# Patient Record
Sex: FEMALE | Race: WHITE | NOT HISPANIC OR LATINO | Employment: OTHER | URBAN - METROPOLITAN AREA
[De-identification: names, ages, dates, MRNs, and addresses within clinical notes are randomized per-mention and may not be internally consistent; named-entity substitution may affect disease eponyms.]

---

## 2017-01-16 ENCOUNTER — LAB VISIT (OUTPATIENT)
Dept: LAB | Facility: HOSPITAL | Age: 67
End: 2017-01-16
Attending: INTERNAL MEDICINE
Payer: MEDICARE

## 2017-01-16 ENCOUNTER — TELEPHONE (OUTPATIENT)
Dept: HEMATOLOGY/ONCOLOGY | Facility: CLINIC | Age: 67
End: 2017-01-16

## 2017-01-16 ENCOUNTER — OFFICE VISIT (OUTPATIENT)
Dept: HEMATOLOGY/ONCOLOGY | Facility: CLINIC | Age: 67
End: 2017-01-16
Payer: MEDICARE

## 2017-01-16 VITALS
HEIGHT: 65 IN | SYSTOLIC BLOOD PRESSURE: 142 MMHG | OXYGEN SATURATION: 99 % | HEART RATE: 61 BPM | WEIGHT: 197.31 LBS | DIASTOLIC BLOOD PRESSURE: 80 MMHG | TEMPERATURE: 98 F | BODY MASS INDEX: 32.87 KG/M2 | RESPIRATION RATE: 18 BRPM

## 2017-01-16 DIAGNOSIS — C64.1 KIDNEY CARCINOMA, RIGHT: Primary | ICD-10-CM

## 2017-01-16 DIAGNOSIS — R30.0 DYSURIA: ICD-10-CM

## 2017-01-16 DIAGNOSIS — Z85.528 HISTORY OF RENAL CELL CARCINOMA: ICD-10-CM

## 2017-01-16 LAB
ALBUMIN SERPL BCP-MCNC: 3.7 G/DL
ALP SERPL-CCNC: 93 U/L
ALT SERPL W/O P-5'-P-CCNC: 14 U/L
ANION GAP SERPL CALC-SCNC: 7 MMOL/L
AST SERPL-CCNC: 18 U/L
BASOPHILS # BLD AUTO: 0.02 K/UL
BASOPHILS NFR BLD: 0.4 %
BILIRUB SERPL-MCNC: 0.8 MG/DL
BUN SERPL-MCNC: 12 MG/DL
CALCIUM SERPL-MCNC: 8.8 MG/DL
CHLORIDE SERPL-SCNC: 108 MMOL/L
CO2 SERPL-SCNC: 25 MMOL/L
CREAT SERPL-MCNC: 1.1 MG/DL
DIFFERENTIAL METHOD: NORMAL
EOSINOPHIL # BLD AUTO: 0.1 K/UL
EOSINOPHIL NFR BLD: 2.3 %
ERYTHROCYTE [DISTWIDTH] IN BLOOD BY AUTOMATED COUNT: 14 %
EST. GFR  (AFRICAN AMERICAN): >60 ML/MIN/1.73 M^2
EST. GFR  (NON AFRICAN AMERICAN): 52 ML/MIN/1.73 M^2
GLUCOSE SERPL-MCNC: 82 MG/DL
HCT VFR BLD AUTO: 42.9 %
HGB BLD-MCNC: 14.4 G/DL
LYMPHOCYTES # BLD AUTO: 1.5 K/UL
LYMPHOCYTES NFR BLD: 29 %
MCH RBC QN AUTO: 29.6 PG
MCHC RBC AUTO-ENTMCNC: 33.6 %
MCV RBC AUTO: 88 FL
MONOCYTES # BLD AUTO: 0.4 K/UL
MONOCYTES NFR BLD: 7.1 %
NEUTROPHILS # BLD AUTO: 3.2 K/UL
NEUTROPHILS NFR BLD: 61.2 %
PLATELET # BLD AUTO: 202 K/UL
PMV BLD AUTO: 9.9 FL
POTASSIUM SERPL-SCNC: 4.8 MMOL/L
PROT SERPL-MCNC: 6.6 G/DL
RBC # BLD AUTO: 4.86 M/UL
SODIUM SERPL-SCNC: 140 MMOL/L
WBC # BLD AUTO: 5.2 K/UL

## 2017-01-16 PROCEDURE — 99213 OFFICE O/P EST LOW 20 MIN: CPT | Mod: S$PBB,,, | Performed by: INTERNAL MEDICINE

## 2017-01-16 PROCEDURE — 99213 OFFICE O/P EST LOW 20 MIN: CPT | Mod: PBBFAC | Performed by: INTERNAL MEDICINE

## 2017-01-16 PROCEDURE — 99999 PR PBB SHADOW E&M-EST. PATIENT-LVL III: CPT | Mod: PBBFAC,,, | Performed by: INTERNAL MEDICINE

## 2017-01-16 NOTE — TELEPHONE ENCOUNTER
Patient is concerned about CT scan results. Message sent to Dr Patel. Will contact patient whenever Dr Patel replies.

## 2017-01-16 NOTE — TELEPHONE ENCOUNTER
----- Message from Rita Tai sent at 1/16/2017  1:51 PM CST -----  Contact: pt  Pt calling to speak to nurse...states that she was seen earlier today and has some questions/ concerns...please adv/call pt back at [phone]///thx jw

## 2017-01-16 NOTE — PROGRESS NOTES
Hematology/Oncology Consult Note    Reason for Consult: Evaluation and management of new bone pain in setting of renal cell carcinoma history    Consult requested by: Dr. Isamar Chun, Rheumatology    [facility] Diagnosis: Renal cell carcinoma of R kidney    Stage: III ([id number])    Pathology: Right nephrectomy 7/25/2013  1. RETROPERITONEAL TISSUE:  POSSIBLY AS MANY AS 5 LYMPH NODES WITH NO METASTATIC CARCINOMA IDENTIFIED  3. RIGHT KIDNEY:  RENAL CELL CARCINOMA (conventional type) WITH MICROSCOPIC EXTENSION INTO PERINEPHRIC ADIPOSE TISSUE AND  WITH TUMOR IN THE RENAL VEIN MARGIN    Prior Treatment: Laparascopic right radical nephrectomy 7/25/13    Current Treatment: Surveillance    History of Present Illness:  Mrs. Sherrill Avery is a 65-year-old  female with prior history of right renal cell carcinoma status post right nephrectomy in July 2013 who comes in for evaluation and management.  At time of initial diagnosis patient reports that she was having trouble bending over, being unable to breath. She then developed georgi hematuria on 6/31/13, went to [facility]. CT imaging showed a right renal mass.  She underwent R nephrectomy in 7/2013, with pathology showing RCC. Pathology report was reviewed between her surgeon, Dr. Correa and the pathologist in detail as it showed extension of the renal cell carcinoma in 2 perinephric adipose tissue and positive margin in the renal vein.  At that time, patient was counseled that there is no role for adjuvant treatment for renal cell carcinoma.  She has been followed closely by Dr. Correa in [place] with surveillance imaging including CT of abdomen pelvis and chest x-rays.  Patient reports having right thigh pain for approximately 6 months, but has had persistent aches and pains in her feet and hips for several years.  She is extremely anxious with the surveillance imaging she has been getting and she asked why she cannot have a PET scan or chest CT.  She denies  any recurrent fevers, chills, sweats, unintentional weight loss.  No nausea, vomiting, diarrhea.  No enlarged lymph nodes.  No hematuria.    Today, patient comes in for follow and surveillance of renal cell carcinoma. She saw Dr. Myers and had abd/pelvis CT on 12/1/16. Imaging showed stable findings, no suspicion of cancer. She asks today why she keeps having blood in her UA. She also reports that she has low back pain, which sometimes occurs during urinary tract infections. She denies any n/v/d/abdom pain, hematuria, dysuria, unintentional weight loss, fevers, chills, sweats, back pain.     ROS:  General:  No wt loss, fever/chills, fatigue, night sweats  Eyes: No vision problems, pain or inflammation.   Ears/Nose: No difficultly hearing, ear pain, or epistaxis, rhinorrhea  Oropharynx: No ulcers, dysphagia, or odynophagia  Cardiovascular: No chest pains, sob, PND or dyspnea on exertion  Pulmonary: No cough, sob, hemoptysis  Gastrointestional: No n/v/d, melena, hematochezia, or change in bowel habits  : No dysuria, hematuria, pelvic pain or flank pain  Musculoskeletal: No myalgias, weakness. Low back pain  Neurological: No headaches, focal deficits, or seizure activity, dizziness  Endocrine: No heat or cold intolerance   Skin: No rashes pruritus, or lesions  Psychiatric: No symptoms of mood disorders  Heme/Lymph: No lymph node enlargment    Past Medical History   Diagnosis Date    Anxiety     Arthritis     Cancer of kidney 8/2/2013     dr myers    Ectopic pregnancy     Eye infection     GERD (gastroesophageal reflux disease)     Hiatal hernia     History of kidney cancer     Hypercholesteremia     Hypertension     Hypothyroid      dr block q 6 months    Insomnia     Migraine headache     Renal cell cancer     Respiratory arrest      due to anesthia    Rosacea     Sleep apnea     Thyroid nodule      dr block    Urinary tract infection        Social History:  Social History     Social History     Marital status:      Spouse name: KAIDEN    Number of children: 5    Years of education: N/A     Occupational History    retired      Dance Instructor     Social History Main Topics    Smoking status: Never Smoker    Smokeless tobacco: Never Used    Alcohol use 0.0 oz/week      Comment: social    Drug use: No    Sexual activity: Yes     Partners: Male     Birth control/ protection: Surgical     Other Topics Concern    None     Social History Narrative    Patient is a retired dance instructor and live with .       Family History: family history includes Cancer in her father; Diabetes in her mother; Glaucoma in her paternal grandmother and sister; Heart disease in her mother; Hypertension in her mother. Father had small cell lung cancer age 76.    Physical Exam:  Vitals:    01/16/17 1135   BP: (!) 142/80   Pulse: 61   Resp: 18   Temp: 98.4 °F (36.9 °C)     Body mass index is 32.83 kg/(m^2).  General:  AAOx4, no acute distress  HEENT: EOMI. Normocephalic and atraumatic. No maxillary sinus tenderness. External auditory canals clear and TMs intact without lesions. Nasal and oral mucosal membranes moist. Normal dentition and gums.   Neck: no LAD, thyromegaly, normal ROM  Pulmonary: Bilaterally clear to auscultation, Normal effort with no accessory muscle use, no wheezes/rales/rhonchi  CV: Normal rate, regular rhythm, no murmurs/rubs/gallops, no edema  ABD:  Soft, nontender, nondistended, no mass, and without hepatosplenomegaly   Ext: No clubbing, cyanosis, or edema, normal ROM.   Skin: No rashes, lesions, bruising or petechiae  Neurological: No focal deficits, CN II to XII grossly intact, normal coordination. 5/5 strength in all 4 extremities   Psychiatric:  Normal affect and judgement, somewhat anxious.  Hem/Lymph:  No submandibular, cervical, supraclavicular, axillaryLAD.    Labs:    Lab Results   Component Value Date    WBC 5.20 01/16/2017    HGB 14.4 01/16/2017    HCT 42.9 01/16/2017    MCV  88 01/16/2017     01/16/2017     Lab Results   Component Value Date     01/16/2017    K 4.8 01/16/2017     01/16/2017    CO2 25 01/16/2017    BUN 12 01/16/2017    CREATININE 1.1 01/16/2017    CALCIUM 8.8 01/16/2017    ANIONGAP 7 (L) 01/16/2017    ESTGFRAFRICA >60 01/16/2017    EGFRNONAA 52 (A) 01/16/2017     Lab Results   Component Value Date    ALT 14 01/16/2017    AST 18 01/16/2017    ALKPHOS 93 01/16/2017    BILITOT 0.8 01/16/2017       No results found for: IRON, TIBC, FERRITIN, SATURATEDIRO  No results found for: [id number]  No results found for: FOLATE  Lab Results   Component Value Date    TSH 0.705 09/17/2013       Imaging:    CT abd/pelvis 12/1/16:  1. In this patient status post right nephrectomy, there is no evidence of local recurrence or metastatic disease.  2. New less than 4 mm pulmonary nodule in the anterior basal segment of the right lower lobe.  Stable 4 mm pulmonary nodule posterior basal segment right lower lobe.  Per Fleischner Society guidelines; in a low risk patient, no follow up recommended. In a high risk patient/smoker, consider 12 month CT chest follow up to evaluate for stability.  3. 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  4. Stable 1.1 cm hypodensity in the left hepatic lobe.  5. Other findings as detailed above.    Bone scan 2/2/16: Mild degenerative change with no acute findings.    Assessment / Plan:  Sherrill Avery is a 66 y.o. female with history of right-sided renal cell carcinoma comes in for evaluation and management    1. Renal cell carcinoma, right-sided: Stage III at time of diagnosis in July/2013.  Status post radical right nephrectomy.  No role for adjuvant chemotherapy. Has had no evidence of disease recurrence on past imaging.  Discussed in detail how PET scans differ from CT scans, specifically with several false positive findings on PET imaging.  Also discussed how PET is not as useful for surveillance but rather can be helpful in the  workup of abnormal CT findings.  Also discussed that insurance often does not cover pet imaging.  We reviewed that risks of and benefits of surveillance imaging have to balance the chance of identifying disease recurrence early with the risk of repeat radiation exposure. Reassured patient that there is no role for adjuvant chemotherapy for renal cell carcinoma.   -- Follow-up in 6 months with labs.   -- Pt also follows with Dr. Myers    2.  Low back pain, dysuria: UA with few bacteria and 8 RBCs.   -- Check urine culture. Will call pt with results. If urine culture negative, will ask Dr. Myers whether the microscopic hematuria needs any further workup.    3. Left knee pain: She wants to know if she needs a knee replacement. I offered evaluation first by Dr. Chun and then perhaps orthopedic surgery. Pt states she will be evaluated by Dr. Chun first.    4. Obesity: BMI > 30. Patient previously asked for recommendations on weight loss. I advised diet and exercise, specifically with calorie counting use a free mobile rush and walking 30 minutes a day as a start.    Purnima Patel M.D.  Hematology Oncology

## 2017-01-17 ENCOUNTER — TELEPHONE (OUTPATIENT)
Dept: HEMATOLOGY/ONCOLOGY | Facility: CLINIC | Age: 67
End: 2017-01-17

## 2017-01-17 NOTE — TELEPHONE ENCOUNTER
Explained Dr Wong advised to patient. Patient still insist that her cancer (with is non existence) is aggressive and waiting 1 year to re scan, would be too late. Explained to patient multiple times the guidelines. Patient still wants to be scanned more frequently. advised patient non reccommended exposure to radiation is harmful.  Patient still no agreeing with advice. Advised patient Dr Patel will contact her for further explanation.

## 2017-01-17 NOTE — TELEPHONE ENCOUNTER
----- Message from Purnima Patel MD sent at 1/17/2017  8:27 AM CST -----  Since CT scans are so sensitive and can  tiny pulmonary nodules which can represent scar tissue, infection and other benign (noncancerous findings), we follow Fleischner Society guidelines in how to follow these findings. I recommend we repeat the chest imaging in 1 yr based on these findings. Pulmonary nodules that are less than 1 cm and especially those less than 0.5cm are often seen on imaging and do not represent cancer.    I hope that helps.    Dr. Patel    ----- Message -----     From: Gabriella Neely LPN     Sent: 1/16/2017   2:05 PM       To: Purnima Patel MD    New less than 4 mm pulmonary nodule in the anterior basal segment of the right lower lobe.  Stable 4 mm pulmonary nodule posterior basal segment right lower lobe.  Per Fleischner Society guidelines; in a low risk patient, no follow up recommended. In a high risk patient/smoker, consider 12 month CT chest follow up to evaluate for stability.    Patient is concerned about this statement. Pleaser advise.

## 2017-01-23 ENCOUNTER — TELEPHONE (OUTPATIENT)
Dept: UROLOGY | Facility: CLINIC | Age: 67
End: 2017-01-23

## 2017-01-23 DIAGNOSIS — R31.29 HEMATURIA, MICROSCOPIC: Primary | ICD-10-CM

## 2017-01-23 NOTE — TELEPHONE ENCOUNTER
----- Message from Oleg Myers MD sent at 1/19/2017  7:40 AM CST -----  I think the only thing we should do is a cystoscopy. Will all of the prior imaging she does not need further upper tract imaging. If cysto negative she will need no further workup for microhematuria for 5 years. I will ask Kari to set her up. Also she could see one of our [facility] urologists to avoid a foggy ride to [facility].  ----- Message -----     From: Purnima Patel MD     Sent: 1/18/2017  12:58 PM       To: Oleg Myers MD    Hello. This patient sees me in Hem[facility]c clinic in [place] every 6 months as an adjunct to your visits, for follow up of RCC. I know she is an extremely anxious person, and I actually had a long discussion with her that it is not healthy for her to be so anxious and that she needs to trust her doctors. However, she did mention that she always has microscopic hematuria on her urinalyses. I checked a urine culture to ensure there was no infection, it was negative. Do you think she needs any further workup for the microscopic hematuria?    Thank you,  Purnima Patel

## 2017-02-02 ENCOUNTER — PROCEDURE VISIT (OUTPATIENT)
Dept: UROLOGY | Facility: CLINIC | Age: 67
End: 2017-02-02
Payer: MEDICARE

## 2017-02-02 VITALS
WEIGHT: 197.75 LBS | BODY MASS INDEX: 31.78 KG/M2 | TEMPERATURE: 98 F | HEIGHT: 66 IN | HEART RATE: 63 BPM | SYSTOLIC BLOOD PRESSURE: 127 MMHG | RESPIRATION RATE: 18 BRPM | DIASTOLIC BLOOD PRESSURE: 69 MMHG

## 2017-02-02 DIAGNOSIS — R31.29 MICROSCOPIC HEMATURIA: ICD-10-CM

## 2017-02-02 DIAGNOSIS — R31.29 HEMATURIA, MICROSCOPIC: ICD-10-CM

## 2017-02-02 PROCEDURE — 52000 CYSTOURETHROSCOPY: CPT | Mod: S$PBB,,, | Performed by: UROLOGY

## 2017-02-02 PROCEDURE — 52000 CYSTOURETHROSCOPY: CPT | Mod: PBBFAC | Performed by: UROLOGY

## 2017-02-02 RX ORDER — LIDOCAINE HYDROCHLORIDE 20 MG/ML
JELLY TOPICAL ONCE
Status: COMPLETED | OUTPATIENT
Start: 2017-02-02 | End: 2017-02-02

## 2017-02-02 RX ORDER — CIPROFLOXACIN 250 MG/1
500 TABLET, FILM COATED ORAL ONCE
Status: COMPLETED | OUTPATIENT
Start: 2017-02-02 | End: 2017-02-02

## 2017-02-02 RX ADMIN — LIDOCAINE HYDROCHLORIDE: 20 JELLY TOPICAL at 01:02

## 2017-02-02 RX ADMIN — CIPROFLOXACIN 500 MG: 250 TABLET, FILM COATED ORAL at 01:02

## 2017-02-02 NOTE — PATIENT INSTRUCTIONS
Cystoscopy  Cystoscopy is a procedure that lets your doctor look directly inside your urethra and bladder. It can be used to:  · Help diagnose a problem with your urethra, bladder, or kidneys.  · Take a sample (biopsy) of bladder or urethral tissue.  · Treat certain problems (such as removing kidney stones).  · Place a stent to bypass an obstruction.  · Take special X-rays of the kidneys.  Based on the findings, your doctor may recommend other tests or treatments.  What is a cystoscope?  A cystoscope is a telescope-like instrument that contains lenses and fiberoptics (small glass wires that make bright light). The cystoscope may be straight and rigid, or flexible to bend around curves in the urethra. The doctor may look directly into the cystoscope, or project the image onto a monitor.  Getting ready  · Ask your doctor if you should stop taking any medications prior to the procedure.  · Ask whether you should avoid eating or drinking anything after midnight before the procedure.  · Follow any other instructions your doctor gives you.  Tell your doctor before the exam if you:  · Take any medications, such as aspirin or blood thinners  · Have allergies to any medications  · Are pregnant   The procedure  Cystoscopy is done in the doctors office or hospital. The doctor and a nurse are present during the procedure. It takes only a few minutes, longer if a biopsy, X-ray, or treatment needs to be done.  During the procedure:  · You lie on an exam table on your back, knees bent and legs apart. You are covered with a drape.  · Your urethra and the area around it are washed. Anesthetic jelly may be applied to numb the urethra. Other pain medication is usually not needed. In some cases, you may be offered a mild sedative to help you relax. If a more extensive procedure is to be done, such as a biopsy or kidney stone removal, general anesthesia may be needed.  · The cystoscope is inserted. A sterile fluid is put into the  bladder to expand it. You may feel pressure from this fluid.  · When the procedure is done, the cystoscope is removed.  After the procedure  If you had a sedative, general anesthesia, or spinal anesthesia, you must have someone drive you home. Once youre home:  · Drink plenty of fluids.  · You may have burning or light bleeding when you urinate--this is normal.  · Medications may be prescribed to ease any discomfort or prevent infection. Take these as directed.  · Call your doctor if you have heavy bleeding or blood clots, burning that lasts more than a day, a fever over 100°F  (38° C), or trouble urinating.  © 0640-2024 [employer]. [address] [place] [address], [place] [address]. All rights reserved. This information is not intended as a substitute for professional medical care. Always follow your healthcare professional's instructions.      What to Expect After a Cystoscopy  For the next 24-48 hours, you may feel a mild burning when you urinate. This burning is normal and expected. Drink plenty of water to dilute the urine to help relieve the burning sensation. You may also see a small amount of blood in your urine after the procedure.    Unless you are already taking antibiotics, you may be given an antibiotic after the test to prevent infection.    Signs and Symptoms to Report  Call the [facility] at [phone] if you develop any of the following:  · Fever of 101 degrees or higher  · Chills or persistent bleeding  · Inability to urinate

## 2017-02-02 NOTE — MR AVS SNAPSHOT
[facility]  [address]  [facility] [address]  Phone: [phone]                  Sherrill Avery   2017 1:00 PM   Procedure visit    Description:  Female : 1950   Provider:  PROCEDURES, UROLOGY   Department:  [facility]           Reason for Visit     Other           Diagnoses this Visit        Comments    Hematuria, microscopic         Microscopic hematuria                To Do List           Future Appointments        Provider Department Dept Phone    2017 10:40 AM LABORATORY, [address] [facility] [phone]    2017 11:00 AM Purnima Patel MD [facility] - Hematology Oncology [phone]      Goals (5 Years of Data)     None      [facility]     [facility] -  Assistance  Registered nurses in the [facility] provide clinical advisement, health education, appointment booking, and other advisory services.  Call for this free service at [phone].             Medications           Message regarding Medications     Verify the changes and/or additions to your medication regime listed below are the same as discussed with your clinician today.  If any of these changes or additions are incorrect, please notify your healthcare provider.        These medications were administered today        Dose Freq    lidocaine HCl 2% urojet  Once    Sig: Place into the urethra once.    Class: Normal    Route: Urethral    ciprofloxacin HCl tablet 500 mg 500 mg Once    Sig: Take 2 tablets (500 mg total) by mouth once.    Class: Normal    Route: Oral           Verify that the below list of medications is an accurate representation of the medications you are currently taking.  If none reported, the list may be blank. If incorrect, please contact your healthcare provider. Carry this list with you in case of emergency.           Current Medications     alprazolam (XANAX) 0.5 MG tablet Take 1 tablet (0.5 mg  "total) by mouth nightly as needed.    butalbital-acetaminophen-caffeine -40 mg (FIORICET, ESGIC) -40 mg per tablet TAKE 1 TABLET EVERY 4 HOURS AS NEEDED    clotrimazole (LOTRIMIN) 1 % Soln Apply topically 2 (two) times daily.    cycloSPORINE (RESTASIS) 0.05 % ophthalmic emulsion Place 1 drop into both eyes 2 (two) times daily.     estradiol (ESTRACE) 1 MG tablet Take 1 tablet (1 mg total) by mouth once daily.    fluticasone (FLONASE) 50 mcg/actuation nasal spray 2 sprays by Each Nare route once daily.    GLUCOSAMINE HCL/CHONDR HERNDON A NA (OSTEO BI-FLEX ORAL) Take by mouth.    hydrocortisone 2.5 % cream Apply to the affected area twice daily.    ketoconazole (NIZORAL) 2 % shampoo Shampoo three times weekly for 4 weeks then as needed.    levothyroxine (SYNTHROID) 100 MCG tablet Take 100 mcg by mouth once daily.    zolpidem (AMBIEN) 5 MG Tab Take 1 tablet (5 mg total) by mouth nightly as needed.    calcium-vitamin D3 500 mg(1,250mg) -200 unit per tablet Take 1 tablet by mouth.    diclofenac sodium 1 % Gel Apply 2 g topically 3 (three) times daily.           Clinical Reference Information           Your Vitals Were     BP Pulse Temp Resp Height Weight    131/70 69 97.7 °F (36.5 °C) (Oral) 18 5' 5.5" (1.664 m) 89.7 kg (197 lb 12 oz)    BMI                32.41 kg/m2          Blood Pressure          Most Recent Value    BP  131/70      Allergies as of 2/2/2017     Tramadol    Codeine    Morphine    Naproxen    Protonix [Pantoprazole]    Wal-phed [Pseudoephedrine Hcl]    Buspirone    Talwin [Pentazocine Lactate]      Immunizations Administered on Date of Encounter - 2/2/2017     None      Orders Placed During Today's Visit      Normal Orders This Visit    Cystoscopy     Future Labs/Procedures Expected by Expires    Cystoscopy  As directed 2/2/2018      [facility] Sign-Up     Activating your [facility] account is as easy as 1-2-3!     1) Visit [email or web address], select Sign Up Now, enter this activation code and your " date of birth, then select Next.  [email or web address]  Expires: 3/19/2017  1:16 PM      2) Create a username and password to use when you visit [facility] in the future and select a security question in case you lose your password and select Next.    3) Enter your e-mail address and click Sign Up!    Additional Information  If you have questions, please e-mail [email or web address] or call [phone] to talk to our [email or web address] staff. Remember, [facility] is NOT to be used for urgent needs. For medical emergencies, dial 911.         Instructions        Cystoscopy  Cystoscopy is a procedure that lets your doctor look directly inside your urethra and bladder. It can be used to:  · Help diagnose a problem with your urethra, bladder, or kidneys.  · Take a sample (biopsy) of bladder or urethral tissue.  · Treat certain problems (such as removing kidney stones).  · Place a stent to bypass an obstruction.  · Take special X-rays of the kidneys.  Based on the findings, your doctor may recommend other tests or treatments.  What is a cystoscope?  A cystoscope is a telescope-like instrument that contains lenses and fiberoptics (small glass wires that make bright light). The cystoscope may be straight and rigid, or flexible to bend around curves in the urethra. The doctor may look directly into the cystoscope, or project the image onto a monitor.  Getting ready  · Ask your doctor if you should stop taking any medications prior to the procedure.  · Ask whether you should avoid eating or drinking anything after midnight before the procedure.  · Follow any other instructions your doctor gives you.  Tell your doctor before the exam if you:  · Take any medications, such as aspirin or blood thinners  · Have allergies to any medications  · Are pregnant   The procedure  Cystoscopy is done in the doctors office or hospital. The doctor and a nurse are present during the procedure. It takes only a few minutes, longer if a biopsy, X-ray, or  treatment needs to be done.  During the procedure:  · You lie on an exam table on your back, knees bent and legs apart. You are covered with a drape.  · Your urethra and the area around it are washed. Anesthetic jelly may be applied to numb the urethra. Other pain medication is usually not needed. In some cases, you may be offered a mild sedative to help you relax. If a more extensive procedure is to be done, such as a biopsy or kidney stone removal, general anesthesia may be needed.  · The cystoscope is inserted. A sterile fluid is put into the bladder to expand it. You may feel pressure from this fluid.  · When the procedure is done, the cystoscope is removed.  After the procedure  If you had a sedative, general anesthesia, or spinal anesthesia, you must have someone drive you home. Once youre home:  · Drink plenty of fluids.  · You may have burning or light bleeding when you urinate--this is normal.  · Medications may be prescribed to ease any discomfort or prevent infection. Take these as directed.  · Call your doctor if you have heavy bleeding or blood clots, burning that lasts more than a day, a fever over 100°F  (38° C), or trouble urinating.  © 7223-3716 [employer]. [address] [place] [address], [place] [id number]. All rights reserved. This information is not intended as a substitute for professional medical care. Always follow your healthcare professional's instructions.      What to Expect After a Cystoscopy  For the next 24-48 hours, you may feel a mild burning when you urinate. This burning is normal and expected. Drink plenty of water to dilute the urine to help relieve the burning sensation. You may also see a small amount of blood in your urine after the procedure.    Unless you are already taking antibiotics, you may be given an antibiotic after the test to prevent infection.    Signs and Symptoms to Report  Call the [facility] at [phone] if you develop any of the  following:  · Fever of 101 degrees or higher  · Chills or persistent bleeding  · Inability to urinate       Language Assistance Services     ATTENTION: Language assistance services are available, free of charge. Please call [phone].      ATENCIÓN: Si ananth crawford, tiene a del toro disposición servicios gratuitos de asistencia lingüística. Llame al [phone].     CHÚ Ý: N?u b?n nói Ti?ng Vi?t, có các d?ch v? h? tr? ngôn ng? mi?n phí dành cho b?n. G?i s? [phone].         [facility] complies with applicable Federal civil rights laws and does not discriminate on the basis of race, color, national origin, age, disability, or sex.

## 2017-02-08 ENCOUNTER — OFFICE VISIT (OUTPATIENT)
Dept: PULMONOLOGY | Facility: CLINIC | Age: 67
End: 2017-02-08
Payer: MEDICARE

## 2017-02-08 VITALS
SYSTOLIC BLOOD PRESSURE: 110 MMHG | DIASTOLIC BLOOD PRESSURE: 70 MMHG | OXYGEN SATURATION: 96 % | WEIGHT: 195.56 LBS | HEART RATE: 70 BPM | BODY MASS INDEX: 31.43 KG/M2 | HEIGHT: 66 IN

## 2017-02-08 DIAGNOSIS — R91.8 LUNG NODULES: Primary | Chronic | ICD-10-CM

## 2017-02-08 PROCEDURE — 99999 PR PBB SHADOW E&M-EST. PATIENT-LVL III: CPT | Mod: PBBFAC,,, | Performed by: INTERNAL MEDICINE

## 2017-02-08 PROCEDURE — 99213 OFFICE O/P EST LOW 20 MIN: CPT | Mod: PBBFAC | Performed by: INTERNAL MEDICINE

## 2017-02-08 PROCEDURE — 99205 OFFICE O/P NEW HI 60 MIN: CPT | Mod: S$PBB,,, | Performed by: INTERNAL MEDICINE

## 2017-02-08 NOTE — PROGRESS NOTES
Subjective:      Patient ID: Sherrill Avery is a 66 y.o. female.    Patient Active Problem List   Diagnosis    GERD (gastroesophageal reflux disease)    Esophageal reflux    Hypothyroidism    Nontoxic multinodular goiter    Perceptive hearing loss, both sides    Osteoarthritis    Fibromyalgia    Other chronic dermatitis due to solar radiation    Benign neoplasm of skin    Family history of malignant neoplasm    Inflamed seborrheic keratosis    Dyschromia    Other dermatitis due to solar radiation    Actinic degeneration    Dermatofibroma    Family history of malignant melanoma    Family history of skin cancer    Bilateral sensorineural hearing loss    Hypertension    Hypercholesteremia    Kidney carcinoma    Multiple benign melanocytic nevi    Hip pain    Body aches    Neuropathy of left sural nerve-mild    Memory deficits    Vitamin D deficiency    Anxiety    Insomnia    Vertigo    Urinary tract infection without hematuria    Microscopic hematuria    Lung nodules     Problem list has been reviewed.    Chief Complaint: Pulmonary Nodules    HPI    She presents for evaluation and treatment of a lung nodule. She has a history of high grade renal cell cancer pT3a, grade 2/4. She underwent radical right nephrectomy resection in 07/25/2013. Surveillance imaging has shown non recurrence. The most recent imaging revealed a stable less than 4 mm pulmonary nodule in the posterior basal segment of the right lower lobe. She is her to evaluate lung nodules. Other symptoms include dyspnea on exertion. She reports dyspnea with bending over and going upstairs but no dyspnea with walking on flat ground.   She has never smoked. The patient has no known exposure to tuberculosis.       Previous Report Reviewed: office notes and radiology reports     The following portions of the patient's history were reviewed and updated as appropriate: She  has a past medical history of Anxiety; Arthritis; Cancer of  kidney (8/2/2013); Ectopic pregnancy; Eye infection; GERD (gastroesophageal reflux disease); Hiatal hernia; History of kidney cancer; Hypercholesteremia; Hypertension; Hypothyroid; Insomnia; Migraine headache; Renal cell cancer; Respiratory arrest; Rosacea; Sleep apnea; Thyroid nodule; and Urinary tract infection.  She  has a past surgical history that includes right ganglion cyst; throat polyp; breast implants; Laparoscopic nephrectomy, hand assisted (07/25/2013); Nephrectomy; Upper gastrointestinal endoscopy (2007); Colonoscopy (2007); Oophorectomy; Transobturator sling (2011); and Hysterectomy.  Her family history includes Cancer in her father; Diabetes in her mother; Glaucoma in her paternal grandmother and sister; Heart disease in her mother; Hypertension in her mother.  She  reports that she has never smoked. She has never used smokeless tobacco. She reports that she drinks alcohol. She reports that she does not use illicit drugs.  She has a current medication list which includes the following prescription(s): alprazolam, butalbital-acetaminophen-caffeine -40 mg, calcium-vitamin d3, clotrimazole, cyclosporine, estradiol, fluticasone, glucosamine hcl/chondr del toro a na, hydrocortisone, ketoconazole, levothyroxine, and zolpidem.  She is allergic to tramadol; codeine; morphine; naproxen; protonix [pantoprazole]; wal-phed [pseudoephedrine hcl]; buspirone; and talwin [pentazocine lactate]..    Review of Systems   Constitutional: Negative for chills, fatigue and night sweats.   HENT: Positive for sinus pressure and congestion. Negative for nosebleeds, postnasal drip and hearing loss.    Eyes: Positive for itching.   Respiratory: Positive for snoring and dyspnea on extertion.    Cardiovascular: Positive for chest pain.   Genitourinary: Positive for hematuria.   Endocrine: Positive for heat intolerance.    Musculoskeletal: Positive for arthralgias and back pain.   Gastrointestinal: Positive for acid reflux. Negative  "for nausea, vomiting and abdominal pain.        Constipation   Neurological: Positive for dizziness.   Psychiatric/Behavioral: The patient is nervous/anxious.      Occupational History:  Retired. Was a dance instructor retired in 1995. Denies occupational exposure to asbestos, silica and petrochemicals. Her father used to work in the shipyard and yard to bring asbestos home. Father and both brothers have asbestosis. She was exposed to asbestos at home.     Avocational Exposure:  None currently.    Pet Exposures:  Dogs. Denies allergy to dog dander.    Objective:     Visit Vitals    /70    Pulse 70    Ht 5' 5.5" (1.664 m)    Wt 88.7 kg (195 lb 8.8 oz)    SpO2 96%    BMI 32.05 kg/m2     Body mass index is 32.05 kg/(m^2).    Physical Exam   Constitutional: She is oriented to person, place, and time. She appears well-developed and well-nourished.   HENT:   Head: Normocephalic and atraumatic.   Right Ear: External ear normal.   Left Ear: External ear normal.   Nose: Nose normal.   Mouth/Throat: Oropharynx is clear and moist.   Eyes: Conjunctivae and EOM are normal. Pupils are equal, round, and reactive to light.   Neck: Normal range of motion.   Cardiovascular: Normal rate, regular rhythm and normal heart sounds.  Exam reveals no gallop and no friction rub.    No murmur heard.  Pulmonary/Chest: Effort normal and breath sounds normal.   Abdominal: Soft. Bowel sounds are normal. She exhibits no distension. There is no hepatosplenomegaly.   Musculoskeletal: Normal range of motion.   Neurological: She is alert and oriented to person, place, and time.   Skin: Skin is warm. No rash noted.   Psychiatric: She has a normal mood and affect. Her behavior is normal. Judgment and thought content normal.   Vitals reviewed.      Personal Diagnostic Review  CT of chest performed on 12/01/16 with contrast: There are partially visualized breast implants. The lung bases demonstrate no evidence of atelectasis or pleural " effusion. There is a new less than 4 mm pulmonary nodule in the anterior basal segment of the right lower lobe and a stable less than 4 mm pulmonary nodule in the posterior basal segment of the right lower lobe.      The visualized portions of the heart appear normal.    The liver is normal in size and attenuation.  No focal hepatic abnormality is seen.  There is a stable 1.1 cm hypodensity in the left hepatic lobe which likely represents a hepatic cyst and compared to 5/2016 and 2/2015. The gallbladder is unremarkable.  No intrahepatic or extrahepatic biliary ductal dilatation.    The stomach, spleen, pancreas, and adrenal glands are unremarkable.    The right kidney is surgically absent. There is no evidence of recurrence of disease in the right renal fossa. The left kidney is normal in size and location.. The left kidney concentrate and excrete contrast appropriately.  No hydronephrosis is seen.  2 ureters are identified on the left. There is a left parapelvic cyst. The urinary bladder and prostate are unremarkable. The uterus is surgically removed.    The visualized loops of small and large bowel show no evidence of obstruction or inflammation.    No ascites, free fluid, or intraperitoneal free air is noted.    There is no evidence of lymph node enlargement in the abdomen or pelvis.    The abdominal aorta is normal in course and caliber without significant atherosclerotic calcifications.    The osseous structures demonstrate no evidence of acute fracture or osseous destructive process.      The extraperitoneal soft tissues are unremarkable.    Assessment:     1. Lung nodules Active     Outpatient Encounter Prescriptions as of 2/8/2017   Medication Sig Dispense Refill    alprazolam (XANAX) 0.5 MG tablet Take 1 tablet (0.5 mg total) by mouth nightly as needed. 90 tablet 0    butalbital-acetaminophen-caffeine -40 mg (FIORICET, ESGIC) -40 mg per tablet TAKE 1 TABLET EVERY 4 HOURS AS NEEDED 30 tablet 0     calcium-vitamin D3 500 mg(1,250mg) -200 unit per tablet Take 1 tablet by mouth.      clotrimazole (LOTRIMIN) 1 % Soln Apply topically 2 (two) times daily. 30 mL 3    cycloSPORINE (RESTASIS) 0.05 % ophthalmic emulsion Place 1 drop into both eyes 2 (two) times daily.       estradiol (ESTRACE) 1 MG tablet Take 1 tablet (1 mg total) by mouth once daily. 30 tablet 0    fluticasone (FLONASE) 50 mcg/actuation nasal spray 2 sprays by Each Nare route once daily. 1 Bottle 12    GLUCOSAMINE HCL/CHONDR HERNDON A NA (OSTEO BI-FLEX ORAL) Take by mouth.      hydrocortisone 2.5 % cream Apply to the affected area twice daily.      ketoconazole (NIZORAL) 2 % shampoo Shampoo three times weekly for 4 weeks then as needed.      levothyroxine (SYNTHROID) 100 MCG tablet Take 100 mcg by mouth once daily.      zolpidem (AMBIEN) 5 MG Tab Take 1 tablet (5 mg total) by mouth nightly as needed. 90 tablet 1    [DISCONTINUED] diclofenac sodium 1 % Gel Apply 2 g topically 3 (three) times daily. 100 g 5     No facility-administered encounter medications on file as of 2/8/2017.      Orders Placed This Encounter   Procedures    CT Chest With Contrast     Standing Status:   Future     Standing Expiration Date:   2/8/2018     Order Specific Question:   Reason for Exam:     Answer:   lung nodules     Order Specific Question:   Is the patient allergic to iodine or contrast? Has a steroid / antihistamine prep been administered?     Answer:   No     Order Specific Question:   Is the patient on ANY Metformin drug such as Glugophage/Glucovance?           Should be off drug 48 hours after contrast. Check renal function before restart.     Answer:   No     Order Specific Question:   Age > 60 years?     Answer:   Yes     Order Specific Question:   History of Kidney Disease - including: decreased kidney function, dialysis, kidney transplay, single kidney, kidney cancer, kidney surgery?     Answer:   Kidney Cancer     Order Specific Question:   Does the  patient have high blood preasure requiring medical treatment?     Answer:   No     Order Specific Question:   Diabetes?     Answer:   No     Order Specific Question:   May the Radiologist modify the order per protocol to meet the clinical needs of the patient?     Answer:   Yes     Order Specific Question:   Recist criteria?     Answer:   No     Order Specific Question:   Will this service be billed to a Worker's Comp policy?     Answer:   No     Plan:     Discussed diagnosis, its evaluation, treatment and usual course. All questions answered.     Lung nodules  Repeat CT chest in 3 months.    TIME SPENT WITH PATIENT: Time spent: 60 minutes in face to face  discussion concerning diagnosis, prognosis, review of lab and test results, benefits of treatment as well as management of disease, counseling of patient and coordination of care between various health  care providers . Greater than half the time spent was used for coordination of care and counseling of patient.       Return in about 3 weeks (around 3/1/2017) for Lung Nodule.

## 2017-02-08 NOTE — PATIENT INSTRUCTIONS
Lung Anatomy  Your lungs take air in to give your body oxygen, which the body needs to work. Your lungs, like all the tissues in your body, are made up of billions of tiny specialized cells. Old lung cells die and are replaced by new, identical lung cells. This natural process helps ensure healthy lungs.    Date Last Reviewed: 11/1/2016  © 2733-0857 [employer]. [address] [place] [address]. All rights reserved. This information is not intended as a substitute for professional medical care. Always follow your healthcare professional's instructions.

## 2017-02-27 DIAGNOSIS — R91.8 LUNG NODULES: Primary | ICD-10-CM

## 2017-03-01 ENCOUNTER — OFFICE VISIT (OUTPATIENT)
Dept: PULMONOLOGY | Facility: CLINIC | Age: 67
End: 2017-03-01
Payer: MEDICARE

## 2017-03-01 ENCOUNTER — HOSPITAL ENCOUNTER (OUTPATIENT)
Dept: RADIOLOGY | Facility: HOSPITAL | Age: 67
Discharge: HOME | End: 2017-03-01
Attending: INTERNAL MEDICINE
Payer: MEDICARE

## 2017-03-01 VITALS
OXYGEN SATURATION: 97 % | HEIGHT: 65 IN | SYSTOLIC BLOOD PRESSURE: 130 MMHG | DIASTOLIC BLOOD PRESSURE: 82 MMHG | BODY MASS INDEX: 32.83 KG/M2 | RESPIRATION RATE: 18 BRPM | HEART RATE: 18 BPM | WEIGHT: 197.06 LBS

## 2017-03-01 DIAGNOSIS — R91.8 MULTIPLE LUNG NODULES ON CT: Primary | Chronic | ICD-10-CM

## 2017-03-01 DIAGNOSIS — R91.8 LUNG NODULES: Chronic | ICD-10-CM

## 2017-03-01 PROCEDURE — 99213 OFFICE O/P EST LOW 20 MIN: CPT | Mod: PBBFAC | Performed by: INTERNAL MEDICINE

## 2017-03-01 PROCEDURE — 99215 OFFICE O/P EST HI 40 MIN: CPT | Mod: S$PBB,,, | Performed by: INTERNAL MEDICINE

## 2017-03-01 PROCEDURE — 99999 PR PBB SHADOW E&M-EST. PATIENT-LVL III: CPT | Mod: PBBFAC,,, | Performed by: INTERNAL MEDICINE

## 2017-03-01 RX ADMIN — IOHEXOL 75 ML: 350 INJECTION, SOLUTION INTRAVENOUS at 08:03

## 2017-03-01 NOTE — PROGRESS NOTES
Subjective:      Patient ID: Sherrill Avery is a 66 y.o. female.    Patient Active Problem List   Diagnosis    GERD (gastroesophageal reflux disease)    Esophageal reflux    Hypothyroidism    Nontoxic multinodular goiter    Perceptive hearing loss, both sides    Osteoarthritis    Fibromyalgia    Other chronic dermatitis due to solar radiation    Benign neoplasm of skin    Family history of malignant neoplasm    Inflamed seborrheic keratosis    Dyschromia    Other dermatitis due to solar radiation    Actinic degeneration    Dermatofibroma    Family history of malignant melanoma    Family history of skin cancer    Bilateral sensorineural hearing loss    Hypertension    Hypercholesteremia    Kidney carcinoma    Multiple benign melanocytic nevi    Hip pain    Body aches    Neuropathy of left sural nerve-mild    Memory deficits    Vitamin D deficiency    Anxiety    Insomnia    Vertigo    Urinary tract infection without hematuria    Microscopic hematuria    Multiple lung nodules on CT     Problem list has been reviewed.    Chief Complaint: Pulmonary Nodules    HPI   Follow up for lung nodules. Review of CT chest. She is accompanied by her spouse. CT chest reviewed with pateint who voiced understanding. She states that she  is fine and has no specific pulmonary complaints. She denies cough sputum, hemoptysis,  pain with breathing, wheezing, asthma.   A full  review of systems, past , family  and social histories was performed except as mentioned in the note above, these are non contributory to the main issues discussed  today    Previous Report Reviewed: office notes     The following portions of the patient's history were reviewed and updated as appropriate: She  has a past medical history of Anxiety; Arthritis; Cancer of kidney (8/2/2013); Ectopic pregnancy; Eye infection; GERD (gastroesophageal reflux disease); Hiatal hernia; History of kidney cancer; Hypercholesteremia; Hypertension;  Hypothyroid; Insomnia; Migraine headache; Renal cell cancer; Respiratory arrest; Rosacea; Sleep apnea; Thyroid nodule; and Urinary tract infection.  She  has a past surgical history that includes right ganglion cyst; throat polyp; breast implants; Laparoscopic nephrectomy, hand assisted (07/25/2013); Nephrectomy; Upper gastrointestinal endoscopy (2007); Colonoscopy (2007); Oophorectomy; Transobturator sling (2011); and Hysterectomy.  Her family history includes Cancer in her father; Diabetes in her mother; Glaucoma in her paternal grandmother and sister; Heart disease in her mother; Hypertension in her mother.  She  reports that she has never smoked. She has never used smokeless tobacco. She reports that she drinks alcohol. She reports that she does not use illicit drugs.  She has a current medication list which includes the following prescription(s): alprazolam, butalbital-acetaminophen-caffeine -40 mg, calcium-vitamin d3, clotrimazole, cyclosporine, estradiol, fluticasone, glucosamine hcl/chondr del toro a na, hydrocortisone, ketoconazole, levothyroxine, and zolpidem.  She is allergic to tramadol; codeine; morphine; naproxen; protonix [pantoprazole]; wal-phed [pseudoephedrine hcl]; buspirone; and talwin [pentazocine lactate]..    Review of Systems   Constitutional: Negative for chills, fatigue and night sweats.   HENT: Positive for sinus pressure and congestion. Negative for nosebleeds, postnasal drip and hearing loss.    Eyes: Positive for itching.   Respiratory: Positive for snoring and dyspnea on extertion.    Cardiovascular: Positive for chest pain.   Genitourinary: Positive for hematuria.   Endocrine: Positive for heat intolerance.    Musculoskeletal: Positive for arthralgias and back pain.   Gastrointestinal: Positive for acid reflux. Negative for nausea, vomiting and abdominal pain.        Constipation   Neurological: Positive for dizziness.   Psychiatric/Behavioral: The patient is nervous/anxious.    All  "other systems reviewed and are negative.     Objective:   /82  Pulse (!) 18  Resp 18  Ht 5' 5" (1.651 m)  Wt 89.4 kg (197 lb 1.5 oz)  SpO2 97%  BMI 32.8 kg/m2  Body mass index is 32.8 kg/(m^2).    Physical Exam   Constitutional: She is oriented to person, place, and time. She appears well-developed and well-nourished.   HENT:   Head: Normocephalic and atraumatic.   Right Ear: External ear normal.   Left Ear: External ear normal.   Nose: Nose normal.   Mouth/Throat: Oropharynx is clear and moist.   Eyes: Conjunctivae and EOM are normal. Pupils are equal, round, and reactive to light.   Neck: Normal range of motion.   Cardiovascular: Normal rate, regular rhythm and normal heart sounds.  Exam reveals no gallop and no friction rub.    No murmur heard.  Pulmonary/Chest: Effort normal and breath sounds normal.   Abdominal: Soft. Bowel sounds are normal. She exhibits no distension. There is no hepatosplenomegaly.   Musculoskeletal: Normal range of motion.   Neurological: She is alert and oriented to person, place, and time.   Skin: Skin is warm. No rash noted.   Psychiatric: She has a normal mood and affect. Her behavior is normal. Judgment and thought content normal.   Vitals reviewed.      Personal Diagnostic Review  CT of chest performed on 03/01/17 with contrast:     There are 5 subcentimeter nodules in the right lung base measuring up to 6 mm which are all new since prior chest CT and 4 of which are new since recent abdominal CT dated 12/01/2016.  There is a stable 3 mm subpleural nodule in the posterior right lung base.  There is a new 5 mm nodule in the right lower lobe infrahilar region.  There is a new 4 mm subpleural nodule right middle lobe.  There is a 6 mm benign-appearing subpleural nodular opacity in the medial left lung base stable compared to recent abdominal CT, new since prior chest CT.  No other suspicious lung nodules are identified.  No dominant lung mass identified.  Patchy bibasilar " dependent atelectasis is noted. The lungs are otherwise clear. There is no acute infiltrate, pleural effusion, or pneumothorax.  The airways are patent.  No pathologic lymphadenopathy is seen in the chest. There is no significant cardiomegaly or pericardial effusion. No aortic aneurysm or dissection identified. Negative for acute fracture or suspicious osseous lesions. No acute disease or suspicious mass seen in the upper abdomen.  Stable simple hepatic cyst.    Assessment:     1. Multiple lung nodules on CT Active     Outpatient Encounter Prescriptions as of 3/1/2017   Medication Sig Dispense Refill    alprazolam (XANAX) 0.5 MG tablet Take 1 tablet (0.5 mg total) by mouth nightly as needed. 90 tablet 0    butalbital-acetaminophen-caffeine -40 mg (FIORICET, ESGIC) -40 mg per tablet TAKE 1 TABLET EVERY 4 HOURS AS NEEDED 30 tablet 0    calcium-vitamin D3 500 mg(1,250mg) -200 unit per tablet Take 1 tablet by mouth.      clotrimazole (LOTRIMIN) 1 % Soln Apply topically 2 (two) times daily. 30 mL 3    cycloSPORINE (RESTASIS) 0.05 % ophthalmic emulsion Place 1 drop into both eyes 2 (two) times daily.       estradiol (ESTRACE) 1 MG tablet Take 1 tablet (1 mg total) by mouth once daily. 30 tablet 0    fluticasone (FLONASE) 50 mcg/actuation nasal spray 2 sprays by Each Nare route once daily. 1 Bottle 12    GLUCOSAMINE HCL/CHONDR HERNDON A NA (OSTEO BI-FLEX ORAL) Take by mouth.      hydrocortisone 2.5 % cream Apply to the affected area twice daily.      ketoconazole (NIZORAL) 2 % shampoo Shampoo three times weekly for 4 weeks then as needed.      levothyroxine (SYNTHROID) 100 MCG tablet Take 100 mcg by mouth once daily.      zolpidem (AMBIEN) 5 MG Tab Take 1 tablet (5 mg total) by mouth nightly as needed. 90 tablet 1     Facility-Administered Encounter Medications as of 3/1/2017   Medication Dose Route Frequency Provider Last Rate Last Dose    [COMPLETED] omnipaque 350 iohexol 75 mL  75 mL Intravenous ONCE  PRN Oleg Shah MD   75 mL at 03/01/17 0840     Orders Placed This Encounter   Procedures    CT Chest With Contrast     Standing Status:   Future     Standing Expiration Date:   3/1/2018     Order Specific Question:   Reason for Exam:     Answer:   Multiple lung nodules     Order Specific Question:   Is the patient allergic to iodine or contrast? Has a steroid / antihistamine prep been administered?     Answer:   No     Order Specific Question:   Is the patient on ANY Metformin drug such as Glugophage/Glucovance?           Should be off drug 48 hours after contrast. Check renal function before restart.     Answer:   No     Order Specific Question:   Age > 60 years?     Answer:   Yes     Order Specific Question:   History of Kidney Disease - including: decreased kidney function, dialysis, kidney transplay, single kidney, kidney cancer, kidney surgery?     Answer:   Kidney Cancer     Order Specific Question:   Does the patient have high blood preasure requiring medical treatment?     Answer:   No     Order Specific Question:   Diabetes?     Answer:   No     Order Specific Question:   May the Radiologist modify the order per protocol to meet the clinical needs of the patient?     Answer:   Yes     Order Specific Question:   Recist criteria?     Answer:   No     Plan:     Discussed diagnosis, its evaluation, treatment and usual course. All questions answered.    Multiple lung nodules on CT  ? Metastatic disease. Subcentimeter nodules . Repeat CT chest in 3 months.     TIME SPENT WITH PATIENT: Time spent: 40 minutes in face to face  discussion concerning diagnosis, prognosis, review of lab and test results, benefits of treatment as well as management of disease, counseling of patient and coordination of care between various health  care providers . Greater than half the time spent was used for coordination of care and counseling of patient.          Return in about 3 months (around 6/1/2017) for Multiple lung  nodules.

## 2017-03-01 NOTE — PATIENT INSTRUCTIONS
Lung Anatomy  Your lungs take air in to give your body oxygen, which the body needs to work. Your lungs, like all the tissues in your body, are made up of billions of tiny specialized cells. Old lung cells die and are replaced by new, identical lung cells. This natural process helps ensure healthy lungs.    Date Last Reviewed: 11/1/2016  © 9713-1734 [employer]. [address] [place] [address]. All rights reserved. This information is not intended as a substitute for professional medical care. Always follow your healthcare professional's instructions.

## 2017-03-14 ENCOUNTER — TELEPHONE (OUTPATIENT)
Dept: HEMATOLOGY/ONCOLOGY | Facility: CLINIC | Age: 67
End: 2017-03-14

## 2017-03-14 NOTE — TELEPHONE ENCOUNTER
----- Message from Lida Shell sent at 3/14/2017  3:58 PM CDT -----  Patient declined to give reason for call. Please adv/call [phone].//thanks. [email or web address]

## 2017-03-21 ENCOUNTER — TELEPHONE (OUTPATIENT)
Dept: HEMATOLOGY/ONCOLOGY | Facility: CLINIC | Age: 67
End: 2017-03-21

## 2017-03-21 NOTE — TELEPHONE ENCOUNTER
----- Message from Aj Shell sent at 3/21/2017  8:17 AM CDT -----  Pt states she is seeing [facility] and she does not want Dr. Patel intervening./ States she would like to speak directly to Dr. Patel./ States she is done with Dr. Patel and she does not want her sharing in on any of her medical care. Pt can be reached at [phone]

## 2017-03-23 ENCOUNTER — TELEPHONE (OUTPATIENT)
Dept: UROLOGY | Facility: CLINIC | Age: 67
End: 2017-03-23

## 2017-03-23 NOTE — TELEPHONE ENCOUNTER
----- Message from Kaelyn Montero MA sent at 3/21/2017  8:23 AM CDT -----  Contact: [phone]  Wanting to let you know that the kidney cancer has returned and it spread to lungs (clear cell carcinoma lung lesion) and she is going to [facility]. She is upset.

## 2017-04-24 ENCOUNTER — HOSPITAL ENCOUNTER (OUTPATIENT)
Dept: RADIOLOGY | Facility: HOSPITAL | Age: 67
Discharge: HOME | End: 2017-04-24
Attending: INTERNAL MEDICINE
Payer: MEDICARE

## 2017-04-24 ENCOUNTER — OFFICE VISIT (OUTPATIENT)
Dept: PULMONOLOGY | Facility: CLINIC | Age: 67
End: 2017-04-24
Payer: MEDICARE

## 2017-04-24 ENCOUNTER — TELEPHONE (OUTPATIENT)
Dept: PULMONOLOGY | Facility: CLINIC | Age: 67
End: 2017-04-24

## 2017-04-24 VITALS
DIASTOLIC BLOOD PRESSURE: 80 MMHG | HEART RATE: 71 BPM | SYSTOLIC BLOOD PRESSURE: 120 MMHG | BODY MASS INDEX: 33.13 KG/M2 | HEIGHT: 65 IN | WEIGHT: 198.88 LBS | OXYGEN SATURATION: 97 % | RESPIRATION RATE: 18 BRPM

## 2017-04-24 DIAGNOSIS — R05.9 COUGH: ICD-10-CM

## 2017-04-24 DIAGNOSIS — R05.9 COUGH: Primary | ICD-10-CM

## 2017-04-24 DIAGNOSIS — R06.00 DYSPNEA AND RESPIRATORY ABNORMALITIES: Chronic | ICD-10-CM

## 2017-04-24 DIAGNOSIS — R06.89 DYSPNEA AND RESPIRATORY ABNORMALITIES: Chronic | ICD-10-CM

## 2017-04-24 DIAGNOSIS — C80.1 CLEAR CELL CARCINOMA: Primary | Chronic | ICD-10-CM

## 2017-04-24 PROCEDURE — 99213 OFFICE O/P EST LOW 20 MIN: CPT | Mod: PBBFAC | Performed by: INTERNAL MEDICINE

## 2017-04-24 PROCEDURE — 99214 OFFICE O/P EST MOD 30 MIN: CPT | Mod: S$PBB,,, | Performed by: INTERNAL MEDICINE

## 2017-04-24 PROCEDURE — 71020 XR CHEST PA AND LATERAL: CPT | Mod: 26,,, | Performed by: RADIOLOGY

## 2017-04-24 PROCEDURE — 99999 PR PBB SHADOW E&M-EST. PATIENT-LVL III: CPT | Mod: PBBFAC,,, | Performed by: INTERNAL MEDICINE

## 2017-04-24 RX ORDER — FUROSEMIDE 20 MG/1
20 TABLET ORAL DAILY
Qty: 5 TABLET | Refills: 0 | Status: SHIPPED | OUTPATIENT
Start: 2017-04-24 | End: 2017-05-23

## 2017-04-24 NOTE — PATIENT INSTRUCTIONS
Lung Anatomy  Your lungs take air in to give your body oxygen, which the body needs to work. Your lungs, like all the tissues in your body, are made up of billions of tiny specialized cells. Old lung cells die and are replaced by new, identical lung cells. This natural process helps ensure healthy lungs.    Date Last Reviewed: 11/1/2016  © 0915-3984 [employer]. [address] [place] [address]. All rights reserved. This information is not intended as a substitute for professional medical care. Always follow your healthcare professional's instructions.

## 2017-04-24 NOTE — PROGRESS NOTES
Subjective:      Patient ID: Sherrill Avery is a 66 y.o. female.    Patient Active Problem List   Diagnosis    GERD (gastroesophageal reflux disease)    Esophageal reflux    Hypothyroidism    Nontoxic multinodular goiter    Perceptive hearing loss, both sides    Osteoarthritis    Fibromyalgia    Other chronic dermatitis due to solar radiation    Benign neoplasm of skin    Family history of malignant neoplasm    Inflamed seborrheic keratosis    Dyschromia    Other dermatitis due to solar radiation    Actinic degeneration    Dermatofibroma    Family history of malignant melanoma    Family history of skin cancer    Bilateral sensorineural hearing loss    Hypertension    Hypercholesteremia    Kidney carcinoma    Multiple benign melanocytic nevi    Hip pain    Body aches    Neuropathy of left sural nerve-mild    Memory deficits    Vitamin D deficiency    Anxiety    Insomnia    Vertigo    Urinary tract infection without hematuria    Microscopic hematuria    Multiple lung nodules on CT    Clear cell carcinoma with lung metastasis    Dyspnea and respiratory abnormalities     Problem list has been reviewed.    Chief Complaint: Shortness of Breath    HPI   Follow up for lung nodules. She is accompanied by her spouse.  CXR reviewed with patient who voiced understanding. She reports that she is currently recovering a recent cold. She reports progressive dyspnea with exertion. She reports a mild cough  productive of yellowish phlegm. She denies  hemoptysis,  pain with breathing, wheezing, asthma.   A full  review of systems, past , family  and social histories was performed except as mentioned in the note above, these are non contributory to the main issues discussed  Today    [facility] was evaluated at [facility]. She was told that she has clear cell carcinoma. Stage IV. Too small to biopsy.  She has follow up in 05/10/17.     Previous Report Reviewed: office notes     The following portions of the  patient's history were reviewed and updated as appropriate: She  has a past medical history of Anxiety; Arthritis; Cancer of kidney (8/2/2013); Ectopic pregnancy; Eye infection; GERD (gastroesophageal reflux disease); Hiatal hernia; History of kidney cancer; Hypercholesteremia; Hypertension; Hypothyroid; Insomnia; Migraine headache; Renal cell cancer; Respiratory arrest; Rosacea; Sleep apnea; Thyroid nodule; and Urinary tract infection.  She  has a past surgical history that includes right ganglion cyst; throat polyp; breast implants; Laparoscopic nephrectomy, hand assisted (07/25/2013); Nephrectomy; Upper gastrointestinal endoscopy (2007); Colonoscopy (2007); Oophorectomy; Transobturator sling (2011); and Hysterectomy.  Her family history includes Cancer in her father; Diabetes in her mother; Glaucoma in her paternal grandmother and sister; Heart disease in her mother; Hypertension in her mother.  She  reports that she has never smoked. She has never used smokeless tobacco. She reports that she drinks alcohol. She reports that she does not use illicit drugs.  She has a current medication list which includes the following prescription(s): alprazolam, butalbital-acetaminophen-caffeine -40 mg, clotrimazole, cyclosporine, estradiol, furosemide, glucosamine hcl/chondr del toro a na, hydrocortisone, ketoconazole, levothyroxine, and zolpidem.  She is allergic to tramadol; codeine; morphine; naproxen; protonix [pantoprazole]; wal-phed [pseudoephedrine hcl]; buspirone; and talwin [pentazocine lactate]..    Review of Systems   Constitutional: Negative for chills, fatigue and night sweats.   HENT: Positive for sinus pressure and congestion. Negative for nosebleeds, postnasal drip and hearing loss.    Eyes: Positive for itching.   Respiratory: Positive for snoring, shortness of breath and dyspnea on extertion.    Cardiovascular: Positive for chest pain.   Genitourinary: Positive for hematuria.   Endocrine: Positive for heat  "intolerance.    Musculoskeletal: Positive for arthralgias and back pain.   Gastrointestinal: Positive for acid reflux. Negative for nausea, vomiting and abdominal pain.        Constipation   Neurological: Positive for dizziness.   Psychiatric/Behavioral: The patient is nervous/anxious.    All other systems reviewed and are negative.     Objective:   /80  Pulse 71  Resp 18  Ht 5' 5" (1.651 m)  Wt 90.2 kg (198 lb 13.7 oz)  SpO2 97%  BMI 33.09 kg/m2  Body mass index is 33.09 kg/(m^2).    Physical Exam   Constitutional: She is oriented to person, place, and time. She appears well-developed and well-nourished.   HENT:   Head: Normocephalic and atraumatic.   Right Ear: External ear normal.   Left Ear: External ear normal.   Nose: Nose normal.   Mouth/Throat: Oropharynx is clear and moist.   Eyes: Conjunctivae and EOM are normal. Pupils are equal, round, and reactive to light.   Neck: Normal range of motion.   Cardiovascular: Normal rate, regular rhythm and normal heart sounds.  Exam reveals no gallop and no friction rub.    No murmur heard.  Pulmonary/Chest: Effort normal and breath sounds normal.   Abdominal: Soft. Bowel sounds are normal. She exhibits no distension. There is no hepatosplenomegaly.   Musculoskeletal: Normal range of motion.   Neurological: She is alert and oriented to person, place, and time.   Skin: Skin is warm. No rash noted.   Psychiatric: She has a normal mood and affect. Her behavior is normal. Judgment and thought content normal.   Vitals reviewed.      Personal Diagnostic Review    X-Ray Chest PA And Lateral: 4/24/2017  Cannot exclude superimposed acute infiltrates and/or partial atelectasis on underlying chronic changes in the LEFT base.  Correlate with clinical and laboratory findings.  Remainder of the lungs are stable in appearance.  Heart and pulmonary vasculature within normal limits and aorta tortuous.  Trachea is midline.  Mild osteopenia and spondylosis  Assessment:     1. " Clear cell carcinoma with lung metastasis Stable   2. Dyspnea and respiratory abnormalities Stable     Outpatient Encounter Prescriptions as of 4/24/2017   Medication Sig Dispense Refill    alprazolam (XANAX) 0.5 MG tablet Take 1 tablet (0.5 mg total) by mouth nightly as needed. 90 tablet 0    butalbital-acetaminophen-caffeine -40 mg (FIORICET, ESGIC) -40 mg per tablet TAKE 1 TABLET EVERY 4 HOURS AS NEEDED 30 tablet 0    clotrimazole (LOTRIMIN) 1 % Soln Apply topically 2 (two) times daily. 30 mL 3    cycloSPORINE (RESTASIS) 0.05 % ophthalmic emulsion Place 1 drop into both eyes 2 (two) times daily.       estradiol (ESTRACE) 1 MG tablet Take 1 tablet (1 mg total) by mouth once daily. 30 tablet 0    furosemide (LASIX) 20 MG tablet Take 1 tablet (20 mg total) by mouth once daily. 5 tablet 0    GLUCOSAMINE HCL/CHONDR HERNDON A NA (OSTEO BI-FLEX ORAL) Take by mouth.      hydrocortisone 2.5 % cream Apply to the affected area twice daily.      ketoconazole (NIZORAL) 2 % shampoo Shampoo three times weekly for 4 weeks then as needed.      levothyroxine (SYNTHROID) 100 MCG tablet Take 100 mcg by mouth once daily.      zolpidem (AMBIEN) 5 MG Tab Take 1 tablet (5 mg total) by mouth nightly as needed. 90 tablet 1    [DISCONTINUED] calcium-vitamin D3 500 mg(1,250mg) -200 unit per tablet Take 1 tablet by mouth.      [DISCONTINUED] fluticasone (FLONASE) 50 mcg/actuation nasal spray 2 sprays by Each Nare route once daily. 1 Bottle 12     No facility-administered encounter medications on file as of 4/24/2017.      No orders of the defined types were placed in this encounter.    Plan:     Discussed diagnosis, its evaluation, treatment and usual course. All questions answered.    Dyspnea and respiratory abnormalities  Etiology unclear:    Lasix 20mg PO daily X 5 days.     Clear cell carcinoma with lung metastasis  Follow up in [facility] as scheduled.     TIME SPENT WITH PATIENT: Time spent: 25 minutes in face to  face  discussion concerning diagnosis, prognosis, review of lab and test results, benefits of treatment as well as management of disease, counseling of patient and coordination of care between various health  care providers . Greater than half the time spent was used for coordination of care and counseling of patient.          Return in about 3 months (around 7/24/2017) for Shortness of breath, Lung Nodule.

## 2017-04-25 ENCOUNTER — HOSPITAL ENCOUNTER (OUTPATIENT)
Facility: HOSPITAL | Age: 67
Discharge: HOME | End: 2017-04-26
Attending: EMERGENCY MEDICINE | Admitting: INTERNAL MEDICINE
Payer: MEDICARE

## 2017-04-25 DIAGNOSIS — R07.9 CHEST PAIN: ICD-10-CM

## 2017-04-25 DIAGNOSIS — I25.9 CHEST PAIN DUE TO MYOCARDIAL ISCHEMIA, UNSPECIFIED ISCHEMIC CHEST PAIN TYPE: Primary | ICD-10-CM

## 2017-04-25 DIAGNOSIS — R07.89 OTHER CHEST PAIN: ICD-10-CM

## 2017-04-25 LAB
ALBUMIN SERPL BCP-MCNC: 3.6 G/DL
ALP SERPL-CCNC: 75 U/L
ALT SERPL W/O P-5'-P-CCNC: 21 U/L
ANION GAP SERPL CALC-SCNC: 9 MMOL/L
AST SERPL-CCNC: 19 U/L
BASOPHILS # BLD AUTO: 0.01 K/UL
BASOPHILS NFR BLD: 0.2 %
BILIRUB SERPL-MCNC: 0.4 MG/DL
BNP SERPL-MCNC: 22 PG/ML
BUN SERPL-MCNC: 9 MG/DL
CALCIUM SERPL-MCNC: 8.6 MG/DL
CHLORIDE SERPL-SCNC: 109 MMOL/L
CO2 SERPL-SCNC: 24 MMOL/L
CREAT SERPL-MCNC: 1 MG/DL
DIFFERENTIAL METHOD: NORMAL
EOSINOPHIL # BLD AUTO: 0.1 K/UL
EOSINOPHIL NFR BLD: 1.8 %
ERYTHROCYTE [DISTWIDTH] IN BLOOD BY AUTOMATED COUNT: 14.2 %
EST. GFR  (AFRICAN AMERICAN): >60 ML/MIN/1.73 M^2
EST. GFR  (NON AFRICAN AMERICAN): 59 ML/MIN/1.73 M^2
GLUCOSE SERPL-MCNC: 103 MG/DL
HCT VFR BLD AUTO: 41.5 %
HGB BLD-MCNC: 14.1 G/DL
INR PPP: 0.9
LYMPHOCYTES # BLD AUTO: 1.6 K/UL
LYMPHOCYTES NFR BLD: 28.3 %
MCH RBC QN AUTO: 30.2 PG
MCHC RBC AUTO-ENTMCNC: 34 %
MCV RBC AUTO: 89 FL
MONOCYTES # BLD AUTO: 0.3 K/UL
MONOCYTES NFR BLD: 4.9 %
NEUTROPHILS # BLD AUTO: 3.7 K/UL
NEUTROPHILS NFR BLD: 64.8 %
PLATELET # BLD AUTO: 199 K/UL
PMV BLD AUTO: 10.1 FL
POTASSIUM SERPL-SCNC: 3.6 MMOL/L
PROT SERPL-MCNC: 6.7 G/DL
PROTHROMBIN TIME: 9.4 SEC
RBC # BLD AUTO: 4.67 M/UL
SODIUM SERPL-SCNC: 142 MMOL/L
TROPONIN I SERPL DL<=0.01 NG/ML-MCNC: <0.006 NG/ML
TROPONIN I SERPL DL<=0.01 NG/ML-MCNC: <0.006 NG/ML
WBC # BLD AUTO: 5.69 K/UL

## 2017-04-25 PROCEDURE — 84443 ASSAY THYROID STIM HORMONE: CPT

## 2017-04-25 PROCEDURE — 84484 ASSAY OF TROPONIN QUANT: CPT | Mod: 91

## 2017-04-25 PROCEDURE — 84484 ASSAY OF TROPONIN QUANT: CPT

## 2017-04-25 PROCEDURE — 25000003 PHARM REV CODE 250: Performed by: EMERGENCY MEDICINE

## 2017-04-25 PROCEDURE — 80053 COMPREHEN METABOLIC PANEL: CPT

## 2017-04-25 PROCEDURE — G0378 HOSPITAL OBSERVATION PER HR: HCPCS

## 2017-04-25 PROCEDURE — 99285 EMERGENCY DEPT VISIT HI MDM: CPT | Mod: 25

## 2017-04-25 PROCEDURE — 93005 ELECTROCARDIOGRAM TRACING: CPT

## 2017-04-25 PROCEDURE — 85025 COMPLETE CBC W/AUTO DIFF WBC: CPT

## 2017-04-25 PROCEDURE — 93010 ELECTROCARDIOGRAM REPORT: CPT | Mod: ,,, | Performed by: INTERNAL MEDICINE

## 2017-04-25 PROCEDURE — 80061 LIPID PANEL: CPT

## 2017-04-25 PROCEDURE — 36415 COLL VENOUS BLD VENIPUNCTURE: CPT

## 2017-04-25 PROCEDURE — 85610 PROTHROMBIN TIME: CPT

## 2017-04-25 PROCEDURE — 83880 ASSAY OF NATRIURETIC PEPTIDE: CPT

## 2017-04-25 RX ORDER — ASPIRIN 325 MG
325 TABLET ORAL
Status: COMPLETED | OUTPATIENT
Start: 2017-04-25 | End: 2017-04-25

## 2017-04-25 RX ORDER — LEVOTHYROXINE SODIUM 100 UG/1
100 TABLET ORAL DAILY
Status: DISCONTINUED | OUTPATIENT
Start: 2017-04-26 | End: 2017-04-26 | Stop reason: HOSPADM

## 2017-04-25 RX ORDER — ALPRAZOLAM 0.5 MG/1
0.5 TABLET ORAL NIGHTLY PRN
Status: DISCONTINUED | OUTPATIENT
Start: 2017-04-25 | End: 2017-04-26 | Stop reason: HOSPADM

## 2017-04-25 RX ORDER — ONDANSETRON 2 MG/ML
4 INJECTION INTRAMUSCULAR; INTRAVENOUS EVERY 8 HOURS PRN
Status: DISCONTINUED | OUTPATIENT
Start: 2017-04-25 | End: 2017-04-26 | Stop reason: HOSPADM

## 2017-04-25 RX ORDER — NITROGLYCERIN 0.4 MG/1
0.4 TABLET SUBLINGUAL EVERY 5 MIN PRN
Status: DISCONTINUED | OUTPATIENT
Start: 2017-04-25 | End: 2017-04-26 | Stop reason: HOSPADM

## 2017-04-25 RX ADMIN — ASPIRIN 325 MG ORAL TABLET 325 MG: 325 PILL ORAL at 02:04

## 2017-04-25 NOTE — H&P
"[facility] Hospital Medicine  History & Physical    Patient Name: Sherrill Avery  MRN: [id number]  Admission Date: 4/25/2017  Attending Physician: Eleazar Chamorro Jr., MD   Primary Care Provider: Alexandre Macias MD         Patient information was obtained from patient and ER records.     Subjective:     Principal Problem: Chest pain    Chief Complaint:   Chief Complaint   Patient presents with    Chest Pain     c/o left sided CP on and off x's 2 weeks.         HPI: Sherrill Avery is a 66 year old female with a PMHx of renal cell carcinoma of right kidney s/p radial right nephrectomy, Hypothyroidism, GERD, HLD, and recently diagnosed with Stage IV clear cell carcinoma (f/u with [facility] on 05/10/17) who presented to the Emergency Department with c/o substernal chest pain. No radiation of pain. Pt describes pain "deep and dull." ED workup revealed:  CBC/CMP stable, initial troponin <0.006. CXR with no acute findings. Pt given ASA in ED. Pt admitted to Observation for ACS rule out.          Past Medical History:   Diagnosis Date    Anxiety     Arthritis     Cancer of kidney 8/2/2013    dr faye    Ectopic pregnancy     Eye infection     GERD (gastroesophageal reflux disease)     Hiatal hernia     History of kidney cancer     Hypercholesteremia     Hypertension     Hypothyroid     dr block q 6 months    Insomnia     Migraine headache     Renal cell cancer     Respiratory arrest     due to anesthia    Rosacea     Sleep apnea     Thyroid nodule     dr block    Urinary tract infection        Past Surgical History:   Procedure Laterality Date    breast implants      COLONOSCOPY  2007    HYSTERECTOMY      ectopic pregnancy x 2, with LSO    LAPAROSCOPIC NEPHRECTOMY, HAND ASSISTED  07/25/2013    NEPHRECTOMY      OOPHORECTOMY      x1    right ganglion cyst      throat polyp      TRANSOBTURATOR SLING  2011    with RSO for persistent complex cyst & MARGOT; done by arndt    UPPER " GASTROINTESTINAL ENDOSCOPY  2007       Review of patient's allergies indicates:   Allergen Reactions    Tramadol Itching    Codeine Itching    Morphine Itching    Naproxen Itching    Wal-phed [pseudoephedrine hcl] Itching    Buspirone Anxiety    Talwin [pentazocine lactate] Anxiety       No current facility-administered medications on file prior to encounter.      Current Outpatient Prescriptions on File Prior to Encounter   Medication Sig    alprazolam (XANAX) 0.5 MG tablet Take 1 tablet (0.5 mg total) by mouth nightly as needed.    cycloSPORINE (RESTASIS) 0.05 % ophthalmic emulsion Place 1 drop into both eyes 2 (two) times daily.     estradiol (ESTRACE) 1 MG tablet Take 1 tablet (1 mg total) by mouth once daily.    furosemide (LASIX) 20 MG tablet Take 1 tablet (20 mg total) by mouth once daily.    levothyroxine (SYNTHROID) 100 MCG tablet Take 100 mcg by mouth once daily.    zolpidem (AMBIEN) 5 MG Tab Take 1 tablet (5 mg total) by mouth nightly as needed.    butalbital-acetaminophen-caffeine -40 mg (FIORICET, ESGIC) -40 mg per tablet TAKE 1 TABLET EVERY 4 HOURS AS NEEDED    clotrimazole (LOTRIMIN) 1 % Soln Apply topically 2 (two) times daily.    GLUCOSAMINE HCL/CHONDR HERNDON A NA (OSTEO BI-FLEX ORAL) Take by mouth.    hydrocortisone 2.5 % cream Apply to the affected area twice daily.    ketoconazole (NIZORAL) 2 % shampoo Shampoo three times weekly for 4 weeks then as needed.     Family History     Problem Relation (Age of Onset)    Cancer Father    Diabetes Mother    Glaucoma Paternal Grandmother, Sister    Heart disease Mother    Hypertension Mother        Social History Main Topics    Smoking status: Never Smoker    Smokeless tobacco: Never Used    Alcohol use 0.0 oz/week      Comment: social    Drug use: No    Sexual activity: Yes     Partners: Male     Birth control/ protection: Surgical     Review of Systems   Constitutional: Negative for appetite change, chills and fever.    HENT: Negative for trouble swallowing and voice change.    Eyes: Negative.    Respiratory: Negative for apnea, cough, choking, chest tightness, shortness of breath, wheezing and stridor.    Cardiovascular: Positive for chest pain. Negative for palpitations and leg swelling.   Gastrointestinal: Negative for abdominal pain, blood in stool, constipation, diarrhea, nausea and vomiting.   Endocrine: Negative.    Genitourinary: Negative.    Musculoskeletal: Negative.    Skin: Negative.    Allergic/Immunologic: Negative.    Neurological: Negative for dizziness, tremors, seizures, syncope, facial asymmetry, speech difficulty, weakness, light-headedness, numbness and headaches.   Hematological: Negative.    All other systems reviewed and are negative.    Objective:     Vital Signs (Most Recent):  Temp: 98 °F (36.7 °C) (04/25/17 1402)  Pulse: 81 (04/25/17 1455)  Resp: 11 (04/25/17 1455)  BP: 125/68 (04/25/17 1455)  SpO2: 99 % (04/25/17 1455) Vital Signs (24h Range):  Temp:  [98 °F (36.7 °C)] 98 °F (36.7 °C)  Pulse:  [74-81] 81  Resp:  [11-18] 11  SpO2:  [97 %-99 %] 99 %  BP: (125-167)/(68-76) 125/68     Weight: 89.8 kg (198 lb)  Body mass index is 32.95 kg/(m^2).    Physical Exam   Constitutional: She is oriented to person, place, and time. She appears well-developed and well-nourished.   HENT:   Head: Normocephalic and atraumatic.   Eyes: Conjunctivae and EOM are normal.   Neck: Normal range of motion. Neck supple.   Cardiovascular: Normal rate, regular rhythm, normal heart sounds and intact distal pulses.    No murmur heard.  Pulmonary/Chest: Effort normal and breath sounds normal. No respiratory distress.   Abdominal: Soft. Bowel sounds are normal. There is no tenderness.   Musculoskeletal: Normal range of motion.   Neurological: She is alert and oriented to person, place, and time.   Skin: Skin is warm and dry.   Psychiatric: She has a normal mood and affect. Her behavior is normal. Judgment and thought content normal.    Nursing note and vitals reviewed.       Significant Labs:   CBC:   Recent Labs  Lab 04/25/17  1423   WBC 5.69   HGB 14.1   HCT 41.5        CMP:   Recent Labs  Lab 04/25/17  1423      K 3.6      CO2 24      BUN 9   CREATININE 1.0   CALCIUM 8.6*   PROT 6.7   ALBUMIN 3.6   BILITOT 0.4   ALKPHOS 75   AST 19   ALT 21   ANIONGAP 9   EGFRNONAA 59*     Coagulation:   Recent Labs  Lab 04/25/17  1423   INR 0.9     Troponin:   Recent Labs  Lab 04/25/17  1423   TROPONINI <0.006       Significant Imaging:   Imaging Results         X-Ray Chest PA And Lateral (Final result) Result time:  04/25/17 14:33:58    Final result by Zeke Benitez MD (04/25/17 14:33:58)    Impression:     Negative      Electronically signed by: ZEKE BENITEZ MD  Date:     04/25/17  Time:    14:33     Narrative:    History: Chest pain    Normal heart size. No infiltrates.            Assessment/Plan:     Chest pain  - Admit to Observation with telemetry  - Serial troponins pending  - TSH, Lipid panel pending  - Supplemental oxygen prn, keep O2 sats >92%  - SL Nitro prn  - ASA  - Consult Cardiology for marked increase in troponins       Hypothyroidism  - Obtain TSH  - Resume Levothyroxine      VTE Risk Mitigation         Ordered     Medium Risk of VTE  Once      04/25/17 1651     Place sequential compression device  Until discontinued      04/25/17 1651        SAMAN Domingo  Department of Hospital Medicine   [facility]

## 2017-04-25 NOTE — ASSESSMENT & PLAN NOTE
- Admit to Observation with telemetry  - Serial troponins pending  - TSH, Lipid panel pending  - Morphine prn  - Supplemental oxygen prn, keep O2 sats >92%  - SL Nitro prn  - ASA  - Consult Cardiology for marked increase in troponins

## 2017-04-25 NOTE — ED PROVIDER NOTES
SCRIBE #1 NOTE: I, Harley Araujo, am scribing for, and in the presence of, Eleazar Chamorro Jr., MD. I have scribed the entire note.      History      Chief Complaint   Patient presents with    Chest Pain     c/o left sided CP on and off x's 2 weeks.        Review of patient's allergies indicates:   Allergen Reactions    Tramadol Itching    Codeine Itching    Morphine Itching    Naproxen Itching    Protonix [pantoprazole]      Bloating, constipation    Wal-phed [pseudoephedrine hcl] Itching    Buspirone Anxiety    Talwin [pentazocine lactate] Anxiety        HPI   HPI    4/25/2017, 2:08 PM   History obtained from the patient and       History of Present Illness: Sherrill Avery is a 66 y.o. female patient with a PMHx of kidney cancer, who presents to the Emergency Department for substernal CP which onset gradually today. Sxs are episodic, lasting for approximately 15 min, and moderate in severity. Pain is described as a deep, dull pain. Pt notes several episodes over the last week. There are no mitigating or exacerbating factors noted. Pt denies any fever, N/V/D, numbness, HA, SOB, cough, dizziness, leg swelling, calf pain, and all other sxs at this time. Pt has an appointment at [facility] on May 10th. No further complaints or concerns at this time.       Arrival mode: Personal vehicle     PCP: Alexandre Macias MD       Past Medical History:  Past Medical History:   Diagnosis Date    Anxiety     Arthritis     Cancer of kidney 8/2/2013    dr faye    Ectopic pregnancy     Eye infection     GERD (gastroesophageal reflux disease)     Hiatal hernia     History of kidney cancer     Hypercholesteremia     Hypertension     Hypothyroid     dr block q 6 months    Insomnia     Migraine headache     Renal cell cancer     Respiratory arrest     due to anesthia    Rosacea     Sleep apnea     Thyroid nodule     dr block    Urinary tract infection        Past Surgical History:  Past  Surgical History:   Procedure Laterality Date    breast implants      COLONOSCOPY  2007    HYSTERECTOMY      ectopic pregnancy x 2, with LSO    LAPAROSCOPIC NEPHRECTOMY, HAND ASSISTED  07/25/2013    NEPHRECTOMY      OOPHORECTOMY      x1    right ganglion cyst      throat polyp      TRANSOBTURATOR SLING  2011    with RSO for persistent complex cyst & MARGOT; done by yris    UPPER GASTROINTESTINAL ENDOSCOPY  2007         Family History:  Family History   Problem Relation Age of Onset    Diabetes Mother     Hypertension Mother     Heart disease Mother     Cancer Father      lung    Glaucoma Paternal Grandmother     Glaucoma Sister        Social History:  Social History     Social History Main Topics    Smoking status: Never Smoker    Smokeless tobacco: Never Used    Alcohol use 0.0 oz/week      Comment: social    Drug use: No    Sexual activity: Yes     Partners: Male     Birth control/ protection: Surgical       ROS   Review of Systems   Constitutional: Negative for fever.   HENT: Negative for sore throat.    Respiratory: Negative for shortness of breath.    Cardiovascular: Positive for chest pain. Negative for palpitations and leg swelling.   Gastrointestinal: Negative for constipation, diarrhea, nausea and vomiting.   Genitourinary: Negative for dysuria.   Musculoskeletal: Negative for back pain.   Skin: Negative for rash.   Neurological: Negative for weakness.   Hematological: Does not bruise/bleed easily.     Physical Exam    Initial Vitals   BP Pulse Resp Temp SpO2   04/25/17 1402 04/25/17 1402 04/25/17 1402 04/25/17 1402 04/25/17 1402   167/76 74 18 98 °F (36.7 °C) 97 %      Physical Exam  Nursing Notes and Vital Signs Reviewed.  Constitutional: Patient is in no acute distress. Awake and alert. Well-developed and well-nourished.  Head: Atraumatic. Normocephalic.  Eyes: PERRL. EOM intact. Conjunctivae are not pale. No scleral icterus.  ENT: Mucous membranes are moist. Oropharynx is clear and  "symmetric.    Neck: Supple. Full ROM. No lymphadenopathy.  Cardiovascular: Regular rate. Regular rhythm. No murmurs, rubs, or gallops. Distal pulses are 2+ and symmetric.  Pulmonary/Chest: No respiratory distress. Clear to auscultation bilaterally. No wheezing, rales, or rhonchi.  Abdominal: Soft and non-distended.  There is no tenderness.  No rebound, guarding, or rigidity. Good bowel sounds.  Genitourinary: No CVA tenderness  Musculoskeletal: Moves all extremities. No obvious deformities. No edema. No calf tenderness.  Skin: Warm and dry.  Neurological:  Alert, awake, and appropriate.  Normal speech.  No acute focal neurological deficits are appreciated.  Psychiatric: Normal affect. Good eye contact. Appropriate in content.    ED Course    Procedures  ED Vital Signs:  Vitals:    04/25/17 1402 04/25/17 1455 04/25/17 1645 04/25/17 1730   BP: (!) 167/76 125/68 125/78    Pulse: 74 81 81    Resp: 18 11 20    Temp: 98 °F (36.7 °C)      TempSrc: Oral      SpO2: 97% 99% 99%    Weight: 89.8 kg (198 lb)   90.3 kg (199 lb)   Height: 5' 5" (1.651 m)   5' 5" (1.651 m)    04/25/17 1738   BP: (!) 152/86   Pulse: 66   Resp: 17   Temp: 98 °F (36.7 °C)   TempSrc: Oral   SpO2: 98%   Weight:    Height:        Abnormal Lab Results:  Labs Reviewed   COMPREHENSIVE METABOLIC PANEL - Abnormal; Notable for the following:        Result Value    Calcium 8.6 (*)     eGFR if non  59 (*)     All other components within normal limits    Narrative:     PLEASE REVIEW ORDER START TIME BEFORE MARKING SPECIMEN  COLLECTED.   CBC W/ AUTO DIFFERENTIAL    Narrative:     PLEASE REVIEW ORDER START TIME BEFORE MARKING SPECIMEN  COLLECTED.   PROTIME-INR    Narrative:     PLEASE REVIEW ORDER START TIME BEFORE MARKING SPECIMEN  COLLECTED.   TROPONIN I    Narrative:     PLEASE REVIEW ORDER START TIME BEFORE MARKING SPECIMEN  COLLECTED.   B-TYPE NATRIURETIC PEPTIDE    Narrative:     PLEASE REVIEW ORDER START TIME BEFORE MARKING " SPECIMEN  COLLECTED.        All Lab Results:  Results for orders placed or performed during the hospital encounter of 04/25/17   CBC auto differential   Result Value Ref Range    WBC 5.69 3.90 - 12.70 K/uL    RBC 4.67 4.00 - 5.40 M/uL    Hemoglobin 14.1 12.0 - 16.0 g/dL    Hematocrit 41.5 37.0 - 48.5 %    MCV 89 82 - 98 fL    MCH 30.2 27.0 - 31.0 pg    MCHC 34.0 32.0 - 36.0 %    RDW 14.2 11.5 - 14.5 %    Platelets 199 150 - 350 K/uL    MPV 10.1 9.2 - 12.9 fL    Gran # 3.7 1.8 - 7.7 K/uL    Lymph # 1.6 1.0 - 4.8 K/uL    Mono # 0.3 0.3 - 1.0 K/uL    Eos # 0.1 0.0 - 0.5 K/uL    Baso # 0.01 0.00 - 0.20 K/uL    Gran% 64.8 38.0 - 73.0 %    Lymph% 28.3 18.0 - 48.0 %    Mono% 4.9 4.0 - 15.0 %    Eosinophil% 1.8 0.0 - 8.0 %    Basophil% 0.2 0.0 - 1.9 %    Differential Method Automated    Comprehensive metabolic panel   Result Value Ref Range    Sodium 142 136 - 145 mmol/L    Potassium 3.6 3.5 - 5.1 mmol/L    Chloride 109 95 - 110 mmol/L    CO2 24 23 - 29 mmol/L    Glucose 103 70 - 110 mg/dL    BUN, Bld 9 8 - 23 mg/dL    Creatinine 1.0 0.5 - 1.4 mg/dL    Calcium 8.6 (L) 8.7 - 10.5 mg/dL    Total Protein 6.7 6.0 - 8.4 g/dL    Albumin 3.6 3.5 - 5.2 g/dL    Total Bilirubin 0.4 0.1 - 1.0 mg/dL    Alkaline Phosphatase 75 55 - 135 U/L    AST 19 10 - 40 U/L    ALT 21 10 - 44 U/L    Anion Gap 9 8 - 16 mmol/L    eGFR if African American >60 >60 mL/min/1.73 m^2    eGFR if non African American 59 (A) >60 mL/min/1.73 m^2   Protime-INR   Result Value Ref Range    Prothrombin Time 9.4 9.0 - 12.5 sec    INR 0.9 0.8 - 1.2   Troponin I   Result Value Ref Range    Troponin I <0.006 0.000 - 0.026 ng/mL   B-Type natriuretic peptide (BNP)   Result Value Ref Range    BNP 22 0 - 99 pg/mL         Imaging Results:  Imaging Results         X-Ray Chest PA And Lateral (Final result) Result time:  04/25/17 14:33:58    Final result by Zeke Benitez MD (04/25/17 14:33:58)    Impression:     Negative      Electronically signed by: ZEKE BENITEZ  MD  Date:     04/25/17  Time:    14:33     Narrative:    History: Chest pain    Normal heart size. No infiltrates.                        The EKG was ordered, reviewed, and independently interpreted by the ED provider.  Interpretation time: 14:15  Rate: 74 BPM  Rhythm: sinus arrhythmia  Interpretation: Possible LAE. Low voltage QRS. No STEMI.      The Emergency Provider reviewed the vital signs and test results, which are outlined above.    ED Discussion     3:53 PM: Discussed case with Ana Kirk NP ([facility]). Ana agrees with current care and management of pt and accepts admission.   Admitting Service: [facility]   Admitting Physician: Dr. Allison  Admit to: telemetry    3:54 PM: Re-evaluated pt. I have discussed test results, shared treatment plan, and the need for admission with patient and family at bedside. Pt and family express understanding at this time and agree with all information. All questions answered. Pt and family have no further questions or concerns at this time. Pt is ready for admit.      ED Medication(s):  Medications   levothyroxine tablet 100 mcg (not administered)   alprazolam tablet 0.5 mg (not administered)   nitroGLYCERIN SL tablet 0.4 mg (not administered)   ondansetron injection 4 mg (not administered)   promethazine (PHENERGAN) 6.25 mg in dextrose 5 % 50 mL IVPB (not administered)   aspirin tablet 325 mg (325 mg Oral Given 4/25/17 2674)       Current Discharge Medication List                Medical Decision Making    Medical Decision Making:   Clinical Tests:   Lab Tests: Reviewed and Ordered  Radiological Study: Reviewed and Ordered  Medical Tests: Ordered and Reviewed           Scribe Attestation:   Scribe #1: I performed the above scribed service and the documentation accurately describes the services I performed. I attest to the accuracy of the note.    Attending:   Physician Attestation Statement for Scribe #1: I, Eleazar Chamorro Jr., MD, personally performed  the services described in this documentation, as scribed by Harley Araujo, in my presence, and it is both accurate and complete.          Clinical Impression       ICD-10-CM ICD-9-CM   1. Chest pain due to myocardial ischemia, unspecified ischemic chest pain type I20.9 786.50   2. Chest pain R07.9 786.50       Disposition:   Disposition: Admitted  Condition: Fair         Eleazar Chamorro Jr., MD  04/25/17 1835

## 2017-04-25 NOTE — ED NOTES
Pt is resting in bed in NAD. RR equal and non labored. Bed in low position and  Locked, side rails up X 2. Call light within reach. Will continue to monitor.

## 2017-04-25 NOTE — ED NOTES
Care assumed from RAEANN Gage. Pt comfortable in stretcher, denies CP at this time. Cardiac monitoring continues, V/S stable. Spouse at bedside, will continue to monitor.

## 2017-04-25 NOTE — SUBJECTIVE & OBJECTIVE
Past Medical History:   Diagnosis Date    Anxiety     Arthritis     Cancer of kidney 8/2/2013    dr faye    Ectopic pregnancy     Eye infection     GERD (gastroesophageal reflux disease)     Hiatal hernia     History of kidney cancer     Hypercholesteremia     Hypertension     Hypothyroid     dr block q 6 months    Insomnia     Migraine headache     Renal cell cancer     Respiratory arrest     due to anesthia    Rosacea     Sleep apnea     Thyroid nodule     dr block    Urinary tract infection        Past Surgical History:   Procedure Laterality Date    breast implants      COLONOSCOPY  2007    HYSTERECTOMY      ectopic pregnancy x 2, with LSO    LAPAROSCOPIC NEPHRECTOMY, HAND ASSISTED  07/25/2013    NEPHRECTOMY      OOPHORECTOMY      x1    right ganglion cyst      throat polyp      TRANSOBTURATOR SLING  2011    with RSO for persistent complex cyst & MARGOT; done by [facility]    UPPER GASTROINTESTINAL ENDOSCOPY  2007       Review of patient's allergies indicates:   Allergen Reactions    Tramadol Itching    Codeine Itching    Morphine Itching    Naproxen Itching    Wal-phed [pseudoephedrine hcl] Itching    Buspirone Anxiety    Talwin [pentazocine lactate] Anxiety       No current facility-administered medications on file prior to encounter.      Current Outpatient Prescriptions on File Prior to Encounter   Medication Sig    alprazolam (XANAX) 0.5 MG tablet Take 1 tablet (0.5 mg total) by mouth nightly as needed.    cycloSPORINE (RESTASIS) 0.05 % ophthalmic emulsion Place 1 drop into both eyes 2 (two) times daily.     estradiol (ESTRACE) 1 MG tablet Take 1 tablet (1 mg total) by mouth once daily.    furosemide (LASIX) 20 MG tablet Take 1 tablet (20 mg total) by mouth once daily.    levothyroxine (SYNTHROID) 100 MCG tablet Take 100 mcg by mouth once daily.    zolpidem (AMBIEN) 5 MG Tab Take 1 tablet (5 mg total) by mouth nightly as needed.    butalbital-acetaminophen-caffeine  -40 mg (FIORICET, ESGIC) -40 mg per tablet TAKE 1 TABLET EVERY 4 HOURS AS NEEDED    clotrimazole (LOTRIMIN) 1 % Soln Apply topically 2 (two) times daily.    GLUCOSAMINE HCL/CHONDR HERNDON A NA (OSTEO BI-FLEX ORAL) Take by mouth.    hydrocortisone 2.5 % cream Apply to the affected area twice daily.    ketoconazole (NIZORAL) 2 % shampoo Shampoo three times weekly for 4 weeks then as needed.     Family History     Problem Relation (Age of Onset)    Cancer Father    Diabetes Mother    Glaucoma Paternal Grandmother, Sister    Heart disease Mother    Hypertension Mother        Social History Main Topics    Smoking status: Never Smoker    Smokeless tobacco: Never Used    Alcohol use 0.0 oz/week      Comment: social    Drug use: No    Sexual activity: Yes     Partners: Male     Birth control/ protection: Surgical     Review of Systems   Constitutional: Negative for appetite change, chills and fever.   HENT: Negative for trouble swallowing and voice change.    Eyes: Negative.    Respiratory: Negative for apnea, cough, choking, chest tightness, shortness of breath, wheezing and stridor.    Cardiovascular: Positive for chest pain. Negative for palpitations and leg swelling.   Gastrointestinal: Negative for abdominal pain, blood in stool, constipation, diarrhea, nausea and vomiting.   Endocrine: Negative.    Genitourinary: Negative.    Musculoskeletal: Negative.    Skin: Negative.    Allergic/Immunologic: Negative.    Neurological: Negative for dizziness, tremors, seizures, syncope, facial asymmetry, speech difficulty, weakness, light-headedness, numbness and headaches.   Hematological: Negative.    All other systems reviewed and are negative.    Objective:     Vital Signs (Most Recent):  Temp: 98 °F (36.7 °C) (04/25/17 1402)  Pulse: 81 (04/25/17 1455)  Resp: 11 (04/25/17 1455)  BP: 125/68 (04/25/17 1455)  SpO2: 99 % (04/25/17 1455) Vital Signs (24h Range):  Temp:  [98 °F (36.7 °C)] 98 °F (36.7 °C)  Pulse:   [74-81] 81  Resp:  [11-18] 11  SpO2:  [97 %-99 %] 99 %  BP: (125-167)/(68-76) 125/68     Weight: 89.8 kg (198 lb)  Body mass index is 32.95 kg/(m^2).    Physical Exam   Constitutional: She is oriented to person, place, and time. She appears well-developed and well-nourished.   HENT:   Head: Normocephalic and atraumatic.   Eyes: Conjunctivae and EOM are normal.   Neck: Normal range of motion. Neck supple.   Cardiovascular: Normal rate, regular rhythm, normal heart sounds and intact distal pulses.    No murmur heard.  Pulmonary/Chest: Effort normal and breath sounds normal. No respiratory distress.   Abdominal: Soft. Bowel sounds are normal. There is no tenderness.   Musculoskeletal: Normal range of motion.   Neurological: She is alert and oriented to person, place, and time.   Skin: Skin is warm and dry.   Psychiatric: She has a normal mood and affect. Her behavior is normal. Judgment and thought content normal.   Nursing note and vitals reviewed.       Significant Labs:   CBC:   Recent Labs  Lab 04/25/17  1423   WBC 5.69   HGB 14.1   HCT 41.5        CMP:   Recent Labs  Lab 04/25/17  1423      K 3.6      CO2 24      BUN 9   CREATININE 1.0   CALCIUM 8.6*   PROT 6.7   ALBUMIN 3.6   BILITOT 0.4   ALKPHOS 75   AST 19   ALT 21   ANIONGAP 9   EGFRNONAA 59*     Coagulation:   Recent Labs  Lab 04/25/17  1423   INR 0.9     Troponin:   Recent Labs  Lab 04/25/17  1423   TROPONINI <0.006       Significant Imaging:   Imaging Results         X-Ray Chest PA And Lateral (Final result) Result time:  04/25/17 14:33:58    Final result by Zeke Benitez MD (04/25/17 14:33:58)    Impression:     Negative      Electronically signed by: ZEKE BENITEZ MD  Date:     04/25/17  Time:    14:33     Narrative:    History: Chest pain    Normal heart size. No infiltrates.

## 2017-04-25 NOTE — IP AVS SNAPSHOT
[address] [place] [address]           Patient Discharge Instructions   Our goal is to set you up for success. This packet includes information on your condition, medications, and your home care.  It will help you care for yourself to prevent having to return to the hospital.     Please ask your nurse if you have any questions.      There are many details to remember when preparing to leave the hospital. Here is what you will need to do:    1. Take your medicine. If you are prescribed medications, review your Medication List on the following pages. You may have new medications to  at the pharmacy and others that you'll need to stop taking. Review the instructions for how and when to take your medications. Talk with your doctor or nurses if you are unsure of what to do.     2. Go to your follow-up appointments. Specific follow-up information is listed in the following pages. Your may be contacted by a nurse or clinical provider about future appointments. Be sure we have all of the phone numbers to reach you. Please contact your provider's office if you are unable to make an appointment.     3. Watch for warning signs. Your doctor or nurse will give you detailed warning signs to watch for and when to call for assistance. These instructions may also include educational information about your condition. If you experience any of warning signs to your health, call your doctor.               ** Verify the list of medication(s) below is accurate and up to date. Carry this with you in case of emergency. If your medications have changed, please notify your healthcare provider.             Medication List      START taking these medications        Additional Info                      aspirin 81 MG EC tablet   Commonly known as:  ECOTRIN   Refills:  0   Dose:  81 mg    Instructions:  Take 1 tablet (81 mg total) by mouth once daily.     Begin Date    AM    Noon    PM     Bedtime         CONTINUE taking these medications        Additional Info                      alprazolam 0.5 MG tablet   Commonly known as:  XANAX   Quantity:  90 tablet   Refills:  0   Dose:  0.5 mg    Instructions:  Take 1 tablet (0.5 mg total) by mouth nightly as needed.     Begin Date    AM    Noon    PM    Bedtime       butalbital-acetaminophen-caffeine -40 mg -40 mg per tablet   Commonly known as:  FIORICET, ESGIC   Quantity:  30 tablet   Refills:  0    Instructions:  TAKE 1 TABLET EVERY 4 HOURS AS NEEDED     Begin Date    AM    Noon    PM    Bedtime       clotrimazole 1 % Soln   Commonly known as:  LOTRIMIN   Quantity:  30 mL   Refills:  3    Instructions:  Apply topically 2 (two) times daily.     Begin Date    AM    Noon    PM    Bedtime       cycloSPORINE 0.05 % ophthalmic emulsion   Commonly known as:  RESTASIS   Refills:  0   Dose:  1 drop    Instructions:  Place 1 drop into both eyes 2 (two) times daily.     Begin Date    AM    Noon    PM    Bedtime       estradiol 1 MG tablet   Commonly known as:  ESTRACE   Quantity:  30 tablet   Refills:  0   Dose:  1 mg    Instructions:  Take 1 tablet (1 mg total) by mouth once daily.     Begin Date    AM    Noon    PM    Bedtime       furosemide 20 MG tablet   Commonly known as:  LASIX   Quantity:  5 tablet   Refills:  0   Dose:  20 mg    Instructions:  Take 1 tablet (20 mg total) by mouth once daily.     Begin Date    AM    Noon    PM    Bedtime       hydrocortisone 2.5 % cream   Refills:  0    Instructions:  Apply to the affected area twice daily.     Begin Date    AM    Noon    PM    Bedtime       ketoconazole 2 % shampoo   Commonly known as:  NIZORAL   Refills:  0    Instructions:  Shampoo three times weekly for 4 weeks then as needed.     Begin Date    AM    Noon    PM    Bedtime       levothyroxine 100 MCG tablet   Commonly known as:  SYNTHROID   Refills:  0   Dose:  100 mcg    Last time this was given:  100 mcg on 4/26/2017  6:09 AM   Instructions:   Take 100 mcg by mouth once daily.     Begin Date    AM    Noon    PM    Bedtime       OSTEO BI-FLEX ORAL   Refills:  0    Instructions:  Take by mouth.     Begin Date    AM    Noon    PM    Bedtime       zolpidem 5 MG Tab   Commonly known as:  AMBIEN   Quantity:  90 tablet   Refills:  1   Dose:  5 mg    Instructions:  Take 1 tablet (5 mg total) by mouth nightly as needed.     Begin Date    AM    Noon    PM    Bedtime            Where to Get Your Medications      You can get these medications from any pharmacy     You don't need a prescription for these medications     aspirin 81 MG EC tablet                  Please bring to all follow up appointments:    1. A copy of your discharge instructions.  2. All medicines you are currently taking in their original bottles.  3. Identification and insurance card.    Please arrive 15 minutes ahead of scheduled appointment time.    Please call 24 hours in advance if you must reschedule your appointment and/or time.        Your Scheduled Appointments     Apr 27, 2017 11:15 AM CDT   MPI Delay with [place] [address] CARDIAC   [facility]    [address] [facility] [id number] [phone]            Apr 27, 2017 11:20 AM CDT   Nuclear Regadenonsen with [place], NUCLEAR MEDICINE   [facility] ([facility]    [address] [facility] 3rd [address]  [facility] [id number] [phone]            Apr 27, 2017  1:15 PM CDT   MPI Delay with [place] [address] CARDIAC   [facility]    [address] [facility] [address] [facility] [id number] [phone]            Apr 27, 2017  3:20 PM CDT   Established Patient Visit with Anjel Gill MD   [facility] ([facility])    [address] [facility] [address] [facility] [id number] [phone]            Jun 26, 2017 10:20 AM CDT   Established Patient Visit with MD [facility] ([facility])    [address] [facility] [id number] [phone]  "             Follow-up Information     Follow up with Alexandre Macias MD In 3 days.    Specialty:  Family Medicine    Contact information:    [address]  [facility] [address] [phone]          Follow up with [facility] - Cardiology In 1 week.    Specialty:  Cardiology    Contact information:    [address] [facility] [address] [phone]    Additional information:    ([address]) 2nd floor      Referrals     Future Orders    Ambulatory consult to Cardiology         Discharge Instructions     Future Orders    Activity as tolerated     Diet general     Questions:    Total calories:      Fat restriction, if any:      Protein restriction, if any:      Na restriction, if any:      Fluid restriction:      Additional restrictions:  Cardiac (Low Na/Chol)        Discharge Instructions       Light breakfast in the AM.  Avoid all caseated products until after your stress test       Primary Diagnosis     Your primary diagnosis was:  Chest Pain      Admission Information     Date & Time Provider Department CSN    4/25/2017  2:07 PM Rashawn Allison MD [facility] -  [id number]      Care Providers     Provider Role Specialty Primary office phone    Rashawn Allison MD Attending Provider Internal Medicine [phone]    Rashawn Allison MD Team Attending  Internal Medicine [phone]      Your Vitals Were     BP Pulse Temp Resp Height Weight    117/72 (BP Location: Left arm, Patient Position: Sitting, BP Method: Automatic) 75 97.9 °F (36.6 °C) (Oral) 17 5' 5" (1.651 m) 90.3 kg (199 lb)    SpO2 BMI             96% 33.12 kg/m2         Recent Lab Values        9/17/2013 7/23/2014                       10:58 AM 10:18 AM          A1C 5.1 5.3                     Pending Labs     Order Current Status    Lipid panel In process      Allergies as of 4/26/2017        Reactions    Tramadol Itching    Codeine Itching    Morphine Itching    Naproxen Itching    Wal-phed " [Pseudoephedrine Hcl] Itching    Buspirone Anxiety    Talwin [Pentazocine Lactate] Anxiety      [facility]     [facility] Care [facility] - 24/7 Assistance  Unless otherwise directed by your provider, please contact [facility], our nurse care line that is available for 24/7 assistance.     Registered nurses in the [facility] provide clinical advisement, health education, appointment booking, and other advisory services.  Call for this free service at [phone].        Advance Directives     An advance directive is a document which, in the event you are no longer able to make decisions for yourself, tells your healthcare team what kind of treatment you do or do not want to receive, or who you would like to make those decisions for you.  If you do not currently have an advance directive, [facility] encourages you to create one.  For more information call:  [phone]), [phone] ([phone]),  or log on to [email or web address].        Language Assistance Services     ATTENTION: Language assistance services are available, free of charge. Please call [phone].      ATENCIÓN: Si habla español, tiene a del toro disposición servicios gratuitos de asistencia lingüística. Llame al [phone].     CHÚ Ý: N?u b?n nói Ti?ng Vi?t, có các d?ch v? h? tr? ngôn ng? mi?n phí dành cho b?n. G?i s? [phone].        [facility] Sign-Up     Activating your [facility] account is as easy as 1-2-3!     1) Visit [email or web address], select Sign Up Now, enter this activation code and your date of birth, then select Next.  [email or web address]  Expires: 6/10/2017  9:50 AM      2) Create a username and password to use when you visit [facility] in the future and select a security question in case you lose your password and select Next.    3) Enter your e-mail address and click Sign Up!    Additional Information  If you have questions, please e-mail [email or web address] or call [phone] to  talk to our [facility] staff. Remember, [facility] is NOT to be used for urgent needs. For medical emergencies, dial 911.          [facility] complies with applicable Federal civil rights laws and does not discriminate on the basis of race, color, national origin, age, disability, or sex.

## 2017-04-26 ENCOUNTER — TELEPHONE (OUTPATIENT)
Dept: CARDIOLOGY | Facility: CLINIC | Age: 67
End: 2017-04-26

## 2017-04-26 VITALS
HEIGHT: 65 IN | WEIGHT: 199 LBS | RESPIRATION RATE: 17 BRPM | BODY MASS INDEX: 33.15 KG/M2 | DIASTOLIC BLOOD PRESSURE: 81 MMHG | TEMPERATURE: 98 F | OXYGEN SATURATION: 98 % | SYSTOLIC BLOOD PRESSURE: 145 MMHG | HEART RATE: 76 BPM

## 2017-04-26 DIAGNOSIS — C64.9 KIDNEY CARCINOMA, UNSPECIFIED LATERALITY: ICD-10-CM

## 2017-04-26 DIAGNOSIS — I10 ESSENTIAL HYPERTENSION: ICD-10-CM

## 2017-04-26 DIAGNOSIS — R06.00 DYSPNEA, UNSPECIFIED TYPE: ICD-10-CM

## 2017-04-26 DIAGNOSIS — R07.9 CHEST PAIN, UNSPECIFIED TYPE: Primary | ICD-10-CM

## 2017-04-26 LAB
CHOLEST/HDLC SERPL: 3.9 {RATIO}
HDL/CHOLESTEROL RATIO: 25.7 %
HDLC SERPL-MCNC: 241 MG/DL
HDLC SERPL-MCNC: 62 MG/DL
LDLC SERPL CALC-MCNC: 152.2 MG/DL
NONHDLC SERPL-MCNC: 179 MG/DL
TRIGL SERPL-MCNC: 134 MG/DL
TROPONIN I SERPL DL<=0.01 NG/ML-MCNC: <0.006 NG/ML
TSH SERPL DL<=0.005 MIU/L-ACNC: 2.5 UIU/ML

## 2017-04-26 PROCEDURE — G0378 HOSPITAL OBSERVATION PER HR: HCPCS

## 2017-04-26 PROCEDURE — 25000003 PHARM REV CODE 250: Performed by: NURSE PRACTITIONER

## 2017-04-26 RX ORDER — ASPIRIN 81 MG/1
81 TABLET ORAL DAILY
Refills: 0 | COMMUNITY
Start: 2017-04-26 | End: 2017-05-23

## 2017-04-26 RX ADMIN — LEVOTHYROXINE SODIUM 100 MCG: 100 TABLET ORAL at 06:04

## 2017-04-26 NOTE — PLAN OF CARE
Problem: Patient Care Overview  Goal: Plan of Care Review  Outcome: Ongoing (interventions implemented as appropriate)  Patient remains free from injury or falls during shift; plan of care reviewed with patient; pt verbalized understanding;no c/o CP overnight; NS on monitor, HR 70-60s; family at bedside for support; patient sleeping between care;. Will continue to monitor with hourly rounding.

## 2017-04-26 NOTE — TELEPHONE ENCOUNTER
Called pt to schedule stress test and f/u appt I let pt know i will give her a call back once Leana puts order in for stress test.

## 2017-04-26 NOTE — TELEPHONE ENCOUNTER
----- Message from CANDIE Arroyo sent at 4/26/2017 11:06 AM CDT -----  Patient is in room 230, can you call and arrange OP stress test this week with f/u this week as well    Let me know if any issues

## 2017-04-26 NOTE — PROGRESS NOTES
Pt given dc instructions at this time. Pt denies having questions. Cards  arranged NMST for tomorrow with f/u apt after study. PIV removed, catheter remained intact. Tele monitor removed at this time. Dc vitals obtained. Pt ambulated off unit to waiting car. No distress noted to pt

## 2017-04-27 ENCOUNTER — HOSPITAL ENCOUNTER (OUTPATIENT)
Dept: RADIOLOGY | Facility: HOSPITAL | Age: 67
Discharge: HOME | End: 2017-04-27
Attending: INTERNAL MEDICINE
Payer: MEDICARE

## 2017-04-27 ENCOUNTER — CLINICAL SUPPORT (OUTPATIENT)
Dept: CARDIOLOGY | Facility: CLINIC | Age: 67
End: 2017-04-27
Payer: MEDICARE

## 2017-04-27 ENCOUNTER — OFFICE VISIT (OUTPATIENT)
Dept: CARDIOLOGY | Facility: CLINIC | Age: 67
End: 2017-04-27
Payer: MEDICARE

## 2017-04-27 VITALS — HEART RATE: 68 BPM | HEIGHT: 65 IN | DIASTOLIC BLOOD PRESSURE: 84 MMHG | SYSTOLIC BLOOD PRESSURE: 120 MMHG

## 2017-04-27 DIAGNOSIS — I10 ESSENTIAL HYPERTENSION: ICD-10-CM

## 2017-04-27 DIAGNOSIS — I10 ESSENTIAL HYPERTENSION: Primary | ICD-10-CM

## 2017-04-27 DIAGNOSIS — R06.00 DYSPNEA, UNSPECIFIED TYPE: ICD-10-CM

## 2017-04-27 DIAGNOSIS — R07.89 CHEST PAIN, ATYPICAL: ICD-10-CM

## 2017-04-27 DIAGNOSIS — R07.9 CHEST PAIN, UNSPECIFIED TYPE: ICD-10-CM

## 2017-04-27 LAB — DIASTOLIC DYSFUNCTION: NO

## 2017-04-27 PROCEDURE — 99204 OFFICE O/P NEW MOD 45 MIN: CPT | Mod: S$PBB,,, | Performed by: INTERNAL MEDICINE

## 2017-04-27 PROCEDURE — 93016 CV STRESS TEST SUPVJ ONLY: CPT | Mod: S$PBB,,, | Performed by: INTERNAL MEDICINE

## 2017-04-27 PROCEDURE — 93018 CV STRESS TEST I&R ONLY: CPT | Mod: S$PBB,,, | Performed by: INTERNAL MEDICINE

## 2017-04-27 PROCEDURE — 78452 HT MUSCLE IMAGE SPECT MULT: CPT | Mod: TC,PO

## 2017-04-27 PROCEDURE — 78452 HT MUSCLE IMAGE SPECT MULT: CPT | Mod: 26,,, | Performed by: INTERNAL MEDICINE

## 2017-04-27 PROCEDURE — 99999 PR PBB SHADOW E&M-EST. PATIENT-LVL II: CPT | Mod: PBBFAC,,, | Performed by: INTERNAL MEDICINE

## 2017-04-27 NOTE — MR AVS SNAPSHOT
[facility]  [address] [facility] [address] [address]  Phone: [phone]  Fax: [phone]                  Sherrill Avery   2017 3:20 PM   Office Visit    Description:  Female : 1950   Provider:  Anjel Gill MD   Department:  [facility]           Reason for Visit     Hypertension     Hyperlipidemia           Diagnoses this Visit        Comments    Essential hypertension    -  Primary     Chest pain, atypical                To Do List           Future Appointments        Provider Department Dept Phone    2017 9:30 AM [facility] CT1 LIMIT 500 LBS [facility] -  [phone]    2017 10:20 AM Oleg Shah MD '[place] - [facility] Services [phone]      Goals (5 Years of Data)     None      Follow-Up and Disposition     Return if symptoms worsen or fail to improve.      [facility]     [facility] Care [facility] -  Assistance  Unless otherwise directed by your provider, please contact [facility], our nurse care line that is available for  assistance.     Registered nurses in the [facility] provide: appointment scheduling, clinical advisement, health education, and other advisory services.  Call: [phone] (toll free)               Medications           Message regarding Medications     Verify the changes and/or additions to your medication regime listed below are the same as discussed with your clinician today.  If any of these changes or additions are incorrect, please notify your healthcare provider.             Verify that the below list of medications is an accurate representation of the medications you are currently taking.  If none reported, the list may be blank. If incorrect, please contact your healthcare provider. Carry this list with you in case of emergency.           Current Medications     alprazolam (XANAX) 0.5 MG tablet Take 1 tablet (0.5 mg total) by mouth nightly as needed.    aspirin (ECOTRIN) 81 MG EC tablet  "Take 1 tablet (81 mg total) by mouth once daily.    butalbital-acetaminophen-caffeine -40 mg (FIORICET, ESGIC) -40 mg per tablet TAKE 1 TABLET EVERY 4 HOURS AS NEEDED    clotrimazole (LOTRIMIN) 1 % Soln Apply topically 2 (two) times daily.    cycloSPORINE (RESTASIS) 0.05 % ophthalmic emulsion Place 1 drop into both eyes 2 (two) times daily.     estradiol (ESTRACE) 1 MG tablet Take 1 tablet (1 mg total) by mouth once daily.    furosemide (LASIX) 20 MG tablet Take 1 tablet (20 mg total) by mouth once daily.    GLUCOSAMINE HCL/CHONDR HERNDON A NA (OSTEO BI-FLEX ORAL) Take by mouth.    hydrocortisone 2.5 % cream Apply to the affected area twice daily.    ketoconazole (NIZORAL) 2 % shampoo Shampoo three times weekly for 4 weeks then as needed.    levothyroxine (SYNTHROID) 100 MCG tablet Take 100 mcg by mouth once daily.    zolpidem (AMBIEN) 5 MG Tab Take 1 tablet (5 mg total) by mouth nightly as needed.           Clinical Reference Information           Your Vitals Were     BP Pulse Height             120/84 68 5' 5" (1.651 m)         Blood Pressure          Most Recent Value    Right Arm BP - Sitting  120/84    Left Arm BP - Sitting  116/80    BP  120/84      Allergies as of 4/27/2017     Tramadol    Codeine    Morphine    Naproxen    Wal-phed [Pseudoephedrine Hcl]    Buspirone    Talwin [Pentazocine Lactate]      Immunizations Administered on Date of Encounter - 4/27/2017     None      [facility] Sign-Up     Activating your [facility] account is as easy as 1-2-3!     1) Visit [email or web address], select Sign Up Now, enter this activation code and your date of birth, then select Next.  [email or web address]  Expires: 6/10/2017  9:50 AM      2) Create a username and password to use when you visit [facility] in the future and select a security question in case you lose your password and select Next.    3) Enter your e-mail address and click Sign Up!    Additional Information  If you have questions, please e-mail " [email or web address] or call [phone] to talk to our [facility] staff. Remember, [facility] is NOT to be used for urgent needs. For medical emergencies, dial 911.         Smoking Cessation     If you would like to quit smoking:   You may be eligible for free services if you are a [place] resident and started smoking cigarettes before September 1, 1988.  Call the Smoking Cessation [employer] ([facility]) toll free at (455) 795-3387 or ([phone] 603-0014.   Call 1-800-QUIT-NOW if you do not meet the above criteria.   Contact us via email: [email or web address]   View our website for more information: [email or web address]        Language Assistance Services     ATTENTION: Language assistance services are available, free of charge. Please call [phone].      ATENCIÓN: Si habla español, tiene a del toro disposición servicios gratuitos de asistencia lingüística. Llame al [phone].     CHÚ Ý: N?u b?n nói Ti?ng Vi?t, có các d?ch v? h? tr? ngôn ng? mi?n phí dành cho b?n. G?i s? [phone].         [facility] complies with applicable Federal civil rights laws and does not discriminate on the basis of race, color, national origin, age, disability, or sex.

## 2017-04-27 NOTE — PROGRESS NOTES
Subjective:   Patient ID:  Sherrill Avery is a 66 y.o. female who presents for evaluation of Hypertension and Hyperlipidemia      HPI Comments: 65 yo, female, came in for f/u of ER visit for chest pain.  PMH HTN,HLD, fibromyalgia, hiatal hernia, kidney cancer with lung metastastasis.  Since 2011, she has had chest pain lasting for few minutes, few times a year. Deep dull pain, up to jaw ache and shoulder pain. No exertional component. In the past 6 days, has had mild recurrent chest pain with dyspnea. Went to ER on Sunday, and troponin x2 negative, ekg NSR and no acute STT change. Phar. Nuclear test normal and EF 70%. She had exercise stress echo in 2014 normal.           Past Medical History:   Diagnosis Date    Anxiety     Arthritis     Cancer of kidney 8/2/2013    dr faye    Ectopic pregnancy     Eye infection     GERD (gastroesophageal reflux disease)     Hiatal hernia     History of kidney cancer     Hypercholesteremia     Hypertension     Hypothyroid     dr block q 6 months    Insomnia     Migraine headache     Renal cell cancer     Respiratory arrest     due to anesthia    Rosacea     Sleep apnea     Thyroid nodule     dr block    Urinary tract infection        Past Surgical History:   Procedure Laterality Date    breast implants      COLONOSCOPY  2007    HYSTERECTOMY      ectopic pregnancy x 2, with LSO    LAPAROSCOPIC NEPHRECTOMY, HAND ASSISTED  07/25/2013    NEPHRECTOMY      OOPHORECTOMY      x1    right ganglion cyst      throat polyp      TRANSOBTURATOR SLING  2011    with RSO for persistent complex cyst & MARGOT; done by yris    UPPER GASTROINTESTINAL ENDOSCOPY  2007       Social History   Substance Use Topics    Smoking status: Never Smoker    Smokeless tobacco: Never Used    Alcohol use 0.0 oz/week      Comment: social       Family History   Problem Relation Age of Onset    Diabetes Mother     Hypertension Mother     Heart disease Mother     Cancer Father      lung     Glaucoma Paternal Grandmother     Glaucoma Sister        Review of Systems   Constitution: Negative for decreased appetite, diaphoresis, fever, weakness, malaise/fatigue and night sweats.   HENT: Negative for headaches and nosebleeds.    Eyes: Negative for blurred vision and double vision.   Cardiovascular: Positive for chest pain. Negative for claudication, dyspnea on exertion, irregular heartbeat, leg swelling, near-syncope, orthopnea, palpitations, paroxysmal nocturnal dyspnea and syncope.   Respiratory: Negative for cough, shortness of breath, sleep disturbances due to breathing, snoring, sputum production and wheezing.    Endocrine: Negative for cold intolerance and polyuria.   Hematologic/Lymphatic: Does not bruise/bleed easily.   Skin: Negative for rash.   Musculoskeletal: Negative for back pain, falls, joint pain, joint swelling and neck pain.   Gastrointestinal: Negative for abdominal pain, heartburn, nausea and vomiting.   Genitourinary: Negative for dysuria, frequency and hematuria.   Neurological: Negative for difficulty with concentration, dizziness, focal weakness, light-headedness, numbness and seizures.   Psychiatric/Behavioral: Negative for depression, memory loss and substance abuse. The patient does not have insomnia.    Allergic/Immunologic: Negative for HIV exposure and hives.       Objective:   Physical Exam   Constitutional: She is oriented to person, place, and time. She appears well-nourished.   HENT:   Head: Normocephalic.   Eyes: Pupils are equal, round, and reactive to light.   Neck: Normal carotid pulses and no JVD present. Carotid bruit is not present. No thyromegaly present.   Cardiovascular: Normal rate, regular rhythm, normal heart sounds and normal pulses.   No extrasystoles are present. PMI is not displaced.  Exam reveals no gallop and no S3.    No murmur heard.  Pulmonary/Chest: Breath sounds normal. No stridor. No respiratory distress.   Abdominal: Soft. Bowel sounds are  normal. There is no tenderness. There is no rebound.   Musculoskeletal: Normal range of motion.   Neurological: She is alert and oriented to person, place, and time.   Skin: Skin is intact. No rash noted.   Psychiatric: Her behavior is normal.       Lab Results   Component Value Date    CHOL 241 (H) 04/25/2017    CHOL 237 (H) 07/24/2015    CHOL 239 (H) 01/21/2015     Lab Results   Component Value Date    HDL 62 04/25/2017    HDL 58 07/24/2015    HDL 56 01/21/2015     Lab Results   Component Value Date    LDLCALC 152.2 04/25/2017    LDLCALC 154.4 07/24/2015    LDLCALC 162.4 (H) 01/21/2015     Lab Results   Component Value Date    TRIG 134 04/25/2017    TRIG 123 07/24/2015    TRIG 103 01/21/2015     Lab Results   Component Value Date    CHOLHDL 25.7 04/25/2017    CHOLHDL 24.5 07/24/2015    CHOLHDL 23.4 01/21/2015       Chemistry        Component Value Date/Time     04/25/2017 1423    K 3.6 04/25/2017 1423     04/25/2017 1423    CO2 24 04/25/2017 1423    BUN 9 04/25/2017 1423    CREATININE 1.0 04/25/2017 1423     04/25/2017 1423        Component Value Date/Time    CALCIUM 8.6 (L) 04/25/2017 1423    ALKPHOS 75 04/25/2017 1423    AST 19 04/25/2017 1423    ALT 21 04/25/2017 1423    BILITOT 0.4 04/25/2017 1423          Lab Results   Component Value Date    TSH 2.499 04/25/2017     Lab Results   Component Value Date    INR 0.9 04/25/2017    INR 0.9 07/14/2013     Lab Results   Component Value Date    WBC 5.69 04/25/2017    HGB 14.1 04/25/2017    HCT 41.5 04/25/2017    MCV 89 04/25/2017     04/25/2017     BNP  @LABRCNTIP(BNP,BNPTRIAGEBLO)@  CrCl cannot be calculated (Unknown ideal weight.).     Assessment:      1. Essential hypertension    2. Chest pain, atypical      Phar. Nuclear stress test no perfusion defect and EF 70%.  EKG NSR and no acute stt change    Plan:   Non cardiac pain.  reassure pt.  RTC as needed.

## 2017-04-27 NOTE — DISCHARGE SUMMARY
"[facility] Hospital Medicine  Discharge Summary      Patient Name: Sherrill Avery  MRN: [id number]  Admission Date: 4/25/2017  Hospital Length of Stay: 0 days  Discharge Date and Time:  04/26/2017 7:31 PM  Attending Physician: Dr. Allison   Discharging Provider: Heaven Montaño NP  Primary Care Provider: Alexandre Macias MD      HPI:   Sherrill Avery is a 66 year old female with a PMHx of renal cell carcinoma of right kidney s/p radial right nephrectomy, Hypothyroidism, GERD, and HLD who presented to the Emergency Department with c/o substernal chest pain. No radiation of pain. Pt describes pain "deep and dull." ED workup revealed:  CBC/CMP stable, initial troponin <0.006. CXR with no acute findings. Pt given ASA in ED. Pt admitted to Observation for ACS rule out.     * No surgery found *      Indwelling Lines/Drains at time of discharge:   Lines/Drains/Airways          No matching active lines, drains, or airways        Hospital Course:   The pt was placed in observation. Serial troponin were normal. EKG was low voltage, but did not show acute ST or t wave changes. Chest pain resolved. The pt will follow up with Cardiology for OP stress test and Echo.   The pt was seen and examined today and determined to be stable for discharge.         Significant Diagnostic Studies:   Imaging Results         X-Ray Chest PA And Lateral (Final result) Result time:  04/25/17 14:33:58    Final result by Jessica Marks MD (04/25/17 14:33:58)    Impression:     Negative      Electronically signed by: JESSICA MARKS MD  Date:     04/25/17  Time:    14:33     Narrative:    History: Chest pain    Normal heart size. No infiltrates.              Pending Diagnostic Studies:     Procedure Component Value Units Date/Time    Lipid panel [[id number]] Collected:  04/25/17 1812    Order Status:  Sent Lab Status:  In process Updated:  04/26/17 1659    Specimen:  Blood from Blood         Final Active Diagnoses:    Diagnosis Date " Noted POA    PRINCIPAL PROBLEM:  Chest pain [R07.9] 04/25/2017 Yes    Hypothyroidism [E03.9] 02/13/2013 Yes      Problems Resolved During this Admission:    Diagnosis Date Noted Date Resolved POA          Discharged Condition: stable    Disposition: Home or Self Care    Follow Up:  Follow-up Information     Follow up with Alexandre Macias MD In 3 days.    Specialty:  Family Medicine    Contact information:    [address] [facility] [address] [phone]          Follow up with [facility] In 1 week.    Specialty:  Cardiology    Contact information:    [address] [facility] [address] [phone]    Additional information:    (off [facility]) 2nd floor        Patient Instructions:     Ambulatory consult to Cardiology   Referral Priority: Routine Referral Type: Consultation   Referral Reason: Specialty Services Required    Requested Specialty: Cardiology    Number of Visits Requested: 1      Diet general   Order Specific Question Answer Comments   Additional restrictions: Cardiac (Low Na/Chol)      Activity as tolerated       Medications:  Reconciled Home Medications:   Discharge Medication List as of 4/26/2017 11:57 AM      START taking these medications    Details   aspirin (ECOTRIN) 81 MG EC tablet Take 1 tablet (81 mg total) by mouth once daily., Starting 4/26/2017, Until Thu 4/26/18, OTC         CONTINUE these medications which have NOT CHANGED    Details   alprazolam (XANAX) 0.5 MG tablet Take 1 tablet (0.5 mg total) by mouth nightly as needed., Starting 7/29/2016, Until Discontinued, Normal      cycloSPORINE (RESTASIS) 0.05 % ophthalmic emulsion Place 1 drop into both eyes 2 (two) times daily. , Until Discontinued, Historical Med      estradiol (ESTRACE) 1 MG tablet Take 1 tablet (1 mg total) by mouth once daily., Starting 6/8/2016, Until Thu 6/8/17, Normal      furosemide (LASIX) 20 MG tablet Take 1 tablet (20 mg total) by mouth once daily., Starting 4/24/2017,  Until Tue 4/24/18, Normal      levothyroxine (SYNTHROID) 100 MCG tablet Take 100 mcg by mouth once daily., Until Discontinued, Historical Med      zolpidem (AMBIEN) 5 MG Tab Take 1 tablet (5 mg total) by mouth nightly as needed., Starting 7/29/2016, Until Discontinued, Normal      butalbital-acetaminophen-caffeine -40 mg (FIORICET, ESGIC) -40 mg per tablet TAKE 1 TABLET EVERY 4 HOURS AS NEEDED, Normal      clotrimazole (LOTRIMIN) 1 % Soln Apply topically 2 (two) times daily., Starting 2/5/2016, Until Discontinued, Normal      GLUCOSAMINE HCL/CHONDR HERNDON A NA (OSTEO BI-FLEX ORAL) Take by mouth., Until Discontinued, Historical Med      hydrocortisone 2.5 % cream Apply to the affected area twice daily., Historical Med      ketoconazole (NIZORAL) 2 % shampoo Shampoo three times weekly for 4 weeks then as needed., Historical Med           Time spent on the discharge of patient: 45 minutes    Heaven Montaño NP  Department of Hospital Medicine  [facility]

## 2017-05-04 ENCOUNTER — HOSPITAL ENCOUNTER (OUTPATIENT)
Dept: RADIOLOGY | Facility: HOSPITAL | Age: 67
Discharge: HOME | End: 2017-05-04
Attending: FAMILY MEDICINE
Payer: MEDICARE

## 2017-05-04 ENCOUNTER — TELEPHONE (OUTPATIENT)
Dept: INTERNAL MEDICINE | Facility: CLINIC | Age: 67
End: 2017-05-04

## 2017-05-04 ENCOUNTER — OFFICE VISIT (OUTPATIENT)
Dept: INTERNAL MEDICINE | Facility: CLINIC | Age: 67
End: 2017-05-04
Payer: MEDICARE

## 2017-05-04 VITALS
SYSTOLIC BLOOD PRESSURE: 126 MMHG | HEART RATE: 78 BPM | TEMPERATURE: 97 F | HEIGHT: 65 IN | OXYGEN SATURATION: 99 % | WEIGHT: 198 LBS | BODY MASS INDEX: 32.99 KG/M2 | DIASTOLIC BLOOD PRESSURE: 78 MMHG

## 2017-05-04 DIAGNOSIS — M79.89 RIGHT LEG SWELLING: ICD-10-CM

## 2017-05-04 DIAGNOSIS — M79.604 RIGHT LEG PAIN: ICD-10-CM

## 2017-05-04 DIAGNOSIS — M79.604 RIGHT LEG PAIN: Primary | ICD-10-CM

## 2017-05-04 DIAGNOSIS — R51.9 HEADACHE, UNSPECIFIED HEADACHE TYPE: ICD-10-CM

## 2017-05-04 PROCEDURE — 99213 OFFICE O/P EST LOW 20 MIN: CPT | Mod: S$PBB,,, | Performed by: FAMILY MEDICINE

## 2017-05-04 PROCEDURE — 99999 PR PBB SHADOW E&M-EST. PATIENT-LVL III: CPT | Mod: PBBFAC,,, | Performed by: FAMILY MEDICINE

## 2017-05-04 PROCEDURE — 93971 EXTREMITY STUDY: CPT | Mod: 26,,, | Performed by: RADIOLOGY

## 2017-05-04 PROCEDURE — 93971 EXTREMITY STUDY: CPT | Mod: TC

## 2017-05-04 RX ORDER — BUTALBITAL, ACETAMINOPHEN AND CAFFEINE 50; 325; 40 MG/1; MG/1; MG/1
TABLET ORAL
Qty: 30 TABLET | Refills: 0 | Status: SHIPPED | OUTPATIENT
Start: 2017-05-04 | End: 2017-10-24 | Stop reason: SDUPTHER

## 2017-05-04 NOTE — MR AVS SNAPSHOT
[facility]  [address] [facility] [address]  Phone: [phone]  Fax: [phone]                  Sherrill Avery   2017 10:00 AM   Office Visit    Description:  Female : 1950   Provider:  Christie Bright MD   Department:  [facility]           Reason for Visit     Leg Pain           Diagnoses this Visit        Comments    Right leg pain    -  Primary     Right leg swelling         Headache, unspecified headache type                To Do List           Future Appointments        Provider Department Dept Phone    2017 2:45 PM ON [address] [facility] [phone]      Goals (5 Years of Data)     None       These Medications        Disp Refills Start End    butalbital-acetaminophen-caffeine -40 mg (FIORICET, ESGIC) -40 mg per tablet 30 tablet 0 2017     TAKE 1 TABLET EVERY 4 HOURS AS NEEDED    Pharmacy: [address] #: [phone]         [facility]     [facility] -  Assistance  Unless otherwise directed by your provider, please contact [facility], our nurse care line that is available for  assistance.     Registered nurses in the [facility] provide: appointment scheduling, clinical advisement, health education, and other advisory services.  Call: [phone] (toll free)               Medications           Message regarding Medications     Verify the changes and/or additions to your medication regime listed below are the same as discussed with your clinician today.  If any of these changes or additions are incorrect, please notify your healthcare provider.             Verify that the below list of medications is an accurate representation of the medications you are currently taking.  If none reported, the list may be blank. If incorrect, please contact your healthcare provider. Carry this list  "with you in case of emergency.           Current Medications     alprazolam (XANAX) 0.5 MG tablet Take 1 tablet (0.5 mg total) by mouth nightly as needed.    aspirin (ECOTRIN) 81 MG EC tablet Take 1 tablet (81 mg total) by mouth once daily.    butalbital-acetaminophen-caffeine -40 mg (FIORICET, ESGIC) -40 mg per tablet TAKE 1 TABLET EVERY 4 HOURS AS NEEDED    clotrimazole (LOTRIMIN) 1 % Soln Apply topically 2 (two) times daily.    cycloSPORINE (RESTASIS) 0.05 % ophthalmic emulsion Place 1 drop into both eyes 2 (two) times daily.     estradiol (ESTRACE) 1 MG tablet Take 1 tablet (1 mg total) by mouth once daily.    furosemide (LASIX) 20 MG tablet Take 1 tablet (20 mg total) by mouth once daily.    GLUCOSAMINE HCL/CHONDR HERNDON A NA (OSTEO BI-FLEX ORAL) Take by mouth.    hydrocortisone 2.5 % cream Apply to the affected area twice daily.    ketoconazole (NIZORAL) 2 % shampoo Shampoo three times weekly for 4 weeks then as needed.    levothyroxine (SYNTHROID) 100 MCG tablet Take 100 mcg by mouth once daily.    zolpidem (AMBIEN) 5 MG Tab Take 1 tablet (5 mg total) by mouth nightly as needed.           Clinical Reference Information           Your Vitals Were     BP Pulse Temp Height Weight SpO2    126/78 78 97.2 °F (36.2 °C) (Tympanic) 5' 5" (1.651 m) 89.8 kg (197 lb 15.6 oz) 99%    BMI                32.94 kg/m2          Blood Pressure          Most Recent Value    BP  126/78      Allergies as of 5/4/2017     Tramadol    Codeine    Morphine    Naproxen    Wal-phed [Pseudoephedrine Hcl]    Buspirone    Talwin [Pentazocine Lactate]      Immunizations Administered on Date of Encounter - 5/4/2017     None      Orders Placed During Today's Visit     Future Labs/Procedures Expected by Expires    US Lower Extremity Veins Right  5/4/2017 5/4/2018      [facility] Sign-Up     Activating your [facility] account is as easy as 1-2-3!     1) Visit [email or web address], select Sign Up Now, enter this activation code and your date of " birth, then select Next.  [email or web address]  Expires: 6/10/2017  9:50 AM      2) Create a username and password to use when you visit [facility] in the future and select a security question in case you lose your password and select Next.    3) Enter your e-mail address and click Sign Up!    Additional Information  If you have questions, please e-mail [email or web address] or call [phone] to talk to our [facility] staff. Remember, [facility] is NOT to be used for urgent needs. For medical emergencies, dial 911.         Language Assistance Services     ATTENTION: Language assistance services are available, free of charge. Please call [phone].      ATENCIÓN: Si habla español, tiene a del toro disposición servicios gratuitos de asistencia lingüística. Llame al [phone].     CHÚ Ý: N?u b?n nói Ti?ng Vi?t, có các d?ch v? h? tr? ngôn ng? mi?n phí dành cho b?n. G?i s? [phone].         [facility] complies with applicable Federal civil rights laws and does not discriminate on the basis of race, color, national origin, age, disability, or sex.

## 2017-05-04 NOTE — PROGRESS NOTES
Subjective:       Patient ID: Sherrill Avery is a 66 y.o. female.    Chief Complaint: Leg Pain (right )    HPI Ms. Avery presents with leg pain and swelling. She reports that she stepped off ladder wrong on this past Saturday where she missed the bottom step of the ladder. This did not hurt at the time. Monday late in the day she had discomfort in the bottom of her right foot then pain moved up to back of her leg and buttock. She did feel a knot behind the knee and is not sure if she has difference in swelling. Would like to rule out a clot.    The longer she stands on it the worse the pain becomes. It is a constant deep throbbing pain. Not moving she is fine. She cant cross legs though because of pain.   This morning she had some numbness on bottom of foot.     Review of Systems    Pertinent ROS listed in HPI  Objective:      Physical Exam   Constitutional: She is oriented to person, place, and time. She appears well-developed and well-nourished.   HENT:   Head: Normocephalic and atraumatic.   Cardiovascular: Regular rhythm.    Pulmonary/Chest: Breath sounds normal.   Musculoskeletal: Normal range of motion.   Right lower extremity slightly swollen  Did not appreciate knot patient reported behind the knee at the top of her calf     Neurological: She is alert and oriented to person, place, and time.   Psychiatric: She has a normal mood and affect. Her behavior is normal.   Vitals reviewed.        Assessment/Plan:     Right leg pain  -     US Lower Extremity Veins Right; Future; Expected date: 5/4/17    Right leg swelling  -     US Lower Extremity Veins Right; Future; Expected date: 5/4/17    Headache, unspecified headache type  -     butalbital-acetaminophen-caffeine -40 mg (FIORICET, ESGIC) -40 mg per tablet; TAKE 1 TABLET EVERY 4 HOURS AS NEEDED  Dispense: 30 tablet; Refill: 0      With history of cancer patient wanted to rule out a blood clot. She goes in 4 days to [facility] for evaluation and  possible treatment.   With one kidney she is advised not to take NSAIDs of any kind she uses tylenol and fioricet for headaches. Must be careful with taking both because Fioricet has tylenol in it.     Return if symptoms worsen or fail to improve.    Christie Bright MD  [facility]   Family Medicine

## 2017-05-04 NOTE — TELEPHONE ENCOUNTER
----- Message from Christie Bright MD sent at 5/4/2017 11:52 AM CDT -----  No evidence of clot in right lower extremity. Tylenol and decreasing weight on the right lower extremity for now. Ice behind the right knee may help with inflammatory process that could be causing discomfort. Ice for short periods of time as tolerated.

## 2017-05-23 ENCOUNTER — TELEPHONE (OUTPATIENT)
Dept: RHEUMATOLOGY | Facility: CLINIC | Age: 67
End: 2017-05-23

## 2017-05-23 ENCOUNTER — HOSPITAL ENCOUNTER (OUTPATIENT)
Dept: RADIOLOGY | Facility: HOSPITAL | Age: 67
Discharge: HOME | End: 2017-05-23
Attending: INTERNAL MEDICINE
Payer: MEDICARE

## 2017-05-23 ENCOUNTER — OFFICE VISIT (OUTPATIENT)
Dept: RHEUMATOLOGY | Facility: CLINIC | Age: 67
End: 2017-05-23
Payer: MEDICARE

## 2017-05-23 VITALS
BODY MASS INDEX: 31.89 KG/M2 | SYSTOLIC BLOOD PRESSURE: 140 MMHG | DIASTOLIC BLOOD PRESSURE: 84 MMHG | HEIGHT: 66 IN | HEART RATE: 76 BPM | WEIGHT: 198.44 LBS

## 2017-05-23 DIAGNOSIS — M79.671 RIGHT FOOT PAIN: ICD-10-CM

## 2017-05-23 DIAGNOSIS — M75.50 SUBACROMIAL BURSITIS: ICD-10-CM

## 2017-05-23 DIAGNOSIS — M15.9 PRIMARY OSTEOARTHRITIS INVOLVING MULTIPLE JOINTS: ICD-10-CM

## 2017-05-23 DIAGNOSIS — M79.7 FIBROMYALGIA: Primary | ICD-10-CM

## 2017-05-23 PROCEDURE — 73630 X-RAY EXAM OF FOOT: CPT | Mod: TC,PO,RT

## 2017-05-23 PROCEDURE — 99214 OFFICE O/P EST MOD 30 MIN: CPT | Mod: S$PBB,,, | Performed by: PHYSICIAN ASSISTANT

## 2017-05-23 PROCEDURE — 99999 PR PBB SHADOW E&M-EST. PATIENT-LVL IV: CPT | Mod: PBBFAC,,, | Performed by: PHYSICIAN ASSISTANT

## 2017-05-23 PROCEDURE — 73630 X-RAY EXAM OF FOOT: CPT | Mod: 26,RT,, | Performed by: RADIOLOGY

## 2017-05-23 RX ORDER — METHYLPREDNISOLONE ACETATE 80 MG/ML
80 INJECTION, SUSPENSION INTRA-ARTICULAR; INTRALESIONAL; INTRAMUSCULAR; SOFT TISSUE
Status: COMPLETED | OUTPATIENT
Start: 2017-05-23 | End: 2017-05-23

## 2017-05-23 RX ORDER — DULOXETIN HYDROCHLORIDE 30 MG/1
30 CAPSULE, DELAYED RELEASE ORAL DAILY
Qty: 30 CAPSULE | Refills: 1 | Status: SHIPPED | OUTPATIENT
Start: 2017-05-23 | End: 2017-05-30 | Stop reason: SINTOL

## 2017-05-23 RX ADMIN — METHYLPREDNISOLONE ACETATE 80 MG: 80 INJECTION, SUSPENSION INTRALESIONAL; INTRAMUSCULAR; INTRASYNOVIAL; SOFT TISSUE at 11:05

## 2017-05-23 NOTE — PROGRESS NOTES
Administered 1cc depomedrol 80mg/cc to right  ventrogluteal. Pt tolerated well. No acute reaction noted to site. Pt instructed on s/s to report. Pt verbalized understanding.    Lot: [id number]  Exp: 6/18

## 2017-05-23 NOTE — TELEPHONE ENCOUNTER
----- Message from Melvina Patton sent at 5/23/2017  4:35 PM CDT -----  Contact: pt  Pt states that she needs to speak to the nurse regarding the xray of her foot done today...[phone] (home)

## 2017-05-23 NOTE — PROGRESS NOTES
Subjective:       Patient ID: Sherrill Avery is a 66 y.o. female.    Chief Complaint: Generalized Body Aches and Fibromyalgia    Sherrill was previously seen in our clinic for fibromyalgia and osteoarthritis.  In the past did more symptomatic treatment like aquatic therapy and exercise to control her symptoms.  Usually takes over-the-counter Tylenol.  Advised nonsteroidals because of GI upset.  Discussed using Cymbalta in the past but preferred to try to use exercise to control her fibromyalgia pain.  She had bilateral hip bursitis did get a local hip bursa Depo-Medrol injection which helps significantly.  This improved her overall pain across her entire body for about one year.  She reports approximately 4 weeks ago she missed the last step on a ladder her foot hit the ground pretty hard within 1 day developed pain in the foot, ankle, leg and knee.  She did see her primary care physician ultrasound was obtained with no evidence of deep vein thrombosis.  No x-rays obtained.  She reports the knee did improve on its own but she still has foot pain.  She has pain intermittently with standing as well as some tenderness to palpation.  No warmth or redness noted over the foot.  She is also having bilateral knee pain intermittently.  Pain with lifting her arms above her head as well as shoulder pain at night.  Taking into her upper arm.  She also has mild generalized myofascial pain.  Her pain level today reported 4/10.  She reports physical therapy in the past did help.  She does have access to a pool.     Recently diagnosed with clear cell carcinoma of the lung which is a metastasis of her renal cell carcinoma.  She was evaluated by [facility] for now her tumors are too small to treat.  She'll follow-up in August with her oncologist at [facility].  She is a little unsettled with a diagnosis.  He is very tearful and upset in the office today.  Situational stress causing some irritation and depression.  Was wondering  "if she could take something to help her cope.      Review of Systems   Constitutional: Negative.  Negative for activity change, appetite change, chills, fatigue and fever.   HENT: Negative.  Negative for mouth sores and trouble swallowing.         No dry mouth   Eyes: Negative.  Negative for photophobia, pain and redness.        No swollen or red eyes, no dry eye     Respiratory: Negative.  Negative for chest tightness, shortness of breath, wheezing and stridor.    Cardiovascular: Negative.  Negative for chest pain.   Gastrointestinal: Negative.  Negative for abdominal pain, blood in stool, diarrhea, nausea and vomiting.   Genitourinary: Negative.  Negative for dysuria, frequency, hematuria and urgency.   Musculoskeletal: Positive for arthralgias and myalgias. Negative for back pain, gait problem, joint swelling, neck pain and neck stiffness.   Skin: Negative.  Negative for color change, pallor and rash.   Neurological: Negative.  Negative for weakness.   Hematological: Negative for adenopathy.   Psychiatric/Behavioral: Negative for suicidal ideas.        Tearful in the office today         Objective:   BP (!) 140/84   Pulse 76   Ht 5' 5.5" (1.664 m)   Wt 90 kg (198 lb 6.6 oz)   BMI 32.52 kg/m²      Physical Exam   Constitutional: She is oriented to person, place, and time and well-developed, well-nourished, and in no distress. No distress.   HENT:   Head: Normocephalic and atraumatic.   Right Ear: External ear normal.   Left Ear: External ear normal.   Mouth/Throat: No oropharyngeal exudate.   Eyes: Conjunctivae and EOM are normal. Pupils are equal, round, and reactive to light. No scleral icterus.   Neck: Normal range of motion. Neck supple. No thyromegaly present.   Cardiovascular: Normal rate, regular rhythm and normal heart sounds.    No murmur heard.  Pulmonary/Chest: Effort normal and breath sounds normal. She exhibits no tenderness.   Abdominal: Soft. Bowel sounds are normal.       Right Side " Rheumatological Exam     The patient is tender to palpation of the shoulder    Left Side Rheumatological Exam     The patient is tender to palpation of the shoulder.      Lymphadenopathy:     She has no cervical adenopathy.   Neurological: She is alert and oriented to person, place, and time. She displays normal reflexes. No cranial nerve deficit. She exhibits normal muscle tone. Gait normal.   Skin: Skin is warm and dry. No rash noted.     Musculoskeletal: Normal range of motion. She exhibits edema and tenderness.   She has crepitus in both knees right worse than left, no effusion noted, no swelling, warmth or erythema over bilateral knees, no popliteal cysts noted she has full range of motion flexion and extension  She has generalized myofascial tenderness across most tender points  She has pain with abduction of the shoulder  She has pain with internal and external rotation of the shoulder  Chest tenderness to palpation along bilateral subacromial versus  She is some mild crepitus in the right shoulder  No swelling noted  She has some tenderness to palpation along the second third and fourth metatarsal as well as the lateral ankle  No swelling, warmth or erythema             Assessment:       1. Fibromyalgia    2. Primary osteoarthritis involving multiple joints    3. Subacromial bursitis    4. Right foot pain          1. Fibro  2. Generalized OA  3. Gee sub acromial bursitis  4. Situational stress with recent diagnosis of lung clear cell carcinoma which is a metastasis of renal cell carcinoma  5. Right foot pain- persistent post injury 4 weeks ago - will make sure no fracture present       Plan:       Depo 80 IM today    Continue tylenol    Add cymbalta 30 mg once daily, can increase in the future if needed  Discussed risk versus benefits and potential side effects of cymbalta. Medication Literature given to patient    PT for fibro, OA and gee sub acromial  Physical therapy to instruct her on At home aquatic  thearpy to continue post PT    X-ray the right foot today making sure there was no occult fracture from the injury 4 weeks ago  Note x-ray negative for fracture but does show some degenerative arthritis in the foot  Recommend good arch supports and insoles  Avoid flat shoes    rtc 6-8 mon no labs

## 2017-05-30 ENCOUNTER — TELEPHONE (OUTPATIENT)
Dept: RHEUMATOLOGY | Facility: CLINIC | Age: 67
End: 2017-05-30

## 2017-05-30 RX ORDER — SERTRALINE HYDROCHLORIDE 50 MG/1
50 TABLET, FILM COATED ORAL DAILY
Qty: 30 TABLET | Refills: 6 | Status: SHIPPED | OUTPATIENT
Start: 2017-05-30 | End: 2017-06-28 | Stop reason: SDUPTHER

## 2017-05-30 NOTE — TELEPHONE ENCOUNTER
Ok stop the cymbalta and move back to zoloft (sertraline) 50 mg once daily  Can go up higher in the future if needed  Script sent to [facility]

## 2017-05-30 NOTE — TELEPHONE ENCOUNTER
Jayla, pt called the cymbalta you put her on was making her itch and very sleepy.  She said on time she was on zoloft and it seemed to work

## 2017-05-30 NOTE — TELEPHONE ENCOUNTER
----- Message from Jagruti Morocho sent at 5/30/2017  1:33 PM CDT -----  Pt is requesting a call from nurse to discuss a medication that isn't work for her. Personal.            Please call pt back at [phone]

## 2017-06-13 RX ORDER — OMEPRAZOLE 40 MG/1
CAPSULE, DELAYED RELEASE ORAL
Qty: 90 CAPSULE | Refills: 3 | OUTPATIENT
Start: 2017-06-13

## 2017-06-22 ENCOUNTER — TELEPHONE (OUTPATIENT)
Dept: UROLOGY | Facility: CLINIC | Age: 67
End: 2017-06-22

## 2017-06-22 DIAGNOSIS — R30.0 DYSURIA: Primary | ICD-10-CM

## 2017-06-22 NOTE — TELEPHONE ENCOUNTER
----- Message from Hilda Clark sent at 6/22/2017  9:42 AM CDT -----  Contact: pt [phone]  Pt is asking to speak with the nurse regarding her having pain in back, possible UTI, history of cancer, feeling tired and would like orders to do a urine specimen sent to [facility] in [facility]. Pain level 7-8 while laying down. Pt states she just doesn't feel well.  Please call pt.

## 2017-06-22 NOTE — TELEPHONE ENCOUNTER
Patient requested to drop off urine in [place] for possible uti , having fatigue, left kidney discomfort rated 7.denies dysuria,hematuria, chills. Fever. Advised patient to go to urgent care if symptoms get worse. And will review results on Monday. Patient adreed.

## 2017-06-28 ENCOUNTER — LAB VISIT (OUTPATIENT)
Dept: LAB | Facility: HOSPITAL | Age: 67
End: 2017-06-28
Payer: MEDICARE

## 2017-06-28 ENCOUNTER — TELEPHONE (OUTPATIENT)
Dept: INTERNAL MEDICINE | Facility: CLINIC | Age: 67
End: 2017-06-28

## 2017-06-28 ENCOUNTER — TELEPHONE (OUTPATIENT)
Dept: RHEUMATOLOGY | Facility: CLINIC | Age: 67
End: 2017-06-28

## 2017-06-28 DIAGNOSIS — R30.0 DYSURIA: ICD-10-CM

## 2017-06-28 PROCEDURE — 87086 URINE CULTURE/COLONY COUNT: CPT

## 2017-06-28 RX ORDER — SERTRALINE HYDROCHLORIDE 100 MG/1
100 TABLET, FILM COATED ORAL DAILY
Qty: 30 TABLET | Refills: 6 | Status: SHIPPED | OUTPATIENT
Start: 2017-06-28 | End: 2017-07-17

## 2017-06-28 NOTE — TELEPHONE ENCOUNTER
----- Message from Brunilda Schuster sent at 6/28/2017 12:28 PM CDT -----  Contact: pt   Pt calling because she needs a refill on her medication, but she wants to discuss it with office first,, please call pt back at [phone]

## 2017-06-28 NOTE — TELEPHONE ENCOUNTER
Spoke with pt and she states that since she has been taking the Zoloft she feels wired and a little bit nervous. Sometimes she takes a xanax with it and wanted to know if that was okay. States that it does not make her sleepy like the one before but sometimes she does still get emotional, not very strong feeling, thinks maybe it has not been enough time on it yet or possibly the wrong dose. States that she does like staying awake all day but she seems to get antsy/annoyed at times with things such as loud noises. States she was told to call to report how she was doing after taking it 1 month before she refilled it. Please Advise.

## 2017-06-28 NOTE — TELEPHONE ENCOUNTER
Let her know I want to increase her zoloft up from 50 mg to 100 mg daily and see if that will help with her anxiousness  Ok for sparing use of her xanax but ok to take together    I sent a new script to [facility]

## 2017-06-29 RX ORDER — ALPRAZOLAM 0.5 MG/1
0.5 TABLET ORAL NIGHTLY PRN
Qty: 30 TABLET | Refills: 0 | Status: SHIPPED | OUTPATIENT
Start: 2017-06-29 | End: 2017-07-20 | Stop reason: SDUPTHER

## 2017-06-29 RX ORDER — ZOLPIDEM TARTRATE 5 MG/1
5 TABLET ORAL NIGHTLY PRN
Qty: 30 TABLET | Refills: 0 | Status: SHIPPED | OUTPATIENT
Start: 2017-06-29 | End: 2017-07-20 | Stop reason: SDUPTHER

## 2017-06-29 NOTE — TELEPHONE ENCOUNTER
----- Message from Whitney Lopez sent at 6/29/2017  1:33 PM CDT -----  Contact: self [phone]  States that she is returning call please call back at [phone]//thank you acc

## 2017-06-29 NOTE — TELEPHONE ENCOUNTER
----- Message from Anni Carlton sent at 6/28/2017 12:31 PM CDT -----  Please call pt back at [phone] in regards to xananx and ambien generics. Pt uses [facility] pharmacy mail order 90 day supply.

## 2017-06-29 NOTE — TELEPHONE ENCOUNTER
Since I did see her recently I Will only fill X 30 days then needs to follow up with dr jha for subsequent refills    no concerns

## 2017-06-29 NOTE — TELEPHONE ENCOUNTER
Advised patient of below, patient would like to know if you can refill her xanax and ambien sent to [facility]. States she called Dr. Stewart's office for a refill but it was denied because she needs an appointment. Please Advise.

## 2017-06-29 NOTE — TELEPHONE ENCOUNTER
Returned patient's call.  Informed her that she will need an office visit prior to refills on requested medication.  Scheduled for frid 06/30

## 2017-06-29 NOTE — TELEPHONE ENCOUNTER
Patient informed that 1 month supply for Ambien and Xanax was sent to [facility] pharmacy. Will need to follow up with Dr. Stewart for future refills.

## 2017-06-30 LAB — BACTERIA UR CULT: NO GROWTH

## 2017-07-03 ENCOUNTER — TELEPHONE (OUTPATIENT)
Dept: UROLOGY | Facility: CLINIC | Age: 67
End: 2017-07-03

## 2017-07-03 NOTE — TELEPHONE ENCOUNTER
----- Message from Araceli Munoz sent at 7/3/2017  1:52 PM CDT -----  Contact: Self- [phone]  Lucia- pt called to see if her results were back from her urine culture yet- please call pt back at [phone]

## 2017-07-06 ENCOUNTER — TELEPHONE (OUTPATIENT)
Dept: RHEUMATOLOGY | Facility: CLINIC | Age: 67
End: 2017-07-06

## 2017-07-06 NOTE — TELEPHONE ENCOUNTER
----- Message from Brunilda Schuster sent at 7/6/2017  1:10 PM CDT -----  Contact: pt   Pt was taking antidepressants and she is having problems, she stop taking the medication and she needs to know what else she needs to know,, please call her back at [phone].

## 2017-07-06 NOTE — TELEPHONE ENCOUNTER
Have her cut dose in 1/2 for 1 week then drop to 1/2 table to every other day  For 1 weeks then 1/2 tablet every 3 days for 1 week then stop  By that time she should see the counselor

## 2017-07-06 NOTE — TELEPHONE ENCOUNTER
Patient states that she can't take Zoloft . Too anxious , feeling of pressure in head , jittery . Just does not feel right. She has decided to stop medication. She will be seeing a counselor letter this month . Does she need to wean off Zoloft or can she just stop?

## 2017-07-17 ENCOUNTER — TELEPHONE (OUTPATIENT)
Dept: RHEUMATOLOGY | Facility: CLINIC | Age: 67
End: 2017-07-17

## 2017-07-17 DIAGNOSIS — M79.7 FIBROMYALGIA SYNDROME: Primary | ICD-10-CM

## 2017-07-17 RX ORDER — DULOXETIN HYDROCHLORIDE 30 MG/1
30 CAPSULE, DELAYED RELEASE ORAL DAILY
Qty: 30 CAPSULE | Refills: 6 | Status: SHIPPED | OUTPATIENT
Start: 2017-07-17 | End: 2017-10-11 | Stop reason: ALTCHOICE

## 2017-07-17 NOTE — TELEPHONE ENCOUNTER
----- Message from Solange Webster sent at 7/17/2017  8:15 AM CDT -----  Pt needs to speak to the nurse regarding her physical therapy /please call [phone]/ma

## 2017-07-17 NOTE — TELEPHONE ENCOUNTER
Need new orders for physical therapy . She will be going to [facility] in  [facility] . Also patient states that she has stop Zoloft for 1 week .Zoloft made her head fill like a lot of pressure. She restarted Cymbalta last night.Will need a new prescription for Cymbalta 30 mg 1 daily.call [facility].

## 2017-07-20 ENCOUNTER — INITIAL CONSULT (OUTPATIENT)
Dept: PSYCHIATRY | Facility: CLINIC | Age: 67
End: 2017-07-20
Payer: MEDICARE

## 2017-07-20 DIAGNOSIS — F43.22 ADJUSTMENT DISORDER WITH ANXIOUS MOOD: Primary | ICD-10-CM

## 2017-07-20 PROCEDURE — 90792 PSYCH DIAG EVAL W/MED SRVCS: CPT | Mod: PBBFAC,PO | Performed by: PSYCHIATRY & NEUROLOGY

## 2017-07-20 PROCEDURE — 90792 PSYCH DIAG EVAL W/MED SRVCS: CPT | Mod: S$PBB,,, | Performed by: PSYCHIATRY & NEUROLOGY

## 2017-07-20 RX ORDER — ALPRAZOLAM 0.5 MG/1
0.5 TABLET ORAL NIGHTLY PRN
Qty: 30 TABLET | Refills: 3 | Status: SHIPPED | OUTPATIENT
Start: 2017-07-20 | End: 2018-02-09 | Stop reason: SDUPTHER

## 2017-07-20 RX ORDER — ZOLPIDEM TARTRATE 5 MG/1
5 TABLET ORAL NIGHTLY PRN
Qty: 30 TABLET | Refills: 3 | Status: SHIPPED | OUTPATIENT
Start: 2017-07-20 | End: 2018-08-22 | Stop reason: SDUPTHER

## 2017-07-20 NOTE — PROGRESS NOTES
"Outpatient Psychiatry Initial Visit (MD/NP)    7/20/2017    Sherrill Avery, a 66 y.o. female, presenting for initial evaluation visit. Met with patient.    Reason for Encounter: Patient presents for psych eval recommended by oncologist at time of diagnosis of cancer recurrence.     History of Present Illness: This 67 y/o WF with hx of Renal CA (clear cell) dx'ed in '13, diagnosed with a recurrence in March '17 (spread to lung). Reports no treatment currently recommended. Was prescribed sertraline, ambien, xanax since receiving news of recurrence related to anxiety and sleep problems she's endorsed to other providers. She couldn't tolerate sertraline, has found the others helpful.  Says "Cancer stays on my mind all the time". Takes xanax at most once daily, has increased use from about 10/month prior to recurrence to about twice that since. Reports sad less than half the time, generally with good interest and energy, and feels she's participating fully in life. Denies avoidance behaviors. Is participating in spiritual relationship with a renowned nun known for healing and consolation and she's considered their visits very therapeutic. No eating problems. Was prescribed sertraline (couldn't tolerate). She has been intermittently prescribed duloxetine for fibromyalgia, is back on it currently.     PsychHx: as above. Xanax back to '13 around time of cancer diagnosis. No hx of suicidal thoughts, self-harm behaviors, paranoia, hallucinations, rebecca, psych hospitalizations. Past psych meds include sertraline (recently), escitalopram '14, buspirone.   MedHx: as above; HTN, fibromyalgia, bursitis, B sensorineural hearing loss .  SocialHx: no problems with gestation, birth, infancy or early childhood development. Good student. Normal number of friends. Grew up with in [place] with parents. Lives with ; have 5 children & grandchildren & great-grandchildren. Good terms with kids, friends, neighbors, [place]. " Lives in [place].  x 35 years. Supportive relationship.   SubstanceHx; rare alcohol, no illicits, no prescription misuse.     Review Of Systems:     GENERAL:  No weight gain or loss  SKIN:  No rashes or lacerations  HEAD:  No headaches  EYES:  No exophthalmos, jaundice or blindness  EARS:  No dizziness, tinnitus or hearing loss  NOSE:  No changes in smell  MOUTH & THROAT:  No dyskinetic movements or obvious goiter  CHEST:  No shortness of breath, hyperventilation or cough  CARDIOVASCULAR:  No tachycardia or chest pain  ABDOMEN:  No nausea, vomiting, pain, constipation or diarrhea  URINARY:  No frequency, dysuria or sexual dysfunction  ENDOCRINE:  No polydipsia, polyuria  MUSCULOSKELETAL:  Joint pain  NEUROLOGIC:  No weakness, sensory changes, seizures, confusion, memory loss, tremor or other abnormal movements      Current Evaluation:     Nutritional Screening: Considering the patient's height and weight, medications, medical history and preferences, should a referral be made to the dietitian? no    Constitutional  Vitals:  Most recent vital signs, dated less than 90 days prior to this appointment, were not reviewed.  There were no vitals filed for this visit.     General:  unremarkable, age appropriate     Musculoskeletal  Muscle Strength/Tone:  no dystonia, no tremor   Gait & Station:  non-ataxic     Psychiatric  Appearance: casually dressed & groomed;   Behavior: calm,   Cooperation: cooperative with assessment  Speech: normal rate, volume, tone  Thought Process: linear, goal-directed  Thought Content: No suicidal or homicidal ideation; no delusions  Affect: tearful  Mood: mildly dysphoric  Perceptions: No auditory or visual hallucinations  Level of Consciousness: alert throughout interview  Insight: fair  Cognition: Oriented to person, place, time, & situation  Memory: no apparent deficits to general clinical interview; not formally assessed  Attention/Concentration: no apparent deficits to  general clinical interview; not formally assessed  Fund of Knowledge: average by vocabulary/education    Laboratory Data  Lab Visit on 06/28/2017   Component Date Value Ref Range Status    Urine Culture, Routine 06/30/2017 No growth   Final         Medications  Outpatient Encounter Prescriptions as of 7/20/2017   Medication Sig Dispense Refill    alprazolam (XANAX) 0.5 MG tablet Take 1 tablet (0.5 mg total) by mouth nightly as needed. 30 tablet 0    butalbital-acetaminophen-caffeine -40 mg (FIORICET, ESGIC) -40 mg per tablet TAKE 1 TABLET EVERY 4 HOURS AS NEEDED 30 tablet 0    clotrimazole (LOTRIMIN) 1 % Soln Apply topically 2 (two) times daily. 30 mL 3    cycloSPORINE (RESTASIS) 0.05 % ophthalmic emulsion Place 1 drop into both eyes 2 (two) times daily.       duloxetine (CYMBALTA) 30 MG capsule Take 1 capsule (30 mg total) by mouth once daily. 30 capsule 6    GLUCOSAMINE HCL/CHONDR HERNDON A NA (OSTEO BI-FLEX ORAL) Take by mouth.      hydrocortisone 2.5 % cream Apply to the affected area twice daily.      ketoconazole (NIZORAL) 2 % shampoo Shampoo three times weekly for 4 weeks then as needed.      levothyroxine (SYNTHROID) 100 MCG tablet Take 100 mcg by mouth once daily.      zolpidem (AMBIEN) 5 MG Tab Take 1 tablet (5 mg total) by mouth nightly as needed. 30 tablet 0     No facility-administered encounter medications on file as of 7/20/2017.      Assessment - Diagnosis - Goals:     Impression: 67 y/o WF with adjustment disorder with anxious mood in context of cancer recurrence diagnosis. Some problems with sleep, anxiety related to diagnosis/prognosis, existential/mortality related issues.     Dx: adjustment disorder with anxious mood    Treatment Goals:  Specify outcomes written in observable, behavioral terms:   Comfort and assist in adjustment to diagnosis/treatment/prognosis.     Treatment Plan/Recommendations:   · Continue current pharmacotherapy - duloxetine should help for mood in  addition to analgesic properties; prn zolpidem, prn alprazolam. Discussed risks, benefits, and alternatives to treatment plan documented above with patient. I answered all patient questions related to this plan and patient expressed understanding and agreement.   · Spiritual counseling preferred by patient. Would recommend professional counseling if not helping.     Return to Clinic: 6 month follow-up    Counseling time: 10 minutes  Total time: 50 minutes    MICHELLE Asif MD

## 2017-07-27 ENCOUNTER — TELEPHONE (OUTPATIENT)
Dept: RHEUMATOLOGY | Facility: CLINIC | Age: 67
End: 2017-07-27

## 2017-07-27 DIAGNOSIS — M15.9 PRIMARY OSTEOARTHRITIS INVOLVING MULTIPLE JOINTS: ICD-10-CM

## 2017-07-27 DIAGNOSIS — M75.50 SUBACROMIAL BURSITIS: ICD-10-CM

## 2017-07-27 DIAGNOSIS — M79.7 FIBROMYALGIA: Primary | ICD-10-CM

## 2017-07-27 NOTE — TELEPHONE ENCOUNTER
Please send new PT orders to [facility] [address] [place] [address] location with start date for therapy 08/14/2017. Please advise    Fax [phone]

## 2017-09-13 ENCOUNTER — PATIENT OUTREACH (OUTPATIENT)
Dept: ADMINISTRATIVE | Facility: HOSPITAL | Age: 67
End: 2017-09-13

## 2017-09-13 NOTE — PROGRESS NOTES
Attempt to schedule patient for blood pressure recheck. Patient also due for zoster pneumococcal, and influenza vaccinations. Patient currently undergoing treatment for cancer. Patient will call back.

## 2017-09-26 ENCOUNTER — TELEPHONE (OUTPATIENT)
Dept: INTERNAL MEDICINE | Facility: CLINIC | Age: 67
End: 2017-09-26

## 2017-09-26 DIAGNOSIS — Z12.11 ENCOUNTER FOR SCREENING COLONOSCOPY: Primary | ICD-10-CM

## 2017-09-26 NOTE — TELEPHONE ENCOUNTER
Patient due for colonoscopy. Please place order. Patient scheduled appointment follow up on 10/11/2017

## 2017-09-26 NOTE — TELEPHONE ENCOUNTER
----- Message from Anni Carlton sent at 9/26/2017 10:24 AM CDT -----  Please call pt back at [phone] about arranging her colon test. She spoke with some providers over [facility] about some test.

## 2017-09-27 ENCOUNTER — PATIENT OUTREACH (OUTPATIENT)
Dept: ADMINISTRATIVE | Facility: HOSPITAL | Age: 67
End: 2017-09-27

## 2017-10-11 ENCOUNTER — OFFICE VISIT (OUTPATIENT)
Dept: INTERNAL MEDICINE | Facility: CLINIC | Age: 67
End: 2017-10-11
Payer: MEDICARE

## 2017-10-11 ENCOUNTER — LAB VISIT (OUTPATIENT)
Dept: LAB | Facility: HOSPITAL | Age: 67
End: 2017-10-11
Attending: FAMILY MEDICINE
Payer: MEDICARE

## 2017-10-11 VITALS
SYSTOLIC BLOOD PRESSURE: 132 MMHG | WEIGHT: 191.81 LBS | HEIGHT: 66 IN | HEART RATE: 75 BPM | DIASTOLIC BLOOD PRESSURE: 78 MMHG | TEMPERATURE: 96 F | BODY MASS INDEX: 30.82 KG/M2

## 2017-10-11 DIAGNOSIS — R42 DIZZINESS: ICD-10-CM

## 2017-10-11 DIAGNOSIS — M89.9 BONE DISORDER: ICD-10-CM

## 2017-10-11 DIAGNOSIS — K92.1 MELENA: Primary | ICD-10-CM

## 2017-10-11 DIAGNOSIS — K21.9 GASTROESOPHAGEAL REFLUX DISEASE WITHOUT ESOPHAGITIS: ICD-10-CM

## 2017-10-11 DIAGNOSIS — K92.1 MELENA: ICD-10-CM

## 2017-10-11 LAB
ALBUMIN SERPL BCP-MCNC: 3.6 G/DL
ALP SERPL-CCNC: 75 U/L
ALT SERPL W/O P-5'-P-CCNC: 16 U/L
ANION GAP SERPL CALC-SCNC: 6 MMOL/L
AST SERPL-CCNC: 17 U/L
BASOPHILS # BLD AUTO: 0.02 K/UL
BASOPHILS NFR BLD: 0.4 %
BILIRUB SERPL-MCNC: 0.7 MG/DL
BUN SERPL-MCNC: 14 MG/DL
CALCIUM SERPL-MCNC: 8.5 MG/DL
CHLORIDE SERPL-SCNC: 108 MMOL/L
CO2 SERPL-SCNC: 27 MMOL/L
CREAT SERPL-MCNC: 0.9 MG/DL
DIFFERENTIAL METHOD: NORMAL
EOSINOPHIL # BLD AUTO: 0.1 K/UL
EOSINOPHIL NFR BLD: 2.5 %
ERYTHROCYTE [DISTWIDTH] IN BLOOD BY AUTOMATED COUNT: 13.4 %
EST. GFR  (AFRICAN AMERICAN): >60 ML/MIN/1.73 M^2
EST. GFR  (NON AFRICAN AMERICAN): >60 ML/MIN/1.73 M^2
FERRITIN SERPL-MCNC: 62 NG/ML
GLUCOSE SERPL-MCNC: 86 MG/DL
HCT VFR BLD AUTO: 42.7 %
HGB BLD-MCNC: 14.4 G/DL
IRON SERPL-MCNC: 126 UG/DL
LYMPHOCYTES # BLD AUTO: 1.6 K/UL
LYMPHOCYTES NFR BLD: 28.3 %
MCH RBC QN AUTO: 30 PG
MCHC RBC AUTO-ENTMCNC: 33.7 G/DL
MCV RBC AUTO: 89 FL
MONOCYTES # BLD AUTO: 0.4 K/UL
MONOCYTES NFR BLD: 6.3 %
NEUTROPHILS # BLD AUTO: 3.5 K/UL
NEUTROPHILS NFR BLD: 62.3 %
PLATELET # BLD AUTO: 194 K/UL
PMV BLD AUTO: 10.5 FL
POTASSIUM SERPL-SCNC: 4.6 MMOL/L
PROT SERPL-MCNC: 6.6 G/DL
RBC # BLD AUTO: 4.8 M/UL
SATURATED IRON: 36 %
SODIUM SERPL-SCNC: 141 MMOL/L
TOTAL IRON BINDING CAPACITY: 354 UG/DL
TRANSFERRIN SERPL-MCNC: 239 MG/DL
TSH SERPL DL<=0.005 MIU/L-ACNC: 1.54 UIU/ML
WBC # BLD AUTO: 5.54 K/UL

## 2017-10-11 PROCEDURE — 99213 OFFICE O/P EST LOW 20 MIN: CPT | Mod: PBBFAC,PO | Performed by: FAMILY MEDICINE

## 2017-10-11 PROCEDURE — 82728 ASSAY OF FERRITIN: CPT

## 2017-10-11 PROCEDURE — 36415 COLL VENOUS BLD VENIPUNCTURE: CPT | Mod: PO

## 2017-10-11 PROCEDURE — 84443 ASSAY THYROID STIM HORMONE: CPT

## 2017-10-11 PROCEDURE — 85025 COMPLETE CBC W/AUTO DIFF WBC: CPT

## 2017-10-11 PROCEDURE — 99999 PR PBB SHADOW E&M-EST. PATIENT-LVL III: CPT | Mod: PBBFAC,,, | Performed by: FAMILY MEDICINE

## 2017-10-11 PROCEDURE — 83540 ASSAY OF IRON: CPT

## 2017-10-11 PROCEDURE — 99214 OFFICE O/P EST MOD 30 MIN: CPT | Mod: S$PBB,,, | Performed by: FAMILY MEDICINE

## 2017-10-11 PROCEDURE — 80053 COMPREHEN METABOLIC PANEL: CPT

## 2017-10-11 RX ORDER — LEVOTHYROXINE SODIUM 100 UG/1
TABLET ORAL
COMMUNITY
Start: 2017-08-07 | End: 2017-10-11 | Stop reason: ALTCHOICE

## 2017-10-11 RX ORDER — AMOXICILLIN AND CLAVULANATE POTASSIUM 500; 125 MG/1; MG/1
1 TABLET, FILM COATED ORAL 2 TIMES DAILY
Qty: 20 TABLET | Refills: 0 | Status: SHIPPED | OUTPATIENT
Start: 2017-10-11 | End: 2017-10-24 | Stop reason: ALTCHOICE

## 2017-10-11 RX ORDER — METHYLPREDNISOLONE 4 MG/1
TABLET ORAL
Qty: 1 PACKAGE | Refills: 0 | Status: SHIPPED | OUTPATIENT
Start: 2017-10-11 | End: 2017-11-01

## 2017-10-11 RX ORDER — VIT C/E/ZN/COPPR/LUTEIN/ZEAXAN 250MG-90MG
1000 CAPSULE ORAL
COMMUNITY
End: 2017-10-24

## 2017-10-11 RX ORDER — CYCLOSPORINE 0.5 MG/ML
1 EMULSION OPHTHALMIC
COMMUNITY
Start: 2013-02-05 | End: 2017-10-11

## 2017-10-11 NOTE — PROGRESS NOTES
Subjective:       Patient ID: Sherrill Avery is a 67 y.o. female.    Chief Complaint: Otalgia and Follow-up    Otalgia    There is pain in the left ear. This is a new problem. The current episode started 1 to 4 weeks ago. The problem occurs constantly. The problem has been waxing and waning. There has been no fever. The pain is at a severity of 6/10. The pain is mild. Associated symptoms include rhinorrhea. Pertinent negatives include no abdominal pain, diarrhea, ear discharge, headaches, hearing loss, neck pain or sore throat. She has tried nothing for the symptoms. The treatment provided no relief.     Review of Systems   Constitutional: Negative.    HENT: Positive for ear pain and rhinorrhea. Negative for ear discharge, hearing loss and sore throat.    Respiratory: Negative.    Cardiovascular: Negative.    Gastrointestinal: Negative for abdominal pain and diarrhea.   Genitourinary: Negative.    Musculoskeletal: Negative.  Negative for neck pain.   Neurological: Negative.  Negative for headaches.   Hematological: Negative.    Psychiatric/Behavioral: Negative.        Objective:      Physical Exam   Constitutional: She appears well-developed and well-nourished.   HENT:   Right Ear: Tympanic membrane is erythematous.   Left Ear: Tympanic membrane is erythematous.   Nose: Mucosal edema and rhinorrhea present. Right sinus exhibits maxillary sinus tenderness. Right sinus exhibits no frontal sinus tenderness. Left sinus exhibits maxillary sinus tenderness. Left sinus exhibits no frontal sinus tenderness.   Mouth/Throat: Posterior oropharyngeal erythema present.   Cardiovascular: Normal rate and regular rhythm.    Pulmonary/Chest: Effort normal and breath sounds normal.   Abdominal: Soft. Bowel sounds are normal.   Skin: Skin is warm and dry.       Assessment:       1. Melena    2. Dizziness    3. Bone disorder    4. Gastroesophageal reflux disease without esophagitis        Plan:       Melena  Comments:  Will send up to  GI for further follow-up may need upper scope  Orders:  -     Iron and TIBC; Future; Expected date: 10/11/2017  -     Ferritin; Future; Expected date: 10/11/2017  -     Ambulatory referral to Gastroenterology    Dizziness  Comments:  Multiple etiolgy with possible iron deficiency but also has bad sinus issues  Orders:  -     Comprehensive metabolic panel; Future; Expected date: 10/11/2017  -     CBC auto differential; Future; Expected date: 10/11/2017  -     TSH; Future; Expected date: 10/11/2017    Bone disorder  Comments:  Will do Dexa scan  Orders:  -     DXA Bone Density Spine And Hip; Future    Gastroesophageal reflux disease without esophagitis  Comments:  Will send pt to GI    Other orders  -     amoxicillin-clavulanate 500-125mg (AUGMENTIN) 500-125 mg Tab; Take 1 tablet (500 mg total) by mouth 2 (two) times daily.  Dispense: 20 tablet; Refill: 0  -     methylPREDNISolone (MEDROL DOSEPACK) 4 mg tablet; use as directed  Dispense: 1 Package; Refill: 0

## 2017-10-17 ENCOUNTER — TELEPHONE (OUTPATIENT)
Dept: GASTROENTEROLOGY | Facility: CLINIC | Age: 67
End: 2017-10-17

## 2017-10-17 NOTE — TELEPHONE ENCOUNTER
----- Message from Jagruti Morocho sent at 10/17/2017 11:52 AM CDT -----  Pt is requesting a call from nurse to verify if she can still come to her apt due to she live out of town.          Please call pt back at [phone]

## 2017-10-23 DIAGNOSIS — J32.9 SINUSITIS, UNSPECIFIED CHRONICITY, UNSPECIFIED LOCATION: Primary | ICD-10-CM

## 2017-10-23 DIAGNOSIS — R51.9 HEADACHE, UNSPECIFIED HEADACHE TYPE: ICD-10-CM

## 2017-10-23 NOTE — TELEPHONE ENCOUNTER
----- Message from Zainab Schuster sent at 10/23/2017 12:30 PM CDT -----  Contact: pt  Calling in regards to RX medication and its not helping her feel better and she wants to know what to do. Please advise [phone]

## 2017-10-23 NOTE — TELEPHONE ENCOUNTER
Patient stated that she was seen on 10/11/2017 and was given a medrol dosepack and Augmentin. She stated that she is not any better. She is still having sinus issues, headache, sinus pressure, post nasal drip, no energy, and dizziness. Please advise

## 2017-10-24 ENCOUNTER — OFFICE VISIT (OUTPATIENT)
Dept: OTOLARYNGOLOGY | Facility: CLINIC | Age: 67
End: 2017-10-24
Payer: MEDICARE

## 2017-10-24 ENCOUNTER — CLINICAL SUPPORT (OUTPATIENT)
Dept: AUDIOLOGY | Facility: CLINIC | Age: 67
End: 2017-10-24
Payer: MEDICARE

## 2017-10-24 VITALS
WEIGHT: 191.38 LBS | TEMPERATURE: 96 F | BODY MASS INDEX: 30.76 KG/M2 | HEART RATE: 88 BPM | HEIGHT: 66 IN | SYSTOLIC BLOOD PRESSURE: 143 MMHG | RESPIRATION RATE: 18 BRPM | DIASTOLIC BLOOD PRESSURE: 71 MMHG

## 2017-10-24 DIAGNOSIS — J30.89 CHRONIC NON-SEASONAL ALLERGIC RHINITIS, UNSPECIFIED TRIGGER: ICD-10-CM

## 2017-10-24 DIAGNOSIS — H81.11 BENIGN PAROXYSMAL POSITIONAL VERTIGO OF RIGHT EAR: Primary | ICD-10-CM

## 2017-10-24 DIAGNOSIS — H81.11 BPPV (BENIGN PAROXYSMAL POSITIONAL VERTIGO), RIGHT: Primary | ICD-10-CM

## 2017-10-24 PROCEDURE — 95992 CANALITH REPOSITIONING PROC: CPT | Mod: PBBFAC,PO | Performed by: ORTHOPAEDIC SURGERY

## 2017-10-24 PROCEDURE — 95992 CANALITH REPOSITIONING PROC: CPT | Mod: S$PBB,,, | Performed by: ORTHOPAEDIC SURGERY

## 2017-10-24 PROCEDURE — 99214 OFFICE O/P EST MOD 30 MIN: CPT | Mod: 25,S$PBB,, | Performed by: ORTHOPAEDIC SURGERY

## 2017-10-24 PROCEDURE — 99999 PR PBB SHADOW E&M-EST. PATIENT-LVL III: CPT | Mod: PBBFAC,,, | Performed by: ORTHOPAEDIC SURGERY

## 2017-10-24 PROCEDURE — 99213 OFFICE O/P EST LOW 20 MIN: CPT | Mod: PBBFAC,PO | Performed by: ORTHOPAEDIC SURGERY

## 2017-10-24 RX ORDER — CLOTRIMAZOLE 1 G/ML
1 SOLUTION TOPICAL 2 TIMES DAILY
COMMUNITY
End: 2019-01-03

## 2017-10-24 RX ORDER — FLUTICASONE PROPIONATE 50 MCG
1 SPRAY, SUSPENSION (ML) NASAL
COMMUNITY
End: 2018-04-04 | Stop reason: SDUPTHER

## 2017-10-24 NOTE — TELEPHONE ENCOUNTER
----- Message from Silvia Heath sent at 10/24/2017 10:26 AM CDT -----  Contact: pt  She's calling to get a refill    1. What is the name of the medication you are requesting? Butal  2. What is the dose? 50/325/40  3. How do you take the medication? Orally, topically, etc? orally  4. How often do you take this medication? As needed  5. Do you need a 30 day or 90 day supply? 30  6. How many refills are you requesting? 1  7. What is your preferred pharmacy and location of the pharmacy? [facility] on [address] in [place]  8. Who can we contact with further questions? [phone] ([place])

## 2017-10-24 NOTE — PROGRESS NOTES
Referring provider:     Sherrill Avery was seen 10/24/2017 for Epley maneuver for BPPV in the right ear.      A 5-position Epley maneuver was performed for the right ear under physician supervision.  Patient tolerated the maneuver well and was asymptomatic upon discharge.  Post-Epley home instructions were reviewed and given to the patient.  Understanding was voiced.    Recommendations: Return in 1 week for recheck.    Diagnosis: BPPV - RE

## 2017-10-24 NOTE — LETTER
October 26, 2017      Cachorro Stewart MD  [address] [place] [address]           [facility]  [address] [place] [address]  Phone: [phone]  Fax: [phone]          Patient: Sherrill Avery   MR Number: [id number]   YOB: 1950   Date of Visit: 10/24/2017       Dear Dr. Cachorro Stewart:    Thank you for referring Sherrill Avery to me for evaluation. Attached you will find relevant portions of my assessment and plan of care.    If you have questions, please do not hesitate to call me. I look forward to following Sherrill Avery along with you.    Sincerely,    Whitney Pierre MD    Enclosure  CC:  No Recipients    If you would like to receive this communication electronically, please contact [email or web address] or [phone] to request more information on [facility] access.    For providers and/or their staff who would like to refer a patient to [facility], please contact us through our one-stop-shop provider referral line, [facility], at [phone].    If you feel you have received this communication in error or would no longer like to receive these types of communications, please e-mail [email or web address]

## 2017-10-24 NOTE — PROGRESS NOTES
Subjective:       Patient ID: Sherrill Avery is a 67 y.o. female.    Chief Complaint: Dizziness (3 wks) and Sinus Problem (drainage)    Patient is a very pleasant 67 y.o. female here to see me today for the first time for evaluation of dizziness.   She reports that the symptoms have been present for the last 3 weeks.  She says that she has a spinning sensation when she lays down, and also when she turns over in bed.  She describes the dizziness as whirling and a sensation of movement of surroundings and says that it lasts seconds.  She has noted that laying and rolling over acts as a trigger.  She denies otalgia, ear drainage, or change in her hearing loss (some slight tenderness behind left ear).  She has not started any new medications, and has not had any recent dietary changes.  She has hearing aids (has had for several years), she thinks she may be due for adjustment.  She was seen by Dr. Tena last year with dizziness as well, but those symptoms have resolved and this dizziness is different.  Second, she has issue with nasal congestion and allergies.  She describes a facial pressure and constant postnasal drainage.  She does not smoke.  She is diagnosed at time during the year with sinusitis as well, and is treated with oral antibiotics.  However, she notes that her symptoms are chronic.        Review of Systems   Constitutional: Negative for chills, fatigue, fever and unexpected weight change.   HENT: Negative for congestion, dental problem, ear discharge, ear pain, facial swelling, hearing loss, nosebleeds, postnasal drip, rhinorrhea, sinus pressure, sneezing, sore throat, tinnitus, trouble swallowing and voice change.    Eyes: Negative for redness, itching and visual disturbance.   Respiratory: Negative for cough, choking and wheezing.    Cardiovascular: Negative for chest pain and palpitations.   Gastrointestinal: Negative for abdominal pain.        No reflux.   Musculoskeletal: Negative for gait problem.    Skin: Negative for rash.   Neurological: Negative for dizziness, light-headedness and headaches.       Objective:      Physical Exam   Constitutional: She is oriented to person, place, and time. She appears well-developed and well-nourished. No distress.   HENT:   Head: Normocephalic and atraumatic.   Right Ear: Tympanic membrane, external ear and ear canal normal.   Left Ear: Tympanic membrane, external ear and ear canal normal.   Nose: Mucosal edema and rhinorrhea present. No nasal deformity or septal deviation. No epistaxis. Right sinus exhibits no maxillary sinus tenderness and no frontal sinus tenderness. Left sinus exhibits no maxillary sinus tenderness and no frontal sinus tenderness.   Mouth/Throat: Uvula is midline, oropharynx is clear and moist and mucous membranes are normal. Mucous membranes are not pale and not dry. No dental caries. No oropharyngeal exudate or posterior oropharyngeal erythema.   Eyes: Conjunctivae, EOM and lids are normal. Pupils are equal, round, and reactive to light. Right eye exhibits no chemosis. Left eye exhibits no chemosis. Right conjunctiva is not injected. Left conjunctiva is not injected. No scleral icterus. Right eye exhibits normal extraocular motion and no nystagmus. Left eye exhibits normal extraocular motion and no nystagmus.   Neck: Trachea normal and phonation normal. No tracheal tenderness present. No tracheal deviation present. No thyroid mass and no thyromegaly present.   Cardiovascular: Intact distal pulses.    Pulmonary/Chest: Effort normal. No stridor. No respiratory distress.   Abdominal: She exhibits no distension.   Lymphadenopathy:        Head (right side): No submental, no submandibular, no preauricular, no posterior auricular and no occipital adenopathy present.        Head (left side): No submental, no submandibular, no preauricular, no posterior auricular and no occipital adenopathy present.     She has no cervical adenopathy.   Neurological: She is  alert and oriented to person, place, and time. No cranial nerve deficit. She displays a negative Romberg sign. Gait normal.   Waucoma-Hallpike positive on the right, negative on the left   Skin: Skin is warm and dry. No rash noted. No erythema.   Psychiatric: She has a normal mood and affect. Her behavior is normal.         Sherrill Avery was seen 10/26/2017 for Epley maneuver for BPPV in the right ear.      A 5-position Epley maneuver was performed for the right.  Patient tolerated the maneuver well and was asymptomatic upon discharge.  No nystagmus seen on post-procedure examination.  Post-Epley home instructions were reviewed and given to the patient.  Understanding was voiced.            Assessment:       1. BPPV (benign paroxysmal positional vertigo), right    2. Chronic non-seasonal allergic rhinitis, unspecified trigger        Plan:       1.  BPPV:  We had a long discussion regarding the relevant anatomy and pathology relevant to BPPV.  We discussed that in the ear there are three semicircular canals that detect rotational movement.  BPPV occurs as a result of otoconia, tiny crystals of calcium carbonate that are a normal part of the inner ears anatomy, detaching from their normal anatomic position and collecting in one of the semicircular canals.  When the head moves, the otoconia shift. This stimulates the cupula to send false signals to the brain, producing vertigo and triggering nystagmus (involuntary eye movements). She has had CRM of the right ear, and she has been given necessary post-procedure instructions at that time.  2.  AR:  I would recommend daily use of Flonase and an oral antihistamine for the next 2-3 weeks.  If she continues to have symptoms at that point, I would like for her to call and would then schedule her for allergy skin testing as she would have had a trial of maximal medical therapy.  Risks and benefits were discussed with the patient, including the risk of an allergic reaction to the  testing.  In the most severe cases, this reaction could cause a life threatening anaphylaxis that would require treatment in clinic.  I reviewed her medications to ensure no contraindication to testing, and she will need to stop oral antihistamines prior.   I will see her in followup once allergy testing is complete and results are available.

## 2017-10-25 RX ORDER — BUTALBITAL, ACETAMINOPHEN AND CAFFEINE 50; 325; 40 MG/1; MG/1; MG/1
TABLET ORAL
Qty: 30 TABLET | Refills: 0 | Status: SHIPPED | OUTPATIENT
Start: 2017-10-25 | End: 2019-01-03 | Stop reason: SDUPTHER

## 2017-11-01 ENCOUNTER — TELEPHONE (OUTPATIENT)
Dept: RADIOLOGY | Facility: HOSPITAL | Age: 67
End: 2017-11-01

## 2017-12-15 ENCOUNTER — TELEPHONE (OUTPATIENT)
Dept: INTERNAL MEDICINE | Facility: CLINIC | Age: 67
End: 2017-12-15

## 2017-12-15 NOTE — TELEPHONE ENCOUNTER
----- Message from Krystle Brooks MA sent at 12/15/2017  2:53 PM CST -----  Contact: pt      ----- Message -----  From: Delicia Harrison  Sent: 12/15/2017   2:09 PM  To: Terry WILLIAMSON Staff    Please call pt @ [phone], pt states she has UTI and need a script [facility] [address], pt have back ache,itching,nausea.   Out of season

## 2018-01-22 ENCOUNTER — TELEPHONE (OUTPATIENT)
Dept: OBSTETRICS AND GYNECOLOGY | Facility: CLINIC | Age: 68
End: 2018-01-22

## 2018-01-22 DIAGNOSIS — Z12.39 BREAST CANCER SCREENING: Primary | ICD-10-CM

## 2018-01-22 RX ORDER — ESTRADIOL 0.5 MG/1
0.5 TABLET ORAL DAILY
Qty: 30 TABLET | Refills: 11 | Status: SHIPPED | OUTPATIENT
Start: 2018-01-22 | End: 2018-02-07 | Stop reason: SDUPTHER

## 2018-01-22 NOTE — TELEPHONE ENCOUNTER
Pt s/p hyst/BSO. Ok to delay gyn exam but needs to continue yearly mmg if stays on ERT. Can just come in for mmg. Reschedule gyn appt for next year. Estradiol refilled for 0.5mg-can adjust dosing after 4-6 weeks if needs to

## 2018-01-22 NOTE — TELEPHONE ENCOUNTER
Spoke with pt, requesting refill on hormone pills (nothing was in chart). Pt stated it is estradiol, but unaware of the mg. Due for annual and mammogram. Scheduled both on same day 3/5/2018. Mammogram orders in per written guidelines. Pharmacy updated in chart. Pt verbalized understanding on appointment times and location.

## 2018-01-31 ENCOUNTER — OFFICE VISIT (OUTPATIENT)
Dept: OTOLARYNGOLOGY | Facility: CLINIC | Age: 68
End: 2018-01-31
Payer: MEDICARE

## 2018-01-31 VITALS
TEMPERATURE: 98 F | HEIGHT: 66 IN | SYSTOLIC BLOOD PRESSURE: 129 MMHG | WEIGHT: 195.31 LBS | HEART RATE: 68 BPM | BODY MASS INDEX: 31.39 KG/M2 | DIASTOLIC BLOOD PRESSURE: 71 MMHG

## 2018-01-31 DIAGNOSIS — J30.89 CHRONIC NON-SEASONAL ALLERGIC RHINITIS, UNSPECIFIED TRIGGER: Primary | ICD-10-CM

## 2018-01-31 PROCEDURE — 99214 OFFICE O/P EST MOD 30 MIN: CPT | Mod: S$PBB,,, | Performed by: ORTHOPAEDIC SURGERY

## 2018-01-31 PROCEDURE — 99999 PR PBB SHADOW E&M-EST. PATIENT-LVL IV: CPT | Mod: PBBFAC,,, | Performed by: ORTHOPAEDIC SURGERY

## 2018-01-31 PROCEDURE — 99214 OFFICE O/P EST MOD 30 MIN: CPT | Mod: PBBFAC,PO | Performed by: ORTHOPAEDIC SURGERY

## 2018-01-31 PROCEDURE — 1126F AMNT PAIN NOTED NONE PRSNT: CPT | Mod: ,,, | Performed by: ORTHOPAEDIC SURGERY

## 2018-01-31 PROCEDURE — 1159F MED LIST DOCD IN RCRD: CPT | Mod: ,,, | Performed by: ORTHOPAEDIC SURGERY

## 2018-01-31 RX ORDER — POLYETHYLENE GLYCOL 3350, SODIUM SULFATE ANHYDROUS, SODIUM BICARBONATE, SODIUM CHLORIDE, POTASSIUM CHLORIDE 236; 22.74; 6.74; 5.86; 2.97 G/4L; G/4L; G/4L; G/4L; G/4L
POWDER, FOR SOLUTION ORAL
COMMUNITY
Start: 2017-12-04 | End: 2018-04-03

## 2018-01-31 RX ORDER — OMEPRAZOLE 40 MG/1
40 CAPSULE, DELAYED RELEASE ORAL
COMMUNITY
End: 2018-04-25

## 2018-01-31 NOTE — PROGRESS NOTES
Subjective:      Patient ID: Sherrill Avery is a 67 y.o. female.    Chief Complaint: Sinus Problem (Wants to discuss allergy testing)    Patient is a very pleasant 67 year old female here to see me today in followup for evaluation of her allergies.  She has a long history of allergic rhinitis, and has had allergy testing many years ago.  She says that she has had reactions in the past to dust mites.  She has no previous history of asthma, but has been diagnosed with bronchitis in the past.  She has required nebulizers when ill.  She has severe allergy symptoms all year including sneezing, itchy eyes, nasal drainage, nasal congestion, and coughing.  She finds that her symptoms are present all year, and are not seasonal.  She does have a dog at home.  She does not smoke.  She does have carpet in her bedroom.  She is currently on Flonase.  She has tried antihistamines in the past, but she cannot take now due to her kidney issues (history of kidney cancer, now has just one kidney, so she is very nervous with what to take).  At her last visit here, she had BPPV, that sensation has resolved.          Review of Systems   Constitutional: Negative for chills, fatigue, fever and unexpected weight change.   HENT: Positive for congestion, postnasal drip, rhinorrhea and sneezing. Negative for dental problem, ear discharge, ear pain, facial swelling, hearing loss, nosebleeds, sinus pressure, sore throat, tinnitus, trouble swallowing and voice change.    Eyes: Positive for redness and itching. Negative for visual disturbance.   Respiratory: Positive for cough. Negative for choking, shortness of breath and wheezing.    Cardiovascular: Positive for palpitations. Negative for chest pain.   Gastrointestinal: Negative for abdominal pain.        No reflux.   Musculoskeletal: Negative for gait problem.   Skin: Negative for rash.   Neurological: Positive for headaches. Negative for dizziness and light-headedness.       Objective:        Physical Exam   Constitutional: She is oriented to person, place, and time. She appears well-developed and well-nourished. No distress.   HENT:   Head: Normocephalic and atraumatic.   Right Ear: Tympanic membrane, external ear and ear canal normal.   Left Ear: Tympanic membrane, external ear and ear canal normal.   Nose: Mucosal edema and rhinorrhea present. No nasal deformity or septal deviation. No epistaxis. Right sinus exhibits no maxillary sinus tenderness and no frontal sinus tenderness. Left sinus exhibits no maxillary sinus tenderness and no frontal sinus tenderness.   Mouth/Throat: Uvula is midline, oropharynx is clear and moist and mucous membranes are normal. Mucous membranes are not pale and not dry. No dental caries. No oropharyngeal exudate or posterior oropharyngeal erythema.   Eyes: Conjunctivae, EOM and lids are normal. Pupils are equal, round, and reactive to light. Right eye exhibits no chemosis. Left eye exhibits no chemosis. Right conjunctiva is not injected. Left conjunctiva is not injected. No scleral icterus. Right eye exhibits normal extraocular motion and no nystagmus. Left eye exhibits normal extraocular motion and no nystagmus.   Neck: Trachea normal and phonation normal. No tracheal tenderness present. No tracheal deviation present. No thyroid mass and no thyromegaly present.   Cardiovascular: Intact distal pulses.    Pulmonary/Chest: Effort normal. No stridor. No respiratory distress.   Abdominal: She exhibits no distension.   Lymphadenopathy:        Head (right side): No submental, no submandibular, no preauricular, no posterior auricular and no occipital adenopathy present.        Head (left side): No submental, no submandibular, no preauricular, no posterior auricular and no occipital adenopathy present.     She has no cervical adenopathy.   Neurological: She is alert and oriented to person, place, and time. No cranial nerve deficit.   Skin: Skin is warm and dry. No rash noted.  No erythema.   Psychiatric: She has a normal mood and affect. Her behavior is normal.       Assessment:       1. Chronic non-seasonal allergic rhinitis, unspecified trigger        Plan:     Chronic non-seasonal allergic rhinitis, unspecified trigger    Patient has recently been on a trial of maximal medical therapy, but continues to have symptoms.  At this point, the next step would be to proceed with allergy skin testing.  Risks and benefits were discussed with the patient, including the risk of an allergic reaction to the testing.  In the most severe cases, this reaction could cause a life threatening anaphylaxis that would require treatment in clinic.  I reviewed her medications to ensure no contraindication to testing, and she will need to stop oral antihistamines prior.  The patient was given a handout with detailed instructions.  We also briefly discussed that pending the allergy testing results, she may be a candidate for specific immunotherapy.  There are different methods of desensitization therapy, including both subcutaneous and sublingual options.  Will discuss further once allergy testing is complete and results are available.  She says that she has been told not to take oral antihistamines due to the fact that she now has only one kidney, may consider a trial of intranasal Astelin due to low systemic absorption.  Will discuss once her skin testing is complete.

## 2018-02-06 ENCOUNTER — TELEPHONE (OUTPATIENT)
Dept: OBSTETRICS AND GYNECOLOGY | Facility: CLINIC | Age: 68
End: 2018-02-06

## 2018-02-06 NOTE — TELEPHONE ENCOUNTER
Spoke with pt, requesting higher dose of estradiol. Pt stated she was on 1mg before. Also would like 90 day supply since she lives out of town. Pharmacy updated in chart. Please advise.

## 2018-02-06 NOTE — TELEPHONE ENCOUNTER
----- Message from Maged Ricardo sent at 2/6/2018 11:04 AM CST -----  Contact: Pt   Pt requested to be called back in regards to prescription for Hormone the milligrams are not the same as the same as the last medication callback number to discuss need a prescription for a year pt says she usually get the 90 day supply...[phone]             ..  [address] [place]  [address] [facility]  [address] [facility]  [place] [address]  Phone: [phone] Fax: [phone]

## 2018-02-07 RX ORDER — ESTRADIOL 0.5 MG/1
1 TABLET ORAL DAILY
Qty: 180 TABLET | Refills: 3 | Status: SHIPPED | OUTPATIENT
Start: 2018-02-07 | End: 2019-02-07

## 2018-02-09 RX ORDER — ALPRAZOLAM 0.5 MG/1
0.5 TABLET ORAL NIGHTLY PRN
Qty: 30 TABLET | Refills: 0 | Status: SHIPPED | OUTPATIENT
Start: 2018-02-09 | End: 2018-04-04 | Stop reason: SDUPTHER

## 2018-02-09 NOTE — TELEPHONE ENCOUNTER
----- Message from Libra Driver MA sent at 2/9/2018  9:38 AM CST -----  Contact: pt      ----- Message -----  From: Cherelle Robles  Sent: 2/8/2018   3:57 PM  To: [facility] Staff    1. What is the name of the medication you are requesting? zanx  2. What is the dose? Per doc  3. How do you take the medication? Orally, topically, etc? orally  4. How often do you take this medication? 3 times a day   5. Do you need a 30 day or 90 day supply? 90   6. How many refills are you requesting? 3  7. What is your preferred pharmacy and location of the pharmacy?   [address] - [address] [facility]  [address] [address] [address]  Phone: [phone] Fax: [phone]  8. Who can we contact with further questions? [phone] ([place])

## 2018-02-12 ENCOUNTER — TELEPHONE (OUTPATIENT)
Dept: OBSTETRICS AND GYNECOLOGY | Facility: CLINIC | Age: 68
End: 2018-02-12

## 2018-02-12 NOTE — TELEPHONE ENCOUNTER
Attempted to call pt, no answer/busy. Spoke with pharmacy and verified prescription for 1mg, instead of taking two 0.5mg tablets. Pharmacy updated prescription.

## 2018-02-12 NOTE — TELEPHONE ENCOUNTER
----- Message from Dariana Case sent at 2/12/2018  4:09 PM CST -----  Contact: pt  States she needs to speak to the nurse regarding her hormones, its the wrong strength for [facility]. States its been two that's she trying to get this straight. States the pharmacy said too call in a new prescription. Please call pt at [phone]. Thank you

## 2018-03-22 ENCOUNTER — TELEPHONE (OUTPATIENT)
Dept: OTOLARYNGOLOGY | Facility: CLINIC | Age: 68
End: 2018-03-22

## 2018-03-22 RX ORDER — ALPRAZOLAM 0.5 MG/1
TABLET ORAL
Qty: 30 TABLET | Refills: 0 | OUTPATIENT
Start: 2018-03-22

## 2018-03-22 NOTE — TELEPHONE ENCOUNTER
----- Message from Bharti Joseph sent at 3/22/2018  3:03 PM CDT -----  Contact: Pt  Pt calling in regards to wanting to schedule her allergy test and would like a callback at .[phone] (pkkt)

## 2018-03-22 NOTE — TELEPHONE ENCOUNTER
I called patient and helped her schedule appointments.  I scanned appointment slips and allergy testing instructions to myself then emailed this to the patient at [email or web address]

## 2018-03-27 ENCOUNTER — CLINICAL SUPPORT (OUTPATIENT)
Dept: OTOLARYNGOLOGY | Facility: CLINIC | Age: 68
End: 2018-03-27
Payer: MEDICARE

## 2018-03-27 ENCOUNTER — LAB VISIT (OUTPATIENT)
Dept: LAB | Facility: HOSPITAL | Age: 68
End: 2018-03-27
Attending: ORTHOPAEDIC SURGERY
Payer: MEDICARE

## 2018-03-27 DIAGNOSIS — J30.9 ALLERGIC RHINITIS, UNSPECIFIED CHRONICITY, UNSPECIFIED SEASONALITY, UNSPECIFIED TRIGGER: Primary | ICD-10-CM

## 2018-03-27 DIAGNOSIS — J30.9 ALLERGIC RHINITIS, UNSPECIFIED CHRONICITY, UNSPECIFIED SEASONALITY, UNSPECIFIED TRIGGER: ICD-10-CM

## 2018-03-27 PROCEDURE — 99999 PR PBB SHADOW E&M-EST. PATIENT-LVL II: CPT | Mod: PBBFAC,,,

## 2018-03-27 PROCEDURE — 86003 ALLG SPEC IGE CRUDE XTRC EA: CPT

## 2018-03-27 PROCEDURE — 86003 ALLG SPEC IGE CRUDE XTRC EA: CPT | Mod: 59

## 2018-03-27 PROCEDURE — 95004 PERQ TESTS W/ALRGNC XTRCS: CPT | Mod: PBBFAC,PO

## 2018-03-27 PROCEDURE — 99212 OFFICE O/P EST SF 10 MIN: CPT | Mod: PBBFAC,PO,25

## 2018-03-27 PROCEDURE — 95004 PERQ TESTS W/ALRGNC XTRCS: CPT | Mod: S$PBB,,, | Performed by: ORTHOPAEDIC SURGERY

## 2018-03-27 RX ORDER — PANTOPRAZOLE SODIUM 40 MG/1
40 FOR SUSPENSION ORAL DAILY
COMMUNITY
End: 2018-04-04 | Stop reason: ALTCHOICE

## 2018-03-27 NOTE — PROGRESS NOTES
After two patient identifiers and written consent were obtained, patient's allergies and medications were reviewed and changes were made as necessary.  Testing was discussed in detail.  Patient confirmed she does not have asthma, has not been experienced wheezing within the last seven days, has not used an inhaler within the last seven days, is not currently on a beta blocker, and is not on any other medications that are contraindicated for allergy testing.    The patient's forearms were cleansed with alcohol, and the allergen extracts were applied using the Skintestor OMNI device according to departmental procedures and guidelines.    Past Medical History:   Diagnosis Date    Anxiety     Arthritis     Cancer of kidney 8/2/2013    dr faye    Ectopic pregnancy     Eye infection     GERD (gastroesophageal reflux disease)     Hiatal hernia     History of kidney cancer     Hypercholesteremia     Hypertension     Hypothyroid     dr block q 6 months    Insomnia     Migraine headache     Renal cell cancer     Respiratory arrest     due to anesthia    Rosacea     Sleep apnea     Thyroid nodule     dr block    Urinary tract infection      Review of patient's allergies indicates:   Allergen Reactions    Tramadol Itching    Codeine Itching    Morphine Itching    Naproxen Itching    Wal-phed [pseudoephedrine hcl] Itching    Buspirone Anxiety    Talwin [pentazocine lactate] Anxiety       Results obtained from the Modified Quantitative Testing (MQT), including skin endpoint titration (SET) are as follows:    Allergen MTII Wheal   SIZE   Histamine   (+ control) 0   *Alternaria 0   *Apergillus 0   Fusarium 0   *Cladosporium Sphaer 0   Cladosporium Herb 0   *Penicillium Maci 0   Mucor Race 0   *Bipolaris 0   Glycerin   (- control) 0       *American Elm 0   *[place] 0   Viola 0   *Pecan 0   *Seltzer 3   Jose 3   [place] Birch 0   Red Redby 3   Bald [place] 0   Red Deaver 0       *Short Ragweed  0   English Plantain 0   Fam Elder 0   Mugwort Bradley 0   Dock-Sorrel Mix 0   *Spiny Pigweed 0   Baccharis Weed 0   *Cocklebur Mize 0   *Lambs Quarter 0   Nettle Weed 0       Jaciel Grass 0   Bermuda Grass 0   *Cachorro Grass 0   *[place] Blue Grass 0   *Farinae Mite 3   *Pteronyssiunus Mite 5   Cockroach 7   Cat Hair 0   Dog Epithelia 0   Feather Mix 0     *Allergens included in sublingual allergen mixtures    Patient tolerated testing well, no complaints of pain, discomfort, or shortness of breath.Mrs. Avery did not react to the positive Histamine Control - and order for RAST was placed, the patient was sent to the 2nd floor to have labs drawn.  Advised that Dr. Pierre will review her lab results and someone with our office will contact her with her results - advised that it could take up to one week for our office to received her RAST results. Instructed to contact our office with any questions or concerns. Patient verbalized understanding.

## 2018-03-29 LAB
A ALTERNATA IGE QN: <0.35 KU/L
A FUMIGATUS IGE QN: <0.35 KU/L
ALLERGEN BOXELDER MAPLE TREE IGE: <0.35 KU/L
ALLERGEN MAPLE (BOX ELDER) CLASS: NORMAL
ALLERGEN MULBERRY CLASS: NORMAL
ALLERGEN MULBERRY TREE IGE: <0.35 KU/L
ALLERGEN PENICILLIUM IGE: <0.35 KU/L
ALLERGEN WHITE ASH TREE IGE: <0.35 KU/L
AMER SYCAMORE IGE QN: <0.35 KU/L
BALD CYPRESS IGE QN: <0.35 KU/L
BERMUDA GRASS IGE QN: <0.35 KU/L
C HERBARUM IGE QN: <0.35 KU/L
CAT DANDER IGE QN: <0.35 KU/L
CEDAR IGE QN: <0.35 KU/L
CMN PIGWEED IGE QN: <0.35 KU/L
COCKLEBUR IGE QN: <0.35 KU/L
COMMON RAGWEED IGE QN: <0.35 KU/L
D FARINAE IGE QN: <0.35 KU/L
D PTERONYSS IGE QN: <0.35 KU/L
DEPRECATED A ALTERNATA IGE RAST QL: NORMAL
DEPRECATED A FUMIGATUS IGE RAST QL: NORMAL
DEPRECATED BALD CYPRESS IGE RAST QL: NORMAL
DEPRECATED BERMUDA GRASS IGE RAST QL: NORMAL
DEPRECATED C HERBARUM IGE RAST QL: NORMAL
DEPRECATED CAT DANDER IGE RAST QL: NORMAL
DEPRECATED CEDAR IGE RAST QL: NORMAL
DEPRECATED COCKLEBUR IGE RAST QL: NORMAL
DEPRECATED COMMON PIGWEED IGE RAST QL: NORMAL
DEPRECATED COMMON RAGWEED IGE RAST QL: NORMAL
DEPRECATED D FARINAE IGE RAST QL: NORMAL
DEPRECATED D PTERONYSS IGE RAST QL: NORMAL
DEPRECATED DOG DANDER IGE RAST QL: NORMAL
DEPRECATED ENGL PLANTAIN IGE RAST QL: NORMAL
DEPRECATED GOOSEFOOT IGE RAST QL: NORMAL
DEPRECATED HORSE DANDER IGE RAST QL: NORMAL
DEPRECATED JOHNSON GRASS IGE RAST QL: NORMAL
DEPRECATED KENT BLUE GRASS IGE RAST QL: NORMAL
DEPRECATED M RACEMOSUS IGE RAST QL: NORMAL
DEPRECATED MARSH ELDER IGE RAST QL: NORMAL
DEPRECATED MUGWORT IGE RAST QL: NORMAL
DEPRECATED NETTLE IGE RAST QL: NORMAL
DEPRECATED PECAN/HICK TREE IGE RAST QL: NORMAL
DEPRECATED ROACH IGE RAST QL: ABNORMAL
DEPRECATED SHEEP SORREL IGE RAST QL: NORMAL
DEPRECATED SILVER BIRCH IGE RAST QL: NORMAL
DEPRECATED TIMOTHY IGE RAST QL: NORMAL
DEPRECATED WHITE OAK IGE RAST QL: NORMAL
DOG DANDER IGE QN: <0.35 KU/L
ELM CEDAR CLASS: NORMAL
ELM CEDAR, IGE: <0.35 KU/L
ENGL PLANTAIN IGE QN: <0.35 KU/L
FEATHER PANEL #2: <0.35 KU/L
GOOSEFOOT IGE QN: <0.35 KU/L
HORSE DANDER IGE QN: <0.35 KU/L
JOHNSON GRASS IGE QN: <0.35 KU/L
KENT BLUE GRASS IGE QN: <0.35 KU/L
M RACEMOSUS IGE QN: <0.35 KU/L
MARSH ELDER IGE QN: <0.35 KU/L
MUGWORT IGE QN: <0.35 KU/L
NETTLE IGE QN: <0.35 KU/L
PECAN/HICK TREE IGE QN: <0.35 KU/L
PENICILLIUM CLASS: NORMAL
ROACH IGE QN: 0.88 KU/L
SHEEP SORREL IGE QN: <0.35 KU/L
SILVER BIRCH IGE QN: <0.35 KU/L
TIMOTHY IGE QN: <0.35 KU/L
WHITE ASH CLASS: NORMAL
WHITE OAK IGE QN: <0.35 KU/L

## 2018-04-02 ENCOUNTER — TELEPHONE (OUTPATIENT)
Dept: OTOLARYNGOLOGY | Facility: CLINIC | Age: 68
End: 2018-04-02

## 2018-04-02 ENCOUNTER — TELEPHONE (OUTPATIENT)
Dept: INTERNAL MEDICINE | Facility: CLINIC | Age: 68
End: 2018-04-02

## 2018-04-02 DIAGNOSIS — Z00.00 ANNUAL PHYSICAL EXAM: Primary | ICD-10-CM

## 2018-04-02 NOTE — TELEPHONE ENCOUNTER
----- Message from Bharti Joseph sent at 4/2/2018  8:13 AM CDT -----  Contact: Pt  Pt calling in regards to wanting to schedule a urinalysis to check for UTI and would like to have the orders submitted in the system so she can have it done on 04/03/18 when she come to see the ENT .    Pt is also requesting for an appt to be scheduled for 04/03/18 if possible after 2:45pm due to  She has to see the ENT Dr    She is also requesting blood work for medication refill.     Pt can be reached at [phone]

## 2018-04-02 NOTE — TELEPHONE ENCOUNTER
I conveyed the information below to the patient who verbalized understanding.  Appointment made for tomorrow at [facility] with Dr. Pierre.  The patient verbalized understanding of date, time and location.

## 2018-04-02 NOTE — TELEPHONE ENCOUNTER
Patient is requesting orders to be place for urine to check for a UTI. She would like to come get this done tomorrow. Also, patient scheduled to come in on Wednesday for annual. Please place lab work to be completed tomorrow

## 2018-04-02 NOTE — TELEPHONE ENCOUNTER
----- Message from Whitney Pierre MD sent at 4/2/2018  6:36 AM CDT -----  Please let Ms. Avery know that her blood testing was positive only for a reaction to cockroach, which is a component of dust.  Please schedule her a followup appointment to discuss treatment options at this point.

## 2018-04-03 ENCOUNTER — OFFICE VISIT (OUTPATIENT)
Dept: OTOLARYNGOLOGY | Facility: CLINIC | Age: 68
End: 2018-04-03
Payer: MEDICARE

## 2018-04-03 ENCOUNTER — LAB VISIT (OUTPATIENT)
Dept: LAB | Facility: HOSPITAL | Age: 68
End: 2018-04-03
Attending: FAMILY MEDICINE
Payer: MEDICARE

## 2018-04-03 VITALS
RESPIRATION RATE: 16 BRPM | SYSTOLIC BLOOD PRESSURE: 123 MMHG | BODY MASS INDEX: 31.04 KG/M2 | TEMPERATURE: 98 F | HEART RATE: 75 BPM | DIASTOLIC BLOOD PRESSURE: 74 MMHG | WEIGHT: 193.13 LBS | HEIGHT: 66 IN

## 2018-04-03 DIAGNOSIS — Z00.00 ANNUAL PHYSICAL EXAM: ICD-10-CM

## 2018-04-03 DIAGNOSIS — J34.89 NASAL OBSTRUCTION: ICD-10-CM

## 2018-04-03 DIAGNOSIS — Z91.038 ALLERGY TO COCKROACHES: ICD-10-CM

## 2018-04-03 DIAGNOSIS — J31.0 RHINITIS, NONALLERGIC, CHRONIC: Primary | ICD-10-CM

## 2018-04-03 LAB
BACTERIA #/AREA URNS HPF: NORMAL /HPF
BILIRUB UR QL STRIP: NEGATIVE
CLARITY UR: CLEAR
COLOR UR: YELLOW
GLUCOSE UR QL STRIP: NEGATIVE
HGB UR QL STRIP: ABNORMAL
KETONES UR QL STRIP: NEGATIVE
LEUKOCYTE ESTERASE UR QL STRIP: NEGATIVE
MICROSCOPIC COMMENT: NORMAL
NITRITE UR QL STRIP: NEGATIVE
PH UR STRIP: 6 [PH] (ref 5–8)
PROT UR QL STRIP: NEGATIVE
RBC #/AREA URNS HPF: 3 /HPF (ref 0–4)
SP GR UR STRIP: 1.02 (ref 1–1.03)
SQUAMOUS #/AREA URNS HPF: 8 /HPF
URN SPEC COLLECT METH UR: ABNORMAL

## 2018-04-03 PROCEDURE — 81000 URINALYSIS NONAUTO W/SCOPE: CPT | Mod: PO

## 2018-04-03 PROCEDURE — 99999 PR PBB SHADOW E&M-EST. PATIENT-LVL III: CPT | Mod: PBBFAC,,, | Performed by: ORTHOPAEDIC SURGERY

## 2018-04-03 PROCEDURE — 99213 OFFICE O/P EST LOW 20 MIN: CPT | Mod: PBBFAC,PO | Performed by: ORTHOPAEDIC SURGERY

## 2018-04-03 PROCEDURE — 31231 NASAL ENDOSCOPY DX: CPT | Mod: PBBFAC,PO | Performed by: ORTHOPAEDIC SURGERY

## 2018-04-03 PROCEDURE — 99214 OFFICE O/P EST MOD 30 MIN: CPT | Mod: 25,S$PBB,, | Performed by: ORTHOPAEDIC SURGERY

## 2018-04-03 PROCEDURE — 31231 NASAL ENDOSCOPY DX: CPT | Mod: S$PBB,,, | Performed by: ORTHOPAEDIC SURGERY

## 2018-04-03 PROCEDURE — 87086 URINE CULTURE/COLONY COUNT: CPT

## 2018-04-03 NOTE — PROGRESS NOTES
Subjective:       Patient ID: Sherrill Avery is a 67 y.o. female.    Chief Complaint: Follow-up (Allergy test results RVW)    Patient is a very pleasant 67 year old female here to see me today in followup for evaluation of her allergies.  She has a long history of allergic rhinitis, and has had allergy testing many years ago.  She says that she has had reactions in the past to dust mites.  She has no previous history of asthma, but has been diagnosed with bronchitis in the past.  She has required nebulizers when ill.  She has severe allergy symptoms all year including sneezing, itchy eyes, nasal drainage, nasal congestion, and coughing.  Her main concern now is nasal congestion and a postnasal drip.  She finds that her symptoms are present all year, and are not seasonal.  She does have a dog at home.  She does not smoke.  She does have carpet in her bedroom.  She is currently on Flonase.  She has tried antihistamines in the past, but she cannot take now due to her kidney issues (history of kidney cancer, now has just one kidney, so she is very nervous with what to take).  Since her last visit, she has had RAST testing done, which was positive only to cockroach.  She now reports that she had some sort of a nasal mass removed many years ago with Dr. Child at [facility], she is unsure what it was at that time.  She says that she cannot use a Nasal irrigation device.      Review of Systems   Constitutional: Negative for chills, fatigue, fever and unexpected weight change.   HENT: Positive for congestion, postnasal drip, rhinorrhea and sneezing. Negative for dental problem, ear discharge, ear pain, facial swelling, hearing loss, nosebleeds, sinus pressure, sore throat, tinnitus, trouble swallowing and voice change.    Eyes: Positive for redness and itching. Negative for visual disturbance.   Respiratory: Positive for cough. Negative for choking, shortness of breath and wheezing.    Cardiovascular: Positive for palpitations.  Negative for chest pain.   Gastrointestinal: Negative for abdominal pain.        No reflux.   Musculoskeletal: Negative for gait problem.   Skin: Negative for rash.   Neurological: Positive for headaches. Negative for dizziness and light-headedness.       Objective:      Physical Exam   Constitutional: She is oriented to person, place, and time. She appears well-developed and well-nourished. No distress.   HENT:   Head: Normocephalic and atraumatic.   Right Ear: Tympanic membrane, external ear and ear canal normal.   Left Ear: Tympanic membrane, external ear and ear canal normal.   Nose: Mucosal edema and rhinorrhea present. No nasal deformity or septal deviation. No epistaxis. Right sinus exhibits no maxillary sinus tenderness and no frontal sinus tenderness. Left sinus exhibits no maxillary sinus tenderness and no frontal sinus tenderness.   Mouth/Throat: Uvula is midline, oropharynx is clear and moist and mucous membranes are normal. Mucous membranes are not pale and not dry. No dental caries. No oropharyngeal exudate or posterior oropharyngeal erythema.   Eyes: Conjunctivae, EOM and lids are normal. Pupils are equal, round, and reactive to light. Right eye exhibits no chemosis. Left eye exhibits no chemosis. Right conjunctiva is not injected. Left conjunctiva is not injected. No scleral icterus. Right eye exhibits normal extraocular motion and no nystagmus. Left eye exhibits normal extraocular motion and no nystagmus.   Neck: Trachea normal and phonation normal. No tracheal tenderness present. No tracheal deviation present. No thyroid mass and no thyromegaly present.   Cardiovascular: Intact distal pulses.    Pulmonary/Chest: Effort normal. No stridor. No respiratory distress.   Abdominal: She exhibits no distension.   Lymphadenopathy:        Head (right side): No submental, no submandibular, no preauricular, no posterior auricular and no occipital adenopathy present.        Head (left side): No submental, no  submandibular, no preauricular, no posterior auricular and no occipital adenopathy present.     She has no cervical adenopathy.   Neurological: She is alert and oriented to person, place, and time. No cranial nerve deficit.   Skin: Skin is warm and dry. No rash noted. No erythema.   Psychiatric: She has a normal mood and affect. Her behavior is normal.       Procedure:  Nasal endoscopy    Preprocedure Diagnosis:  Nasal obstruction  Postprocedure Diangosis:    Same    Findings:  Normal nasal cavity and nasopharynx, no masses seen    Procedure in detail:  After verbal consent was obtained, the patient's nasal cavity was anesthetized and decongested using topical ponticaine and neosynephrine.  First her right then her left nasal cavity was examined.  She was found to have normal inferior and middle turbinates bilaterally, and did not have any drainage from the middle meatus bilaterally.  There were no masses or lesions seen on either side.  The scope was then passed through the nasal cavity, and the nasopharynx was examined.  It was found to be free of any masses or ulcerations.  The scope was rotated to examine the eustachian tube orifice bilaterally, and they were both normal bilaterally.  The patient tolerated the procedure without difficulty, and there were no complications.      RAST:  Testing reviewed in detail, class I reaction to cockroach only, all other testing negative.      Assessment:       1. Rhinitis, nonallergic, chronic    2. Allergy to cockroaches    3. Nasal obstruction        Plan:       1. Chronic rhinitis:  Her main concern at this time is her constant nasal drainage and postnasal drip.  We are somewhat limited by the fact that she cannot take oral antihistamines or other oral medication following her nephrectomy, and will not use nasal steroid sprays.  Discussed nasal anatomy and triggers for nasal drainage.  She has previously had a mass of some sort excised by Dr. Child, no evidence of any  mass at this time as the cause for her symptoms.  I would recommend we try topical Nasoneb with budesonide only, as she cannot tolerate irrigations or sprays.  If she still has symptoms, I would recommend sending her to a rhinologist for a second opinion to see what other options are available to her.  2.  Cockroach allergy:  Her only positive response to RAST testing was cockroach.  While desensitization is certainly possible, I do not think it would make a significant improvement in her clinical symptoms with such an isolated allergy.

## 2018-04-04 ENCOUNTER — OFFICE VISIT (OUTPATIENT)
Dept: INTERNAL MEDICINE | Facility: CLINIC | Age: 68
End: 2018-04-04
Payer: MEDICARE

## 2018-04-04 ENCOUNTER — HOSPITAL ENCOUNTER (OUTPATIENT)
Dept: RADIOLOGY | Facility: HOSPITAL | Age: 68
Discharge: HOME | End: 2018-04-04
Attending: FAMILY MEDICINE
Payer: MEDICARE

## 2018-04-04 ENCOUNTER — TELEPHONE (OUTPATIENT)
Dept: OTOLARYNGOLOGY | Facility: CLINIC | Age: 68
End: 2018-04-04

## 2018-04-04 VITALS
HEART RATE: 73 BPM | WEIGHT: 194.88 LBS | SYSTOLIC BLOOD PRESSURE: 132 MMHG | DIASTOLIC BLOOD PRESSURE: 84 MMHG | HEIGHT: 65 IN | TEMPERATURE: 98 F | BODY MASS INDEX: 32.47 KG/M2

## 2018-04-04 DIAGNOSIS — M89.9 BONE DISORDER: ICD-10-CM

## 2018-04-04 DIAGNOSIS — C64.1 CARCINOMA OF RIGHT KIDNEY: ICD-10-CM

## 2018-04-04 DIAGNOSIS — G89.29 CHRONIC BILATERAL LOW BACK PAIN WITHOUT SCIATICA: ICD-10-CM

## 2018-04-04 DIAGNOSIS — Z00.00 ANNUAL PHYSICAL EXAM: Primary | ICD-10-CM

## 2018-04-04 DIAGNOSIS — M54.50 CHRONIC BILATERAL LOW BACK PAIN WITHOUT SCIATICA: ICD-10-CM

## 2018-04-04 LAB — BACTERIA UR CULT: NORMAL

## 2018-04-04 PROCEDURE — 99999 PR PBB SHADOW E&M-EST. PATIENT-LVL III: CPT | Mod: PBBFAC,,, | Performed by: FAMILY MEDICINE

## 2018-04-04 PROCEDURE — 72220 X-RAY EXAM SACRUM TAILBONE: CPT | Mod: 26,,, | Performed by: RADIOLOGY

## 2018-04-04 PROCEDURE — 72220 X-RAY EXAM SACRUM TAILBONE: CPT | Mod: TC,FY,PO

## 2018-04-04 PROCEDURE — 99213 OFFICE O/P EST LOW 20 MIN: CPT | Mod: S$PBB,,, | Performed by: FAMILY MEDICINE

## 2018-04-04 PROCEDURE — 99213 OFFICE O/P EST LOW 20 MIN: CPT | Mod: PBBFAC,25,PO | Performed by: FAMILY MEDICINE

## 2018-04-04 RX ORDER — ALPRAZOLAM 0.5 MG/1
0.5 TABLET ORAL NIGHTLY PRN
Qty: 30 TABLET | Refills: 0 | Status: SHIPPED | OUTPATIENT
Start: 2018-04-04 | End: 2018-10-04 | Stop reason: SDUPTHER

## 2018-04-04 RX ORDER — FLUTICASONE PROPIONATE 50 MCG
1 SPRAY, SUSPENSION (ML) NASAL
Qty: 3 BOTTLE | Refills: 4 | Status: SHIPPED | OUTPATIENT
Start: 2018-04-04 | End: 2018-04-06 | Stop reason: SDUPTHER

## 2018-04-04 NOTE — TELEPHONE ENCOUNTER
I conveyed the message below to the patient who verbalized understanding.  Prescription and demographics faxed to [facility].

## 2018-04-04 NOTE — PROGRESS NOTES
Subjective:       Patient ID: Sherrill Avery is a 67 y.o. female.    Chief Complaint: Annual Exam    Annual exam:       Pt is a 67 year old who is being followed with MD Webster for [place]. Pt has annual labs. Pt lipid panel was good. Reviewed general wellness requirements.      Review of Systems   Constitutional: Negative.    HENT: Negative.    Respiratory: Negative.    Cardiovascular: Negative.    Gastrointestinal: Negative.    Genitourinary: Negative.    Musculoskeletal: Negative.    Neurological: Negative.    Hematological: Negative.    Psychiatric/Behavioral: Negative.        Objective:      Physical Exam   Constitutional: She is oriented to person, place, and time. She appears well-developed and well-nourished.   Cardiovascular: Normal rate and regular rhythm.  Exam reveals no friction rub.    No murmur heard.  Pulmonary/Chest: Effort normal and breath sounds normal. She has no wheezes. She has no rales.   Abdominal: Soft. Bowel sounds are normal.   Musculoskeletal: Normal range of motion.   Neurological: She is alert and oriented to person, place, and time.   Skin: Skin is warm and dry.   Psychiatric: She has a normal mood and affect. Her behavior is normal.       Assessment:       1. Annual physical exam    2. Bone disorder    3. Carcinoma of right kidney    4. Chronic bilateral low back pain without sciatica        Plan:       Annual physical exam  Comments:  Pt is up-to-date on her wellness    Bone disorder  Comments:  Will do DEXA scan  Orders:  -     DXA Bone Density Spine And Hip; Future; Expected date: 04/04/2018    Carcinoma of right kidney    Chronic bilateral low back pain without sciatica  -     X-Ray Sacrum And Coccyx; Future; Expected date: 04/04/2018    Other orders  -     ALPRAZolam (XANAX) 0.5 MG tablet; Take 1 tablet (0.5 mg total) by mouth nightly as needed.  Dispense: 30 tablet; Refill: 0  -     Discontinue: fluticasone (FLONASE) 50 mcg/actuation nasal spray; 1 spray (50 mcg total) by Each Nare  route as needed for Rhinitis.  Dispense: 3 Bottle; Refill: 4

## 2018-04-04 NOTE — TELEPHONE ENCOUNTER
----- Message from Jagruti Morocho sent at 4/4/2018  2:05 PM CDT -----  Pt is requesting a call from nurse to discuss she hasn't received her prescription from pharmacy.        Please call pt back at [phone]      .  [address] - [address] [facility] [address] [address] [address] [address]  Phone: [phone] Fax: [phone]

## 2018-04-06 NOTE — TELEPHONE ENCOUNTER
Patient is having trouble with [employer]. Patient is asking for you to send script to local pharmacy instead.

## 2018-04-08 RX ORDER — FLUTICASONE PROPIONATE 50 MCG
1 SPRAY, SUSPENSION (ML) NASAL
Qty: 3 BOTTLE | Refills: 4 | Status: SHIPPED | OUTPATIENT
Start: 2018-04-08 | End: 2019-11-04 | Stop reason: SDUPTHER

## 2018-04-24 ENCOUNTER — HOSPITAL ENCOUNTER (OUTPATIENT)
Dept: RADIOLOGY | Facility: HOSPITAL | Age: 68
Discharge: HOME | End: 2018-04-24
Attending: OBSTETRICS & GYNECOLOGY
Payer: MEDICARE

## 2018-04-24 DIAGNOSIS — Z12.39 BREAST CANCER SCREENING: ICD-10-CM

## 2018-04-24 PROCEDURE — 77067 SCR MAMMO BI INCL CAD: CPT | Mod: 26,,, | Performed by: RADIOLOGY

## 2018-04-24 PROCEDURE — 77067 SCR MAMMO BI INCL CAD: CPT | Mod: TC

## 2018-04-24 PROCEDURE — 77063 BREAST TOMOSYNTHESIS BI: CPT | Mod: 26,,, | Performed by: RADIOLOGY

## 2018-04-25 ENCOUNTER — CLINICAL SUPPORT (OUTPATIENT)
Dept: CARDIOLOGY | Facility: CLINIC | Age: 68
End: 2018-04-25
Payer: MEDICARE

## 2018-04-25 ENCOUNTER — HOSPITAL ENCOUNTER (OUTPATIENT)
Dept: RADIOLOGY | Facility: HOSPITAL | Age: 68
Discharge: HOME | End: 2018-04-25
Attending: FAMILY MEDICINE
Payer: MEDICARE

## 2018-04-25 ENCOUNTER — OFFICE VISIT (OUTPATIENT)
Dept: INTERNAL MEDICINE | Facility: CLINIC | Age: 68
End: 2018-04-25
Payer: MEDICARE

## 2018-04-25 VITALS
TEMPERATURE: 98 F | OXYGEN SATURATION: 98 % | HEART RATE: 70 BPM | WEIGHT: 192 LBS | BODY MASS INDEX: 30.86 KG/M2 | SYSTOLIC BLOOD PRESSURE: 138 MMHG | DIASTOLIC BLOOD PRESSURE: 84 MMHG | HEIGHT: 66 IN

## 2018-04-25 DIAGNOSIS — R06.00 DYSPNEA, UNSPECIFIED TYPE: ICD-10-CM

## 2018-04-25 DIAGNOSIS — R05.9 COUGH: ICD-10-CM

## 2018-04-25 DIAGNOSIS — R06.00 DYSPNEA, UNSPECIFIED TYPE: Primary | ICD-10-CM

## 2018-04-25 PROCEDURE — 71046 X-RAY EXAM CHEST 2 VIEWS: CPT | Mod: 26,,, | Performed by: RADIOLOGY

## 2018-04-25 PROCEDURE — 99213 OFFICE O/P EST LOW 20 MIN: CPT | Mod: PBBFAC,PO,25 | Performed by: FAMILY MEDICINE

## 2018-04-25 PROCEDURE — 93010 ELECTROCARDIOGRAM REPORT: CPT | Mod: S$PBB,,, | Performed by: INTERNAL MEDICINE

## 2018-04-25 PROCEDURE — 71046 X-RAY EXAM CHEST 2 VIEWS: CPT | Mod: TC,FY,PO

## 2018-04-25 PROCEDURE — 99999 PR PBB SHADOW E&M-EST. PATIENT-LVL III: CPT | Mod: PBBFAC,,, | Performed by: FAMILY MEDICINE

## 2018-04-25 PROCEDURE — 93005 ELECTROCARDIOGRAM TRACING: CPT | Mod: PBBFAC,PO | Performed by: INTERNAL MEDICINE

## 2018-04-25 PROCEDURE — 99213 OFFICE O/P EST LOW 20 MIN: CPT | Mod: S$PBB,,, | Performed by: FAMILY MEDICINE

## 2018-04-25 RX ORDER — ALBUTEROL SULFATE 90 UG/1
AEROSOL, METERED RESPIRATORY (INHALATION)
COMMUNITY
Start: 2018-04-18 | End: 2019-01-03

## 2018-04-25 RX ORDER — DOXYCYCLINE 100 MG/1
100 CAPSULE ORAL
COMMUNITY
Start: 2018-04-18 | End: 2018-04-28

## 2018-04-25 NOTE — PROGRESS NOTES
Subjective:       Patient ID: Sherrill Avery is a 67 y.o. female.    Chief Complaint: breathing problems    F/U:      Pt is a 67 year old is a 67 year old who reports having some cold sx a few weeks ago. Pt was placed on antibiotics. Pt reports irregular breathing on exertion. Pt was on inhaler and not taken for last 4 days. Pt has completed antibiotics. Pt had a decongestant as well but not for 5 days.       Review of Systems   Constitutional: Negative.    Respiratory: Negative.    Genitourinary: Negative.    Musculoskeletal: Negative.    Neurological: Negative.    Hematological: Negative.        Objective:      Physical Exam   Constitutional: She is oriented to person, place, and time. She appears well-developed and well-nourished.   Cardiovascular: Normal rate and regular rhythm.    Pulmonary/Chest: Effort normal and breath sounds normal. No respiratory distress. She has no wheezes.   Abdominal: Soft. Bowel sounds are normal.   Neurological: She is alert and oriented to person, place, and time.   Skin: Skin is warm and dry.   Psychiatric: Judgment normal. Her mood appears anxious.       Assessment:       1. Dyspnea, unspecified type    2. Cough        Plan:       Dyspnea, unspecified type  Comments:  Will do EKG get a BNP  Orders:  -     SCHEDULED EKG 12-LEAD (to Muse); Future  -     Brain natriuretic peptide; Future; Expected date: 04/25/2018    Cough  Comments:  Will get a xray of chest  Orders:  -     X-Ray Chest PA And Lateral; Future; Expected date: 04/25/2018

## 2018-07-02 PROBLEM — Z00.00 ANNUAL PHYSICAL EXAM: Status: RESOLVED | Noted: 2018-04-02 | Resolved: 2018-07-02

## 2018-08-21 NOTE — PROGRESS NOTES
"Outpatient Psychiatry Follow-up Visit (MD/NP)    8/22/2018    Sherrill Avery, a 67 y.o. female, presenting for initial evaluation visit. Met with patient.    Reason for Encounter: Patient presents for psych eval recommended by oncologist at time of diagnosis of cancer recurrence.     Interval History: Patient seen and interviewed for follow-up today. Endorses symptoms ongoing - "I'm too emotional". Cry a lot including over small things. Worse about 6 months ago. Had been feeling better when cancer was shrinking. It's now stable, not growing, but says she's been unnerved by it's failure to shrink further. "I realized that it could grow again". Goes back for follow-up and re-imaging in October. Describes ongoing difficulty with overworrying. Off cymbalta. Xanax infrequently (1x/month); sleeping medication 3x/week.     Background: This 67 y/o WF with hx of Renal CA (clear cell) dx'ed in '13, diagnosed with a recurrence in March '17 (spread to lung). Reports no treatment currently recommended. Was prescribed sertraline, ambien, xanax since receiving news of recurrence related to anxiety and sleep problems she's endorsed to other providers. She couldn't tolerate sertraline, has found the others helpful. Says "Cancer stays on my mind all the time". Takes xanax at most once daily, has increased use from about 10/month prior to recurrence to about twice that since. Reports sad less than half the time, generally with good interest & energy, & feels she's participating fully in life. Denies avoidance behaviors. Is participating in spiritual relationship with a renowned nun known for healing & consolation & she's considered their visits very therapeutic. No eating problems. Was prescribed sertraline (couldn't tolerate). She has been intermittently prescribed duloxetine for fibromyalgia, is back on it currently. PsychHx: as above. Xanax back to '13 around time of cancer diagnosis. No hx of suicidal thoughts, self-harm behaviors, " paranoia, hallucinations, rebecca, psych hospitalizations. Past psych meds include sertraline (recently), escitalopram '14, buspirone. MedHx: as above; HTN, fibromyalgia, bursitis, B sensorineural hearing loss. SocialHx: no problems with gestation, birth, infancy or early childhood development. Good student. Normal number of friends. Grew up with in [place] with parents. Lives with ; have 5 children & grandchildren & great-grandchildren. Good terms with kids, friends, neighbors, Jainism. Lives in [place].  x 35 years. Supportive relationship. SubstanceHx; rare alcohol, no illicits, no prescription misuse.     Review Of Systems:     GENERAL:  No weight gain or loss  SKIN:  No rashes or lacerations  HEAD:  No headaches  CHEST:  No shortness of breath, hyperventilation or cough  CARDIOVASCULAR:  No tachycardia or chest pain  ABDOMEN:  No nausea, vomiting, pain, constipation or diarrhea  URINARY:  No frequency, dysuria or sexual dysfunction  ENDOCRINE:  No polydipsia, polyuria  MUSCULOSKELETAL:  Joint pain  NEUROLOGIC:  No weakness, sensory changes, seizures, confusion, memory loss, tremor or other abnormal movements    Current Evaluation:     Nutritional Screening: Considering the patient's height and weight, medications, medical history and preferences, should a referral be made to the dietitian? no    Constitutional  Vitals:  Most recent vital signs, dated less than 90 days prior to this appointment, were not reviewed.  There were no vitals filed for this visit.     General:  unremarkable, age appropriate     Musculoskeletal  Muscle Strength/Tone:  no dystonia, no tremor   Gait & Station:  non-ataxic     Psychiatric  Appearance: casually dressed & groomed;   Behavior: calm,   Cooperation: cooperative with assessment  Speech: normal rate, volume, tone  Thought Process: linear, goal-directed  Thought Content: No suicidal or homicidal ideation; no delusions  Affect: tearful  Mood: mildly  dysphoric  Perceptions: No auditory or visual hallucinations  Level of Consciousness: alert throughout interview  Insight: fair  Cognition: Oriented to person, place, time, & situation  Memory: no apparent deficits to general clinical interview; not formally assessed  Attention/Concentration: no apparent deficits to general clinical interview; not formally assessed  Fund of Knowledge: average by vocabulary/education    Laboratory Data  No visits with results within 1 Month(s) from this visit.   Latest known visit with results is:   Lab Visit on 04/25/2018   Component Date Value Ref Range Status    BNP 04/25/2018 <10  0 - 99 pg/mL Final         Medications  Outpatient Encounter Medications as of 8/22/2018   Medication Sig Dispense Refill    albuterol 90 mcg/actuation inhaler Inhale into the lungs.      ALPRAZolam (XANAX) 0.5 MG tablet Take 1 tablet (0.5 mg total) by mouth nightly as needed. 30 tablet 0    butalbital-acetaminophen-caffeine -40 mg (FIORICET, ESGIC) -40 mg per tablet TAKE 1 TABLET EVERY 4 HOURS AS NEEDED 30 tablet 0    clotrimazole (LOTRIMIN) 1 % Soln Apply 1 drop topically 2 (two) times daily.      estradiol (ESTRACE) 0.5 MG tablet Take 2 tablets (1 mg total) by mouth once daily. 180 tablet 3    fluticasone (FLONASE) 50 mcg/actuation nasal spray 1 spray (50 mcg total) by Each Nare route as needed for Rhinitis. 3 Bottle 4    GLUCOSAMINE HCL/CHONDR HERNDON A NA (OSTEO BI-FLEX ORAL) Take by mouth.      hydrocortisone 2.5 % cream Apply to the affected area twice daily.      levothyroxine (SYNTHROID) 100 MCG tablet Take 100 mcg by mouth once daily.      zolpidem (AMBIEN) 5 MG Tab Take 1 tablet (5 mg total) by mouth nightly as needed. 30 tablet 3     No facility-administered encounter medications on file as of 8/22/2018.      Assessment - Diagnosis - Goals:     Impression: 67 y/o WF with anxiety disorder with adjustment component disorder in context of uncertainty in cancer treatment  outcome. Some problems with sleep, anxiety related to diagnosis/prognosis, existential/mortality related issues.     Dx: adjustment disorder with anxious mood    Treatment Goals:  Specify outcomes written in observable, behavioral terms:   Comfort and assist in adjustment to diagnosis/treatment/prognosis.     Treatment Plan/Recommendations:   · Venlafaxine in place of duloxetine.  prn zolpidem, prn alprazolam. Discussed risks, benefits, and alternatives to treatment plan documented above with patient. I answered all patient questions related to this plan and patient expressed understanding and agreement.   · Patient gets spiritual counseling but would recommend professional. Have given her information on how to access and make good use of care.      Return to Clinic: 3 month follow-up    Counseling time: 10 minutes  Total time: 25 minutes    MICHELLE Asif MD

## 2018-08-22 ENCOUNTER — OFFICE VISIT (OUTPATIENT)
Dept: PSYCHIATRY | Facility: CLINIC | Age: 68
End: 2018-08-22
Payer: MEDICARE

## 2018-08-22 DIAGNOSIS — F41.9 ANXIETY: Primary | ICD-10-CM

## 2018-08-22 PROCEDURE — 99214 OFFICE O/P EST MOD 30 MIN: CPT | Mod: S$PBB,,, | Performed by: PSYCHIATRY & NEUROLOGY

## 2018-08-22 PROCEDURE — 99211 OFF/OP EST MAY X REQ PHY/QHP: CPT | Mod: PBBFAC,PO | Performed by: PSYCHIATRY & NEUROLOGY

## 2018-08-22 PROCEDURE — 99999 PR PBB SHADOW E&M-EST. PATIENT-LVL I: CPT | Mod: PBBFAC,,, | Performed by: PSYCHIATRY & NEUROLOGY

## 2018-08-22 RX ORDER — VENLAFAXINE HYDROCHLORIDE 37.5 MG/1
TABLET, EXTENDED RELEASE ORAL
Qty: 60 EACH | Refills: 1 | Status: SHIPPED | OUTPATIENT
Start: 2018-08-22 | End: 2019-01-03

## 2018-08-22 RX ORDER — ZOLPIDEM TARTRATE 5 MG/1
5 TABLET ORAL NIGHTLY PRN
Qty: 30 TABLET | Refills: 3 | Status: SHIPPED | OUTPATIENT
Start: 2018-08-22 | End: 2018-10-04 | Stop reason: SDUPTHER

## 2018-09-24 ENCOUNTER — TELEPHONE (OUTPATIENT)
Dept: INTERNAL MEDICINE | Facility: CLINIC | Age: 68
End: 2018-09-24

## 2018-09-24 NOTE — TELEPHONE ENCOUNTER
Patient stated that she needs a letter from her physician stating that she was on an anti-depression medication in the passed and the reason why she was on the medication and that she is no longer on it. This letter is for so that she will be able to get a concealed carry permit.

## 2018-09-25 NOTE — TELEPHONE ENCOUNTER
Patient stated that she was seeing a physiatrist but not anymore. She did not see where seeing a physiatrist was helping her. She stated that she did ask the physiatrist to original write the letter and he stated that he was not going to. Patient asking if you would write her the letter.

## 2018-10-04 ENCOUNTER — OFFICE VISIT (OUTPATIENT)
Dept: PSYCHIATRY | Facility: CLINIC | Age: 68
End: 2018-10-04
Payer: MEDICARE

## 2018-10-04 DIAGNOSIS — F43.22 ADJUSTMENT DISORDER WITH ANXIOUS MOOD: Primary | ICD-10-CM

## 2018-10-04 DIAGNOSIS — G47.00 INSOMNIA, UNSPECIFIED TYPE: ICD-10-CM

## 2018-10-04 PROCEDURE — 99213 OFFICE O/P EST LOW 20 MIN: CPT | Mod: S$PBB,,, | Performed by: PSYCHIATRY & NEUROLOGY

## 2018-10-04 RX ORDER — ZOLPIDEM TARTRATE 5 MG/1
5 TABLET ORAL NIGHTLY PRN
Qty: 30 TABLET | Refills: 1 | Status: SHIPPED | OUTPATIENT
Start: 2018-10-04 | End: 2019-01-03 | Stop reason: SDUPTHER

## 2018-10-04 RX ORDER — ALPRAZOLAM 0.5 MG/1
TABLET ORAL
Qty: 30 TABLET | Refills: 0 | Status: SHIPPED | OUTPATIENT
Start: 2018-10-04 | End: 2019-01-03 | Stop reason: SDUPTHER

## 2018-10-04 NOTE — PROGRESS NOTES
"Outpatient Psychiatry Follow-up Visit (MD/NP)    10/4/2018    Sherrill Avery, a 68 y.o. female, presenting for follow-up visit. Met with patient.    Reason for Encounter: Follow-up, adjustment disorder.     Interval History: Patient seen and interviewed for follow-up today. Says she's not crying anymore. Intermittently mildly anxious, but improved, though ongoing. Feeling overall ok. Has been hosting brother from [place], feels comforted by his visit. Also gets benefit from ongoing Hinduism counseling. Takes occasional prn medication. Tried venlafaxine. Found it made her feel "numb" and "made my head feel weird". Infrequently takes xanax for anxiety, takes zolpidem about twice weekly for sleep. Denies side effects from either. Will be going to appointment follow-up on Oct. 11.     Background: This 67 y/o WF with hx of Renal CA (clear cell) dx'ed in '13, diagnosed with a recurrence in March '17 (spread to lung). Reports no treatment currently recommended. Was prescribed sertraline, ambien, xanax since receiving news of recurrence related to anxiety and sleep problems she's endorsed to other providers. She couldn't tolerate sertraline, has found the others helpful. Says "Cancer stays on my mind all the time". Takes xanax at most once daily, has increased use from about 10/month prior to recurrence to about twice that since. Reports sad less than half the time, generally with good interest & energy, & feels she's participating fully in life. Denies avoidance behaviors. Is participating in spiritual relationship with a renowned nun known for healing & consolation & she's considered their visits very therapeutic. No eating problems. Was prescribed sertraline (couldn't tolerate). She has been intermittently prescribed duloxetine for fibromyalgia, is back on it currently. PsychHx: as above. Xanax back to '13 around time of cancer diagnosis. No hx of suicidal thoughts, self-harm behaviors, paranoia, hallucinations, rebecca, psych " hospitalizations. Past psych meds include sertraline (recently), escitalopram '14, buspirone. MedHx: as above; HTN, fibromyalgia, bursitis, B sensorineural hearing loss. SocialHx: no problems with gestation, birth, infancy or early childhood development. Good student. Normal number of friends. Grew up with in [place] with parents. Lives with ; have 5 children & grandchildren & great-grandchildren. Good terms with kids, friends, neighbors, [employer]. Lives in [place].  x 35 years. Supportive relationship. SubstanceHx; rare alcohol, no illicits, no prescription misuse.     Review Of Systems:     GENERAL:  No weight gain or loss  SKIN:  No rashes or lacerations  HEAD:  No headaches  CHEST:  No shortness of breath, hyperventilation or cough  CARDIOVASCULAR:  No tachycardia or chest pain  ABDOMEN:  No nausea, vomiting, pain, constipation or diarrhea  URINARY:  No frequency, dysuria or sexual dysfunction  ENDOCRINE:  No polydipsia, polyuria  MUSCULOSKELETAL:  Joint pain  NEUROLOGIC:  No weakness, sensory changes, seizures, confusion, memory loss, tremor or other abnormal movements    Current Evaluation:     Nutritional Screening: Considering the patient's height and weight, medications, medical history and preferences, should a referral be made to the dietitian? no    Constitutional  Vitals:  Most recent vital signs, dated less than 90 days prior to this appointment, were not reviewed.  There were no vitals filed for this visit.     General:  unremarkable, age appropriate     Musculoskeletal  Muscle Strength/Tone:  no dystonia, no tremor   Gait & Station:  non-ataxic     Psychiatric  Appearance: casually dressed & groomed;   Behavior: calm,   Cooperation: cooperative with assessment  Speech: normal rate, volume, tone  Thought Process: linear, goal-directed  Thought Content: No suicidal or homicidal ideation; no delusions  Affect: tearful  Mood: mildly dysphoric  Perceptions: No auditory or  visual hallucinations  Level of Consciousness: alert throughout interview  Insight: fair  Cognition: Oriented to person, place, time, & situation  Memory: no apparent deficits to general clinical interview; not formally assessed  Attention/Concentration: no apparent deficits to general clinical interview; not formally assessed  Fund of Knowledge: average by vocabulary/education    Laboratory Data  No visits with results within 1 Month(s) from this visit.   Latest known visit with results is:   Lab Visit on 04/25/2018   Component Date Value Ref Range Status    BNP 04/25/2018 <10  0 - 99 pg/mL Final         Medications  Outpatient Encounter Medications as of 10/4/2018   Medication Sig Dispense Refill    albuterol 90 mcg/actuation inhaler Inhale into the lungs.      ALPRAZolam (XANAX) 0.5 MG tablet Take 1 tablet (0.5 mg total) by mouth nightly as needed. 30 tablet 0    butalbital-acetaminophen-caffeine -40 mg (FIORICET, ESGIC) -40 mg per tablet TAKE 1 TABLET EVERY 4 HOURS AS NEEDED 30 tablet 0    clotrimazole (LOTRIMIN) 1 % Soln Apply 1 drop topically 2 (two) times daily.      estradiol (ESTRACE) 0.5 MG tablet Take 2 tablets (1 mg total) by mouth once daily. 180 tablet 3    fluticasone (FLONASE) 50 mcg/actuation nasal spray 1 spray (50 mcg total) by Each Nare route as needed for Rhinitis. 3 Bottle 4    GLUCOSAMINE HCL/CHONDR HERNDON A NA (OSTEO BI-FLEX ORAL) Take by mouth.      hydrocortisone 2.5 % cream Apply to the affected area twice daily.      levothyroxine (SYNTHROID) 100 MCG tablet Take 100 mcg by mouth once daily.      venlafaxine 37.5 mg TR24 Take 1 capsule daily for 7 days then 2 capsules daily thereafter 60 each 1    zolpidem (AMBIEN) 5 MG Tab Take 1 tablet (5 mg total) by mouth nightly as needed. 30 tablet 3     No facility-administered encounter medications on file as of 10/4/2018.      Assessment - Diagnosis - Goals:     Impression: 65 y/o WF with anxiety disorder with adjustment  component disorder in context of uncertainty in cancer treatment outcome. Some intermittent problems with sleep, anxiety related to diagnosis/prognosis, existential/mortality related issues. Gets good benefit from spiritual counseling and is quite satisfied with care. Hasn't tolerated several monoaminergic meds, gets benefit from prn benzos, doesn't have a daily need for a prn.     Dx: adjustment disorder with anxious mood    Treatment Goals:  Specify outcomes written in observable, behavioral terms:   Comfort and assist in adjustment to diagnosis/treatment/prognosis.     Treatment Plan/Recommendations:     · Patient gets spiritual counseling but would recommend professional. Have given her information on how to access and make good use of care.    · Takes prn zolpidem, prn alprazolam. Discussed risks, benefits, and alternatives to treatment plan documented above with patient. I answered all patient questions related to this plan and patient expressed understanding and agreement.     Return to Clinic: 3-4 months or as needed. Plans to discuss possibility of meds through PCP unless symptoms worsen in which case she'll return for psych specific care.     Counseling time: 10 minutes  Total time: 20 minutes    MICHELLE Asif MD

## 2018-11-26 ENCOUNTER — TELEPHONE (OUTPATIENT)
Dept: INTERNAL MEDICINE | Facility: CLINIC | Age: 68
End: 2018-11-26

## 2018-11-26 NOTE — TELEPHONE ENCOUNTER
----- Message from Mita Tovar sent at 11/26/2018 11:41 AM CST -----  Contact: Pt  Pt called and stated she needs to speak to the nurse to schedule her pneumonia shot along with her flu shot. She can be reached at [phone].    Thanks,  TF

## 2018-11-29 ENCOUNTER — IMMUNIZATION (OUTPATIENT)
Dept: INTERNAL MEDICINE | Facility: CLINIC | Age: 68
End: 2018-11-29
Payer: MEDICARE

## 2018-11-29 ENCOUNTER — CLINICAL SUPPORT (OUTPATIENT)
Dept: INTERNAL MEDICINE | Facility: CLINIC | Age: 68
End: 2018-11-29
Payer: MEDICARE

## 2018-11-29 DIAGNOSIS — Z23 NEED FOR VACCINATION WITH 13-POLYVALENT PNEUMOCOCCAL CONJUGATE VACCINE: Primary | ICD-10-CM

## 2018-11-29 PROCEDURE — 90662 IIV NO PRSV INCREASED AG IM: CPT | Mod: PBBFAC,PO

## 2018-11-29 PROCEDURE — 99212 OFFICE O/P EST SF 10 MIN: CPT | Mod: PBBFAC,25,PO

## 2018-11-29 PROCEDURE — 90670 PCV13 VACCINE IM: CPT | Mod: PBBFAC,PO

## 2018-11-29 PROCEDURE — 99999 PR PBB SHADOW E&M-EST. PATIENT-LVL II: CPT | Mod: PBBFAC,,,

## 2018-11-29 NOTE — PROGRESS NOTES
Patient here for Prevnar-13 injection as per written order guideline and immunization record. After reviewing allergies and meds, patient received Prevnar-13 injection to right deltoid. Patient was advised to remain in clinic for 15 minutes post administration and to report any signs and symptoms of adverse reaction. Patient verbalized understanding. She exited exam room ambulating without assist AAOx4.

## 2018-12-03 ENCOUNTER — TELEPHONE (OUTPATIENT)
Dept: INTERNAL MEDICINE | Facility: CLINIC | Age: 68
End: 2018-12-03

## 2018-12-03 NOTE — TELEPHONE ENCOUNTER
----- Message from Norman Mabry sent at 12/3/2018  8:56 AM CST -----  Contact: pt  She's calling in regards to being worked into the schedule on wednesday 12/19/18 with her spouse, [phone] (home)

## 2019-01-03 ENCOUNTER — OFFICE VISIT (OUTPATIENT)
Dept: INTERNAL MEDICINE | Facility: CLINIC | Age: 69
End: 2019-01-03
Payer: MEDICARE

## 2019-01-03 ENCOUNTER — LAB VISIT (OUTPATIENT)
Dept: LAB | Facility: HOSPITAL | Age: 69
End: 2019-01-03
Attending: FAMILY MEDICINE
Payer: MEDICARE

## 2019-01-03 VITALS
HEIGHT: 66 IN | OXYGEN SATURATION: 98 % | DIASTOLIC BLOOD PRESSURE: 86 MMHG | TEMPERATURE: 98 F | BODY MASS INDEX: 31 KG/M2 | HEART RATE: 73 BPM | SYSTOLIC BLOOD PRESSURE: 122 MMHG | WEIGHT: 192.88 LBS

## 2019-01-03 DIAGNOSIS — C64.1 CARCINOMA OF RIGHT KIDNEY: ICD-10-CM

## 2019-01-03 DIAGNOSIS — G89.29 CHRONIC PAIN OF LOWER EXTREMITY, UNSPECIFIED LATERALITY: ICD-10-CM

## 2019-01-03 DIAGNOSIS — C80.1 CLEAR CELL CARCINOMA: ICD-10-CM

## 2019-01-03 DIAGNOSIS — M79.606 CHRONIC PAIN OF LOWER EXTREMITY, UNSPECIFIED LATERALITY: ICD-10-CM

## 2019-01-03 DIAGNOSIS — I10 ESSENTIAL HYPERTENSION: ICD-10-CM

## 2019-01-03 DIAGNOSIS — F41.9 ANXIETY: ICD-10-CM

## 2019-01-03 DIAGNOSIS — E04.2 NONTOXIC MULTINODULAR GOITER: ICD-10-CM

## 2019-01-03 DIAGNOSIS — G89.29 CHRONIC NECK PAIN: ICD-10-CM

## 2019-01-03 DIAGNOSIS — E78.00 HYPERCHOLESTEREMIA: ICD-10-CM

## 2019-01-03 DIAGNOSIS — F51.04 CHRONIC INSOMNIA: ICD-10-CM

## 2019-01-03 DIAGNOSIS — E03.9 HYPOTHYROIDISM, UNSPECIFIED TYPE: ICD-10-CM

## 2019-01-03 DIAGNOSIS — R51.9 HEADACHE, UNSPECIFIED HEADACHE TYPE: ICD-10-CM

## 2019-01-03 DIAGNOSIS — M54.2 CHRONIC NECK PAIN: ICD-10-CM

## 2019-01-03 DIAGNOSIS — M89.9 BONE DISORDER: Primary | ICD-10-CM

## 2019-01-03 DIAGNOSIS — R91.8 MULTIPLE LUNG NODULES ON CT: Chronic | ICD-10-CM

## 2019-01-03 LAB
ANION GAP SERPL CALC-SCNC: 9 MMOL/L
BUN SERPL-MCNC: 12 MG/DL
CALCIUM SERPL-MCNC: 9 MG/DL
CHLORIDE SERPL-SCNC: 104 MMOL/L
CHOLEST SERPL-MCNC: 253 MG/DL
CHOLEST/HDLC SERPL: 3.8 {RATIO}
CO2 SERPL-SCNC: 27 MMOL/L
CREAT SERPL-MCNC: 1 MG/DL
EST. GFR  (AFRICAN AMERICAN): >60 ML/MIN/1.73 M^2
EST. GFR  (NON AFRICAN AMERICAN): 58 ML/MIN/1.73 M^2
GLUCOSE SERPL-MCNC: 76 MG/DL
HDLC SERPL-MCNC: 66 MG/DL
HDLC SERPL: 26.1 %
LDLC SERPL CALC-MCNC: 157.6 MG/DL
NONHDLC SERPL-MCNC: 187 MG/DL
POTASSIUM SERPL-SCNC: 4.3 MMOL/L
SODIUM SERPL-SCNC: 140 MMOL/L
T4 FREE SERPL-MCNC: 1.3 NG/DL
TRIGL SERPL-MCNC: 147 MG/DL
TSH SERPL DL<=0.005 MIU/L-ACNC: 4.42 UIU/ML

## 2019-01-03 PROCEDURE — 80048 BASIC METABOLIC PNL TOTAL CA: CPT

## 2019-01-03 PROCEDURE — 36415 COLL VENOUS BLD VENIPUNCTURE: CPT | Mod: PO

## 2019-01-03 PROCEDURE — 99999 PR PBB SHADOW E&M-EST. PATIENT-LVL III: ICD-10-PCS | Mod: PBBFAC,,, | Performed by: FAMILY MEDICINE

## 2019-01-03 PROCEDURE — 84439 ASSAY OF FREE THYROXINE: CPT

## 2019-01-03 PROCEDURE — 80061 LIPID PANEL: CPT

## 2019-01-03 PROCEDURE — 84443 ASSAY THYROID STIM HORMONE: CPT

## 2019-01-03 PROCEDURE — 99213 OFFICE O/P EST LOW 20 MIN: CPT | Mod: PBBFAC,PO | Performed by: FAMILY MEDICINE

## 2019-01-03 PROCEDURE — 99214 PR OFFICE/OUTPT VISIT, EST, LEVL IV, 30-39 MIN: ICD-10-PCS | Mod: S$PBB,,, | Performed by: FAMILY MEDICINE

## 2019-01-03 PROCEDURE — 99999 PR PBB SHADOW E&M-EST. PATIENT-LVL III: CPT | Mod: PBBFAC,,, | Performed by: FAMILY MEDICINE

## 2019-01-03 PROCEDURE — 99214 OFFICE O/P EST MOD 30 MIN: CPT | Mod: S$PBB,,, | Performed by: FAMILY MEDICINE

## 2019-01-03 RX ORDER — ZOLPIDEM TARTRATE 5 MG/1
5 TABLET ORAL NIGHTLY PRN
Qty: 30 TABLET | Refills: 1 | Status: SHIPPED | OUTPATIENT
Start: 2019-01-03 | End: 2019-11-04 | Stop reason: SDUPTHER

## 2019-01-03 RX ORDER — BUTALBITAL, ACETAMINOPHEN AND CAFFEINE 50; 325; 40 MG/1; MG/1; MG/1
TABLET ORAL
Qty: 30 TABLET | Refills: 0 | Status: SHIPPED | OUTPATIENT
Start: 2019-01-03 | End: 2019-11-04 | Stop reason: SDUPTHER

## 2019-01-03 RX ORDER — ALPRAZOLAM 0.5 MG/1
TABLET ORAL
Qty: 30 TABLET | Refills: 1 | Status: SHIPPED | OUTPATIENT
Start: 2019-01-03 | End: 2019-11-04 | Stop reason: SDUPTHER

## 2019-01-04 ENCOUNTER — TELEPHONE (OUTPATIENT)
Dept: INTERNAL MEDICINE | Facility: CLINIC | Age: 69
End: 2019-01-04

## 2019-01-04 DIAGNOSIS — E03.4 HYPOTHYROIDISM DUE TO ACQUIRED ATROPHY OF THYROID: Primary | ICD-10-CM

## 2019-01-04 NOTE — PROGRESS NOTES
Subjective:       Patient ID: Sherrill Avery is a 68 y.o. female.    Chief Complaint: Multiple issues see below    HPIFollow-up establish care for multiple issues update with oncologist at [facility] followed for renal cell carcinoma with metastatic lung nodules  Hypertension: blood pressures at home normal. Tolerating medicine.   Osteopenia due aislinn  GERD asympt. Tolerating medication. No swallowing problems;sees [facility]    Chronic anxiety/insomnia uses either benzo or ambien few times per week prev dr yusuf but stable enough felt ok for primary care to resume rxing for this and no need to f/u unless problems. She doesn't take benzo same day ambien. Knows potential problems with these interacting;mood good;tried ssris etc but intol and current regimen working well per her and psych.    Hypothyroidism: nl tsh. Tolerating med. Asympt; sees dr block overdue    *few *Days of nasal congestion postnasal drip cough without fever shortness of breath or malaise.  Better today. Using flonase    infreq migraine headaches none now. Needs rf         Past Medical History:   Diagnosis Date    Anxiety     Arthritis     Cancer of kidney 8/2/2013    dr faye    Ectopic pregnancy     Eye infection     GERD (gastroesophageal reflux disease)     Hiatal hernia     History of kidney cancer     Hypercholesteremia     Hypertension     Hypothyroid     dr block q 6 months    Insomnia     Migraine headache     Renal cell cancer     Respiratory arrest     due to anesthia    Rosacea     Sleep apnea     Thyroid nodule     dr block    Urinary tract infection      Past Surgical History:   Procedure Laterality Date    breast implants      BREAST SURGERY Bilateral 1996    saline implants    COLONOSCOPY  2007    COLONOSCOPY N/A 11/18/2013    [facility]    EGD (ESOPHAGOGASTRODUODENOSCOPY) N/A 11/18/2013    Per[facility]    HYSTERECTOMY      ectopic  pregnancy x 2, with LSO    LAPAROSCOPIC NEPHRECTOMY, HAND ASSISTED  07/25/2013    LYMPHADENECTOMY, RETROPERITONEUM N/A 7/25/2013    [facility] OR 2ND [facility]    NEPHRECTOMY      OOPHORECTOMY      x1    right ganglion cyst      ROBOTIC NEPHRECTOMY Right 7/25/2013    [facility] OR 2ND [facility]    throat polyp      TRANSOBTURATOR SLING  2011    with RSO for persistent complex cyst & MARGOT; done by arndt    UPPER GASTROINTESTINAL ENDOSCOPY  2007     Family History   Problem Relation Age of Onset    Diabetes Mother     Hypertension Mother     Heart disease Mother     Cancer Father         lung    Migraines Father     Glaucoma Paternal Grandmother     Glaucoma Sister     Thyroid disease Neg Hx     Asthma Neg Hx      Social History     Socioeconomic History    Marital status:      Spouse name: KAIDEN    Number of children: 5    Years of education: None    Highest education level: None   Social Needs    Financial resource strain: None    Food insecurity - worry: None    Food insecurity - inability: None    Transportation needs - medical: None    Transportation needs - non-medical: None   Occupational History    Occupation: retired     Comment: Dance Instructor   Tobacco Use    Smoking status: Never Smoker    Smokeless tobacco: Never Used   Substance and Sexual Activity    Alcohol use: Yes     Alcohol/week: 0.0 oz     Comment: social    Drug use: No    Sexual activity: Yes     Partners: Male     Birth control/protection: Surgical   Other Topics Concern    None   Social History Narrative    Patient is a retired dance instructor and live with .       Review of Systems  Cardiovascular: no chest pain  Chest: no shortness of breath  Abd: no abd pain  Remainder review of systems negative    Objective:    husb here  Physical Exam   Constitutional: She is oriented to person, place, and time. She appears well-developed and  well-nourished. No distress.   HENT:   Head: Atraumatic.   Right Ear: External ear normal.   Left Ear: External ear normal.   Nose: Nose normal.   Mouth/Throat: Oropharynx is clear and moist. No oropharyngeal exudate.   bilat tms nl   Eyes: Conjunctivae and EOM are normal. Pupils are equal, round, and reactive to light. No scleral icterus.   Neck: Normal range of motion. Neck supple. No thyromegaly present.   Cardiovascular: Normal rate, regular rhythm and normal heart sounds.   No murmur heard.  Pulmonary/Chest: Effort normal and breath sounds normal. No respiratory distress. She has no wheezes. She has no rales.   Abdominal: Soft. Bowel sounds are normal. She exhibits no distension and no mass. There is no hepatosplenomegaly. There is no tenderness. There is no rebound and no guarding.   Musculoskeletal: Normal range of motion. She exhibits no edema or tenderness.   Lymphadenopathy:     She has no cervical adenopathy.   Neurological: She is alert and oriented to person, place, and time. No cranial nerve deficit. She exhibits normal muscle tone. Coordination normal.   Skin: Skin is warm. No rash noted. No erythema. No pallor.   Psychiatric: She has a normal mood and affect. Her behavior is normal. Judgment and thought content normal.   Nursing note and vitals reviewed.    neck supple no mass   Assessment:       1. Bone disorder    2. Headache, unspecified headache type    3. Chronic neck pain    4. Chronic pain of lower extremity, unspecified laterality    5. Anxiety    6. Nontoxic multinodular goiter    7. Multiple lung nodules on CT    8. Hypercholesteremia    9. Clear cell carcinoma with lung metastasis    10. Essential hypertension    11. Hypothyroidism, unspecified type    12. Carcinoma of right kidney    13. Chronic insomnia      uri  Plan:       **If no better 3-4 days followup sooner if any worsening or change in symptoms or lack of resolution  *  add:she req f/u PT for chronic neck and leg pain that was  improved with PT in past. No change in this recurr    F/u dr block and oncol as planned  Bone disorder  -     DXA Bone Density Spine And Hip; Future; Expected date: 01/03/2019    Headache, unspecified headache type  -     butalbital-acetaminophen-caffeine -40 mg (FIORICET, ESGIC) -40 mg per tablet; TAKE 1 TABLET EVERY 4 HOURS AS NEEDED  Dispense: 30 tablet; Refill: 0    Chronic neck pain  -     Ambulatory Referral to Physical/Occupational Therapy    Chronic pain of lower extremity, unspecified laterality  -     Ambulatory Referral to Physical/Occupational Therapy    Anxiety    Nontoxic multinodular goiter  -     Basic metabolic panel; Future; Expected date: 01/03/2019  -     TSH; Future; Expected date: 01/03/2019    Multiple lung nodules on CT    Hypercholesteremia  -     Lipid panel; Future; Expected date: 01/03/2019    Clear cell carcinoma with lung metastasis    Essential hypertension    Hypothyroidism, unspecified type    Carcinoma of right kidney    Chronic insomnia    Other orders  -     ALPRAZolam (XANAX) 0.5 MG tablet; Take daily as needed for anxiety.  Dispense: 30 tablet; Refill: 1  -     zolpidem (AMBIEN) 5 MG Tab; Take 1 tablet (5 mg total) by mouth nightly as needed.  Dispense: 30 tablet; Refill: 1    she plans f/u derm

## 2019-01-04 NOTE — TELEPHONE ENCOUNTER
----- Message from Alexandre Macias MD sent at 1/4/2019  2:49 PM CST -----  Please let her know chol bit elev ;low chol diet  Thyroid test just a bit off. Cont same dose/avoid missing any doses or taking any other meds or food within an hour of thyroid med  Loren tsh 6 weeks

## 2019-01-07 ENCOUNTER — TELEPHONE (OUTPATIENT)
Dept: INTERNAL MEDICINE | Facility: CLINIC | Age: 69
End: 2019-01-07

## 2019-01-07 RX ORDER — AZITHROMYCIN 250 MG/1
TABLET, FILM COATED ORAL
Qty: 6 TABLET | Refills: 0 | Status: SHIPPED | OUTPATIENT
Start: 2019-01-07 | End: 2019-01-11

## 2019-01-07 NOTE — TELEPHONE ENCOUNTER
----- Message from Dianne Phillips sent at 1/7/2019  2:04 PM CST -----  Contact: Patient  Patient called and stated she was in the other day for Cold Symptoms and was told if she isn't feeling any better to call in and Dr. Macias will call something in for her.    [address] - [address] [facility] [address]  [address] [facility] [address]  [facility] [address]  Phone: [phone] Fax: [phone]    She can be contacted at [phone].    Thanks,  Dianne

## 2019-01-07 NOTE — TELEPHONE ENCOUNTER
Ok. zpack erxd  If any fever , shortness of breath f/u asap or ED  If no better by end of week then f/u

## 2019-01-11 ENCOUNTER — TELEPHONE (OUTPATIENT)
Dept: INTERNAL MEDICINE | Facility: CLINIC | Age: 69
End: 2019-01-11

## 2019-01-11 RX ORDER — DOXYCYCLINE 100 MG/1
100 CAPSULE ORAL 2 TIMES DAILY
Qty: 20 CAPSULE | Refills: 0 | Status: SHIPPED | OUTPATIENT
Start: 2019-01-11 | End: 2019-01-21

## 2019-01-11 NOTE — TELEPHONE ENCOUNTER
If tongue still swollen needs to be seen now  If congestion symptoms gone then no need for new antibiotic  If improved but still congestion then doxycycline (erxd)

## 2019-01-11 NOTE — TELEPHONE ENCOUNTER
S/w pt. Pt stated that she quit taking the zpak. She stated that she had diarrhea and that her tongue felt swollen so she quit taking the medication. Please advise

## 2019-01-11 NOTE — TELEPHONE ENCOUNTER
----- Message from Leatha Ji sent at 1/11/2019  2:13 PM CST -----  Contact: self  Requesting call back regarding letting doctor know she stopped taking medication due to allergic reaction. Please call back at [phone].    Thanks,  Leatha Ji

## 2019-01-17 ENCOUNTER — TELEPHONE (OUTPATIENT)
Dept: OPHTHALMOLOGY | Facility: CLINIC | Age: 69
End: 2019-01-17

## 2019-01-17 ENCOUNTER — APPOINTMENT (OUTPATIENT)
Dept: RADIOLOGY | Facility: HOSPITAL | Age: 69
End: 2019-01-17
Attending: FAMILY MEDICINE
Payer: MEDICARE

## 2019-01-17 DIAGNOSIS — M89.9 BONE DISORDER: ICD-10-CM

## 2019-01-17 PROCEDURE — 77080 DXA BONE DENSITY AXIAL: CPT | Mod: 26,,, | Performed by: RADIOLOGY

## 2019-01-17 PROCEDURE — 77080 DXA BONE DENSITY AXIAL: CPT | Mod: TC

## 2019-01-17 PROCEDURE — 77080 DEXA BONE DENSITY SPINE HIP: ICD-10-PCS | Mod: 26,,, | Performed by: RADIOLOGY

## 2019-01-17 NOTE — TELEPHONE ENCOUNTER
----- Message from Moira Ko sent at 1/17/2019  4:39 PM CST -----  Contact: pt  Pt needs to schedule appointment with Dr Sawant............ [phone] (home)

## 2019-02-04 DIAGNOSIS — M25.562 PAIN IN BOTH KNEES, UNSPECIFIED CHRONICITY: Primary | ICD-10-CM

## 2019-02-04 DIAGNOSIS — M25.561 PAIN IN BOTH KNEES, UNSPECIFIED CHRONICITY: Primary | ICD-10-CM

## 2019-02-05 ENCOUNTER — HOSPITAL ENCOUNTER (OUTPATIENT)
Dept: RADIOLOGY | Facility: HOSPITAL | Age: 69
Discharge: HOME | End: 2019-02-05
Attending: ORTHOPAEDIC SURGERY
Payer: MEDICARE

## 2019-02-05 ENCOUNTER — OFFICE VISIT (OUTPATIENT)
Dept: ORTHOPEDICS | Facility: CLINIC | Age: 69
End: 2019-02-05
Payer: MEDICARE

## 2019-02-05 VITALS
SYSTOLIC BLOOD PRESSURE: 144 MMHG | DIASTOLIC BLOOD PRESSURE: 77 MMHG | BODY MASS INDEX: 31.99 KG/M2 | WEIGHT: 192 LBS | HEIGHT: 65 IN | HEART RATE: 73 BPM

## 2019-02-05 DIAGNOSIS — M25.561 PAIN IN BOTH KNEES, UNSPECIFIED CHRONICITY: ICD-10-CM

## 2019-02-05 DIAGNOSIS — M22.42 CHONDROMALACIA, PATELLA, LEFT: ICD-10-CM

## 2019-02-05 DIAGNOSIS — M76.32 IT BAND SYNDROME, LEFT: ICD-10-CM

## 2019-02-05 DIAGNOSIS — M22.41 CHONDROMALACIA, PATELLA, RIGHT: ICD-10-CM

## 2019-02-05 DIAGNOSIS — M25.561 ACUTE PAIN OF RIGHT KNEE: Primary | ICD-10-CM

## 2019-02-05 DIAGNOSIS — M54.16 LUMBAR RADICULOPATHY: ICD-10-CM

## 2019-02-05 DIAGNOSIS — M25.562 PAIN IN BOTH KNEES, UNSPECIFIED CHRONICITY: ICD-10-CM

## 2019-02-05 DIAGNOSIS — M76.31 IT BAND SYNDROME, RIGHT: ICD-10-CM

## 2019-02-05 PROCEDURE — 99999 PR PBB SHADOW E&M-EST. PATIENT-LVL III: CPT | Mod: PBBFAC,,, | Performed by: ORTHOPAEDIC SURGERY

## 2019-02-05 PROCEDURE — 73564 XR KNEE ORTHO BILAT WITH FLEXION: ICD-10-PCS | Mod: 26,50,, | Performed by: RADIOLOGY

## 2019-02-05 PROCEDURE — 73564 X-RAY EXAM KNEE 4 OR MORE: CPT | Mod: 26,50,, | Performed by: RADIOLOGY

## 2019-02-05 PROCEDURE — 73564 X-RAY EXAM KNEE 4 OR MORE: CPT | Mod: TC,50

## 2019-02-05 PROCEDURE — 99999 PR PBB SHADOW E&M-EST. PATIENT-LVL III: ICD-10-PCS | Mod: PBBFAC,,, | Performed by: ORTHOPAEDIC SURGERY

## 2019-02-05 PROCEDURE — 99204 OFFICE O/P NEW MOD 45 MIN: CPT | Mod: 25,S$PBB,, | Performed by: ORTHOPAEDIC SURGERY

## 2019-02-05 PROCEDURE — 20610 DRAIN/INJ JOINT/BURSA W/O US: CPT | Mod: PBBFAC,PN | Performed by: ORTHOPAEDIC SURGERY

## 2019-02-05 PROCEDURE — 99213 OFFICE O/P EST LOW 20 MIN: CPT | Mod: PBBFAC,25,PN | Performed by: ORTHOPAEDIC SURGERY

## 2019-02-05 PROCEDURE — 20610 DRAIN/INJ JOINT/BURSA W/O US: CPT | Mod: S$PBB,RT,, | Performed by: ORTHOPAEDIC SURGERY

## 2019-02-05 PROCEDURE — 99204 PR OFFICE/OUTPT VISIT, NEW, LEVL IV, 45-59 MIN: ICD-10-PCS | Mod: 25,S$PBB,, | Performed by: ORTHOPAEDIC SURGERY

## 2019-02-05 PROCEDURE — 20610 PR DRAIN/INJECT LARGE JOINT/BURSA: ICD-10-PCS | Mod: S$PBB,RT,, | Performed by: ORTHOPAEDIC SURGERY

## 2019-02-05 RX ORDER — TRIAMCINOLONE ACETONIDE 40 MG/ML
40 INJECTION, SUSPENSION INTRA-ARTICULAR; INTRAMUSCULAR
Status: COMPLETED | OUTPATIENT
Start: 2019-02-05 | End: 2019-02-05

## 2019-02-05 RX ADMIN — TRIAMCINOLONE ACETONIDE 40 MG: 40 INJECTION, SUSPENSION INTRA-ARTICULAR; INTRAMUSCULAR at 10:02

## 2019-02-05 NOTE — PROGRESS NOTES
Subjective:     Patient ID: Sherrill Avery is a 68 y.o. female.    Chief Complaint: Pain of the Right Knee and Pain of the Left Knee    Patient is here for bilateral knee pain. Rt > Lt. Reports they have been bothering her for the last 1 1/2 years and have gotten progressively worse over the last 2 weeks. Reports no previous injury. Reports no ERNESTO.      Knee Pain    The pain is present in the right knee and left knee. This is a chronic problem. The current episode started more than 1 year ago. The problem occurs constantly. The problem has been gradually worsening. The quality of the pain is described as dull and aching. The pain is at a severity of 8/10. Associated symptoms include joint swelling and stiffness. Pertinent negatives include no fever or numbness. The symptoms are aggravated by walking, standing, sitting, lying down and bending. She has tried OTC pain meds and OTC ointments for the symptoms. The treatment provided mild relief. Physical therapy was not tried.      Past Medical History:   Diagnosis Date    Anxiety     Arthritis     Cancer of kidney 8/2/2013    dr faye    Ectopic pregnancy     Eye infection     GERD (gastroesophageal reflux disease)     Hiatal hernia     History of kidney cancer     Hypercholesteremia     Hypertension     Hypothyroid     dr block q 6 months    Insomnia     Migraine headache     Renal cell cancer     Respiratory arrest     due to anesthia    Rosacea     Sleep apnea     Thyroid nodule     dr block    Urinary tract infection      Past Surgical History:   Procedure Laterality Date    breast implants      BREAST SURGERY Bilateral 1996    saline implants    COLONOSCOPY  2007    COLONOSCOPY N/A 11/18/2013    [facility]    EGD (ESOPHAGOGASTRODUODENOSCOPY) N/A 11/18/2013    Performed by [facility] at [facility] ENDO    HYSTERECTOMY      ectopic pregnancy x 2, with LSO    LAPAROSCOPIC NEPHRECTOMY, HAND ASSISTED   07/25/2013    LYMPHADENECTOMY, RETROPERITONEUM N/A 7/25/2013    Per[facility] by [facility] at [facility] OR 2ND FLR    NEPHRECTOMY      OOPHORECTOMY      x1    right ganglion cyst      ROBOTIC NEPHRECTOMY Right 7/25/2013    [facility] at [facility] OR 2ND [facility]LR    throat polyp      TRANSOBTURATOR SLING  2011    with RSO for persistent complex cyst & MARGOT; done by yris    UPPER GASTROINTESTINAL ENDOSCOPY  2007     Family History   Problem Relation Age of Onset    Diabetes Mother     Hypertension Mother     Heart disease Mother     Cancer Father         lung    Migraines Father     Glaucoma Paternal Grandmother     Glaucoma Sister     Thyroid disease Neg Hx     Asthma Neg Hx      Social History     Socioeconomic History    Marital status:      Spouse name: KAIDEN    Number of children: 5    Years of education: Not on file    Highest education level: Not on file   Social Needs    Financial resource strain: Not on file    Food insecurity - worry: Not on file    Food insecurity - inability: Not on file    Transportation needs - medical: Not on file    Transportation needs - non-medical: Not on file   Occupational History    Occupation: retired     Comment: Dance Instructor   Tobacco Use    Smoking status: Never Smoker    Smokeless tobacco: Never Used   Substance and Sexual Activity    Alcohol use: Yes     Alcohol/week: 0.0 oz     Comment: social    Drug use: No    Sexual activity: Yes     Partners: Male     Birth control/protection: Surgical   Other Topics Concern    Not on file   Social History Narrative    Patient is a retired dance instructor and live with .     Medication List with Changes/Refills   Current Medications    ALPRAZOLAM (XANAX) 0.5 MG TABLET    Take daily as needed for anxiety.    BUTALBITAL-ACETAMINOPHEN-CAFFEINE -40 MG (FIORICET, ESGIC) -40 MG PER TABLET    TAKE 1 TABLET EVERY 4 HOURS AS NEEDED    ESTRADIOL (ESTRACE) 0.5 MG  TABLET    Take 2 tablets (1 mg total) by mouth once daily.    FLUTICASONE (FLONASE) 50 MCG/ACTUATION NASAL SPRAY    1 spray (50 mcg total) by Each Nare route as needed for Rhinitis.    HYDROCORTISONE 2.5 % CREAM    Apply to the affected area twice daily.    LEVOTHYROXINE (SYNTHROID) 100 MCG TABLET    Take 100 mcg by mouth once daily.    RANITIDINE (ZANTAC) 150 MG TABLET    Take 150 mg by mouth.    ZOLPIDEM (AMBIEN) 5 MG TAB    Take 1 tablet (5 mg total) by mouth nightly as needed.     Review of patient's allergies indicates:   Allergen Reactions    Tramadol Itching    Codeine Itching    Morphine Itching    Naproxen Itching    Omeprazole Other (See Comments)     Abdominal bloating    Wal-phed [pseudoephedrine hcl] Itching    Zithromax [azithromycin]     Buspirone Anxiety    Morpholine analogues Anxiety and Itching    Nexium [esomeprazole magnesium]     Talwin [pentazocine lactate] Anxiety     Review of Systems   Constitution: Negative for fever.   HENT: Positive for hearing loss.    Eyes: Negative for blurred vision.   Cardiovascular: Negative for chest pain and leg swelling.   Respiratory: Negative for shortness of breath.    Endocrine: Negative for polyuria.   Hematologic/Lymphatic: Negative for bleeding problem.   Skin: Negative for rash.   Musculoskeletal: Positive for back pain, joint pain, joint swelling, muscle cramps and stiffness. Negative for muscle weakness.   Gastrointestinal: Negative for melena.   Genitourinary: Negative for hematuria.   Neurological: Negative for loss of balance, numbness and paresthesias.   Psychiatric/Behavioral: Negative for altered mental status.       Objective:   Body mass index is 31.95 kg/m².  Vitals:    02/05/19 0923   BP: (!) 144/77   Pulse: 73       General: Sherrill is well-developed, well-nourished, appears stated age, in no acute distress, alert and oriented.       General    Vitals reviewed.  Constitutional: She is oriented to person, place, and time. She appears  well-developed and well-nourished. No distress.   HENT:   Mouth/Throat: No oropharyngeal exudate.   Eyes: Right eye exhibits no discharge. Left eye exhibits no discharge.   Neck: Normal range of motion.   Pulmonary/Chest: Effort normal. No respiratory distress.   Neurological: She is alert and oriented to person, place, and time. She has normal reflexes. No cranial nerve deficit. Coordination normal.   Psychiatric: She has a normal mood and affect. Her behavior is normal. Judgment and thought content normal.           Right Knee Exam     Inspection   Erythema: absent  Scars: absent  Swelling: absent  Effusion: absent  Deformity: absent  Bruising: absent    Tenderness   The patient is tender to palpation of the lateral joint line and iliotibial band.    Crepitus   The patient has crepitus of the patella.    Range of Motion   Extension: 0   Flexion: 130     Tests   Meniscus   Remedios:  Medial - negative Lateral - negative  Ligament Examination Lachman: normal (-1 to 2mm) PCL-Posterior Drawer: normal (0 to 2mm)     MCL - Valgus: normal (0 to 2mm)  LCL - Varus: normal  Patella   Patellar apprehension: negative  Passive Patellar Tilt: neutral  Patellar Tracking: normal  Patellar Glide (quadrants): Lateral - 1   Medial - 2  Q-Angle at 90 degrees: normal  Patellar Grind: negative    Other   Meniscal Cyst: absent  Popliteal (Baker's) Cyst: absent  Sensation: normal    Left Knee Exam     Inspection   Erythema: absent  Scars: absent  Swelling: absent  Effusion: absent  Deformity: absent  Bruising: absent    Tenderness   The patient tender to palpation of the medial joint line and lateral joint line.    Crepitus   The patient has crepitus of the patella.    Range of Motion   Extension: 0   Flexion: 130     Tests   Meniscus   Remedios:  Medial - negative Lateral - negative  Stability Lachman: normal (-1 to 2mm) PCL-Posterior Drawer: normal (0 to 2mm)  MCL - Valgus: normal (0 to 2mm)  LCL - Varus: normal (0 to 2mm)  Patella    Patellar apprehension: negative  Passive Patellar Tilt: neutral  Patellar Tracking: normal  Patellar Glide (Quadrants): Lateral - 1 Medial - 2  Q-Angle at 90 degrees: normal  Patellar Grind: negative    Other   Meniscal Cyst: absent  Popliteal (Baker's) Cyst: absent  Sensation: normal    Right Hip Exam     Tests   Dayanna: negative  Left Hip Exam     Tests   Dayanna: negative          Muscle Strength   Right Lower Extremity   Hip Abduction: 5/5   Quadriceps:  3/5   Hamstrin/5   Left Lower Extremity   Hip Abduction: 5/5   Quadriceps:  4/5   Hamstrin/5     Reflexes     Left Side  Quadriceps:  2+  Achilles:  2+    Right Side   Quadriceps:  2+  Achilles:  2+    Vascular Exam     Right Pulses  Dorsalis Pedis:      2+  Posterior Tibial:      2+        Left Pulses  Dorsalis Pedis:      2+  Posterior Tibial:      2+            Imaging reviewed and interpreted today     X-rays including standing, weight bearing AP and flexion bilateral knees, lateral and merchant views ordered and images reviewed by me show:    No fracture, dislocation or other pathology   Medial compartment: mild degenerative changes   Lateral compartment: mild degenerative changes   Patellofemoral compartment: mild degenerative changes    Assessment:     Encounter Diagnoses   Name Primary?    Acute pain of right knee Yes    Chondromalacia, patella, right     Chondromalacia, patella, left     It band syndrome, left     It band syndrome, right     Lumbar radiculopathy         Plan:     We reviewed with Sherrill today, the pathology and natural history of her diagnosis. We had an extensive discussion as to the conservative treatment and management of their condition. We also discussed the variety of treatment options to include medication, physical therapy, diagnostic testing as well as other treatments.The decision was made to go forward with:    1. PT    2. Injection today    PROCEDURE NOTE: right KNEE INJECTION  After time out was performed,  including verification of patient ID, procedure, site and side, availability of information and equipment, review of safety issues, and agreement with consent, the procedure site was marked and the patient was prepped aseptically. A diagnostic and therapeutic injection of 2cc 40 mg kenalog and 1% lidocaine/0.5% sensorcaine was given under sterile technique using a 22g x 1.5 needle into the knee inferolaterally in seated position.   The patient had no adverse reactions to the medication. Pain decreased. The patient was instructed to apply ice to the joint for 20 minutes and avoid strenuous activities for 24-36 hours following the injection. Patient was warned of possible blood sugar and/or blood pressure changes during that time. Following that time, patient can resume regular activities.    -Dr. Gray for LBP/SI

## 2019-02-08 RX ORDER — ESTRADIOL 1 MG/1
TABLET ORAL
Qty: 90 TABLET | Refills: 3 | Status: SHIPPED | OUTPATIENT
Start: 2019-02-08 | End: 2019-03-06 | Stop reason: SDUPTHER

## 2019-02-27 ENCOUNTER — TELEPHONE (OUTPATIENT)
Dept: PULMONOLOGY | Facility: CLINIC | Age: 69
End: 2019-02-27

## 2019-02-27 NOTE — TELEPHONE ENCOUNTER
----- Message from Siobhan Shell sent at 2/27/2019  1:38 PM CST -----  Contact: pt  The states she has a lung biopsy on 02/14/2019 and is now having pain in her lungs, the pt wants to be advised and can be reached at [phone]///[id number]

## 2019-02-27 NOTE — TELEPHONE ENCOUNTER
Patient states that she has biopsy done at [facility] on 2/14/18 complains of pain on left side of back denies any cough/sob. Patient instructed to call MD that performed biopsy or go to ED.

## 2019-03-01 ENCOUNTER — HOSPITAL ENCOUNTER (OUTPATIENT)
Dept: RADIOLOGY | Facility: HOSPITAL | Age: 69
Discharge: HOME | End: 2019-03-01
Attending: NURSE PRACTITIONER
Payer: MEDICARE

## 2019-03-01 ENCOUNTER — OFFICE VISIT (OUTPATIENT)
Dept: INTERNAL MEDICINE | Facility: CLINIC | Age: 69
End: 2019-03-01
Payer: MEDICARE

## 2019-03-01 ENCOUNTER — HOSPITAL ENCOUNTER (EMERGENCY)
Facility: HOSPITAL | Age: 69
Discharge: HOME | End: 2019-03-02
Attending: EMERGENCY MEDICINE
Payer: MEDICARE

## 2019-03-01 VITALS
HEART RATE: 80 BPM | WEIGHT: 192.25 LBS | BODY MASS INDEX: 32.03 KG/M2 | HEIGHT: 65 IN | OXYGEN SATURATION: 98 % | SYSTOLIC BLOOD PRESSURE: 120 MMHG | TEMPERATURE: 98 F | DIASTOLIC BLOOD PRESSURE: 70 MMHG

## 2019-03-01 DIAGNOSIS — R91.1 LESION OF LUNG: ICD-10-CM

## 2019-03-01 DIAGNOSIS — Z98.890 HISTORY OF LUNG BIOPSY: ICD-10-CM

## 2019-03-01 DIAGNOSIS — M54.9 UPPER BACK PAIN: Primary | ICD-10-CM

## 2019-03-01 DIAGNOSIS — M54.9 UPPER BACK PAIN: ICD-10-CM

## 2019-03-01 DIAGNOSIS — R09.1 PLEURISY: Primary | ICD-10-CM

## 2019-03-01 LAB
BASOPHILS # BLD AUTO: 0.02 K/UL
BASOPHILS NFR BLD: 0.3 %
DIFFERENTIAL METHOD: NORMAL
EOSINOPHIL # BLD AUTO: 0.2 K/UL
EOSINOPHIL NFR BLD: 1.9 %
ERYTHROCYTE [DISTWIDTH] IN BLOOD BY AUTOMATED COUNT: 13.4 %
HCT VFR BLD AUTO: 39.2 %
HGB BLD-MCNC: 12.9 G/DL
LYMPHOCYTES # BLD AUTO: 2.5 K/UL
LYMPHOCYTES NFR BLD: 31.8 %
MCH RBC QN AUTO: 30.1 PG
MCHC RBC AUTO-ENTMCNC: 32.9 G/DL
MCV RBC AUTO: 92 FL
MONOCYTES # BLD AUTO: 0.7 K/UL
MONOCYTES NFR BLD: 8.4 %
NEUTROPHILS # BLD AUTO: 4.5 K/UL
NEUTROPHILS NFR BLD: 57.6 %
PLATELET # BLD AUTO: 179 K/UL
PMV BLD AUTO: 9.5 FL
RBC # BLD AUTO: 4.28 M/UL
WBC # BLD AUTO: 7.86 K/UL

## 2019-03-01 PROCEDURE — 99999 PR PBB SHADOW E&M-EST. PATIENT-LVL IV: ICD-10-PCS | Mod: PBBFAC,,, | Performed by: NURSE PRACTITIONER

## 2019-03-01 PROCEDURE — 99284 EMERGENCY DEPT VISIT MOD MDM: CPT | Mod: 25,27

## 2019-03-01 PROCEDURE — 63600175 PHARM REV CODE 636 W HCPCS: Performed by: EMERGENCY MEDICINE

## 2019-03-01 PROCEDURE — 99213 PR OFFICE/OUTPT VISIT, EST, LEVL III, 20-29 MIN: ICD-10-PCS | Mod: S$PBB,,, | Performed by: NURSE PRACTITIONER

## 2019-03-01 PROCEDURE — 85025 COMPLETE CBC W/AUTO DIFF WBC: CPT

## 2019-03-01 PROCEDURE — 99999 PR PBB SHADOW E&M-EST. PATIENT-LVL IV: CPT | Mod: PBBFAC,,, | Performed by: NURSE PRACTITIONER

## 2019-03-01 PROCEDURE — 80048 BASIC METABOLIC PNL TOTAL CA: CPT

## 2019-03-01 PROCEDURE — 96374 THER/PROPH/DIAG INJ IV PUSH: CPT

## 2019-03-01 PROCEDURE — 99214 OFFICE O/P EST MOD 30 MIN: CPT | Mod: PBBFAC,25,PN | Performed by: NURSE PRACTITIONER

## 2019-03-01 PROCEDURE — 71046 XR CHEST PA AND LATERAL: ICD-10-PCS | Mod: 26,,, | Performed by: RADIOLOGY

## 2019-03-01 PROCEDURE — 96361 HYDRATE IV INFUSION ADD-ON: CPT

## 2019-03-01 PROCEDURE — 99213 OFFICE O/P EST LOW 20 MIN: CPT | Mod: S$PBB,,, | Performed by: NURSE PRACTITIONER

## 2019-03-01 PROCEDURE — 36415 COLL VENOUS BLD VENIPUNCTURE: CPT

## 2019-03-01 PROCEDURE — 71046 X-RAY EXAM CHEST 2 VIEWS: CPT | Mod: 26,,, | Performed by: RADIOLOGY

## 2019-03-01 PROCEDURE — 25000003 PHARM REV CODE 250: Performed by: EMERGENCY MEDICINE

## 2019-03-01 PROCEDURE — 71046 X-RAY EXAM CHEST 2 VIEWS: CPT | Mod: TC

## 2019-03-01 RX ORDER — KETOROLAC TROMETHAMINE 30 MG/ML
15 INJECTION, SOLUTION INTRAMUSCULAR; INTRAVENOUS
Status: COMPLETED | OUTPATIENT
Start: 2019-03-01 | End: 2019-03-01

## 2019-03-01 RX ORDER — METRONIDAZOLE 10 MG/G
1 GEL TOPICAL
COMMUNITY
Start: 2018-03-06 | End: 2019-11-04 | Stop reason: SDUPTHER

## 2019-03-01 RX ORDER — ACETAMINOPHEN 325 MG/1
650 TABLET ORAL
Status: COMPLETED | OUTPATIENT
Start: 2019-03-01 | End: 2019-03-01

## 2019-03-01 RX ADMIN — SODIUM CHLORIDE 1000 ML: 0.9 INJECTION, SOLUTION INTRAVENOUS at 11:03

## 2019-03-01 RX ADMIN — KETOROLAC TROMETHAMINE 15 MG: 30 INJECTION, SOLUTION INTRAMUSCULAR; INTRAVENOUS at 11:03

## 2019-03-01 RX ADMIN — ACETAMINOPHEN 650 MG: 325 TABLET ORAL at 11:03

## 2019-03-01 NOTE — PROGRESS NOTES
Subjective:       Patient ID: Sherrill Avery is a 68 y.o. female.    Chief Complaint: upper back pain    Patient presents with back pain that started after lung biopsy on 2/14/2019 at [facility].  Neg patho.   Tried Tylenol with mild relief.  Has history of anxiety.  Reports some shortness of breath.       Review of Systems   Constitutional: Negative for chills and fatigue.   Respiratory: Positive for shortness of breath.    Musculoskeletal: Positive for back pain.   Skin: Negative for color change and wound.   Psychiatric/Behavioral: Negative for agitation and confusion.       Objective:      Physical Exam   Constitutional: Vital signs are normal. She appears well-developed.   HENT:   Head: Normocephalic.   Neck: Normal range of motion.   Cardiovascular: Normal rate and regular rhythm.   Pulmonary/Chest: Effort normal and breath sounds normal.   Musculoskeletal: She exhibits tenderness.   Neurological: She is alert.   Skin: Skin is warm.   Psychiatric: She has a normal mood and affect. Her behavior is normal.       Assessment:       1. Upper back pain    2. Lesion of lung    3. History of lung biopsy        Plan:         Upper back pain  -     X-Ray Chest PA And Lateral; Future; Expected date: 03/01/2019    Lesion of lung  -     X-Ray Chest PA And Lateral; Future; Expected date: 03/01/2019    History of lung biopsy  -     X-Ray Chest PA And Lateral; Future; Expected date: 03/01/2019      X-ray today.  Advised to do warm compresses to site.  Tylenol.  If no improvement, follow up.

## 2019-03-02 ENCOUNTER — NURSE TRIAGE (OUTPATIENT)
Dept: ADMINISTRATIVE | Facility: CLINIC | Age: 69
End: 2019-03-02

## 2019-03-02 VITALS
WEIGHT: 193.81 LBS | SYSTOLIC BLOOD PRESSURE: 148 MMHG | HEART RATE: 65 BPM | DIASTOLIC BLOOD PRESSURE: 75 MMHG | OXYGEN SATURATION: 100 % | HEIGHT: 66 IN | RESPIRATION RATE: 18 BRPM | BODY MASS INDEX: 31.15 KG/M2 | TEMPERATURE: 98 F

## 2019-03-02 LAB
ANION GAP SERPL CALC-SCNC: 6 MMOL/L
BUN SERPL-MCNC: 17 MG/DL
CALCIUM SERPL-MCNC: 9.1 MG/DL
CHLORIDE SERPL-SCNC: 108 MMOL/L
CO2 SERPL-SCNC: 27 MMOL/L
CREAT SERPL-MCNC: 1.1 MG/DL
EST. GFR  (AFRICAN AMERICAN): 60 ML/MIN/1.73 M^2
EST. GFR  (NON AFRICAN AMERICAN): 52 ML/MIN/1.73 M^2
GLUCOSE SERPL-MCNC: 90 MG/DL
POTASSIUM SERPL-SCNC: 4.3 MMOL/L
SODIUM SERPL-SCNC: 141 MMOL/L

## 2019-03-02 PROCEDURE — 25500020 PHARM REV CODE 255: Performed by: EMERGENCY MEDICINE

## 2019-03-02 RX ORDER — HYDROCODONE BITARTRATE AND ACETAMINOPHEN 5; 325 MG/1; MG/1
1 TABLET ORAL EVERY 4 HOURS PRN
Qty: 18 TABLET | Refills: 0 | Status: SHIPPED | OUTPATIENT
Start: 2019-03-02 | End: 2019-11-04

## 2019-03-02 RX ADMIN — IOHEXOL 75 ML: 350 INJECTION, SOLUTION INTRAVENOUS at 12:03

## 2019-03-02 NOTE — ED PROVIDER NOTES
SCRIBE #1 NOTE: I, Lakshmi Zane/Sendy Duran, am scribing for, and in the presence of, Samy Amor MD. I have scribed the entire note.      History      Chief Complaint   Patient presents with    lung pain     states had a left lung biopsy at [facility] last week - states increased pain since procedure. Had xray today but increased pain       Review of patient's allergies indicates:   Allergen Reactions    Tramadol Itching    Codeine Itching    Morphine Itching    Naproxen Itching    Omeprazole Other (See Comments)     Abdominal bloating    Wal-phed [pseudoephedrine hcl] Itching    Zithromax [azithromycin]     Buspirone Anxiety    Morpholine analogues Anxiety and Itching    Nexium [esomeprazole magnesium]     Talwin [pentazocine lactate] Anxiety        HPI   HPI    3/1/2019, 11:15 PM   History obtained from the patient.      History of Present Illness: Sherrill Avery is a 68 y.o. female patient with a PMHx of Renal cell CA, HTN, PSHx of Nephrectomy who presents to the Emergency Department s/p lung biopsy at [facility] on 2/14/19 for pleuritic CP which onset gradually on 2/20/19 and has been gradually worsening. Pt reports her pain was most severe last PM, rated an 8/10 in severity. She currently rates her pain a 4-6/10 in severity. Symptoms are waxing and waning and moderate in severity. Pain worse with standing, mitigated by laying down. Pt reports she became more concerned when her pain did not improve with laying down last PM. Associated sxs include mild SOB; she reports this is no worse than baseline. Patient denies any cough, back pain, abd pain, flank pain, fever, chills, fatigue, N/V/D, and all other sxs at this time. Prior Tx includes 2 regular strength tylenol. Pt reports she called Dr. Shah (Pulmonology) today who referred pt to the ED for a chest CT. No further complaints or concerns at this time.       Arrival mode: Personal vehicle     PCP: Alexandre Macias MD       Past Medical  History:  Past Medical History:   Diagnosis Date    Anxiety     Arthritis     Cancer of kidney 8/2/2013    dr faye    Ectopic pregnancy     Eye infection     GERD (gastroesophageal reflux disease)     Hiatal hernia     History of kidney cancer     Hypercholesteremia     Hypertension     Hypothyroid     dr block q 6 months    Insomnia     Migraine headache     Renal cell cancer     Respiratory arrest     due to anesthia    Rosacea     Sleep apnea     Thyroid nodule     dr block    Urinary tract infection        Past Surgical History:  Past Surgical History:   Procedure Laterality Date    breast implants      BREAST SURGERY Bilateral 1996    saline implants    COLONOSCOPY  2007    COLONOSCOPY N/A 11/18/2013    Per[facility] at [facility]O    EGD (ESOPHAGOGASTRODUODENOSCOPY) N/A 11/18/2013    Performed by [facility] at [facility] ENDO    HYSTERECTOMY      ectopic pregnancy x 2, with LSO    LAPAROSCOPIC NEPHRECTOMY, HAND ASSISTED  07/25/2013    LYMPHADENECTOMY, RETROPERITONEUM N/A 7/25/2013    Performed by [facility]lain [facility] at [facility] OR 2ND FLR    NEPHRECTOMY      OOPHORECTOMY      x1    right ganglion cyst      ROBOTIC NEPHRECTOMY Right 7/25/2013    Performed by Fran Chamberlain [facility] at [facility] OR 2ND FLR    throat polyp      TRANSOBTURATOR SLING  2011    with RSO for persistent complex cyst & MARGOT; done by [facility]    UPPER GASTROINTESTINAL ENDOSCOPY  2007         Family History:  Family History   Problem Relation Age of Onset    Diabetes Mother     Hypertension Mother     Heart disease Mother     Cancer Father         lung    Migraines Father     Glaucoma Paternal Grandmother     Glaucoma Sister     Thyroid disease Neg Hx     Asthma Neg Hx        Social History:  Social History     Tobacco Use    Smoking status: Never Smoker    Smokeless tobacco: Never Used   Substance and Sexual Activity    Alcohol use: Yes     Alcohol/week: 0.0 oz     Comment: social     Drug use: No    Sexual activity: Yes     Partners: Male     Birth control/protection: Surgical       ROS   Review of Systems   Constitutional: Negative for chills, fatigue and fever.   HENT: Negative for sore throat.    Respiratory: Positive for shortness of breath. Negative for cough.    Cardiovascular: Positive for chest pain (pleuritic).   Gastrointestinal: Negative for abdominal pain, diarrhea, nausea and vomiting.   Genitourinary: Negative for dysuria and flank pain.   Musculoskeletal: Negative for back pain.   Skin: Negative for rash.   Neurological: Negative for weakness and numbness.   Hematological: Does not bruise/bleed easily.   All other systems reviewed and are negative.    Physical Exam      Initial Vitals [03/01/19 2111]   BP Pulse Resp Temp SpO2   (!) 160/83 67 20 97.9 °F (36.6 °C) 98 %      MAP       --          Physical Exam  Nursing Notes and Vital Signs Reviewed.  Constitutional: Patient is in no acute distress. Well-developed and well-nourished.  Head: Atraumatic. Normocephalic.  Eyes: PERRL. EOM intact. Conjunctivae are not pale. No scleral icterus.  ENT: Mucous membranes are moist. Oropharynx is clear and symmetric.    Neck: Supple. Full ROM. No lymphadenopathy.  Cardiovascular: Regular rate. Regular rhythm. No murmurs, rubs, or gallops. Distal pulses are 2+ and symmetric.  Pulmonary/Chest: No respiratory distress. Clear to auscultation bilaterally. No wheezing or rales.  Abdominal: Soft and non-distended.  There is no tenderness.  No rebound, guarding, or rigidity.  Musculoskeletal: Moves all extremities. No obvious deformities. No edema. No calf tenderness.  Skin: Warm and dry. Biopsy incision site on L posterior thoracic cage is clean, dry, intact. No signs of infection.   Neurological:  Alert, awake, and appropriate.  Normal speech.  No acute focal neurological deficits are appreciated.  Psychiatric: Normal affect. Good eye contact. Appropriate in content.    ED Course    Procedures  ED  "Vital Signs:  Vitals:    03/01/19 2111 03/02/19 0236   BP: (!) 160/83 (!) 148/75   Pulse: 67 65   Resp: 20 18   Temp: 97.9 °F (36.6 °C)    TempSrc: Oral    SpO2: 98% 100%   Weight: 87.9 kg (193 lb 12.6 oz)    Height: 5' 5.5" (1.664 m)        Abnormal Lab Results:  Labs Reviewed   BASIC METABOLIC PANEL - Abnormal; Notable for the following components:       Result Value    Anion Gap 6 (*)     eGFR if non  52 (*)     All other components within normal limits   CBC W/ AUTO DIFFERENTIAL        All Lab Results:  Results for orders placed or performed during the hospital encounter of 03/01/19   CBC auto differential   Result Value Ref Range    WBC 7.86 3.90 - 12.70 K/uL    RBC 4.28 4.00 - 5.40 M/uL    Hemoglobin 12.9 12.0 - 16.0 g/dL    Hematocrit 39.2 37.0 - 48.5 %    MCV 92 82 - 98 fL    MCH 30.1 27.0 - 31.0 pg    MCHC 32.9 32.0 - 36.0 g/dL    RDW 13.4 11.5 - 14.5 %    Platelets 179 150 - 350 K/uL    MPV 9.5 9.2 - 12.9 fL    Gran # (ANC) 4.5 1.8 - 7.7 K/uL    Lymph # 2.5 1.0 - 4.8 K/uL    Mono # 0.7 0.3 - 1.0 K/uL    Eos # 0.2 0.0 - 0.5 K/uL    Baso # 0.02 0.00 - 0.20 K/uL    Gran% 57.6 38.0 - 73.0 %    Lymph% 31.8 18.0 - 48.0 %    Mono% 8.4 4.0 - 15.0 %    Eosinophil% 1.9 0.0 - 8.0 %    Basophil% 0.3 0.0 - 1.9 %    Differential Method Automated    Basic metabolic panel   Result Value Ref Range    Sodium 141 136 - 145 mmol/L    Potassium 4.3 3.5 - 5.1 mmol/L    Chloride 108 95 - 110 mmol/L    CO2 27 23 - 29 mmol/L    Glucose 90 70 - 110 mg/dL    BUN, Bld 17 8 - 23 mg/dL    Creatinine 1.1 0.5 - 1.4 mg/dL    Calcium 9.1 8.7 - 10.5 mg/dL    Anion Gap 6 (L) 8 - 16 mmol/L    eGFR if African American 60 >60 mL/min/1.73 m^2    eGFR if non African American 52 (A) >60 mL/min/1.73 m^2         Imaging Results:  Imaging Results          CT Chest With Contrast (Final result)  Result time 03/02/19 08:40:36    Final result by Jose Raul Camacho MD (03/02/19 08:40:36)                 Impression:      No acute process. "  No pleural effusion or pneumothorax.    Multiple bilateral pulmonary nodules with the 2 largest on the right 4.8 mm and 8.3 mm involving the medial right upper lobe and the posterior inferior right lower lobe.  The largest on the left is pleural-based in location 1 point 0 cm in size at the medial left lung base pleural-based in location as seen on image 84 of series 2.  Note that since the previous study several of the nodules have enlarged or appeared and several have decreased in size.    Please correlate with clinical history.    All CT scans at this facility are performed  using dose modulation techniques as appropriate to performed exam including the following:  automated exposure control; adjustment of mA and/or kV according to the patients size (this includes techniques or standardized protocols for targeted exams where dose is matched to indication/reason for exam: i.e. extremities or head);  iterative reconstruction technique.      Electronically signed by: Jose Raul Camacho MD  Date:    03/02/2019  Time:    08:40             Narrative:    EXAMINATION:  CT CHEST WITH CONTRAST    CLINICAL HISTORY:  Chest pain.  History of lung biopsy with pleuritic chest pain.    TECHNIQUE:  Standard CT chest with 75cc's Omnipaque 350.    COMPARISON:  03/01/2017.  No recent exam for comparison.    FINDINGS:  Bilateral breast implants are noted.    Cardiac size appears nonenlarged.  The aorta and pulmonary arteries appear normal.  No mediastinal mass or adenopathy is evident at this time.  A small hiatal hernia is present.    There is an 8.3 mm subpleural nodule at the posterior right lower lobe.  A medial nodule adjacent to the heart border is seen on image 53 of series 2 measuring 5 mm in size.  There is a pleural-based nodule at the anteromedial left lower lobe, probably best seen on axial image 84 of series 2.  This measures approximately 10.4 mm in size.  Several small 2 mm subpleural nodules are present at the left  lung base on MIPS image 50 and 51.  There is an additional right lung base nodule approximately 2 mm in size on image 34.    When compared to the previous study several oral the nodules appear slightly larger.  Several appear actually improved.                               1:30 AM: Per STAT radiology, pt's CT results:   1. No evidence for pneumothorax or effusion. No acute CT findings.  2. Pulmonary nodules as above, slightly enlarged since the previous exam within the right medial lower lobe and right middle lobe. Multiple of the previously seen nodules are no longer visualized. Correlate with history given the known recent biopsy.            The Emergency Provider reviewed the vital signs and test results, which are outlined above.    ED Discussion     2:20 AM: Reassessed pt at this time.  Pt states her condition has improved at this time. Discussed with pt all pertinent ED information and results. Discussed pt dx and plan of tx. Gave pt all f/u and return to the ED instructions. All questions and concerns were addressed at this time. Pt expresses understanding of information and instructions, and is comfortable with plan to discharge. Pt is stable for discharge.    I discussed with patient and/or family/caretaker that evaluation in the ED does not suggest any emergent or life threatening medical conditions requiring immediate intervention beyond what was provided in the ED, and I believe patient is safe for discharge.  Regardless, an unremarkable evaluation in the ED does not preclude the development or presence of a serious of life threatening condition. As such, patient was instructed to return immediately for any worsening or change in current symptoms.    Discharge Medication List as of 3/2/2019  2:31 AM      START taking these medications    Details   HYDROcodone-acetaminophen (NORCO) 5-325 mg per tablet Take 1 tablet by mouth every 4 (four) hours as needed for Pain., Starting Sat 3/2/2019, Print                  ED Medication(s):  Medications   acetaminophen tablet 650 mg (650 mg Oral Given 3/1/19 2347)   ketorolac injection 15 mg (15 mg Intravenous Given 3/1/19 2349)   sodium chloride 0.9% bolus 1,000 mL (0 mLs Intravenous Stopped 3/2/19 0236)   omnipaque 350 iohexol 75 mL (75 mLs Intravenous Given 3/2/19 0053)       Follow-up Information     Alexandre Macias MD In 1 week.    Specialty:  Family Medicine  Contact information:  [address]  [place] [address] [phone]                     Medical Decision Making    Medical Decision Making:   Clinical Tests:   Lab Tests: Ordered and Reviewed  Radiological Study: Ordered and Reviewed           Scribe Attestation:   Scribe #1: I performed the above scribed service and the documentation accurately describes the services I performed. I attest to the accuracy of the note.    Attending:   Physician Attestation Statement for Scribe #1: I, Samy Amor MD, personally performed the services described in this documentation, as scribed by Lakshmi Degroot, in my presence, and it is both accurate and complete.          Clinical Impression       ICD-10-CM ICD-9-CM   1. Pleurisy R09.1 511.0       Disposition:   Disposition: Discharged  Condition: Stable         Samy Amor MD  03/02/19 5806

## 2019-03-02 NOTE — TELEPHONE ENCOUNTER
Reason for Disposition   [1] Prescription not at pharmacy AND [2] was prescribed today by PCP    Protocols used: ST MEDICATION QUESTION CALL-A-     calling regarding Pt d/c'd from ED today and medication was suppose to be sent to pharmacy and medication is not there.  Care advice given.

## 2019-03-06 ENCOUNTER — TELEPHONE (OUTPATIENT)
Dept: OBSTETRICS AND GYNECOLOGY | Facility: CLINIC | Age: 69
End: 2019-03-06

## 2019-03-06 RX ORDER — ESTRADIOL 1 MG/1
1 TABLET ORAL DAILY
Qty: 90 TABLET | Refills: 3 | Status: SHIPPED | OUTPATIENT
Start: 2019-03-06 | End: 2019-05-02 | Stop reason: SDUPTHER

## 2019-03-06 NOTE — TELEPHONE ENCOUNTER
Please advise. Left message to call clinic to schedule an annual.      ----- Message from Kaylynn Hodges sent at 3/6/2019  4:05 PM CST -----  Contact: pt   Type:  RX Refill Request    Who Called:  ALEXIA SAAVEDRA   Refill or New Rx:refill  RX Name and Strength: estradiol 1 MG   How is the patient currently taking it? (ex. 1XDay): 1 daily  Is this a 30 day or 90 day RX:   Preferred Pharmacy with phone number:    [facility] [address] [place], [place] - [address] [facility]  [address] [address] [address]  Phone: [phone] Fax: [phone]    local or Mail Order: local  Ordering Provider: yris  Would the patient rather a call back or a response via [facility]? call  Best Call Back Number: [phone] (home)   Additional Information:

## 2019-03-18 ENCOUNTER — OFFICE VISIT (OUTPATIENT)
Dept: INTERNAL MEDICINE | Facility: CLINIC | Age: 69
End: 2019-03-18
Payer: MEDICARE

## 2019-03-18 VITALS
SYSTOLIC BLOOD PRESSURE: 138 MMHG | DIASTOLIC BLOOD PRESSURE: 88 MMHG | TEMPERATURE: 97 F | HEART RATE: 72 BPM | OXYGEN SATURATION: 98 % | WEIGHT: 192.25 LBS | HEIGHT: 66 IN | BODY MASS INDEX: 30.9 KG/M2

## 2019-03-18 DIAGNOSIS — M54.5 ACUTE BILATERAL LOW BACK PAIN, WITH SCIATICA PRESENCE UNSPECIFIED: ICD-10-CM

## 2019-03-18 DIAGNOSIS — M79.604 PAIN OF RIGHT LOWER EXTREMITY: Primary | ICD-10-CM

## 2019-03-18 PROCEDURE — 99999 PR PBB SHADOW E&M-EST. PATIENT-LVL IV: ICD-10-PCS | Mod: PBBFAC,,, | Performed by: PHYSICIAN ASSISTANT

## 2019-03-18 PROCEDURE — 99214 OFFICE O/P EST MOD 30 MIN: CPT | Mod: PBBFAC,PN | Performed by: PHYSICIAN ASSISTANT

## 2019-03-18 PROCEDURE — 99999 PR PBB SHADOW E&M-EST. PATIENT-LVL IV: CPT | Mod: PBBFAC,,, | Performed by: PHYSICIAN ASSISTANT

## 2019-03-18 PROCEDURE — 99214 OFFICE O/P EST MOD 30 MIN: CPT | Mod: S$PBB,,, | Performed by: PHYSICIAN ASSISTANT

## 2019-03-18 PROCEDURE — 99214 PR OFFICE/OUTPT VISIT, EST, LEVL IV, 30-39 MIN: ICD-10-PCS | Mod: S$PBB,,, | Performed by: PHYSICIAN ASSISTANT

## 2019-03-18 RX ORDER — PHENYLEPHRINE HCL 10 MG/1
10 TABLET, FILM COATED ORAL
COMMUNITY
End: 2019-11-04

## 2019-03-18 NOTE — PROGRESS NOTES
Subjective:      Patient ID: Sherrill Avery is a 68 y.o. female.    Chief Complaint: Leg Pain (right leg) and spot on top of foot    Has seen ortho for this issue. Received a cortisone shot in February, no improvement.  Leg Pain    Incident onset: 1 month. There was no injury mechanism. The pain is present in the right thigh, right knee, right ankle and right foot. Quality: throbbing. The pain has been worsening since onset. Associated symptoms include an inability to bear weight. Pertinent negatives include no loss of motion, loss of sensation, muscle weakness, numbness or tingling. The symptoms are aggravated by weight bearing. She has tried NSAIDs for the symptoms. The treatment provided moderate relief.   Improves with rest. Worse with activity.     Additionally pt reports some mild right flank pain which has been going on for the past week. Denies any urinary frequency or pain with urination.   Patient Active Problem List   Diagnosis    GERD (gastroesophageal reflux disease)    Esophageal reflux    Hypothyroidism    Nontoxic multinodular goiter    Perceptive hearing loss, both sides    Primary osteoarthritis involving multiple joints    Fibromyalgia    Other chronic dermatitis due to solar radiation    Benign neoplasm of skin    Family history of malignant neoplasm    Inflamed seborrheic keratosis    Other dermatitis due to solar radiation    Actinic degeneration    Dermatofibroma    Family history of malignant melanoma    Family history of skin cancer    Bilateral sensorineural hearing loss    Hypertension    Hypercholesteremia    Kidney carcinoma    Multiple benign melanocytic nevi    Hip pain    Neuropathy of left sural nerve-mild    Vitamin D deficiency    Anxiety    Chronic insomnia    Microscopic hematuria    Multiple lung nodules on CT    Clear cell carcinoma with lung metastasis    Subacromial bursitis    Right foot pain    Bone disorder    It band syndrome, left    It  "band syndrome, right    Acute pain of right knee    Chondromalacia, patella, right    Chondromalacia, patella, left    Lumbar radiculopathy       Review of Systems   Constitutional: Negative for activity change, appetite change, chills, diaphoresis, fatigue, fever and unexpected weight change.   HENT: Negative.  Negative for congestion, hearing loss, postnasal drip, rhinorrhea, sore throat, trouble swallowing and voice change.    Eyes: Negative.  Negative for visual disturbance.   Respiratory: Negative.  Negative for apnea, cough, choking, chest tightness, shortness of breath, wheezing and stridor.    Cardiovascular: Negative for chest pain, palpitations and leg swelling.   Gastrointestinal: Negative for abdominal distention, abdominal pain, blood in stool, constipation, diarrhea, nausea and vomiting.   Endocrine: Negative for cold intolerance, heat intolerance, polydipsia and polyuria.   Genitourinary: Negative.  Negative for difficulty urinating and frequency.   Musculoskeletal: Positive for arthralgias, gait problem and joint swelling. Negative for back pain and myalgias.   Skin: Negative for color change, pallor, rash and wound.   Neurological: Negative for dizziness, tingling, tremors, weakness, light-headedness, numbness and headaches.   Hematological: Negative for adenopathy.   Psychiatric/Behavioral: Negative for behavioral problems, confusion, self-injury, sleep disturbance and suicidal ideas. The patient is not nervous/anxious.      Objective:   /88   Pulse 72   Temp 96.9 °F (36.1 °C) (Tympanic)   Ht 5' 5.5" (1.664 m)   Wt 87.2 kg (192 lb 3.9 oz)   SpO2 98%   BMI 31.50 kg/m²     Physical Exam   Constitutional: She is oriented to person, place, and time. She appears well-developed and well-nourished.   HENT:   Head: Normocephalic and atraumatic.   Right Ear: External ear normal.   Left Ear: External ear normal.   Nose: Nose normal.   Mouth/Throat: Oropharynx is clear and moist.   Eyes: " Conjunctivae and EOM are normal. Pupils are equal, round, and reactive to light.   Neck: Normal range of motion. Neck supple.   Cardiovascular: Normal rate, regular rhythm and normal heart sounds. Exam reveals no gallop and no friction rub.   No murmur heard.  Pulmonary/Chest: Effort normal and breath sounds normal. No respiratory distress. She has no wheezes. She has no rales. She exhibits no tenderness.   Abdominal: Soft. She exhibits no distension. There is no tenderness.   Musculoskeletal: Normal range of motion. She exhibits tenderness. She exhibits no edema or deformity.        Right knee: She exhibits normal range of motion, no swelling, no effusion, no ecchymosis, no deformity, no laceration, no erythema, normal alignment, no LCL laxity, normal patellar mobility, no bony tenderness, normal meniscus and no MCL laxity. Tenderness found.        Right ankle: She exhibits swelling. She exhibits normal range of motion, no ecchymosis, no deformity, no laceration and normal pulse. No tenderness. Achilles tendon normal.        Thoracic back: She exhibits tenderness. She exhibits normal range of motion, no bony tenderness, no swelling, no edema, no deformity, no laceration, no pain, no spasm and normal pulse.        Lumbar back: She exhibits normal range of motion, no tenderness, no bony tenderness, no swelling, no edema, no deformity, no laceration, no pain, no spasm and normal pulse.        Back:         Right upper leg: She exhibits tenderness. She exhibits no bony tenderness, no swelling, no edema, no deformity and no laceration.        Left upper leg: She exhibits no tenderness, no bony tenderness, no swelling, no edema, no deformity and no laceration.        Right lower leg: She exhibits tenderness. She exhibits no bony tenderness, no swelling, no edema, no deformity and no laceration.        Left lower leg: She exhibits no tenderness, no bony tenderness, no swelling, no edema, no deformity and no laceration.         Legs:       Feet:    Lymphadenopathy:     She has no cervical adenopathy.   Neurological: She is alert and oriented to person, place, and time.   Skin: Skin is warm and dry.   Psychiatric: She has a normal mood and affect. Her behavior is normal. Judgment and thought content normal.   Vitals reviewed.    X-ray ortho knee results 2/5/2019: Right knee: There is suggestion of a small suprapatellar joint effusion.  No acute fractures or dislocations visualized.  Tricompartmental marginal osteophytes again noted.  Mild joint space loss in the medial tibiofemoral compartment appears unchanged.  Assessment:     1. Pain of right lower extremity    2. Acute bilateral low back pain, with sciatica presence unspecified      Plan:   Pain of right lower extremity  -     US Lower Extremity Veins Right; Future; Expected date: 03/18/2019  -     Uric acid; Future; Expected date: 03/18/2019    Acute bilateral low back pain, with sciatica presence unspecified  -     Urinalysis; Future; Expected date: 03/18/2019      Follow-up if symptoms worsen or fail to improve.

## 2019-03-19 ENCOUNTER — HOSPITAL ENCOUNTER (OUTPATIENT)
Dept: RADIOLOGY | Facility: HOSPITAL | Age: 69
Discharge: HOME | End: 2019-03-19
Attending: PHYSICIAN ASSISTANT
Payer: MEDICARE

## 2019-03-19 DIAGNOSIS — M79.604 PAIN OF RIGHT LOWER EXTREMITY: ICD-10-CM

## 2019-03-19 PROCEDURE — 93971 EXTREMITY STUDY: CPT | Mod: TC,RT

## 2019-03-22 DIAGNOSIS — Z12.39 BREAST CANCER SCREENING: Primary | ICD-10-CM

## 2019-05-02 ENCOUNTER — OFFICE VISIT (OUTPATIENT)
Dept: OBSTETRICS AND GYNECOLOGY | Facility: CLINIC | Age: 69
End: 2019-05-02
Payer: MEDICARE

## 2019-05-02 ENCOUNTER — HOSPITAL ENCOUNTER (OUTPATIENT)
Dept: RADIOLOGY | Facility: HOSPITAL | Age: 69
Discharge: HOME | End: 2019-05-02
Attending: OBSTETRICS & GYNECOLOGY
Payer: MEDICARE

## 2019-05-02 VITALS
WEIGHT: 195.13 LBS | HEIGHT: 66 IN | SYSTOLIC BLOOD PRESSURE: 134 MMHG | BODY MASS INDEX: 31.36 KG/M2 | DIASTOLIC BLOOD PRESSURE: 80 MMHG

## 2019-05-02 DIAGNOSIS — Z01.419 ENCOUNTER FOR GYNECOLOGICAL EXAMINATION WITHOUT ABNORMAL FINDING: Primary | ICD-10-CM

## 2019-05-02 DIAGNOSIS — R68.82 LIBIDO, DECREASED: ICD-10-CM

## 2019-05-02 DIAGNOSIS — Z78.0 MENOPAUSE: ICD-10-CM

## 2019-05-02 DIAGNOSIS — Z12.39 BREAST CANCER SCREENING: ICD-10-CM

## 2019-05-02 PROCEDURE — 99999 PR PBB SHADOW E&M-EST. PATIENT-LVL III: ICD-10-PCS | Mod: PBBFAC,,, | Performed by: OBSTETRICS & GYNECOLOGY

## 2019-05-02 PROCEDURE — 77067 SCR MAMMO BI INCL CAD: CPT | Mod: 26,,, | Performed by: RADIOLOGY

## 2019-05-02 PROCEDURE — G0101 CA SCREEN;PELVIC/BREAST EXAM: HCPCS | Mod: ,,, | Performed by: OBSTETRICS & GYNECOLOGY

## 2019-05-02 PROCEDURE — 77063 BREAST TOMOSYNTHESIS BI: CPT | Mod: 26,,, | Performed by: RADIOLOGY

## 2019-05-02 PROCEDURE — 99213 OFFICE O/P EST LOW 20 MIN: CPT | Mod: PBBFAC | Performed by: OBSTETRICS & GYNECOLOGY

## 2019-05-02 PROCEDURE — 77063 MAMMO DIGITAL SCREENING BILAT WITH TOMOSYNTHESIS_CAD: ICD-10-PCS | Mod: 26,,, | Performed by: RADIOLOGY

## 2019-05-02 PROCEDURE — 77067 MAMMO DIGITAL SCREENING BILAT WITH TOMOSYNTHESIS_CAD: ICD-10-PCS | Mod: 26,,, | Performed by: RADIOLOGY

## 2019-05-02 PROCEDURE — G0101 PR CA SCREEN;PELVIC/BREAST EXAM: ICD-10-PCS | Mod: ,,, | Performed by: OBSTETRICS & GYNECOLOGY

## 2019-05-02 PROCEDURE — 77067 SCR MAMMO BI INCL CAD: CPT | Mod: TC

## 2019-05-02 PROCEDURE — 99999 PR PBB SHADOW E&M-EST. PATIENT-LVL III: CPT | Mod: PBBFAC,,, | Performed by: OBSTETRICS & GYNECOLOGY

## 2019-05-02 RX ORDER — ESTRADIOL 1 MG/1
1 TABLET ORAL DAILY
Qty: 90 TABLET | Refills: 3 | Status: SHIPPED | OUTPATIENT
Start: 2019-05-02 | End: 2020-04-28

## 2019-05-02 NOTE — PROGRESS NOTES
CC: Well woman exam    Sherrill Avery is a 68 y.o. female  presents for a well woman exam.  C/o decreased libido, causing strain. Desires refills on estrace po    Past Medical History:   Diagnosis Date    Anxiety     Arthritis     Cancer of kidney 2013    dr faye    Ectopic pregnancy     Eye infection     GERD (gastroesophageal reflux disease)     Hiatal hernia     History of kidney cancer     Hypercholesteremia     Hypertension     Hypothyroid     dr block q 6 months    Insomnia     Migraine headache     Renal cell cancer     Respiratory arrest     due to anesthia    Rosacea     Sleep apnea     Thyroid nodule     dr block    Urinary tract infection      Past Surgical History:   Procedure Laterality Date    AUGMENTATION OF BREAST      breast implants      BREAST SURGERY Bilateral 1996    saline implants    COLONOSCOPY  2007    COLONOSCOPY N/A 2013    [facility] at [facility]    EGD (ESOPHAGOGASTRODUODENOSCOPY) N/A 2013    [facility]    HYSTERECTOMY      ectopic pregnancy x 2, with LSO    LAPAROSCOPIC NEPHRECTOMY, HAND ASSISTED  2013    LYMPHADENECTOMY, RETROPERITONEUM N/A 2013    [facility]    NEPHRECTOMY      OOPHORECTOMY      x1    right ganglion cyst      ROBOTIC NEPHRECTOMY Right 2013    [facility]    throat polyp      TRANSOBTURATOR SLING  2011    with RSO for persistent complex cyst & MARGOT; done by arndt    UPPER GASTROINTESTINAL ENDOSCOPY       Family History   Problem Relation Age of Onset    Diabetes Mother     Hypertension Mother     Heart disease Mother     Cancer Father         lung    Migraines Father     Glaucoma Paternal Grandmother     Glaucoma Sister     Thyroid disease Neg Hx     Asthma Neg Hx      Social History     Tobacco Use    Smoking status: Never Smoker    Smokeless  "tobacco: Never Used   Substance Use Topics    Alcohol use: Yes     Alcohol/week: 0.0 oz     Comment: social    Drug use: No     OB History        7    Para   2    Term   2            AB   5    Living   2       SAB   5    TAB        Ectopic        Multiple        Live Births   2                 Current Outpatient Medications:     ALPRAZolam (XANAX) 0.5 MG tablet, Take daily as needed for anxiety., Disp: 30 tablet, Rfl: 1    butalbital-acetaminophen-caffeine -40 mg (FIORICET, ESGIC) -40 mg per tablet, TAKE 1 TABLET EVERY 4 HOURS AS NEEDED, Disp: 30 tablet, Rfl: 0    diflorasone-emollient (APEXICON E) 0.05 % Crea, Apply 1 application topically., Disp: , Rfl:     estradiol (ESTRACE) 1 MG tablet, Take 1 tablet (1 mg total) by mouth once daily., Disp: 90 tablet, Rfl: 3    fluticasone (FLONASE) 50 mcg/actuation nasal spray, 1 spray (50 mcg total) by Each Nare route as needed for Rhinitis., Disp: 3 Bottle, Rfl: 4    levothyroxine (SYNTHROID) 100 MCG tablet, Take 100 mcg by mouth once daily., Disp: , Rfl:     phenylephrine (SUDAFED PE) 10 MG Tab, Take 10 mg by mouth., Disp: , Rfl:     ranitidine (ZANTAC) 150 MG tablet, Take 150 mg by mouth., Disp: , Rfl:     zolpidem (AMBIEN) 5 MG Tab, Take 1 tablet (5 mg total) by mouth nightly as needed., Disp: 30 tablet, Rfl: 1    HYDROcodone-acetaminophen (NORCO) 5-325 mg per tablet, Take 1 tablet by mouth every 4 (four) hours as needed for Pain., Disp: 18 tablet, Rfl: 0    hydrocortisone 2.5 % cream, Apply to the affected area twice daily., Disp: , Rfl:     metronidazole 1% (METROGEL) 1 % Gel, Apply 1 application topically., Disp: , Rfl:     GYNECOLOGY HISTORY:  No abnormal pap/std    DATA REVIEWED:  Last pap: normal Date:   Last mmg: normal Date: ; done today  Colonoscopy   DEXA     /80   Ht 5' 5.5" (1.664 m)   Wt 88.5 kg (195 lb 1.7 oz)   BMI 31.97 kg/m²     ROS:  GENERAL: Denies weight gain or weight loss. Feeling well " overall.   SKIN: Denies rash or lesions.   HEAD: Denies head injury or headache.   NODES: Denies enlarged lymph nodes.   CHEST: Denies chest pain or shortness of breath.   CARDIOVASCULAR: Denies palpitations or left sided chest pain.   ABDOMEN: No abdominal pain, constipation, diarrhea, nausea, vomiting or rectal bleeding.   URINARY: No frequency, dysuria, hematuria, or burning on urination.  REPRODUCTIVE: See HPI.   BREASTS: The patient denies pain, lumps, or nipple discharge.   HEMATOLOGIC: No easy bruisability or excessive bleeding.   MUSCULOSKELETAL: Denies joint pain or swelling.   NEUROLOGIC: Denies syncope or weakness.   PSYCHIATRIC: Denies depression, anxiety or mood swings.    PE:   APPEARANCE: Well nourished, well developed, in no acute distress.  AFFECT: WNL, alert and oriented x 3.  SKIN: No acne or hirsutism.  NECK: Neck symmetric without masses or thyromegaly.  NODES: No inguinal, cervical, axillary or femoral lymph node enlargement.  CHEST: Good respiratory effort.   ABDOMEN: Soft. No tenderness or masses. No hepatosplenomegaly. No hernias.  BREASTS: Symmetrical, no skin changes or visible lesions. No palpable masses, nipple discharge bilaterally.  PELVIC: Normal external female genitalia without lesions. Normal hair distribution. Adequate perineal body, normal urethral meatus. Vagina atrophic without lesions or discharge. No significant cystocele or rectocele. Bimanual exam shows uterus and cervix to be surgically absent. Adnexa without masses or tenderness.  EXTREMITIES: No edema.    Encounter for gynecological examination without abnormal finding    Menopause    Libido, decreased    Other orders  -     estradiol (ESTRACE) 1 MG tablet; Take 1 tablet (1 mg total) by mouth once daily.  Dispense: 90 tablet; Refill: 3    topical testosterone called into [facility]    Patient was counseled today on A.C.S. Pap guidelines (none needed) and recommendations for yearly pelvic exams, yearly  mammograms starting age 40, and clinical breast exams; to see her PCP for other health maintenance.

## 2019-06-21 ENCOUNTER — PES CALL (OUTPATIENT)
Dept: ADMINISTRATIVE | Facility: CLINIC | Age: 69
End: 2019-06-21

## 2019-08-09 ENCOUNTER — TELEPHONE (OUTPATIENT)
Dept: INTERNAL MEDICINE | Facility: CLINIC | Age: 69
End: 2019-08-09

## 2019-08-12 ENCOUNTER — TELEPHONE (OUTPATIENT)
Dept: INTERNAL MEDICINE | Facility: CLINIC | Age: 69
End: 2019-08-12

## 2019-08-12 NOTE — TELEPHONE ENCOUNTER
----- Message from Mireya Schuster sent at 8/12/2019 10:06 AM CDT -----  Contact: [phone]  Would like to consult with the nurse, Patient is returning the nurse call, please call back at  [phone], thanks sj  .Type:  Patient Returning Call    Who Called Ms Avery  Who Left Message for Patient:Jerson  Does the patient know what this is regarding?:no  Would the patient rather a call back or a response via [facility]? Call back  Best Call Back Number:[phone]  Additional Information:

## 2019-08-16 ENCOUNTER — TELEPHONE (OUTPATIENT)
Dept: INTERNAL MEDICINE | Facility: CLINIC | Age: 69
End: 2019-08-16

## 2019-08-16 NOTE — TELEPHONE ENCOUNTER
----- Message from Delicia Harrison sent at 8/16/2019  8:49 AM CDT -----  Contact: pt  Type:  Sooner Apoointment Request    Caller is requesting a sooner appointment.  Caller declined first available appointment listed below.  Caller will not accept being placed on the waitlist and is requesting a message be sent to doctor.  Name of Caller:Patient  When is the first available appointment?10/22  Symptoms:follow up  Would the patient rather a call back or a response via [facility]? Call back  Best Call Back Number:[phone]  Additional Information: pt states her appt was cancel and change to see NP, pt do not want to see NP, states her and her /Harsh Avery wants to see doctor only, pt and  want to come together.

## 2019-10-31 ENCOUNTER — PATIENT OUTREACH (OUTPATIENT)
Dept: ADMINISTRATIVE | Facility: HOSPITAL | Age: 69
End: 2019-10-31

## 2019-11-04 ENCOUNTER — LAB VISIT (OUTPATIENT)
Dept: LAB | Facility: HOSPITAL | Age: 69
End: 2019-11-04
Attending: FAMILY MEDICINE
Payer: MEDICARE

## 2019-11-04 ENCOUNTER — OFFICE VISIT (OUTPATIENT)
Dept: INTERNAL MEDICINE | Facility: CLINIC | Age: 69
End: 2019-11-04
Payer: MEDICARE

## 2019-11-04 VITALS
BODY MASS INDEX: 31.15 KG/M2 | TEMPERATURE: 97 F | WEIGHT: 193.81 LBS | SYSTOLIC BLOOD PRESSURE: 138 MMHG | DIASTOLIC BLOOD PRESSURE: 82 MMHG | HEIGHT: 66 IN

## 2019-11-04 DIAGNOSIS — I10 ESSENTIAL HYPERTENSION: ICD-10-CM

## 2019-11-04 DIAGNOSIS — C64.1 CARCINOMA OF RIGHT KIDNEY: ICD-10-CM

## 2019-11-04 DIAGNOSIS — E78.00 HYPERCHOLESTEREMIA: ICD-10-CM

## 2019-11-04 DIAGNOSIS — R91.8 MULTIPLE LUNG NODULES ON CT: Chronic | ICD-10-CM

## 2019-11-04 DIAGNOSIS — E03.9 HYPOTHYROIDISM, UNSPECIFIED TYPE: ICD-10-CM

## 2019-11-04 DIAGNOSIS — E04.2 NONTOXIC MULTINODULAR GOITER: Primary | ICD-10-CM

## 2019-11-04 DIAGNOSIS — R51.9 HEADACHE, UNSPECIFIED HEADACHE TYPE: ICD-10-CM

## 2019-11-04 PROBLEM — M79.671 RIGHT FOOT PAIN: Status: RESOLVED | Noted: 2017-05-23 | Resolved: 2019-11-04

## 2019-11-04 PROCEDURE — 36415 COLL VENOUS BLD VENIPUNCTURE: CPT

## 2019-11-04 PROCEDURE — 90662 IIV NO PRSV INCREASED AG IM: CPT | Mod: PBBFAC

## 2019-11-04 PROCEDURE — 99999 PR PBB SHADOW E&M-EST. PATIENT-LVL IV: ICD-10-PCS | Mod: PBBFAC,,, | Performed by: FAMILY MEDICINE

## 2019-11-04 PROCEDURE — 99214 PR OFFICE/OUTPT VISIT, EST, LEVL IV, 30-39 MIN: ICD-10-PCS | Mod: S$PBB,,, | Performed by: FAMILY MEDICINE

## 2019-11-04 PROCEDURE — 99999 PR PBB SHADOW E&M-EST. PATIENT-LVL IV: CPT | Mod: PBBFAC,,, | Performed by: FAMILY MEDICINE

## 2019-11-04 PROCEDURE — 99214 OFFICE O/P EST MOD 30 MIN: CPT | Mod: PBBFAC,25 | Performed by: FAMILY MEDICINE

## 2019-11-04 PROCEDURE — 80053 COMPREHEN METABOLIC PANEL: CPT

## 2019-11-04 PROCEDURE — 99214 OFFICE O/P EST MOD 30 MIN: CPT | Mod: S$PBB,,, | Performed by: FAMILY MEDICINE

## 2019-11-04 PROCEDURE — 80061 LIPID PANEL: CPT

## 2019-11-04 RX ORDER — FLUTICASONE PROPIONATE 50 MCG
1 SPRAY, SUSPENSION (ML) NASAL
Qty: 3 BOTTLE | Refills: 4 | Status: SHIPPED | OUTPATIENT
Start: 2019-11-04 | End: 2019-11-11

## 2019-11-04 RX ORDER — HYDROCORTISONE 25 MG/G
CREAM TOPICAL 2 TIMES DAILY
Qty: 20 G | Refills: 5 | Status: SHIPPED | OUTPATIENT
Start: 2019-11-04 | End: 2021-02-01 | Stop reason: SDUPTHER

## 2019-11-04 RX ORDER — METRONIDAZOLE 10 MG/G
1 GEL TOPICAL DAILY
Qty: 180 G | Refills: 4 | Status: SHIPPED | OUTPATIENT
Start: 2019-11-04 | End: 2021-08-17

## 2019-11-04 RX ORDER — LEVOTHYROXINE SODIUM 112 UG/1
112 TABLET ORAL
Status: ON HOLD | COMMUNITY
Start: 2019-10-08 | End: 2021-06-11 | Stop reason: SDUPTHER

## 2019-11-04 RX ORDER — ALPRAZOLAM 0.5 MG/1
TABLET ORAL
Qty: 30 TABLET | Refills: 1 | Status: SHIPPED | OUTPATIENT
Start: 2019-11-04 | End: 2020-02-27 | Stop reason: SDUPTHER

## 2019-11-04 RX ORDER — ZOLPIDEM TARTRATE 5 MG/1
5 TABLET ORAL NIGHTLY PRN
Qty: 30 TABLET | Refills: 1 | Status: SHIPPED | OUTPATIENT
Start: 2019-11-04 | End: 2020-05-11

## 2019-11-04 RX ORDER — BUTALBITAL, ACETAMINOPHEN AND CAFFEINE 50; 325; 40 MG/1; MG/1; MG/1
TABLET ORAL
Qty: 30 TABLET | Refills: 0 | Status: SHIPPED | OUTPATIENT
Start: 2019-11-04 | End: 2020-05-11

## 2019-11-04 RX ORDER — FAMOTIDINE 20 MG/1
20 TABLET, FILM COATED ORAL 2 TIMES DAILY
Qty: 60 TABLET | Refills: 11 | Status: SHIPPED | OUTPATIENT
Start: 2019-11-04 | End: 2020-02-07

## 2019-11-05 LAB
ALBUMIN SERPL BCP-MCNC: 3.9 G/DL (ref 3.5–5.2)
ALP SERPL-CCNC: 71 U/L (ref 55–135)
ALT SERPL W/O P-5'-P-CCNC: 14 U/L (ref 10–44)
ANION GAP SERPL CALC-SCNC: 7 MMOL/L (ref 8–16)
AST SERPL-CCNC: 19 U/L (ref 10–40)
BILIRUB SERPL-MCNC: 0.6 MG/DL (ref 0.1–1)
BUN SERPL-MCNC: 14 MG/DL (ref 8–23)
CALCIUM SERPL-MCNC: 9.3 MG/DL (ref 8.7–10.5)
CHLORIDE SERPL-SCNC: 102 MMOL/L (ref 95–110)
CHOLEST SERPL-MCNC: 259 MG/DL (ref 120–199)
CHOLEST/HDLC SERPL: 3.8 {RATIO} (ref 2–5)
CO2 SERPL-SCNC: 27 MMOL/L (ref 23–29)
CREAT SERPL-MCNC: 1.1 MG/DL (ref 0.5–1.4)
EST. GFR  (AFRICAN AMERICAN): 59.2 ML/MIN/1.73 M^2
EST. GFR  (NON AFRICAN AMERICAN): 51.3 ML/MIN/1.73 M^2
GLUCOSE SERPL-MCNC: 81 MG/DL (ref 70–110)
HDLC SERPL-MCNC: 68 MG/DL (ref 40–75)
HDLC SERPL: 26.3 % (ref 20–50)
LDLC SERPL CALC-MCNC: 163 MG/DL (ref 63–159)
NONHDLC SERPL-MCNC: 191 MG/DL
POTASSIUM SERPL-SCNC: 4.5 MMOL/L (ref 3.5–5.1)
PROT SERPL-MCNC: 6.9 G/DL (ref 6–8.4)
SODIUM SERPL-SCNC: 136 MMOL/L (ref 136–145)
TRIGL SERPL-MCNC: 140 MG/DL (ref 30–150)

## 2019-11-08 ENCOUNTER — TELEPHONE (OUTPATIENT)
Dept: INTERNAL MEDICINE | Facility: CLINIC | Age: 69
End: 2019-11-08

## 2019-11-08 NOTE — TELEPHONE ENCOUNTER
Spoke with the pharmacist at [facility]. They need more specific instructions for the Flonase. It needs to say either as needed once daily or twice daily. Told her I will send the message to Dr Macias and call back when he clarifies.

## 2019-11-08 NOTE — TELEPHONE ENCOUNTER
----- Message from Moira Ko sent at 11/7/2019  3:05 PM CST -----  Contact: Tova   .Type:  Pharmacy Calling to Clarify an RX    Name of Caller:   [facility]: [facility]  Prescription Name: fluticasone   What do they need to clarify?: clarification on directions  Best Call Back Number: [phone]  Additional Information:

## 2019-11-11 RX ORDER — FLUTICASONE PROPIONATE 50 MCG
2 SPRAY, SUSPENSION (ML) NASAL DAILY
Qty: 3 BOTTLE | Refills: 4 | Status: SHIPPED | OUTPATIENT
Start: 2019-11-11 | End: 2020-09-24 | Stop reason: SDUPTHER

## 2019-11-18 NOTE — PROGRESS NOTES
Subjective:       Patient ID: Sherrill Avery is a . female.    Chief Complaint: Multiple issues see below    HPIFollow-upfor multiple issues update with oncologist at [facility] followed for renal cell carcinoma with metastatic lung nodules hx  Hypertension: blood pressures at home normal. Tolerating medicine.   Osteopenia dd/wd  GERD asympt. Tolerating medication. No swallowing problems;sees mdand. gi    Chronic anxiety/insomnia uses either benzo or ambien few times per week prev dr yusuf but stable enough felt ok for primary care to resume rxing for this and no need to f/u unless problems. She doesn't take benzo same day ambien. Knows potential problems with these interacting;mood good;tried ssris etc but intol and current regimen working well per her and psych.    Hypothyroidism: nl tsh hx Tolerating med. Asympt; sees dr block prev      infreq migraine    Past Medical History:   Diagnosis Date    Anxiety     Arthritis     Cancer of kidney 8/2/2013    dr faye    Ectopic pregnancy     Eye infection     GERD (gastroesophageal reflux disease)     Hiatal hernia     History of kidney cancer     Hypercholesteremia     Hypertension     Hypothyroid     dr block q 6 months    Insomnia     Migraine headache     Renal cell cancer     Respiratory arrest     due to anesthia    Rosacea     Sleep apnea     Thyroid nodule     dr block    Urinary tract infection      Past Surgical History:   Procedure Laterality Date    AUGMENTATION OF BREAST      breast implants      BREAST SURGERY Bilateral 1996    saline implants    COLONOSCOPY  2007    HYSTERECTOMY      ectopic pregnancy x 2, with LSO    LAPAROSCOPIC NEPHRECTOMY, HAND ASSISTED  07/25/2013    NEPHRECTOMY      OOPHORECTOMY      x1    right ganglion cyst      throat polyp      TRANSOBTURATOR SLING  2011    with RSO for persistent complex cyst & MARGOT; done by yris    UPPER GASTROINTESTINAL ENDOSCOPY  2007     Family History   Problem  Relation Age of Onset    Diabetes Mother     Hypertension Mother     Heart disease Mother     Cancer Father         lung    Migraines Father     Glaucoma Paternal Grandmother     Glaucoma Sister     Thyroid disease Neg Hx     Asthma Neg Hx      Social History     Socioeconomic History    Marital status:      Spouse name: KAIDEN    Number of children: 5    Years of education: Not on file    Highest education level: Not on file   Occupational History    Occupation: retired     Comment: Dance Instructor   Social Needs    Financial resource strain: Not on file    Food insecurity:     Worry: Not on file     Inability: Not on file    Transportation needs:     Medical: Not on file     Non-medical: Not on file   Tobacco Use    Smoking status: Never Smoker    Smokeless tobacco: Never Used   Substance and Sexual Activity    Alcohol use: Yes     Alcohol/week: 0.0 standard drinks     Comment: social    Drug use: No    Sexual activity: Yes     Partners: Male     Birth control/protection: Surgical   Lifestyle    Physical activity:     Days per week: Not on file     Minutes per session: Not on file    Stress: Not on file   Relationships    Social connections:     Talks on phone: Not on file     Gets together: Not on file     Attends [employer] service: Not on file     Active member of club or organization: Not on file     Attends meetings of clubs or organizations: Not on file     Relationship status: Not on file   Other Topics Concern    Not on file   Social History Narrative    Patient is a retired dance instructor and live with .       Review of Systems  Cardiovascular: no chest pain  Chest: no shortness of breath  Abd: no abd pain  Remainder review of systems negative    Objective:    husb here  Physical Exam   Constitutional: She is oriented to person, place, and time. She appears well-developed and well-nourished. No distress.   HENT:   Head: Atraumatic.   Right Ear: External ear  normal.   Left Ear: External ear normal.   Nose: Nose normal.   Mouth/Throat: Oropharynx is clear and moist. No oropharyngeal exudate.   bilat tms nl   Eyes: Conjunctivae and EOM are normal. Pupils are equal, round, and reactive to light. No scleral icterus.   Neck: Normal range of motion. Neck supple. No thyromegaly present.   Cardiovascular: Normal rate, regular rhythm and normal heart sounds.   No murmur heard.  Pulmonary/Chest: Effort normal and breath sounds normal. No respiratory distress. She has no wheezes. She has no rales.   Abdominal: Soft. Bowel sounds are normal. She exhibits no distension and no mass. There is no hepatosplenomegaly. There is no tenderness. There is no rebound and no guarding.   Musculoskeletal: Normal range of motion. She exhibits no edema or tenderness.   Lymphadenopathy:     She has no cervical adenopathy.   Neurological: She is alert and oriented to person, place, and time. No cranial nerve deficit. She exhibits normal muscle tone. Coordination normal.   Skin: Skin is warm. No rash noted. No erythema. No pallor.   Psychiatric: She has a normal mood and affect. Her behavior is normal. Judgment and thought content normal.   Nursing note and vitals reviewed.    neck supple no mass   Assessment:       1. Bone disorder    2. Headache, unspecified headache type    3. Chronic neck pain    4. Chronic pain of lower extremity, unspecified laterality    5. Anxiety    6. Nontoxic multinodular goiter    7. Multiple lung nodules on CT    8. Hypercholesteremia    9. Clear cell carcinoma with lung metastasis    10. Essential hypertension    11. Hypothyroidism, unspecified type    12. Carcinoma of right kidney    13. Chronic insomnia      uri  Plan:       *f/u oncol and endocrine when due    Nontoxic multinodular goiter    Headache, unspecified headache type  -     butalbital-acetaminophen-caffeine -40 mg (FIORICET, ESGIC) -40 mg per tablet; TAKE 1 TABLET EVERY 4 HOURS AS NEEDED   Dispense: 30 tablet; Refill: 0    Multiple lung nodules on CT    Carcinoma of right kidney    Hypothyroidism, unspecified type    Essential hypertension  -     Comprehensive metabolic panel; Future; Expected date: 11/04/2019  -     Lipid panel; Future; Expected date: 11/03/2020    Hypercholesteremia  -     Lipid panel; Future; Expected date: 11/04/2019  -     Comprehensive metabolic panel; Future; Expected date: 11/03/2020    Other orders  -     ALPRAZolam (XANAX) 0.5 MG tablet; Take daily as needed for anxiety.  Dispense: 30 tablet; Refill: 1  -     famotidine (PEPCID) 20 MG tablet; Take 1 tablet (20 mg total) by mouth 2 (two) times daily.  Dispense: 60 tablet; Refill: 11  -     zolpidem (AMBIEN) 5 MG Tab; Take 1 tablet (5 mg total) by mouth nightly as needed.  Dispense: 30 tablet; Refill: 1  -     Discontinue: fluticasone propionate (FLONASE) 50 mcg/actuation nasal spray; 1 spray (50 mcg total) by Each Nostril route as needed for Rhinitis.  Dispense: 3 Bottle; Refill: 4  -     hydrocortisone 2.5 % cream; Apply topically 2 (two) times daily. apply to affected area for 7 days  Dispense: 20 g; Refill: 5  -     diflorasone-emollient (APEXICON E) 0.05 % Crea; Apply 1 application topically once daily. for 7 days  Dispense: 30 g; Refill: 5  -     metronidazole 1% (METROGEL) 1 % Gel; Apply 1 application topically once daily. For rosacea  Dispense: 180 g; Refill: 4  -     Influenza - High Dose (65+) (PF) (IM)    F/u ortho   F/u me one year same day time   Please give her letter I did today  Flu shot    void taking alprazolam within hours of zolpidem  Shingrix new shingles vaccine  via a pharmacy

## 2020-01-26 ENCOUNTER — PATIENT MESSAGE (OUTPATIENT)
Dept: INTERNAL MEDICINE | Facility: CLINIC | Age: 70
End: 2020-01-26

## 2020-01-26 DIAGNOSIS — E78.00 HYPERCHOLESTEREMIA: Primary | ICD-10-CM

## 2020-01-27 NOTE — TELEPHONE ENCOUNTER
Fall cholesterol  Elevated   Recommend recheck about a month from now after the holidays  Avoid high chol foods

## 2020-02-07 ENCOUNTER — TELEPHONE (OUTPATIENT)
Dept: HEMATOLOGY/ONCOLOGY | Facility: CLINIC | Age: 70
End: 2020-02-07

## 2020-02-07 ENCOUNTER — HOSPITAL ENCOUNTER (EMERGENCY)
Facility: HOSPITAL | Age: 70
Discharge: HOME | End: 2020-02-07
Attending: EMERGENCY MEDICINE
Payer: MEDICARE

## 2020-02-07 VITALS
TEMPERATURE: 98 F | HEART RATE: 66 BPM | DIASTOLIC BLOOD PRESSURE: 68 MMHG | SYSTOLIC BLOOD PRESSURE: 130 MMHG | HEIGHT: 65 IN | RESPIRATION RATE: 18 BRPM | OXYGEN SATURATION: 97 % | BODY MASS INDEX: 31.99 KG/M2 | WEIGHT: 192 LBS

## 2020-02-07 DIAGNOSIS — T14.8XXA WOUND DRAINAGE: Primary | ICD-10-CM

## 2020-02-07 LAB
ALBUMIN SERPL BCP-MCNC: 2.9 G/DL (ref 3.5–5.2)
ALP SERPL-CCNC: 99 U/L (ref 55–135)
ALT SERPL W/O P-5'-P-CCNC: 18 U/L (ref 10–44)
ANION GAP SERPL CALC-SCNC: 10 MMOL/L (ref 8–16)
AST SERPL-CCNC: 15 U/L (ref 10–40)
BASOPHILS # BLD AUTO: 0.02 K/UL (ref 0–0.2)
BASOPHILS NFR BLD: 0.3 % (ref 0–1.9)
BILIRUB SERPL-MCNC: 0.5 MG/DL (ref 0.1–1)
BUN SERPL-MCNC: 12 MG/DL (ref 8–23)
CALCIUM SERPL-MCNC: 8.6 MG/DL (ref 8.7–10.5)
CHLORIDE SERPL-SCNC: 104 MMOL/L (ref 95–110)
CO2 SERPL-SCNC: 25 MMOL/L (ref 23–29)
CREAT SERPL-MCNC: 1.1 MG/DL (ref 0.5–1.4)
DIFFERENTIAL METHOD: ABNORMAL
EOSINOPHIL # BLD AUTO: 0.3 K/UL (ref 0–0.5)
EOSINOPHIL NFR BLD: 5.1 % (ref 0–8)
ERYTHROCYTE [DISTWIDTH] IN BLOOD BY AUTOMATED COUNT: 12.9 % (ref 11.5–14.5)
EST. GFR  (AFRICAN AMERICAN): 59 ML/MIN/1.73 M^2
EST. GFR  (NON AFRICAN AMERICAN): 51 ML/MIN/1.73 M^2
GLUCOSE SERPL-MCNC: 90 MG/DL (ref 70–110)
HCT VFR BLD AUTO: 39.9 % (ref 37–48.5)
HGB BLD-MCNC: 12.7 G/DL (ref 12–16)
IMM GRANULOCYTES # BLD AUTO: 0.04 K/UL (ref 0–0.04)
IMM GRANULOCYTES NFR BLD AUTO: 0.6 % (ref 0–0.5)
LYMPHOCYTES # BLD AUTO: 1.6 K/UL (ref 1–4.8)
LYMPHOCYTES NFR BLD: 24.4 % (ref 18–48)
MCH RBC QN AUTO: 29.3 PG (ref 27–31)
MCHC RBC AUTO-ENTMCNC: 31.8 G/DL (ref 32–36)
MCV RBC AUTO: 92 FL (ref 82–98)
MONOCYTES # BLD AUTO: 0.5 K/UL (ref 0.3–1)
MONOCYTES NFR BLD: 7.4 % (ref 4–15)
NEUTROPHILS # BLD AUTO: 4.1 K/UL (ref 1.8–7.7)
NEUTROPHILS NFR BLD: 62.2 % (ref 38–73)
NRBC BLD-RTO: 0 /100 WBC
PLATELET # BLD AUTO: 345 K/UL (ref 150–350)
PMV BLD AUTO: 8.7 FL (ref 9.2–12.9)
POTASSIUM SERPL-SCNC: 4.2 MMOL/L (ref 3.5–5.1)
PROT SERPL-MCNC: 6.6 G/DL (ref 6–8.4)
RBC # BLD AUTO: 4.33 M/UL (ref 4–5.4)
SODIUM SERPL-SCNC: 139 MMOL/L (ref 136–145)
WBC # BLD AUTO: 6.63 K/UL (ref 3.9–12.7)

## 2020-02-07 PROCEDURE — 80053 COMPREHEN METABOLIC PANEL: CPT

## 2020-02-07 PROCEDURE — 25500020 PHARM REV CODE 255: Performed by: EMERGENCY MEDICINE

## 2020-02-07 PROCEDURE — 85025 COMPLETE CBC W/AUTO DIFF WBC: CPT

## 2020-02-07 PROCEDURE — 99285 EMERGENCY DEPT VISIT HI MDM: CPT | Mod: 25

## 2020-02-07 RX ORDER — OMEPRAZOLE 40 MG/1
40 CAPSULE, DELAYED RELEASE ORAL
COMMUNITY
End: 2020-05-11 | Stop reason: SDUPTHER

## 2020-02-07 RX ORDER — GABAPENTIN 300 MG/1
300 CAPSULE ORAL DAILY
COMMUNITY
Start: 2020-01-31 | End: 2020-02-22

## 2020-02-07 RX ORDER — METHOCARBAMOL 750 MG/1
750 TABLET, FILM COATED ORAL
COMMUNITY
Start: 2020-01-31 | End: 2020-02-11

## 2020-02-07 RX ORDER — OXYCODONE AND ACETAMINOPHEN 7.5; 325 MG/1; MG/1
1 TABLET ORAL EVERY 6 HOURS PRN
COMMUNITY
Start: 2020-01-31 | End: 2021-05-30 | Stop reason: ALTCHOICE

## 2020-02-07 RX ADMIN — IOHEXOL 100 ML: 350 INJECTION, SOLUTION INTRAVENOUS at 12:02

## 2020-02-07 NOTE — DISCHARGE INSTRUCTIONS
ContinueDress her wound.  Is okay to shower with soap but do not take a bath where the wound to be immersed.  Follow-up with primary care doctor tomorrow for re-evaluation.  Return as needed for any worsening symptoms, problems, questions or concerns.

## 2020-02-07 NOTE — TELEPHONE ENCOUNTER
Spoke with patient yesterday.  Dr Jaquez was not in clinic and his 1st availability is 2/18.  Pt declined appt with other specialists.  She will take 2/18 with Dr Jaquez and establish local care.    She wanted to know who she can see should she have urgent health issues.    Discussed situation with [facility] team in [place] (closer to home).  Recommendation is to reconnect with PCP there and other providers she'd seen previously. For some issues like fluids in lungs -- she can go to ER.  This was discussed with patient today.    Gave her contact # so she can call if she has questions or concerns in the days ahead.    ----- Message from Km Fontenot RN sent at 2/6/2020 --------------  Contact: Sherrill     teL:   [phone]       ----- Message -----  From: Joy Tatum  Sent: 2/6/2020  11:26 AM CST  To: [facility] Staff    Caller says she is asking if you can see her today.       [facility]    Dr. Frederic Leslie is referring pt.to see you, since he taught dr. Jaquez.   Recently had surgery 6 days ago.    Went to [facility] in [facility] today.   Pt.is presently at [place]  55 miles away.   Asking if you can see her today.   Pls call to discuss.

## 2020-02-07 NOTE — ED PROVIDER NOTES
SCRIBE #1 NOTE: I, Jad Chahal, am scribing for, and in the presence of, Iftikhar Vera Jr., MD. I have scribed the entire note.       History     Chief Complaint   Patient presents with    Wound Check     pt states she had biopsy done 1 week ago, area where drain was is draining a lot      Review of patient's allergies indicates:   Allergen Reactions    Talwin compound Anxiety     hallucinations/anxiety    Tramadol Itching    Codeine Itching    Morphine Itching    Naproxen Itching    Omeprazole Other (See Comments)     Abdominal bloating    Wal-phed [pseudoephedrine hcl] Itching    Zithromax [azithromycin]     Buspirone Anxiety    Morpholine analogues Anxiety and Itching    Nexium [esomeprazole magnesium]     Talwin [pentazocine lactate] Anxiety         History of Present Illness     HPI    2/7/2020, 11:04 AM  History obtained from the patient      History of Present Illness: Sherrill Avery is a 69 y.o. female patient with a PMHx of GERD, HTN, who presents to the Emergency Department for evaluation of a post-biopsy drainage site, s/p lung biopsy 1 week PTA. Pt states that she has been experiencing a large amount of drainage from the site over the past 24 hours, and is unsure if the volume is normal. Symptoms are intermittent and moderate in severity. Pt reports sxs are exacerbated with coughing. No associated sxs reported. Patient denies any fever, chills, sore throat, cough, n/v/d, CP, SOB, HA, syncope, weakness, and all other sxs at this time. No further complaints or concerns at this time.         Arrival mode: Personal vehicle    PCP: Alexandre Macias MD        Past Medical History:  Past Medical History:   Diagnosis Date    Anxiety     Arthritis     Cancer of kidney 8/2/2013    dr faye    Ectopic pregnancy     Eye infection     GERD (gastroesophageal reflux disease)     Hiatal hernia     History of kidney cancer     Hypercholesteremia     Hypertension     Hypothyroid       umair q 6 months    Insomnia     Migraine headache     Renal cell cancer     Respiratory arrest     due to anesthia    Rosacea     Sleep apnea     Thyroid nodule     dr block    Urinary tract infection        Past Surgical History:  Past Surgical History:   Procedure Laterality Date    AUGMENTATION OF BREAST      breast implants      BREAST SURGERY Bilateral 1996    saline implants    COLONOSCOPY  2007    HYSTERECTOMY      ectopic pregnancy x 2, with LSO    LAPAROSCOPIC NEPHRECTOMY, HAND ASSISTED  07/25/2013    NEPHRECTOMY      OOPHORECTOMY      x1    right ganglion cyst      throat polyp      TRANSOBTURATOR SLING  2011    with RSO for persistent complex cyst & MARGOT; done by yris    UPPER GASTROINTESTINAL ENDOSCOPY  2007         Family History:  Family History   Problem Relation Age of Onset    Diabetes Mother     Hypertension Mother     Heart disease Mother     Cancer Father         lung    Migraines Father     Glaucoma Paternal Grandmother     Glaucoma Sister     Thyroid disease Neg Hx     Asthma Neg Hx        Social History:  Social History     Tobacco Use    Smoking status: Never Smoker    Smokeless tobacco: Never Used   Substance and Sexual Activity    Alcohol use: Yes     Alcohol/week: 0.0 standard drinks     Comment: social    Drug use: No    Sexual activity: Yes     Partners: Male     Birth control/protection: Surgical        Review of Systems     Review of Systems   Constitutional: Negative for activity change, appetite change, chills, diaphoresis and fever.   HENT: Negative for congestion, drooling, ear pain, mouth sores, rhinorrhea, sinus pain, sore throat and trouble swallowing.    Eyes: Negative for pain and discharge.   Respiratory: Negative for cough, chest tightness, shortness of breath, wheezing and stridor.    Cardiovascular: Negative for chest pain, palpitations and leg swelling.   Gastrointestinal: Negative for abdominal distention, abdominal pain, blood in  stool, constipation, diarrhea, nausea and vomiting.   Genitourinary: Negative for difficulty urinating, dysuria, flank pain, frequency, hematuria and urgency.   Musculoskeletal: Negative for arthralgias, back pain and myalgias.   Skin: Negative for pallor, rash and wound.        (+) Drainage site s/p lung biopsy   Neurological: Negative for dizziness, seizures, syncope, weakness, light-headedness, numbness and headaches.   All other systems reviewed and are negative.       Physical Exam     Initial Vitals [02/07/20 1045]   BP Pulse Resp Temp SpO2   (!) 144/89 79 18 98.1 °F (36.7 °C) 96 %      MAP       --          Physical Exam  Nursing Notes and Vital Signs Reviewed.  Constitutional: Patient is in no acute distress. Well-developed and well-nourished.  Head: Atraumatic. Normocephalic.  Eyes:  EOM intact. Conjunctivae are not pale. No scleral icterus.  ENT: Mucous membranes are moist. Nares clear  Cardiovascular: Regular rate. Regular rhythm. No murmurs, rubs, or gallops. Distal pulses are 2+ and symmetric.  Pulmonary/Chest: No respiratory distress. Clear to auscultation bilaterally. No wheezing or rales.  Abdominal: Soft and non-distended.  There is no tenderness.  No rebound, guarding, or rigidity. Good bowel sounds.  Genitourinary: No CVA tenderness.  No suprapubic tenderness  Musculoskeletal: Moves all extremities. No obvious deformities. No edema. No calf tenderness.  Skin: Warm and dry. Mild bruise in the left flank.  Wounds are clean dry and intact.  There is some drainage that is serous from a singular wound.  There is no fluctuance.  There is no palpable fluid collection.  There is no pus.  Neurological:  Alert, awake, and appropriate.  Normal speech.  No acute focal neurological deficits are appreciated.  Psychiatric: Normal affect. Good eye contact. Appropriate in content.     ED Course   Procedures  ED Vital Signs:  Vitals:    02/07/20 1045 02/07/20 1249   BP: (!) 144/89 123/66   Pulse: 79 70   Resp: 18  "18   Temp: 98.1 °F (36.7 °C) 97.8 °F (36.6 °C)   TempSrc: Oral Oral   SpO2: 96% 100%   Weight: 87.1 kg (192 lb)    Height: 5' 5" (1.651 m)        Abnormal Lab Results:  Labs Reviewed   CBC W/ AUTO DIFFERENTIAL - Abnormal; Notable for the following components:       Result Value    Mean Corpuscular Hemoglobin Conc 31.8 (*)     MPV 8.7 (*)     Immature Granulocytes 0.6 (*)     All other components within normal limits   COMPREHENSIVE METABOLIC PANEL - Abnormal; Notable for the following components:    Calcium 8.6 (*)     Albumin 2.9 (*)     eGFR if  59 (*)     eGFR if non  51 (*)     All other components within normal limits        All Lab Results:  Results for orders placed or performed during the hospital encounter of 02/07/20   CBC auto differential   Result Value Ref Range    WBC 6.63 3.90 - 12.70 K/uL    RBC 4.33 4.00 - 5.40 M/uL    Hemoglobin 12.7 12.0 - 16.0 g/dL    Hematocrit 39.9 37.0 - 48.5 %    Mean Corpuscular Volume 92 82 - 98 fL    Mean Corpuscular Hemoglobin 29.3 27.0 - 31.0 pg    Mean Corpuscular Hemoglobin Conc 31.8 (L) 32.0 - 36.0 g/dL    RDW 12.9 11.5 - 14.5 %    Platelets 345 150 - 350 K/uL    MPV 8.7 (L) 9.2 - 12.9 fL    Immature Granulocytes 0.6 (H) 0.0 - 0.5 %    Gran # (ANC) 4.1 1.8 - 7.7 K/uL    Immature Grans (Abs) 0.04 0.00 - 0.04 K/uL    Lymph # 1.6 1.0 - 4.8 K/uL    Mono # 0.5 0.3 - 1.0 K/uL    Eos # 0.3 0.0 - 0.5 K/uL    Baso # 0.02 0.00 - 0.20 K/uL    nRBC 0 0 /100 WBC    Gran% 62.2 38.0 - 73.0 %    Lymph% 24.4 18.0 - 48.0 %    Mono% 7.4 4.0 - 15.0 %    Eosinophil% 5.1 0.0 - 8.0 %    Basophil% 0.3 0.0 - 1.9 %    Differential Method Automated    Comprehensive metabolic panel   Result Value Ref Range    Sodium 139 136 - 145 mmol/L    Potassium 4.2 3.5 - 5.1 mmol/L    Chloride 104 95 - 110 mmol/L    CO2 25 23 - 29 mmol/L    Glucose 90 70 - 110 mg/dL    BUN, Bld 12 8 - 23 mg/dL    Creatinine 1.1 0.5 - 1.4 mg/dL    Calcium 8.6 (L) 8.7 - 10.5 mg/dL    Total " Protein 6.6 6.0 - 8.4 g/dL    Albumin 2.9 (L) 3.5 - 5.2 g/dL    Total Bilirubin 0.5 0.1 - 1.0 mg/dL    Alkaline Phosphatase 99 55 - 135 U/L    AST 15 10 - 40 U/L    ALT 18 10 - 44 U/L    Anion Gap 10 8 - 16 mmol/L    eGFR if African American 59 (A) >60 mL/min/1.73 m^2    eGFR if non African American 51 (A) >60 mL/min/1.73 m^2       Imaging Results:  Imaging Results          CT Chest With Contrast (Final result)  Result time 02/07/20 12:57:30    Final result by Trenton Goodwin Jr., MD (02/07/20 12:57:30)                 Impression:      1. New loculated pleural effusion within the left chest.  No definite peripheral enhancement as may occur with empyema although the material may be somewhat thick.  2. Possible soft tissue contusion or surgical a tract lateral to the left breast and posterior to the left chest wall.  3. Unchanged nonspecific 6 mm nodule within the right lung.  The previously described nodule within the left lower lobe inferiorly is no longer visible.  It could be compress related to the pleural fluid.  All CT scans at this facility use dose modulation, iterative reconstruction, and/or weight base dosing when appropriate to reduce radiation dose to as low as reasonably achievable.      Electronically signed by: Trenton Goodwin Jr., MD  Date:    02/07/2020  Time:    12:57             Narrative:    EXAMINATION:  CT CHEST WITH CONTRAST    CLINICAL HISTORY:  seroma; status post lung biopsy.    TECHNIQUE:  Routine contrast-enhanced chest CT protocol was performed. after the intravenous administration of 75 mL of Omnipaque 350 All CT scans at this facility use dose modulation, iterative reconstruction, and/or weight based dosing when appropriate to reduce radiation dose to as low as reasonably achievable.    COMPARISON:  Prior CT from March 2, 2019.    FINDINGS:  The patient is status post recent biopsy.  There may be a chest wall biopsy tract about the posterior left chest on axial series 2, image 57.  There  is also a small band of soft tissue density within the subcutaneous fat, potentially contusion, lateral to the left breast.  There is a loculated pleural effusion within the left hemithorax.  No definite peripheral enhancement.  Thin calcific density about the visceral pleural surface of the atelectatic left lower lobe.  The more superior left lung is clear.  6 mm nodule adjacent to the right heart border within is unchanged.  Parenchymal bands within the right lower lobe likely relate to atelectasis.  No pathologically enlarged mediastinal, hilar, or axillary lymph nodes. No aortic aneurysm or dissection. The heart is normal in size. No suspicious bone lesions.  Limited images through the upper abdomen demonstrate no acute abnormality.                                      The Emergency Provider reviewed the vital signs and test results, which are outlined above.     ED Discussion     1:13 PM: Reassessed pt at this time.  Pt states her condition has improved at this time. Discussed with pt all pertinent ED information and results. Discussed pt dx and plan of tx. Gave pt all f/u and return to the ED instructions. All questions and concerns were addressed at this time. Pt expresses understanding of information and instructions, and is comfortable with plan to discharge. Pt is stable for discharge.    I discussed with patient and/or family/caretaker that evaluation in the ED does not suggest any emergent or life threatening medical conditions requiring immediate intervention beyond what was provided in the ED, and I believe patient is safe for discharge.  Regardless, an unremarkable evaluation in the ED does not preclude the development or presence of a serious of life threatening condition. As such, patient was instructed to return immediately for any worsening or change in current symptoms.    1:19 PM  Patient is stable nontoxic.  Patient has some postoperative wound drainage.  She was seen yesterday at [facility] who discussed the case with her surgeon is okay with discharge. His symptoms have actually improved though she has had some continued drainage as concerning for her.  This drainage is serous in nature.  She does have some loculated pleural effusions which are not unexpected postoperatively.  She does not appear to be infected.  She has normal white count.  There is no pus or erythema purulent drainage from the wound.  Light of this, the patient is being discharged home for close follow-up with primary care doctor and her surgeon.  I discussed all findings with the patient at length.  She verbalized understanding agreement seems very reliable.  She will return for symptoms worsen in any way.     Medical Decision Making:   Clinical Tests:   Lab Tests: Ordered and Reviewed  Radiological Study: Ordered and Reviewed           ED Medication(s):  Medications   iohexol (OMNIPAQUE 350) injection 100 mL (100 mLs Intravenous Given 2/7/20 0077)       New Prescriptions    No medications on file       Follow-up Information     Alexandre Macias MD In 1 day.    Specialty:  Family Medicine  Contact information:  [id number]  [place] [id number] [phone]                       Scribe Attestation:   Scribe #1: I performed the above scribed service and the documentation accurately describes the services I performed. I attest to the accuracy of the note.     Attending:   Physician Attestation Statement for Scribe #1: I, Iftikhar Vera Jr., MD, personally performed the services described in this documentation, as scribed by Jad Chahal, in my presence, and it is both accurate and complete.           Clinical Impression       ICD-10-CM ICD-9-CM   1. Wound drainage T14.8XXA 879.8       Disposition:   Disposition: Discharged  Condition: Stable         Iftikhar Vera Jr., MD  02/07/20 1320

## 2020-02-18 ENCOUNTER — OFFICE VISIT (OUTPATIENT)
Dept: HEMATOLOGY/ONCOLOGY | Facility: CLINIC | Age: 70
End: 2020-02-18
Payer: MEDICARE

## 2020-02-18 VITALS
RESPIRATION RATE: 18 BRPM | TEMPERATURE: 98 F | WEIGHT: 195.13 LBS | BODY MASS INDEX: 31.36 KG/M2 | HEART RATE: 71 BPM | SYSTOLIC BLOOD PRESSURE: 157 MMHG | DIASTOLIC BLOOD PRESSURE: 77 MMHG | HEIGHT: 66 IN

## 2020-02-18 DIAGNOSIS — C80.1 CLEAR CELL CARCINOMA: Primary | ICD-10-CM

## 2020-02-18 DIAGNOSIS — C64.1 CARCINOMA OF RIGHT KIDNEY: ICD-10-CM

## 2020-02-18 PROCEDURE — 99205 PR OFFICE/OUTPT VISIT, NEW, LEVL V, 60-74 MIN: ICD-10-PCS | Mod: S$PBB,,, | Performed by: INTERNAL MEDICINE

## 2020-02-18 PROCEDURE — 99213 OFFICE O/P EST LOW 20 MIN: CPT | Mod: PBBFAC | Performed by: INTERNAL MEDICINE

## 2020-02-18 PROCEDURE — 99205 OFFICE O/P NEW HI 60 MIN: CPT | Mod: S$PBB,,, | Performed by: INTERNAL MEDICINE

## 2020-02-18 PROCEDURE — 99999 PR PBB SHADOW E&M-EST. PATIENT-LVL III: ICD-10-PCS | Mod: PBBFAC,,, | Performed by: INTERNAL MEDICINE

## 2020-02-18 PROCEDURE — 99999 PR PBB SHADOW E&M-EST. PATIENT-LVL III: CPT | Mod: PBBFAC,,, | Performed by: INTERNAL MEDICINE

## 2020-02-18 NOTE — PROGRESS NOTES
Subjective:       Patient ID: Sherrill Avery    Chief Complaint: renal cell carcinoma (consult)    HPI     Sherrill Avery is a 69 y.o. female, referred by Frederic Leslie MD, to clinic for evaluation and management of mRCC.     Here to establish care with a local oncologist.       Oncologic History:    Clear cell carcinoma of right kidney.    6/30/2013 Initial Diagnosis   Presented with gross hematuria. CT CAP: 7.2-cm mass occupying the upper two thirds of the right kidney. 2.8-cm mass in the posterior aspect of the urinary bladder c/w TCC.    7/25/2013 Surgery   Right radical nephrectomy Pathology: 7.5-cm clear cell RCC Yanet nuclear grade 2, with microscopic extension into perinephric adipose tissue and with tumor in the renal margin.  pT3a pN0 pMX    10/16/2013 - No Evidence of Disease ([facility])   CT AP: No CT evidence of recurrence or metastatic disease    8/4/2015 - No Evidence of Disease ([facility])   CT AP: No CT evidence of recurrence or metastatic disease    12/1/2016 - [facility] with concern of recurrence   CT AP: Mew less than 4 mm pulmonary nodule in the anterior basal segment of the RLL. Stable 4 mm pulmonary nodule posterior basal segment RLL.     3/1/2017 Relapse/Recurrence   CT Chest: Multiple new subcm pulmonary nodules in the RLL and one in the right middle measuring up to 6 mm suspicious for metastatic disease.     2/8/2019 - [facility] with concern of recurrence   1. Small volume pulmonary metastases are slightly larger compared to the prior study.     2. No recurrent or metastatic disease in the abdomen or pelvis.    2/14/2019 Biopsy   CT-guided biopsy of a 1 cm left lower lobe lesion   Pathology: Small focus of atypical adenomatous hyperplasia with no tumor present. PAX-8 negative.    She was initally noted to have spontaneous remission of the lung nodules in the absence of any systemic treatment. However, in 2019, the lung nodules at started to increase in size but were still mostly subcentimeter in greatest  dimension. In 2/2019, when the left lower lobe lesion increased to 1.4 cm. IR biopsied a peripheral left lung lesion which was PAX-8 negative thought to be a small focus of atypical adenomatous hyperplasia. We therefore brought her back after only 3 months to monitor those lesions carefully. She was referred to Dr. King who thinks the lesions represent indolent RCC metastases. She underwent resection and RCC was confirmed on pathology at [facility].      Here to establish care.      Review of Systems   Constitutional: Negative for activity change, appetite change, chills, fatigue and fever.   HENT: Negative for congestion, hearing loss, mouth sores, sore throat, tinnitus and voice change.    Eyes: Negative for pain and visual disturbance.   Respiratory: Negative for cough, shortness of breath and wheezing.    Cardiovascular: Negative for chest pain, palpitations and leg swelling.   Gastrointestinal: Negative for abdominal pain, constipation, diarrhea, nausea and vomiting.   Endocrine: Negative for cold intolerance and heat intolerance.   Genitourinary: Negative for difficulty urinating, dyspareunia, dysuria, frequency, menstrual problem, urgency, vaginal bleeding, vaginal discharge and vaginal pain.   Musculoskeletal: Negative for arthralgias and myalgias.   Skin: Negative for color change, rash and wound.   Allergic/Immunologic: Negative for environmental allergies and food allergies.   Neurological: Negative for weakness, numbness and headaches.   Hematological: Negative for adenopathy. Does not bruise/bleed easily.   Psychiatric/Behavioral: Negative for agitation, confusion, hallucinations and sleep disturbance. The patient is not nervous/anxious.    All other systems reviewed and are negative.          Allergies:  Review of patient's allergies indicates:   Allergen Reactions    Talwin compound Anxiety     hallucinations/anxiety    Tramadol Itching    Codeine Itching    Morphine Itching    Naproxen Itching     Omeprazole Other (See Comments)     Abdominal bloating    Wal-phed [pseudoephedrine hcl] Itching    Zithromax [azithromycin]     Buspirone Anxiety    Morpholine analogues Anxiety and Itching    Nexium [esomeprazole magnesium]     Talwin [pentazocine lactate] Anxiety       Medications:  Current Outpatient Medications   Medication Sig Dispense Refill    estradiol (ESTRACE) 1 MG tablet Take 1 tablet (1 mg total) by mouth once daily. 90 tablet 3    fluticasone propionate (FLONASE) 50 mcg/actuation nasal spray 2 sprays (100 mcg total) by Each Nostril route once daily. 3 Bottle 4    gabapentin (NEURONTIN) 300 MG capsule Take 300 mg by mouth once daily.       levothyroxine (SYNTHROID) 112 MCG tablet Take 112 mcg by mouth.      omeprazole (PRILOSEC) 40 MG capsule Take 40 mg by mouth.      oxyCODONE-acetaminophen (PERCOCET) 7.5-325 mg per tablet Take 1 tablet by mouth every 6 (six) hours as needed.       ALPRAZolam (XANAX) 0.5 MG tablet Take daily as needed for anxiety. (Patient not taking: Reported on 2/18/2020) 30 tablet 1    butalbital-acetaminophen-caffeine -40 mg (FIORICET, ESGIC) -40 mg per tablet TAKE 1 TABLET EVERY 4 HOURS AS NEEDED (Patient not taking: Reported on 2/18/2020) 30 tablet 0    diflorasone-emollient (APEXICON E) 0.05 % Crea Apply 1 application topically once daily. for 7 days 30 g 5    hydrocortisone 2.5 % cream Apply topically 2 (two) times daily. apply to affected area for 7 days 20 g 5    metronidazole 1% (METROGEL) 1 % Gel Apply 1 application topically once daily. For rosacea (Patient not taking: Reported on 2/18/2020) 180 g 4    zolpidem (AMBIEN) 5 MG Tab Take 1 tablet (5 mg total) by mouth nightly as needed. (Patient not taking: Reported on 2/18/2020) 30 tablet 1     No current facility-administered medications for this visit.        PMH:  Past Medical History:   Diagnosis Date    Anxiety     Arthritis     Cancer of kidney 8/2/2013    dr faye    Ectopic  pregnancy     Eye infection     GERD (gastroesophageal reflux disease)     Hiatal hernia     History of kidney cancer     Hypercholesteremia     Hypertension     Hypothyroid     dr block q 6 months    Insomnia     Migraine headache     Renal cell cancer     Respiratory arrest     due to anesthia    Rosacea     Sleep apnea     Thyroid nodule     dr block    Urinary tract infection        PSH:  Past Surgical History:   Procedure Laterality Date    AUGMENTATION OF BREAST      breast implants      BREAST SURGERY Bilateral 1996    saline implants    COLONOSCOPY  2007    HYSTERECTOMY      ectopic pregnancy x 2, with LSO    LAPAROSCOPIC NEPHRECTOMY, HAND ASSISTED  07/25/2013    NEPHRECTOMY      OOPHORECTOMY      x1    right ganglion cyst      throat polyp      TRANSOBTURATOR SLING  2011    with RSO for persistent complex cyst & MARGOT; done by arndt    UPPER GASTROINTESTINAL ENDOSCOPY  2007       FamHx:  Family History   Problem Relation Age of Onset    Diabetes Mother     Hypertension Mother     Heart disease Mother     Cancer Father         lung    Migraines Father     Glaucoma Paternal Grandmother     Glaucoma Sister     Thyroid disease Neg Hx     Asthma Neg Hx        SocHx:  Social History     Socioeconomic History    Marital status:      Spouse name: KAIDEN    Number of children: 5    Years of education: Not on file    Highest education level: Not on file   Occupational History    Occupation: retired     Comment: Dance Instructor   Social Needs    Financial resource strain: Not on file    Food insecurity:     Worry: Not on file     Inability: Not on file    Transportation needs:     Medical: Not on file     Non-medical: Not on file   Tobacco Use    Smoking status: Never Smoker    Smokeless tobacco: Never Used   Substance and Sexual Activity    Alcohol use: Yes     Alcohol/week: 0.0 standard drinks     Comment: social    Drug use: No    Sexual activity: Yes      Partners: Male     Birth control/protection: Surgical   Lifestyle    Physical activity:     Days per week: Not on file     Minutes per session: Not on file    Stress: Not on file   Relationships    Social connections:     Talks on phone: Not on file     Gets together: Not on file     Attends [employer] service: Not on file     Active member of club or organization: Not on file     Attends meetings of clubs or organizations: Not on file     Relationship status: Not on file   Other Topics Concern    Not on file   Social History Narrative    Patient is a retired dance instructor and live with .       Distress Score    Distress Score: 4        Practical Problems Physical Problems   : No Appearance: No   Housing: No Bathing / Dressing: No   Insurance / Financial: No Breathing: No    Transportation: No  Changes in Urination: No    Work / School: No  Constipation: No   Treatment Decisions: No  Diarrhea: No     Eating: No    Family Problems Fatigue: No    Dealing with Children: No Feeling Swollen: No    Dealing with Partner: No Fevers: No    Ability to Have Children: No  Getting Around: No    Family Health Issues: No  Indigestion: No     Memory / Concentration: No   Emotional Problems Mouth Sores: No    Depression: No  Nausea: No    Fears: No  Nose Dry / Congested: No    Nervousness: No  Pain: Yes    Sadness: No Sexual: No    Worry: Yes Skin Dry / Itchy: No    Loss of Interest in Usual Activities: No Sleep: Yes     Substance Abuse: No    Spiritual/Religions Concerns Tingling in Hands / Feet: No   Spritual / Islam Concerns: No         Other Problems                Objective:      Physical Exam   Constitutional: She is oriented to person, place, and time. She appears well-developed and well-nourished. No distress.   HENT:   Head: Normocephalic and atraumatic.   Eyes: Pupils are equal, round, and reactive to light. Conjunctivae and EOM are normal. Right eye exhibits no discharge. Left eye exhibits no  discharge. No scleral icterus.   Neck: Normal range of motion.   Musculoskeletal: Normal range of motion. She exhibits no edema, tenderness or deformity.   Neurological: She is alert and oriented to person, place, and time. No cranial nerve deficit. Coordination normal.   Skin: Skin is warm and dry. No rash noted. She is not diaphoretic. No erythema. No pallor.   Psychiatric: She has a normal mood and affect. Her behavior is normal. Judgment and thought content normal.         LABS:  WBC   Date Value Ref Range Status   02/07/2020 6.63 3.90 - 12.70 K/uL Final     Hemoglobin   Date Value Ref Range Status   02/07/2020 12.7 12.0 - 16.0 g/dL Final     Hematocrit   Date Value Ref Range Status   02/07/2020 39.9 37.0 - 48.5 % Final     Platelets   Date Value Ref Range Status   02/07/2020 345 150 - 350 K/uL Final       Chemistry        Component Value Date/Time     02/07/2020 1127    K 4.2 02/07/2020 1127     02/07/2020 1127    CO2 25 02/07/2020 1127    BUN 12 02/07/2020 1127    CREATININE 1.1 02/07/2020 1127    GLU 90 02/07/2020 1127        Component Value Date/Time    CALCIUM 8.6 (L) 02/07/2020 1127    ALKPHOS 99 02/07/2020 1127    AST 15 02/07/2020 1127    ALT 18 02/07/2020 1127    BILITOT 0.5 02/07/2020 1127    ESTGFRAFRICA 59 (A) 02/07/2020 1127    EGFRNONAA 51 (A) 02/07/2020 1127              Assessment:       1. Clear cell carcinoma with lung metastasis    2. Carcinoma of right kidney          Plan:       1.  Metastatic CCRCC:    Currently on no systemic treatment.      Due for f/u and scans at [facility] in early March.  Dr. Leslie to make decision at that time regarding initiating first-line systemic treatment.      RTC in mid-March to see me with CBC, CMP.     The patient agrees with the plan, and all questions have been answered to their satisfaction.      More than 60 mins were spent during this encounter, greater than 50% was spent in direct counseling and/or coordination of care.     Kervin Jaquez,  M.D., M.S., F.A.YESENIA.P.  Hematology and Oncology Attending  [facility]

## 2020-02-18 NOTE — LETTER
February 18, 2020      Frederic Leslie MD  [address]  [place] [address]           [facility] Hematology Oncology  [facility]  [place] [address]  Phone: [phone]          Patient: Sherrill Avery   MR Number: [id number]   YOB: 1950   Date of Visit: 2/18/2020       Dear Dr. Frederic Leslie:    Thank you for referring Sherrill Avery to me for evaluation. Attached you will find relevant portions of my assessment and plan of care.    If you have questions, please do not hesitate to call me. I look forward to following Sherrill Avery along with you.    Sincerely,    Kervin Jaquez MD    Enclosure  CC:  No Recipients    If you would like to receive this communication electronically, please contact [email or web address] or [phone] to request more information on [facility] access.    For providers and/or their staff who would like to refer a patient to [facility], please contact us through our one-stop-shop provider referral line, Jeanne Stoddard, at [phone].    If you feel you have received this communication in error or would no longer like to receive these types of communications, please e-mail [email or web address]

## 2020-02-19 DIAGNOSIS — C80.1 CLEAR CELL CARCINOMA: Primary | ICD-10-CM

## 2020-02-24 ENCOUNTER — HOSPITAL ENCOUNTER (EMERGENCY)
Facility: HOSPITAL | Age: 70
Discharge: HOME | End: 2020-02-24
Attending: FAMILY MEDICINE
Payer: MEDICARE

## 2020-02-24 VITALS
TEMPERATURE: 98 F | RESPIRATION RATE: 18 BRPM | HEART RATE: 79 BPM | OXYGEN SATURATION: 97 % | SYSTOLIC BLOOD PRESSURE: 138 MMHG | WEIGHT: 196.63 LBS | HEIGHT: 66 IN | BODY MASS INDEX: 31.6 KG/M2 | DIASTOLIC BLOOD PRESSURE: 78 MMHG

## 2020-02-24 DIAGNOSIS — R06.02 SHORTNESS OF BREATH: Primary | ICD-10-CM

## 2020-02-24 LAB
ALBUMIN SERPL BCP-MCNC: 3.6 G/DL (ref 3.5–5.2)
ALP SERPL-CCNC: 71 U/L (ref 55–135)
ALT SERPL W/O P-5'-P-CCNC: 9 U/L (ref 10–44)
ANION GAP SERPL CALC-SCNC: 7 MMOL/L (ref 8–16)
AST SERPL-CCNC: 15 U/L (ref 10–40)
BASOPHILS # BLD AUTO: 0.04 K/UL (ref 0–0.2)
BASOPHILS NFR BLD: 0.5 % (ref 0–1.9)
BILIRUB SERPL-MCNC: 0.2 MG/DL (ref 0.1–1)
BNP SERPL-MCNC: 16 PG/ML (ref 0–99)
BUN SERPL-MCNC: 13 MG/DL (ref 8–23)
CALCIUM SERPL-MCNC: 8.8 MG/DL (ref 8.7–10.5)
CHLORIDE SERPL-SCNC: 106 MMOL/L (ref 95–110)
CO2 SERPL-SCNC: 26 MMOL/L (ref 23–29)
CREAT SERPL-MCNC: 1.1 MG/DL (ref 0.5–1.4)
DIFFERENTIAL METHOD: ABNORMAL
EOSINOPHIL # BLD AUTO: 0.6 K/UL (ref 0–0.5)
EOSINOPHIL NFR BLD: 7 % (ref 0–8)
ERYTHROCYTE [DISTWIDTH] IN BLOOD BY AUTOMATED COUNT: 13.6 % (ref 11.5–14.5)
EST. GFR  (AFRICAN AMERICAN): 59 ML/MIN/1.73 M^2
EST. GFR  (NON AFRICAN AMERICAN): 51 ML/MIN/1.73 M^2
GLUCOSE SERPL-MCNC: 97 MG/DL (ref 70–110)
HCT VFR BLD AUTO: 40 % (ref 37–48.5)
HCV AB SERPL QL IA: NEGATIVE
HGB BLD-MCNC: 12.6 G/DL (ref 12–16)
IMM GRANULOCYTES # BLD AUTO: 0.02 K/UL (ref 0–0.04)
IMM GRANULOCYTES NFR BLD AUTO: 0.3 % (ref 0–0.5)
LYMPHOCYTES # BLD AUTO: 2.7 K/UL (ref 1–4.8)
LYMPHOCYTES NFR BLD: 33.9 % (ref 18–48)
MCH RBC QN AUTO: 29.2 PG (ref 27–31)
MCHC RBC AUTO-ENTMCNC: 31.5 G/DL (ref 32–36)
MCV RBC AUTO: 93 FL (ref 82–98)
MONOCYTES # BLD AUTO: 0.5 K/UL (ref 0.3–1)
MONOCYTES NFR BLD: 6.6 % (ref 4–15)
NEUTROPHILS # BLD AUTO: 4.1 K/UL (ref 1.8–7.7)
NEUTROPHILS NFR BLD: 51.7 % (ref 38–73)
NRBC BLD-RTO: 0 /100 WBC
PLATELET # BLD AUTO: 259 K/UL (ref 150–350)
PMV BLD AUTO: 9.7 FL (ref 9.2–12.9)
POTASSIUM SERPL-SCNC: 4.7 MMOL/L (ref 3.5–5.1)
PROT SERPL-MCNC: 6.5 G/DL (ref 6–8.4)
RBC # BLD AUTO: 4.32 M/UL (ref 4–5.4)
SODIUM SERPL-SCNC: 139 MMOL/L (ref 136–145)
TROPONIN I SERPL DL<=0.01 NG/ML-MCNC: <0.006 NG/ML (ref 0–0.03)
WBC # BLD AUTO: 7.85 K/UL (ref 3.9–12.7)

## 2020-02-24 PROCEDURE — 83880 ASSAY OF NATRIURETIC PEPTIDE: CPT

## 2020-02-24 PROCEDURE — 93010 ELECTROCARDIOGRAM REPORT: CPT | Mod: ,,, | Performed by: INTERNAL MEDICINE

## 2020-02-24 PROCEDURE — 93005 ELECTROCARDIOGRAM TRACING: CPT

## 2020-02-24 PROCEDURE — 80053 COMPREHEN METABOLIC PANEL: CPT

## 2020-02-24 PROCEDURE — 93010 EKG 12-LEAD: ICD-10-PCS | Mod: ,,, | Performed by: INTERNAL MEDICINE

## 2020-02-24 PROCEDURE — 86803 HEPATITIS C AB TEST: CPT

## 2020-02-24 PROCEDURE — 94640 AIRWAY INHALATION TREATMENT: CPT

## 2020-02-24 PROCEDURE — 36415 COLL VENOUS BLD VENIPUNCTURE: CPT

## 2020-02-24 PROCEDURE — 85025 COMPLETE CBC W/AUTO DIFF WBC: CPT

## 2020-02-24 PROCEDURE — 99285 EMERGENCY DEPT VISIT HI MDM: CPT | Mod: 25

## 2020-02-24 PROCEDURE — 25000242 PHARM REV CODE 250 ALT 637 W/ HCPCS: Performed by: FAMILY MEDICINE

## 2020-02-24 PROCEDURE — 84484 ASSAY OF TROPONIN QUANT: CPT

## 2020-02-24 RX ORDER — IPRATROPIUM BROMIDE AND ALBUTEROL SULFATE 2.5; .5 MG/3ML; MG/3ML
3 SOLUTION RESPIRATORY (INHALATION)
Status: COMPLETED | OUTPATIENT
Start: 2020-02-24 | End: 2020-02-24

## 2020-02-24 RX ADMIN — IPRATROPIUM BROMIDE AND ALBUTEROL SULFATE 3 ML: .5; 3 SOLUTION RESPIRATORY (INHALATION) at 10:02

## 2020-02-27 ENCOUNTER — LAB VISIT (OUTPATIENT)
Dept: LAB | Facility: HOSPITAL | Age: 70
End: 2020-02-27
Attending: FAMILY MEDICINE
Payer: MEDICARE

## 2020-02-27 DIAGNOSIS — E78.00 HYPERCHOLESTEREMIA: ICD-10-CM

## 2020-02-27 LAB
CHOLEST SERPL-MCNC: 226 MG/DL (ref 120–199)
CHOLEST/HDLC SERPL: 3.8 {RATIO} (ref 2–5)
HDLC SERPL-MCNC: 59 MG/DL (ref 40–75)
HDLC SERPL: 26.1 % (ref 20–50)
LDLC SERPL CALC-MCNC: 144.2 MG/DL (ref 63–159)
NONHDLC SERPL-MCNC: 167 MG/DL
TRIGL SERPL-MCNC: 114 MG/DL (ref 30–150)

## 2020-02-27 PROCEDURE — 36415 COLL VENOUS BLD VENIPUNCTURE: CPT

## 2020-02-27 PROCEDURE — 80061 LIPID PANEL: CPT

## 2020-02-27 RX ORDER — ALPRAZOLAM 0.5 MG/1
TABLET ORAL
Qty: 30 TABLET | Refills: 1 | Status: SHIPPED | OUTPATIENT
Start: 2020-02-27 | End: 2020-05-11 | Stop reason: SDUPTHER

## 2020-02-27 NOTE — TELEPHONE ENCOUNTER
----- Message from Shukri Pruett sent at 2/27/2020  9:57 AM CST -----  Contact: self [phone]  .Type:  RX Refill Request    Who Called: Sherrill Avery  Refill or New Rx:refill  RX Name and Strength:Xanax  How is the patient currently taking it? (ex. 1XDay):Daily  Is this a 30 day or 90 day RX:30  Preferred Pharmacy with phone number:.  [facility] [id number] [place], [place] - [address] [facility]  [address] [address] [address]  Phone: [phone] Fax: [phone]      Local or Mail Order:local  Ordering Provider:Dr. Macias  Would the patient rather a call back or a response via [facility]? Call back  Best Call Back Number:[phone]  Additional Information: pt would like to know if she can  prescription when she comes in for labs toda

## 2020-02-27 NOTE — TELEPHONE ENCOUNTER
I contacted the pt and assisted w/ scheduling the 6 mo f/u appt per  . She verbalized understanding and I also mailed appt reminder to her . //kah

## 2020-02-28 ENCOUNTER — TELEPHONE (OUTPATIENT)
Dept: INTERNAL MEDICINE | Facility: CLINIC | Age: 70
End: 2020-02-28

## 2020-02-28 NOTE — TELEPHONE ENCOUNTER
----- Message from Peri Saucedo sent at 2/28/2020 10:21 AM CST -----  Contact: Self   Sherrill Avery calling regarding call back from nurse please  Regarding form incomplete for handicap parking tag.      Pt states DMV told her provider has to have Number 6 of reasoning for Handicap tag filled out and address filled by provider and not patient hand writing.      Please advise pt can be contact [phone]    [facility] FAX NUMBER- [phone]

## 2020-03-20 ENCOUNTER — PATIENT MESSAGE (OUTPATIENT)
Dept: HEMATOLOGY/ONCOLOGY | Facility: CLINIC | Age: 70
End: 2020-03-20

## 2020-03-21 NOTE — PROGRESS NOTES
Subjective:       Patient ID: Sherrill Avery    Chief Complaint: No chief complaint on file.    HPI     Sherrill Avery is a 69 y.o. female, virtual visit for evaluation and management of mRCC.     Sees Dr. Leslie at [facility].      Oncologic History:    Clear cell carcinoma of right kidney.    6/30/2013 Initial Diagnosis   Presented with gross hematuria. CT CAP: 7.2-cm mass occupying the upper two thirds of the right kidney. 2.8-cm mass in the posterior aspect of the urinary bladder c/w TCC.    7/25/2013 Surgery   Right radical nephrectomy Pathology: 7.5-cm clear cell RCC Yanet nuclear grade 2, with microscopic extension into perinephric adipose tissue and with tumor in the renal margin.  pT3a pN0 pMX    10/16/2013 - No Evidence of Disease ([facility])   CT AP: No CT evidence of recurrence or metastatic disease    8/4/2015 - No Evidence of Disease ([facility])   CT AP: No CT evidence of recurrence or metastatic disease    12/1/2016 - [facility] with concern of recurrence   CT AP: Mew less than 4 mm pulmonary nodule in the anterior basal segment of the RLL. Stable 4 mm pulmonary nodule posterior basal segment RLL.     3/1/2017 Relapse/Recurrence   CT Chest: Multiple new subcm pulmonary nodules in the RLL and one in the right middle measuring up to 6 mm suspicious for metastatic disease.     2/8/2019 - [facility] with concern of recurrence   1. Small volume pulmonary metastases are slightly larger compared to the prior study.     2. No recurrent or metastatic disease in the abdomen or pelvis.    2/14/2019 Biopsy   CT-guided biopsy of a 1 cm left lower lobe lesion   Pathology: Small focus of atypical adenomatous hyperplasia with no tumor present. PAX-8 negative.    She was initally noted to have spontaneous remission of the lung nodules in the absence of any systemic treatment. However, in 2019, the lung nodules at started to increase in size but were still mostly subcentimeter in greatest dimension. In 2/2019, when the left lower lobe lesion  increased to 1.4 cm. IR biopsied a peripheral left lung lesion which was PAX-8 negative thought to be a small focus of atypical adenomatous hyperplasia. We therefore brought her back after only 3 months to monitor those lesions carefully. She was referred to Dr. King who thinks the lesions represent indolent RCC metastases. She underwent resection and RCC was confirmed on pathology at [facility].      Here to establish care.      Review of Systems   Constitutional: Negative for activity change, appetite change, chills, fatigue, fever and unexpected weight change.   HENT: Negative for congestion, hearing loss, mouth sores, sore throat, tinnitus and voice change.    Eyes: Negative for pain and visual disturbance.   Respiratory: Negative for cough, shortness of breath and wheezing.    Cardiovascular: Negative for chest pain, palpitations and leg swelling.   Gastrointestinal: Negative for abdominal pain, constipation, diarrhea, nausea and vomiting.   Endocrine: Negative for cold intolerance and heat intolerance.   Genitourinary: Negative for difficulty urinating, dyspareunia, dysuria, frequency, menstrual problem, urgency, vaginal bleeding, vaginal discharge and vaginal pain.   Musculoskeletal: Negative for arthralgias, back pain and myalgias.   Skin: Negative for color change, rash and wound.   Allergic/Immunologic: Negative for environmental allergies and food allergies.   Neurological: Negative for weakness, numbness and headaches.   Hematological: Negative for adenopathy. Does not bruise/bleed easily.   Psychiatric/Behavioral: Negative for agitation, confusion, hallucinations and sleep disturbance. The patient is not nervous/anxious.    All other systems reviewed and are negative.          Allergies:  Review of patient's allergies indicates:   Allergen Reactions    Talwin compound Anxiety     hallucinations/anxiety    Tramadol Itching    Codeine Itching    Morphine Itching    Naproxen Itching    Omeprazole  Other (See Comments)     Abdominal bloating    Wal-phed [pseudoephedrine hcl] Itching    Zithromax [azithromycin]     Buspirone Anxiety    Morpholine analogues Anxiety and Itching    Nexium [esomeprazole magnesium]     Talwin [pentazocine lactate] Anxiety       Medications:  Current Outpatient Medications   Medication Sig Dispense Refill    ALPRAZolam (XANAX) 0.5 MG tablet Take daily as needed for anxiety. 30 tablet 1    butalbital-acetaminophen-caffeine -40 mg (FIORICET, ESGIC) -40 mg per tablet TAKE 1 TABLET EVERY 4 HOURS AS NEEDED (Patient not taking: Reported on 2/18/2020) 30 tablet 0    diflorasone-emollient (APEXICON E) 0.05 % Crea Apply 1 application topically once daily. for 7 days 30 g 5    estradiol (ESTRACE) 1 MG tablet Take 1 tablet (1 mg total) by mouth once daily. 90 tablet 3    fluticasone propionate (FLONASE) 50 mcg/actuation nasal spray 2 sprays (100 mcg total) by Each Nostril route once daily. 3 Bottle 4    hydrocortisone 2.5 % cream Apply topically 2 (two) times daily. apply to affected area for 7 days 20 g 5    levothyroxine (SYNTHROID) 112 MCG tablet Take 112 mcg by mouth.      metronidazole 1% (METROGEL) 1 % Gel Apply 1 application topically once daily. For rosacea (Patient not taking: Reported on 2/18/2020) 180 g 4    omeprazole (PRILOSEC) 40 MG capsule Take 40 mg by mouth.      oxyCODONE-acetaminophen (PERCOCET) 7.5-325 mg per tablet Take 1 tablet by mouth every 6 (six) hours as needed.       zolpidem (AMBIEN) 5 MG Tab Take 1 tablet (5 mg total) by mouth nightly as needed. (Patient not taking: Reported on 2/18/2020) 30 tablet 1     No current facility-administered medications for this visit.        PMH:  Past Medical History:   Diagnosis Date    Anxiety     Arthritis     Cancer of kidney 8/2/2013    dr faye    Ectopic pregnancy     Eye infection     GERD (gastroesophageal reflux disease)     Hiatal hernia     History of kidney cancer      Hypercholesteremia     Hypertension     Hypothyroid     dr block q 6 months    Insomnia     Migraine headache     Renal cell cancer     Respiratory arrest     due to anesthia    Rosacea     Sleep apnea     Thyroid nodule     dr block    Urinary tract infection        PSH:  Past Surgical History:   Procedure Laterality Date    AUGMENTATION OF BREAST      breast implants      BREAST SURGERY Bilateral 1996    saline implants    COLONOSCOPY  2007    HYSTERECTOMY      ectopic pregnancy x 2, with LSO    LAPAROSCOPIC NEPHRECTOMY, HAND ASSISTED  07/25/2013    NEPHRECTOMY      OOPHORECTOMY      x1    right ganglion cyst      throat polyp      TRANSOBTURATOR SLING  2011    with RSO for persistent complex cyst & MARGOT; done by yris    UPPER GASTROINTESTINAL ENDOSCOPY  2007       FamHx:  Family History   Problem Relation Age of Onset    Diabetes Mother     Hypertension Mother     Heart disease Mother     Cancer Father         lung    Migraines Father     Glaucoma Paternal Grandmother     Glaucoma Sister     Thyroid disease Neg Hx     Asthma Neg Hx        SocHx:  Social History     Socioeconomic History    Marital status:      Spouse name: KAIDEN    Number of children: 5    Years of education: Not on file    Highest education level: Not on file   Occupational History    Occupation: retired     Comment: Dance Instructor   Social Needs    Financial resource strain: Not on file    Food insecurity:     Worry: Not on file     Inability: Not on file    Transportation needs:     Medical: Not on file     Non-medical: Not on file   Tobacco Use    Smoking status: Never Smoker    Smokeless tobacco: Never Used   Substance and Sexual Activity    Alcohol use: Yes     Alcohol/week: 0.0 standard drinks     Comment: social    Drug use: No    Sexual activity: Yes     Partners: Male     Birth control/protection: Surgical   Lifestyle    Physical activity:     Days per week: Not on file      "Minutes per session: Not on file    Stress: Not on file   Relationships    Social connections:     Talks on phone: Not on file     Gets together: Not on file     Attends Shinto service: Not on file     Active member of club or organization: Not on file     Attends meetings of clubs or organizations: Not on file     Relationship status: Not on file   Other Topics Concern    Not on file   Social History Narrative    Patient is a retired dance instructor and live with .       Objective:      LABS:  WBC   Date Value Ref Range Status   02/24/2020 7.85 3.90 - 12.70 K/uL Final     Hemoglobin   Date Value Ref Range Status   02/24/2020 12.6 12.0 - 16.0 g/dL Final     Hematocrit   Date Value Ref Range Status   02/24/2020 40.0 37.0 - 48.5 % Final     Platelets   Date Value Ref Range Status   02/24/2020 259 150 - 350 K/uL Final       Chemistry        Component Value Date/Time     02/24/2020 2108    K 4.7 02/24/2020 2108     02/24/2020 2108    CO2 26 02/24/2020 2108    BUN 13 02/24/2020 2108    CREATININE 1.1 02/24/2020 2108    GLU 97 02/24/2020 2108        Component Value Date/Time    CALCIUM 8.8 02/24/2020 2108    ALKPHOS 71 02/24/2020 2108    AST 15 02/24/2020 2108    ALT 9 (L) 02/24/2020 2108    BILITOT 0.2 02/24/2020 2108    ESTGFRAFRICA 59 (A) 02/24/2020 2108    EGFRNONAA 51 (A) 02/24/2020 2108              Assessment:       1. Carcinoma of right kidney    2. Clear cell carcinoma with lung metastasis          Plan:       1. Metastatic CCRCC:    Currently on no systemic treatment.       Per [facility] record, "Metastatic Renal Cell Carcinoma  S/p resection of 3 left lung lesions on 1/29/2020 confirming metastatic clear cell RCC. The patient still has multiple enlarging lung lesions on the right side but is not in a condition right now to start systemic therapy. We will see her back in 2 months for a restaging evaluation."    Saw Dr. Leslie, who recommended systematic therapy at some point, she wants to " delay until at least June.    RTC in June with labs (CBC, CMP) and to see me.      The patient location is: home   The chief complaint leading to consultation is: mRCC  Visit type: Virtual visit with synchronous audio and video  Total time spent with patient: 25  Each patient to whom he or she provides medical services by telemedicine is:  (1) informed of the relationship between the physician and patient and the respective role of any other health care provider with respect to management of the patient; and (2) notified that he or she may decline to receive medical services by telemedicine and may withdraw from such care at any time.    The patient agrees with the plan, and all questions have been answered to their satisfaction.      More than 25 mins were spent during this encounter, greater than 50% was spent in direct counseling and/or coordination of care.     Kervin Jaquez M.D., M.S., F.A.C.P.  Hematology and Oncology Attending  [facility]

## 2020-03-23 ENCOUNTER — OFFICE VISIT (OUTPATIENT)
Dept: HEMATOLOGY/ONCOLOGY | Facility: CLINIC | Age: 70
End: 2020-03-23
Payer: MEDICARE

## 2020-03-23 ENCOUNTER — TELEPHONE (OUTPATIENT)
Dept: INTERNAL MEDICINE | Facility: CLINIC | Age: 70
End: 2020-03-23

## 2020-03-23 DIAGNOSIS — C80.1 CLEAR CELL CARCINOMA: ICD-10-CM

## 2020-03-23 DIAGNOSIS — C64.1 CARCINOMA OF RIGHT KIDNEY: Primary | ICD-10-CM

## 2020-03-23 DIAGNOSIS — Z90.5 HISTORY OF RIGHT NEPHRECTOMY: ICD-10-CM

## 2020-03-23 PROCEDURE — 99214 PR OFFICE/OUTPT VISIT, EST, LEVL IV, 30-39 MIN: ICD-10-PCS | Mod: 95,,, | Performed by: INTERNAL MEDICINE

## 2020-03-23 PROCEDURE — 99214 OFFICE O/P EST MOD 30 MIN: CPT | Mod: 95,,, | Performed by: INTERNAL MEDICINE

## 2020-03-23 NOTE — TELEPHONE ENCOUNTER
----- Message from Brittany Newell sent at 3/20/2020  3:41 PM CDT -----  Contact: pt   Would like to consult with nurse regarding her having a bad cough, and coughing up green mucus but no fever. Please give a call back at [phone].          Thanks,  Brittany CAIN

## 2020-03-24 ENCOUNTER — PATIENT MESSAGE (OUTPATIENT)
Dept: HEMATOLOGY/ONCOLOGY | Facility: CLINIC | Age: 70
End: 2020-03-24

## 2020-04-06 ENCOUNTER — TELEPHONE (OUTPATIENT)
Dept: INTERNAL MEDICINE | Facility: CLINIC | Age: 70
End: 2020-04-06

## 2020-04-06 NOTE — TELEPHONE ENCOUNTER
----- Message from Brittany Newell sent at 4/6/2020  2:48 PM CDT -----  Contact: pt  Would like to consult with nurse regarding something personal, will not give any additional information. Please give a call back at  [phone].            Thanks,  Brittany CAIN

## 2020-04-28 RX ORDER — ESTRADIOL 1 MG/1
TABLET ORAL
Qty: 90 TABLET | Refills: 3 | Status: SHIPPED | OUTPATIENT
Start: 2020-04-28 | End: 2021-02-04

## 2020-04-30 ENCOUNTER — PATIENT MESSAGE (OUTPATIENT)
Dept: HEMATOLOGY/ONCOLOGY | Facility: CLINIC | Age: 70
End: 2020-04-30

## 2020-05-01 ENCOUNTER — TELEPHONE (OUTPATIENT)
Dept: HEMATOLOGY/ONCOLOGY | Facility: CLINIC | Age: 70
End: 2020-05-01

## 2020-05-01 ENCOUNTER — OFFICE VISIT (OUTPATIENT)
Dept: HEMATOLOGY/ONCOLOGY | Facility: CLINIC | Age: 70
End: 2020-05-01
Payer: MEDICARE

## 2020-05-01 DIAGNOSIS — M15.9 PRIMARY OSTEOARTHRITIS INVOLVING MULTIPLE JOINTS: ICD-10-CM

## 2020-05-01 DIAGNOSIS — C64.1 CARCINOMA OF RIGHT KIDNEY: ICD-10-CM

## 2020-05-01 DIAGNOSIS — Z90.5 HISTORY OF RIGHT NEPHRECTOMY: ICD-10-CM

## 2020-05-01 DIAGNOSIS — C80.1 CLEAR CELL CARCINOMA: Primary | ICD-10-CM

## 2020-05-01 DIAGNOSIS — M79.2 NEURALGIA OF CHEST: ICD-10-CM

## 2020-05-01 PROCEDURE — 99215 PR OFFICE/OUTPT VISIT, EST, LEVL V, 40-54 MIN: ICD-10-PCS | Mod: 95,,, | Performed by: NURSE PRACTITIONER

## 2020-05-01 PROCEDURE — 99215 OFFICE O/P EST HI 40 MIN: CPT | Mod: 95,,, | Performed by: NURSE PRACTITIONER

## 2020-05-01 NOTE — TELEPHONE ENCOUNTER
----- Message from Sheron Grissom sent at 5/1/2020  9:26 AM CDT -----  Contact: Minal hay/[facility]  Minal with [facility] request a callback at [phone] to discuss metastatic disease plan for patient Sherrill Avery

## 2020-05-01 NOTE — TELEPHONE ENCOUNTER
Spoke with [facility] surgeon. Struggled with postoperative pain from lung resection in January. She had weaned off pain medications but now with return of pain. She is back on gabapentin now. He would appreciate CT chest with contrast. Will order and notify him and Dr. Leslie of results.

## 2020-05-01 NOTE — PROGRESS NOTES
"Subjective:       Patient ID: Sherrill Avery    Chief Complaint: renal cell carcinoma    HPI     Sherrill Avery is a 69 y.o. female, patient of Dr. Jaquez, for virtual urgent care visit. Primary oncologist is Dr. Leslie at [facility]. 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 She is s/p left VATS LLL wedge resection on 1/29/20.      Feels "pressure" in the left lung that would cause pain earlier this week. She has had some soreness in the surgery area especially when rolling over in bed. Has had some recent yard work and unsure if she pulled something. Patient resumed gabapentin 300 mg BID yesterday as directed by [facility].     Last CT at [facility] "CT chest abdomen pelvis with contrast 3/5/20 showed no significant change to lung nodules right upper lung nodule 0.6 x 0.7 cm and right lower lobe nodule measuring 0.7 x 1 cm."    Also having bilateral chronic knee pain- worsening.    Oncologic History:    Clear cell carcinoma of right kidney.    6/30/2013 Initial Diagnosis   Presented with gross hematuria. CT CAP: 7.2-cm mass occupying the upper two thirds of the right kidney. 2.8-cm mass in the posterior aspect of the urinary bladder c/w TCC.    7/25/2013 Surgery   Right radical nephrectomy Pathology: 7.5-cm clear cell RCC Yanet nuclear grade 2, with microscopic extension into perinephric adipose tissue and with tumor in the renal margin.  pT3a pN0 pMX    10/16/2013 - No Evidence of Disease ([facility])   CT AP: No CT evidence of recurrence or metastatic disease    8/4/2015 - No Evidence of Disease ([facility])   CT AP: No CT evidence of recurrence or metastatic disease    12/1/2016 - [facility] with concern of recurrence   CT AP: Mew less than 4 mm pulmonary nodule in the anterior basal segment of the RLL. Stable 4 mm pulmonary nodule posterior basal segment RLL.     3/1/2017 Relapse/Recurrence   CT Chest: Multiple new subcm pulmonary nodules in the RLL and one in the right middle measuring " up to 6 mm suspicious for metastatic disease.     2/8/2019 - [facility] with concern of recurrence   1. Small volume pulmonary metastases are slightly larger compared to the prior study.     2. No recurrent or metastatic disease in the abdomen or pelvis.    2/14/2019 Biopsy   CT-guided biopsy of a 1 cm left lower lobe lesion   Pathology: Small focus of atypical adenomatous hyperplasia with no tumor present. PAX-8 negative.    She was initally noted to have spontaneous remission of the lung nodules in the absence of any systemic treatment. However, in 2019, the lung nodules at started to increase in size but were still mostly subcentimeter in greatest dimension. In 2/2019, when the left lower lobe lesion increased to 1.4 cm. IR biopsied a peripheral left lung lesion which was PAX-8 negative thought to be a small focus of atypical adenomatous hyperplasia. We therefore brought her back after only 3 months to monitor those lesions carefully. She was referred to Dr. King who thinks the lesions represent indolent RCC metastases. She underwent resection and RCC was confirmed on pathology at [facility].      Review of Systems   Constitutional: Negative for activity change, appetite change, chills, fatigue, fever and unexpected weight change.   HENT: Negative for congestion, hearing loss, mouth sores, sore throat, tinnitus and voice change.    Eyes: Negative for pain and visual disturbance.   Respiratory: Negative for cough, shortness of breath and wheezing.         +rib cage pain   Cardiovascular: Negative for chest pain, palpitations and leg swelling.   Gastrointestinal: Negative for abdominal pain, constipation, diarrhea, nausea and vomiting.   Endocrine: Negative for cold intolerance and heat intolerance.   Genitourinary: Negative for difficulty urinating, dyspareunia, dysuria, frequency, menstrual problem, urgency, vaginal bleeding, vaginal discharge and vaginal pain.   Musculoskeletal: Positive for arthralgias. Negative for  back pain and myalgias.   Skin: Negative for color change, rash and wound.   Allergic/Immunologic: Negative for environmental allergies and food allergies.   Neurological: Negative for weakness, numbness and headaches.   Hematological: Negative for adenopathy. Does not bruise/bleed easily.   Psychiatric/Behavioral: Negative for agitation, confusion, hallucinations and sleep disturbance. The patient is not nervous/anxious.    All other systems reviewed and are negative.          Allergies:  Review of patient's allergies indicates:   Allergen Reactions    Talwin compound Anxiety     hallucinations/anxiety    Tramadol Itching    Codeine Itching    Morphine Itching    Naproxen Itching    Omeprazole Other (See Comments)     Abdominal bloating    Wal-phed [pseudoephedrine hcl] Itching    Zithromax [azithromycin]     Buspirone Anxiety    Morpholine analogues Anxiety and Itching    Nexium [esomeprazole magnesium]     Talwin [pentazocine lactate] Anxiety       Medications:  Current Outpatient Medications   Medication Sig Dispense Refill    ALPRAZolam (XANAX) 0.5 MG tablet Take daily as needed for anxiety. 30 tablet 1    butalbital-acetaminophen-caffeine -40 mg (FIORICET, ESGIC) -40 mg per tablet TAKE 1 TABLET EVERY 4 HOURS AS NEEDED (Patient not taking: Reported on 2/18/2020) 30 tablet 0    diflorasone-emollient (APEXICON E) 0.05 % Crea Apply 1 application topically once daily. for 7 days 30 g 5    estradioL (ESTRACE) 1 MG tablet TAKE 1 TABLET EVERY DAY 90 tablet 3    fluticasone propionate (FLONASE) 50 mcg/actuation nasal spray 2 sprays (100 mcg total) by Each Nostril route once daily. 3 Bottle 4    hydrocortisone 2.5 % cream Apply topically 2 (two) times daily. apply to affected area for 7 days 20 g 5    levothyroxine (SYNTHROID) 112 MCG tablet Take 112 mcg by mouth.      metronidazole 1% (METROGEL) 1 % Gel Apply 1 application topically once daily. For rosacea (Patient not taking: Reported  on 2/18/2020) 180 g 4    omeprazole (PRILOSEC) 40 MG capsule Take 40 mg by mouth.      oxyCODONE-acetaminophen (PERCOCET) 7.5-325 mg per tablet Take 1 tablet by mouth every 6 (six) hours as needed.       zolpidem (AMBIEN) 5 MG Tab Take 1 tablet (5 mg total) by mouth nightly as needed. (Patient not taking: Reported on 2/18/2020) 30 tablet 1     No current facility-administered medications for this visit.        PMH:  Past Medical History:   Diagnosis Date    Anxiety     Arthritis     Cancer of kidney 8/2/2013    dr faye    Ectopic pregnancy     Eye infection     GERD (gastroesophageal reflux disease)     Hiatal hernia     History of kidney cancer     Hypercholesteremia     Hypertension     Hypothyroid     dr block q 6 months    Insomnia     Migraine headache     Renal cell cancer     Respiratory arrest     due to anesthia    Rosacea     Sleep apnea     Thyroid nodule     dr block    Urinary tract infection        PSH:  Past Surgical History:   Procedure Laterality Date    AUGMENTATION OF BREAST      breast implants      BREAST SURGERY Bilateral 1996    saline implants    COLONOSCOPY  2007    HYSTERECTOMY      ectopic pregnancy x 2, with LSO    LAPAROSCOPIC NEPHRECTOMY, HAND ASSISTED  07/25/2013    NEPHRECTOMY      OOPHORECTOMY      x1    right ganglion cyst      throat polyp      TRANSOBTURATOR SLING  2011    with RSO for persistent complex cyst & MARGOT; done by yris    UPPER GASTROINTESTINAL ENDOSCOPY  2007       FamHx:  Family History   Problem Relation Age of Onset    Diabetes Mother     Hypertension Mother     Heart disease Mother     Cancer Father         lung    Migraines Father     Glaucoma Paternal Grandmother     Glaucoma Sister     Thyroid disease Neg Hx     Asthma Neg Hx        SocHx:  Social History     Socioeconomic History    Marital status:      Spouse name: KAIDEN    Number of children: 5    Years of education: Not on file    Highest education  level: Not on file   Occupational History    Occupation: retired     Comment: Dance Instructor   Social Needs    Financial resource strain: Not on file    Food insecurity:     Worry: Not on file     Inability: Not on file    Transportation needs:     Medical: Not on file     Non-medical: Not on file   Tobacco Use    Smoking status: Never Smoker    Smokeless tobacco: Never Used   Substance and Sexual Activity    Alcohol use: Yes     Alcohol/week: 0.0 standard drinks     Comment: social    Drug use: No    Sexual activity: Yes     Partners: Male     Birth control/protection: Surgical   Lifestyle    Physical activity:     Days per week: Not on file     Minutes per session: Not on file    Stress: Not on file   Relationships    Social connections:     Talks on phone: Not on file     Gets together: Not on file     Attends Church service: Not on file     Active member of club or organization: Not on file     Attends meetings of clubs or organizations: Not on file     Relationship status: Not on file   Other Topics Concern    Not on file   Social History Narrative    Patient is a retired dance instructor and live with .       Objective:      LABS:  WBC   Date Value Ref Range Status   02/24/2020 7.85 3.90 - 12.70 K/uL Final     Hemoglobin   Date Value Ref Range Status   02/24/2020 12.6 12.0 - 16.0 g/dL Final     Hematocrit   Date Value Ref Range Status   02/24/2020 40.0 37.0 - 48.5 % Final     Platelets   Date Value Ref Range Status   02/24/2020 259 150 - 350 K/uL Final       Chemistry        Component Value Date/Time     02/24/2020 2108    K 4.7 02/24/2020 2108     02/24/2020 2108    CO2 26 02/24/2020 2108    BUN 13 02/24/2020 2108    CREATININE 1.1 02/24/2020 2108    GLU 97 02/24/2020 2108        Component Value Date/Time    CALCIUM 8.8 02/24/2020 2108    ALKPHOS 71 02/24/2020 2108    AST 15 02/24/2020 2108    ALT 9 (L) 02/24/2020 2108    BILITOT 0.2 02/24/2020 2108    ESTGFRAFRICA 59 (A)  "02/24/2020 2108    EGFRNONAA 51 (A) 02/24/2020 2108              Assessment:       1. Clear cell carcinoma with lung metastasis    2. History of right nephrectomy    3. Carcinoma of right kidney    4. Neuralgia of chest          Plan:       1,2,3,4- Metastatic CCRCC:    Currently on no systemic treatment. Per [facility] record, "Metastatic Renal Cell Carcinoma  S/p resection of 3 left lung lesions on 1/29/2020 confirming metastatic clear cell RCC. The patient still has multiple enlarging lung lesions on the right side but is not in a condition right now to start systemic therapy. We will see her back in 2 months for a restaging evaluation."    Care Everywhere records reviewed. I spoke with her thoracic surgeon this morning- patient has previously weaned off all pain medications. Now with new onset pain in the previous surgical area. She has resumed gabapentin at directed by [facility] with some relief. Discussed getting CT chest for further evaluation, which she would like to defer at this time due to COVID. I did review with her the measures being taken to limit COVID exposure at [facility].  We discussed it is likely related to pulling something in this area as it is still healing.Her symptoms seem to be improving but she is still anxious.  We will have a virtual visit in 1 week to see how she is feeling and will order CT chest at that time if pain remains.     5- Patient with worsening chronic knee pain. Interested in cortisone injections vs bilateral knee replacements. She is concerned about possibly starting therapy in June and surgery. I advised her to follow up with orthopedics for injections and have scans next month as planned with Dr. Leslie. He will be the one to ultimately determine when she needs to start therapy or if growth is slow, can delay for possible surgery.    The patient location is: home  The chief complaint leading to consultation is: urgent care visit  Visit type: virtual with audio and visual  Total " time spent with patient: More than 45 mins were spent during this encounter, greater than 50% was spent in direct counseling and/or coordination of care.     Each patient to whom he or she provides medical services by telemedicine is:  (1) informed of the relationship between the physician and patient and the respective role of any other health care provider with respect to management of the patient; and (2) notified that he or she may decline to receive medical services by telemedicine and may withdraw from such care at any time.    Patient is in agreement with the proposed treatment plan. All questions were answered to the patient's satisfaction. Pt knows to call clinic if anything is needed before the next clinic visit.    Yoko Sweet, MSN, APRN, FNP-C  Hematology and Medical Oncology  Nurse Practitioner to Dr. Kervin aJquez  Nurse Practitioner, [facility]

## 2020-05-07 ENCOUNTER — TELEPHONE (OUTPATIENT)
Dept: HEMATOLOGY/ONCOLOGY | Facility: CLINIC | Age: 70
End: 2020-05-07

## 2020-05-07 ENCOUNTER — PATIENT MESSAGE (OUTPATIENT)
Dept: HEMATOLOGY/ONCOLOGY | Facility: CLINIC | Age: 70
End: 2020-05-07

## 2020-05-08 ENCOUNTER — OFFICE VISIT (OUTPATIENT)
Dept: HEMATOLOGY/ONCOLOGY | Facility: CLINIC | Age: 70
End: 2020-05-08
Payer: MEDICARE

## 2020-05-08 DIAGNOSIS — M15.9 PRIMARY OSTEOARTHRITIS INVOLVING MULTIPLE JOINTS: ICD-10-CM

## 2020-05-08 DIAGNOSIS — Z90.5 HISTORY OF RIGHT NEPHRECTOMY: ICD-10-CM

## 2020-05-08 DIAGNOSIS — C64.1 CARCINOMA OF RIGHT KIDNEY: Primary | ICD-10-CM

## 2020-05-08 DIAGNOSIS — C80.1 CLEAR CELL CARCINOMA: ICD-10-CM

## 2020-05-08 DIAGNOSIS — M79.2 NEURALGIA OF CHEST: ICD-10-CM

## 2020-05-08 PROCEDURE — 99214 OFFICE O/P EST MOD 30 MIN: CPT | Mod: 95,,, | Performed by: NURSE PRACTITIONER

## 2020-05-08 PROCEDURE — 99214 PR OFFICE/OUTPT VISIT, EST, LEVL IV, 30-39 MIN: ICD-10-PCS | Mod: 95,,, | Performed by: NURSE PRACTITIONER

## 2020-05-08 NOTE — PROGRESS NOTES
Subjective:       Patient ID: Sherrill Avery    Chief Complaint: kidney cancer    HPI     Sherrill Avery is a 69 y.o. female, patient of Dr. Jaquez, for 1 week virtual urgent care visit. Primary oncologist is Dr. Leslie at [facility]. 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 She is s/p left VATS LLL wedge resection on 1/29/20.      Patient was previously having chest pressure at site of previous surgery. Patient resumed gabapentin 300 mg BID last week as directed by [facility] with improvement in pain. Major issue is chronic knee pain.     Oncologic History:    Clear cell carcinoma of right kidney.    6/30/2013 Initial Diagnosis   Presented with gross hematuria. CT CAP: 7.2-cm mass occupying the upper two thirds of the right kidney. 2.8-cm mass in the posterior aspect of the urinary bladder c/w TCC.    7/25/2013 Surgery   Right radical nephrectomy Pathology: 7.5-cm clear cell RCC Yanet nuclear grade 2, with microscopic extension into perinephric adipose tissue and with tumor in the renal margin.  pT3a pN0 pMX    10/16/2013 - No Evidence of Disease ([facility])   CT AP: No CT evidence of recurrence or metastatic disease    8/4/2015 - No Evidence of Disease ([facility])   CT AP: No CT evidence of recurrence or metastatic disease    12/1/2016 - [facility] with concern of recurrence   CT AP: Mew less than 4 mm pulmonary nodule in the anterior basal segment of the RLL. Stable 4 mm pulmonary nodule posterior basal segment RLL.     3/1/2017 Relapse/Recurrence   CT Chest: Multiple new subcm pulmonary nodules in the RLL and one in the right middle measuring up to 6 mm suspicious for metastatic disease.     2/8/2019 - [facility] with concern of recurrence   1. Small volume pulmonary metastases are slightly larger compared to the prior study.     2. No recurrent or metastatic disease in the abdomen or pelvis.    2/14/2019 Biopsy   CT-guided biopsy of a 1 cm left lower lobe lesion   Pathology: Small focus  of atypical adenomatous hyperplasia with no tumor present. PAX-8 negative.    She was initally noted to have spontaneous remission of the lung nodules in the absence of any systemic treatment. However, in 2019, the lung nodules at started to increase in size but were still mostly subcentimeter in greatest dimension. In 2/2019, when the left lower lobe lesion increased to 1.4 cm. IR biopsied a peripheral left lung lesion which was PAX-8 negative thought to be a small focus of atypical adenomatous hyperplasia. We therefore brought her back after only 3 months to monitor those lesions carefully. She was referred to Dr. King who thinks the lesions represent indolent RCC metastases. She underwent resection and RCC was confirmed on pathology at [facility].      Review of Systems   Constitutional: Negative for activity change, appetite change, chills, fatigue, fever and unexpected weight change.   HENT: Negative for congestion, hearing loss, mouth sores, sore throat, tinnitus and voice change.    Eyes: Negative for pain and visual disturbance.   Respiratory: Negative for cough, shortness of breath and wheezing.    Cardiovascular: Negative for chest pain, palpitations and leg swelling.   Gastrointestinal: Negative for abdominal pain, constipation, diarrhea, nausea and vomiting.   Endocrine: Negative for cold intolerance and heat intolerance.   Genitourinary: Negative for difficulty urinating, dyspareunia, dysuria, frequency, menstrual problem, urgency, vaginal bleeding, vaginal discharge and vaginal pain.   Musculoskeletal: Positive for arthralgias. Negative for back pain and myalgias.   Skin: Negative for color change, rash and wound.   Allergic/Immunologic: Negative for environmental allergies and food allergies.   Neurological: Negative for weakness, numbness and headaches.   Hematological: Negative for adenopathy. Does not bruise/bleed easily.   Psychiatric/Behavioral: Negative for agitation, confusion, hallucinations  and sleep disturbance. The patient is not nervous/anxious.    All other systems reviewed and are negative.          Allergies:  Review of patient's allergies indicates:   Allergen Reactions    Talwin compound Anxiety     hallucinations/anxiety    Tramadol Itching    Codeine Itching    Morphine Itching    Naproxen Itching    Omeprazole Other (See Comments)     Abdominal bloating    Wal-phed [pseudoephedrine hcl] Itching    Zithromax [azithromycin]     Buspirone Anxiety    Morpholine analogues Anxiety and Itching    Nexium [esomeprazole magnesium]     Talwin [pentazocine lactate] Anxiety       Medications:  Current Outpatient Medications   Medication Sig Dispense Refill    ALPRAZolam (XANAX) 0.5 MG tablet Take daily as needed for anxiety. 30 tablet 1    butalbital-acetaminophen-caffeine -40 mg (FIORICET, ESGIC) -40 mg per tablet TAKE 1 TABLET EVERY 4 HOURS AS NEEDED (Patient not taking: Reported on 2/18/2020) 30 tablet 0    diflorasone-emollient (APEXICON E) 0.05 % Crea Apply 1 application topically once daily. for 7 days 30 g 5    estradioL (ESTRACE) 1 MG tablet TAKE 1 TABLET EVERY DAY 90 tablet 3    fluticasone propionate (FLONASE) 50 mcg/actuation nasal spray 2 sprays (100 mcg total) by Each Nostril route once daily. 3 Bottle 4    hydrocortisone 2.5 % cream Apply topically 2 (two) times daily. apply to affected area for 7 days 20 g 5    levothyroxine (SYNTHROID) 112 MCG tablet Take 112 mcg by mouth.      metronidazole 1% (METROGEL) 1 % Gel Apply 1 application topically once daily. For rosacea (Patient not taking: Reported on 2/18/2020) 180 g 4    omeprazole (PRILOSEC) 40 MG capsule Take 40 mg by mouth.      oxyCODONE-acetaminophen (PERCOCET) 7.5-325 mg per tablet Take 1 tablet by mouth every 6 (six) hours as needed.       zolpidem (AMBIEN) 5 MG Tab Take 1 tablet (5 mg total) by mouth nightly as needed. (Patient not taking: Reported on 2/18/2020) 30 tablet 1     No current  facility-administered medications for this visit.        PMH:  Past Medical History:   Diagnosis Date    Anxiety     Arthritis     Cancer of kidney 8/2/2013    dr faye    Ectopic pregnancy     Eye infection     GERD (gastroesophageal reflux disease)     Hiatal hernia     History of kidney cancer     Hypercholesteremia     Hypertension     Hypothyroid     dr block q 6 months    Insomnia     Migraine headache     Renal cell cancer     Respiratory arrest     due to anesthia    Rosacea     Sleep apnea     Thyroid nodule     dr block    Urinary tract infection        PSH:  Past Surgical History:   Procedure Laterality Date    AUGMENTATION OF BREAST      breast implants      BREAST SURGERY Bilateral 1996    saline implants    COLONOSCOPY  2007    HYSTERECTOMY      ectopic pregnancy x 2, with LSO    LAPAROSCOPIC NEPHRECTOMY, HAND ASSISTED  07/25/2013    NEPHRECTOMY      OOPHORECTOMY      x1    right ganglion cyst      throat polyp      TRANSOBTURATOR SLING  2011    with RSO for persistent complex cyst & MARGOT; done by yris    UPPER GASTROINTESTINAL ENDOSCOPY  2007       FamHx:  Family History   Problem Relation Age of Onset    Diabetes Mother     Hypertension Mother     Heart disease Mother     Cancer Father         lung    Migraines Father     Glaucoma Paternal Grandmother     Glaucoma Sister     Thyroid disease Neg Hx     Asthma Neg Hx        SocHx:  Social History     Socioeconomic History    Marital status:      Spouse name: KAIDEN    Number of children: 5    Years of education: Not on file    Highest education level: Not on file   Occupational History    Occupation: retired     Comment: Dance Instructor   Social Needs    Financial resource strain: Not on file    Food insecurity:     Worry: Not on file     Inability: Not on file    Transportation needs:     Medical: Not on file     Non-medical: Not on file   Tobacco Use    Smoking status: Never Smoker     Smokeless tobacco: Never Used   Substance and Sexual Activity    Alcohol use: Yes     Alcohol/week: 0.0 standard drinks     Comment: social    Drug use: No    Sexual activity: Yes     Partners: Male     Birth control/protection: Surgical   Lifestyle    Physical activity:     Days per week: Not on file     Minutes per session: Not on file    Stress: Not on file   Relationships    Social connections:     Talks on phone: Not on file     Gets together: Not on file     Attends Orthodox service: Not on file     Active member of club or organization: Not on file     Attends meetings of clubs or organizations: Not on file     Relationship status: Not on file   Other Topics Concern    Not on file   Social History Narrative    Patient is a retired dance instructor and live with .       Objective:      LABS:  WBC   Date Value Ref Range Status   02/24/2020 7.85 3.90 - 12.70 K/uL Final     Hemoglobin   Date Value Ref Range Status   02/24/2020 12.6 12.0 - 16.0 g/dL Final     Hematocrit   Date Value Ref Range Status   02/24/2020 40.0 37.0 - 48.5 % Final     Platelets   Date Value Ref Range Status   02/24/2020 259 150 - 350 K/uL Final       Chemistry        Component Value Date/Time     02/24/2020 2108    K 4.7 02/24/2020 2108     02/24/2020 2108    CO2 26 02/24/2020 2108    BUN 13 02/24/2020 2108    CREATININE 1.1 02/24/2020 2108    GLU 97 02/24/2020 2108        Component Value Date/Time    CALCIUM 8.8 02/24/2020 2108    ALKPHOS 71 02/24/2020 2108    AST 15 02/24/2020 2108    ALT 9 (L) 02/24/2020 2108    BILITOT 0.2 02/24/2020 2108    ESTGFRAFRICA 59 (A) 02/24/2020 2108    EGFRNONAA 51 (A) 02/24/2020 2108              Assessment:       1. Carcinoma of right kidney    2. Clear cell carcinoma with lung metastasis    3. History of right nephrectomy    4. Neuralgia of chest    5. Primary osteoarthritis involving multiple joints          Plan:       1,2,3,4- Metastatic CCRCC:    Currently on no systemic  "treatment. Per [facility] record, "Metastatic Renal Cell Carcinoma  S/p resection of 3 left lung lesions on 1/29/2020 confirming metastatic clear cell RCC. The patient still has multiple enlarging lung lesions on the right side but is not in a condition right now to start systemic therapy. We will see her back in 2 months for a restaging evaluation." Care Everywhere records reviewed. I spoke with her thoracic surgeon previously.    She has resumed gabapentin at directed by [facility] with improvement in pain. She will follow up in June for restaging as scheduled and  I will notify her [facility] surgeon of pain improvement.    5- Patient with worsening chronic knee pain. Interested in cortisone injections vs bilateral knee replacements. She is concerned about possibly starting therapy in June and surgery. I advised her to follow up with orthopedics for injections and have scans next month as planned with Dr. Leslie. He will be the one to ultimately determine when she needs to start therapy or if growth is slow, can delay for possible surgery.She did not contact orthopedic office as previously advised.     The patient location is: home  The chief complaint leading to consultation is: urgent care visit  Visit type: virtual with audio and visual  Total time spent with patient: More than 25 mins were spent during this encounter, greater than 50% was spent in direct counseling and/or coordination of care.     Each patient to whom he or she provides medical services by telemedicine is:  (1) informed of the relationship between the physician and patient and the respective role of any other health care provider with respect to management of the patient; and (2) notified that he or she may decline to receive medical services by telemedicine and may withdraw from such care at any time.    Patient is in agreement with the proposed treatment plan. All questions were answered to the patient's satisfaction. Pt knows to call clinic if anything " is needed before the next clinic visit.    Yoko Sweet, MSN, APRN, FNP-C  Hematology and Medical Oncology  Nurse Practitioner to Dr. Kervin Jaquez  Nurse Practitioner, [facility]

## 2020-05-11 ENCOUNTER — OFFICE VISIT (OUTPATIENT)
Dept: INTERNAL MEDICINE | Facility: CLINIC | Age: 70
End: 2020-05-11
Payer: MEDICARE

## 2020-05-11 VITALS — WEIGHT: 189 LBS | BODY MASS INDEX: 30.97 KG/M2

## 2020-05-11 DIAGNOSIS — F41.9 ANXIETY: ICD-10-CM

## 2020-05-11 DIAGNOSIS — E04.2 NONTOXIC MULTINODULAR GOITER: ICD-10-CM

## 2020-05-11 DIAGNOSIS — C64.1 CARCINOMA OF RIGHT KIDNEY: Primary | ICD-10-CM

## 2020-05-11 DIAGNOSIS — C80.1 CLEAR CELL CARCINOMA: ICD-10-CM

## 2020-05-11 DIAGNOSIS — F51.04 CHRONIC INSOMNIA: ICD-10-CM

## 2020-05-11 DIAGNOSIS — E03.9 HYPOTHYROIDISM, UNSPECIFIED TYPE: ICD-10-CM

## 2020-05-11 DIAGNOSIS — I10 ESSENTIAL HYPERTENSION: ICD-10-CM

## 2020-05-11 PROCEDURE — 99214 OFFICE O/P EST MOD 30 MIN: CPT | Mod: 95,,, | Performed by: FAMILY MEDICINE

## 2020-05-11 PROCEDURE — 99214 PR OFFICE/OUTPT VISIT, EST, LEVL IV, 30-39 MIN: ICD-10-PCS | Mod: 95,,, | Performed by: FAMILY MEDICINE

## 2020-05-11 PROCEDURE — G0463 HOSPITAL OUTPT CLINIC VISIT: HCPCS

## 2020-05-11 PROCEDURE — 99211 OFF/OP EST MAY X REQ PHY/QHP: CPT

## 2020-05-11 RX ORDER — ALPRAZOLAM 0.5 MG/1
TABLET ORAL
Qty: 30 TABLET | Refills: 5 | Status: SHIPPED | OUTPATIENT
Start: 2020-05-11 | End: 2020-08-28 | Stop reason: SDUPTHER

## 2020-05-11 RX ORDER — OMEPRAZOLE 40 MG/1
40 CAPSULE, DELAYED RELEASE ORAL DAILY
Qty: 90 CAPSULE | Refills: 4 | Status: SHIPPED | OUTPATIENT
Start: 2020-05-11 | End: 2021-03-25 | Stop reason: SDUPTHER

## 2020-05-11 RX ORDER — DICLOFENAC SODIUM 10 MG/G
2 GEL TOPICAL DAILY PRN
Qty: 100 G | Refills: 11 | Status: SHIPPED | OUTPATIENT
Start: 2020-05-11 | End: 2021-03-25 | Stop reason: SDUPTHER

## 2020-05-11 NOTE — PROGRESS NOTES
Subjective:       Patient ID: Sherrill Avery is a . female.    Chief Complaint: Multiple issues see below    HPI The patient location is:home  The chief complaint leading to consultation is in hpi  Visit type: Virtual visit with synchronous audio and video  Total time spent with patient: 25 mins  Each patient to whom he or she provides medical services by telemedicine is:  (1) informed of the relationship between the physician and patient and the respective role of any other health care provider with respect to management of the patient; and (2) notified that he or she may decline to receive medical services by telemedicine and may withdraw from such care at any time.        Follow-upfor multiple issues update with oncologist at [facility] followed for renal cell carcinoma with metastatic lung nodules hx and dr sheryl VILLANUEVA  Hypertension: blood pressures at home normal. Tolerating medicine.   Osteopenia dd/wd  GERD asympt. Tolerating medication. No swallowing problems;sees mdand. Gi' req rf prilosec    Chronic anxiety/insomnia uses either benzo or ambien few times per week prev dr yusuf but stable enough felt ok for primary care to resume rxing for this and no need to f/u unless problems. She doesn't take benzo same day ambien. Knows potential problems with these interacting;mood good;tried ssris etc but intol and current regimen working well per her and psych.req rf    Hypothyroidism:  Tolerating med. Asympt; sees dr block       infreq migraine    bilat knee pain dxd djd saw ortho. She asks for topical med. D/wd volt gel. ирина ibup PO in past no itching    Past Medical History:   Diagnosis Date    Anxiety     Arthritis     Cancer of kidney 8/2/2013    dr faye    Ectopic pregnancy     Eye infection     GERD (gastroesophageal reflux disease)     Hiatal hernia     History of kidney cancer     Hypercholesteremia     Hypertension     Hypothyroid     dr block q 6 months    Insomnia     Migraine  headache     Renal cell cancer     Respiratory arrest     due to anesthia    Rosacea     Sleep apnea     Thyroid nodule     dr block    Urinary tract infection      Past Surgical History:   Procedure Laterality Date    AUGMENTATION OF BREAST      breast implants      BREAST SURGERY Bilateral 1996    saline implants    COLONOSCOPY  2007    HYSTERECTOMY      ectopic pregnancy x 2, with LSO    LAPAROSCOPIC NEPHRECTOMY, HAND ASSISTED  07/25/2013    NEPHRECTOMY      OOPHORECTOMY      x1    right ganglion cyst      throat polyp      TRANSOBTURATOR SLING  2011    with RSO for persistent complex cyst & MARGOT; done by yrsi    UPPER GASTROINTESTINAL ENDOSCOPY  2007     Family History   Problem Relation Age of Onset    Diabetes Mother     Hypertension Mother     Heart disease Mother     Cancer Father         lung    Migraines Father     Glaucoma Paternal Grandmother     Glaucoma Sister     Thyroid disease Neg Hx     Asthma Neg Hx      Social History     Socioeconomic History    Marital status:      Spouse name: KAIDEN    Number of children: 5    Years of education: Not on file    Highest education level: Not on file   Occupational History    Occupation: retired     Comment: Dance Instructor   Social Needs    Financial resource strain: Not on file    Food insecurity:     Worry: Not on file     Inability: Not on file    Transportation needs:     Medical: Not on file     Non-medical: Not on file   Tobacco Use    Smoking status: Never Smoker    Smokeless tobacco: Never Used   Substance and Sexual Activity    Alcohol use: Yes     Alcohol/week: 0.0 standard drinks     Comment: social    Drug use: No    Sexual activity: Yes     Partners: Male     Birth control/protection: Surgical   Lifestyle    Physical activity:     Days per week: Not on file     Minutes per session: Not on file    Stress: Not on file   Relationships    Social connections:     Talks on phone: Not on file     Gets  together: Not on file     Attends Orthodoxy service: Not on file     Active member of club or organization: Not on file     Attends meetings of clubs or organizations: Not on file     Relationship status: Not on file   Other Topics Concern    Not on file   Social History Narrative    Patient is a retired dance instructor and live with .       Review of Systems  Cardiovascular: no chest pain  Chest: no shortness of breath  Abd: no abd pain  Remainder review of systems negative    Objective:     Physical Exam   Constitutional: She is oriented to person, place, and time. She appears well-developed and well-nourished. No distress.   gen nad    a and o x 3  Psychiatric: She has a normal mood and affect. Her behavior is normal. Judgment and thought content normal.   Nursing note and vitals reviewed.    neck supple no mass   Assessment:       1. Bone disorder        3. Chronic neck pain hx   4. Chronic pain of lower extremity, unspecified laterality    5. Anxiety    6. Nontoxic multinodular goiter    7. Multiple lung nodules on CT    8. Hypercholesteremia    9. Clear cell carcinoma with lung metastasis    10. Essential hypertension    11. Hypothyroidism, unspecified type    12. Carcinoma of right kidney    13. Chronic insomnia      Plan:       *f/u oncol and endocrine when due      F/u ortho if current med regimen w tpical volt not helping as much  F/u me one year same day time  (11/20) q 6 months xanax  Avoid taking alprazolam within several hours of zolpidem    Carcinoma of right kidney    Nontoxic multinodular goiter    Hypothyroidism, unspecified type    Essential hypertension    Clear cell carcinoma with lung metastasis    Chronic insomnia    Anxiety    Other orders  -     ALPRAZolam (XANAX) 0.5 MG tablet; Take daily as needed for anxiety.  Dispense: 30 tablet; Refill: 5  -     omeprazole (PRILOSEC) 40 MG capsule; Take 1 capsule (40 mg total) by mouth once daily.  Dispense: 90 capsule; Refill: 4  -      diclofenac sodium (VOLTAREN) 1 % Gel; Apply 2 g topically daily as needed.  Dispense: 100 g; Refill: 11

## 2020-05-17 ENCOUNTER — PATIENT MESSAGE (OUTPATIENT)
Dept: HEMATOLOGY/ONCOLOGY | Facility: CLINIC | Age: 70
End: 2020-05-17

## 2020-05-21 ENCOUNTER — TELEPHONE (OUTPATIENT)
Dept: HEMATOLOGY/ONCOLOGY | Facility: CLINIC | Age: 70
End: 2020-05-21

## 2020-05-21 NOTE — TELEPHONE ENCOUNTER
[facility] appointment 6/8. Would like to do scans in [facility]. Would like to know about starting. CT CAP with contrast, MRI brain w/o contrast, EKG, echo and labs is what they think they need. She would like all appointments later in the morning or early afternoon.

## 2020-05-21 NOTE — TELEPHONE ENCOUNTER
"----- Message from Sheron Jaciel sent at 5/21/2020 11:04 AM CDT -----  Contact: Sherrill  Consult/Advisory:    Name Of Caller: Sherrill  Provider Name: Dr. Jaquez  Does patient feel the need to be seen today? No  Relationship to the Pt?: Self  Contact Preference?: [phone]  What is the nature of the call?:  Patient request a callback from Dr. Jaquez to discuss CT scan and other test at [facility]     Additional Notes:  "Thank you for all that you do for our patients'"      "

## 2020-05-26 ENCOUNTER — TELEPHONE (OUTPATIENT)
Dept: HEMATOLOGY/ONCOLOGY | Facility: CLINIC | Age: 70
End: 2020-05-26

## 2020-05-26 ENCOUNTER — PATIENT MESSAGE (OUTPATIENT)
Dept: HEMATOLOGY/ONCOLOGY | Facility: CLINIC | Age: 70
End: 2020-05-26

## 2020-05-26 DIAGNOSIS — C64.1 CARCINOMA OF RIGHT KIDNEY: Primary | ICD-10-CM

## 2020-05-26 NOTE — TELEPHONE ENCOUNTER
----- Message from Jacki Corey sent at 5/26/2020  2:49 PM CDT -----  Contact: Self [phone]      ----- Message -----  From: Km Fontenot RN  Sent: 5/26/2020   2:48 PM CDT  To: [facility]  Pool    Returned call to schedule scans  ----- Message -----  From: Katherine Traore  Sent: 5/26/2020   2:43 PM CDT  To: Leonid Galeana Staff    Patient is returning your call.  Please advise.

## 2020-05-28 ENCOUNTER — HOSPITAL ENCOUNTER (OUTPATIENT)
Dept: RADIOLOGY | Facility: HOSPITAL | Age: 70
Discharge: HOME | End: 2020-05-28
Attending: INTERNAL MEDICINE
Payer: MEDICARE

## 2020-05-28 DIAGNOSIS — C64.1 CARCINOMA OF RIGHT KIDNEY: ICD-10-CM

## 2020-05-28 PROCEDURE — 74177 CT ABD & PELVIS W/CONTRAST: CPT | Mod: TC

## 2020-05-28 PROCEDURE — 25500020 PHARM REV CODE 255: Performed by: INTERNAL MEDICINE

## 2020-05-28 RX ADMIN — IOHEXOL 30 ML: 350 INJECTION, SOLUTION INTRAVENOUS at 05:05

## 2020-05-28 RX ADMIN — IOHEXOL 100 ML: 350 INJECTION, SOLUTION INTRAVENOUS at 05:05

## 2020-06-01 ENCOUNTER — TELEPHONE (OUTPATIENT)
Dept: HEMATOLOGY/ONCOLOGY | Facility: CLINIC | Age: 70
End: 2020-06-01

## 2020-06-02 ENCOUNTER — OFFICE VISIT (OUTPATIENT)
Dept: HEMATOLOGY/ONCOLOGY | Facility: CLINIC | Age: 70
End: 2020-06-02
Payer: MEDICARE

## 2020-06-02 DIAGNOSIS — C80.1 CLEAR CELL CARCINOMA: ICD-10-CM

## 2020-06-02 DIAGNOSIS — C64.1 CARCINOMA OF RIGHT KIDNEY: Primary | ICD-10-CM

## 2020-06-02 PROCEDURE — 99214 OFFICE O/P EST MOD 30 MIN: CPT | Mod: 95,,, | Performed by: INTERNAL MEDICINE

## 2020-06-02 PROCEDURE — 99214 PR OFFICE/OUTPT VISIT, EST, LEVL IV, 30-39 MIN: ICD-10-PCS | Mod: 95,,, | Performed by: INTERNAL MEDICINE

## 2020-06-02 NOTE — PROGRESS NOTES
Subjective:       Patient ID: Sherrill Avery    Chief Complaint:  Met ccRCC    HPI     Sherrill Avery is a 69 y.o. 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 She is s/p left VATS LLL wedge resection on 1/29/20.     Here to review scans.  Currently NOT on therapy.  Feeling great.  Some knee pain and occ skeletomuscular pain in back, otherwise feeling well.       Pt seeing Sister Janessa, the healing nun.        Oncologic History:    Clear cell carcinoma of right kidney.    6/30/2013 Initial Diagnosis   Presented with gross hematuria. CT CAP: 7.2-cm mass occupying the upper two thirds of the right kidney. 2.8-cm mass in the posterior aspect of the urinary bladder c/w TCC.    7/25/2013 Surgery   Right radical nephrectomy Pathology: 7.5-cm clear cell RCC Yanet nuclear grade 2, with microscopic extension into perinephric adipose tissue and with tumor in the renal margin.  pT3a pN0 pMX    10/16/2013 - No Evidence of Disease ([facility])   CT AP: No CT evidence of recurrence or metastatic disease    8/4/2015 - No Evidence of Disease ([facility])   CT AP: No CT evidence of recurrence or metastatic disease    12/1/2016 - [facility] with concern of recurrence   CT AP: Mew less than 4 mm pulmonary nodule in the anterior basal segment of the RLL. Stable 4 mm pulmonary nodule posterior basal segment RLL.     3/1/2017 Relapse/Recurrence   CT Chest: Multiple new subcm pulmonary nodules in the RLL and one in the right middle measuring up to 6 mm suspicious for metastatic disease.     2/8/2019 - [facility] with concern of recurrence   1. Small volume pulmonary metastases are slightly larger compared to the prior study.     2. No recurrent or metastatic disease in the abdomen or pelvis.    2/14/2019 Biopsy   CT-guided biopsy of a 1 cm left lower lobe lesion   Pathology: Small focus of atypical adenomatous hyperplasia with no tumor present. PAX-8 negative.    She was initally noted to have  spontaneous remission of the lung nodules in the absence of any systemic treatment. However, in 2019, the lung nodules at started to increase in size but were still mostly subcentimeter in greatest dimension. In 2/2019, when the left lower lobe lesion increased to 1.4 cm. IR biopsied a peripheral left lung lesion which was PAX-8 negative thought to be a small focus of atypical adenomatous hyperplasia. We therefore brought her back after only 3 months to monitor those lesions carefully. She was referred to Dr. King who thinks the lesions represent indolent RCC metastases. She underwent resection and RCC was confirmed on pathology at [facility].      Review of Systems   Constitutional: Negative for activity change, appetite change, chills, fatigue, fever and unexpected weight change.   HENT: Negative for congestion, hearing loss, mouth sores, sore throat, tinnitus and voice change.    Eyes: Negative for pain and visual disturbance.   Respiratory: Negative for cough, shortness of breath and wheezing.    Cardiovascular: Negative for chest pain, palpitations and leg swelling.   Gastrointestinal: Negative for abdominal pain, constipation, diarrhea, nausea and vomiting.   Endocrine: Negative for cold intolerance and heat intolerance.   Genitourinary: Negative for difficulty urinating, dyspareunia, dysuria, frequency, menstrual problem, urgency, vaginal bleeding, vaginal discharge and vaginal pain.   Musculoskeletal: Positive for arthralgias. Negative for back pain and myalgias.   Skin: Negative for color change, rash and wound.   Allergic/Immunologic: Negative for environmental allergies and food allergies.   Neurological: Negative for weakness, numbness and headaches.   Hematological: Negative for adenopathy. Does not bruise/bleed easily.   Psychiatric/Behavioral: Negative for agitation, confusion, hallucinations and sleep disturbance. The patient is not nervous/anxious.    All other systems reviewed and are negative.           Allergies:  Review of patient's allergies indicates:   Allergen Reactions    Talwin compound Anxiety     hallucinations/anxiety    Tramadol Itching    Codeine Itching    Morphine Itching    Naproxen Itching     Ibuprofen ok    Wal-phed [pseudoephedrine hcl] Itching    Zithromax [azithromycin]     Buspirone Anxiety    Morpholine analogues Anxiety and Itching    Nexium [esomeprazole magnesium]     Talwin [pentazocine lactate] Anxiety       Medications:  Current Outpatient Medications   Medication Sig Dispense Refill    ALPRAZolam (XANAX) 0.5 MG tablet Take daily as needed for anxiety. 30 tablet 5    diclofenac sodium (VOLTAREN) 1 % Gel Apply 2 g topically daily as needed. 100 g 11    diflorasone-emollient (APEXICON E) 0.05 % Crea Apply 1 application topically once daily. for 7 days 30 g 5    estradioL (ESTRACE) 1 MG tablet TAKE 1 TABLET EVERY DAY 90 tablet 3    fluticasone propionate (FLONASE) 50 mcg/actuation nasal spray 2 sprays (100 mcg total) by Each Nostril route once daily. 3 Bottle 4    hydrocortisone 2.5 % cream Apply topically 2 (two) times daily. apply to affected area for 7 days 20 g 5    levothyroxine (SYNTHROID) 112 MCG tablet Take 112 mcg by mouth.      metronidazole 1% (METROGEL) 1 % Gel Apply 1 application topically once daily. For rosacea (Patient not taking: Reported on 2/18/2020) 180 g 4    omeprazole (PRILOSEC) 40 MG capsule Take 1 capsule (40 mg total) by mouth once daily. 90 capsule 4    oxyCODONE-acetaminophen (PERCOCET) 7.5-325 mg per tablet Take 1 tablet by mouth every 6 (six) hours as needed.        No current facility-administered medications for this visit.        PMH:  Past Medical History:   Diagnosis Date    Anxiety     Arthritis     Cancer of kidney 8/2/2013    dr faye    Ectopic pregnancy     Eye infection     GERD (gastroesophageal reflux disease)     Hiatal hernia     History of kidney cancer     Hypercholesteremia     Hypertension      Hypothyroid     dr block q 6 months    Insomnia     Migraine headache     Renal cell cancer     Respiratory arrest     due to anesthia    Rosacea     Sleep apnea     Thyroid nodule     dr block    Urinary tract infection        PSH:  Past Surgical History:   Procedure Laterality Date    AUGMENTATION OF BREAST      breast implants      BREAST SURGERY Bilateral 1996    saline implants    COLONOSCOPY  2007    HYSTERECTOMY      ectopic pregnancy x 2, with LSO    LAPAROSCOPIC NEPHRECTOMY, HAND ASSISTED  07/25/2013    NEPHRECTOMY      OOPHORECTOMY      x1    right ganglion cyst      throat polyp      TRANSOBTURATOR SLING  2011    with RSO for persistent complex cyst & MARGOT; done by yris    UPPER GASTROINTESTINAL ENDOSCOPY  2007       FamHx:  Family History   Problem Relation Age of Onset    Diabetes Mother     Hypertension Mother     Heart disease Mother     Cancer Father         lung    Migraines Father     Glaucoma Paternal Grandmother     Glaucoma Sister     Thyroid disease Neg Hx     Asthma Neg Hx        SocHx:  Social History     Socioeconomic History    Marital status:      Spouse name: KAIDEN    Number of children: 5    Years of education: Not on file    Highest education level: Not on file   Occupational History    Occupation: retired     Comment: Dance Instructor   Social Needs    Financial resource strain: Not on file    Food insecurity:     Worry: Not on file     Inability: Not on file    Transportation needs:     Medical: Not on file     Non-medical: Not on file   Tobacco Use    Smoking status: Never Smoker    Smokeless tobacco: Never Used   Substance and Sexual Activity    Alcohol use: Yes     Alcohol/week: 0.0 standard drinks     Comment: social    Drug use: No    Sexual activity: Yes     Partners: Male     Birth control/protection: Surgical   Lifestyle    Physical activity:     Days per week: Not on file     Minutes per session: Not on file    Stress: Not  on file   Relationships    Social connections:     Talks on phone: Not on file     Gets together: Not on file     Attends Muslim service: Not on file     Active member of club or organization: Not on file     Attends meetings of clubs or organizations: Not on file     Relationship status: Not on file   Other Topics Concern    Not on file   Social History Narrative    Patient is a retired dance instructor and live with .       Objective:      LABS:  WBC   Date Value Ref Range Status   05/28/2020 7.59 3.90 - 12.70 K/uL Final     Hemoglobin   Date Value Ref Range Status   05/28/2020 13.7 12.0 - 16.0 g/dL Final     Hematocrit   Date Value Ref Range Status   05/28/2020 43.8 37.0 - 48.5 % Final     Platelets   Date Value Ref Range Status   05/28/2020 204 150 - 350 K/uL Final       Chemistry        Component Value Date/Time     05/28/2020 1532    K 4.0 05/28/2020 1532     05/28/2020 1532    CO2 25 05/28/2020 1532    BUN 12 05/28/2020 1532    CREATININE 1.0 05/28/2020 1532    GLU 90 05/28/2020 1532        Component Value Date/Time    CALCIUM 8.4 (L) 05/28/2020 1532    ALKPHOS 77 05/28/2020 1532    AST 15 05/28/2020 1532    ALT 13 05/28/2020 1532    BILITOT 0.3 05/28/2020 1532    ESTGFRAFRICA >60 05/28/2020 1532    EGFRNONAA 58 (A) 05/28/2020 1532              Assessment:       1. Carcinoma of right kidney    2. Clear cell carcinoma with lung metastasis          Plan:       1. Metastatic ccRCC:    Currently on no systemic treatment. S/p wedge resection [facility].  Scans now show complete stability compared to imaging in February.   Discussed with patient.  Will hold off initiating systemic therapy until progression is seen, especially given patient's good clinical response.  Will let Dr. Leslie know.      2. Knee pain: Refer to orthopedics.     Refer to orthopedics in [place] for knee pain.  RTC 3 months with CBC, CMP, and CT CAP (all in [place] a few days before visit with me) and to see me.      The patient location is: home  The chief complaint leading to consultation is: urgent care visit  Visit type: virtual with audio and visual  Total time spent with patient: More than 25 mins were spent during this encounter, greater than 50% was spent in direct counseling and/or coordination of care.     Each patient to whom he or she provides medical services by telemedicine is:  (1) informed of the relationship between the physician and patient and the respective role of any other health care provider with respect to management of the patient; and (2) notified that he or she may decline to receive medical services by telemedicine and may withdraw from such care at any time.    Patient is in agreement with the proposed treatment plan. All questions were answered to the patient's satisfaction. Pt knows to call clinic if anything is needed before the next clinic visit.    More than 25 mins were spent during this encounter, greater than 50% was spent in direct counseling and/or coordination of care.     Kervin Jaquez M.D., M.S., F.A.C.P.  Hematology and Oncology Attending  [facility]

## 2020-06-02 NOTE — Clinical Note
Refer to orthopedics in [place] for knee pain.  RTC 3 months with CBC, CMP, and CT CAP (all in [place] a few days before visit with me) and to see me.

## 2020-06-10 ENCOUNTER — OFFICE VISIT (OUTPATIENT)
Dept: ORTHOPEDICS | Facility: CLINIC | Age: 70
End: 2020-06-10
Payer: MEDICARE

## 2020-06-10 VITALS
DIASTOLIC BLOOD PRESSURE: 84 MMHG | WEIGHT: 189 LBS | BODY MASS INDEX: 30.37 KG/M2 | HEART RATE: 66 BPM | SYSTOLIC BLOOD PRESSURE: 141 MMHG | HEIGHT: 66 IN

## 2020-06-10 DIAGNOSIS — C64.1 CARCINOMA OF RIGHT KIDNEY: ICD-10-CM

## 2020-06-10 DIAGNOSIS — M54.16 LUMBAR RADICULOPATHY: Primary | ICD-10-CM

## 2020-06-10 DIAGNOSIS — M25.561 PAIN IN BOTH KNEES, UNSPECIFIED CHRONICITY: ICD-10-CM

## 2020-06-10 DIAGNOSIS — M25.562 PAIN IN BOTH KNEES, UNSPECIFIED CHRONICITY: ICD-10-CM

## 2020-06-10 DIAGNOSIS — M79.7 FIBROMYALGIA: ICD-10-CM

## 2020-06-10 PROCEDURE — 20610 LARGE JOINT ASPIRATION/INJECTION: BILATERAL KNEE: ICD-10-PCS | Mod: 50,S$PBB,, | Performed by: PHYSICAL MEDICINE & REHABILITATION

## 2020-06-10 PROCEDURE — 99999 PR PBB SHADOW E&M-EST. PATIENT-LVL IV: ICD-10-PCS | Mod: PBBFAC,,, | Performed by: PHYSICAL MEDICINE & REHABILITATION

## 2020-06-10 PROCEDURE — 99214 OFFICE O/P EST MOD 30 MIN: CPT | Mod: PBBFAC | Performed by: PHYSICAL MEDICINE & REHABILITATION

## 2020-06-10 PROCEDURE — 99999 PR PBB SHADOW E&M-EST. PATIENT-LVL IV: CPT | Mod: PBBFAC,,, | Performed by: PHYSICAL MEDICINE & REHABILITATION

## 2020-06-10 PROCEDURE — 99214 PR OFFICE/OUTPT VISIT, EST, LEVL IV, 30-39 MIN: ICD-10-PCS | Mod: 25,S$PBB,, | Performed by: PHYSICAL MEDICINE & REHABILITATION

## 2020-06-10 PROCEDURE — 20610 DRAIN/INJ JOINT/BURSA W/O US: CPT | Mod: 50,PBBFAC | Performed by: PHYSICAL MEDICINE & REHABILITATION

## 2020-06-10 PROCEDURE — 99214 OFFICE O/P EST MOD 30 MIN: CPT | Mod: 25,S$PBB,, | Performed by: PHYSICAL MEDICINE & REHABILITATION

## 2020-06-10 RX ORDER — GABAPENTIN 300 MG/1
CAPSULE ORAL 3 TIMES DAILY
Status: ON HOLD | COMMUNITY
Start: 2020-05-20 | End: 2021-06-11 | Stop reason: SDUPTHER

## 2020-06-10 RX ORDER — ONDANSETRON HYDROCHLORIDE 8 MG/1
8 TABLET, FILM COATED ORAL
COMMUNITY
Start: 2020-03-05 | End: 2021-05-30 | Stop reason: ALTCHOICE

## 2020-06-10 RX ORDER — METHYLPREDNISOLONE ACETATE 80 MG/ML
80 INJECTION, SUSPENSION INTRA-ARTICULAR; INTRALESIONAL; INTRAMUSCULAR; SOFT TISSUE
Status: DISCONTINUED | OUTPATIENT
Start: 2020-06-10 | End: 2020-06-10 | Stop reason: HOSPADM

## 2020-06-10 RX ADMIN — METHYLPREDNISOLONE ACETATE 80 MG: 80 INJECTION, SUSPENSION INTRALESIONAL; INTRAMUSCULAR; INTRASYNOVIAL; SOFT TISSUE at 02:06

## 2020-06-10 NOTE — PROGRESS NOTES
"SPORTS MEDICINE / PM&R NEW PATIENT H & P:    Referring Physician: Kervin Jaquez MD    Chief Complaint   Patient presents with    Left Knee - Pain    Right Knee - Pain       HPI: This is a 69 y.o.  female being seen in clinic today for evaluation of Pain of the Left Knee and Pain of the Right Knee   The problem first began a year and a half ago. She feels sharp, dull, aching, stabbing, constant, pain at rest and pain with movement pain deep her bilateral knee . The symptoms are worsening. She has tried nerve blocks (she may just mean gabapentin), percocet, and heat with little improvement. She has not tried physical therapy. She believes she was told she had "bone on bone" arthritis. She had previously tried injections without much help, but would like injections today to help with the pain. She has kidney cancer with multiple mets to the lungs but is not currently on chemo.    History obtained from patient.    Past family, medical, social, surgical history, and vital signs reviewed in chart.    Review of Systems   Constitutional: Negative for chills, fever and weight loss.   HENT: Negative for hearing loss and sore throat.    Eyes: Negative for blurred vision, photophobia and pain.   Respiratory: Negative for shortness of breath.    Cardiovascular: Negative for chest pain.   Gastrointestinal: Negative for abdominal pain.   Genitourinary: Negative for dysuria.   Skin: Negative for rash.   Neurological: Negative for tingling and headaches.   Endo/Heme/Allergies: Does not bruise/bleed easily.   Psychiatric/Behavioral: Negative for depression.       General    Nursing note and vitals reviewed.  Constitutional: She is oriented to person, place, and time. She appears well-developed and well-nourished.   HENT:   Head: Normocephalic and atraumatic.   Eyes: Conjunctivae and EOM are normal. Pupils are equal, round, and reactive to light.   Neck: Neck supple.   Cardiovascular: Intact distal pulses.    Pulmonary/Chest: " Effort normal. No respiratory distress.   Abdominal: She exhibits no distension.   Neurological: She is alert and oriented to person, place, and time. She has normal reflexes.   Psychiatric: She has a normal mood and affect.     General Musculoskeletal Exam   Gait: normal       Right Knee Exam     Inspection   Erythema: absent  Swelling: absent  Effusion: absent    Tenderness   The patient is tender to palpation of the medial joint line and lateral joint line.    Crepitus   The patient has crepitus of the patella.    Range of Motion   Extension: abnormal Right knee extension grade: full range, but with pain.   Flexion: normal     Tests   Meniscus   Remedios:  Medial - negative Lateral - negative  Ligament Examination Lachman: normal (-1 to 2mm) PCL-Posterior Drawer: normal (0 to 2mm)     MCL - Valgus: normal (0 to 2mm)  LCL - Varus: normal  Patella   Patellar apprehension: negative  Patellar Tracking: normal    Other   Sensation: normal    Left Knee Exam     Inspection   Erythema: absent  Swelling: absent  Effusion: absent    Tenderness   The patient tender to palpation of the lateral joint line and medial joint line.    Crepitus   The patient has crepitus of the patella.    Range of Motion   Extension: abnormal Left knee extension grade: full, but with pain.   Flexion: normal     Tests   Meniscus   Remedios:  Medial - negative Lateral - negative  Stability Lachman: normal (-1 to 2mm) PCL-Posterior Drawer: normal (0 to 2mm)  MCL - Valgus: normal (0 to 2mm)  LCL - Varus: normal (0 to 2mm)  Patella   Patellar apprehension: negative  Patellar Tracking: normal    Other   Sensation: normal    Right Hip Exam   Right hip exam is normal.     Tests   Pain w/ forced internal rotation (CARMEN): absent  Left Hip Exam   Left hip exam is normal.    Tests   Pain w/ forced internal rotation (CARMEN): absent          Muscle Strength   Right Lower Extremity   Hip Abduction: 4/5   Hip Adduction: 5/5   Hip Flexion: 5/5   Quadriceps:  4/5    Hamstrin/5   Left Lower Extremity   Hip Abduction: 4/5   Hip Adduction: 5/5   Hip Flexion: 5/5   Quadriceps:  4/5   Hamstrin/5     Reflexes     Left Side  Quadriceps:  2+  Achilles:  2+    Right Side   Quadriceps:  2+  Achilles:  2+    Vascular Exam     Right Pulses  Dorsalis Pedis:      2+          Left Pulses  Dorsalis Pedis:      2+              IMPRESSION/PLAN: Sherrill is a 69 y.o.  female with:    1. Carcinoma of right kidney  - Ambulatory referral/consult to Orthopedics    2. Pain in both knees, unspecified chronicity    3. Lumbar radiculopathy  - Ambulatory referral/consult to Pain Clinic; Future    4. Fibromyalgia       The findings were discussed with Sherrill in detail. We reviewed her previous x-rays and I explained that she did not have bone on bone arthritis, but certainly some joint space narrowing. She then inquired about why her knees are hurting. I tried to explain the poor correlation between imaging and symptoms and offered to get new x-rays, although I doubt it would change treatment. I explained that corticosteroid injection would have the best chance of decreasing pain, but can't promise repeat injection would work any better than those she'd tried last year. Eventually, she elected to try injections. I also discussed that physical therapy would be the most important treatment, but she did not want to do that. For her back pain, she was referred to pain clinic last year but didn't go. She asked me to make another referral and she'll consider going. All of her questions were answered. She was provided this plan in writing. She will follow-up with me in 2 weeks to see response to injections.     Kelsy Alexander M.D.  Sports Medicine   0 = swallows foods/liquids without difficulty

## 2020-06-10 NOTE — LETTER
Katelin 10, 2020      Kervin Jaquez MD  [address] [address]           [facility]  [address] [address] [address]  Phone: [phone]  Fax: [phone]          Patient: Sherrill Avery   MR Number: [id number]   YOB: 1950   Date of Visit: 6/10/2020       Dear Dr. Kervin Jaquez:    Thank you for referring Sherrill Avery to me for evaluation. Attached you will find relevant portions of my assessment and plan of care.    If you have questions, please do not hesitate to call me. I look forward to following Sherrill Avery along with you.    Sincerely,    Kelsy Alexander MD    Enclosure  CC:  No Recipients    If you would like to receive this communication electronically, please contact [email or web address] or [phone] to request more information on [employer] access.    For providers and/or their staff who would like to refer a patient to [facility], please contact us through our one-stop-shop provider referral line, [facility], at [phone].    If you feel you have received this communication in error or would no longer like to receive these types of communications, please e-mail [email or web address]

## 2020-06-11 NOTE — PROCEDURES
Large Joint Aspiration/Injection: bilateral knee  Date/Time: 6/10/2020 2:30 PM  Performed by: Kelsy Alexander MD  Authorized by: Kelsy Alexander MD     Consent Done?:  Yes (Verbal)  Indications:  Joint swelling and pain  Site marked: the procedure site was marked    Timeout: prior to procedure the correct patient, procedure, and site was verified    Prep: patient was prepped and draped in usual sterile fashion      Local anesthesia used?: Yes    Anesthesia:  Local infiltration  Local anesthetic:  Bupivacaine 0.5% without epinephrine, lidocaine 1% without epinephrine and topical anesthetic    Details:  Needle Size:  22 G  Ultrasonic Guidance for needle placement?: No    Approach:  Superior  Location:  Knee  Laterality:  Bilateral  Site:  Bilateral knee  Medications (Right):  80 mg methylPREDNISolone acetate 80 mg/mL  Medications (Left):  80 mg methylPREDNISolone acetate 80 mg/mL  Patient tolerance:  Patient tolerated the procedure well with no immediate complications     Bilateral knee injections:  2cc 1% lidocaine plain, 2cc 0.5% marcaine plain, 0.5cc 80mg methylprednisolone

## 2020-06-29 ENCOUNTER — OFFICE VISIT (OUTPATIENT)
Dept: INTERNAL MEDICINE | Facility: CLINIC | Age: 70
End: 2020-06-29
Payer: MEDICARE

## 2020-06-29 VITALS
BODY MASS INDEX: 29.99 KG/M2 | HEART RATE: 81 BPM | DIASTOLIC BLOOD PRESSURE: 77 MMHG | SYSTOLIC BLOOD PRESSURE: 136 MMHG | WEIGHT: 183 LBS

## 2020-06-29 DIAGNOSIS — R91.8 MULTIPLE LUNG NODULES ON CT: Chronic | ICD-10-CM

## 2020-06-29 DIAGNOSIS — F41.9 ANXIETY: ICD-10-CM

## 2020-06-29 DIAGNOSIS — I10 ESSENTIAL HYPERTENSION: Primary | ICD-10-CM

## 2020-06-29 DIAGNOSIS — E03.9 HYPOTHYROIDISM, UNSPECIFIED TYPE: ICD-10-CM

## 2020-06-29 DIAGNOSIS — C64.1 CARCINOMA OF RIGHT KIDNEY: ICD-10-CM

## 2020-06-29 PROCEDURE — 99214 OFFICE O/P EST MOD 30 MIN: CPT | Mod: 95,,, | Performed by: FAMILY MEDICINE

## 2020-06-29 PROCEDURE — 99214 PR OFFICE/OUTPT VISIT, EST, LEVL IV, 30-39 MIN: ICD-10-PCS | Mod: 95,,, | Performed by: FAMILY MEDICINE

## 2020-06-29 NOTE — PROGRESS NOTES
Subjective:       Patient ID: Sherrill Avery is a . female.    Chief Complaint: Multiple issues see below    HPI The patient location is:home  The chief complaint leading to consultation is in hpi  Visit type: Virtual visit with synchronous audio and video  Total time spent with patient: 20 mins  Each patient to whom he or she provides medical services by telemedicine is:  (1) informed of the relationship between the physician and patient and the respective role of any other health care provider with respect to management of the patient; and (2) notified that he or she may decline to receive medical services by telemedicine and may withdraw from such care at any time.        Follow-upfor multiple issues update with oncologist at [facility] followed for renal cell carcinoma with metastatic lung nodules hx and dr sheryl VILLANUEVA; since last visit no inc size via May CT  Hypertension: blood pressures typically normal. Tolerating medicine.   Osteopenia d/wd  GERD asympt. Tolerating medication. No swallowing problems;sees mdand. Gi' req rf prilosec    Chronic anxiety/insomnia uses either benzo or ambien few times per week prev dr yusuf but stable enough felt ok for primary care to resume rxing for this and no need to f/u unless problems. She doesn't take benzo same day ambien. Knows potential problems with these interacting;mood good;tried ssris etc but intol and current regimen working well per her and psych.; takes 4-5 x /months    Hypothyroidism:  Tolerating med. Asympt; sees dr block       infreq migraine    bilat knee pain dxd djd hAD KNEE injxns and much better now; after this back pain also resolved      Over 8 months couple times a day in conversation will have a point to make and if doesn't make it then may forget about what she is going to say. Later in day she will remember. No freq headaches. No other memory issues. Mood anxiety stable. No inc xanax use. husb hasnt noticed any problems. No other neuro  sympt.    D/wd  Mild wt loss ; nl appet. ;not eating out as before Covid    Past Medical History:   Diagnosis Date    Anxiety     Arthritis     Cancer of kidney 8/2/2013    dr faye    Ectopic pregnancy     Eye infection     GERD (gastroesophageal reflux disease)     Hiatal hernia     History of kidney cancer     Hypercholesteremia     Hypertension     Hypothyroid     dr block q 6 months    Insomnia     Migraine headache     Renal cell cancer     Respiratory arrest     due to anesthia    Rosacea     Sleep apnea     Thyroid nodule     dr block    Urinary tract infection      Past Surgical History:   Procedure Laterality Date    AUGMENTATION OF BREAST      breast implants      BREAST SURGERY Bilateral 1996    saline implants    COLONOSCOPY  2007    HYSTERECTOMY      ectopic pregnancy x 2, with LSO    LAPAROSCOPIC NEPHRECTOMY, HAND ASSISTED  07/25/2013    NEPHRECTOMY      OOPHORECTOMY      x1    right ganglion cyst      throat polyp      TRANSOBTURATOR SLING  2011    with RSO for persistent complex cyst & MARGOT; done by arndt    UPPER GASTROINTESTINAL ENDOSCOPY  2007     Family History   Problem Relation Age of Onset    Diabetes Mother     Hypertension Mother     Heart disease Mother     Cancer Father         lung    Migraines Father     Glaucoma Paternal Grandmother     Glaucoma Sister     Thyroid disease Neg Hx     Asthma Neg Hx      Social History     Socioeconomic History    Marital status:      Spouse name: KAIDEN    Number of children: 5    Years of education: Not on file    Highest education level: Not on file   Occupational History    Occupation: retired     Comment: Dance Instructor   Social Needs    Financial resource strain: Not on file    Food insecurity:     Worry: Not on file     Inability: Not on file    Transportation needs:     Medical: Not on file     Non-medical: Not on file   Tobacco Use    Smoking status: Never Smoker    Smokeless tobacco:  Never Used   Substance and Sexual Activity    Alcohol use: Yes     Alcohol/week: 0.0 standard drinks     Comment: social    Drug use: No    Sexual activity: Yes     Partners: Male     Birth control/protection: Surgical   Lifestyle    Physical activity:     Days per week: Not on file     Minutes per session: Not on file    Stress: Not on file   Relationships    Social connections:     Talks on phone: Not on file     Gets together: Not on file     Attends Mandaen service: Not on file     Active member of club or organization: Not on file     Attends meetings of clubs or organizations: Not on file     Relationship status: Not on file   Other Topics Concern    Not on file   Social History Narrative    Patient is a retired dance instructor and live with .       Review of Systems  Cardiovascular: no chest pain  Chest: no shortness of breath  Abd: no abd pain  Remainder review of systems negative    Objective:     Physical Exam   Constitutional: She is oriented to person, place, and time. She appears well-developed and well-nourished. No distress.   gen nad    a and o x 3  Psychiatric: She has a normal mood and affect. Her behavior is normal. Judgment and thought content normal.   Nursing note and vitals reviewed.    neck supple no mass   Assessment:       1. Bone disorder        3. Chronic neck pain hx   4. Chronic pain of lower extremity, unspecified laterality    5. Anxiety    6. Nontoxic multinodular goiter    7. Multiple lung nodules on CT    8. Hypercholesteremia    9. Clear cell carcinoma with lung metastasis    10. Essential hypertension    11. Hypothyroidism, unspecified type    12. Carcinoma of right kidney    13. Chronic insomnia    forgetfullness  Mild wt loss  Plan:     D/wd poss neurol  ,mri . Has mri planned few months MD [place]. She defers neurol and will try using notepad. If any change or worsening then she will notify me and plan sooner imaging and neurologist  *f/u oncol and endocrine  when due    Monitor wt/Notify if wt dec more (note this is in comparison to clinic wts)      F/u ortho if current med regimen w tpical volt not helping as much  Has F/u me  same day time  (11/20) q 6 months xanax  Avoid taking alprazolam within several hours of zolpidem

## 2020-07-01 ENCOUNTER — OFFICE VISIT (OUTPATIENT)
Dept: ORTHOPEDICS | Facility: CLINIC | Age: 70
End: 2020-07-01
Payer: MEDICARE

## 2020-07-01 DIAGNOSIS — M25.562 PAIN IN BOTH KNEES, UNSPECIFIED CHRONICITY: ICD-10-CM

## 2020-07-01 DIAGNOSIS — M22.42 CHONDROMALACIA, PATELLA, LEFT: ICD-10-CM

## 2020-07-01 DIAGNOSIS — M22.41 CHONDROMALACIA, PATELLA, RIGHT: Primary | ICD-10-CM

## 2020-07-01 DIAGNOSIS — M25.561 PAIN IN BOTH KNEES, UNSPECIFIED CHRONICITY: ICD-10-CM

## 2020-07-01 PROCEDURE — 99213 OFFICE O/P EST LOW 20 MIN: CPT | Mod: 95,,, | Performed by: PHYSICAL MEDICINE & REHABILITATION

## 2020-07-01 PROCEDURE — 99213 PR OFFICE/OUTPT VISIT, EST, LEVL III, 20-29 MIN: ICD-10-PCS | Mod: 95,,, | Performed by: PHYSICAL MEDICINE & REHABILITATION

## 2020-07-01 NOTE — PROGRESS NOTES
SPORTS MEDICINE / PM&R Follow Up Visit :    The patient location is: at her home in [place]  The chief complaint leading to consultation is: knee pain    Visit type: audiovisual    Face to Face time with patient: 20 minutes  30 minutes of total time spent on the encounter, which includes face to face time and non-face to face time preparing to see the patient (eg, review of tests), Obtaining and/or reviewing separately obtained history, Documenting clinical information in the electronic or other health record, Independently interpreting results (not separately reported) and communicating results to the patient/family/caregiver, or Care coordination (not separately reported).     Each patient to whom he or she provides medical services by telemedicine is:  (1) informed of the relationship between the physician and patient and the respective role of any other health care provider with respect to management of the patient; and (2) notified that he or she may decline to receive medical services by telemedicine and may withdraw from such care at any time.      Referring Physician: No ref. provider found    Chief Complaint   Patient presents with    Left Knee - Pain    Right Knee - Pain       HPI: This is a 69 y.o.  female being seen in clinic today for evaluation of Pain of the Left Knee and Pain of the Right Knee   Since last visit, the symptoms are 90% better.  She has not been to therapy, as it hasn't helped in the past, feeling better after injection, and worried about risk of infection. She is very pleased with how much relief she had. Even her back is feeling much better and so she held off on going to the pain clinic. Notes she has stairs at her house and goes up and down multiple times a day. Also has a bike  and willing to do more walking.    History obtained from patient.    Past family, medical, social, surgical history, and vital signs reviewed in chart.    Review of Systems   Constitutional:  Negative for chills, fever and weight loss.   HENT: Negative for hearing loss and sore throat.    Eyes: Negative for blurred vision, photophobia and pain.   Respiratory: Negative for shortness of breath.    Cardiovascular: Negative for chest pain.   Gastrointestinal: Negative for abdominal pain.   Genitourinary: Negative for dysuria.   Skin: Negative for rash.   Neurological: Negative for tingling and headaches.   Endo/Heme/Allergies: Does not bruise/bleed easily.   Psychiatric/Behavioral: Negative for depression.       General    Constitutional: She is oriented to person, place, and time. She appears well-developed and well-nourished.   HENT:   Head: Normocephalic and atraumatic.   Eyes: EOM are normal. Pupils are equal, round, and reactive to light.   Neck: Neck supple.   Pulmonary/Chest: Effort normal. No respiratory distress.   Abdominal: She exhibits no distension.   Neurological: She is alert and oriented to person, place, and time.   Psychiatric: She has a normal mood and affect.           Right Knee Exam     Inspection   Erythema: absent    Left Knee Exam     Inspection   Erythema: absent        IMPRESSION/PLAN: This is a 69 y.o.  female with:    Chondromalacia, patella, right    Chondromalacia, patella, left    Pain in both knees, unspecified chronicity        The findings were discussed with Sherrill in detail. I'm glad she's had so much relief. I strongly encouraged her to exercise daily with walking, biking, stairs, etc, but definitely ease into it. Small increases each week will add up. We will see how long she continues to feel this relief from the injections. We discussed considering repeat CSI versus HA injections if pain returns.  She was provided with this plan in writing. All of her questions were answered. She will follow up with me PRMARNI.     Kelsy Alexander M.D.  Sports Medicine

## 2020-08-07 DIAGNOSIS — C80.1 CLEAR CELL CARCINOMA: Primary | ICD-10-CM

## 2020-08-10 ENCOUNTER — PATIENT MESSAGE (OUTPATIENT)
Dept: HEMATOLOGY/ONCOLOGY | Facility: CLINIC | Age: 70
End: 2020-08-10

## 2020-08-21 ENCOUNTER — LAB VISIT (OUTPATIENT)
Dept: LAB | Facility: HOSPITAL | Age: 70
End: 2020-08-21
Attending: INTERNAL MEDICINE
Payer: MEDICARE

## 2020-08-21 DIAGNOSIS — C80.1 CLEAR CELL CARCINOMA: ICD-10-CM

## 2020-08-21 LAB
ALBUMIN SERPL BCP-MCNC: 3.8 G/DL (ref 3.5–5.2)
ALP SERPL-CCNC: 72 U/L (ref 55–135)
ALT SERPL W/O P-5'-P-CCNC: 12 U/L (ref 10–44)
ANION GAP SERPL CALC-SCNC: 7 MMOL/L (ref 8–16)
AST SERPL-CCNC: 14 U/L (ref 10–40)
BILIRUB SERPL-MCNC: 0.5 MG/DL (ref 0.1–1)
BUN SERPL-MCNC: 12 MG/DL (ref 8–23)
CALCIUM SERPL-MCNC: 8.7 MG/DL (ref 8.7–10.5)
CHLORIDE SERPL-SCNC: 106 MMOL/L (ref 95–110)
CO2 SERPL-SCNC: 27 MMOL/L (ref 23–29)
CREAT SERPL-MCNC: 0.9 MG/DL (ref 0.5–1.4)
CREAT SERPL-MCNC: 0.9 MG/DL (ref 0.5–1.4)
ERYTHROCYTE [DISTWIDTH] IN BLOOD BY AUTOMATED COUNT: 13.9 % (ref 11.5–14.5)
EST. GFR  (AFRICAN AMERICAN): >60 ML/MIN/1.73 M^2
EST. GFR  (AFRICAN AMERICAN): >60 ML/MIN/1.73 M^2
EST. GFR  (NON AFRICAN AMERICAN): >60 ML/MIN/1.73 M^2
EST. GFR  (NON AFRICAN AMERICAN): >60 ML/MIN/1.73 M^2
GLUCOSE SERPL-MCNC: 95 MG/DL (ref 70–110)
HCT VFR BLD AUTO: 42.6 % (ref 37–48.5)
HGB BLD-MCNC: 13.9 G/DL (ref 12–16)
IMM GRANULOCYTES # BLD AUTO: 0.02 K/UL (ref 0–0.04)
MCH RBC QN AUTO: 30.2 PG (ref 27–31)
MCHC RBC AUTO-ENTMCNC: 32.6 G/DL (ref 32–36)
MCV RBC AUTO: 93 FL (ref 82–98)
NEUTROPHILS # BLD AUTO: 3.8 K/UL (ref 1.8–7.7)
PLATELET # BLD AUTO: 206 K/UL (ref 150–350)
PMV BLD AUTO: 9.9 FL (ref 9.2–12.9)
POTASSIUM SERPL-SCNC: 5.1 MMOL/L (ref 3.5–5.1)
PROT SERPL-MCNC: 6.8 G/DL (ref 6–8.4)
RBC # BLD AUTO: 4.6 M/UL (ref 4–5.4)
SODIUM SERPL-SCNC: 140 MMOL/L (ref 136–145)
WBC # BLD AUTO: 5.87 K/UL (ref 3.9–12.7)

## 2020-08-21 PROCEDURE — 85027 COMPLETE CBC AUTOMATED: CPT

## 2020-08-21 PROCEDURE — 80053 COMPREHEN METABOLIC PANEL: CPT

## 2020-08-21 PROCEDURE — 36415 COLL VENOUS BLD VENIPUNCTURE: CPT

## 2020-08-24 ENCOUNTER — OFFICE VISIT (OUTPATIENT)
Dept: HEMATOLOGY/ONCOLOGY | Facility: CLINIC | Age: 70
End: 2020-08-24
Payer: MEDICARE

## 2020-08-24 DIAGNOSIS — C64.1 CARCINOMA OF RIGHT KIDNEY: ICD-10-CM

## 2020-08-24 DIAGNOSIS — C80.1 CLEAR CELL CARCINOMA: Primary | ICD-10-CM

## 2020-08-24 PROCEDURE — 99214 PR OFFICE/OUTPT VISIT, EST, LEVL IV, 30-39 MIN: ICD-10-PCS | Mod: 95,,, | Performed by: INTERNAL MEDICINE

## 2020-08-24 PROCEDURE — 99214 OFFICE O/P EST MOD 30 MIN: CPT | Mod: 95,,, | Performed by: INTERNAL MEDICINE

## 2020-08-24 NOTE — PROGRESS NOTES
Subjective:       Patient ID: Sherrill Avery    Chief Complaint:  Met ccRCC    HPI     Sherrill Avery is a 70 y.o. 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 She is s/p left VATS LLL wedge resection on 1/29/20.     Here to follow-up Currently NOT on therapy.  Feeling great.  Some knee pain and occ skeletomuscular pain in back, otherwise feeling well.       Pt seeing Sister Janessa, the healing nun.        Oncologic History:    Clear cell carcinoma of right kidney.    6/30/2013 Initial Diagnosis   Presented with gross hematuria. CT CAP: 7.2-cm mass occupying the upper two thirds of the right kidney. 2.8-cm mass in the posterior aspect of the urinary bladder c/w TCC.    7/25/2013 Surgery   Right radical nephrectomy Pathology: 7.5-cm clear cell RCC Yanet nuclear grade 2, with microscopic extension into perinephric adipose tissue and with tumor in the renal margin.  pT3a pN0 pMX    10/16/2013 - No Evidence of Disease ([facility])   CT AP: No CT evidence of recurrence or metastatic disease    8/4/2015 - No Evidence of Disease ([facility])   CT AP: No CT evidence of recurrence or metastatic disease    12/1/2016 - [facility] with concern of recurrence   CT AP: Mew less than 4 mm pulmonary nodule in the anterior basal segment of the RLL. Stable 4 mm pulmonary nodule posterior basal segment RLL.     3/1/2017 Relapse/Recurrence   CT Chest: Multiple new subcm pulmonary nodules in the RLL and one in the right middle measuring up to 6 mm suspicious for metastatic disease.     2/8/2019 - [facility] with concern of recurrence   1. Small volume pulmonary metastases are slightly larger compared to the prior study.     2. No recurrent or metastatic disease in the abdomen or pelvis.    2/14/2019 Biopsy   CT-guided biopsy of a 1 cm left lower lobe lesion   Pathology: Small focus of atypical adenomatous hyperplasia with no tumor present. PAX-8 negative.    She was initally noted to have  spontaneous remission of the lung nodules in the absence of any systemic treatment. However, in 2019, the lung nodules at started to increase in size but were still mostly subcentimeter in greatest dimension. In 2/2019, when the left lower lobe lesion increased to 1.4 cm. IR biopsied a peripheral left lung lesion which was PAX-8 negative thought to be a small focus of atypical adenomatous hyperplasia. We therefore brought her back after only 3 months to monitor those lesions carefully. She was referred to Dr. King who thinks the lesions represent indolent RCC metastases. She underwent resection and RCC was confirmed on pathology at [facility].      Review of Systems   Constitutional: Negative for activity change, appetite change, chills, fatigue, fever and unexpected weight change.   HENT: Negative for congestion, hearing loss, mouth sores, sore throat, tinnitus and voice change.    Eyes: Negative for pain and visual disturbance.   Respiratory: Negative for cough, shortness of breath and wheezing.    Cardiovascular: Negative for chest pain, palpitations and leg swelling.   Gastrointestinal: Negative for abdominal pain, constipation, diarrhea, nausea and vomiting.   Endocrine: Negative for cold intolerance and heat intolerance.   Genitourinary: Negative for difficulty urinating, dyspareunia, dysuria, frequency, menstrual problem, urgency, vaginal bleeding, vaginal discharge and vaginal pain.   Musculoskeletal: Positive for arthralgias. Negative for back pain and myalgias.   Skin: Negative for color change, rash and wound.   Allergic/Immunologic: Negative for environmental allergies and food allergies.   Neurological: Negative for weakness, numbness and headaches.   Hematological: Negative for adenopathy. Does not bruise/bleed easily.   Psychiatric/Behavioral: Negative for agitation, confusion, hallucinations and sleep disturbance. The patient is not nervous/anxious.    All other systems reviewed and are negative.           Allergies:  Review of patient's allergies indicates:   Allergen Reactions    Talwin compound Anxiety     hallucinations/anxiety    Tramadol Itching    Codeine Itching    Morphine Itching    Naproxen Itching     Ibuprofen ok    Wal-phed [pseudoephedrine hcl] Itching    Zithromax [azithromycin]     Buspirone Anxiety    Morpholine analogues Anxiety and Itching    Nexium [esomeprazole magnesium]     Talwin [pentazocine lactate] Anxiety       Medications:  Current Outpatient Medications   Medication Sig Dispense Refill    ALPRAZolam (XANAX) 0.5 MG tablet Take daily as needed for anxiety. 30 tablet 5    diclofenac sodium (VOLTAREN) 1 % Gel Apply 2 g topically daily as needed. 100 g 11    diflorasone-emollient (APEXICON E) 0.05 % Crea Apply 1 application topically once daily. for 7 days 30 g 5    estradioL (ESTRACE) 1 MG tablet TAKE 1 TABLET EVERY DAY 90 tablet 3    fluticasone propionate (FLONASE) 50 mcg/actuation nasal spray 2 sprays (100 mcg total) by Each Nostril route once daily. 3 Bottle 4    gabapentin (NEURONTIN) 300 MG capsule       hydrocortisone 2.5 % cream Apply topically 2 (two) times daily. apply to affected area for 7 days 20 g 5    levothyroxine (SYNTHROID) 112 MCG tablet Take 112 mcg by mouth.      metronidazole 1% (METROGEL) 1 % Gel Apply 1 application topically once daily. For rosacea 180 g 4    omeprazole (PRILOSEC) 40 MG capsule Take 1 capsule (40 mg total) by mouth once daily. 90 capsule 4    ondansetron (ZOFRAN) 8 MG tablet Take 8 mg by mouth.      oxyCODONE-acetaminophen (PERCOCET) 7.5-325 mg per tablet Take 1 tablet by mouth every 6 (six) hours as needed.        No current facility-administered medications for this visit.        PMH:  Past Medical History:   Diagnosis Date    Anxiety     Arthritis     Cancer of kidney 8/2/2013    dr faye    Ectopic pregnancy     Eye infection     GERD (gastroesophageal reflux disease)     Hiatal hernia     History of kidney  cancer     Hypercholesteremia     Hypertension     Hypothyroid     dr block q 6 months    Insomnia     Migraine headache     Renal cell cancer     Respiratory arrest     due to anesthia    Rosacea     Sleep apnea     Thyroid nodule     dr block    Urinary tract infection        PSH:  Past Surgical History:   Procedure Laterality Date    AUGMENTATION OF BREAST      breast implants      BREAST SURGERY Bilateral 1996    saline implants    COLONOSCOPY  2007    HYSTERECTOMY      ectopic pregnancy x 2, with LSO    LAPAROSCOPIC NEPHRECTOMY, HAND ASSISTED  07/25/2013    NEPHRECTOMY      OOPHORECTOMY      x1    right ganglion cyst      throat polyp      TRANSOBTURATOR SLING  2011    with RSO for persistent complex cyst & MARGOT; done by yris    UPPER GASTROINTESTINAL ENDOSCOPY  2007       FamHx:  Family History   Problem Relation Age of Onset    Diabetes Mother     Hypertension Mother     Heart disease Mother     Cancer Father         lung    Migraines Father     Glaucoma Paternal Grandmother     Glaucoma Sister     Thyroid disease Neg Hx     Asthma Neg Hx        SocHx:  Social History     Socioeconomic History    Marital status:      Spouse name: KAIDEN    Number of children: 5    Years of education: Not on file    Highest education level: Not on file   Occupational History    Occupation: retired     Comment: Dance Instructor   Social Needs    Financial resource strain: Not on file    Food insecurity     Worry: Not on file     Inability: Not on file    Transportation needs     Medical: Not on file     Non-medical: Not on file   Tobacco Use    Smoking status: Never Smoker    Smokeless tobacco: Never Used   Substance and Sexual Activity    Alcohol use: Yes     Alcohol/week: 0.0 standard drinks     Comment: social    Drug use: No    Sexual activity: Yes     Partners: Male     Birth control/protection: Surgical   Lifestyle    Physical activity     Days per week: Not on file      Minutes per session: Not on file    Stress: Not on file   Relationships    Social connections     Talks on phone: Not on file     Gets together: Not on file     Attends Judaism service: Not on file     Active member of club or organization: Not on file     Attends meetings of clubs or organizations: Not on file     Relationship status: Not on file   Other Topics Concern    Not on file   Social History Narrative    Patient is a retired dance instructor and live with .       Objective:      LABS:  WBC   Date Value Ref Range Status   08/21/2020 5.87 3.90 - 12.70 K/uL Final     Hemoglobin   Date Value Ref Range Status   08/21/2020 13.9 12.0 - 16.0 g/dL Final     Hematocrit   Date Value Ref Range Status   08/21/2020 42.6 37.0 - 48.5 % Final     Platelets   Date Value Ref Range Status   08/21/2020 206 150 - 350 K/uL Final       Chemistry        Component Value Date/Time     08/21/2020 1044    K 5.1 08/21/2020 1044     08/21/2020 1044    CO2 27 08/21/2020 1044    BUN 12 08/21/2020 1044    CREATININE 0.9 08/21/2020 1044    CREATININE 0.9 08/21/2020 1044    GLU 95 08/21/2020 1044        Component Value Date/Time    CALCIUM 8.7 08/21/2020 1044    ALKPHOS 72 08/21/2020 1044    AST 14 08/21/2020 1044    ALT 12 08/21/2020 1044    BILITOT 0.5 08/21/2020 1044    ESTGFRAFRICA >60 08/21/2020 1044    ESTGFRAFRICA >60 08/21/2020 1044    EGFRNONAA >60 08/21/2020 1044    EGFRNONAA >60 08/21/2020 1044              Assessment:       1. Clear cell carcinoma with lung metastasis    2. Carcinoma of right kidney          Plan:       1. Metastatic ccRCC:    Currently on no systemic treatment. S/p wedge resection [facility].  Due for scans with Dr. Leslie at [facility] on Sept 1st.   Will hold off initiating systemic therapy until progression is seen, especially given patient's good clinical response.    2. Knee pain: Seeing orthopedics.     RTC 3 months with CBC, CMP ([place] a few days before visit with me) and to see me  (virtual visit is fine).     The patient location is: home  The chief complaint leading to consultation is: urgent care visit  Visit type: virtual with audio and visual  Total time spent with patient: More than 25 mins were spent during this encounter, greater than 50% was spent in direct counseling and/or coordination of care.     Each patient to whom he or she provides medical services by telemedicine is:  (1) informed of the relationship between the physician and patient and the respective role of any other health care provider with respect to management of the patient; and (2) notified that he or she may decline to receive medical services by telemedicine and may withdraw from such care at any time.    Patient is in agreement with the proposed treatment plan. All questions were answered to the patient's satisfaction. Pt knows to call clinic if anything is needed before the next clinic visit.    More than 25 mins were spent during this encounter, greater than 50% was spent in direct counseling and/or coordination of care.     Kervin Jaquez M.D., M.S., F.A.C.P.  Hematology and Oncology Attending  [facility]

## 2020-08-24 NOTE — Clinical Note
RTC 3 months with CBC, CMP ([place] a few days before visit with me) and to see me (virtual visit is fine).

## 2020-08-28 NOTE — TELEPHONE ENCOUNTER
----- Message from Dahlia Comer sent at 8/28/2020 12:11 PM CDT -----  Contact: self/[phone]  Patient would like consult with nurse regarding a refill on these medication (ALPRAZolam (XANAX) 0.5 MG tablet and Ambien ). And would like it sent to   [address] - [address] [facility]  [address] [address] [address]  Phone: [phone] Fax: [phone]  Thanks/ar

## 2020-08-30 RX ORDER — ALPRAZOLAM 0.5 MG/1
TABLET ORAL
Qty: 30 TABLET | Refills: 5 | Status: SHIPPED | OUTPATIENT
Start: 2020-08-30 | End: 2021-03-25 | Stop reason: SDUPTHER

## 2020-09-07 ENCOUNTER — PATIENT OUTREACH (OUTPATIENT)
Dept: ADMINISTRATIVE | Facility: OTHER | Age: 70
End: 2020-09-07

## 2020-09-07 NOTE — PROGRESS NOTES
Health Maintenance Due   Topic Date Due    Shingles Vaccine (1 of 2) 08/23/2000    Influenza Vaccine (1) 08/01/2020     Updates were requested from care everywhere.  Chart was reviewed for overdue Proactive [facility] Encounters ([facility]) topics (CRS, Breast Cancer Screening, Eye exam)  Health Maintenance has been updated.  LIN[facility] immunization registry triggered.  Immunizations were reconciled.

## 2020-09-08 ENCOUNTER — OFFICE VISIT (OUTPATIENT)
Dept: OPHTHALMOLOGY | Facility: CLINIC | Age: 70
End: 2020-09-08
Payer: MEDICARE

## 2020-09-08 DIAGNOSIS — Z96.1 PSEUDOPHAKIA OF BOTH EYES: ICD-10-CM

## 2020-09-08 DIAGNOSIS — I10 ESSENTIAL HYPERTENSION: Primary | ICD-10-CM

## 2020-09-08 DIAGNOSIS — H04.123 DRY EYES, BILATERAL: ICD-10-CM

## 2020-09-08 DIAGNOSIS — H52.4 BILATERAL PRESBYOPIA: ICD-10-CM

## 2020-09-08 PROCEDURE — 92015 DETERMINE REFRACTIVE STATE: CPT | Mod: ,,, | Performed by: OPTOMETRIST

## 2020-09-08 PROCEDURE — 92004 PR EYE EXAM, NEW PATIENT,COMPREHESV: ICD-10-PCS | Mod: S$PBB,,, | Performed by: OPTOMETRIST

## 2020-09-08 PROCEDURE — 92004 COMPRE OPH EXAM NEW PT 1/>: CPT | Mod: S$PBB,,, | Performed by: OPTOMETRIST

## 2020-09-08 PROCEDURE — 99213 OFFICE O/P EST LOW 20 MIN: CPT | Mod: PBBFAC | Performed by: OPTOMETRIST

## 2020-09-08 PROCEDURE — 99999 PR PBB SHADOW E&M-EST. PATIENT-LVL III: ICD-10-PCS | Mod: PBBFAC,,, | Performed by: OPTOMETRIST

## 2020-09-08 PROCEDURE — 99999 PR PBB SHADOW E&M-EST. PATIENT-LVL III: CPT | Mod: PBBFAC,,, | Performed by: OPTOMETRIST

## 2020-09-08 PROCEDURE — 92015 PR REFRACTION: ICD-10-PCS | Mod: ,,, | Performed by: OPTOMETRIST

## 2020-09-08 RX ORDER — AMOXICILLIN 250 MG
1 CAPSULE ORAL
COMMUNITY
Start: 2020-06-12 | End: 2023-12-04

## 2020-09-08 NOTE — PROGRESS NOTES
HPI     pts last eye exam 3 yrs ago  Diagnosed with kidney cancer, nodules in lungs.  No dm  Uses readers   Dry eyes   pciol ou  RK sx  Many years ago    Last edited by Brandt Hou, OD on 9/8/2020  2:46 PM. (History)            Assessment /Plan     For exam results, see Encounter Report.    Essential hypertension    Dry eyes, bilateral    Pseudophakia of both eyes    Bilateral presbyopia      No HTN Retinopathy    Worksheet given. Discussed Dry Eyes in detail including Artificial Tears, lubricants, and Omega 3 Fish Oils.    Stable IOL OU.    Dispense Final Rx for glasses.  RTC 1 year  Discussed above and answered questions.

## 2020-09-15 ENCOUNTER — CLINICAL SUPPORT (OUTPATIENT)
Dept: INTERNAL MEDICINE | Facility: CLINIC | Age: 70
End: 2020-09-15
Payer: MEDICARE

## 2020-09-15 PROCEDURE — G0008 ADMIN INFLUENZA VIRUS VAC: HCPCS | Mod: PBBFAC

## 2020-09-15 PROCEDURE — 90694 VACC AIIV4 NO PRSRV 0.5ML IM: CPT | Mod: PBBFAC

## 2020-09-15 PROCEDURE — 99212 OFFICE O/P EST SF 10 MIN: CPT | Mod: PBBFAC

## 2020-09-15 PROCEDURE — 99999 PR PBB SHADOW E&M-EST. PATIENT-LVL II: ICD-10-PCS | Mod: PBBFAC,,,

## 2020-09-15 PROCEDURE — 99999 PR PBB SHADOW E&M-EST. PATIENT-LVL II: CPT | Mod: PBBFAC,,,

## 2020-09-15 NOTE — PROGRESS NOTES
"Pharmacy Chemotherapy Verification:    DX: Recurrent breast cancer    Cycle: Herceptin maintenance  Previous treatment = 12/12/18    Regimen: Maintenance trastuzumab (Herceptin)  *Dosing Reference*  Trastuzumab 8 mg/kg IV loading dose over 90 min on C1D1  Trastuzumab 6 mg/kg IV over 30 min on day 1 beginning Cycle 2  q21 days until DP/UT  NCCN Guidelines for Breast cancer V.1.2018  Barbie JUARES et al - Onkologie. 2010;33(8-9):425-30. doi: 10.1159/766403269. Epub 2010 Jul 27.    Allergies:Patient has no known allergies.  BP (!) 91/53   Pulse 86   Temp 36.4 °C (97.5 °F) (Temporal)   Resp 18   Ht 1.635 m (5' 4.37\")   Wt 72.6 kg (160 lb 0.9 oz)   LMP  (LMP Unknown)   SpO2 96%   BMI 27.16 kg/m²   Body surface area is 1.82 meters squared.     ECHO 10/29/18   LVEF ~ 60%    No labs required.      Trastuzumab (Herceptin) 6 mg/kg  x 72.6 kg = 436  mg    Rounded to vial size (within 10%) per dose rounding protocol.    OK to treat with final dose = 450 mg IV      Rosa Corrales, PharmD  " PPatient here for her annual high dose flu vaccine. Administered as per written guideline order. See immunizations tab for further details.

## 2020-09-24 ENCOUNTER — OFFICE VISIT (OUTPATIENT)
Dept: INTERNAL MEDICINE | Facility: CLINIC | Age: 70
End: 2020-09-24
Payer: MEDICARE

## 2020-09-24 VITALS
HEART RATE: 80 BPM | WEIGHT: 187 LBS | DIASTOLIC BLOOD PRESSURE: 74 MMHG | SYSTOLIC BLOOD PRESSURE: 136 MMHG | BODY MASS INDEX: 30.65 KG/M2

## 2020-09-24 DIAGNOSIS — E03.9 HYPOTHYROIDISM, UNSPECIFIED TYPE: ICD-10-CM

## 2020-09-24 DIAGNOSIS — R91.8 MULTIPLE LUNG NODULES ON CT: Chronic | ICD-10-CM

## 2020-09-24 DIAGNOSIS — K21.9 GASTROESOPHAGEAL REFLUX DISEASE WITHOUT ESOPHAGITIS: ICD-10-CM

## 2020-09-24 DIAGNOSIS — I10 ESSENTIAL HYPERTENSION: Primary | ICD-10-CM

## 2020-09-24 DIAGNOSIS — C64.1 CARCINOMA OF RIGHT KIDNEY: ICD-10-CM

## 2020-09-24 DIAGNOSIS — E78.00 HYPERCHOLESTEREMIA: ICD-10-CM

## 2020-09-24 PROCEDURE — 99214 OFFICE O/P EST MOD 30 MIN: CPT | Mod: 95,,, | Performed by: FAMILY MEDICINE

## 2020-09-24 PROCEDURE — 99214 PR OFFICE/OUTPT VISIT, EST, LEVL IV, 30-39 MIN: ICD-10-PCS | Mod: 95,,, | Performed by: FAMILY MEDICINE

## 2020-09-24 RX ORDER — ZOLPIDEM TARTRATE 5 MG/1
5 TABLET ORAL NIGHTLY PRN
Qty: 30 TABLET | Refills: 0 | Status: SHIPPED | OUTPATIENT
Start: 2020-09-24 | End: 2021-03-25 | Stop reason: SDUPTHER

## 2020-09-24 RX ORDER — FLUTICASONE PROPIONATE 50 MCG
2 SPRAY, SUSPENSION (ML) NASAL DAILY
Qty: 3 ML | Refills: 3 | Status: ON HOLD | OUTPATIENT
Start: 2020-09-24 | End: 2021-06-11 | Stop reason: SDUPTHER

## 2020-09-24 NOTE — PROGRESS NOTES
Subjective:       Patient ID: Sherrill Avery is a . female.    Chief Complaint: Multiple issues see below    HPI The patient location is:home  The chief complaint leading to consultation is in hpi  Visit type: Virtual visit with synchronous audio and video  Total time spent with patient: 20 mins  Each patient to whom he or she provides medical services by telemedicine is:  (1) informed of the relationship between the physician and patient and the respective role of any other health care provider with respect to management of the patient; and (2) notified that he or she may decline to receive medical services by telemedicine and may withdraw from such care at any time.        Follow-upfor multiple issues update with oncologist at [facility] followed for renal cell carcinoma with metastatic lung nodules hx and dr sheryl VILLANUEVA; since last visit no inc size via last  CT 8/20    Hypertension: blood pressures typically normal. Tolerating medicine.     GERD asympt. Tolerating medication. No swallowing problems;sees mdand. Gi' req rf prilosec    Chronic anxiety/insomnia uses either benzo or ambien few times per week prev dr yusuf but stable enough felt ok for primary care to resume rxing for this and no need to f/u unless problems. She doesn't take benzo same day ambien. Knows potential problems with these interacting;mood good;tried ssris etc but intol and current regimen working well per her and psych.; takes 4-5 x /months    Hypothyroidism:  Tolerating med. Asympt; sees dr block ;nl tsh aug      infreq migraine    bilat knee pain dxd djd hAD KNEE injxns and much better now; after this back pain also resolved; last televist mabel akers 2020      Memory doing ok unless stressors but she and md and.  plan mri soon when rechks early jan    Wt loss resolved    Past Medical History:   Diagnosis Date    Anxiety     Arthritis     Cancer of kidney 8/2/2013    dr faye    Ectopic pregnancy     Eye infection     GERD  (gastroesophageal reflux disease)     Hiatal hernia     History of kidney cancer     Hypercholesteremia     Hypertension     Hypothyroid     dr block q 6 months    Insomnia     Migraine headache     Renal cell cancer     Respiratory arrest     due to anesthia    Rosacea     Sleep apnea     Thyroid nodule     dr block    Urinary tract infection      Past Surgical History:   Procedure Laterality Date    AUGMENTATION OF BREAST      breast implants      BREAST SURGERY Bilateral 1996    saline implants    COLONOSCOPY  2007    HYSTERECTOMY      ectopic pregnancy x 2, with LSO    LAPAROSCOPIC NEPHRECTOMY, HAND ASSISTED  07/25/2013    NEPHRECTOMY      OOPHORECTOMY      x1    right ganglion cyst      throat polyp      TRANSOBTURATOR SLING  2011    with RSO for persistent complex cyst & MARGOT; done by yris    UPPER GASTROINTESTINAL ENDOSCOPY  2007     Family History   Problem Relation Age of Onset    Diabetes Mother     Hypertension Mother     Heart disease Mother     Cancer Father         lung    Migraines Father     Glaucoma Paternal Grandmother     Glaucoma Sister     Thyroid disease Neg Hx     Asthma Neg Hx      Social History     Socioeconomic History    Marital status:      Spouse name: KAIDEN    Number of children: 5    Years of education: Not on file    Highest education level: Not on file   Occupational History    Occupation: retired     Comment: Dance Instructor   Social Needs    Financial resource strain: Not on file    Food insecurity:     Worry: Not on file     Inability: Not on file    Transportation needs:     Medical: Not on file     Non-medical: Not on file   Tobacco Use    Smoking status: Never Smoker    Smokeless tobacco: Never Used   Substance and Sexual Activity    Alcohol use: Yes     Alcohol/week: 0.0 standard drinks     Comment: social    Drug use: No    Sexual activity: Yes     Partners: Male     Birth control/protection: Surgical   Lifestyle     Physical activity:     Days per week: Not on file     Minutes per session: Not on file    Stress: Not on file   Relationships    Social connections:     Talks on phone: Not on file     Gets together: Not on file     Attends Baptism service: Not on file     Active member of club or organization: Not on file     Attends meetings of clubs or organizations: Not on file     Relationship status: Not on file   Other Topics Concern    Not on file   Social History Narrative    Patient is a retired dance instructor and live with .       Review of Systems  Cardiovascular: no chest pain  Chest: no shortness of breath  Abd: no abd pain  Remainder review of systems negative    Objective:     Physical Exam   Constitutional: She is oriented to person, place, and time. She appears well-developed and well-nourished. No distress.   gen nad    a and o x 3  Psychiatric: She has a normal mood and affect. Her behavior is normal. Judgment and thought content normal.   Nursing note and vitals reviewed.      Assessment:               3. Chronic neck pain hx   4. Chronic pain of lower extremity, unspecified laterality    5. Anxiety    6. Nontoxic multinodular goiter    7. Multiple lung nodules on CT    8. Hypercholesteremia    9. Clear cell carcinoma with lung metastasis    10. Essential hypertension    11. Hypothyroidism, unspecified type    12. Carcinoma of right kidney    13. Chronic insomnia        Plan:     Cancel oct lab and move lipid to nov lab  *f/u oncol and endocrine when due    Monitor wt/Notify if wt dec more (note this is in comparison to clinic wts)      F/u ortho if current med regimen w tpical volt not helping as much  Has F/u me  same day time  (11/20) q 6 months xanax  Avoid taking alprazolam within several hours of zolpidem    rf ambien. infreq use    F/u 6 months      Essential hypertension    Hypothyroidism, unspecified type    Carcinoma of right kidney    Multiple lung nodules on  CT    Hypercholesteremia    Gastroesophageal reflux disease without esophagitis    Other orders  -     zolpidem (AMBIEN) 5 MG Tab; Take 1 tablet (5 mg total) by mouth nightly as needed.  Dispense: 30 tablet; Refill: 0  -     fluticasone propionate (FLONASE) 50 mcg/actuation nasal spray; 2 sprays (100 mcg total) by Each Nostril route once daily.  Dispense: 3 mL; Refill: 3

## 2020-09-26 ENCOUNTER — PATIENT MESSAGE (OUTPATIENT)
Dept: HEMATOLOGY/ONCOLOGY | Facility: CLINIC | Age: 70
End: 2020-09-26

## 2020-10-01 ENCOUNTER — PATIENT MESSAGE (OUTPATIENT)
Dept: OTHER | Facility: OTHER | Age: 70
End: 2020-10-01

## 2020-10-07 ENCOUNTER — OFFICE VISIT (OUTPATIENT)
Dept: HEMATOLOGY/ONCOLOGY | Facility: CLINIC | Age: 70
End: 2020-10-07
Payer: MEDICARE

## 2020-10-07 DIAGNOSIS — C80.1 CLEAR CELL CARCINOMA: Primary | ICD-10-CM

## 2020-10-07 PROCEDURE — 99215 OFFICE O/P EST HI 40 MIN: CPT | Mod: 95,,, | Performed by: INTERNAL MEDICINE

## 2020-10-07 PROCEDURE — 99215 PR OFFICE/OUTPT VISIT, EST, LEVL V, 40-54 MIN: ICD-10-PCS | Mod: 95,,, | Performed by: INTERNAL MEDICINE

## 2020-10-07 NOTE — Clinical Note
Xrays of femer and tibia/fibula in [place] next week.  RTC mid-January with CBC, CMP ([place] a few days before visit with me) and to see me (virtual visit is fine).

## 2020-10-07 NOTE — PROGRESS NOTES
Subjective:       Patient ID: Sherrill Avery    Chief Complaint:  Met ccRCC    HPI     Sherrill Avery is a 70 y.o. 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 She is s/p left VATS LLL wedge resection on 1/29/20.     Here to follow-up Currently NOT on therapy. Disease is stable and indolent.  Feeling great.  Worsening R  knee pain which radiates up and down leg. otherwise feeling well.       Pt seeing Sister Janessa, the healing nun.  Saw Dr. Leslie recently at [facility], he wants to restage in January otherwise her metastatic dz is stable off treatment.       Oncologic History:    Clear cell carcinoma of right kidney.    6/30/2013 Initial Diagnosis   Presented with gross hematuria. CT CAP: 7.2-cm mass occupying the upper two thirds of the right kidney. 2.8-cm mass in the posterior aspect of the urinary bladder c/w TCC.    7/25/2013 Surgery   Right radical nephrectomy Pathology: 7.5-cm clear cell RCC Yanet nuclear grade 2, with microscopic extension into perinephric adipose tissue and with tumor in the renal margin.  pT3a pN0 pMX    10/16/2013 - No Evidence of Disease ([facility])   CT AP: No CT evidence of recurrence or metastatic disease    8/4/2015 - No Evidence of Disease ([facility])   CT AP: No CT evidence of recurrence or metastatic disease    12/1/2016 - [facility] with concern of recurrence   CT AP: Mew less than 4 mm pulmonary nodule in the anterior basal segment of the RLL. Stable 4 mm pulmonary nodule posterior basal segment RLL.     3/1/2017 Relapse/Recurrence   CT Chest: Multiple new subcm pulmonary nodules in the RLL and one in the right middle measuring up to 6 mm suspicious for metastatic disease.     2/8/2019 - [facility] with concern of recurrence   1. Small volume pulmonary metastases are slightly larger compared to the prior study.     2. No recurrent or metastatic disease in the abdomen or pelvis.    2/14/2019 Biopsy   CT-guided biopsy of a 1 cm left lower  lobe lesion   Pathology: Small focus of atypical adenomatous hyperplasia with no tumor present. PAX-8 negative.    She was initally noted to have spontaneous remission of the lung nodules in the absence of any systemic treatment. However, in 2019, the lung nodules at started to increase in size but were still mostly subcentimeter in greatest dimension. In 2/2019, when the left lower lobe lesion increased to 1.4 cm. IR biopsied a peripheral left lung lesion which was PAX-8 negative thought to be a small focus of atypical adenomatous hyperplasia. We therefore brought her back after only 3 months to monitor those lesions carefully. She was referred to Dr. King who thinks the lesions represent indolent RCC metastases. She underwent resection and RCC was confirmed on pathology at [facility].      Review of Systems   Constitutional: Negative for activity change, appetite change, chills, fatigue, fever and unexpected weight change.   HENT: Negative for congestion, hearing loss, mouth sores, sore throat, tinnitus and voice change.    Eyes: Negative for pain and visual disturbance.   Respiratory: Negative for cough, shortness of breath and wheezing.    Cardiovascular: Negative for chest pain, palpitations and leg swelling.   Gastrointestinal: Negative for abdominal pain, constipation, diarrhea, nausea and vomiting.   Endocrine: Negative for cold intolerance and heat intolerance.   Genitourinary: Negative for difficulty urinating, dyspareunia, dysuria, frequency, menstrual problem, urgency, vaginal bleeding, vaginal discharge and vaginal pain.   Musculoskeletal: Positive for arthralgias. Negative for back pain and myalgias.   Skin: Negative for color change, rash and wound.   Allergic/Immunologic: Negative for environmental allergies and food allergies.   Neurological: Negative for weakness, numbness and headaches.   Hematological: Negative for adenopathy. Does not bruise/bleed easily.   Psychiatric/Behavioral: Negative for  agitation, confusion, hallucinations and sleep disturbance. The patient is not nervous/anxious.    All other systems reviewed and are negative.          Allergies:  Review of patient's allergies indicates:   Allergen Reactions    Talwin compound Anxiety     hallucinations/anxiety    Tramadol Itching    Codeine Itching    Morphine Itching    Naproxen Itching     Ibuprofen ok    Wal-phed [pseudoephedrine hcl] Itching    Buspirone Anxiety    Morpholine analogues Anxiety and Itching    Nexium [esomeprazole magnesium]     Talwin [pentazocine lactate] Anxiety       Medications:  Current Outpatient Medications   Medication Sig Dispense Refill    ALPRAZolam (XANAX) 0.5 MG tablet Take daily as needed for anxiety. 30 tablet 5    diclofenac sodium (VOLTAREN) 1 % Gel Apply 2 g topically daily as needed. 100 g 11    diflorasone-emollient (APEXICON E) 0.05 % Crea Apply 1 application topically once daily. for 7 days 30 g 5    estradioL (ESTRACE) 1 MG tablet TAKE 1 TABLET EVERY DAY 90 tablet 3    fluticasone propionate (FLONASE) 50 mcg/actuation nasal spray 2 sprays (100 mcg total) by Each Nostril route once daily. 3 mL 3    gabapentin (NEURONTIN) 300 MG capsule       hydrocortisone 2.5 % cream Apply topically 2 (two) times daily. apply to affected area for 7 days 20 g 5    levothyroxine (SYNTHROID) 112 MCG tablet Take 112 mcg by mouth.      metronidazole 1% (METROGEL) 1 % Gel Apply 1 application topically once daily. For rosacea 180 g 4    omeprazole (PRILOSEC) 40 MG capsule Take 1 capsule (40 mg total) by mouth once daily. 90 capsule 4    ondansetron (ZOFRAN) 8 MG tablet Take 8 mg by mouth.      oxyCODONE-acetaminophen (PERCOCET) 7.5-325 mg per tablet Take 1 tablet by mouth every 6 (six) hours as needed.       senna-docusate 8.6-50 mg (PERICOLACE) 8.6-50 mg per tablet Take 1 tablet by mouth.      zolpidem (AMBIEN) 5 MG Tab Take 1 tablet (5 mg total) by mouth nightly as needed. 30 tablet 0     No current  facility-administered medications for this visit.        PMH:  Past Medical History:   Diagnosis Date    Anxiety     Arthritis     Cancer of kidney 8/2/2013    dr faye    Ectopic pregnancy     Eye infection     GERD (gastroesophageal reflux disease)     Hiatal hernia     History of kidney cancer     Hypercholesteremia     Hypertension     Hypothyroid     dr block q 6 months    Insomnia     Migraine headache     Renal cell cancer     Respiratory arrest     due to anesthia    Rosacea     Sleep apnea     Thyroid nodule     dr block    Urinary tract infection        PSH:  Past Surgical History:   Procedure Laterality Date    AUGMENTATION OF BREAST      breast implants      BREAST SURGERY Bilateral 1996    saline implants    COLONOSCOPY  2007    HYSTERECTOMY      ectopic pregnancy x 2, with LSO    LAPAROSCOPIC NEPHRECTOMY, HAND ASSISTED  07/25/2013    NEPHRECTOMY      OOPHORECTOMY      x1    right ganglion cyst      throat polyp      TRANSOBTURATOR SLING  2011    with RSO for persistent complex cyst & MARGOT; done by yris    UPPER GASTROINTESTINAL ENDOSCOPY  2007       FamHx:  Family History   Problem Relation Age of Onset    Diabetes Mother     Hypertension Mother     Heart disease Mother     Cancer Father         lung    Migraines Father     Glaucoma Paternal Grandmother     Glaucoma Sister     Thyroid disease Neg Hx     Asthma Neg Hx        SocHx:  Social History     Socioeconomic History    Marital status:      Spouse name: KAIDEN    Number of children: 5    Years of education: Not on file    Highest education level: Not on file   Occupational History    Occupation: retired     Comment: Dance Instructor   Social Needs    Financial resource strain: Not on file    Food insecurity     Worry: Not on file     Inability: Not on file    Transportation needs     Medical: Not on file     Non-medical: Not on file   Tobacco Use    Smoking status: Never Smoker     Smokeless tobacco: Never Used   Substance and Sexual Activity    Alcohol use: Yes     Alcohol/week: 0.0 standard drinks     Comment: social    Drug use: No    Sexual activity: Yes     Partners: Male     Birth control/protection: Surgical   Lifestyle    Physical activity     Days per week: Not on file     Minutes per session: Not on file    Stress: Not on file   Relationships    Social connections     Talks on phone: Not on file     Gets together: Not on file     Attends Hinduism service: Not on file     Active member of club or organization: Not on file     Attends meetings of clubs or organizations: Not on file     Relationship status: Not on file   Other Topics Concern    Not on file   Social History Narrative    Patient is a retired dance instructor and live with .       Objective:      LABS:  WBC   Date Value Ref Range Status   08/21/2020 5.87 3.90 - 12.70 K/uL Final     Hemoglobin   Date Value Ref Range Status   08/21/2020 13.9 12.0 - 16.0 g/dL Final     Hematocrit   Date Value Ref Range Status   08/21/2020 42.6 37.0 - 48.5 % Final     Platelets   Date Value Ref Range Status   08/21/2020 206 150 - 350 K/uL Final       Chemistry        Component Value Date/Time     08/21/2020 1044    K 5.1 08/21/2020 1044     08/21/2020 1044    CO2 27 08/21/2020 1044    BUN 12 08/21/2020 1044    CREATININE 0.9 08/21/2020 1044    CREATININE 0.9 08/21/2020 1044    GLU 95 08/21/2020 1044        Component Value Date/Time    CALCIUM 8.7 08/21/2020 1044    ALKPHOS 72 08/21/2020 1044    AST 14 08/21/2020 1044    ALT 12 08/21/2020 1044    BILITOT 0.5 08/21/2020 1044    ESTGFRAFRICA >60 08/21/2020 1044    ESTGFRAFRICA >60 08/21/2020 1044    EGFRNONAA >60 08/21/2020 1044    EGFRNONAA >60 08/21/2020 1044              Assessment:       No diagnosis found.      Plan:       1. Metastatic ccRCC:    Currently on no systemic treatment. S/p wedge resection [facility].  Scans with Dr. Leslie at [facility] stable.   Will hold off  initiating systemic therapy until progression is seen, especially given patient's good clinical response.    Seeing Dr. Leslie again in early January.   Will obtain Xrays of R leg given pain, but this may be musculosketal pain related to knee.        Xrays of femer and tibia/fibula in [place] next week.  RTC mid-January with CBC, CMP ([place] a few days before visit with me) and to see me (virtual visit is fine).     The patient location is: home  The chief complaint leading to consultation is: urgent care visit  Visit type: virtual with audio and visual  Total time spent with patient: More than 40 mins were spent during this encounter, greater than 50% was spent in direct counseling and/or coordination of care.     Each patient to whom he or she provides medical services by telemedicine is:  (1) informed of the relationship between the physician and patient and the respective role of any other health care provider with respect to management of the patient; and (2) notified that he or she may decline to receive medical services by telemedicine and may withdraw from such care at any time.    Patient is in agreement with the proposed treatment plan. All questions were answered to the patient's satisfaction. Pt knows to call clinic if anything is needed before the next clinic visit.    More than 40 mins were spent during this encounter, greater than 50% was spent in direct counseling and/or coordination of care.     Kervin Jaquez M.D., M.S., F.A.C.P.  Hematology and Oncology Attending  [facility] and [facility]

## 2020-10-14 ENCOUNTER — HOSPITAL ENCOUNTER (OUTPATIENT)
Dept: RADIOLOGY | Facility: HOSPITAL | Age: 70
Discharge: HOME | End: 2020-10-14
Attending: INTERNAL MEDICINE
Payer: MEDICARE

## 2020-10-14 DIAGNOSIS — C80.1 CLEAR CELL CARCINOMA: ICD-10-CM

## 2020-10-14 PROCEDURE — 73590 X-RAY EXAM OF LOWER LEG: CPT | Mod: TC,LT

## 2020-10-14 PROCEDURE — 73552 X-RAY EXAM OF FEMUR 2/>: CPT | Mod: TC,RT

## 2020-11-19 ENCOUNTER — OFFICE VISIT (OUTPATIENT)
Dept: ORTHOPEDICS | Facility: CLINIC | Age: 70
End: 2020-11-19
Payer: MEDICARE

## 2020-11-19 VITALS
HEIGHT: 60 IN | HEART RATE: 90 BPM | WEIGHT: 187 LBS | BODY MASS INDEX: 36.71 KG/M2 | DIASTOLIC BLOOD PRESSURE: 71 MMHG | SYSTOLIC BLOOD PRESSURE: 124 MMHG

## 2020-11-19 DIAGNOSIS — G89.29 CHRONIC BILATERAL LOW BACK PAIN WITH RIGHT-SIDED SCIATICA: ICD-10-CM

## 2020-11-19 DIAGNOSIS — M54.41 CHRONIC BILATERAL LOW BACK PAIN WITH RIGHT-SIDED SCIATICA: ICD-10-CM

## 2020-11-19 DIAGNOSIS — M25.561 PAIN IN BOTH KNEES, UNSPECIFIED CHRONICITY: ICD-10-CM

## 2020-11-19 DIAGNOSIS — M54.16 LUMBAR RADICULOPATHY: ICD-10-CM

## 2020-11-19 DIAGNOSIS — M25.562 PAIN IN BOTH KNEES, UNSPECIFIED CHRONICITY: ICD-10-CM

## 2020-11-19 DIAGNOSIS — M17.0 PRIMARY OSTEOARTHRITIS OF BOTH KNEES: Primary | ICD-10-CM

## 2020-11-19 PROCEDURE — 99214 OFFICE O/P EST MOD 30 MIN: CPT | Mod: S$PBB,,, | Performed by: PHYSICAL MEDICINE & REHABILITATION

## 2020-11-19 PROCEDURE — 99999 PR PBB SHADOW E&M-EST. PATIENT-LVL V: CPT | Mod: PBBFAC,,, | Performed by: PHYSICAL MEDICINE & REHABILITATION

## 2020-11-19 PROCEDURE — 99214 PR OFFICE/OUTPT VISIT, EST, LEVL IV, 30-39 MIN: ICD-10-PCS | Mod: S$PBB,,, | Performed by: PHYSICAL MEDICINE & REHABILITATION

## 2020-11-19 PROCEDURE — 99999 PR PBB SHADOW E&M-EST. PATIENT-LVL V: ICD-10-PCS | Mod: PBBFAC,,, | Performed by: PHYSICAL MEDICINE & REHABILITATION

## 2020-11-19 PROCEDURE — 99215 OFFICE O/P EST HI 40 MIN: CPT | Mod: PBBFAC | Performed by: PHYSICAL MEDICINE & REHABILITATION

## 2020-11-19 NOTE — PATIENT INSTRUCTIONS
Recommend trying Voltaren Gel over the counter, and following the directions on the package.   Remember that you may need to use it consistently for several days before seeing results.        Tylenol up to 3000 mg daily.

## 2020-11-19 NOTE — PROGRESS NOTES
SPORTS MEDICINE / PM&R Follow Up Visit :    Referring Physician: No ref. provider found    Chief Complaint   Patient presents with    Left Knee - Pain    Right Knee - Pain       HPI: This is a 70 y.o.  female being seen in clinic today for evaluation of her bilateral knee pain. She states that her pain radiates down her leg and up to her hip. She states she has trouble sitting, standing, walking and sleeping due to pain.      Since last visit, the symptoms are worsening.  She has not been to formal physical therapy.  States that previous therapy she noticed the therapist would give her exercises and then just walk away.  She states that she does do exercises at home when she feels up to it.  Steroid injections did not provide much relief in the past.  She did not follow-up with the pain clinic although I placed a consult during the summer for her back pain.  She notes the back pain continues to be an issue.     History obtained from patient.    Past family, medical, social, surgical history, and vital signs reviewed in chart.      Review of Systems   Constitutional: Negative for chills, fever and weight loss.   HENT: Negative for hearing loss and sore throat.    Eyes: Negative for blurred vision, photophobia and pain.   Respiratory: Negative for shortness of breath.    Cardiovascular: Negative for chest pain.   Gastrointestinal: Negative for abdominal pain.   Genitourinary: Negative for dysuria.   Musculoskeletal: Positive for joint pain.   Skin: Negative for rash.   Neurological: Negative for tingling and headaches.   Endo/Heme/Allergies: Does not bruise/bleed easily.   Psychiatric/Behavioral: Negative for depression.       General    Nursing note and vitals reviewed.  Constitutional: She is oriented to person, place, and time. She appears well-developed and well-nourished.   HENT:   Head: Normocephalic and atraumatic.   Eyes: Conjunctivae and EOM are normal. Pupils are equal, round, and reactive to light.    Neck: Neck supple.   Cardiovascular: Intact distal pulses.    Pulmonary/Chest: Effort normal. No respiratory distress.   Abdominal: She exhibits no distension.   Neurological: She is alert and oriented to person, place, and time. She has normal reflexes.   Psychiatric: She has a normal mood and affect.     General Musculoskeletal Exam   Gait: normal       Right Knee Exam     Inspection   Erythema: absent  Swelling: absent  Effusion: absent    Tenderness   The patient is tender to palpation of the medial joint line and lateral joint line.    Crepitus   The patient has crepitus of the patella.    Range of Motion   Extension: abnormal Right knee extension grade: full range, but with pain.   Flexion: normal     Tests   Meniscus   Remedios:  Medial - negative Lateral - negative  Ligament Examination Lachman: normal (-1 to 2mm) PCL-Posterior Drawer: normal (0 to 2mm)     MCL - Valgus: normal (0 to 2mm)  LCL - Varus: normal  Patella   Patellar apprehension: negative  Patellar Tracking: normal    Other   Sensation: normal    Left Knee Exam     Inspection   Erythema: absent  Swelling: absent  Effusion: absent    Tenderness   The patient tender to palpation of the lateral joint line and medial joint line.    Crepitus   The patient has crepitus of the patella.    Range of Motion   Extension: abnormal Left knee extension grade: full, but with pain.   Flexion: normal     Tests   Meniscus   Remedios:  Medial - negative Lateral - negative  Stability Lachman: normal (-1 to 2mm) PCL-Posterior Drawer: normal (0 to 2mm)  MCL - Valgus: normal (0 to 2mm)  LCL - Varus: normal (0 to 2mm)  Patella   Patellar apprehension: negative  Patellar Tracking: normal    Other   Sensation: normal    Right Hip Exam   Right hip exam is normal.     Tests   Pain w/ forced internal rotation (CARMEN): absent  Left Hip Exam   Left hip exam is normal.    Tests   Pain w/ forced internal rotation (CARMEN): absent          Muscle Strength   Right Lower  Extremity   Hip Abduction: 4/5   Hip Adduction: 5/5   Hip Flexion: 5/5   Quadriceps:  4/5   Hamstrin/5   Left Lower Extremity   Hip Abduction: 4/5   Hip Adduction: 5/5   Hip Flexion: 5/5   Quadriceps:  4/5   Hamstrin/5     Reflexes     Left Side  Achilles:  2+  Quadriceps:  2+    Right Side   Achilles:  2+  Quadriceps:  2+    Vascular Exam     Right Pulses  Dorsalis Pedis:      2+          Left Pulses  Dorsalis Pedis:      2+                IMPRESSION/PLAN: This is a 70 y.o.  female with:    Primary osteoarthritis of both knees  -     Prior authorization Order    Pain in both knees, unspecified chronicity  -     Prior authorization Order  -     Ambulatory referral/consult to Pain Clinic; Future; Expected date: 2020    Lumbar radiculopathy  -     Ambulatory referral/consult to Pain Clinic; Future; Expected date: 2020    Chronic bilateral low back pain with right-sided sciatica  -     Ambulatory referral/consult to Pain Clinic; Future; Expected date: 2020        The findings were discussed with Sherrill and her  in detail.  We are somewhat limited with medication options.  She is using diclofenac gel, but sounds like she is completely under dosing at.  We discussed the proper way to take it 3 or 4 times daily and to measure out the proper amount.  Since steroid injections did not help, I recommend we try viscosupplementation.  She has Molina Yaakov grade 2-3 changes and she will benefit from Visco.  I again recommended physical therapy but she elected to hold off.  I again recommended referral to Pain Management Clinic, and she elected to try.  She was provided with this plan in writing. All of her questions were answered. She will follow up with me in 4 weeks.     Disclaimer: This note was prepared using a voice recognition system and is likely to have sound alike errors within the text.     Kelsy Alexander M.D.  Sports Medicine

## 2020-12-07 ENCOUNTER — TELEPHONE (OUTPATIENT)
Dept: ORTHOPEDICS | Facility: CLINIC | Age: 70
End: 2020-12-07

## 2020-12-07 NOTE — TELEPHONE ENCOUNTER
Left message for pt to call back regarding her apt on 12/10, dr. Alexander will be out of  Office at the time of her apt. Pt needs to call back regarding rescheduling at another time.

## 2020-12-11 ENCOUNTER — PATIENT MESSAGE (OUTPATIENT)
Dept: OTHER | Facility: OTHER | Age: 70
End: 2020-12-11

## 2020-12-11 ENCOUNTER — PATIENT MESSAGE (OUTPATIENT)
Dept: ADMINISTRATIVE | Facility: OTHER | Age: 70
End: 2020-12-11

## 2020-12-14 ENCOUNTER — PATIENT MESSAGE (OUTPATIENT)
Dept: HEMATOLOGY/ONCOLOGY | Facility: CLINIC | Age: 70
End: 2020-12-14

## 2020-12-14 ENCOUNTER — PATIENT MESSAGE (OUTPATIENT)
Dept: INTERNAL MEDICINE | Facility: CLINIC | Age: 70
End: 2020-12-14

## 2020-12-29 ENCOUNTER — PATIENT MESSAGE (OUTPATIENT)
Dept: HEMATOLOGY/ONCOLOGY | Facility: CLINIC | Age: 70
End: 2020-12-29

## 2020-12-29 DIAGNOSIS — C80.1 CLEAR CELL CARCINOMA: Primary | ICD-10-CM

## 2021-01-04 ENCOUNTER — PATIENT MESSAGE (OUTPATIENT)
Dept: HEMATOLOGY/ONCOLOGY | Facility: CLINIC | Age: 71
End: 2021-01-04

## 2021-01-04 ENCOUNTER — PATIENT MESSAGE (OUTPATIENT)
Dept: INTERNAL MEDICINE | Facility: CLINIC | Age: 71
End: 2021-01-04

## 2021-01-07 ENCOUNTER — TELEPHONE (OUTPATIENT)
Dept: RADIOLOGY | Facility: HOSPITAL | Age: 71
End: 2021-01-07

## 2021-01-08 ENCOUNTER — HOSPITAL ENCOUNTER (OUTPATIENT)
Dept: RADIOLOGY | Facility: HOSPITAL | Age: 71
Discharge: HOME | End: 2021-01-08
Attending: INTERNAL MEDICINE
Payer: MEDICARE

## 2021-01-08 DIAGNOSIS — C80.1 CLEAR CELL CARCINOMA: ICD-10-CM

## 2021-01-08 PROCEDURE — 71260 CT CHEST ABDOMEN PELVIS WITH CONTRAST (XPD): ICD-10-PCS | Mod: 26,,, | Performed by: RADIOLOGY

## 2021-01-08 PROCEDURE — 74177 CT ABD & PELVIS W/CONTRAST: CPT | Mod: 26,,, | Performed by: RADIOLOGY

## 2021-01-08 PROCEDURE — A9698 NON-RAD CONTRAST MATERIALNOC: HCPCS | Performed by: INTERNAL MEDICINE

## 2021-01-08 PROCEDURE — 71260 CT THORAX DX C+: CPT | Mod: 26,,, | Performed by: RADIOLOGY

## 2021-01-08 PROCEDURE — 71260 CT THORAX DX C+: CPT | Mod: TC

## 2021-01-08 PROCEDURE — 74177 CT ABD & PELVIS W/CONTRAST: CPT | Mod: TC

## 2021-01-08 PROCEDURE — 25500020 PHARM REV CODE 255: Performed by: INTERNAL MEDICINE

## 2021-01-08 PROCEDURE — 74177 CT CHEST ABDOMEN PELVIS WITH CONTRAST (XPD): ICD-10-PCS | Mod: 26,,, | Performed by: RADIOLOGY

## 2021-01-08 RX ADMIN — IOHEXOL 1000 ML: 12 SOLUTION ORAL at 09:01

## 2021-01-08 RX ADMIN — IOHEXOL 100 ML: 350 INJECTION, SOLUTION INTRAVENOUS at 11:01

## 2021-01-11 ENCOUNTER — OFFICE VISIT (OUTPATIENT)
Dept: HEMATOLOGY/ONCOLOGY | Facility: CLINIC | Age: 71
End: 2021-01-11
Payer: MEDICARE

## 2021-01-11 DIAGNOSIS — C64.1 CARCINOMA OF RIGHT KIDNEY: ICD-10-CM

## 2021-01-11 DIAGNOSIS — C80.1 CLEAR CELL CARCINOMA: Primary | ICD-10-CM

## 2021-01-11 PROCEDURE — 99215 OFFICE O/P EST HI 40 MIN: CPT | Mod: 95,,, | Performed by: INTERNAL MEDICINE

## 2021-01-11 PROCEDURE — 99215 PR OFFICE/OUTPT VISIT, EST, LEVL V, 40-54 MIN: ICD-10-PCS | Mod: 95,,, | Performed by: INTERNAL MEDICINE

## 2021-01-15 ENCOUNTER — IMMUNIZATION (OUTPATIENT)
Dept: INTERNAL MEDICINE | Facility: CLINIC | Age: 71
End: 2021-01-15
Payer: MEDICARE

## 2021-01-15 DIAGNOSIS — Z23 NEED FOR VACCINATION: Primary | ICD-10-CM

## 2021-01-15 PROCEDURE — 91300 COVID-19, MRNA, LNP-S, PF, 30 MCG/0.3 ML DOSE VACCINE: CPT | Mod: PBBFAC | Performed by: FAMILY MEDICINE

## 2021-02-05 ENCOUNTER — IMMUNIZATION (OUTPATIENT)
Dept: INTERNAL MEDICINE | Facility: CLINIC | Age: 71
End: 2021-02-05
Payer: MEDICARE

## 2021-02-05 DIAGNOSIS — Z23 NEED FOR VACCINATION: Primary | ICD-10-CM

## 2021-02-05 PROCEDURE — 91300 COVID-19, MRNA, LNP-S, PF, 30 MCG/0.3 ML DOSE VACCINE: CPT | Mod: PBBFAC | Performed by: FAMILY MEDICINE

## 2021-02-05 PROCEDURE — 0002A COVID-19, MRNA, LNP-S, PF, 30 MCG/0.3 ML DOSE VACCINE: CPT | Mod: PBBFAC | Performed by: FAMILY MEDICINE

## 2021-03-10 ENCOUNTER — TELEPHONE (OUTPATIENT)
Dept: INTERNAL MEDICINE | Facility: CLINIC | Age: 71
End: 2021-03-10

## 2021-03-10 ENCOUNTER — TELEPHONE (OUTPATIENT)
Dept: OBSTETRICS AND GYNECOLOGY | Facility: CLINIC | Age: 71
End: 2021-03-10

## 2021-03-10 DIAGNOSIS — Z12.31 ENCOUNTER FOR SCREENING MAMMOGRAM FOR MALIGNANT NEOPLASM OF BREAST: Primary | ICD-10-CM

## 2021-03-12 ENCOUNTER — TELEPHONE (OUTPATIENT)
Dept: ORTHOPEDICS | Facility: CLINIC | Age: 71
End: 2021-03-12

## 2021-03-16 ENCOUNTER — PATIENT OUTREACH (OUTPATIENT)
Dept: ADMINISTRATIVE | Facility: OTHER | Age: 71
End: 2021-03-16

## 2021-03-17 ENCOUNTER — OFFICE VISIT (OUTPATIENT)
Dept: ORTHOPEDICS | Facility: CLINIC | Age: 71
End: 2021-03-17
Payer: MEDICARE

## 2021-03-17 VITALS — HEIGHT: 60 IN | WEIGHT: 187 LBS | BODY MASS INDEX: 36.71 KG/M2

## 2021-03-17 DIAGNOSIS — M25.561 PAIN IN BOTH KNEES, UNSPECIFIED CHRONICITY: ICD-10-CM

## 2021-03-17 DIAGNOSIS — M17.0 PRIMARY OSTEOARTHRITIS OF BOTH KNEES: Primary | ICD-10-CM

## 2021-03-17 DIAGNOSIS — M25.562 PAIN IN BOTH KNEES, UNSPECIFIED CHRONICITY: ICD-10-CM

## 2021-03-17 PROCEDURE — 20610 DRAIN/INJ JOINT/BURSA W/O US: CPT | Mod: 50,PBBFAC | Performed by: PHYSICAL MEDICINE & REHABILITATION

## 2021-03-17 PROCEDURE — 99999 PR PBB SHADOW E&M-EST. PATIENT-LVL III: ICD-10-PCS | Mod: PBBFAC,,, | Performed by: PHYSICAL MEDICINE & REHABILITATION

## 2021-03-17 PROCEDURE — 99213 OFFICE O/P EST LOW 20 MIN: CPT | Mod: PBBFAC | Performed by: PHYSICAL MEDICINE & REHABILITATION

## 2021-03-17 PROCEDURE — 20610 LARGE JOINT ASPIRATION/INJECTION: BILATERAL KNEE: ICD-10-PCS | Mod: 50,S$PBB,, | Performed by: PHYSICAL MEDICINE & REHABILITATION

## 2021-03-17 PROCEDURE — 99999 PR PBB SHADOW E&M-EST. PATIENT-LVL III: CPT | Mod: PBBFAC,,, | Performed by: PHYSICAL MEDICINE & REHABILITATION

## 2021-03-17 PROCEDURE — 99499 UNLISTED E&M SERVICE: CPT | Mod: S$PBB,,, | Performed by: PHYSICAL MEDICINE & REHABILITATION

## 2021-03-17 PROCEDURE — 99499 NO LOS: ICD-10-PCS | Mod: S$PBB,,, | Performed by: PHYSICAL MEDICINE & REHABILITATION

## 2021-03-17 RX ADMIN — Medication 20 MG: at 02:03

## 2021-03-24 ENCOUNTER — OFFICE VISIT (OUTPATIENT)
Dept: ORTHOPEDICS | Facility: CLINIC | Age: 71
End: 2021-03-24
Payer: MEDICARE

## 2021-03-24 ENCOUNTER — TELEPHONE (OUTPATIENT)
Dept: ORTHOPEDICS | Facility: CLINIC | Age: 71
End: 2021-03-24

## 2021-03-24 VITALS — HEIGHT: 60 IN | WEIGHT: 187 LBS | BODY MASS INDEX: 36.71 KG/M2

## 2021-03-24 DIAGNOSIS — M25.562 PAIN IN BOTH KNEES, UNSPECIFIED CHRONICITY: ICD-10-CM

## 2021-03-24 DIAGNOSIS — M76.31 ILIOTIBIAL BAND SYNDROME OF RIGHT SIDE: ICD-10-CM

## 2021-03-24 DIAGNOSIS — M25.561 PAIN IN BOTH KNEES, UNSPECIFIED CHRONICITY: ICD-10-CM

## 2021-03-24 DIAGNOSIS — M17.0 PRIMARY OSTEOARTHRITIS OF BOTH KNEES: Primary | ICD-10-CM

## 2021-03-24 PROCEDURE — 20610 DRAIN/INJ JOINT/BURSA W/O US: CPT | Mod: 50,PBBFAC | Performed by: PHYSICAL MEDICINE & REHABILITATION

## 2021-03-24 PROCEDURE — 20610 LARGE JOINT ASPIRATION/INJECTION: BILATERAL KNEE: ICD-10-PCS | Mod: 50,S$PBB,, | Performed by: PHYSICAL MEDICINE & REHABILITATION

## 2021-03-24 PROCEDURE — 99213 PR OFFICE/OUTPT VISIT, EST, LEVL III, 20-29 MIN: ICD-10-PCS | Mod: 25,S$PBB,, | Performed by: PHYSICAL MEDICINE & REHABILITATION

## 2021-03-24 PROCEDURE — 99999 PR PBB SHADOW E&M-EST. PATIENT-LVL III: CPT | Mod: PBBFAC,,, | Performed by: PHYSICAL MEDICINE & REHABILITATION

## 2021-03-24 PROCEDURE — 99213 OFFICE O/P EST LOW 20 MIN: CPT | Mod: 25,S$PBB,, | Performed by: PHYSICAL MEDICINE & REHABILITATION

## 2021-03-24 PROCEDURE — 99999 PR PBB SHADOW E&M-EST. PATIENT-LVL III: ICD-10-PCS | Mod: PBBFAC,,, | Performed by: PHYSICAL MEDICINE & REHABILITATION

## 2021-03-24 PROCEDURE — 99213 OFFICE O/P EST LOW 20 MIN: CPT | Mod: PBBFAC,25 | Performed by: PHYSICAL MEDICINE & REHABILITATION

## 2021-03-24 RX ADMIN — Medication 20 MG: at 04:03

## 2021-03-25 ENCOUNTER — OFFICE VISIT (OUTPATIENT)
Dept: INTERNAL MEDICINE | Facility: CLINIC | Age: 71
End: 2021-03-25
Payer: MEDICARE

## 2021-03-25 ENCOUNTER — HOSPITAL ENCOUNTER (OUTPATIENT)
Dept: RADIOLOGY | Facility: HOSPITAL | Age: 71
Discharge: HOME | End: 2021-03-25
Attending: OBSTETRICS & GYNECOLOGY
Payer: MEDICARE

## 2021-03-25 ENCOUNTER — OFFICE VISIT (OUTPATIENT)
Dept: PODIATRY | Facility: CLINIC | Age: 71
End: 2021-03-25
Payer: MEDICARE

## 2021-03-25 VITALS
OXYGEN SATURATION: 98 % | HEART RATE: 74 BPM | DIASTOLIC BLOOD PRESSURE: 88 MMHG | SYSTOLIC BLOOD PRESSURE: 130 MMHG | TEMPERATURE: 97 F | WEIGHT: 192.25 LBS | HEIGHT: 60 IN | BODY MASS INDEX: 37.74 KG/M2

## 2021-03-25 VITALS — WEIGHT: 192 LBS | BODY MASS INDEX: 37.69 KG/M2 | HEIGHT: 60 IN

## 2021-03-25 DIAGNOSIS — G89.29 CHRONIC RIGHT SHOULDER PAIN: ICD-10-CM

## 2021-03-25 DIAGNOSIS — M43.6 NECK STIFFNESS: ICD-10-CM

## 2021-03-25 DIAGNOSIS — L60.3 ONYCHODYSTROPHY: Primary | ICD-10-CM

## 2021-03-25 DIAGNOSIS — C80.1 CLEAR CELL CARCINOMA: ICD-10-CM

## 2021-03-25 DIAGNOSIS — Z12.31 ENCOUNTER FOR SCREENING MAMMOGRAM FOR MALIGNANT NEOPLASM OF BREAST: ICD-10-CM

## 2021-03-25 DIAGNOSIS — M25.511 CHRONIC RIGHT SHOULDER PAIN: ICD-10-CM

## 2021-03-25 DIAGNOSIS — E03.9 HYPOTHYROIDISM, UNSPECIFIED TYPE: ICD-10-CM

## 2021-03-25 DIAGNOSIS — I10 ESSENTIAL HYPERTENSION: Primary | ICD-10-CM

## 2021-03-25 PROCEDURE — 99214 PR OFFICE/OUTPT VISIT, EST, LEVL IV, 30-39 MIN: ICD-10-PCS | Mod: S$PBB,,, | Performed by: FAMILY MEDICINE

## 2021-03-25 PROCEDURE — 99999 PR PBB SHADOW E&M-EST. PATIENT-LVL IV: CPT | Mod: PBBFAC,,, | Performed by: FAMILY MEDICINE

## 2021-03-25 PROCEDURE — 99213 OFFICE O/P EST LOW 20 MIN: CPT | Mod: PBBFAC,27,25 | Performed by: PODIATRIST

## 2021-03-25 PROCEDURE — 99999 PR PBB SHADOW E&M-EST. PATIENT-LVL IV: ICD-10-PCS | Mod: PBBFAC,,, | Performed by: FAMILY MEDICINE

## 2021-03-25 PROCEDURE — 99214 OFFICE O/P EST MOD 30 MIN: CPT | Mod: S$PBB,,, | Performed by: FAMILY MEDICINE

## 2021-03-25 PROCEDURE — 77067 SCR MAMMO BI INCL CAD: CPT | Mod: 26,,, | Performed by: RADIOLOGY

## 2021-03-25 PROCEDURE — 77067 SCR MAMMO BI INCL CAD: CPT | Mod: TC

## 2021-03-25 PROCEDURE — 99203 PR OFFICE/OUTPT VISIT, NEW, LEVL III, 30-44 MIN: ICD-10-PCS | Mod: 25,S$PBB,, | Performed by: PODIATRIST

## 2021-03-25 PROCEDURE — 99999 PR PBB SHADOW E&M-EST. PATIENT-LVL III: ICD-10-PCS | Mod: PBBFAC,,, | Performed by: PODIATRIST

## 2021-03-25 PROCEDURE — 77067 MAMMO DIGITAL SCREENING BILAT WITH TOMO: ICD-10-PCS | Mod: 26,,, | Performed by: RADIOLOGY

## 2021-03-25 PROCEDURE — 77063 BREAST TOMOSYNTHESIS BI: CPT | Mod: 26,,, | Performed by: RADIOLOGY

## 2021-03-25 PROCEDURE — 11750 NAIL REMOVAL: ICD-10-PCS | Mod: TA,S$PBB,, | Performed by: PODIATRIST

## 2021-03-25 PROCEDURE — 77063 MAMMO DIGITAL SCREENING BILAT WITH TOMO: ICD-10-PCS | Mod: 26,,, | Performed by: RADIOLOGY

## 2021-03-25 PROCEDURE — 99999 PR PBB SHADOW E&M-EST. PATIENT-LVL III: CPT | Mod: PBBFAC,,, | Performed by: PODIATRIST

## 2021-03-25 PROCEDURE — 99203 OFFICE O/P NEW LOW 30 MIN: CPT | Mod: 25,S$PBB,, | Performed by: PODIATRIST

## 2021-03-25 PROCEDURE — 99214 OFFICE O/P EST MOD 30 MIN: CPT | Mod: PBBFAC | Performed by: FAMILY MEDICINE

## 2021-03-25 PROCEDURE — 11750 EXCISION NAIL&NAIL MATRIX: CPT | Mod: PBBFAC | Performed by: PODIATRIST

## 2021-03-25 RX ORDER — ZOLPIDEM TARTRATE 5 MG/1
5 TABLET ORAL NIGHTLY PRN
Qty: 30 TABLET | Refills: 0 | Status: SHIPPED | OUTPATIENT
Start: 2021-03-25 | End: 2021-05-11 | Stop reason: SDUPTHER

## 2021-03-25 RX ORDER — CEPHALEXIN 500 MG/1
500 CAPSULE ORAL EVERY 8 HOURS
Qty: 9 CAPSULE | Refills: 0 | Status: SHIPPED | OUTPATIENT
Start: 2021-03-25 | End: 2021-03-28

## 2021-03-25 RX ORDER — DICLOFENAC SODIUM 10 MG/G
2 GEL TOPICAL DAILY PRN
Qty: 100 G | Refills: 11 | Status: ON HOLD | OUTPATIENT
Start: 2021-03-25 | End: 2021-06-11 | Stop reason: SDUPTHER

## 2021-03-25 RX ORDER — OMEPRAZOLE 40 MG/1
40 CAPSULE, DELAYED RELEASE ORAL DAILY
Qty: 90 CAPSULE | Refills: 4 | Status: SHIPPED | OUTPATIENT
Start: 2021-03-25 | End: 2021-04-28 | Stop reason: SDUPTHER

## 2021-03-25 RX ORDER — ALPRAZOLAM 0.5 MG/1
TABLET ORAL
Qty: 30 TABLET | Refills: 5 | Status: SHIPPED | OUTPATIENT
Start: 2021-03-25 | End: 2021-04-28 | Stop reason: SDUPTHER

## 2021-03-25 RX ORDER — HYDROCORTISONE 25 MG/G
CREAM TOPICAL 2 TIMES DAILY
Qty: 20 G | Refills: 5 | Status: SHIPPED | OUTPATIENT
Start: 2021-03-25 | End: 2021-04-28 | Stop reason: SDUPTHER

## 2021-03-31 ENCOUNTER — OFFICE VISIT (OUTPATIENT)
Dept: ORTHOPEDICS | Facility: CLINIC | Age: 71
End: 2021-03-31
Payer: MEDICARE

## 2021-03-31 VITALS — BODY MASS INDEX: 37.69 KG/M2 | WEIGHT: 192 LBS | HEIGHT: 60 IN

## 2021-03-31 DIAGNOSIS — M17.0 PRIMARY OSTEOARTHRITIS OF BOTH KNEES: Primary | ICD-10-CM

## 2021-03-31 DIAGNOSIS — M25.562 PAIN IN BOTH KNEES, UNSPECIFIED CHRONICITY: ICD-10-CM

## 2021-03-31 DIAGNOSIS — M25.561 PAIN IN BOTH KNEES, UNSPECIFIED CHRONICITY: ICD-10-CM

## 2021-03-31 PROCEDURE — 20610 LARGE JOINT ASPIRATION/INJECTION: BILATERAL KNEE: ICD-10-PCS | Mod: 50,S$PBB,, | Performed by: PHYSICAL MEDICINE & REHABILITATION

## 2021-03-31 PROCEDURE — 99999 PR PBB SHADOW E&M-EST. PATIENT-LVL III: CPT | Mod: PBBFAC,,, | Performed by: PHYSICAL MEDICINE & REHABILITATION

## 2021-03-31 PROCEDURE — 20610 DRAIN/INJ JOINT/BURSA W/O US: CPT | Mod: 50,PBBFAC | Performed by: PHYSICAL MEDICINE & REHABILITATION

## 2021-03-31 PROCEDURE — 99213 OFFICE O/P EST LOW 20 MIN: CPT | Mod: PBBFAC | Performed by: PHYSICAL MEDICINE & REHABILITATION

## 2021-03-31 PROCEDURE — 99499 UNLISTED E&M SERVICE: CPT | Mod: S$PBB,,, | Performed by: PHYSICAL MEDICINE & REHABILITATION

## 2021-03-31 PROCEDURE — 99999 PR PBB SHADOW E&M-EST. PATIENT-LVL III: ICD-10-PCS | Mod: PBBFAC,,, | Performed by: PHYSICAL MEDICINE & REHABILITATION

## 2021-03-31 PROCEDURE — 99499 NO LOS: ICD-10-PCS | Mod: S$PBB,,, | Performed by: PHYSICAL MEDICINE & REHABILITATION

## 2021-03-31 RX ADMIN — Medication 20 MG: at 01:03

## 2021-04-05 ENCOUNTER — PES CALL (OUTPATIENT)
Dept: ADMINISTRATIVE | Facility: CLINIC | Age: 71
End: 2021-04-05

## 2021-04-11 ENCOUNTER — PATIENT MESSAGE (OUTPATIENT)
Dept: INTERNAL MEDICINE | Facility: CLINIC | Age: 71
End: 2021-04-11

## 2021-04-12 ENCOUNTER — TELEPHONE (OUTPATIENT)
Dept: INTERNAL MEDICINE | Facility: CLINIC | Age: 71
End: 2021-04-12

## 2021-04-12 DIAGNOSIS — H91.90 HEARING LOSS, UNSPECIFIED HEARING LOSS TYPE, UNSPECIFIED LATERALITY: Primary | ICD-10-CM

## 2021-04-13 ENCOUNTER — TELEPHONE (OUTPATIENT)
Dept: ORTHOPEDICS | Facility: CLINIC | Age: 71
End: 2021-04-13

## 2021-04-16 ENCOUNTER — TELEPHONE (OUTPATIENT)
Dept: HEMATOLOGY/ONCOLOGY | Facility: CLINIC | Age: 71
End: 2021-04-16

## 2021-04-16 ENCOUNTER — TELEPHONE (OUTPATIENT)
Dept: OBSTETRICS AND GYNECOLOGY | Facility: CLINIC | Age: 71
End: 2021-04-16

## 2021-04-16 ENCOUNTER — PATIENT MESSAGE (OUTPATIENT)
Dept: ORTHOPEDICS | Facility: CLINIC | Age: 71
End: 2021-04-16

## 2021-04-16 ENCOUNTER — PATIENT MESSAGE (OUTPATIENT)
Dept: HEMATOLOGY/ONCOLOGY | Facility: CLINIC | Age: 71
End: 2021-04-16

## 2021-04-17 ENCOUNTER — PATIENT MESSAGE (OUTPATIENT)
Dept: HEMATOLOGY/ONCOLOGY | Facility: CLINIC | Age: 71
End: 2021-04-17

## 2021-04-19 ENCOUNTER — OFFICE VISIT (OUTPATIENT)
Dept: HEMATOLOGY/ONCOLOGY | Facility: CLINIC | Age: 71
End: 2021-04-19
Payer: MEDICARE

## 2021-04-19 DIAGNOSIS — C64.1 CARCINOMA OF RIGHT KIDNEY: ICD-10-CM

## 2021-04-19 DIAGNOSIS — C80.1 CLEAR CELL CARCINOMA: Primary | ICD-10-CM

## 2021-04-19 DIAGNOSIS — Z90.5 HISTORY OF RIGHT NEPHRECTOMY: ICD-10-CM

## 2021-04-19 PROCEDURE — 99215 PR OFFICE/OUTPT VISIT, EST, LEVL V, 40-54 MIN: ICD-10-PCS | Mod: 95,,, | Performed by: INTERNAL MEDICINE

## 2021-04-19 PROCEDURE — 99215 OFFICE O/P EST HI 40 MIN: CPT | Mod: 95,,, | Performed by: INTERNAL MEDICINE

## 2021-04-23 ENCOUNTER — PATIENT MESSAGE (OUTPATIENT)
Dept: HEMATOLOGY/ONCOLOGY | Facility: CLINIC | Age: 71
End: 2021-04-23

## 2021-04-23 ENCOUNTER — PATIENT MESSAGE (OUTPATIENT)
Dept: ORTHOPEDICS | Facility: CLINIC | Age: 71
End: 2021-04-23

## 2021-04-23 DIAGNOSIS — M17.0 PRIMARY OSTEOARTHRITIS OF BOTH KNEES: Primary | ICD-10-CM

## 2021-04-27 ENCOUNTER — TELEPHONE (OUTPATIENT)
Dept: INTERNAL MEDICINE | Facility: CLINIC | Age: 71
End: 2021-04-27

## 2021-04-27 ENCOUNTER — OFFICE VISIT (OUTPATIENT)
Dept: ORTHOPEDICS | Facility: CLINIC | Age: 71
End: 2021-04-27
Payer: MEDICARE

## 2021-04-27 ENCOUNTER — HOSPITAL ENCOUNTER (OUTPATIENT)
Dept: RADIOLOGY | Facility: HOSPITAL | Age: 71
Discharge: HOME | End: 2021-04-27
Attending: ORTHOPAEDIC SURGERY
Payer: MEDICARE

## 2021-04-27 VITALS — BODY MASS INDEX: 38.42 KG/M2 | WEIGHT: 195.69 LBS | HEIGHT: 60 IN

## 2021-04-27 DIAGNOSIS — M17.0 PRIMARY OSTEOARTHRITIS OF BOTH KNEES: Primary | ICD-10-CM

## 2021-04-27 DIAGNOSIS — M17.0 PRIMARY OSTEOARTHRITIS OF BOTH KNEES: ICD-10-CM

## 2021-04-27 DIAGNOSIS — M17.12 PRIMARY OSTEOARTHRITIS OF LEFT KNEE: Primary | ICD-10-CM

## 2021-04-27 DIAGNOSIS — Z01.818 PRE-OP TESTING: ICD-10-CM

## 2021-04-27 PROCEDURE — 73564 XR KNEE ORTHO BILAT WITH FLEXION: ICD-10-PCS | Mod: 26,50,, | Performed by: RADIOLOGY

## 2021-04-27 PROCEDURE — 99214 OFFICE O/P EST MOD 30 MIN: CPT | Mod: S$PBB,,, | Performed by: ORTHOPAEDIC SURGERY

## 2021-04-27 PROCEDURE — 99213 OFFICE O/P EST LOW 20 MIN: CPT | Mod: PBBFAC,25 | Performed by: ORTHOPAEDIC SURGERY

## 2021-04-27 PROCEDURE — 99999 PR PBB SHADOW E&M-EST. PATIENT-LVL III: ICD-10-PCS | Mod: PBBFAC,,, | Performed by: ORTHOPAEDIC SURGERY

## 2021-04-27 PROCEDURE — 73564 X-RAY EXAM KNEE 4 OR MORE: CPT | Mod: TC,50

## 2021-04-27 PROCEDURE — 99214 PR OFFICE/OUTPT VISIT, EST, LEVL IV, 30-39 MIN: ICD-10-PCS | Mod: S$PBB,,, | Performed by: ORTHOPAEDIC SURGERY

## 2021-04-27 PROCEDURE — 73564 X-RAY EXAM KNEE 4 OR MORE: CPT | Mod: 26,50,, | Performed by: RADIOLOGY

## 2021-04-27 PROCEDURE — 99999 PR PBB SHADOW E&M-EST. PATIENT-LVL III: CPT | Mod: PBBFAC,,, | Performed by: ORTHOPAEDIC SURGERY

## 2021-04-28 ENCOUNTER — TELEPHONE (OUTPATIENT)
Dept: ORTHOPEDICS | Facility: CLINIC | Age: 71
End: 2021-04-28

## 2021-04-28 RX ORDER — HYDROCORTISONE 25 MG/G
CREAM TOPICAL 2 TIMES DAILY
Qty: 20 G | Refills: 5 | Status: ON HOLD | OUTPATIENT
Start: 2021-04-28 | End: 2021-06-11 | Stop reason: SDUPTHER

## 2021-04-28 RX ORDER — OMEPRAZOLE 40 MG/1
40 CAPSULE, DELAYED RELEASE ORAL DAILY
Qty: 90 CAPSULE | Refills: 4 | Status: SHIPPED | OUTPATIENT
Start: 2021-04-28 | End: 2021-06-08

## 2021-04-28 RX ORDER — ALPRAZOLAM 0.5 MG/1
TABLET ORAL
Qty: 30 TABLET | Refills: 5 | Status: ON HOLD | OUTPATIENT
Start: 2021-04-28 | End: 2021-06-11 | Stop reason: SDUPTHER

## 2021-05-01 ENCOUNTER — PATIENT MESSAGE (OUTPATIENT)
Dept: ORTHOPEDICS | Facility: CLINIC | Age: 71
End: 2021-05-01

## 2021-05-04 ENCOUNTER — PATIENT MESSAGE (OUTPATIENT)
Dept: ADMINISTRATIVE | Facility: OTHER | Age: 71
End: 2021-05-04

## 2021-05-04 ENCOUNTER — OFFICE VISIT (OUTPATIENT)
Dept: INTERNAL MEDICINE | Facility: CLINIC | Age: 71
End: 2021-05-04
Payer: MEDICARE

## 2021-05-04 ENCOUNTER — PATIENT MESSAGE (OUTPATIENT)
Dept: SURGERY | Facility: HOSPITAL | Age: 71
End: 2021-05-04

## 2021-05-04 VITALS
HEIGHT: 60 IN | DIASTOLIC BLOOD PRESSURE: 80 MMHG | BODY MASS INDEX: 38.18 KG/M2 | HEART RATE: 68 BPM | OXYGEN SATURATION: 96 % | WEIGHT: 194.44 LBS | SYSTOLIC BLOOD PRESSURE: 130 MMHG | TEMPERATURE: 98 F

## 2021-05-04 DIAGNOSIS — J01.90 ACUTE SINUSITIS, RECURRENCE NOT SPECIFIED, UNSPECIFIED LOCATION: Primary | ICD-10-CM

## 2021-05-04 DIAGNOSIS — I10 ESSENTIAL HYPERTENSION: ICD-10-CM

## 2021-05-04 PROCEDURE — 99214 OFFICE O/P EST MOD 30 MIN: CPT | Mod: PBBFAC | Performed by: PHYSICIAN ASSISTANT

## 2021-05-04 PROCEDURE — 99999 PR PBB SHADOW E&M-EST. PATIENT-LVL IV: CPT | Mod: PBBFAC,,, | Performed by: PHYSICIAN ASSISTANT

## 2021-05-04 PROCEDURE — 99214 PR OFFICE/OUTPT VISIT, EST, LEVL IV, 30-39 MIN: ICD-10-PCS | Mod: S$PBB,,, | Performed by: PHYSICIAN ASSISTANT

## 2021-05-04 PROCEDURE — 99999 PR PBB SHADOW E&M-EST. PATIENT-LVL IV: ICD-10-PCS | Mod: PBBFAC,,, | Performed by: PHYSICIAN ASSISTANT

## 2021-05-04 PROCEDURE — 99214 OFFICE O/P EST MOD 30 MIN: CPT | Mod: S$PBB,,, | Performed by: PHYSICIAN ASSISTANT

## 2021-05-04 RX ORDER — AMOXICILLIN AND CLAVULANATE POTASSIUM 875; 125 MG/1; MG/1
1 TABLET, FILM COATED ORAL 2 TIMES DAILY
Qty: 20 TABLET | Refills: 0 | Status: SHIPPED | OUTPATIENT
Start: 2021-05-04 | End: 2021-05-14

## 2021-05-10 ENCOUNTER — TELEPHONE (OUTPATIENT)
Dept: INTERNAL MEDICINE | Facility: CLINIC | Age: 71
End: 2021-05-10

## 2021-05-11 RX ORDER — ZOLPIDEM TARTRATE 5 MG/1
5 TABLET ORAL NIGHTLY PRN
Qty: 90 TABLET | Refills: 0 | Status: ON HOLD | OUTPATIENT
Start: 2021-05-11 | End: 2021-06-11 | Stop reason: SDUPTHER

## 2021-05-15 ENCOUNTER — PATIENT MESSAGE (OUTPATIENT)
Dept: ADMINISTRATIVE | Facility: OTHER | Age: 71
End: 2021-05-15

## 2021-05-17 ENCOUNTER — TELEPHONE (OUTPATIENT)
Dept: INTERNAL MEDICINE | Facility: CLINIC | Age: 71
End: 2021-05-17

## 2021-05-17 ENCOUNTER — OFFICE VISIT (OUTPATIENT)
Dept: INTERNAL MEDICINE | Facility: CLINIC | Age: 71
End: 2021-05-17
Payer: MEDICARE

## 2021-05-17 ENCOUNTER — OFFICE VISIT (OUTPATIENT)
Dept: HEMATOLOGY/ONCOLOGY | Facility: CLINIC | Age: 71
End: 2021-05-17
Payer: MEDICARE

## 2021-05-17 VITALS
BODY MASS INDEX: 31.25 KG/M2 | DIASTOLIC BLOOD PRESSURE: 80 MMHG | SYSTOLIC BLOOD PRESSURE: 124 MMHG | WEIGHT: 194.44 LBS | TEMPERATURE: 98 F | HEIGHT: 66 IN | RESPIRATION RATE: 18 BRPM | HEART RATE: 77 BPM | OXYGEN SATURATION: 98 %

## 2021-05-17 DIAGNOSIS — J32.9 SINUSITIS, UNSPECIFIED CHRONICITY, UNSPECIFIED LOCATION: Primary | ICD-10-CM

## 2021-05-17 DIAGNOSIS — Z90.5 HISTORY OF RIGHT NEPHRECTOMY: ICD-10-CM

## 2021-05-17 DIAGNOSIS — I10 ESSENTIAL HYPERTENSION: ICD-10-CM

## 2021-05-17 DIAGNOSIS — J30.9 ALLERGIC RHINITIS, UNSPECIFIED SEASONALITY, UNSPECIFIED TRIGGER: ICD-10-CM

## 2021-05-17 DIAGNOSIS — M17.10 ARTHRITIS OF KNEE: ICD-10-CM

## 2021-05-17 DIAGNOSIS — C64.1 CARCINOMA OF RIGHT KIDNEY: Primary | ICD-10-CM

## 2021-05-17 DIAGNOSIS — E03.9 HYPOTHYROIDISM, UNSPECIFIED TYPE: ICD-10-CM

## 2021-05-17 PROCEDURE — 99999 PR PBB SHADOW E&M-EST. PATIENT-LVL IV: CPT | Mod: PBBFAC,,, | Performed by: FAMILY MEDICINE

## 2021-05-17 PROCEDURE — 99214 OFFICE O/P EST MOD 30 MIN: CPT | Mod: PBBFAC | Performed by: FAMILY MEDICINE

## 2021-05-17 PROCEDURE — 99999 PR PBB SHADOW E&M-EST. PATIENT-LVL IV: ICD-10-PCS | Mod: PBBFAC,,, | Performed by: FAMILY MEDICINE

## 2021-05-17 PROCEDURE — 99215 PR OFFICE/OUTPT VISIT, EST, LEVL V, 40-54 MIN: ICD-10-PCS | Mod: 95,,, | Performed by: INTERNAL MEDICINE

## 2021-05-17 PROCEDURE — 99215 OFFICE O/P EST HI 40 MIN: CPT | Mod: 95,,, | Performed by: INTERNAL MEDICINE

## 2021-05-17 PROCEDURE — 99214 PR OFFICE/OUTPT VISIT, EST, LEVL IV, 30-39 MIN: ICD-10-PCS | Mod: S$PBB,,, | Performed by: FAMILY MEDICINE

## 2021-05-17 PROCEDURE — 99214 OFFICE O/P EST MOD 30 MIN: CPT | Mod: S$PBB,,, | Performed by: FAMILY MEDICINE

## 2021-05-17 RX ORDER — LEVOFLOXACIN 500 MG/1
500 TABLET, FILM COATED ORAL DAILY
Qty: 14 TABLET | Refills: 0 | Status: SHIPPED | OUTPATIENT
Start: 2021-05-17 | End: 2021-06-08

## 2021-05-17 RX ORDER — FLUOCINONIDE TOPICAL SOLUTION USP, 0.05% 0.5 MG/ML
SOLUTION TOPICAL 2 TIMES DAILY
COMMUNITY

## 2021-05-17 RX ORDER — KETOCONAZOLE 20 MG/ML
SHAMPOO, SUSPENSION TOPICAL
COMMUNITY
Start: 2021-04-26

## 2021-05-17 RX ORDER — FAMOTIDINE 40 MG/1
40 TABLET, FILM COATED ORAL 2 TIMES DAILY PRN
COMMUNITY
Start: 2021-05-06 | End: 2022-04-04

## 2021-05-19 DIAGNOSIS — C80.1 CLEAR CELL CARCINOMA: Primary | ICD-10-CM

## 2021-05-20 ENCOUNTER — HOSPITAL ENCOUNTER (OUTPATIENT)
Dept: RADIOLOGY | Facility: HOSPITAL | Age: 71
Discharge: HOME | End: 2021-05-20
Attending: INTERNAL MEDICINE
Payer: MEDICARE

## 2021-05-20 DIAGNOSIS — C64.1 CARCINOMA OF RIGHT KIDNEY: ICD-10-CM

## 2021-05-20 DIAGNOSIS — Z90.5 HISTORY OF RIGHT NEPHRECTOMY: ICD-10-CM

## 2021-05-20 PROCEDURE — 78306 NM BONE SCAN WHOLE BODY: ICD-10-PCS | Mod: 26,,, | Performed by: RADIOLOGY

## 2021-05-20 PROCEDURE — A9503 TC99M MEDRONATE: HCPCS

## 2021-05-20 PROCEDURE — 78306 BONE IMAGING WHOLE BODY: CPT | Mod: 26,,, | Performed by: RADIOLOGY

## 2021-05-24 ENCOUNTER — EDUCATION (OUTPATIENT)
Dept: ORTHOPEDICS | Facility: CLINIC | Age: 71
End: 2021-05-24

## 2021-05-25 ENCOUNTER — TELEPHONE (OUTPATIENT)
Dept: ORTHOPEDICS | Facility: CLINIC | Age: 71
End: 2021-05-25

## 2021-05-27 ENCOUNTER — OFFICE VISIT (OUTPATIENT)
Dept: ORTHOPEDICS | Facility: CLINIC | Age: 71
End: 2021-05-27
Payer: MEDICARE

## 2021-05-27 ENCOUNTER — HOSPITAL ENCOUNTER (OUTPATIENT)
Dept: RADIOLOGY | Facility: HOSPITAL | Age: 71
Discharge: HOME | End: 2021-05-27
Attending: NURSE PRACTITIONER
Payer: MEDICARE

## 2021-05-27 DIAGNOSIS — M17.12 OSTEOARTHRITIS OF LEFT KNEE, UNSPECIFIED OSTEOARTHRITIS TYPE: Primary | ICD-10-CM

## 2021-05-27 DIAGNOSIS — M17.12 OSTEOARTHRITIS OF LEFT KNEE, UNSPECIFIED OSTEOARTHRITIS TYPE: ICD-10-CM

## 2021-05-27 PROCEDURE — 99213 OFFICE O/P EST LOW 20 MIN: CPT | Mod: PBBFAC,25 | Performed by: NURSE PRACTITIONER

## 2021-05-27 PROCEDURE — 99999 PR PBB SHADOW E&M-EST. PATIENT-LVL III: ICD-10-PCS | Mod: PBBFAC,,, | Performed by: NURSE PRACTITIONER

## 2021-05-27 PROCEDURE — 99499 NO LOS: ICD-10-PCS | Mod: S$PBB,,, | Performed by: NURSE PRACTITIONER

## 2021-05-27 PROCEDURE — 99999 PR PBB SHADOW E&M-EST. PATIENT-LVL III: CPT | Mod: PBBFAC,,, | Performed by: NURSE PRACTITIONER

## 2021-05-27 PROCEDURE — 73560 X-RAY EXAM OF KNEE 1 OR 2: CPT | Mod: 26,LT,, | Performed by: RADIOLOGY

## 2021-05-27 PROCEDURE — 99499 UNLISTED E&M SERVICE: CPT | Mod: S$PBB,,, | Performed by: NURSE PRACTITIONER

## 2021-05-27 PROCEDURE — 73560 X-RAY EXAM OF KNEE 1 OR 2: CPT | Mod: TC,LT

## 2021-05-27 PROCEDURE — 73560 XR KNEE 1 OR 2 VIEW LEFT: ICD-10-PCS | Mod: 26,LT,, | Performed by: RADIOLOGY

## 2021-05-29 DIAGNOSIS — I10 HYPERTENSION, UNSPECIFIED TYPE: ICD-10-CM

## 2021-05-29 DIAGNOSIS — M79.605 PAIN OF LEFT LOWER EXTREMITY: Primary | ICD-10-CM

## 2021-05-31 ENCOUNTER — PATIENT MESSAGE (OUTPATIENT)
Dept: ADMINISTRATIVE | Facility: OTHER | Age: 71
End: 2021-05-31

## 2021-05-31 RX ORDER — POLYETHYLENE GLYCOL 3350 17 G/17G
17 POWDER, FOR SOLUTION ORAL DAILY
Status: CANCELLED | OUTPATIENT
Start: 2021-06-01

## 2021-05-31 RX ORDER — FAMOTIDINE 20 MG/1
20 TABLET, FILM COATED ORAL 2 TIMES DAILY
Status: CANCELLED | OUTPATIENT
Start: 2021-05-31

## 2021-05-31 RX ORDER — CELECOXIB 200 MG/1
200 CAPSULE ORAL DAILY
Status: CANCELLED | OUTPATIENT
Start: 2021-06-01

## 2021-05-31 RX ORDER — AMOXICILLIN 250 MG
1 CAPSULE ORAL 2 TIMES DAILY
Status: CANCELLED | OUTPATIENT
Start: 2021-05-31

## 2021-05-31 RX ORDER — SODIUM CHLORIDE 9 MG/ML
INJECTION, SOLUTION INTRAVENOUS
Status: CANCELLED | OUTPATIENT
Start: 2021-05-31

## 2021-05-31 RX ORDER — BISACODYL 10 MG
10 SUPPOSITORY, RECTAL RECTAL EVERY 12 HOURS PRN
Status: CANCELLED | OUTPATIENT
Start: 2021-05-31

## 2021-05-31 RX ORDER — ACETAMINOPHEN 500 MG
1000 TABLET ORAL
Status: CANCELLED | OUTPATIENT
Start: 2021-05-31

## 2021-05-31 RX ORDER — CEFAZOLIN SODIUM 2 G/50ML
2 SOLUTION INTRAVENOUS
Status: CANCELLED | OUTPATIENT
Start: 2021-05-31

## 2021-05-31 RX ORDER — ROPIVACAINE HYDROCHLORIDE 2 MG/ML
8 INJECTION, SOLUTION EPIDURAL; INFILTRATION; PERINEURAL CONTINUOUS
Status: CANCELLED | OUTPATIENT
Start: 2021-05-31

## 2021-05-31 RX ORDER — PREGABALIN 75 MG/1
75 CAPSULE ORAL NIGHTLY
Status: CANCELLED | OUTPATIENT
Start: 2021-05-31

## 2021-05-31 RX ORDER — FENTANYL CITRATE 50 UG/ML
25 INJECTION, SOLUTION INTRAMUSCULAR; INTRAVENOUS EVERY 5 MIN PRN
Status: CANCELLED | OUTPATIENT
Start: 2021-05-31

## 2021-05-31 RX ORDER — ASPIRIN 81 MG/1
81 TABLET ORAL 2 TIMES DAILY
Status: CANCELLED | OUTPATIENT
Start: 2021-05-31

## 2021-05-31 RX ORDER — OXYCODONE HYDROCHLORIDE 5 MG/1
10 TABLET ORAL
Status: CANCELLED | OUTPATIENT
Start: 2021-05-31

## 2021-05-31 RX ORDER — MORPHINE SULFATE 2 MG/ML
2 INJECTION, SOLUTION INTRAMUSCULAR; INTRAVENOUS
Status: CANCELLED | OUTPATIENT
Start: 2021-05-31

## 2021-05-31 RX ORDER — ACETAMINOPHEN 500 MG
1000 TABLET ORAL EVERY 6 HOURS
Status: CANCELLED | OUTPATIENT
Start: 2021-05-31 | End: 2021-06-02

## 2021-05-31 RX ORDER — CEFAZOLIN SODIUM 2 G/50ML
2 SOLUTION INTRAVENOUS
Status: CANCELLED | OUTPATIENT
Start: 2021-05-31 | End: 2021-06-01

## 2021-05-31 RX ORDER — MIDAZOLAM HYDROCHLORIDE 1 MG/ML
1 INJECTION INTRAMUSCULAR; INTRAVENOUS EVERY 5 MIN PRN
Status: CANCELLED | OUTPATIENT
Start: 2021-05-31

## 2021-05-31 RX ORDER — LIDOCAINE HYDROCHLORIDE 10 MG/ML
1 INJECTION, SOLUTION EPIDURAL; INFILTRATION; INTRACAUDAL; PERINEURAL
Status: CANCELLED | OUTPATIENT
Start: 2021-05-31

## 2021-05-31 RX ORDER — CELECOXIB 200 MG/1
400 CAPSULE ORAL
Status: CANCELLED | OUTPATIENT
Start: 2021-05-31

## 2021-05-31 RX ORDER — MUPIROCIN 20 MG/G
1 OINTMENT TOPICAL 2 TIMES DAILY
Status: CANCELLED | OUTPATIENT
Start: 2021-05-31 | End: 2021-06-05

## 2021-05-31 RX ORDER — PROCHLORPERAZINE EDISYLATE 5 MG/ML
5 INJECTION INTRAMUSCULAR; INTRAVENOUS EVERY 6 HOURS PRN
Status: CANCELLED | OUTPATIENT
Start: 2021-05-31

## 2021-05-31 RX ORDER — OXYCODONE HYDROCHLORIDE 5 MG/1
5 TABLET ORAL
Status: CANCELLED | OUTPATIENT
Start: 2021-05-31

## 2021-05-31 RX ORDER — SODIUM CHLORIDE 0.9 % (FLUSH) 0.9 %
10 SYRINGE (ML) INJECTION
Status: CANCELLED | OUTPATIENT
Start: 2021-05-31

## 2021-05-31 RX ORDER — TALC
6 POWDER (GRAM) TOPICAL NIGHTLY PRN
Status: CANCELLED | OUTPATIENT
Start: 2021-05-31

## 2021-05-31 RX ORDER — NALOXONE HCL 0.4 MG/ML
0.02 VIAL (ML) INJECTION
Status: CANCELLED | OUTPATIENT
Start: 2021-05-31

## 2021-05-31 RX ORDER — SODIUM CHLORIDE 9 MG/ML
INJECTION, SOLUTION INTRAVENOUS CONTINUOUS
Status: CANCELLED | OUTPATIENT
Start: 2021-05-31 | End: 2021-06-01

## 2021-05-31 RX ORDER — ONDANSETRON 2 MG/ML
4 INJECTION INTRAMUSCULAR; INTRAVENOUS EVERY 8 HOURS PRN
Status: CANCELLED | OUTPATIENT
Start: 2021-05-31

## 2021-05-31 RX ORDER — MUPIROCIN 20 MG/G
1 OINTMENT TOPICAL
Status: CANCELLED | OUTPATIENT
Start: 2021-05-31

## 2021-05-31 RX ORDER — PREGABALIN 75 MG/1
75 CAPSULE ORAL
Status: CANCELLED | OUTPATIENT
Start: 2021-05-31

## 2021-06-01 ENCOUNTER — PATIENT MESSAGE (OUTPATIENT)
Dept: SURGERY | Facility: HOSPITAL | Age: 71
End: 2021-06-01

## 2021-06-05 ENCOUNTER — NURSE TRIAGE (OUTPATIENT)
Dept: ADMINISTRATIVE | Facility: CLINIC | Age: 71
End: 2021-06-05

## 2021-06-05 ENCOUNTER — HOSPITAL ENCOUNTER (EMERGENCY)
Facility: HOSPITAL | Age: 71
Discharge: HOME | End: 2021-06-06
Attending: FAMILY MEDICINE
Payer: MEDICARE

## 2021-06-05 DIAGNOSIS — R60.0 LOWER EXTREMITY EDEMA: ICD-10-CM

## 2021-06-05 LAB
ALBUMIN SERPL BCP-MCNC: 3.5 G/DL (ref 3.5–5.2)
ALP SERPL-CCNC: 70 U/L (ref 55–135)
ALT SERPL W/O P-5'-P-CCNC: 15 U/L (ref 10–44)
ANION GAP SERPL CALC-SCNC: 10 MMOL/L (ref 8–16)
ANION GAP SERPL CALC-SCNC: 10 MMOL/L (ref 8–16)
AST SERPL-CCNC: 16 U/L (ref 10–40)
BASOPHILS # BLD AUTO: 0.04 K/UL (ref 0–0.2)
BASOPHILS NFR BLD: 0.5 % (ref 0–1.9)
BILIRUB SERPL-MCNC: 0.2 MG/DL (ref 0.1–1)
BNP SERPL-MCNC: 42 PG/ML (ref 0–99)
BUN SERPL-MCNC: 14 MG/DL (ref 8–23)
BUN SERPL-MCNC: 14 MG/DL (ref 8–23)
CALCIUM SERPL-MCNC: 8.3 MG/DL (ref 8.7–10.5)
CALCIUM SERPL-MCNC: 8.3 MG/DL (ref 8.7–10.5)
CHLORIDE SERPL-SCNC: 108 MMOL/L (ref 95–110)
CHLORIDE SERPL-SCNC: 108 MMOL/L (ref 95–110)
CO2 SERPL-SCNC: 25 MMOL/L (ref 23–29)
CO2 SERPL-SCNC: 25 MMOL/L (ref 23–29)
CREAT SERPL-MCNC: 1 MG/DL (ref 0.5–1.4)
CREAT SERPL-MCNC: 1 MG/DL (ref 0.5–1.4)
DIFFERENTIAL METHOD: ABNORMAL
EOSINOPHIL # BLD AUTO: 0.2 K/UL (ref 0–0.5)
EOSINOPHIL NFR BLD: 2.9 % (ref 0–8)
ERYTHROCYTE [DISTWIDTH] IN BLOOD BY AUTOMATED COUNT: 13.7 % (ref 11.5–14.5)
EST. GFR  (AFRICAN AMERICAN): >60 ML/MIN/1.73 M^2
EST. GFR  (AFRICAN AMERICAN): >60 ML/MIN/1.73 M^2
EST. GFR  (NON AFRICAN AMERICAN): 57 ML/MIN/1.73 M^2
EST. GFR  (NON AFRICAN AMERICAN): 57 ML/MIN/1.73 M^2
GLUCOSE SERPL-MCNC: 96 MG/DL (ref 70–110)
GLUCOSE SERPL-MCNC: 96 MG/DL (ref 70–110)
HCT VFR BLD AUTO: 41.3 % (ref 37–48.5)
HGB BLD-MCNC: 13.1 G/DL (ref 12–16)
IMM GRANULOCYTES # BLD AUTO: 0.03 K/UL (ref 0–0.04)
IMM GRANULOCYTES NFR BLD AUTO: 0.4 % (ref 0–0.5)
LYMPHOCYTES # BLD AUTO: 2.1 K/UL (ref 1–4.8)
LYMPHOCYTES NFR BLD: 25.8 % (ref 18–48)
MCH RBC QN AUTO: 29.1 PG (ref 27–31)
MCHC RBC AUTO-ENTMCNC: 31.7 G/DL (ref 32–36)
MCV RBC AUTO: 92 FL (ref 82–98)
MONOCYTES # BLD AUTO: 0.7 K/UL (ref 0.3–1)
MONOCYTES NFR BLD: 9 % (ref 4–15)
NEUTROPHILS # BLD AUTO: 5.1 K/UL (ref 1.8–7.7)
NEUTROPHILS NFR BLD: 61.4 % (ref 38–73)
NRBC BLD-RTO: 0 /100 WBC
PLATELET # BLD AUTO: 212 K/UL (ref 150–450)
PMV BLD AUTO: 10 FL (ref 9.2–12.9)
POTASSIUM SERPL-SCNC: 4.4 MMOL/L (ref 3.5–5.1)
POTASSIUM SERPL-SCNC: 4.4 MMOL/L (ref 3.5–5.1)
PROT SERPL-MCNC: 6.5 G/DL (ref 6–8.4)
RBC # BLD AUTO: 4.5 M/UL (ref 4–5.4)
SODIUM SERPL-SCNC: 143 MMOL/L (ref 136–145)
SODIUM SERPL-SCNC: 143 MMOL/L (ref 136–145)
TROPONIN I SERPL DL<=0.01 NG/ML-MCNC: <0.006 NG/ML (ref 0–0.03)
WBC # BLD AUTO: 8.23 K/UL (ref 3.9–12.7)

## 2021-06-05 PROCEDURE — 85025 COMPLETE CBC W/AUTO DIFF WBC: CPT | Performed by: FAMILY MEDICINE

## 2021-06-05 PROCEDURE — 36415 COLL VENOUS BLD VENIPUNCTURE: CPT | Performed by: FAMILY MEDICINE

## 2021-06-05 PROCEDURE — 84484 ASSAY OF TROPONIN QUANT: CPT | Performed by: FAMILY MEDICINE

## 2021-06-05 PROCEDURE — 99285 EMERGENCY DEPT VISIT HI MDM: CPT | Mod: 25

## 2021-06-05 PROCEDURE — 83880 ASSAY OF NATRIURETIC PEPTIDE: CPT | Performed by: FAMILY MEDICINE

## 2021-06-05 PROCEDURE — 80053 COMPREHEN METABOLIC PANEL: CPT | Performed by: FAMILY MEDICINE

## 2021-06-06 VITALS
HEART RATE: 76 BPM | DIASTOLIC BLOOD PRESSURE: 76 MMHG | OXYGEN SATURATION: 100 % | TEMPERATURE: 99 F | WEIGHT: 201.5 LBS | HEIGHT: 65 IN | RESPIRATION RATE: 18 BRPM | SYSTOLIC BLOOD PRESSURE: 144 MMHG | BODY MASS INDEX: 33.57 KG/M2

## 2021-06-06 PROCEDURE — 25500020 PHARM REV CODE 255: Performed by: FAMILY MEDICINE

## 2021-06-06 RX ADMIN — IOHEXOL 100 ML: 350 INJECTION, SOLUTION INTRAVENOUS at 01:06

## 2021-06-08 ENCOUNTER — OFFICE VISIT (OUTPATIENT)
Dept: INTERNAL MEDICINE | Facility: CLINIC | Age: 71
End: 2021-06-08
Payer: MEDICARE

## 2021-06-08 ENCOUNTER — HOSPITAL ENCOUNTER (OUTPATIENT)
Dept: CARDIOLOGY | Facility: CLINIC | Age: 71
Discharge: HOME | End: 2021-06-08
Payer: MEDICARE

## 2021-06-08 VITALS
HEIGHT: 65 IN | DIASTOLIC BLOOD PRESSURE: 62 MMHG | WEIGHT: 199 LBS | BODY MASS INDEX: 33.15 KG/M2 | TEMPERATURE: 98 F | HEART RATE: 72 BPM | OXYGEN SATURATION: 97 % | SYSTOLIC BLOOD PRESSURE: 131 MMHG

## 2021-06-08 DIAGNOSIS — C80.1 CLEAR CELL CARCINOMA: ICD-10-CM

## 2021-06-08 DIAGNOSIS — I10 HYPERTENSION, UNSPECIFIED TYPE: ICD-10-CM

## 2021-06-08 DIAGNOSIS — N18.31 STAGE 3A CHRONIC KIDNEY DISEASE: ICD-10-CM

## 2021-06-08 DIAGNOSIS — Z79.890 HORMONE REPLACEMENT THERAPY (HRT): ICD-10-CM

## 2021-06-08 DIAGNOSIS — E04.2 NONTOXIC MULTINODULAR GOITER: ICD-10-CM

## 2021-06-08 DIAGNOSIS — I10 ESSENTIAL HYPERTENSION: ICD-10-CM

## 2021-06-08 DIAGNOSIS — H90.3 BILATERAL SENSORINEURAL HEARING LOSS: ICD-10-CM

## 2021-06-08 DIAGNOSIS — M79.7 FIBROMYALGIA: ICD-10-CM

## 2021-06-08 DIAGNOSIS — F41.9 ANXIETY: ICD-10-CM

## 2021-06-08 DIAGNOSIS — M79.605 PAIN OF LEFT LOWER EXTREMITY: ICD-10-CM

## 2021-06-08 DIAGNOSIS — E03.9 HYPOTHYROIDISM, UNSPECIFIED TYPE: ICD-10-CM

## 2021-06-08 DIAGNOSIS — K21.9 GASTROESOPHAGEAL REFLUX DISEASE WITHOUT ESOPHAGITIS: ICD-10-CM

## 2021-06-08 DIAGNOSIS — F51.04 CHRONIC INSOMNIA: ICD-10-CM

## 2021-06-08 DIAGNOSIS — C64.1 CARCINOMA OF RIGHT KIDNEY: ICD-10-CM

## 2021-06-08 PROBLEM — N18.9 CKD (CHRONIC KIDNEY DISEASE): Status: ACTIVE | Noted: 2021-06-08

## 2021-06-08 PROCEDURE — 99999 PR PBB SHADOW E&M-EST. PATIENT-LVL IV: ICD-10-PCS | Mod: PBBFAC,,,

## 2021-06-08 PROCEDURE — 93010 EKG 12-LEAD: ICD-10-PCS | Mod: S$PBB,,, | Performed by: INTERNAL MEDICINE

## 2021-06-08 PROCEDURE — 93005 ELECTROCARDIOGRAM TRACING: CPT | Mod: PBBFAC | Performed by: INTERNAL MEDICINE

## 2021-06-08 PROCEDURE — 99999 PR PBB SHADOW E&M-EST. PATIENT-LVL IV: CPT | Mod: PBBFAC,,,

## 2021-06-08 PROCEDURE — 99214 OFFICE O/P EST MOD 30 MIN: CPT | Mod: PBBFAC,25

## 2021-06-08 PROCEDURE — 99214 PR OFFICE/OUTPT VISIT, EST, LEVL IV, 30-39 MIN: ICD-10-PCS | Mod: S$PBB,,, | Performed by: HOSPITALIST

## 2021-06-08 PROCEDURE — 99214 OFFICE O/P EST MOD 30 MIN: CPT | Mod: S$PBB,,, | Performed by: HOSPITALIST

## 2021-06-08 PROCEDURE — 93010 ELECTROCARDIOGRAM REPORT: CPT | Mod: S$PBB,,, | Performed by: INTERNAL MEDICINE

## 2021-06-09 PROBLEM — R31.29 MICROSCOPIC HEMATURIA: Status: RESOLVED | Noted: 2017-02-02 | Resolved: 2021-06-09

## 2021-06-09 PROBLEM — Z79.890 HORMONE REPLACEMENT THERAPY (HRT): Status: ACTIVE | Noted: 2021-06-09

## 2021-06-11 ENCOUNTER — TELEPHONE (OUTPATIENT)
Dept: ORTHOPEDICS | Facility: CLINIC | Age: 71
End: 2021-06-11

## 2021-06-13 ENCOUNTER — ANESTHESIA EVENT (OUTPATIENT)
Dept: SURGERY | Facility: HOSPITAL | Age: 71
End: 2021-06-13
Payer: MEDICARE

## 2021-06-13 RX ORDER — HYDROMORPHONE HYDROCHLORIDE 2 MG/1
2 TABLET ORAL EVERY 6 HOURS PRN
Qty: 50 TABLET | Refills: 0 | Status: SHIPPED | OUTPATIENT
Start: 2021-06-13 | End: 2021-06-23 | Stop reason: SDUPTHER

## 2021-06-13 RX ORDER — DOCUSATE SODIUM 100 MG/1
100 CAPSULE, LIQUID FILLED ORAL 2 TIMES DAILY PRN
Qty: 60 CAPSULE | Refills: 0 | Status: SHIPPED | OUTPATIENT
Start: 2021-06-13 | End: 2021-08-05 | Stop reason: ALTCHOICE

## 2021-06-13 RX ORDER — DEXTROMETHORPHAN HYDROBROMIDE, GUAIFENESIN 5; 100 MG/5ML; MG/5ML
650 LIQUID ORAL EVERY 8 HOURS PRN
Qty: 120 TABLET | Refills: 0 | Status: SHIPPED | OUTPATIENT
Start: 2021-06-13 | End: 2021-07-22

## 2021-06-14 ENCOUNTER — HOSPITAL ENCOUNTER (OUTPATIENT)
Facility: HOSPITAL | Age: 71
Discharge: HOME | End: 2021-06-15
Attending: ORTHOPAEDIC SURGERY | Admitting: ORTHOPAEDIC SURGERY
Payer: MEDICARE

## 2021-06-14 ENCOUNTER — ANESTHESIA (OUTPATIENT)
Dept: SURGERY | Facility: HOSPITAL | Age: 71
End: 2021-06-14
Payer: MEDICARE

## 2021-06-14 ENCOUNTER — TELEPHONE (OUTPATIENT)
Dept: INTERNAL MEDICINE | Facility: CLINIC | Age: 71
End: 2021-06-14

## 2021-06-14 DIAGNOSIS — M17.12 OSTEOARTHRITIS OF LEFT KNEE, UNSPECIFIED OSTEOARTHRITIS TYPE: ICD-10-CM

## 2021-06-14 PROCEDURE — 25000003 PHARM REV CODE 250: Performed by: ANESTHESIOLOGY

## 2021-06-14 PROCEDURE — 71000033 HC RECOVERY, INTIAL HOUR: Performed by: ORTHOPAEDIC SURGERY

## 2021-06-14 PROCEDURE — 94799 UNLISTED PULMONARY SVC/PX: CPT

## 2021-06-14 PROCEDURE — 27447 PR TOTAL KNEE ARTHROPLASTY: ICD-10-PCS | Mod: LT,GC,, | Performed by: ORTHOPAEDIC SURGERY

## 2021-06-14 PROCEDURE — D9220A PRA ANESTHESIA: Mod: ANES,,, | Performed by: ANESTHESIOLOGY

## 2021-06-14 PROCEDURE — D9220A PRA ANESTHESIA: ICD-10-PCS | Mod: CRNA,,, | Performed by: NURSE ANESTHETIST, CERTIFIED REGISTERED

## 2021-06-14 PROCEDURE — 63600175 PHARM REV CODE 636 W HCPCS: Performed by: NURSE ANESTHETIST, CERTIFIED REGISTERED

## 2021-06-14 PROCEDURE — 25000003 PHARM REV CODE 250: Performed by: NURSE PRACTITIONER

## 2021-06-14 PROCEDURE — 63600175 PHARM REV CODE 636 W HCPCS: Performed by: NURSE PRACTITIONER

## 2021-06-14 PROCEDURE — D9220A PRA ANESTHESIA: ICD-10-PCS | Mod: ANES,,, | Performed by: ANESTHESIOLOGY

## 2021-06-14 PROCEDURE — A4216 STERILE WATER/SALINE, 10 ML: HCPCS | Performed by: NURSE ANESTHETIST, CERTIFIED REGISTERED

## 2021-06-14 PROCEDURE — 27447 TOTAL KNEE ARTHROPLASTY: CPT | Mod: LT,GC,, | Performed by: ORTHOPAEDIC SURGERY

## 2021-06-14 PROCEDURE — C1713 ANCHOR/SCREW BN/BN,TIS/BN: HCPCS | Performed by: ORTHOPAEDIC SURGERY

## 2021-06-14 PROCEDURE — 27201423 OPTIME MED/SURG SUP & DEVICES STERILE SUPPLY: Performed by: ORTHOPAEDIC SURGERY

## 2021-06-14 PROCEDURE — 63600175 PHARM REV CODE 636 W HCPCS: Performed by: ORTHOPAEDIC SURGERY

## 2021-06-14 PROCEDURE — 63600175 PHARM REV CODE 636 W HCPCS: Performed by: ANESTHESIOLOGY

## 2021-06-14 PROCEDURE — 36000710: Performed by: ORTHOPAEDIC SURGERY

## 2021-06-14 PROCEDURE — 25000003 PHARM REV CODE 250: Performed by: NURSE ANESTHETIST, CERTIFIED REGISTERED

## 2021-06-14 PROCEDURE — 37000009 HC ANESTHESIA EA ADD 15 MINS: Performed by: ORTHOPAEDIC SURGERY

## 2021-06-14 PROCEDURE — 37000008 HC ANESTHESIA 1ST 15 MINUTES: Performed by: ORTHOPAEDIC SURGERY

## 2021-06-14 PROCEDURE — D9220A PRA ANESTHESIA: Mod: CRNA,,, | Performed by: NURSE ANESTHETIST, CERTIFIED REGISTERED

## 2021-06-14 PROCEDURE — 36000711: Performed by: ORTHOPAEDIC SURGERY

## 2021-06-14 PROCEDURE — 27100025 HC TUBING, SET FLUID WARMER: Performed by: ANESTHESIOLOGY

## 2021-06-14 PROCEDURE — 25000003 PHARM REV CODE 250: Performed by: ORTHOPAEDIC SURGERY

## 2021-06-14 PROCEDURE — 71000039 HC RECOVERY, EACH ADD'L HOUR: Performed by: ORTHOPAEDIC SURGERY

## 2021-06-14 PROCEDURE — C1776 JOINT DEVICE (IMPLANTABLE): HCPCS | Performed by: ORTHOPAEDIC SURGERY

## 2021-06-14 PROCEDURE — 94761 N-INVAS EAR/PLS OXIMETRY MLT: CPT

## 2021-06-14 PROCEDURE — 99900035 HC TECH TIME PER 15 MIN (STAT)

## 2021-06-14 DEVICE — COMPONENT FEM POST SZ 3 LEFT: Type: IMPLANTABLE DEVICE | Site: KNEE | Status: FUNCTIONAL

## 2021-06-14 DEVICE — INSERT PFC SIGMA TIB SZ3 10MM: Type: IMPLANTABLE DEVICE | Site: KNEE | Status: FUNCTIONAL

## 2021-06-14 DEVICE — CEMENT BONE SURG SMPLX P RADPQ: Type: IMPLANTABLE DEVICE | Site: KNEE | Status: FUNCTIONAL

## 2021-06-14 DEVICE — PATELLA OVAL DOME 35MM: Type: IMPLANTABLE DEVICE | Site: KNEE | Status: FUNCTIONAL

## 2021-06-14 DEVICE — TRAY TIBIAL CEMENT SZ 3 COBAL: Type: IMPLANTABLE DEVICE | Site: KNEE | Status: FUNCTIONAL

## 2021-06-14 RX ORDER — MIDAZOLAM HYDROCHLORIDE 1 MG/ML
1 INJECTION INTRAMUSCULAR; INTRAVENOUS EVERY 5 MIN PRN
Status: DISCONTINUED | OUTPATIENT
Start: 2021-06-14 | End: 2021-06-14 | Stop reason: HOSPADM

## 2021-06-14 RX ORDER — OXYCODONE HYDROCHLORIDE 5 MG/1
5 TABLET ORAL
Status: DISCONTINUED | OUTPATIENT
Start: 2021-06-14 | End: 2021-06-14 | Stop reason: HOSPADM

## 2021-06-14 RX ORDER — HYDROCORTISONE 25 MG/G
CREAM TOPICAL 2 TIMES DAILY
Qty: 20 G | Refills: 5 | Status: SHIPPED | OUTPATIENT
Start: 2021-06-14 | End: 2023-06-09 | Stop reason: SDUPTHER

## 2021-06-14 RX ORDER — HYDROMORPHONE HYDROCHLORIDE 1 MG/ML
2 INJECTION, SOLUTION INTRAMUSCULAR; INTRAVENOUS; SUBCUTANEOUS ONCE
Status: COMPLETED | OUTPATIENT
Start: 2021-06-14 | End: 2021-06-14

## 2021-06-14 RX ORDER — MUPIROCIN 20 MG/G
1 OINTMENT TOPICAL
Status: COMPLETED | OUTPATIENT
Start: 2021-06-14 | End: 2021-06-14

## 2021-06-14 RX ORDER — FENTANYL CITRATE 50 UG/ML
INJECTION, SOLUTION INTRAMUSCULAR; INTRAVENOUS
Status: DISCONTINUED | OUTPATIENT
Start: 2021-06-14 | End: 2021-06-14

## 2021-06-14 RX ORDER — DIAZEPAM 5 MG/1
10 TABLET ORAL ONCE
Status: COMPLETED | OUTPATIENT
Start: 2021-06-14 | End: 2021-06-14

## 2021-06-14 RX ORDER — TRANEXAMIC ACID 100 MG/ML
1000 INJECTION, SOLUTION INTRAVENOUS
Status: DISCONTINUED | OUTPATIENT
Start: 2021-06-14 | End: 2021-06-14 | Stop reason: HOSPADM

## 2021-06-14 RX ORDER — MIDAZOLAM HYDROCHLORIDE 1 MG/ML
INJECTION, SOLUTION INTRAMUSCULAR; INTRAVENOUS
Status: DISCONTINUED | OUTPATIENT
Start: 2021-06-14 | End: 2021-06-14

## 2021-06-14 RX ORDER — AMOXICILLIN 250 MG
1 CAPSULE ORAL 2 TIMES DAILY
Status: DISCONTINUED | OUTPATIENT
Start: 2021-06-14 | End: 2021-06-15 | Stop reason: HOSPADM

## 2021-06-14 RX ORDER — SODIUM CHLORIDE 9 MG/ML
INJECTION, SOLUTION INTRAVENOUS
Status: COMPLETED | OUTPATIENT
Start: 2021-06-14 | End: 2021-06-14

## 2021-06-14 RX ORDER — PREGABALIN 75 MG/1
75 CAPSULE ORAL
Status: COMPLETED | OUTPATIENT
Start: 2021-06-14 | End: 2021-06-14

## 2021-06-14 RX ORDER — LEVOTHYROXINE SODIUM 112 UG/1
112 TABLET ORAL
Qty: 90 TABLET | Refills: 4 | Status: SHIPPED | OUTPATIENT
Start: 2021-06-14 | End: 2022-07-14

## 2021-06-14 RX ORDER — ONDANSETRON 2 MG/ML
4 INJECTION INTRAMUSCULAR; INTRAVENOUS EVERY 8 HOURS PRN
Status: DISCONTINUED | OUTPATIENT
Start: 2021-06-14 | End: 2021-06-15 | Stop reason: HOSPADM

## 2021-06-14 RX ORDER — FENTANYL CITRATE 50 UG/ML
25 INJECTION, SOLUTION INTRAMUSCULAR; INTRAVENOUS EVERY 5 MIN PRN
Status: DISCONTINUED | OUTPATIENT
Start: 2021-06-14 | End: 2021-06-14 | Stop reason: HOSPADM

## 2021-06-14 RX ORDER — CELECOXIB 200 MG/1
400 CAPSULE ORAL
Status: COMPLETED | OUTPATIENT
Start: 2021-06-14 | End: 2021-06-14

## 2021-06-14 RX ORDER — NALOXONE HCL 0.4 MG/ML
0.02 VIAL (ML) INJECTION
Status: DISCONTINUED | OUTPATIENT
Start: 2021-06-14 | End: 2021-06-15 | Stop reason: HOSPADM

## 2021-06-14 RX ORDER — HALOPERIDOL 5 MG/ML
0.5 INJECTION INTRAMUSCULAR EVERY 10 MIN PRN
Status: DISCONTINUED | OUTPATIENT
Start: 2021-06-14 | End: 2021-06-14 | Stop reason: HOSPADM

## 2021-06-14 RX ORDER — PREGABALIN 75 MG/1
75 CAPSULE ORAL NIGHTLY
Status: DISCONTINUED | OUTPATIENT
Start: 2021-06-14 | End: 2021-06-14

## 2021-06-14 RX ORDER — CEFAZOLIN SODIUM 1 G/3ML
2 INJECTION, POWDER, FOR SOLUTION INTRAMUSCULAR; INTRAVENOUS
Status: COMPLETED | OUTPATIENT
Start: 2021-06-14 | End: 2021-06-15

## 2021-06-14 RX ORDER — ACETAMINOPHEN 500 MG
1000 TABLET ORAL EVERY 6 HOURS
Status: DISCONTINUED | OUTPATIENT
Start: 2021-06-14 | End: 2021-06-15 | Stop reason: HOSPADM

## 2021-06-14 RX ORDER — PROPOFOL 10 MG/ML
VIAL (ML) INTRAVENOUS CONTINUOUS PRN
Status: DISCONTINUED | OUTPATIENT
Start: 2021-06-14 | End: 2021-06-14

## 2021-06-14 RX ORDER — CELECOXIB 200 MG/1
200 CAPSULE ORAL DAILY
Status: DISCONTINUED | OUTPATIENT
Start: 2021-06-15 | End: 2021-06-14

## 2021-06-14 RX ORDER — GABAPENTIN 300 MG/1
300 CAPSULE ORAL 3 TIMES DAILY
Status: DISCONTINUED | OUTPATIENT
Start: 2021-06-14 | End: 2021-06-15 | Stop reason: HOSPADM

## 2021-06-14 RX ORDER — LIDOCAINE HYDROCHLORIDE 20 MG/ML
INJECTION INTRAVENOUS
Status: DISCONTINUED | OUTPATIENT
Start: 2021-06-14 | End: 2021-06-14

## 2021-06-14 RX ORDER — DEXMEDETOMIDINE HYDROCHLORIDE 100 UG/ML
INJECTION, SOLUTION INTRAVENOUS
Status: DISCONTINUED | OUTPATIENT
Start: 2021-06-14 | End: 2021-06-14

## 2021-06-14 RX ORDER — LIDOCAINE HYDROCHLORIDE 10 MG/ML
1 INJECTION, SOLUTION EPIDURAL; INFILTRATION; INTRACAUDAL; PERINEURAL
Status: DISCONTINUED | OUTPATIENT
Start: 2021-06-14 | End: 2021-06-14 | Stop reason: HOSPADM

## 2021-06-14 RX ORDER — DICLOFENAC SODIUM 10 MG/G
2 GEL TOPICAL DAILY PRN
Qty: 100 G | Refills: 11 | Status: SHIPPED | OUTPATIENT
Start: 2021-06-14 | End: 2023-06-09 | Stop reason: SDUPTHER

## 2021-06-14 RX ORDER — KETAMINE HCL IN 0.9 % NACL 50 MG/5 ML
SYRINGE (ML) INTRAVENOUS
Status: DISCONTINUED | OUTPATIENT
Start: 2021-06-14 | End: 2021-06-14

## 2021-06-14 RX ORDER — ROPIVACAINE HYDROCHLORIDE 2 MG/ML
8 INJECTION, SOLUTION EPIDURAL; INFILTRATION; PERINEURAL CONTINUOUS
Status: DISCONTINUED | OUTPATIENT
Start: 2021-06-14 | End: 2021-06-15

## 2021-06-14 RX ORDER — FAMOTIDINE 20 MG/1
20 TABLET, FILM COATED ORAL 2 TIMES DAILY
Status: DISCONTINUED | OUTPATIENT
Start: 2021-06-14 | End: 2021-06-15 | Stop reason: HOSPADM

## 2021-06-14 RX ORDER — DEXAMETHASONE SODIUM PHOSPHATE 4 MG/ML
INJECTION, SOLUTION INTRA-ARTICULAR; INTRALESIONAL; INTRAMUSCULAR; INTRAVENOUS; SOFT TISSUE
Status: DISCONTINUED | OUTPATIENT
Start: 2021-06-14 | End: 2021-06-14

## 2021-06-14 RX ORDER — MUPIROCIN 20 MG/G
1 OINTMENT TOPICAL 2 TIMES DAILY
Status: DISCONTINUED | OUTPATIENT
Start: 2021-06-14 | End: 2021-06-15 | Stop reason: HOSPADM

## 2021-06-14 RX ORDER — ASPIRIN 81 MG/1
81 TABLET ORAL 2 TIMES DAILY
Status: DISCONTINUED | OUTPATIENT
Start: 2021-06-14 | End: 2021-06-15

## 2021-06-14 RX ORDER — ONDANSETRON 2 MG/ML
INJECTION INTRAMUSCULAR; INTRAVENOUS
Status: DISCONTINUED | OUTPATIENT
Start: 2021-06-14 | End: 2021-06-14

## 2021-06-14 RX ORDER — SODIUM CHLORIDE 9 MG/ML
INJECTION, SOLUTION INTRAVENOUS CONTINUOUS
Status: DISCONTINUED | OUTPATIENT
Start: 2021-06-14 | End: 2021-06-15 | Stop reason: HOSPADM

## 2021-06-14 RX ORDER — TALC
6 POWDER (GRAM) TOPICAL NIGHTLY PRN
Status: DISCONTINUED | OUTPATIENT
Start: 2021-06-14 | End: 2021-06-14 | Stop reason: HOSPADM

## 2021-06-14 RX ORDER — BISACODYL 10 MG
10 SUPPOSITORY, RECTAL RECTAL EVERY 12 HOURS PRN
Status: DISCONTINUED | OUTPATIENT
Start: 2021-06-14 | End: 2021-06-15 | Stop reason: HOSPADM

## 2021-06-14 RX ORDER — CARBOXYMETHYLCELLULOSE SODIUM 10 MG/ML
GEL OPHTHALMIC
Status: DISCONTINUED | OUTPATIENT
Start: 2021-06-14 | End: 2021-06-14

## 2021-06-14 RX ORDER — VANCOMYCIN HYDROCHLORIDE 1 G/20ML
INJECTION, POWDER, LYOPHILIZED, FOR SOLUTION INTRAVENOUS
Status: DISCONTINUED | OUTPATIENT
Start: 2021-06-14 | End: 2021-06-14 | Stop reason: HOSPADM

## 2021-06-14 RX ORDER — PROCHLORPERAZINE EDISYLATE 5 MG/ML
5 INJECTION INTRAMUSCULAR; INTRAVENOUS EVERY 6 HOURS PRN
Status: DISCONTINUED | OUTPATIENT
Start: 2021-06-14 | End: 2021-06-15 | Stop reason: HOSPADM

## 2021-06-14 RX ORDER — ZOLPIDEM TARTRATE 5 MG/1
5 TABLET ORAL NIGHTLY PRN
Qty: 90 TABLET | Refills: 0 | Status: SHIPPED | OUTPATIENT
Start: 2021-06-14 | End: 2021-09-30 | Stop reason: SDUPTHER

## 2021-06-14 RX ORDER — OXYCODONE HYDROCHLORIDE 5 MG/1
5 TABLET ORAL
Status: DISCONTINUED | OUTPATIENT
Start: 2021-06-14 | End: 2021-06-14

## 2021-06-14 RX ORDER — PROPOFOL 10 MG/ML
VIAL (ML) INTRAVENOUS
Status: DISCONTINUED | OUTPATIENT
Start: 2021-06-14 | End: 2021-06-14

## 2021-06-14 RX ORDER — TRANEXAMIC ACID 100 MG/ML
1000 INJECTION, SOLUTION INTRAVENOUS
Status: COMPLETED | OUTPATIENT
Start: 2021-06-14 | End: 2021-06-14

## 2021-06-14 RX ORDER — ACETAMINOPHEN 500 MG
1000 TABLET ORAL
Status: COMPLETED | OUTPATIENT
Start: 2021-06-14 | End: 2021-06-14

## 2021-06-14 RX ORDER — POLYETHYLENE GLYCOL 3350 17 G/17G
17 POWDER, FOR SOLUTION ORAL DAILY
Status: DISCONTINUED | OUTPATIENT
Start: 2021-06-15 | End: 2021-06-15 | Stop reason: HOSPADM

## 2021-06-14 RX ORDER — GABAPENTIN 300 MG/1
300 CAPSULE ORAL 3 TIMES DAILY
Qty: 270 CAPSULE | Refills: 4 | Status: SHIPPED | OUTPATIENT
Start: 2021-06-14 | End: 2021-08-17

## 2021-06-14 RX ORDER — OXYCODONE HYDROCHLORIDE 10 MG/1
10 TABLET ORAL
Status: DISCONTINUED | OUTPATIENT
Start: 2021-06-14 | End: 2021-06-14

## 2021-06-14 RX ORDER — CEFAZOLIN SODIUM 1 G/3ML
2 INJECTION, POWDER, FOR SOLUTION INTRAMUSCULAR; INTRAVENOUS
Status: COMPLETED | OUTPATIENT
Start: 2021-06-14 | End: 2021-06-14

## 2021-06-14 RX ORDER — MORPHINE SULFATE 2 MG/ML
2 INJECTION, SOLUTION INTRAMUSCULAR; INTRAVENOUS
Status: DISCONTINUED | OUTPATIENT
Start: 2021-06-14 | End: 2021-06-14

## 2021-06-14 RX ORDER — FLUTICASONE PROPIONATE 50 MCG
2 SPRAY, SUSPENSION (ML) NASAL DAILY
Qty: 3 ML | Refills: 3 | Status: SHIPPED | OUTPATIENT
Start: 2021-06-14 | End: 2023-12-04

## 2021-06-14 RX ORDER — ALPRAZOLAM 0.5 MG/1
TABLET ORAL
Qty: 30 TABLET | Refills: 5 | Status: SHIPPED | OUTPATIENT
Start: 2021-06-14 | End: 2021-09-30 | Stop reason: SDUPTHER

## 2021-06-14 RX ORDER — ONDANSETRON 2 MG/ML
4 INJECTION INTRAMUSCULAR; INTRAVENOUS DAILY PRN
Status: DISCONTINUED | OUTPATIENT
Start: 2021-06-14 | End: 2021-06-14 | Stop reason: HOSPADM

## 2021-06-14 RX ORDER — HYDROMORPHONE HYDROCHLORIDE 2 MG/1
2 TABLET ORAL
Status: DISCONTINUED | OUTPATIENT
Start: 2021-06-14 | End: 2021-06-15 | Stop reason: HOSPADM

## 2021-06-14 RX ORDER — FAMOTIDINE 10 MG/ML
INJECTION INTRAVENOUS
Status: DISCONTINUED | OUTPATIENT
Start: 2021-06-14 | End: 2021-06-14

## 2021-06-14 RX ORDER — SODIUM CHLORIDE 0.9 % (FLUSH) 0.9 %
10 SYRINGE (ML) INJECTION
Status: DISCONTINUED | OUTPATIENT
Start: 2021-06-14 | End: 2021-06-14 | Stop reason: HOSPADM

## 2021-06-14 RX ORDER — HYDROMORPHONE HCL 2 MG/ML
1 VIAL (ML) INJECTION EVERY 4 HOURS PRN
Status: DISCONTINUED | OUTPATIENT
Start: 2021-06-14 | End: 2021-06-15 | Stop reason: HOSPADM

## 2021-06-14 RX ADMIN — MIDAZOLAM HYDROCHLORIDE 1 MG: 1 INJECTION, SOLUTION INTRAMUSCULAR; INTRAVENOUS at 01:06

## 2021-06-14 RX ADMIN — DEXMEDETOMIDINE HYDROCHLORIDE 18 MCG: 100 INJECTION, SOLUTION, CONCENTRATE INTRAVENOUS at 01:06

## 2021-06-14 RX ADMIN — SODIUM CHLORIDE: 0.9 INJECTION, SOLUTION INTRAVENOUS at 11:06

## 2021-06-14 RX ADMIN — SODIUM CHLORIDE 0.2 MCG/KG/HR: 9 INJECTION INTRAMUSCULAR; INTRAVENOUS; SUBCUTANEOUS at 01:06

## 2021-06-14 RX ADMIN — OXYCODONE 5 MG: 5 TABLET ORAL at 06:06

## 2021-06-14 RX ADMIN — CARBOXYMETHYLCELLULOSE SODIUM 2 DROP: 10 GEL OPHTHALMIC at 01:06

## 2021-06-14 RX ADMIN — ONDANSETRON 4 MG: 2 INJECTION, SOLUTION INTRAMUSCULAR; INTRAVENOUS at 02:06

## 2021-06-14 RX ADMIN — MUPIROCIN 1 G: 20 OINTMENT TOPICAL at 11:06

## 2021-06-14 RX ADMIN — ACETAMINOPHEN 1000 MG: 500 TABLET, FILM COATED ORAL at 11:06

## 2021-06-14 RX ADMIN — HYDROMORPHONE HYDROCHLORIDE 2 MG: 2 TABLET ORAL at 11:06

## 2021-06-14 RX ADMIN — ACETAMINOPHEN 1000 MG: 500 TABLET ORAL at 11:06

## 2021-06-14 RX ADMIN — PREGABALIN 75 MG: 75 CAPSULE ORAL at 10:06

## 2021-06-14 RX ADMIN — ASPIRIN 81 MG: 81 TABLET, COATED ORAL at 10:06

## 2021-06-14 RX ADMIN — CEFAZOLIN 2 G: 330 INJECTION, POWDER, FOR SOLUTION INTRAMUSCULAR; INTRAVENOUS at 10:06

## 2021-06-14 RX ADMIN — CELECOXIB 400 MG: 200 CAPSULE ORAL at 11:06

## 2021-06-14 RX ADMIN — VANCOMYCIN HYDROCHLORIDE 1500 MG: 1.5 INJECTION, POWDER, LYOPHILIZED, FOR SOLUTION INTRAVENOUS at 11:06

## 2021-06-14 RX ADMIN — DEXAMETHASONE SODIUM PHOSPHATE 8 MG: 4 INJECTION, SOLUTION INTRAMUSCULAR; INTRAVENOUS at 01:06

## 2021-06-14 RX ADMIN — ACETAMINOPHEN 1000 MG: 500 TABLET, FILM COATED ORAL at 06:06

## 2021-06-14 RX ADMIN — PROPOFOL 30 MG: 10 INJECTION, EMULSION INTRAVENOUS at 01:06

## 2021-06-14 RX ADMIN — FENTANYL CITRATE 25 MCG: 50 INJECTION, SOLUTION INTRAMUSCULAR; INTRAVENOUS at 01:06

## 2021-06-14 RX ADMIN — FAMOTIDINE 20 MG: 10 INJECTION, SOLUTION INTRAVENOUS at 01:06

## 2021-06-14 RX ADMIN — PROPOFOL 100 MCG/KG/MIN: 10 INJECTION, EMULSION INTRAVENOUS at 01:06

## 2021-06-14 RX ADMIN — DOCUSATE SODIUM 50MG AND SENNOSIDES 8.6MG 1 TABLET: 8.6; 5 TABLET, FILM COATED ORAL at 10:06

## 2021-06-14 RX ADMIN — TRANEXAMIC ACID 1000 MG: 100 INJECTION, SOLUTION INTRAVENOUS at 02:06

## 2021-06-14 RX ADMIN — Medication 25 MG: at 01:06

## 2021-06-14 RX ADMIN — MEPIVACAINE HYDROCHLORIDE 3.5 ML: 15 INJECTION, SOLUTION EPIDURAL; INFILTRATION at 01:06

## 2021-06-14 RX ADMIN — DIAZEPAM 10 MG: 5 TABLET ORAL at 07:06

## 2021-06-14 RX ADMIN — FAMOTIDINE 20 MG: 20 TABLET ORAL at 10:06

## 2021-06-14 RX ADMIN — GABAPENTIN 300 MG: 300 CAPSULE ORAL at 10:06

## 2021-06-14 RX ADMIN — SODIUM CHLORIDE: 0.9 INJECTION, SOLUTION INTRAVENOUS at 03:06

## 2021-06-14 RX ADMIN — LIDOCAINE HYDROCHLORIDE 50 MG: 20 INJECTION, SOLUTION INTRAVENOUS at 01:06

## 2021-06-14 RX ADMIN — FENTANYL CITRATE 50 MCG: 50 INJECTION, SOLUTION INTRAMUSCULAR; INTRAVENOUS at 03:06

## 2021-06-14 RX ADMIN — HYDROMORPHONE HYDROCHLORIDE 2 MG: 1 INJECTION, SOLUTION INTRAMUSCULAR; INTRAVENOUS; SUBCUTANEOUS at 07:06

## 2021-06-14 RX ADMIN — MUPIROCIN 1 G: 20 OINTMENT TOPICAL at 10:06

## 2021-06-14 RX ADMIN — CEFAZOLIN 2 G: 330 INJECTION, POWDER, FOR SOLUTION INTRAMUSCULAR; INTRAVENOUS at 01:06

## 2021-06-14 RX ADMIN — TRANEXAMIC ACID 1000 MG: 100 INJECTION, SOLUTION INTRAVENOUS at 01:06

## 2021-06-15 VITALS
DIASTOLIC BLOOD PRESSURE: 64 MMHG | OXYGEN SATURATION: 97 % | RESPIRATION RATE: 18 BRPM | WEIGHT: 199 LBS | HEART RATE: 69 BPM | BODY MASS INDEX: 33.15 KG/M2 | HEIGHT: 65 IN | TEMPERATURE: 98 F | SYSTOLIC BLOOD PRESSURE: 135 MMHG

## 2021-06-15 PROCEDURE — 97110 THERAPEUTIC EXERCISES: CPT

## 2021-06-15 PROCEDURE — 94761 N-INVAS EAR/PLS OXIMETRY MLT: CPT

## 2021-06-15 PROCEDURE — 25000003 PHARM REV CODE 250: Performed by: NURSE PRACTITIONER

## 2021-06-15 PROCEDURE — 25000003 PHARM REV CODE 250: Performed by: STUDENT IN AN ORGANIZED HEALTH CARE EDUCATION/TRAINING PROGRAM

## 2021-06-15 PROCEDURE — 97165 OT EVAL LOW COMPLEX 30 MIN: CPT

## 2021-06-15 PROCEDURE — 97530 THERAPEUTIC ACTIVITIES: CPT

## 2021-06-15 PROCEDURE — 97535 SELF CARE MNGMENT TRAINING: CPT

## 2021-06-15 PROCEDURE — 63600175 PHARM REV CODE 636 W HCPCS: Performed by: NURSE PRACTITIONER

## 2021-06-15 PROCEDURE — 99212 PR OFFICE/OUTPT VISIT, EST, LEVL II, 10-19 MIN: ICD-10-PCS | Mod: ,,, | Performed by: ANESTHESIOLOGY

## 2021-06-15 PROCEDURE — 99212 OFFICE O/P EST SF 10 MIN: CPT | Mod: ,,, | Performed by: ANESTHESIOLOGY

## 2021-06-15 PROCEDURE — 97116 GAIT TRAINING THERAPY: CPT

## 2021-06-15 PROCEDURE — 97161 PT EVAL LOW COMPLEX 20 MIN: CPT

## 2021-06-15 PROCEDURE — 99900035 HC TECH TIME PER 15 MIN (STAT)

## 2021-06-15 RX ORDER — LEVOTHYROXINE SODIUM 112 UG/1
112 TABLET ORAL
Status: DISCONTINUED | OUTPATIENT
Start: 2021-06-16 | End: 2021-06-15 | Stop reason: HOSPADM

## 2021-06-15 RX ORDER — ALPRAZOLAM 0.5 MG/1
0.5 TABLET ORAL 2 TIMES DAILY PRN
Status: DISCONTINUED | OUTPATIENT
Start: 2021-06-15 | End: 2021-06-15 | Stop reason: HOSPADM

## 2021-06-15 RX ADMIN — POLYETHYLENE GLYCOL 3350 17 G: 17 POWDER, FOR SOLUTION ORAL at 09:06

## 2021-06-15 RX ADMIN — HYDROMORPHONE HYDROCHLORIDE 2 MG: 2 TABLET ORAL at 04:06

## 2021-06-15 RX ADMIN — DOCUSATE SODIUM 50MG AND SENNOSIDES 8.6MG 1 TABLET: 8.6; 5 TABLET, FILM COATED ORAL at 09:06

## 2021-06-15 RX ADMIN — FAMOTIDINE 20 MG: 20 TABLET ORAL at 09:06

## 2021-06-15 RX ADMIN — MUPIROCIN 1 G: 20 OINTMENT TOPICAL at 09:06

## 2021-06-15 RX ADMIN — CEFAZOLIN 2 G: 330 INJECTION, POWDER, FOR SOLUTION INTRAMUSCULAR; INTRAVENOUS at 05:06

## 2021-06-15 RX ADMIN — HYDROMORPHONE HYDROCHLORIDE 2 MG: 2 TABLET ORAL at 10:06

## 2021-06-15 RX ADMIN — APIXABAN 2.5 MG: 2.5 TABLET, FILM COATED ORAL at 09:06

## 2021-06-15 RX ADMIN — HYDROMORPHONE HYDROCHLORIDE 2 MG: 2 TABLET ORAL at 07:06

## 2021-06-15 RX ADMIN — GABAPENTIN 300 MG: 300 CAPSULE ORAL at 09:06

## 2021-06-15 RX ADMIN — ACETAMINOPHEN 1000 MG: 500 TABLET, FILM COATED ORAL at 05:06

## 2021-06-16 ENCOUNTER — TELEPHONE (OUTPATIENT)
Dept: ORTHOPEDICS | Facility: CLINIC | Age: 71
End: 2021-06-16

## 2021-06-16 ENCOUNTER — CLINICAL SUPPORT (OUTPATIENT)
Dept: ORTHOPEDICS | Facility: CLINIC | Age: 71
End: 2021-06-16
Payer: MEDICARE

## 2021-06-16 DIAGNOSIS — Z96.659 STATUS POST KNEE REPLACEMENT, UNSPECIFIED LATERALITY: Primary | ICD-10-CM

## 2021-06-16 PROCEDURE — 99499 UNLISTED E&M SERVICE: CPT | Mod: 95,,, | Performed by: ORTHOPAEDIC SURGERY

## 2021-06-16 PROCEDURE — 99499 NO LOS: ICD-10-PCS | Mod: 95,,, | Performed by: ORTHOPAEDIC SURGERY

## 2021-06-17 DIAGNOSIS — Z96.659 STATUS POST KNEE REPLACEMENT, UNSPECIFIED LATERALITY: Primary | ICD-10-CM

## 2021-06-18 ENCOUNTER — TELEPHONE (OUTPATIENT)
Dept: ORTHOPEDICS | Facility: CLINIC | Age: 71
End: 2021-06-18

## 2021-06-20 ENCOUNTER — PATIENT MESSAGE (OUTPATIENT)
Dept: INTERNAL MEDICINE | Facility: CLINIC | Age: 71
End: 2021-06-20

## 2021-06-21 ENCOUNTER — PATIENT MESSAGE (OUTPATIENT)
Dept: ORTHOPEDICS | Facility: CLINIC | Age: 71
End: 2021-06-21

## 2021-06-21 ENCOUNTER — PATIENT MESSAGE (OUTPATIENT)
Dept: HEMATOLOGY/ONCOLOGY | Facility: CLINIC | Age: 71
End: 2021-06-21

## 2021-06-22 ENCOUNTER — TELEPHONE (OUTPATIENT)
Dept: ORTHOPEDICS | Facility: CLINIC | Age: 71
End: 2021-06-22

## 2021-06-22 DIAGNOSIS — Z96.652 STATUS POST LEFT KNEE REPLACEMENT: Primary | ICD-10-CM

## 2021-06-23 DIAGNOSIS — Z96.659 STATUS POST KNEE REPLACEMENT, UNSPECIFIED LATERALITY: Primary | ICD-10-CM

## 2021-06-23 RX ORDER — HYDROMORPHONE HYDROCHLORIDE 2 MG/1
2 TABLET ORAL EVERY 6 HOURS PRN
Qty: 40 TABLET | Refills: 0 | Status: SHIPPED | OUTPATIENT
Start: 2021-06-23 | End: 2021-06-29 | Stop reason: ALTCHOICE

## 2021-06-28 ENCOUNTER — CLINICAL SUPPORT (OUTPATIENT)
Dept: REHABILITATION | Facility: HOSPITAL | Age: 71
End: 2021-06-28
Payer: MEDICARE

## 2021-06-28 DIAGNOSIS — G89.29 CHRONIC PAIN OF LEFT KNEE: ICD-10-CM

## 2021-06-28 DIAGNOSIS — M25.562 CHRONIC PAIN OF LEFT KNEE: ICD-10-CM

## 2021-06-28 DIAGNOSIS — R26.89 FUNCTIONAL GAIT ABNORMALITY: ICD-10-CM

## 2021-06-28 PROCEDURE — 97110 THERAPEUTIC EXERCISES: CPT

## 2021-06-28 PROCEDURE — 97161 PT EVAL LOW COMPLEX 20 MIN: CPT

## 2021-06-29 ENCOUNTER — LAB VISIT (OUTPATIENT)
Dept: LAB | Facility: HOSPITAL | Age: 71
End: 2021-06-29
Attending: INTERNAL MEDICINE
Payer: MEDICARE

## 2021-06-29 ENCOUNTER — OFFICE VISIT (OUTPATIENT)
Dept: ORTHOPEDICS | Facility: CLINIC | Age: 71
End: 2021-06-29
Payer: MEDICARE

## 2021-06-29 DIAGNOSIS — C80.1 CLEAR CELL CARCINOMA: ICD-10-CM

## 2021-06-29 DIAGNOSIS — G89.18 POST-OP PAIN: Primary | ICD-10-CM

## 2021-06-29 LAB
ALBUMIN SERPL BCP-MCNC: 3.5 G/DL (ref 3.5–5.2)
ALP SERPL-CCNC: 77 U/L (ref 55–135)
ALT SERPL W/O P-5'-P-CCNC: 11 U/L (ref 10–44)
ANION GAP SERPL CALC-SCNC: 9 MMOL/L (ref 8–16)
AST SERPL-CCNC: 15 U/L (ref 10–40)
BILIRUB SERPL-MCNC: 0.8 MG/DL (ref 0.1–1)
BUN SERPL-MCNC: 16 MG/DL (ref 8–23)
CALCIUM SERPL-MCNC: 9.3 MG/DL (ref 8.7–10.5)
CHLORIDE SERPL-SCNC: 105 MMOL/L (ref 95–110)
CO2 SERPL-SCNC: 25 MMOL/L (ref 23–29)
CREAT SERPL-MCNC: 0.9 MG/DL (ref 0.5–1.4)
ERYTHROCYTE [DISTWIDTH] IN BLOOD BY AUTOMATED COUNT: 13.6 % (ref 11.5–14.5)
EST. GFR  (AFRICAN AMERICAN): >60 ML/MIN/1.73 M^2
EST. GFR  (NON AFRICAN AMERICAN): >60 ML/MIN/1.73 M^2
GLUCOSE SERPL-MCNC: 95 MG/DL (ref 70–110)
HCT VFR BLD AUTO: 38.7 % (ref 37–48.5)
HGB BLD-MCNC: 12.2 G/DL (ref 12–16)
IMM GRANULOCYTES # BLD AUTO: 0.03 K/UL (ref 0–0.04)
MCH RBC QN AUTO: 28.7 PG (ref 27–31)
MCHC RBC AUTO-ENTMCNC: 31.5 G/DL (ref 32–36)
MCV RBC AUTO: 91 FL (ref 82–98)
NEUTROPHILS # BLD AUTO: 3.9 K/UL (ref 1.8–7.7)
PLATELET # BLD AUTO: 281 K/UL (ref 150–450)
PMV BLD AUTO: 9.3 FL (ref 9.2–12.9)
POTASSIUM SERPL-SCNC: 4.9 MMOL/L (ref 3.5–5.1)
PROT SERPL-MCNC: 6.7 G/DL (ref 6–8.4)
RBC # BLD AUTO: 4.25 M/UL (ref 4–5.4)
SODIUM SERPL-SCNC: 139 MMOL/L (ref 136–145)
WBC # BLD AUTO: 6.28 K/UL (ref 3.9–12.7)

## 2021-06-29 PROCEDURE — 99024 PR POST-OP FOLLOW-UP VISIT: ICD-10-PCS | Mod: POP,,, | Performed by: NURSE PRACTITIONER

## 2021-06-29 PROCEDURE — 85027 COMPLETE CBC AUTOMATED: CPT | Performed by: INTERNAL MEDICINE

## 2021-06-29 PROCEDURE — 80053 COMPREHEN METABOLIC PANEL: CPT | Performed by: INTERNAL MEDICINE

## 2021-06-29 PROCEDURE — 99999 PR PBB SHADOW E&M-EST. PATIENT-LVL III: ICD-10-PCS | Mod: PBBFAC,,, | Performed by: NURSE PRACTITIONER

## 2021-06-29 PROCEDURE — 99024 POSTOP FOLLOW-UP VISIT: CPT | Mod: POP,,, | Performed by: NURSE PRACTITIONER

## 2021-06-29 PROCEDURE — 36415 COLL VENOUS BLD VENIPUNCTURE: CPT | Performed by: INTERNAL MEDICINE

## 2021-06-29 PROCEDURE — 99999 PR PBB SHADOW E&M-EST. PATIENT-LVL III: CPT | Mod: PBBFAC,,, | Performed by: NURSE PRACTITIONER

## 2021-06-29 PROCEDURE — 99213 OFFICE O/P EST LOW 20 MIN: CPT | Mod: PBBFAC | Performed by: NURSE PRACTITIONER

## 2021-06-29 RX ORDER — OXYCODONE HYDROCHLORIDE 5 MG/1
5 TABLET ORAL EVERY 6 HOURS PRN
Qty: 30 TABLET | Refills: 0 | Status: SHIPPED | OUTPATIENT
Start: 2021-06-29 | End: 2021-07-12 | Stop reason: ALTCHOICE

## 2021-06-30 ENCOUNTER — OFFICE VISIT (OUTPATIENT)
Dept: HEMATOLOGY/ONCOLOGY | Facility: CLINIC | Age: 71
End: 2021-06-30
Payer: MEDICARE

## 2021-06-30 ENCOUNTER — CLINICAL SUPPORT (OUTPATIENT)
Dept: REHABILITATION | Facility: HOSPITAL | Age: 71
End: 2021-06-30
Payer: MEDICARE

## 2021-06-30 DIAGNOSIS — G89.29 CHRONIC PAIN OF LEFT KNEE: ICD-10-CM

## 2021-06-30 DIAGNOSIS — R26.89 FUNCTIONAL GAIT ABNORMALITY: ICD-10-CM

## 2021-06-30 DIAGNOSIS — C64.9 CARCINOMA OF KIDNEY, UNSPECIFIED LATERALITY: ICD-10-CM

## 2021-06-30 DIAGNOSIS — C64.1 CARCINOMA OF RIGHT KIDNEY: ICD-10-CM

## 2021-06-30 DIAGNOSIS — M25.562 CHRONIC PAIN OF LEFT KNEE: ICD-10-CM

## 2021-06-30 DIAGNOSIS — C80.1 CLEAR CELL CARCINOMA: Primary | ICD-10-CM

## 2021-06-30 PROCEDURE — 99214 OFFICE O/P EST MOD 30 MIN: CPT | Mod: 95,,, | Performed by: INTERNAL MEDICINE

## 2021-06-30 PROCEDURE — 99214 PR OFFICE/OUTPT VISIT, EST, LEVL IV, 30-39 MIN: ICD-10-PCS | Mod: 95,,, | Performed by: INTERNAL MEDICINE

## 2021-06-30 PROCEDURE — 97110 THERAPEUTIC EXERCISES: CPT

## 2021-06-30 PROCEDURE — 97014 ELECTRIC STIMULATION THERAPY: CPT

## 2021-06-30 PROCEDURE — 97010 HOT OR COLD PACKS THERAPY: CPT

## 2021-07-02 ENCOUNTER — DOCUMENTATION ONLY (OUTPATIENT)
Dept: REHABILITATION | Facility: HOSPITAL | Age: 71
End: 2021-07-02

## 2021-07-02 ENCOUNTER — CLINICAL SUPPORT (OUTPATIENT)
Dept: REHABILITATION | Facility: HOSPITAL | Age: 71
End: 2021-07-02
Payer: MEDICARE

## 2021-07-02 DIAGNOSIS — R26.89 FUNCTIONAL GAIT ABNORMALITY: ICD-10-CM

## 2021-07-02 DIAGNOSIS — G89.29 CHRONIC PAIN OF LEFT KNEE: ICD-10-CM

## 2021-07-02 DIAGNOSIS — M25.562 CHRONIC PAIN OF LEFT KNEE: ICD-10-CM

## 2021-07-02 PROCEDURE — 97014 ELECTRIC STIMULATION THERAPY: CPT | Mod: CQ

## 2021-07-02 PROCEDURE — 97110 THERAPEUTIC EXERCISES: CPT | Mod: CQ

## 2021-07-06 ENCOUNTER — CLINICAL SUPPORT (OUTPATIENT)
Dept: REHABILITATION | Facility: HOSPITAL | Age: 71
End: 2021-07-06
Payer: MEDICARE

## 2021-07-06 ENCOUNTER — TELEPHONE (OUTPATIENT)
Dept: ORTHOPEDICS | Facility: CLINIC | Age: 71
End: 2021-07-06

## 2021-07-06 DIAGNOSIS — G89.29 CHRONIC PAIN OF LEFT KNEE: ICD-10-CM

## 2021-07-06 DIAGNOSIS — M25.562 CHRONIC PAIN OF LEFT KNEE: ICD-10-CM

## 2021-07-06 DIAGNOSIS — R26.89 FUNCTIONAL GAIT ABNORMALITY: ICD-10-CM

## 2021-07-06 PROCEDURE — 97110 THERAPEUTIC EXERCISES: CPT | Mod: CQ

## 2021-07-06 PROCEDURE — 97014 ELECTRIC STIMULATION THERAPY: CPT | Mod: CQ

## 2021-07-06 PROCEDURE — 97140 MANUAL THERAPY 1/> REGIONS: CPT | Mod: CQ

## 2021-07-08 ENCOUNTER — CLINICAL SUPPORT (OUTPATIENT)
Dept: REHABILITATION | Facility: HOSPITAL | Age: 71
End: 2021-07-08
Payer: MEDICARE

## 2021-07-08 DIAGNOSIS — M25.562 CHRONIC PAIN OF LEFT KNEE: ICD-10-CM

## 2021-07-08 DIAGNOSIS — R26.89 FUNCTIONAL GAIT ABNORMALITY: ICD-10-CM

## 2021-07-08 DIAGNOSIS — G89.29 CHRONIC PAIN OF LEFT KNEE: ICD-10-CM

## 2021-07-08 PROCEDURE — 97014 ELECTRIC STIMULATION THERAPY: CPT

## 2021-07-08 PROCEDURE — 97010 HOT OR COLD PACKS THERAPY: CPT

## 2021-07-08 PROCEDURE — 97110 THERAPEUTIC EXERCISES: CPT

## 2021-07-08 PROCEDURE — 97140 MANUAL THERAPY 1/> REGIONS: CPT

## 2021-07-09 ENCOUNTER — CLINICAL SUPPORT (OUTPATIENT)
Dept: REHABILITATION | Facility: HOSPITAL | Age: 71
End: 2021-07-09
Payer: MEDICARE

## 2021-07-09 ENCOUNTER — LAB VISIT (OUTPATIENT)
Dept: LAB | Facility: HOSPITAL | Age: 71
End: 2021-07-09
Attending: FAMILY MEDICINE
Payer: MEDICARE

## 2021-07-09 ENCOUNTER — TELEPHONE (OUTPATIENT)
Dept: INTERNAL MEDICINE | Facility: CLINIC | Age: 71
End: 2021-07-09

## 2021-07-09 DIAGNOSIS — R39.9 UTI SYMPTOMS: Primary | ICD-10-CM

## 2021-07-09 DIAGNOSIS — G89.29 CHRONIC PAIN OF LEFT KNEE: ICD-10-CM

## 2021-07-09 DIAGNOSIS — R39.9 UTI SYMPTOMS: ICD-10-CM

## 2021-07-09 DIAGNOSIS — R26.89 FUNCTIONAL GAIT ABNORMALITY: ICD-10-CM

## 2021-07-09 DIAGNOSIS — M25.562 CHRONIC PAIN OF LEFT KNEE: ICD-10-CM

## 2021-07-09 LAB
BILIRUB UR QL STRIP: NEGATIVE
CLARITY UR: CLEAR
COLOR UR: YELLOW
GLUCOSE UR QL STRIP: NEGATIVE
HGB UR QL STRIP: ABNORMAL
KETONES UR QL STRIP: NEGATIVE
LEUKOCYTE ESTERASE UR QL STRIP: NEGATIVE
NITRITE UR QL STRIP: NEGATIVE
PH UR STRIP: 5.5 [PH] (ref 5–8)
PROT UR QL STRIP: NEGATIVE
SP GR UR STRIP: >1.03 (ref 1–1.03)
URN SPEC COLLECT METH UR: ABNORMAL

## 2021-07-09 PROCEDURE — 81002 URINALYSIS NONAUTO W/O SCOPE: CPT | Performed by: NURSE PRACTITIONER

## 2021-07-09 PROCEDURE — 97110 THERAPEUTIC EXERCISES: CPT

## 2021-07-09 PROCEDURE — 97140 MANUAL THERAPY 1/> REGIONS: CPT

## 2021-07-09 PROCEDURE — 87086 URINE CULTURE/COLONY COUNT: CPT | Performed by: NURSE PRACTITIONER

## 2021-07-11 LAB — BACTERIA UR CULT: NORMAL

## 2021-07-12 ENCOUNTER — CLINICAL SUPPORT (OUTPATIENT)
Dept: REHABILITATION | Facility: HOSPITAL | Age: 71
End: 2021-07-12
Payer: MEDICARE

## 2021-07-12 ENCOUNTER — PATIENT MESSAGE (OUTPATIENT)
Dept: ORTHOPEDICS | Facility: CLINIC | Age: 71
End: 2021-07-12

## 2021-07-12 DIAGNOSIS — G89.18 POST-OP PAIN: Primary | ICD-10-CM

## 2021-07-12 DIAGNOSIS — G89.29 CHRONIC PAIN OF LEFT KNEE: ICD-10-CM

## 2021-07-12 DIAGNOSIS — M25.562 CHRONIC PAIN OF LEFT KNEE: ICD-10-CM

## 2021-07-12 DIAGNOSIS — R26.89 FUNCTIONAL GAIT ABNORMALITY: ICD-10-CM

## 2021-07-12 PROCEDURE — 97110 THERAPEUTIC EXERCISES: CPT

## 2021-07-12 PROCEDURE — 97140 MANUAL THERAPY 1/> REGIONS: CPT

## 2021-07-12 RX ORDER — HYDROCODONE BITARTRATE AND ACETAMINOPHEN 5; 325 MG/1; MG/1
1 TABLET ORAL EVERY 6 HOURS PRN
Qty: 28 TABLET | Refills: 0 | Status: SHIPPED | OUTPATIENT
Start: 2021-07-12 | End: 2021-07-20 | Stop reason: SDUPTHER

## 2021-07-13 DIAGNOSIS — Z96.659 STATUS POST KNEE REPLACEMENT, UNSPECIFIED LATERALITY: Primary | ICD-10-CM

## 2021-07-13 RX ORDER — METHOCARBAMOL 750 MG/1
750 TABLET, FILM COATED ORAL 4 TIMES DAILY
Qty: 40 TABLET | Refills: 0 | Status: SHIPPED | OUTPATIENT
Start: 2021-07-13 | End: 2021-07-23

## 2021-07-14 ENCOUNTER — CLINICAL SUPPORT (OUTPATIENT)
Dept: REHABILITATION | Facility: HOSPITAL | Age: 71
End: 2021-07-14
Payer: MEDICARE

## 2021-07-14 DIAGNOSIS — M25.562 CHRONIC PAIN OF LEFT KNEE: ICD-10-CM

## 2021-07-14 DIAGNOSIS — G89.29 CHRONIC PAIN OF LEFT KNEE: ICD-10-CM

## 2021-07-14 DIAGNOSIS — R26.89 FUNCTIONAL GAIT ABNORMALITY: ICD-10-CM

## 2021-07-14 PROCEDURE — 97140 MANUAL THERAPY 1/> REGIONS: CPT

## 2021-07-14 PROCEDURE — 97110 THERAPEUTIC EXERCISES: CPT

## 2021-07-16 ENCOUNTER — CLINICAL SUPPORT (OUTPATIENT)
Dept: REHABILITATION | Facility: HOSPITAL | Age: 71
End: 2021-07-16
Payer: MEDICARE

## 2021-07-16 DIAGNOSIS — M25.562 CHRONIC PAIN OF LEFT KNEE: ICD-10-CM

## 2021-07-16 DIAGNOSIS — G89.29 CHRONIC PAIN OF LEFT KNEE: ICD-10-CM

## 2021-07-16 DIAGNOSIS — R26.89 FUNCTIONAL GAIT ABNORMALITY: ICD-10-CM

## 2021-07-16 PROCEDURE — 97110 THERAPEUTIC EXERCISES: CPT

## 2021-07-16 PROCEDURE — 97010 HOT OR COLD PACKS THERAPY: CPT

## 2021-07-16 PROCEDURE — 97014 ELECTRIC STIMULATION THERAPY: CPT

## 2021-07-16 PROCEDURE — 97140 MANUAL THERAPY 1/> REGIONS: CPT

## 2021-07-19 ENCOUNTER — CLINICAL SUPPORT (OUTPATIENT)
Dept: REHABILITATION | Facility: HOSPITAL | Age: 71
End: 2021-07-19
Payer: MEDICARE

## 2021-07-19 DIAGNOSIS — G89.29 CHRONIC PAIN OF LEFT KNEE: ICD-10-CM

## 2021-07-19 DIAGNOSIS — R26.89 FUNCTIONAL GAIT ABNORMALITY: ICD-10-CM

## 2021-07-19 DIAGNOSIS — M25.562 CHRONIC PAIN OF LEFT KNEE: ICD-10-CM

## 2021-07-19 PROCEDURE — 97014 ELECTRIC STIMULATION THERAPY: CPT

## 2021-07-19 PROCEDURE — 97110 THERAPEUTIC EXERCISES: CPT

## 2021-07-19 PROCEDURE — 97140 MANUAL THERAPY 1/> REGIONS: CPT

## 2021-07-19 PROCEDURE — 97010 HOT OR COLD PACKS THERAPY: CPT

## 2021-07-20 ENCOUNTER — PATIENT MESSAGE (OUTPATIENT)
Dept: ORTHOPEDICS | Facility: CLINIC | Age: 71
End: 2021-07-20

## 2021-07-20 DIAGNOSIS — G89.18 POST-OP PAIN: ICD-10-CM

## 2021-07-20 DIAGNOSIS — Z96.659 STATUS POST KNEE REPLACEMENT, UNSPECIFIED LATERALITY: Primary | ICD-10-CM

## 2021-07-20 RX ORDER — HYDROCODONE BITARTRATE AND ACETAMINOPHEN 5; 325 MG/1; MG/1
1 TABLET ORAL EVERY 6 HOURS PRN
Qty: 28 TABLET | Refills: 0 | Status: SHIPPED | OUTPATIENT
Start: 2021-07-20 | End: 2021-07-30

## 2021-07-21 ENCOUNTER — CLINICAL SUPPORT (OUTPATIENT)
Dept: REHABILITATION | Facility: HOSPITAL | Age: 71
End: 2021-07-21
Payer: MEDICARE

## 2021-07-21 DIAGNOSIS — G89.29 CHRONIC PAIN OF LEFT KNEE: ICD-10-CM

## 2021-07-21 DIAGNOSIS — M25.562 CHRONIC PAIN OF LEFT KNEE: ICD-10-CM

## 2021-07-21 DIAGNOSIS — R26.89 FUNCTIONAL GAIT ABNORMALITY: ICD-10-CM

## 2021-07-21 PROCEDURE — 97110 THERAPEUTIC EXERCISES: CPT

## 2021-07-21 PROCEDURE — 97140 MANUAL THERAPY 1/> REGIONS: CPT

## 2021-07-22 ENCOUNTER — OFFICE VISIT (OUTPATIENT)
Dept: INTERNAL MEDICINE | Facility: CLINIC | Age: 71
End: 2021-07-22
Payer: MEDICARE

## 2021-07-22 ENCOUNTER — HOSPITAL ENCOUNTER (OUTPATIENT)
Dept: RADIOLOGY | Facility: HOSPITAL | Age: 71
Discharge: HOME | End: 2021-07-22
Attending: PHYSICIAN ASSISTANT
Payer: MEDICARE

## 2021-07-22 VITALS
OXYGEN SATURATION: 97 % | TEMPERATURE: 99 F | DIASTOLIC BLOOD PRESSURE: 64 MMHG | HEIGHT: 65 IN | WEIGHT: 194.88 LBS | BODY MASS INDEX: 32.47 KG/M2 | HEART RATE: 72 BPM | SYSTOLIC BLOOD PRESSURE: 130 MMHG

## 2021-07-22 DIAGNOSIS — M54.50 ACUTE BILATERAL LOW BACK PAIN WITHOUT SCIATICA: Primary | ICD-10-CM

## 2021-07-22 DIAGNOSIS — M54.50 ACUTE BILATERAL LOW BACK PAIN WITHOUT SCIATICA: ICD-10-CM

## 2021-07-22 DIAGNOSIS — K21.9 GASTROESOPHAGEAL REFLUX DISEASE WITHOUT ESOPHAGITIS: ICD-10-CM

## 2021-07-22 PROCEDURE — 99999 PR PBB SHADOW E&M-EST. PATIENT-LVL V: ICD-10-PCS | Mod: PBBFAC,,, | Performed by: PHYSICIAN ASSISTANT

## 2021-07-22 PROCEDURE — 73620 XR FOOT 2 VIEW LEFT: ICD-10-PCS | Mod: 26,LT,, | Performed by: RADIOLOGY

## 2021-07-22 PROCEDURE — 73620 X-RAY EXAM OF FOOT: CPT | Mod: TC,LT

## 2021-07-22 PROCEDURE — 99215 OFFICE O/P EST HI 40 MIN: CPT | Mod: PBBFAC | Performed by: PHYSICIAN ASSISTANT

## 2021-07-22 PROCEDURE — 74018 RADEX ABDOMEN 1 VIEW: CPT | Mod: TC

## 2021-07-22 PROCEDURE — 99999 PR PBB SHADOW E&M-EST. PATIENT-LVL V: CPT | Mod: PBBFAC,,, | Performed by: PHYSICIAN ASSISTANT

## 2021-07-22 PROCEDURE — 74018 RADEX ABDOMEN 1 VIEW: CPT | Mod: 26,,, | Performed by: RADIOLOGY

## 2021-07-22 PROCEDURE — 99214 OFFICE O/P EST MOD 30 MIN: CPT | Mod: S$PBB,,, | Performed by: PHYSICIAN ASSISTANT

## 2021-07-22 PROCEDURE — 99214 PR OFFICE/OUTPT VISIT, EST, LEVL IV, 30-39 MIN: ICD-10-PCS | Mod: S$PBB,,, | Performed by: PHYSICIAN ASSISTANT

## 2021-07-22 PROCEDURE — 74018 XR ABDOMEN AP 1 VIEW: ICD-10-PCS | Mod: 26,,, | Performed by: RADIOLOGY

## 2021-07-22 PROCEDURE — 73620 X-RAY EXAM OF FOOT: CPT | Mod: 26,LT,, | Performed by: RADIOLOGY

## 2021-07-22 RX ORDER — MELOXICAM 7.5 MG/1
TABLET ORAL
Qty: 30 TABLET | Refills: 0 | Status: SHIPPED | OUTPATIENT
Start: 2021-07-22 | End: 2021-08-17

## 2021-07-23 ENCOUNTER — PATIENT MESSAGE (OUTPATIENT)
Dept: HEMATOLOGY/ONCOLOGY | Facility: CLINIC | Age: 71
End: 2021-07-23

## 2021-07-23 ENCOUNTER — CLINICAL SUPPORT (OUTPATIENT)
Dept: REHABILITATION | Facility: HOSPITAL | Age: 71
End: 2021-07-23
Payer: MEDICARE

## 2021-07-23 DIAGNOSIS — R26.89 FUNCTIONAL GAIT ABNORMALITY: ICD-10-CM

## 2021-07-23 DIAGNOSIS — M25.562 CHRONIC PAIN OF LEFT KNEE: ICD-10-CM

## 2021-07-23 DIAGNOSIS — G89.29 CHRONIC PAIN OF LEFT KNEE: ICD-10-CM

## 2021-07-23 PROCEDURE — 97110 THERAPEUTIC EXERCISES: CPT | Mod: CQ

## 2021-07-23 PROCEDURE — 97140 MANUAL THERAPY 1/> REGIONS: CPT | Mod: CQ

## 2021-07-27 ENCOUNTER — HOSPITAL ENCOUNTER (OUTPATIENT)
Dept: RADIOLOGY | Facility: HOSPITAL | Age: 71
Discharge: HOME | End: 2021-07-27
Attending: ORTHOPAEDIC SURGERY
Payer: MEDICARE

## 2021-07-27 ENCOUNTER — OFFICE VISIT (OUTPATIENT)
Dept: ORTHOPEDICS | Facility: CLINIC | Age: 71
End: 2021-07-27
Payer: MEDICARE

## 2021-07-27 VITALS — BODY MASS INDEX: 32.08 KG/M2 | HEIGHT: 65 IN | WEIGHT: 192.56 LBS

## 2021-07-27 DIAGNOSIS — Z96.659 STATUS POST KNEE REPLACEMENT, UNSPECIFIED LATERALITY: ICD-10-CM

## 2021-07-27 DIAGNOSIS — Z96.652 STATUS POST LEFT KNEE REPLACEMENT: Primary | ICD-10-CM

## 2021-07-27 PROCEDURE — 73562 X-RAY EXAM OF KNEE 3: CPT | Mod: 26,LT,, | Performed by: RADIOLOGY

## 2021-07-27 PROCEDURE — 99024 POSTOP FOLLOW-UP VISIT: CPT | Mod: POP,,, | Performed by: ORTHOPAEDIC SURGERY

## 2021-07-27 PROCEDURE — 73562 X-RAY EXAM OF KNEE 3: CPT | Mod: TC,LT

## 2021-07-27 PROCEDURE — 99024 PR POST-OP FOLLOW-UP VISIT: ICD-10-PCS | Mod: POP,,, | Performed by: ORTHOPAEDIC SURGERY

## 2021-07-27 PROCEDURE — 73562 XR KNEE 3 VIEW LEFT: ICD-10-PCS | Mod: 26,LT,, | Performed by: RADIOLOGY

## 2021-07-27 PROCEDURE — 99999 PR PBB SHADOW E&M-EST. PATIENT-LVL III: ICD-10-PCS | Mod: PBBFAC,,, | Performed by: ORTHOPAEDIC SURGERY

## 2021-07-27 PROCEDURE — 99213 OFFICE O/P EST LOW 20 MIN: CPT | Mod: PBBFAC | Performed by: ORTHOPAEDIC SURGERY

## 2021-07-27 PROCEDURE — 99999 PR PBB SHADOW E&M-EST. PATIENT-LVL III: CPT | Mod: PBBFAC,,, | Performed by: ORTHOPAEDIC SURGERY

## 2021-07-29 ENCOUNTER — CLINICAL SUPPORT (OUTPATIENT)
Dept: REHABILITATION | Facility: HOSPITAL | Age: 71
End: 2021-07-29
Payer: MEDICARE

## 2021-07-29 ENCOUNTER — DOCUMENTATION ONLY (OUTPATIENT)
Dept: REHABILITATION | Facility: HOSPITAL | Age: 71
End: 2021-07-29

## 2021-07-29 DIAGNOSIS — R26.89 FUNCTIONAL GAIT ABNORMALITY: ICD-10-CM

## 2021-07-29 DIAGNOSIS — M25.562 CHRONIC PAIN OF LEFT KNEE: ICD-10-CM

## 2021-07-29 DIAGNOSIS — G89.29 CHRONIC PAIN OF LEFT KNEE: ICD-10-CM

## 2021-07-29 DIAGNOSIS — R53.1 WEAKNESS: ICD-10-CM

## 2021-07-29 PROCEDURE — 97110 THERAPEUTIC EXERCISES: CPT | Mod: CQ

## 2021-08-02 ENCOUNTER — PATIENT MESSAGE (OUTPATIENT)
Dept: HEMATOLOGY/ONCOLOGY | Facility: CLINIC | Age: 71
End: 2021-08-02

## 2021-08-02 ENCOUNTER — CLINICAL SUPPORT (OUTPATIENT)
Dept: REHABILITATION | Facility: HOSPITAL | Age: 71
End: 2021-08-02
Payer: MEDICARE

## 2021-08-02 DIAGNOSIS — G89.29 CHRONIC PAIN OF LEFT KNEE: ICD-10-CM

## 2021-08-02 DIAGNOSIS — M25.562 CHRONIC PAIN OF LEFT KNEE: ICD-10-CM

## 2021-08-02 DIAGNOSIS — R26.89 FUNCTIONAL GAIT ABNORMALITY: ICD-10-CM

## 2021-08-02 PROCEDURE — 97140 MANUAL THERAPY 1/> REGIONS: CPT

## 2021-08-02 PROCEDURE — 97110 THERAPEUTIC EXERCISES: CPT

## 2021-08-04 ENCOUNTER — TELEPHONE (OUTPATIENT)
Dept: INTERNAL MEDICINE | Facility: CLINIC | Age: 71
End: 2021-08-04

## 2021-08-04 ENCOUNTER — CLINICAL SUPPORT (OUTPATIENT)
Dept: REHABILITATION | Facility: HOSPITAL | Age: 71
End: 2021-08-04
Payer: MEDICARE

## 2021-08-04 DIAGNOSIS — G89.29 CHRONIC PAIN OF LEFT KNEE: ICD-10-CM

## 2021-08-04 DIAGNOSIS — M25.562 CHRONIC PAIN OF LEFT KNEE: ICD-10-CM

## 2021-08-04 DIAGNOSIS — R26.89 FUNCTIONAL GAIT ABNORMALITY: ICD-10-CM

## 2021-08-04 PROCEDURE — 97110 THERAPEUTIC EXERCISES: CPT

## 2021-08-05 ENCOUNTER — OFFICE VISIT (OUTPATIENT)
Dept: INTERNAL MEDICINE | Facility: CLINIC | Age: 71
End: 2021-08-05
Payer: MEDICARE

## 2021-08-05 ENCOUNTER — PATIENT MESSAGE (OUTPATIENT)
Dept: INTERNAL MEDICINE | Facility: CLINIC | Age: 71
End: 2021-08-05

## 2021-08-05 DIAGNOSIS — K59.03 DRUG-INDUCED CONSTIPATION: ICD-10-CM

## 2021-08-05 DIAGNOSIS — K21.9 GASTROESOPHAGEAL REFLUX DISEASE, UNSPECIFIED WHETHER ESOPHAGITIS PRESENT: ICD-10-CM

## 2021-08-05 DIAGNOSIS — J01.90 ACUTE SINUSITIS, RECURRENCE NOT SPECIFIED, UNSPECIFIED LOCATION: Primary | ICD-10-CM

## 2021-08-05 PROCEDURE — 99213 OFFICE O/P EST LOW 20 MIN: CPT | Mod: 95,,, | Performed by: PHYSICIAN ASSISTANT

## 2021-08-05 PROCEDURE — 99213 PR OFFICE/OUTPT VISIT, EST, LEVL III, 20-29 MIN: ICD-10-PCS | Mod: 95,,, | Performed by: PHYSICIAN ASSISTANT

## 2021-08-05 RX ORDER — AMOXICILLIN AND CLAVULANATE POTASSIUM 875; 125 MG/1; MG/1
1 TABLET, FILM COATED ORAL 2 TIMES DAILY
Qty: 20 TABLET | Refills: 0 | Status: SHIPPED | OUTPATIENT
Start: 2021-08-05 | End: 2021-08-15

## 2021-08-09 ENCOUNTER — LAB VISIT (OUTPATIENT)
Dept: PRIMARY CARE CLINIC | Facility: OTHER | Age: 71
End: 2021-08-09
Payer: MEDICARE

## 2021-08-09 DIAGNOSIS — R05.9 COUGH: ICD-10-CM

## 2021-08-09 PROCEDURE — U0003 INFECTIOUS AGENT DETECTION BY NUCLEIC ACID (DNA OR RNA); SEVERE ACUTE RESPIRATORY SYNDROME CORONAVIRUS 2 (SARS-COV-2) (CORONAVIRUS DISEASE [COVID-19]), AMPLIFIED PROBE TECHNIQUE, MAKING USE OF HIGH THROUGHPUT TECHNOLOGIES AS DESCRIBED BY CMS-2020-01-R: HCPCS

## 2021-08-12 LAB
SARS-COV-2 RNA RESP QL NAA+PROBE: NOT DETECTED
SARS-COV-2- CYCLE NUMBER: -1

## 2021-08-17 ENCOUNTER — HOSPITAL ENCOUNTER (OUTPATIENT)
Dept: RADIOLOGY | Facility: HOSPITAL | Age: 71
Discharge: HOME | End: 2021-08-17
Attending: PHYSICIAN ASSISTANT
Payer: MEDICARE

## 2021-08-17 ENCOUNTER — OFFICE VISIT (OUTPATIENT)
Dept: INTERNAL MEDICINE | Facility: CLINIC | Age: 71
End: 2021-08-17
Payer: MEDICARE

## 2021-08-17 ENCOUNTER — CLINICAL SUPPORT (OUTPATIENT)
Dept: REHABILITATION | Facility: HOSPITAL | Age: 71
End: 2021-08-17
Payer: MEDICARE

## 2021-08-17 VITALS
WEIGHT: 191.13 LBS | HEART RATE: 76 BPM | SYSTOLIC BLOOD PRESSURE: 130 MMHG | BODY MASS INDEX: 31.85 KG/M2 | HEIGHT: 65 IN | TEMPERATURE: 98 F | DIASTOLIC BLOOD PRESSURE: 82 MMHG | OXYGEN SATURATION: 99 %

## 2021-08-17 DIAGNOSIS — G89.29 CHRONIC PAIN OF LEFT KNEE: Primary | ICD-10-CM

## 2021-08-17 DIAGNOSIS — R26.89 FUNCTIONAL GAIT ABNORMALITY: ICD-10-CM

## 2021-08-17 DIAGNOSIS — R10.84 GENERALIZED ABDOMINAL PAIN: ICD-10-CM

## 2021-08-17 DIAGNOSIS — R06.02 SHORTNESS OF BREATH: ICD-10-CM

## 2021-08-17 DIAGNOSIS — R53.1 WEAKNESS: ICD-10-CM

## 2021-08-17 DIAGNOSIS — J20.9 ACUTE BRONCHITIS, UNSPECIFIED ORGANISM: ICD-10-CM

## 2021-08-17 DIAGNOSIS — M25.562 CHRONIC PAIN OF LEFT KNEE: Primary | ICD-10-CM

## 2021-08-17 DIAGNOSIS — K59.00 CONSTIPATION, UNSPECIFIED CONSTIPATION TYPE: Primary | ICD-10-CM

## 2021-08-17 DIAGNOSIS — J01.90 ACUTE SINUSITIS, RECURRENCE NOT SPECIFIED, UNSPECIFIED LOCATION: ICD-10-CM

## 2021-08-17 DIAGNOSIS — K59.00 CONSTIPATION, UNSPECIFIED CONSTIPATION TYPE: ICD-10-CM

## 2021-08-17 PROCEDURE — 99215 OFFICE O/P EST HI 40 MIN: CPT | Mod: PBBFAC | Performed by: PHYSICIAN ASSISTANT

## 2021-08-17 PROCEDURE — 99999 PR PBB SHADOW E&M-EST. PATIENT-LVL V: CPT | Mod: PBBFAC,,, | Performed by: PHYSICIAN ASSISTANT

## 2021-08-17 PROCEDURE — 99214 PR OFFICE/OUTPT VISIT, EST, LEVL IV, 30-39 MIN: ICD-10-PCS | Mod: S$PBB,,, | Performed by: PHYSICIAN ASSISTANT

## 2021-08-17 PROCEDURE — 74019 RADEX ABDOMEN 2 VIEWS: CPT | Mod: TC

## 2021-08-17 PROCEDURE — 74019 RADEX ABDOMEN 2 VIEWS: CPT | Mod: 26,,, | Performed by: RADIOLOGY

## 2021-08-17 PROCEDURE — 97110 THERAPEUTIC EXERCISES: CPT

## 2021-08-17 PROCEDURE — 71046 XR CHEST PA AND LATERAL: ICD-10-PCS | Mod: 26,,, | Performed by: RADIOLOGY

## 2021-08-17 PROCEDURE — 99214 OFFICE O/P EST MOD 30 MIN: CPT | Mod: S$PBB,,, | Performed by: PHYSICIAN ASSISTANT

## 2021-08-17 PROCEDURE — 99999 PR PBB SHADOW E&M-EST. PATIENT-LVL V: ICD-10-PCS | Mod: PBBFAC,,, | Performed by: PHYSICIAN ASSISTANT

## 2021-08-17 PROCEDURE — 71046 X-RAY EXAM CHEST 2 VIEWS: CPT | Mod: 26,,, | Performed by: RADIOLOGY

## 2021-08-17 PROCEDURE — 71046 X-RAY EXAM CHEST 2 VIEWS: CPT | Mod: TC

## 2021-08-17 PROCEDURE — 74019 XR ABDOMEN FLAT AND ERECT: ICD-10-PCS | Mod: 26,,, | Performed by: RADIOLOGY

## 2021-08-19 ENCOUNTER — CLINICAL SUPPORT (OUTPATIENT)
Dept: REHABILITATION | Facility: HOSPITAL | Age: 71
End: 2021-08-19
Payer: MEDICARE

## 2021-08-19 ENCOUNTER — TELEPHONE (OUTPATIENT)
Dept: INTERNAL MEDICINE | Facility: CLINIC | Age: 71
End: 2021-08-19

## 2021-08-19 DIAGNOSIS — M25.562 CHRONIC PAIN OF LEFT KNEE: ICD-10-CM

## 2021-08-19 DIAGNOSIS — G89.29 CHRONIC PAIN OF LEFT KNEE: ICD-10-CM

## 2021-08-19 DIAGNOSIS — R06.02 SHORTNESS OF BREATH: Primary | ICD-10-CM

## 2021-08-19 DIAGNOSIS — R26.89 FUNCTIONAL GAIT ABNORMALITY: ICD-10-CM

## 2021-08-19 PROCEDURE — 97110 THERAPEUTIC EXERCISES: CPT

## 2021-08-19 RX ORDER — AZITHROMYCIN 250 MG/1
TABLET, FILM COATED ORAL
Qty: 6 TABLET | Refills: 0 | Status: SHIPPED | OUTPATIENT
Start: 2021-08-19 | End: 2021-09-30 | Stop reason: ALTCHOICE

## 2021-08-19 RX ORDER — METHYLPREDNISOLONE 4 MG/1
TABLET ORAL
Qty: 1 PACKAGE | Refills: 0 | Status: SHIPPED | OUTPATIENT
Start: 2021-08-19 | End: 2021-09-30

## 2021-08-27 ENCOUNTER — PATIENT MESSAGE (OUTPATIENT)
Dept: INTERNAL MEDICINE | Facility: CLINIC | Age: 71
End: 2021-08-27

## 2021-08-27 ENCOUNTER — TELEPHONE (OUTPATIENT)
Dept: HEMATOLOGY/ONCOLOGY | Facility: CLINIC | Age: 71
End: 2021-08-27

## 2021-08-27 ENCOUNTER — HOSPITAL ENCOUNTER (OUTPATIENT)
Dept: CARDIOLOGY | Facility: HOSPITAL | Age: 71
Discharge: HOME | End: 2021-08-27
Payer: MEDICARE

## 2021-08-27 ENCOUNTER — PATIENT MESSAGE (OUTPATIENT)
Dept: HEMATOLOGY/ONCOLOGY | Facility: CLINIC | Age: 71
End: 2021-08-27

## 2021-08-27 DIAGNOSIS — C80.1 CLEAR CELL CARCINOMA: Primary | ICD-10-CM

## 2021-08-27 DIAGNOSIS — C80.1 CLEAR CELL CARCINOMA: ICD-10-CM

## 2021-08-27 PROCEDURE — 93010 ELECTROCARDIOGRAM REPORT: CPT | Mod: ,,, | Performed by: INTERNAL MEDICINE

## 2021-08-27 PROCEDURE — 93010 EKG 12-LEAD: ICD-10-PCS | Mod: ,,, | Performed by: INTERNAL MEDICINE

## 2021-08-27 PROCEDURE — 93005 ELECTROCARDIOGRAM TRACING: CPT

## 2021-09-02 ENCOUNTER — PATIENT MESSAGE (OUTPATIENT)
Dept: ORTHOPEDICS | Facility: CLINIC | Age: 71
End: 2021-09-02

## 2021-09-03 ENCOUNTER — TELEPHONE (OUTPATIENT)
Dept: ORTHOPEDICS | Facility: CLINIC | Age: 71
End: 2021-09-03

## 2021-09-05 NOTE — PATIENT INSTRUCTIONS
Treating Strains and Sprains  Strains and sprains happen when muscles or other soft tissues near your bones stretch or tear. These injuries can cause bruising, swelling, and pain. To ease your discomfort and speed the healing of your strain or sprain, follow the tips below. Remember, a strain or sprain can take 6 to 8 weeks to heal.     Important Note: Do not give aspirin to children or teens without discussing it with your healthcare provider first.        Ice first, heat later  · Use ice for the first 24 to 48 hours after injury. Ice helps prevent swelling and reduce pain. Ice the injury for no more than 20 minutes at a time and allow at least  20 minutes between icing sessions.  · Apply heat after the first 72 hours, once the swelling has gone down. Heat relaxes muscles and increases blood flow. Soak the injured area in warm water or use a heating pad set on low for no more than 15 minutes at a time.  Wrap and elevate  · Wrap an injured limb firmly with an elastic bandage. This provides support and helps prevent swelling. Dont wear an elastic bandage overnight. Watch for tingling, numbness, or increased pain, and remove the bandage immediately if any of these occurs.  · Elevate the injured area to help reduce swelling and throbbing. Its best to raise an injured limb above the level of your heart.     Medicines  · Over-the-counter medicines such as acetaminophen or ibuprofen can help reduce pain. Some also help reduce swelling.  · Take medicine only as directed.  · Rest the area even if medicines are controlling the pain.  Rest  · Rest the injured area by not using it for 24 hours.  · When youre ready, return slowly to your normal activities. Rest the injured area often.  · Dont use or walk on an injured limb if it hurts.  Date Last Reviewed: 9/3/2015  © 3994-7011 [employer]. [address] [place] [address], [place] [address]. All rights reserved. This information is not intended as a substitute for  professional medical care. Always follow your healthcare professional's instructions.        Sciatica    Sciatica is a condition that causes pain in the lower back that spreads down into the buttock, hip, and leg. Sometimes the leg pain can happen without any back pain. Sciatica happens when a spinal nerve is irritated or has pressure put on it as comes out of the spinal canal in the lower back. This most often happens when a bulge or rupture of a nearby spinal disk presses on the nerve. Sciatica can also be caused by a narrowing of the spinal canal (spinal stenosis) or spasm of the muscle in the buttocks that the sciatic nerve passes through (pyriform muscle). Sciatica is also called lumbar radiculopathy.  Sciatica may begin after a sudden twisting or bending force, such as in a car accident. Or it can happen after a simple awkward movement. In either case, muscle spasm often also happens. Muscle spasm makes the pain worse.  A healthcare provider makes a diagnosis of sciatica from your symptoms and a physical exam. Unless you had an injury from a car accident or fall, you usually wont have X-rays taken at this time. This is because the nerves and disks in your back cant be seen on an X-ray. If the provider sees signs of a compressed nerve, you will need to schedule an MRI scan as an outpatient. Signs of a compressed nerve include loss of strength in a leg.  Most sciatica gets better with medicine, exercise, and physical therapy. If your symptoms continue after at least 3 months of medical treatment, you may need surgery or injections to your lower back.  Home care  Follow these tips when caring for yourself at home:  · You may need to stay in bed the first few days. But as soon as possible, begin sitting up or walking. This will help you avoid problems that come from staying in bed for long periods.  · When in bed, try to find a position that is comfortable. A firm mattress is best. Try lying flat on your back  with pillows under your knees. You can also try lying on your side with your knees bent up toward your chest and a pillow between your knees.  · Avoid sitting for long periods. This puts more stress on your lower back than standing or walking.  · Use heat from a hot shower, hot bath, or heating pad to help ease pain. Massage can also help. You can also try using an ice pack. You can make your own ice pack by putting ice cubes in a plastic bag. Wrap the bag in a thin towel. Try both heat and cold to see which works best. Use the method that feels best for 20 minutes several times a day.  · You may use acetaminophen or ibuprofen to ease pain, unless another pain medicine was prescribed. Note: If you have chronic liver or kidney disease, talk with your healthcare provider before taking these medicines. Also talk with your provider if youve had a stomach ulcer or gastrointestinal bleeding.  · Use safe lifting methods. Dont lift anything heavier than 15 pounds until all of the pain is gone.  Follow-up care  Follow up with your healthcare provider, or as advised. You may need physical therapy or additional tests.  If X-rays were taken, a radiologist will look at them. You will be told of any new findings that may affect your care.  When to seek medical advice  Call your healthcare provider right away if any of these occur:  · Pain gets worse even after taking prescribed medicine  · Weakness or numbness in 1 or both legs or hips  · Numbness in your groin or genital area  · You cant control your bowel or bladder  · Fever  · Redness or swelling over your back or spine   Date Last Reviewed: 8/1/2016  © 4948-2725 [employer]. [address] [place] [address], [place] [address]. All rights reserved. This information is not intended as a substitute for professional medical care. Always follow your healthcare professional's instructions.        Possible Causes of Low Back or Leg Pain    The symptoms in your back or leg may  be due to pressure on a nerve. This pressure may be caused by a damaged disk or by abnormal bone growth. Either way, you may feel pain, burning, tingling, or numbness. If you have pressure on a nerve that connects to the sciatic nerve, pain may shoot down your leg.    Pressure from the disk  Constant wear and tear can weaken a disk over time and cause back pain. The disk can then be damaged by a sudden movement or injury. If its soft center begins to bulge, the disk may press on a nerve. Or the outside of the disk may tear, and the soft center may squeeze through and pinch a nerve.    Pressure from bone  As a disk wears out, the vertebrae right above and below the disk begin to touch. This can put pressure on a nerve. Often, abnormal bone (called bone spurs) grows where the vertebrae rub against each other. This can cause the foramen or the spinal canal to narrow (called stenosis) and press against a nerve.  Date Last Reviewed: 10/4/2015  © 1477-4054 [employer]. [address] [place] [address], [place] [address]. All rights reserved. This information is not intended as a substitute for professional medical care. Always follow your healthcare professional's instructions.         [phone]

## 2021-09-07 ENCOUNTER — HOSPITAL ENCOUNTER (OUTPATIENT)
Dept: CARDIOLOGY | Facility: HOSPITAL | Age: 71
Discharge: HOME | End: 2021-09-07
Attending: INTERNAL MEDICINE
Payer: MEDICARE

## 2021-09-07 ENCOUNTER — OFFICE VISIT (OUTPATIENT)
Dept: ORTHOPEDICS | Facility: CLINIC | Age: 71
End: 2021-09-07
Payer: MEDICARE

## 2021-09-07 VITALS
DIASTOLIC BLOOD PRESSURE: 82 MMHG | BODY MASS INDEX: 32.32 KG/M2 | HEIGHT: 65 IN | HEART RATE: 70 BPM | WEIGHT: 194 LBS | SYSTOLIC BLOOD PRESSURE: 140 MMHG

## 2021-09-07 VITALS — WEIGHT: 194 LBS | BODY MASS INDEX: 32.32 KG/M2 | HEIGHT: 65 IN

## 2021-09-07 DIAGNOSIS — C80.1 CLEAR CELL CARCINOMA: ICD-10-CM

## 2021-09-07 DIAGNOSIS — Z96.652 STATUS POST LEFT KNEE REPLACEMENT: Primary | ICD-10-CM

## 2021-09-07 LAB
ASCENDING AORTA: 3.29 CM
AV INDEX (PROSTH): 0.73
AV MEAN GRADIENT: 2 MMHG
AV PEAK GRADIENT: 3 MMHG
AV VALVE AREA: 2.67 CM2
AV VELOCITY RATIO: 0.76
BSA FOR ECHO PROCEDURE: 2.01 M2
CV ECHO LV RWT: 0.51 CM
DOP CALC AO PEAK VEL: 0.91 M/S
DOP CALC AO VTI: 21.01 CM
DOP CALC LVOT AREA: 3.7 CM2
DOP CALC LVOT DIAMETER: 2.16 CM
DOP CALC LVOT PEAK VEL: 0.69 M/S
DOP CALC LVOT STROKE VOLUME: 56.04 CM3
DOP CALCLVOT PEAK VEL VTI: 15.3 CM
E WAVE DECELERATION TIME: 260.96 MSEC
E/A RATIO: 0.99
E/E' RATIO: 8.93 M/S
ECHO LV POSTERIOR WALL: 1.14 CM (ref 0.6–1.1)
EJECTION FRACTION: 60 %
FRACTIONAL SHORTENING: 38 % (ref 28–44)
INTERVENTRICULAR SEPTUM: 1.01 CM (ref 0.6–1.1)
LA MAJOR: 3.56 CM
LA MINOR: 3.78 CM
LA WIDTH: 3.32 CM
LEFT ATRIUM SIZE: 3.76 CM
LEFT ATRIUM VOLUME INDEX MOD: 13.9 ML/M2
LEFT ATRIUM VOLUME INDEX: 20 ML/M2
LEFT ATRIUM VOLUME MOD: 27.13 CM3
LEFT ATRIUM VOLUME: 38.91 CM3
LEFT INTERNAL DIMENSION IN SYSTOLE: 2.76 CM (ref 2.1–4)
LEFT VENTRICLE DIASTOLIC VOLUME INDEX: 45.96 ML/M2
LEFT VENTRICLE DIASTOLIC VOLUME: 89.63 ML
LEFT VENTRICLE MASS INDEX: 85 G/M2
LEFT VENTRICLE SYSTOLIC VOLUME INDEX: 14.6 ML/M2
LEFT VENTRICLE SYSTOLIC VOLUME: 28.43 ML
LEFT VENTRICULAR INTERNAL DIMENSION IN DIASTOLE: 4.44 CM (ref 3.5–6)
LEFT VENTRICULAR MASS: 165.89 G
LV LATERAL E/E' RATIO: 9.57 M/S
LV SEPTAL E/E' RATIO: 8.38 M/S
MV PEAK A VEL: 0.68 M/S
MV PEAK E VEL: 0.67 M/S
MV STENOSIS PRESSURE HALF TIME: 75.68 MS
MV VALVE AREA P 1/2 METHOD: 2.91 CM2
PISA TR MAX VEL: 2.37 M/S
RA MAJOR: 3.86 CM
RA PRESSURE: 3 MMHG
RA WIDTH: 2.99 CM
RIGHT VENTRICULAR END-DIASTOLIC DIMENSION: 3.75 CM
RV TISSUE DOPPLER FREE WALL SYSTOLIC VELOCITY 1 (APICAL 4 CHAMBER VIEW): 11.17 CM/S
SINUS: 2.87 CM
STJ: 2.37 CM
TDI LATERAL: 0.07 M/S
TDI SEPTAL: 0.08 M/S
TDI: 0.08 M/S
TR MAX PG: 22 MMHG
TRICUSPID ANNULAR PLANE SYSTOLIC EXCURSION: 2.38 CM
TV REST PULMONARY ARTERY PRESSURE: 25 MMHG

## 2021-09-07 PROCEDURE — 93306 ECHO (CUPID ONLY): ICD-10-PCS | Mod: 26,,, | Performed by: INTERNAL MEDICINE

## 2021-09-07 PROCEDURE — 93306 TTE W/DOPPLER COMPLETE: CPT

## 2021-09-07 PROCEDURE — 99999 PR PBB SHADOW E&M-EST. PATIENT-LVL III: ICD-10-PCS | Mod: PBBFAC,,, | Performed by: ORTHOPAEDIC SURGERY

## 2021-09-07 PROCEDURE — 99024 POSTOP FOLLOW-UP VISIT: CPT | Mod: POP,,, | Performed by: ORTHOPAEDIC SURGERY

## 2021-09-07 PROCEDURE — 99999 PR PBB SHADOW E&M-EST. PATIENT-LVL III: CPT | Mod: PBBFAC,,, | Performed by: ORTHOPAEDIC SURGERY

## 2021-09-07 PROCEDURE — 99024 PR POST-OP FOLLOW-UP VISIT: ICD-10-PCS | Mod: POP,,, | Performed by: ORTHOPAEDIC SURGERY

## 2021-09-07 PROCEDURE — 93306 TTE W/DOPPLER COMPLETE: CPT | Mod: 26,,, | Performed by: INTERNAL MEDICINE

## 2021-09-07 PROCEDURE — 99213 OFFICE O/P EST LOW 20 MIN: CPT | Mod: PBBFAC | Performed by: ORTHOPAEDIC SURGERY

## 2021-09-08 ENCOUNTER — CLINICAL SUPPORT (OUTPATIENT)
Dept: REHABILITATION | Facility: HOSPITAL | Age: 71
End: 2021-09-08
Payer: MEDICARE

## 2021-09-08 DIAGNOSIS — M25.562 CHRONIC PAIN OF LEFT KNEE: ICD-10-CM

## 2021-09-08 DIAGNOSIS — G89.29 CHRONIC PAIN OF LEFT KNEE: ICD-10-CM

## 2021-09-08 DIAGNOSIS — R26.89 FUNCTIONAL GAIT ABNORMALITY: ICD-10-CM

## 2021-09-08 PROCEDURE — 97140 MANUAL THERAPY 1/> REGIONS: CPT

## 2021-09-08 PROCEDURE — 97110 THERAPEUTIC EXERCISES: CPT

## 2021-09-09 ENCOUNTER — PATIENT MESSAGE (OUTPATIENT)
Dept: HEMATOLOGY/ONCOLOGY | Facility: CLINIC | Age: 71
End: 2021-09-09

## 2021-09-10 ENCOUNTER — PATIENT MESSAGE (OUTPATIENT)
Dept: ADMINISTRATIVE | Facility: OTHER | Age: 71
End: 2021-09-10

## 2021-09-10 ENCOUNTER — PATIENT MESSAGE (OUTPATIENT)
Dept: ORTHOPEDICS | Facility: CLINIC | Age: 71
End: 2021-09-10

## 2021-09-10 ENCOUNTER — PATIENT OUTREACH (OUTPATIENT)
Dept: ORTHOPEDICS | Facility: CLINIC | Age: 71
End: 2021-09-10

## 2021-09-10 ENCOUNTER — PATIENT MESSAGE (OUTPATIENT)
Dept: INTERNAL MEDICINE | Facility: CLINIC | Age: 71
End: 2021-09-10

## 2021-09-29 ENCOUNTER — PATIENT MESSAGE (OUTPATIENT)
Dept: ORTHOPEDICS | Facility: CLINIC | Age: 71
End: 2021-09-29

## 2021-09-30 ENCOUNTER — OFFICE VISIT (OUTPATIENT)
Dept: INTERNAL MEDICINE | Facility: CLINIC | Age: 71
End: 2021-09-30
Payer: MEDICARE

## 2021-09-30 ENCOUNTER — LAB VISIT (OUTPATIENT)
Dept: LAB | Facility: HOSPITAL | Age: 71
End: 2021-09-30
Attending: FAMILY MEDICINE
Payer: MEDICARE

## 2021-09-30 VITALS
WEIGHT: 195.56 LBS | TEMPERATURE: 98 F | DIASTOLIC BLOOD PRESSURE: 84 MMHG | HEIGHT: 65 IN | BODY MASS INDEX: 32.58 KG/M2 | HEART RATE: 75 BPM | SYSTOLIC BLOOD PRESSURE: 126 MMHG | OXYGEN SATURATION: 97 %

## 2021-09-30 DIAGNOSIS — E55.9 VITAMIN D DEFICIENCY: ICD-10-CM

## 2021-09-30 DIAGNOSIS — I10 ESSENTIAL HYPERTENSION: ICD-10-CM

## 2021-09-30 DIAGNOSIS — K21.9 GASTROESOPHAGEAL REFLUX DISEASE WITHOUT ESOPHAGITIS: ICD-10-CM

## 2021-09-30 DIAGNOSIS — I10 ESSENTIAL HYPERTENSION: Primary | ICD-10-CM

## 2021-09-30 DIAGNOSIS — E03.9 HYPOTHYROIDISM, UNSPECIFIED TYPE: ICD-10-CM

## 2021-09-30 LAB
25(OH)D3+25(OH)D2 SERPL-MCNC: 31 NG/ML (ref 30–96)
CHOLEST SERPL-MCNC: 222 MG/DL (ref 120–199)
CHOLEST/HDLC SERPL: 3.4 {RATIO} (ref 2–5)
HDLC SERPL-MCNC: 66 MG/DL (ref 40–75)
HDLC SERPL: 29.7 % (ref 20–50)
LDLC SERPL CALC-MCNC: 136.4 MG/DL (ref 63–159)
NONHDLC SERPL-MCNC: 156 MG/DL
TRIGL SERPL-MCNC: 98 MG/DL (ref 30–150)

## 2021-09-30 PROCEDURE — G0008 ADMIN INFLUENZA VIRUS VAC: HCPCS | Mod: PBBFAC

## 2021-09-30 PROCEDURE — 36415 COLL VENOUS BLD VENIPUNCTURE: CPT | Performed by: FAMILY MEDICINE

## 2021-09-30 PROCEDURE — 99214 PR OFFICE/OUTPT VISIT, EST, LEVL IV, 30-39 MIN: ICD-10-PCS | Mod: S$PBB,,, | Performed by: FAMILY MEDICINE

## 2021-09-30 PROCEDURE — 82306 VITAMIN D 25 HYDROXY: CPT | Performed by: FAMILY MEDICINE

## 2021-09-30 PROCEDURE — 99999 PR PBB SHADOW E&M-EST. PATIENT-LVL IV: ICD-10-PCS | Mod: PBBFAC,,, | Performed by: FAMILY MEDICINE

## 2021-09-30 PROCEDURE — 99214 OFFICE O/P EST MOD 30 MIN: CPT | Mod: PBBFAC,25 | Performed by: FAMILY MEDICINE

## 2021-09-30 PROCEDURE — 90694 VACC AIIV4 NO PRSRV 0.5ML IM: CPT | Mod: PBBFAC

## 2021-09-30 PROCEDURE — 80061 LIPID PANEL: CPT | Performed by: FAMILY MEDICINE

## 2021-09-30 PROCEDURE — 99999 PR PBB SHADOW E&M-EST. PATIENT-LVL IV: CPT | Mod: PBBFAC,,, | Performed by: FAMILY MEDICINE

## 2021-09-30 PROCEDURE — 99214 OFFICE O/P EST MOD 30 MIN: CPT | Mod: S$PBB,,, | Performed by: FAMILY MEDICINE

## 2021-09-30 RX ORDER — CLOTRIMAZOLE 1 G/ML
1 SOLUTION TOPICAL 2 TIMES DAILY
Status: CANCELLED | OUTPATIENT
Start: 2021-09-30

## 2021-09-30 RX ORDER — ZOLPIDEM TARTRATE 5 MG/1
5 TABLET ORAL NIGHTLY PRN
Qty: 90 TABLET | Refills: 0 | Status: SHIPPED | OUTPATIENT
Start: 2021-09-30 | End: 2022-04-04 | Stop reason: SDUPTHER

## 2021-09-30 RX ORDER — ALPRAZOLAM 0.5 MG/1
TABLET ORAL
Qty: 30 TABLET | Refills: 5 | Status: SHIPPED | OUTPATIENT
Start: 2021-09-30 | End: 2022-04-04 | Stop reason: SDUPTHER

## 2021-09-30 RX ORDER — CLOTRIMAZOLE 1 G/ML
SOLUTION TOPICAL 2 TIMES DAILY
Qty: 15 ML | Refills: 5 | Status: SHIPPED | OUTPATIENT
Start: 2021-09-30 | End: 2023-06-09 | Stop reason: SDUPTHER

## 2021-10-01 ENCOUNTER — OFFICE VISIT (OUTPATIENT)
Dept: ORTHOPEDICS | Facility: CLINIC | Age: 71
End: 2021-10-01
Payer: MEDICARE

## 2021-10-01 VITALS — WEIGHT: 196.75 LBS | HEIGHT: 65 IN | BODY MASS INDEX: 32.78 KG/M2

## 2021-10-01 DIAGNOSIS — Z96.652 STATUS POST LEFT KNEE REPLACEMENT: Primary | ICD-10-CM

## 2021-10-01 PROCEDURE — 99999 PR PBB SHADOW E&M-EST. PATIENT-LVL III: ICD-10-PCS | Mod: PBBFAC,,, | Performed by: ORTHOPAEDIC SURGERY

## 2021-10-01 PROCEDURE — 99213 OFFICE O/P EST LOW 20 MIN: CPT | Mod: PBBFAC | Performed by: ORTHOPAEDIC SURGERY

## 2021-10-01 PROCEDURE — 99213 PR OFFICE/OUTPT VISIT, EST, LEVL III, 20-29 MIN: ICD-10-PCS | Mod: S$PBB,,, | Performed by: ORTHOPAEDIC SURGERY

## 2021-10-01 PROCEDURE — 99999 PR PBB SHADOW E&M-EST. PATIENT-LVL III: CPT | Mod: PBBFAC,,, | Performed by: ORTHOPAEDIC SURGERY

## 2021-10-01 PROCEDURE — 99213 OFFICE O/P EST LOW 20 MIN: CPT | Mod: S$PBB,,, | Performed by: ORTHOPAEDIC SURGERY

## 2021-10-08 ENCOUNTER — TELEPHONE (OUTPATIENT)
Dept: INTERNAL MEDICINE | Facility: CLINIC | Age: 71
End: 2021-10-08

## 2021-10-11 RX ORDER — METRONIDAZOLE 10 MG/G
GEL TOPICAL DAILY
Qty: 60 G | Refills: 5 | Status: SHIPPED | OUTPATIENT
Start: 2021-10-11 | End: 2023-06-09 | Stop reason: SDUPTHER

## 2021-10-13 ENCOUNTER — PATIENT MESSAGE (OUTPATIENT)
Dept: INTERNAL MEDICINE | Facility: CLINIC | Age: 71
End: 2021-10-13
Payer: MEDICARE

## 2021-10-18 ENCOUNTER — PATIENT MESSAGE (OUTPATIENT)
Dept: HEMATOLOGY/ONCOLOGY | Facility: CLINIC | Age: 71
End: 2021-10-18
Payer: MEDICARE

## 2021-10-19 ENCOUNTER — TELEPHONE (OUTPATIENT)
Dept: INTERNAL MEDICINE | Facility: CLINIC | Age: 71
End: 2021-10-19

## 2021-10-19 RX ORDER — PRAVASTATIN SODIUM 20 MG/1
20 TABLET ORAL DAILY
Qty: 90 TABLET | Refills: 3 | Status: SHIPPED | OUTPATIENT
Start: 2021-10-19 | End: 2022-08-02

## 2021-10-19 RX ORDER — PRAVASTATIN SODIUM 20 MG/1
20 TABLET ORAL DAILY
Qty: 90 TABLET | Refills: 3 | Status: SHIPPED | OUTPATIENT
Start: 2021-10-19 | End: 2021-10-19 | Stop reason: SDUPTHER

## 2021-10-28 ENCOUNTER — TELEPHONE (OUTPATIENT)
Dept: ORTHOPEDICS | Facility: CLINIC | Age: 71
End: 2021-10-28
Payer: MEDICARE

## 2021-10-29 ENCOUNTER — OFFICE VISIT (OUTPATIENT)
Dept: ORTHOPEDICS | Facility: CLINIC | Age: 71
End: 2021-10-29
Payer: MEDICARE

## 2021-10-29 VITALS — BODY MASS INDEX: 32.8 KG/M2 | WEIGHT: 196.88 LBS | HEIGHT: 65 IN

## 2021-10-29 DIAGNOSIS — G89.18 POST-OP PAIN: Primary | ICD-10-CM

## 2021-10-29 DIAGNOSIS — Z96.652 STATUS POST LEFT KNEE REPLACEMENT: ICD-10-CM

## 2021-10-29 PROCEDURE — 99999 PR PBB SHADOW E&M-EST. PATIENT-LVL III: CPT | Mod: PBBFAC,,, | Performed by: ORTHOPAEDIC SURGERY

## 2021-10-29 PROCEDURE — 99213 OFFICE O/P EST LOW 20 MIN: CPT | Mod: S$PBB,,, | Performed by: ORTHOPAEDIC SURGERY

## 2021-10-29 PROCEDURE — 99213 OFFICE O/P EST LOW 20 MIN: CPT | Mod: PBBFAC | Performed by: ORTHOPAEDIC SURGERY

## 2021-10-29 PROCEDURE — 99999 PR PBB SHADOW E&M-EST. PATIENT-LVL III: ICD-10-PCS | Mod: PBBFAC,,, | Performed by: ORTHOPAEDIC SURGERY

## 2021-10-29 PROCEDURE — 99213 PR OFFICE/OUTPT VISIT, EST, LEVL III, 20-29 MIN: ICD-10-PCS | Mod: S$PBB,,, | Performed by: ORTHOPAEDIC SURGERY

## 2021-10-29 RX ORDER — METHYLPREDNISOLONE 4 MG/1
TABLET ORAL
Qty: 1 EACH | Refills: 0 | Status: SHIPPED | OUTPATIENT
Start: 2021-10-29 | End: 2022-04-04

## 2021-11-01 ENCOUNTER — CLINICAL SUPPORT (OUTPATIENT)
Dept: REHABILITATION | Facility: HOSPITAL | Age: 71
End: 2021-11-01
Payer: MEDICARE

## 2021-11-01 DIAGNOSIS — R26.89 FUNCTIONAL GAIT ABNORMALITY: ICD-10-CM

## 2021-11-01 DIAGNOSIS — M25.562 CHRONIC PAIN OF LEFT KNEE: ICD-10-CM

## 2021-11-01 DIAGNOSIS — G89.29 CHRONIC PAIN OF LEFT KNEE: ICD-10-CM

## 2021-11-01 PROCEDURE — 97110 THERAPEUTIC EXERCISES: CPT

## 2021-11-09 ENCOUNTER — PATIENT MESSAGE (OUTPATIENT)
Dept: ORTHOPEDICS | Facility: CLINIC | Age: 71
End: 2021-11-09
Payer: MEDICARE

## 2021-11-09 ENCOUNTER — CLINICAL SUPPORT (OUTPATIENT)
Dept: REHABILITATION | Facility: HOSPITAL | Age: 71
End: 2021-11-09
Payer: MEDICARE

## 2021-11-09 DIAGNOSIS — G89.29 CHRONIC PAIN OF LEFT KNEE: Primary | ICD-10-CM

## 2021-11-09 DIAGNOSIS — M25.562 CHRONIC PAIN OF LEFT KNEE: Primary | ICD-10-CM

## 2021-11-09 DIAGNOSIS — R26.89 FUNCTIONAL GAIT ABNORMALITY: ICD-10-CM

## 2021-11-09 DIAGNOSIS — R53.1 WEAKNESS: ICD-10-CM

## 2021-11-09 PROCEDURE — 97110 THERAPEUTIC EXERCISES: CPT

## 2021-11-17 ENCOUNTER — IMMUNIZATION (OUTPATIENT)
Dept: PRIMARY CARE CLINIC | Facility: CLINIC | Age: 71
End: 2021-11-17
Payer: MEDICARE

## 2021-11-17 DIAGNOSIS — Z23 NEED FOR VACCINATION: Primary | ICD-10-CM

## 2021-11-17 PROCEDURE — 0004A COVID-19, MRNA, LNP-S, PF, 30 MCG/0.3 ML DOSE VACCINE: CPT | Mod: CV19,PBBFAC | Performed by: FAMILY MEDICINE

## 2021-11-27 ENCOUNTER — PATIENT MESSAGE (OUTPATIENT)
Dept: ORTHOPEDICS | Facility: CLINIC | Age: 71
End: 2021-11-27
Payer: MEDICARE

## 2021-11-30 ENCOUNTER — PES CALL (OUTPATIENT)
Dept: ADMINISTRATIVE | Facility: CLINIC | Age: 71
End: 2021-11-30
Payer: MEDICARE

## 2021-12-14 ENCOUNTER — TELEPHONE (OUTPATIENT)
Dept: PAIN MEDICINE | Facility: CLINIC | Age: 71
End: 2021-12-14
Payer: MEDICARE

## 2021-12-21 ENCOUNTER — TELEPHONE (OUTPATIENT)
Dept: ORTHOPEDICS | Facility: CLINIC | Age: 71
End: 2021-12-21
Payer: MEDICARE

## 2021-12-30 ENCOUNTER — TELEPHONE (OUTPATIENT)
Dept: PAIN MEDICINE | Facility: CLINIC | Age: 71
End: 2021-12-30
Payer: MEDICARE

## 2022-01-14 ENCOUNTER — TELEPHONE (OUTPATIENT)
Dept: OBSTETRICS AND GYNECOLOGY | Facility: CLINIC | Age: 72
End: 2022-01-14
Payer: MEDICARE

## 2022-01-14 ENCOUNTER — PATIENT MESSAGE (OUTPATIENT)
Dept: ORTHOPEDICS | Facility: CLINIC | Age: 72
End: 2022-01-14
Payer: MEDICARE

## 2022-01-14 NOTE — TELEPHONE ENCOUNTER
----- Message from Malorie Corey sent at 1/14/2022 10:46 AM CST -----  Pt is requesting a call back in regards to estradioL (ESTRACE) 1 MG tablet. Pt states that she really need her med and she only do Mammo every 2 years. Pt states that she can do virtual if the doctor need to see her.       .  [facility] - [place], [place] - [address]  [address] [address]  Phone: [phone] Fax: [phone]      Pt can be reached at [phone] (home) [phone] (work)

## 2022-01-18 ENCOUNTER — PATIENT MESSAGE (OUTPATIENT)
Dept: ORTHOPEDICS | Facility: CLINIC | Age: 72
End: 2022-01-18

## 2022-01-18 ENCOUNTER — OFFICE VISIT (OUTPATIENT)
Dept: ORTHOPEDICS | Facility: CLINIC | Age: 72
End: 2022-01-18
Payer: MEDICARE

## 2022-01-18 VITALS — BODY MASS INDEX: 32.65 KG/M2 | WEIGHT: 196 LBS | HEIGHT: 65 IN

## 2022-01-18 DIAGNOSIS — Z96.652 STATUS POST LEFT KNEE REPLACEMENT: Primary | ICD-10-CM

## 2022-01-18 DIAGNOSIS — M17.11 PRIMARY OSTEOARTHRITIS OF RIGHT KNEE: ICD-10-CM

## 2022-01-18 PROCEDURE — 20610 LARGE JOINT ASPIRATION/INJECTION: R KNEE: ICD-10-PCS | Mod: S$PBB,RT,, | Performed by: ORTHOPAEDIC SURGERY

## 2022-01-18 PROCEDURE — 99213 OFFICE O/P EST LOW 20 MIN: CPT | Mod: 25,S$PBB,, | Performed by: ORTHOPAEDIC SURGERY

## 2022-01-18 PROCEDURE — 99213 OFFICE O/P EST LOW 20 MIN: CPT | Mod: PBBFAC | Performed by: ORTHOPAEDIC SURGERY

## 2022-01-18 PROCEDURE — 99999 PR PBB SHADOW E&M-EST. PATIENT-LVL III: CPT | Mod: PBBFAC,,, | Performed by: ORTHOPAEDIC SURGERY

## 2022-01-18 PROCEDURE — 99213 PR OFFICE/OUTPT VISIT, EST, LEVL III, 20-29 MIN: ICD-10-PCS | Mod: 25,S$PBB,, | Performed by: ORTHOPAEDIC SURGERY

## 2022-01-18 PROCEDURE — 99999 PR PBB SHADOW E&M-EST. PATIENT-LVL III: ICD-10-PCS | Mod: PBBFAC,,, | Performed by: ORTHOPAEDIC SURGERY

## 2022-01-18 PROCEDURE — 20610 DRAIN/INJ JOINT/BURSA W/O US: CPT | Mod: PBBFAC | Performed by: ORTHOPAEDIC SURGERY

## 2022-01-18 RX ADMIN — TRIAMCINOLONE ACETONIDE 40 MG: 40 INJECTION, SUSPENSION INTRA-ARTICULAR; INTRAMUSCULAR at 02:01

## 2022-01-18 NOTE — PROGRESS NOTES
Patient is a months out from a left total knee replacement June 14, 2021.    She is helpful and trouble sleeping she is on Ambien for that.  She complains of some anterior shin and some pain radiating down into her foot.  She is also currently starting radiation soon for her renal cell cancer.    Saw her in October 2021 recommended Pain Clinic for genicular nerve block or ablation this was rescheduled to March 15, 2022. She has tried a Medrol Dosepak at this not help.    We tried compound cream with minimal change.  She has been on gabapentin long-term twice a day and that has been increased to 3 times a day.    Overall she says she is better than she was preop but she is having difficulty with more pain at night and waking up at night.  After a long day she has got anterior shin pain radiating down the anterior shin into the foot gets some throbbing and numbness there.    She is walking well no limp nonantalgic she has no significant change in exam he is moving well good stability good range of motion appears well healed    No x-rays today    Assessment plan  She is mechanically well-functioning left total knee replacement with persistent neuropathic pain.    Recommend:   [facility]: L TKA with neuropathic pain, eval for genicular block/RFA v saphenous block/RFA v lumbar sympathetic block, eval for increasing gabapentin    In terms of sleeping she should discuss options with her primary care provider since she is already on Ambien she might benefit from a referral to the Sleep Clinic    She would like a right knee steroid injection today for her right knee arthritis.    We can see her back in 4 months which will be 1 year out from her left knee with bilateral knee x-rays.

## 2022-02-23 ENCOUNTER — TELEPHONE (OUTPATIENT)
Dept: ADMINISTRATIVE | Facility: HOSPITAL | Age: 72
End: 2022-02-23
Payer: MEDICARE

## 2022-02-24 NOTE — PROGRESS NOTES
Injection Information    Triamcinolone Acetonide Injectable Suspension (40mg/mL)  Lot Number: [id number]   Expiration Date: 6/30/2023

## 2022-03-11 ENCOUNTER — PATIENT MESSAGE (OUTPATIENT)
Dept: ADMINISTRATIVE | Facility: OTHER | Age: 72
End: 2022-03-11
Payer: MEDICARE

## 2022-04-04 ENCOUNTER — OFFICE VISIT (OUTPATIENT)
Dept: INTERNAL MEDICINE | Facility: CLINIC | Age: 72
End: 2022-04-04
Payer: MEDICARE

## 2022-04-04 VITALS
DIASTOLIC BLOOD PRESSURE: 78 MMHG | HEART RATE: 72 BPM | BODY MASS INDEX: 33.97 KG/M2 | OXYGEN SATURATION: 97 % | WEIGHT: 204.13 LBS | SYSTOLIC BLOOD PRESSURE: 126 MMHG | TEMPERATURE: 98 F

## 2022-04-04 DIAGNOSIS — E03.9 HYPOTHYROIDISM, UNSPECIFIED TYPE: ICD-10-CM

## 2022-04-04 DIAGNOSIS — E04.2 NONTOXIC MULTINODULAR GOITER: ICD-10-CM

## 2022-04-04 DIAGNOSIS — F41.9 ANXIETY: ICD-10-CM

## 2022-04-04 DIAGNOSIS — Z12.31 ENCOUNTER FOR SCREENING MAMMOGRAM FOR MALIGNANT NEOPLASM OF BREAST: Primary | ICD-10-CM

## 2022-04-04 DIAGNOSIS — E78.00 HYPERCHOLESTEREMIA: ICD-10-CM

## 2022-04-04 DIAGNOSIS — F51.04 CHRONIC INSOMNIA: ICD-10-CM

## 2022-04-04 DIAGNOSIS — M54.2 NECK PAIN: ICD-10-CM

## 2022-04-04 DIAGNOSIS — C64.9 CARCINOMA OF KIDNEY, UNSPECIFIED LATERALITY: ICD-10-CM

## 2022-04-04 DIAGNOSIS — I10 PRIMARY HYPERTENSION: ICD-10-CM

## 2022-04-04 DIAGNOSIS — R51.9 HEADACHE, UNSPECIFIED HEADACHE TYPE: ICD-10-CM

## 2022-04-04 PROCEDURE — 99999 PR PBB SHADOW E&M-EST. PATIENT-LVL III: CPT | Mod: PBBFAC,,, | Performed by: FAMILY MEDICINE

## 2022-04-04 PROCEDURE — 99999 PR PBB SHADOW E&M-EST. PATIENT-LVL III: ICD-10-PCS | Mod: PBBFAC,,, | Performed by: FAMILY MEDICINE

## 2022-04-04 PROCEDURE — 99214 PR OFFICE/OUTPT VISIT, EST, LEVL IV, 30-39 MIN: ICD-10-PCS | Mod: S$PBB,,, | Performed by: FAMILY MEDICINE

## 2022-04-04 PROCEDURE — 99214 OFFICE O/P EST MOD 30 MIN: CPT | Mod: S$PBB,,, | Performed by: FAMILY MEDICINE

## 2022-04-04 PROCEDURE — 99213 OFFICE O/P EST LOW 20 MIN: CPT | Mod: PBBFAC | Performed by: FAMILY MEDICINE

## 2022-04-04 RX ORDER — ZOLPIDEM TARTRATE 5 MG/1
5 TABLET ORAL NIGHTLY PRN
Qty: 90 TABLET | Refills: 0 | Status: SHIPPED | OUTPATIENT
Start: 2022-04-04 | End: 2023-06-09 | Stop reason: SDUPTHER

## 2022-04-04 RX ORDER — ALPRAZOLAM 0.5 MG/1
TABLET ORAL
Qty: 30 TABLET | Refills: 5 | Status: SHIPPED | OUTPATIENT
Start: 2022-04-04 | End: 2023-06-09 | Stop reason: SDUPTHER

## 2022-04-04 RX ORDER — BUTALBITAL, ACETAMINOPHEN AND CAFFEINE 50; 325; 40 MG/1; MG/1; MG/1
TABLET ORAL
Qty: 30 TABLET | Refills: 0 | Status: SHIPPED | OUTPATIENT
Start: 2022-04-04 | End: 2022-04-07 | Stop reason: SDUPTHER

## 2022-04-04 NOTE — PROGRESS NOTES
Subjective:       Patient ID: Sherrill Avery is a . female.    Chief Complaint: Multiple issues see below    HPI     Follow-upfor multiple issues update with oncologist at [facility] followed for renal cell carcinoma with metastatic lung nodules hx and dr sheryl VILLANUEVA; 1 new 2mm lung nodule plans [facility] ; had rad tx 2/22 lung    Hypertension: blood pressures typically normal. Tolerating medicine.     GERD asympt. Tolerating medication. No swallowing problems;sees md[facility]. Gi' req rf prilosec    Chronic anxiety/insomnia uses either benzo or ambien few times per week prev dr yusuf but stable enough felt ok for primary care to resume rxing for this and no need to f/u unless problems. She doesn't take benzo same day ambien. Knows potential problems with these interacting;mood good;tried ssris etc but intol and current regimen working well per her and psych.; takes 4-5 x /months    Hypothyroidism:  Tolerating med. Asympt; sees dr block ;nl tsh aug      infreq migraine    bilat knee pain dxd djd hAD KNEE injxns and much better now; after this back pain also resolved; last televist mabel akers 2020      Memory doing ok unless stressors but she and md and.  plan mri soon when rechks early jan    crh r should pain-dec rom    Chr neck stiffness-req add to referral dr ibarra sent for leg .     2 weeks r post neck pain inc rom    Past Medical History:   Diagnosis Date    Anxiety     Arthritis     Cancer of kidney 8/2/2013    dr faye    Ectopic pregnancy     Eye infection     GERD (gastroesophageal reflux disease)     Hiatal hernia     History of kidney cancer     Hypercholesteremia     Hypertension     Hypothyroid     dr block q 6 months    Insomnia     Migraine headache     Renal cell cancer     Respiratory arrest     due to anesthia    Rosacea     Sleep apnea     Thyroid nodule     dr block    Urinary tract infection      Past Surgical History:   Procedure Laterality Date    AUGMENTATION OF  BREAST      breast implants      BREAST SURGERY Bilateral 1996    saline implants    COLONOSCOPY  2007    HYSTERECTOMY      ectopic pregnancy x 2, with LSO    LAPAROSCOPIC NEPHRECTOMY, HAND ASSISTED  07/25/2013    NEPHRECTOMY      OOPHORECTOMY      x1    right ganglion cyst      throat polyp      TRANSOBTURATOR SLING  2011    with RSO for persistent complex cyst & MARGOT; done by yris    UPPER GASTROINTESTINAL ENDOSCOPY  2007     Family History   Problem Relation Age of Onset    Diabetes Mother     Hypertension Mother     Heart disease Mother     Cancer Father         lung    Migraines Father     Glaucoma Paternal Grandmother     Glaucoma Sister     Thyroid disease Neg Hx     Asthma Neg Hx      Social History     Socioeconomic History    Marital status:      Spouse name: KAIDEN    Number of children: 5    Years of education: Not on file    Highest education level: Not on file   Occupational History    Occupation: retired     Comment: Dance Instructor   Social Needs    Financial resource strain: Not on file    Food insecurity:     Worry: Not on file     Inability: Not on file    Transportation needs:     Medical: Not on file     Non-medical: Not on file   Tobacco Use    Smoking status: Never Smoker    Smokeless tobacco: Never Used   Substance and Sexual Activity    Alcohol use: Yes     Alcohol/week: 0.0 standard drinks     Comment: social    Drug use: No    Sexual activity: Yes     Partners: Male     Birth control/protection: Surgical   Lifestyle    Physical activity:     Days per week: Not on file     Minutes per session: Not on file    Stress: Not on file   Relationships    Social connections:     Talks on phone: Not on file     Gets together: Not on file     Attends Orthodoxy service: Not on file     Active member of club or organization: Not on file     Attends meetings of clubs or organizations: Not on file     Relationship status: Not on file   Other Topics Concern     Not on file   Social History Narrative    Patient is a retired dance instructor and live with .       Review of Systems  Cardiovascular: no chest pain  Chest: no shortness of breath  Abd: no abd pain  Remainder review of systems negative    Objective:   husb here  Physical Exam   Constitutional: She is oriented to person, place, and time. She appears well-developed and well-nourished. No distress.   gen nad    a and o x 3  Neck bit limited rom all dirxns ttp r post neck  cvrrr  Chest ctabilat    Psychiatric: She has a normal mood and affect. Her behavior is normal. Judgment and thought content normal.   Nursing note and vitals reviewed.      Assessment:               3. Chronic neck pain hx   4. Chronic pain of lower extremity, unspecified laterality    5. Anxiety    6. Nontoxic multinodular goiter    7. Multiple lung nodules on CT    8. Hypercholesteremia    9. Clear cell carcinoma with lung metastasis    10. Essential hypertension    11. Hypothyroidism, unspecified type    12. Carcinoma of right kidney    13. Chronic insomnia      Acute on chr neck pain  Plan:     *f/u oncol and endocrine when due        Ok to use volt gel to neck  PT, if not resolved few weeks then f/u    Has F/u me  same day time  q 6 months xanax  Avoid taking alprazolam within several hours of zolpidem    rf ambien. When due infreq use    Encounter for screening mammogram for malignant neoplasm of breast  -     Mammo Digital Screening Bilat w/ [place]; Future; Expected date: 04/04/2022    Primary hypertension  -     Comprehensive Metabolic Panel; Future; Expected date: 10/01/2022  -     Lipid Panel; Future; Expected date: 10/01/2022    Hypothyroidism, unspecified type    Carcinoma of kidney, unspecified laterality    Nontoxic multinodular goiter    Hypercholesteremia    Anxiety    Chronic insomnia    Neck pain  -     Ambulatory referral/consult to Physical/Occupational Therapy; Future; Expected date: 04/11/2022    Other orders  -      ALPRAZolam (XANAX) 0.5 MG tablet; Take daily as needed for anxiety.  Dispense: 30 tablet; Refill: 5

## 2022-04-12 ENCOUNTER — CLINICAL SUPPORT (OUTPATIENT)
Dept: REHABILITATION | Facility: HOSPITAL | Age: 72
End: 2022-04-12
Payer: MEDICARE

## 2022-04-12 DIAGNOSIS — M54.2 NECK PAIN: ICD-10-CM

## 2022-04-12 PROCEDURE — 97161 PT EVAL LOW COMPLEX 20 MIN: CPT

## 2022-04-12 PROCEDURE — 97110 THERAPEUTIC EXERCISES: CPT

## 2022-04-12 NOTE — PLAN OF CARE
[facility]   Physical Therapy Initial Evaluation     Date: 4/12/2022   Name: Sherrill Avery  Clinic Number: [id number]    Therapy Diagnosis:   Encounter Diagnosis   Name Primary?    Neck pain      Physician: Alexandre Macias MD    Physician Orders: PT Eval and Treat   Medical Diagnosis from Referral: neck pain   Evaluation Date: 4/12/2022  Authorization Period Expiration: 4/42023  Plan of Care Expiration: 6/25/2022  Progress Note Due: 10th visit  Visit # / Visits authorized: 1/ 20   FOTO: 1/ 3     Precautions: Standard and Fall    Time In: 2:30  Time Out: 3:15  Total Appointment Time (timed & untimed codes): 45 minutes    SUBJECTIVE   Date of onset: 2 weeks    History of current condition - Sherrill reports: tenderness and pain at suboccipital region with sharp pain from right upper trapezius region with any movement of her neck. She feels weak when lifting her neck from  and report daily headaches, mainly in the PM.   Pt did undergo radiation for a full month (Feb to March) at [facility]. Pt reports increased fatigue since coming back home.   Pt cont to have left knee pain since TKE last year, but reports little to no compliance with strengthening HEP.   Main difficulty is noted with sit to stand from standard chair.     Falls: none    Imaging, none:     Prior Therapy: yes  Social History:  lives with their spouse  Occupation: retired  Prior Level of Function: mod I  Current Level of Function: mod I     Pain:  Current 7/10, worst 10/10, best 5/10   Location: right knee  and neck  Description: Aching, Dull, Throbbing and Tight  Aggravating Factors: Sitting, Standing, Extension, Lifting, Getting out of bed/chair and neck rotation  Easing Factors: relaxation    Patients goals: decrease pain in neck and knee     Medical History:   Past Medical History:   Diagnosis Date    Anxiety     Family history of malignant melanoma 8/25/2014    Fibromyalgia affecting lower leg     GERD  (gastroesophageal reflux disease)     Hypercholesteremia     Hypertension     Hypothyroidism     Inflamed seborrheic keratosis 8/25/2014    Microscopic hematuria 2/2/2017    Multiple benign melanocytic nevi 8/25/2014       Surgical History:   Sherrill Avery  has a past surgical history that includes right ganglion cyst; throat polyp; breast implants; Laparoscopic nephrectomy, hand assisted (07/25/2013); Nephrectomy; Upper gastrointestinal endoscopy (2007); Colonoscopy (2007); Oophorectomy; Transobturator sling (2011); Hysterectomy; Breast surgery (Bilateral, 1996); Augmentation of breast; bladder lift; Lung removal, partial (Left, 2020); and Knee Arthroplasty (Left, 6/14/2021).    Medications:   Sherrill has a current medication list which includes the following prescription(s): alprazolam, butalbital-acetaminophen-caffeine -40 mg, clotrimazole, diclofenac sodium, diflorasone-emollient, estradiol, fluocinonide, fluticasone propionate, hydrocortisone, ketoconazole, levothyroxine, metronidazole 1%, pravastatin, senna-docusate 8.6-50 mg, and zolpidem.    Allergies:   Review of patient's allergies indicates:   Allergen Reactions    Talwin compound Anxiety     hallucinations/anxiety    Tramadol Itching    Buspirone Anxiety    Codeine Itching    Morphine Itching     jittery    Morpholine analogues Anxiety and Itching    Naproxen Itching     Takes Ibuprofen is ok on rare occasion     Nexium [esomeprazole magnesium] Other (See Comments)     constipation    Wal-phed [pseudoephedrine hcl] Itching          OBJECTIVE       CMS Impairment/Limitation/Restriction for FOTO Neck Survey    Therapist reviewed FOTO scores for Sherrill Avery on 4/12/2022.   FOTO documents entered into EPIC - see Media section.    Limitation Score: 64%       Posture: Pt noted to present with forward head/rounded shoulder posture,     Gait: , decreased step length and increased base of support       30 second sit-to-stand test (4 w/ UE  "support)  30" sit to stand Cutoff Scores:14          ROM    CERVICAL  (single inclinometer at top of head) AROM  (degrees/%) Pain/Dysfunction with Movement   Flexion 50    Extension 45    Right side bending 25    Left side bending 20    Right rotation 50 %    Left rotation 55%          Strength:   U/E MMT Right Left Pain/Dysfunction with Movement   Cervical SB 5/5 5/5    Upper Trapezial 5/5 5/5    Shoulder Flexion 4/5 4/5    Shoulder Abduction 4/5 4/5    Elbow Flexion (C5)  4/5 4/5    Rhomboids 3+/5 4/5    Mid Traps 3+/5 3+/5    Low Traps 3+/5 3+/5        L/E MMT Right Left Pain/Dysfunction with Movement   Hip Flexion 3+/5 3+/5    Hip Extension 3+/5 3+/5    Gluteus medius 3+/5 3+/5    Gluteus duran 4/5 3+/5    Hip IR 3+/5 3+/5    Hip ER 3+/5 3+/5    Knee Flexion 3+/5 3+/5    Knee Extension 3+/5 3+/5    Ankle DF 4/5 4/5       Joint mobility: hypomobility noted at OA, C1-2, and CTJ    Top Tier   Cervical Flexion Functional - Nonpainful   Cervical Extension Functional - Painful   Cervical Rotation Dysfunctional - Painful   Single Leg Stance Dysfunctional - Painful   Arms Down Deep Squat Dysfunctional - Painful      Palpation:  Tender with increased tone over along upper/mid gluteals, quadratus femoris, hip flexors, piriformis, LB paraspinals, and over PSIS.     Patient tender with increased tone over bilateral (greater in right verse left) upper trapezial muscle, levator scapula, latissimus dorsi, teres minor, anterior  chest muscle, posterior cervical muscle, sternocleidomastoid, suboccipital muscle, medial/lateral scapular muscle's.     TREATMENT     Total Treatment time (time-based codes) separate from Evaluation: 15 minutes      Sherrill received the treatments listed below:       THERAPEUTIC EXERCISES to develop strength, endurance, ROM, flexibility, posture and core stabilization for 15 minutes including LE strengthening, prone cervical retraction, open books    Manual Therapy: STM to UT, LS, cervical extensors, " subocc release, cervical distraction    FND: right UT piston    PATIENT EDUCATION AND HOME EXERCISES     Education provided:   - yes    Written Home Exercises Provided: yes. Exercises were reviewed and Sherrill was able to demonstrate them prior to the end of the session.  Sherrill demonstrated good  understanding of the education provided. See EMR under Patient Instructions for exercises provided during therapy sessions.    ASSESSMENT     Sherrill is a 71 y.o. female referred to outpatient Physical Therapy with a medical diagnosis of neck pain and LE weakness, pt presents with signs and symptoms consistent with diagnosis along with LE weakness and fatigue post radiation.  The patient presents with impairments which include weakness, impaired endurance, impaired self care skills, impaired functional mobility, gait instability, decreased upper extremity function, decreased lower extremity function, pain, abnormal tone, decreased ROM, impaired joint extensibility and impaired muscle length.  These impairments are limiting patient's ability to complete ADLs, home and community transfers, community ambulation and levels, household chores, and enjoy leisure activities.     Patient prognosis is Good if patient is consistent and compliant with PT and home exercise program.   Patient will benefit from skilled outpatient Physical Therapy to address the deficits stated above and in the chart below, provide pt/family education, and to maximize pt's level of independence.     Plan of care discussed with patient: Yes  Pt's spiritual, cultural and educational needs considered and patient is agreeable to the plan of care and goals as stated below:     Anticipated Barriers for therapy: motivation    Medical Necessity is demonstrated by the following  History  Co-morbidities and personal factors that may impact the plan of care Co-morbidities:   See medical hx    Personal Factors:   lifestyle  character     moderate   Examination  Body  Structures and Functions, activity limitations and participation restrictions that may impact the plan of care Body Regions:   neck  back  lower extremities    Body Systems:    ROM  strength  balance  gait  joint mobility, muscle tone, muscle length    Participation Restrictions:   See current level of function listed above     Activity limitations:   Learning and applying knowledge  no deficits    General Tasks and Commands  no deficits    Communication  no deficits    Mobility  lifting and carrying objects  walking    Self care  washing oneself (bathing, drying, washing hands)    Domestic Life  shopping  cooking  doing house work (cleaning house, washing dishes, laundry)    Interactions/Relationships  no deficits    Life Areas  no deficits    Community and Social Life  community life  recreation and leisure         low   Clinical Presentation stable and uncomplicated low   Decision Making/ Complexity Score: low     Goals: Reviewed:4/12/2022    Short Term Goals:  In 6 weeks   1.Pt to be educated on HEP.  2.Patient to increase cervical/LB ROM  to 60 deg rotation, in order to improve available range of motion for ADL's.  3.Patient to increase UE/LE MMT strength by 1/2 grade, in order to improve endurance with activity.  4.Patient to have pain less than 3/10 at worst, in order to improve QOL.  5.Patient to improve score on the FOTO, in order to improve QOL.    6. Pt to be educated on postural/body mechanics awareness.    Long Term Goals: In 10 weeks  1. Patient to improve score on the FOTO to 40% or less, in order to improve QOL.  2. Patient to demo increase in UE/LE strength to 4+/5, in order to improve endurance with activity.  3. Patient to have decreased pain to 2/10 at worst, in order to improve QOL.  4. Patient to demo increase cervical/LB ROM to WFL, in order to improve available range of motion for ADL's.  5. Patient to perform daily activities including sit to stand transfers without increase in symptoms.    6. Patient to increase 30 second sit to stand to 8 repetitions, to decrease fall risk.      PLAN     Plan of care Certification: 4/12/2022 to 6/25/2022.    Outpatient Physical Therapy 2 times weekly for 10 weeks to include the following interventions: Cervical/Lumbar Traction, Electrical Stimulation knee/cervical, Gait Training, Iontophoresis (with NA), Manual Therapy, Moist Heat/ Ice, Neuromuscular Re-ed, Orthotic Management and Training, Patient Education, Self Care, Therapeutic Activities, Therapeutic Exercise and Dry needling.     Liz Ramos, PT      I CERTIFY THE NEED FOR THESE SERVICES FURNISHED UNDER THIS PLAN OF TREATMENT AND WHILE UNDER MY CARE   Physician's comments:     Physician's Signature: ___________________________________________________

## 2022-04-14 ENCOUNTER — PATIENT MESSAGE (OUTPATIENT)
Dept: HEMATOLOGY/ONCOLOGY | Facility: CLINIC | Age: 72
End: 2022-04-14
Payer: MEDICARE

## 2022-04-15 RX ORDER — TRIAMCINOLONE ACETONIDE 40 MG/ML
40 INJECTION, SUSPENSION INTRA-ARTICULAR; INTRAMUSCULAR
Status: DISCONTINUED | OUTPATIENT
Start: 2022-01-18 | End: 2022-04-15 | Stop reason: HOSPADM

## 2022-04-15 NOTE — PROCEDURES
Large Joint Aspiration/Injection: R knee    Date/Time: 1/18/2022 2:15 PM  Performed by: Osmar Avery III, MD  Authorized by: Osmar Avery III, MD     Consent Done?:  Yes (Verbal)  Indications:  Pain  Timeout: prior to procedure the correct patient, procedure, and site was verified    Prep: patient was prepped and draped in usual sterile fashion      Local anesthesia used?: Yes    Local anesthetic:  Lidocaine 1% without epinephrine  Anesthetic total (ml):  5      Details:  Needle Size:  21 G  Location:  Knee  Site:  R knee  Medications:  40 mg triamcinolone acetonide 40 mg/mL  Patient tolerance:  Patient tolerated the procedure well with no immediate complications

## 2022-04-20 ENCOUNTER — TELEPHONE (OUTPATIENT)
Dept: INTERNAL MEDICINE | Facility: CLINIC | Age: 72
End: 2022-04-20
Payer: MEDICARE

## 2022-04-20 ENCOUNTER — CLINICAL SUPPORT (OUTPATIENT)
Dept: REHABILITATION | Facility: HOSPITAL | Age: 72
End: 2022-04-20
Payer: MEDICARE

## 2022-04-20 DIAGNOSIS — M54.2 NECK PAIN: Primary | ICD-10-CM

## 2022-04-20 PROCEDURE — 97140 MANUAL THERAPY 1/> REGIONS: CPT

## 2022-04-20 PROCEDURE — 97110 THERAPEUTIC EXERCISES: CPT

## 2022-04-20 NOTE — PROGRESS NOTES
[facility]   Physical Therapy Treatment Note     Name: Sherrill Avery  Clinic Number: [id number]    Therapy Diagnosis:   Encounter Diagnosis   Name Primary?    Neck pain Yes     Physician: Alexandre Macias MD    Visit Date: 4/20/2022  Physician Orders: PT Eval and Treat   Medical Diagnosis from Referral: neck pain   Evaluation Date: 4/12/2022  Authorization Period Expiration: 4/42023  Plan of Care Expiration: 6/25/2022  Progress Note Due: 10th visit  Visit # / Visits authorized: 1/ 20   FOTO: 1/ 3       PTA Visit #: 0/5     Time In: 1:15p  Time Out: 2:00  Total Billable Time: 45 minutes    SUBJECTIVE     Pt reports: her neck feels a little better. She still feels weak in her legs  She was compliant with home exercise program.  Response to previous treatment: positive  Functional change: improved CROM    Pain: 3/10  Location: bilateral knee  and neck      OBJECTIVE     Objective Measures updated at progress report unless specified.     Treatment     Sherrill received the treatments listed below:      therapeutic exercises to develop strength, endurance, ROM, flexibility, posture and core stabilization for 30 minutes including:  Prone cervical retraction   Quad st with strap  Prone hip ext 1x10 B  Prone T uni and B 1x10 ea  Open books  Bridge 2x10  SLR 2x10 ea  Hip abd 1x10 and calm 1x10  Seated Ws rtb 2x10  Seated resisted cervical retraction rtb    manual therapy techniques: Joint mobilizations, Manual traction, Soft tissue Mobilization and dry needling were applied to the: neck for 15 minutes, including:  PA mobs mid cervical to upper thoracic  Cervical distraction with retraction  STM to cervical ext, UT, LS, and thoracic paraspinals  FDN: B UT          Patient Education and Home Exercises     Home Exercises Provided and Patient Education Provided     Education provided:   - yes    Written Home Exercises Provided: Patient instructed to cont prior HEP. Exercises were reviewed and Sherrill was  able to demonstrate them prior to the end of the session.  Sherrill demonstrated good  understanding of the education provided. See EMR under Patient Instructions for exercises provided during therapy sessions    ASSESSMENT     Pt tolerated first session well. She participated in cervical strengthening, scap stabilization, and LE strengthening for overall independence with home and leisure tasks. Plan to advance to weight lifting via machine use next session in preparation for wellness plan.     Sherrill Is progressing well towards her goals.   Pt prognosis is Good.     Pt will continue to benefit from skilled outpatient physical therapy to address the deficits listed in the problem list box on initial evaluation, provide pt/family education and to maximize pt's level of independence in the home and community environment.     Pt's spiritual, cultural and educational needs considered and pt agreeable to plan of care and goals.     Anticipated barriers to physical therapy: distance    Goals:   Goals: Reviewed:4/12/2022    Short Term Goals:  In 6 weeks   1.Pt to be educated on HEP.  2.Patient to increase cervical/LB ROM  to 60 deg rotation, in order to improve available range of motion for ADL's.  3.Patient to increase UE/LE MMT strength by 1/2 grade, in order to improve endurance with activity.  4.Patient to have pain less than 3/10 at worst, in order to improve QOL.  5.Patient to improve score on the FOTO, in order to improve QOL.    6. Pt to be educated on postural/body mechanics awareness.     Long Term Goals: In 10 weeks  1. Patient to improve score on the FOTO to 40% or less, in order to improve QOL.  2. Patient to demo increase in UE/LE strength to 4+/5, in order to improve endurance with activity.  3. Patient to have decreased pain to 2/10 at worst, in order to improve QOL.  4. Patient to demo increase cervical/LB ROM to WFL, in order to improve available range of motion for ADL's.  5. Patient to perform daily  activities including sit to stand transfers without increase in symptoms.   6. Patient to increase 30 second sit to stand to 8 repetitions, to decrease fall risk.        PLAN      Plan of care Certification: 4/12/2022 to 6/25/2022.     Outpatient Physical Therapy 2 times weekly for 10 weeks to include the following interventions: Cervical/Lumbar Traction, Electrical Stimulation knee/cervical, Gait Training, Iontophoresis (with NA), Manual Therapy, Moist Heat/ Ice, Neuromuscular Re-ed, Orthotic Management and Training, Patient Education, Self Care, Therapeutic Activities, Therapeutic Exercise and Dry needling.        Liz Ramos, PT

## 2022-04-20 NOTE — TELEPHONE ENCOUNTER
----- Message from Gayla Torres sent at 4/20/2022 11:59 AM CDT -----  Type: Patient Call Back         Who called:Patient          What is the request in detail:Patient called in regarding a few questions and concerns  and was wondering if possible to speak with Dr Macias's Nurse          Can the clinic reply by [facility]?no          Would the patient rather a call back or a response via [facility]?call back          Best call back number:[phone] (mobile)          Additional Information:           Thank You

## 2022-04-21 ENCOUNTER — OFFICE VISIT (OUTPATIENT)
Dept: INTERNAL MEDICINE | Facility: CLINIC | Age: 72
End: 2022-04-21
Payer: MEDICARE

## 2022-04-21 ENCOUNTER — TELEPHONE (OUTPATIENT)
Dept: INTERNAL MEDICINE | Facility: CLINIC | Age: 72
End: 2022-04-21

## 2022-04-21 VITALS
WEIGHT: 203.25 LBS | OXYGEN SATURATION: 98 % | HEIGHT: 65 IN | BODY MASS INDEX: 33.86 KG/M2 | SYSTOLIC BLOOD PRESSURE: 124 MMHG | DIASTOLIC BLOOD PRESSURE: 80 MMHG | HEART RATE: 70 BPM | TEMPERATURE: 99 F

## 2022-04-21 DIAGNOSIS — J30.9 ALLERGIC RHINITIS, UNSPECIFIED SEASONALITY, UNSPECIFIED TRIGGER: Primary | ICD-10-CM

## 2022-04-21 DIAGNOSIS — J34.89 OTHER SPECIFIED DISORDERS OF NOSE AND NASAL SINUSES: ICD-10-CM

## 2022-04-21 DIAGNOSIS — Z12.31 ENCOUNTER FOR SCREENING MAMMOGRAM FOR MALIGNANT NEOPLASM OF BREAST: Primary | ICD-10-CM

## 2022-04-21 PROCEDURE — 99999 PR PBB SHADOW E&M-EST. PATIENT-LVL V: CPT | Mod: PBBFAC,,, | Performed by: FAMILY MEDICINE

## 2022-04-21 PROCEDURE — 99215 OFFICE O/P EST HI 40 MIN: CPT | Mod: PBBFAC | Performed by: FAMILY MEDICINE

## 2022-04-21 PROCEDURE — 99999 PR PBB SHADOW E&M-EST. PATIENT-LVL V: ICD-10-PCS | Mod: PBBFAC,,, | Performed by: FAMILY MEDICINE

## 2022-04-21 PROCEDURE — 99214 OFFICE O/P EST MOD 30 MIN: CPT | Mod: S$PBB,,, | Performed by: FAMILY MEDICINE

## 2022-04-21 PROCEDURE — 99214 PR OFFICE/OUTPT VISIT, EST, LEVL IV, 30-39 MIN: ICD-10-PCS | Mod: S$PBB,,, | Performed by: FAMILY MEDICINE

## 2022-04-21 NOTE — PROGRESS NOTES
Subjective:      Patient ID: Sherrill Avery is a 71 y.o. female.    Chief Complaint: Facial Swelling (Constant headaches, spitting mucus up often as well. /)    HPIchronic nasal cricket/hx all rhin on zyrtec and flonase. Had abx for sinus infxn 1/22 then again 3/22 and some green nasal drainage occas am recently although improving. Also sheis concerned about r maxillary area facial swelling. She asks about imaging r/o polyp etc  Past Medical History:   Diagnosis Date    Anxiety     Family history of malignant melanoma 8/25/2014    Fibromyalgia affecting lower leg     GERD (gastroesophageal reflux disease)     Hypercholesteremia     Hypertension     Hypothyroidism     Inflamed seborrheic keratosis 8/25/2014    Microscopic hematuria 2/2/2017    Multiple benign melanocytic nevi 8/25/2014      Past Surgical History:   Procedure Laterality Date    AUGMENTATION OF BREAST      bladder lift      breast implants      BREAST SURGERY Bilateral 1996    saline implants    COLONOSCOPY  2007    HYSTERECTOMY      ectopic pregnancy x 2, with LSO    KNEE ARTHROPLASTY Left 6/14/2021    Procedure: ARTHROPLASTY, KNEE:LEFT:DEPUY-SIGMA;  Surgeon: Osmar Avery III, MD;  Location: [facility];  Service: Orthopedics;  Laterality: Left;    LAPAROSCOPIC NEPHRECTOMY, HAND ASSISTED  07/25/2013    LUNG REMOVAL, PARTIAL Left 2020    At [facility]    NEPHRECTOMY      OOPHORECTOMY      x1    right ganglion cyst      throat polyp      TRANSOBTURATOR SLING  2011    with RSO for persistent complex cyst & MARGOT; done by arndt    UPPER GASTROINTESTINAL ENDOSCOPY  2007      Social History     Socioeconomic History    Marital status:      Spouse name: KAIDEN    Number of children: 5   Occupational History    Occupation: retired     Comment: Dance Instructor   Tobacco Use    Smoking status: Never Smoker    Smokeless tobacco: Never Used   Substance and Sexual Activity    Alcohol use: Yes     Alcohol/week: 0.0 standard drinks      Comment: social    Drug use: No    Sexual activity: Yes     Partners: Male     Birth control/protection: Surgical   Social History Narrative    Patient is a retired dance instructor and live with . Has stairs at home 12- has an elevator      Family History   Problem Relation Age of Onset    Diabetes Mother     Hypertension Mother     Heart disease Mother     Cancer Father         lung    Migraines Father     Glaucoma Paternal Grandmother     Glaucoma Sister     Thyroid disease Neg Hx     Asthma Neg Hx       Review of Systems        Objective:     Physical Exam  Vitals and nursing note reviewed.   Constitutional:       General: She is not in acute distress.     Appearance: She is well-developed.   HENT:      Head: Atraumatic.      Right Ear: External ear normal.      Left Ear: External ear normal.      Nose: Nose normal.      Comments: No obv external nasal swelling nor at maxillary area; no redness     Mouth/Throat:      Pharynx: No oropharyngeal exudate.   Eyes:      General: No scleral icterus.     Conjunctiva/sclera: Conjunctivae normal.      Pupils: Pupils are equal, round, and reactive to light.   Neck:      Thyroid: No thyromegaly.   Cardiovascular:      Rate and Rhythm: Normal rate and regular rhythm.      Heart sounds: Normal heart sounds. No murmur heard.  Pulmonary:      Effort: Pulmonary effort is normal. No respiratory distress.      Breath sounds: Normal breath sounds. No wheezing or rales.   Musculoskeletal:      Cervical back: Normal range of motion and neck supple.   Lymphadenopathy:      Cervical: No cervical adenopathy.   Skin:     General: Skin is warm.      Coloration: Skin is not pale.      Findings: No erythema or rash.   Neurological:      Mental Status: She is alert and oriented to person, place, and time.      Cranial Nerves: No cranial nerve deficit.      Motor: No abnormal muscle tone.      Coordination: Coordination normal.   Psychiatric:         Behavior: Behavior  "normal.         Thought Content: Thought content normal.         Judgment: Judgment normal.       Assessment:         ICD-10-CM ICD-9-CM   1. Allergic rhinitis, unspecified seasonality, unspecified trigger  J30.9 477.9   2. Other specified disorders of nose and nasal sinuses  J34.89 478.19      Plan:      *f/u dr eduardo (seen in past and offered subling. immunotx per patient)**  Allergic rhinitis, unspecified seasonality, unspecified trigger    Other specified disorders of nose and nasal sinuses  -     Ambulatory referral/consult to ENT; Future; Expected date: 04/28/2022  -     CT Sinuses without Contrast; Future; Expected date: 04/21/2022         Hold off on 3rd round abx. If obv infxn on ct then tx(note had "amoxicillin " via an UC in March)      "

## 2022-04-26 ENCOUNTER — CLINICAL SUPPORT (OUTPATIENT)
Dept: REHABILITATION | Facility: HOSPITAL | Age: 72
End: 2022-04-26
Payer: MEDICARE

## 2022-04-26 ENCOUNTER — PATIENT MESSAGE (OUTPATIENT)
Dept: OBSTETRICS AND GYNECOLOGY | Facility: CLINIC | Age: 72
End: 2022-04-26
Payer: MEDICARE

## 2022-04-26 ENCOUNTER — PATIENT OUTREACH (OUTPATIENT)
Dept: ADMINISTRATIVE | Facility: OTHER | Age: 72
End: 2022-04-26
Payer: MEDICARE

## 2022-04-26 DIAGNOSIS — M54.2 NECK PAIN: Primary | ICD-10-CM

## 2022-04-26 PROCEDURE — 97140 MANUAL THERAPY 1/> REGIONS: CPT

## 2022-04-26 PROCEDURE — 97110 THERAPEUTIC EXERCISES: CPT

## 2022-04-26 NOTE — PROGRESS NOTES
Health Maintenance Due   Topic Date Due    Shingles Vaccine (1 of 2) Never done    Pneumococcal Vaccines (Age 65+) (3 - PPSV23 or PCV20) 09/24/2020    Mammogram  03/25/2022     Updates were requested from care everywhere.  Chart was reviewed for overdue Proactive [facility] Encounters ([facility]) topics (CRS, Breast Cancer Screening, Eye exam)  Health Maintenance has been updated.  LIN[place] immunization registry triggered.  Immunizations were reconciled.

## 2022-04-26 NOTE — PROGRESS NOTES
"[facility]   Physical Therapy Treatment Note     Name: Sherrill Avery  Clinic Number: [id number]    Therapy Diagnosis:   Encounter Diagnosis   Name Primary?    Neck pain Yes     Physician: Alexandre Macias MD    Visit Date: 4/26/2022  Physician Orders: PT Eval and Treat   Medical Diagnosis from Referral: neck pain   Evaluation Date: 4/12/2022  Authorization Period Expiration: 4/42023  Plan of Care Expiration: 6/25/2022  Progress Note Due: 10th visit  Visit # / Visits authorized: 3/ 20   FOTO: 1/ 3       PTA Visit #: 0/5     Time In: 4:45  Time Out: 5:30  Total Billable Time: 45 minutes    SUBJECTIVE     Pt reports: her neck feels a little better. She still feels weak in her legs  She was compliant with home exercise program.  Response to previous treatment: positive  Functional change: improved CROM    Pain: 3/10  Location: bilateral knee  and neck      OBJECTIVE     Objective Measures updated at progress report unless specified.     Treatment     Sherrill received the treatments listed below:      therapeutic exercises to develop strength, endurance, ROM, flexibility, posture and core stabilization for 30 minutes including:  Prone cervical retraction   Quad st with strap  Prone hip ext 1x10 B  Prone T uni and B 1x10 ea  Open books  Bridge 2x10  SLR 2x10 ea  Hip abd 1x10 and calm 1x10  Prone cervical retraction 2x10  6" step up 1x10  Sit to stand from 20" box 1x10  Sit to stand from standard chair 19" 1x5      manual therapy techniques: Joint mobilizations, Manual traction, Soft tissue Mobilization and dry needling were applied to the: neck for 15 minutes, including:  PA mobs mid cervical to upper thoracic  Cervical distraction with retraction  STM to cervical ext, UT, LS, and thoracic paraspinals  FDN: B UT          Patient Education and Home Exercises     Home Exercises Provided and Patient Education Provided     Education provided:   - yes    Written Home Exercises Provided: Patient " "instructed to cont prior HEP. Exercises were reviewed and Sherrill was able to demonstrate them prior to the end of the session.  Sherrill demonstrated good  understanding of the education provided. See EMR under Patient Instructions for exercises provided during therapy sessions    ASSESSMENT     Pt tolerated session well. She participated in cervical strengthening, scap stabilization, and LE strengthening for overall independence with home and leisure tasks. Increased difficlty with sit to stand from standard 19" chair due to LE weakness. Pt to cont with quad strengthening. Plan to advance to weight lifting via machine use next session in preparation for wellness plan.     Sherrill Is progressing well towards her goals.   Pt prognosis is Good.     Pt will continue to benefit from skilled outpatient physical therapy to address the deficits listed in the problem list box on initial evaluation, provide pt/family education and to maximize pt's level of independence in the home and community environment.     Pt's spiritual, cultural and educational needs considered and pt agreeable to plan of care and goals.     Anticipated barriers to physical therapy: distance    Goals:   Goals: Reviewed:4/12/2022    Short Term Goals:  In 6 weeks   1.Pt to be educated on HEP.  2.Patient to increase cervical/LB ROM  to 60 deg rotation, in order to improve available range of motion for ADL's.  3.Patient to increase UE/LE MMT strength by 1/2 grade, in order to improve endurance with activity.  4.Patient to have pain less than 3/10 at worst, in order to improve QOL.  5.Patient to improve score on the FOTO, in order to improve QOL.    6. Pt to be educated on postural/body mechanics awareness.     Long Term Goals: In 10 weeks  1. Patient to improve score on the FOTO to 40% or less, in order to improve QOL.  2. Patient to demo increase in UE/LE strength to 4+/5, in order to improve endurance with activity.  3. Patient to have decreased pain to 2/10 " at worst, in order to improve QOL.  4. Patient to demo increase cervical/LB ROM to WFL, in order to improve available range of motion for ADL's.  5. Patient to perform daily activities including sit to stand transfers without increase in symptoms.   6. Patient to increase 30 second sit to stand to 8 repetitions, to decrease fall risk.        PLAN      Plan of care Certification: 4/12/2022 to 6/25/2022.     Outpatient Physical Therapy 2 times weekly for 10 weeks to include the following interventions: Cervical/Lumbar Traction, Electrical Stimulation knee/cervical, Gait Training, Iontophoresis (with NA), Manual Therapy, Moist Heat/ Ice, Neuromuscular Re-ed, Orthotic Management and Training, Patient Education, Self Care, Therapeutic Activities, Therapeutic Exercise and Dry needling.        Liz Ramos, PT

## 2022-04-27 ENCOUNTER — HOSPITAL ENCOUNTER (OUTPATIENT)
Dept: RADIOLOGY | Facility: HOSPITAL | Age: 72
Discharge: HOME | End: 2022-04-27
Attending: FAMILY MEDICINE
Payer: MEDICARE

## 2022-04-27 ENCOUNTER — OFFICE VISIT (OUTPATIENT)
Dept: OTOLARYNGOLOGY | Facility: CLINIC | Age: 72
End: 2022-04-27
Payer: MEDICARE

## 2022-04-27 VITALS — TEMPERATURE: 98 F | DIASTOLIC BLOOD PRESSURE: 73 MMHG | SYSTOLIC BLOOD PRESSURE: 133 MMHG | HEART RATE: 69 BPM

## 2022-04-27 DIAGNOSIS — J30.89 NON-SEASONAL ALLERGIC RHINITIS, UNSPECIFIED TRIGGER: ICD-10-CM

## 2022-04-27 DIAGNOSIS — J34.89 OTHER SPECIFIED DISORDERS OF NOSE AND NASAL SINUSES: ICD-10-CM

## 2022-04-27 DIAGNOSIS — J32.4 CHRONIC PANSINUSITIS: Primary | ICD-10-CM

## 2022-04-27 DIAGNOSIS — J34.3 HYPERTROPHY OF INFERIOR NASAL TURBINATE: ICD-10-CM

## 2022-04-27 PROCEDURE — 99999 PR PBB SHADOW E&M-EST. PATIENT-LVL IV: CPT | Mod: PBBFAC,,, | Performed by: OTOLARYNGOLOGY

## 2022-04-27 PROCEDURE — 99204 OFFICE O/P NEW MOD 45 MIN: CPT | Mod: S$PBB,,, | Performed by: OTOLARYNGOLOGY

## 2022-04-27 PROCEDURE — 99204 PR OFFICE/OUTPT VISIT, NEW, LEVL IV, 45-59 MIN: ICD-10-PCS | Mod: S$PBB,,, | Performed by: OTOLARYNGOLOGY

## 2022-04-27 PROCEDURE — 99214 OFFICE O/P EST MOD 30 MIN: CPT | Mod: PBBFAC,25 | Performed by: OTOLARYNGOLOGY

## 2022-04-27 PROCEDURE — 70486 CT MAXILLOFACIAL W/O DYE: CPT | Mod: 26,,, | Performed by: RADIOLOGY

## 2022-04-27 PROCEDURE — 99999 PR PBB SHADOW E&M-EST. PATIENT-LVL IV: ICD-10-PCS | Mod: PBBFAC,,, | Performed by: OTOLARYNGOLOGY

## 2022-04-27 PROCEDURE — 70486 CT MAXILLOFACIAL W/O DYE: CPT | Mod: TC

## 2022-04-27 PROCEDURE — 70486 CT SINUSES WITHOUT CONTRAST: ICD-10-PCS | Mod: 26,,, | Performed by: RADIOLOGY

## 2022-04-27 RX ORDER — CETIRIZINE HYDROCHLORIDE 10 MG/1
10 TABLET ORAL DAILY PRN
COMMUNITY
Start: 2021-12-16 | End: 2023-12-04

## 2022-04-27 RX ORDER — ONDANSETRON 4 MG/1
TABLET, FILM COATED ORAL
COMMUNITY
Start: 2022-02-22

## 2022-04-27 RX ORDER — OXYMETAZOLINE HCL 0.05 %
2 SPRAY, NON-AEROSOL (ML) NASAL 2 TIMES DAILY
Status: ON HOLD | COMMUNITY
End: 2024-02-06 | Stop reason: HOSPADM

## 2022-04-27 RX ORDER — AMOXICILLIN AND CLAVULANATE POTASSIUM 875; 125 MG/1; MG/1
1 TABLET, FILM COATED ORAL 2 TIMES DAILY
Qty: 20 TABLET | Refills: 0 | Status: SHIPPED | OUTPATIENT
Start: 2022-04-27 | End: 2022-05-07

## 2022-04-27 RX ORDER — METHYLPREDNISOLONE 4 MG/1
TABLET ORAL
Qty: 1 EACH | Refills: 0 | Status: SHIPPED | OUTPATIENT
Start: 2022-04-27 | End: 2022-09-13 | Stop reason: ALTCHOICE

## 2022-04-27 RX ORDER — OMEPRAZOLE 10 MG/1
10 CAPSULE, DELAYED RELEASE ORAL
COMMUNITY
Start: 2021-12-05

## 2022-04-27 RX ORDER — GABAPENTIN 300 MG/1
300 CAPSULE ORAL 3 TIMES DAILY
COMMUNITY
Start: 2022-04-14 | End: 2022-10-31 | Stop reason: SDUPTHER

## 2022-04-27 NOTE — PROGRESS NOTES
Referring Provider:    Alexandre Macias Md  [id number] [place]  [place] [address]  Subjective:   Patient: Sherrill Avery [id number], :1950   Visit date:2022 8:52 AM    Chief Complaint:  Sinus Problem (Pt complains of allergies year round, also gets facial swelling on right side )    HPI:    Prior notes reviewed by myself.  Clinical documentation obtained by nursing staff reviewed.     71-year-old female presents with complaints of nasal congestion, rhinorrhea, postnasal drip.  She has also had some intermittent right-sided facial swelling over her right maxilla.  No recent fever.  She has had 1 round of antibiotics but nothing recently.  She uses zyrtec and afrin (intermittently) with only partial relief.  She has tried nasal steroid sprays in the past with only partial relief.  No recent allergy testing.  Her CT sinus findings are below      Objective:     Physical Exam:  Vitals:  /73   Pulse 69   Temp 97.5 °F (36.4 °C) (Temporal)   General appearance:  Well developed, well nourished    Ears:  Otoscopy of external auditory canals and tympanic membranes was normal, clinical speech reception thresholds grossly intact, no mass/lesion of auricle.    Nose:  No masses/lesions of external nose, congested nasal mucosa, septum, and turbinates were within normal limits.    Mouth:  No mass/lesion of lips, teeth, gums, hard/soft palate, tongue, tonsils, or oropharynx.    Neck & Lymphatics:  No cervical lymphadenopathy, no neck mass/crepitus/ asymmetry, trachea is midline, no thyroid enlargement/tenderness/mass.        [x]  Data Reviewed:    Lab Results   Component Value Date    WBC 5.58 2021    HGB 13.8 2021    HCT 42.4 2021    MCV 92 2021    EOSINOPHIL 2.3 2021       CT Sinuses without Contrast  Order: [id number]   Status: Final result     Visible to patient: Yes (not seen)     Next appt: 2022 at 04:00 PM in Outpatient [facility] (Franchesca Bynum PTA)     Dx: Other  specified disorders of nose and...     0 Result Notes    Details    Reading Physician Reading Date Result Priority   Alfredo Wall MD  [phone] 4/27/2022 Routine     Narrative & Impression  EXAMINATION:  CT SINUSES WITHOUT CONTRAST     CLINICAL HISTORY:  Sinonasal obstruction;  Other specified disorders of nose and nasal sinuses     TECHNIQUE:  Axial low-dose images, coronal and sagittal reformations were performed through the paranasal sinuses.  Contrast was not administered.     COMPARISON:  None.     FINDINGS:  The frontal sinuses are clear bilaterally.     The ethmoid sinuses are clear bilaterally.     The sphenoid sinus and sphenoethmoid recesses are clear bilaterally.     Bilateral maxillary sinuses are clear. Ostiomeatal complexes are Patent bilaterally.     Nasal cavity: No evidence for a cavity mass.     The lamina papyracea and roof of the ethmoids are grossly intact bilaterally. No evidence of carotid canal dehiscence.     Impression:     No acute findings        Electronically signed by: Alfredo Wall MD  Date:                                            04/27/2022  Time:                                           11:04               Assessment & Plan:   Chronic pansinusitis    Other specified disorders of nose and nasal sinuses  -     Ambulatory referral/consult to ENT    Non-seasonal allergic rhinitis, unspecified trigger  -     Aspergillus fumagatus IgE; Future; Expected date: 04/27/2022  -     Bermuda grass IgE; Future; Expected date: 04/27/2022  -     Cat epithelium IgE; Future; Expected date: 04/27/2022  -     Cladosporium IgE; Future; Expected date: 04/27/2022  -     Cockroach, American IgE; Future; Expected date: 04/27/2022  -     Auburn, bald IgE; Future; Expected date: 04/27/2022  -     D. farinae IgE; Future; Expected date: 04/27/2022  -     D. pteronyssinus IgE; Future; Expected date: 04/27/2022  -     Dog dander IgE; Future; Expected date: 04/27/2022  -     Plantain, English IgE;  Future; Expected date: 04/27/2022  -     Jaciel grass IgE; Future; Expected date: 04/27/2022  -     Fam elder, rough IgE; Future; Expected date: 04/27/2022  -     Mugwort IgE; Future; Expected date: 04/27/2022  -     Nettle IgE; Future; Expected date: 04/27/2022  -     Oak, white IgE; Future; Expected date: 04/27/2022  -     Penicillium IgE; Future; Expected date: 04/27/2022  -     Ragweed, short, common IgE; Future; Expected date: 04/27/2022  -     Cachorro IgE; Future; Expected date: 04/27/2022  -     Allergen, Cocklebur; Future; Expected date: 04/27/2022  -     Allergen, Elm Cedar; Future; Expected date: 04/27/2022  -     Allergen, Meadow Grass ([facility]); Future; Expected date: 04/27/2022  -     Allergen, Mucor Racemosus; Future; Expected date: 04/27/2022  -     Allergen, Pecan Hickory IgE; Future; Expected date: 04/27/2022  -     Allergen, White Jose; Future; Expected date: 04/27/2022  -     Allergen-Alternaria Alternata; Future; Expected date: 04/27/2022  -     Allergen-Leflore; Future; Expected date: 04/27/2022  -     Allergen-Common Pigweed; Future; Expected date: 04/27/2022  -     Allergen-Silver Birch; Future; Expected date: 04/27/2022  -     Feather Panel #2; Future; Expected date: 04/27/2022  -     RAST Allergen for [place]; Future; Expected date: 04/27/2022  -     RAST Allergen Maple ([place]); Future; Expected date: 04/27/2022  -     RAST Allergen Everett; Future; Expected date: 04/27/2022  -     RAST Allergen, Lamb's Quarters; Future; Expected date: 04/27/2022  -     RAST Allergen, Sheep Hyannis(Yellow Dock); Future; Expected date: 04/27/2022    Hypertrophy of inferior nasal turbinate    Other orders  -     amoxicillin-clavulanate 875-125mg (AUGMENTIN) 875-125 mg per tablet; Take 1 tablet by mouth 2 (two) times daily. for 10 days  Dispense: 20 tablet; Refill: 0  -     methylPREDNISolone (MEDROL DOSEPACK) 4 mg tablet; use as directed  Dispense: 1 each; Refill: 0        CT sinus pending,  chronic sinus symptoms right maxillary greater than other areas.  Sinus/nasal congestion.  Rec round of augmentin, flonase, medrol dosepack.    Addendum:  Were able to review her CT sinus which is completely normal.  I recommended that she not start her Augmentin, but I explained that she could still take the Medrol Dosepak for symptomatic relief from her allergy sinonasal symptoms.  We will follow-up on her allergy testing and further management to follow those results.

## 2022-04-29 ENCOUNTER — PATIENT MESSAGE (OUTPATIENT)
Dept: ORTHOPEDICS | Facility: CLINIC | Age: 72
End: 2022-04-29
Payer: MEDICARE

## 2022-04-29 DIAGNOSIS — Z96.652 STATUS POST LEFT KNEE REPLACEMENT: Primary | ICD-10-CM

## 2022-04-29 DIAGNOSIS — M17.11 PRIMARY OSTEOARTHRITIS OF RIGHT KNEE: ICD-10-CM

## 2022-05-02 ENCOUNTER — DOCUMENTATION ONLY (OUTPATIENT)
Dept: REHABILITATION | Facility: HOSPITAL | Age: 72
End: 2022-05-02

## 2022-05-02 ENCOUNTER — CLINICAL SUPPORT (OUTPATIENT)
Dept: REHABILITATION | Facility: HOSPITAL | Age: 72
End: 2022-05-02
Payer: MEDICARE

## 2022-05-02 ENCOUNTER — TELEPHONE (OUTPATIENT)
Dept: OTOLARYNGOLOGY | Facility: CLINIC | Age: 72
End: 2022-05-02
Payer: MEDICARE

## 2022-05-02 DIAGNOSIS — M54.2 NECK PAIN: Primary | ICD-10-CM

## 2022-05-02 PROCEDURE — 97140 MANUAL THERAPY 1/> REGIONS: CPT | Mod: CQ

## 2022-05-02 PROCEDURE — 97110 THERAPEUTIC EXERCISES: CPT | Mod: CQ

## 2022-05-02 NOTE — PROGRESS NOTES
[facility]   Physical Therapist Assistant Treatment Note     Name: Sherrill Avery  Clinic Number: [id number]    Therapy Diagnosis:   Encounter Diagnosis   Name Primary?    Neck pain Yes     Physician: Alexandre Macias MD    Visit Date: 5/2/2022  Physician Orders: PT Eval and Treat   Medical Diagnosis from Referral: neck pain   Evaluation Date: 4/12/2022  Authorization Period Expiration: 4/42023  Plan of Care Expiration: 6/25/2022  Progress Note Due: 10th visit  Visit # / Visits authorized: 3/ 20   FOTO: 1/ 3       PTA Visit #: 1/5     Time In: 4:00  Time Out: 5:05  Total Billable Time: 54 minutes    SUBJECTIVE     Pt reports: continued neck and knee pain/weakness; she thinks she strained her neck blow drying her hair this morning  She was compliant with home exercise program.  Response to previous treatment: positive  Functional change: improved CROM    Pain: 5/10  Location: bilat knees and neck      OBJECTIVE     Objective Measures updated at progress report unless specified.     Treatment     Sherrill received the treatments listed below:      therapeutic exercises to develop strength, endurance, ROM, flexibility, posture and core stabilization for 38 minutes including:  Prone cervical retraction     Prone hip ext 1x10 ea  Prone T 1x10   Prone I 1x10  Open books x10 ea  Bridge w/ball squeeze 2x10  SLR 2x10 ea  Hip abd 1x10 ea  Clams GTB 1x10 ea  Shuttle 6B 2.5 minutes  Shuttle HR 4B x10    Sit to stand from standard chair 1x6      manual therapy techniques for 16 minutes, including:  Suboccipital releases  Cervical distraction with retraction  Cervical nodding  STM to cervical paraspinals, UT, LS, and thoracic paraspinals      HP x10 min to neck and thoracic spine      Patient Education and Home Exercises     Home Exercises Provided and Patient Education Provided     Education provided:   - yes    Written Home Exercises Provided: Patient instructed to cont prior HEP. Exercises were reviewed  and Sherrill was able to demonstrate them prior to the end of the session.  Sherrill demonstrated good  understanding of the education provided. See EMR under Patient Instructions for exercises provided during therapy sessions    ASSESSMENT   Sherrill presented today with increased neck pain and no change in her knee pain. She feels she strained her neck this am blow drying her hair. Mild tension palpated greater on the right side of her neck in the upper traps and sub occipital region. She participated in cervical strengthening, scap stabilization, and LE strengthening for overall independence with home and leisure tasks. Less difficlty with sit to stands from a standard chair but she required moderate cueing and did complain of some left knee pain. Glute strengthening will definitely improve this task.  Plan to advance to weight lifting via machine use next session in preparation for wellness plan as we were unable to do so today.     Sherrill Is progressing well towards her goals.   Pt prognosis is Good.     Pt will continue to benefit from skilled outpatient physical therapy to address the deficits listed in the problem list box on initial evaluation, provide pt/family education and to maximize pt's level of independence in the home and community environment.     Pt's spiritual, cultural and educational needs considered and pt agreeable to plan of care and goals.     Anticipated barriers to physical therapy: distance    Goals:   Goals: Reviewed:4/12/2022    Short Term Goals:  In 6 weeks   1.Pt to be educated on HEP.  2.Patient to increase cervical/LB ROM  to 60 deg rotation, in order to improve available range of motion for ADL's.  3.Patient to increase UE/LE MMT strength by 1/2 grade, in order to improve endurance with activity.  4.Patient to have pain less than 3/10 at worst, in order to improve QOL.  5.Patient to improve score on the FOTO, in order to improve QOL.    6. Pt to be educated on postural/body mechanics  awareness.     Long Term Goals: In 10 weeks  1. Patient to improve score on the FOTO to 40% or less, in order to improve QOL.  2. Patient to demo increase in UE/LE strength to 4+/5, in order to improve endurance with activity.  3. Patient to have decreased pain to 2/10 at worst, in order to improve QOL.  4. Patient to demo increase cervical/LB ROM to WFL, in order to improve available range of motion for ADL's.  5. Patient to perform daily activities including sit to stand transfers without increase in symptoms.   6. Patient to increase 30 second sit to stand to 8 repetitions, to decrease fall risk.        PLAN      Plan of care Certification: 4/12/2022 to 6/25/2022.     Outpatient Physical Therapy 2 times weekly for 10 weeks to include the following interventions: Cervical/Lumbar Traction, Electrical Stimulation knee/cervical, Gait Training, Iontophoresis (with NA), Manual Therapy, Moist Heat/ Ice, Neuromuscular Re-ed, Orthotic Management and Training, Patient Education, Self Care, Therapeutic Activities, Therapeutic Exercise and Dry needling.        Franchesca Bynum, PTA

## 2022-05-02 NOTE — PROGRESS NOTES
PT/PTA met face to face to discuss pt's treatment plan and progress towards established goals. Pt will be seen by a physical therapist minimally every 6th visit or every 30 days.        Franchesca Bynum PTA

## 2022-05-02 NOTE — TELEPHONE ENCOUNTER
"Spoke to patient and told her about her "mild" cockroach allergy. Patient gave verbal understanding.      ----- Message from Abel Gallego MD sent at 5/2/2022  3:21 PM CDT -----  You have a mild allergy to cockroach but all of your other tests were negative.  We can discuss more at your follow up.  Let me know if you have any questions.    Dr. Gallego    "

## 2022-05-04 NOTE — PROGRESS NOTES
[facility]   Physical Therapist Assistant Treatment Note     Name: Sherrill Avery  Clinic Number: [id number]    Therapy Diagnosis:   Encounter Diagnosis   Name Primary?    Neck pain Yes     Physician: Alexandre Macias MD    Visit Date: 5/5/2022  Physician Orders: PT Eval and Treat   Medical Diagnosis from Referral: neck pain   Evaluation Date: 4/12/2022  Authorization Period Expiration: 4/42023  Plan of Care Expiration: 6/25/2022  Progress Note Due: 10th visit  Visit # / Visits authorized: 4/ 20   FOTO: 1/ 3       PTA Visit #: 2/5     Time In: 4:05  Time Out: 5:00  Total Billable Time: 55 minutes    SUBJECTIVE     Pt reports: her neck hurts her at the base of skull causing headaches. She continues to have knee pain.  She was compliant with home exercise program.  Response to previous treatment: positive  Functional change: improved CROM    Pain: 5/10  Location: bilat knees and neck      OBJECTIVE     Objective Measures updated at progress report unless specified.     Treatment     Sherrill received the treatments listed below:      therapeutic exercises to develop strength, endurance, ROM, flexibility, posture and core stabilization for 25 minutes including:  Nustep 5' for mobility  Bridge w/ball squeeze 2x10  SLR 2x10 ea  Prone cervical retraction     Open books x10 ea  Hip abd 1x10 ea      Sit to stand from standard chair 1x8      manual therapy techniques for 30 minutes, including:  Suboccipital releases  Cervical distraction with retraction  STM to cervical paraspinals, UT, LS, and thoracic paraspinals    PT performed cervical manipulation and cervical mobs    HP x10 min to neck and thoracic spine      Patient Education and Home Exercises     Home Exercises Provided and Patient Education Provided     Education provided:   - yes    Written Home Exercises Provided: Patient instructed to cont prior HEP. Exercises were reviewed and Sherrill was able to demonstrate them prior to the end of the  session.  Sherrill demonstrated good  understanding of the education provided. See EMR under Patient Instructions for exercises provided during therapy sessions    ASSESSMENT   Sherrill presented today with continued neck and knee pain. She has increased tension in cervical paraspinals and suboccipitals- manual therapy was to decrease tension and pain. PT also performed cervical mobs and manipulation for relief. Patient was educated on sleeping with towel roll under curvature of neck to support lordosis of cervical spine and provided with handout about suboccipital release using tennis balls. She performed exercises for postural and lower extremity strengthening. She needs increased cues to improve sit to stand transfer. She tends to lean away from left when sitting down and will benefit from further hip strengthening. Plan to advance to weight lifting via machine use next session in preparation for wellness plan as we were unable to do so today.     Sherrill Is progressing well towards her goals.   Pt prognosis is Good.     Pt will continue to benefit from skilled outpatient physical therapy to address the deficits listed in the problem list box on initial evaluation, provide pt/family education and to maximize pt's level of independence in the home and community environment.     Pt's spiritual, cultural and educational needs considered and pt agreeable to plan of care and goals.     Anticipated barriers to physical therapy: distance    Goals:   Goals: Reviewed:4/12/2022    Short Term Goals:  In 6 weeks   1.Pt to be educated on HEP.  2.Patient to increase cervical/LB ROM  to 60 deg rotation, in order to improve available range of motion for ADL's.  3.Patient to increase UE/LE MMT strength by 1/2 grade, in order to improve endurance with activity.  4.Patient to have pain less than 3/10 at worst, in order to improve QOL.  5.Patient to improve score on the FOTO, in order to improve QOL.    6. Pt to be educated on postural/body  mechanics awareness.     Long Term Goals: In 10 weeks  1. Patient to improve score on the FOTO to 40% or less, in order to improve QOL.  2. Patient to demo increase in UE/LE strength to 4+/5, in order to improve endurance with activity.  3. Patient to have decreased pain to 2/10 at worst, in order to improve QOL.  4. Patient to demo increase cervical/LB ROM to WFL, in order to improve available range of motion for ADL's.  5. Patient to perform daily activities including sit to stand transfers without increase in symptoms.   6. Patient to increase 30 second sit to stand to 8 repetitions, to decrease fall risk.        PLAN      Plan of care Certification: 4/12/2022 to 6/25/2022.     Outpatient Physical Therapy 2 times weekly for 10 weeks to include the following interventions: Cervical/Lumbar Traction, Electrical Stimulation knee/cervical, Gait Training, Iontophoresis (with NA), Manual Therapy, Moist Heat/ Ice, Neuromuscular Re-ed, Orthotic Management and Training, Patient Education, Self Care, Therapeutic Activities, Therapeutic Exercise and Dry needling.        Nancy Jimenez, PTA

## 2022-05-05 ENCOUNTER — DOCUMENTATION ONLY (OUTPATIENT)
Dept: REHABILITATION | Facility: HOSPITAL | Age: 72
End: 2022-05-05
Payer: MEDICARE

## 2022-05-05 ENCOUNTER — CLINICAL SUPPORT (OUTPATIENT)
Dept: REHABILITATION | Facility: HOSPITAL | Age: 72
End: 2022-05-05
Payer: MEDICARE

## 2022-05-05 DIAGNOSIS — M54.2 NECK PAIN: Primary | ICD-10-CM

## 2022-05-05 PROCEDURE — 97140 MANUAL THERAPY 1/> REGIONS: CPT | Mod: CQ

## 2022-05-05 PROCEDURE — 97110 THERAPEUTIC EXERCISES: CPT | Mod: CQ

## 2022-05-05 NOTE — PROGRESS NOTES
PT/PTA met face to face to discuss pt's treatment plan and progress towards established goals. Pt will be seen by a physical therapist minimally every 6th visit or every 30 days.    Nancy Jimenez PTA

## 2022-05-11 NOTE — TELEPHONE ENCOUNTER
[facility] Medicine  Progress Note    Patient Name: Tianna Leon  MRN: [id number]  Patient Class: IP- Inpatient   Admission Date: 5/6/2022  Length of Stay: 5 days  Attending Physician: Elizabeth Kim MD  Primary Care Provider: Spenser Campa MD        Subjective:     Principal Problem:Bacteremia        HPI:  40 y/o. female  with a PMHx of asthma, COPD,Bipolar  , IVDU and HLD presented to the ER with a c/o  sob for the last weak which has gotten worse . The SOB is associated with fever , chills , productive cough , back pain  and generalized weakness . She report cough some blood this am .  She is active IVDU  ( heroine , cocaine  and amphetamine ) . She denies any  sick contact , chest pain  , GI/ sx , She also complaning of LE sweeling . Knee pain and B/L LE rash .   ER COURSE: CTA chest negative for pe . Multiple nodular and cavitary opacities concerning for septic emboli with larger opacities possibly necrotizing pneumonia. CT cervical  and thoracic did not show any acute finding , CT lumbar Possible progression of degenerative changes most notable at L4-5 with moderate to severe spinal canal stenosis at this level.  WBC  29 k , na 130 , k 3.4 , cl 89 , procal 2.71   ER VS:  BP Pulse Resp Temp SpO2   (!) 124/58 110 (!) 30 (!) 101.6 °F (38.7 °C) 96 %           Pt will be admitted to inpt with a dx of PNA and severe sepsis       Overview/Hospital Course:  5/7 admitted for pneumonia, severe sepsis in setting of ivdu. Mother at bedside and provides collateral. Patient has struggled with substance abuse for approximately 20 years, worsening over last 3-5 years since life event. Onset of symptoms 1-2 weeks ago. Tolerated thoracentesis with no pneumothorax on chest x-ray. Mri lumbar and echo, pending. Bcx positive, growing gram positive cocci. On vanc, cefepime and flagyl. Infectious disease consulted for bacteremia. 5/8/22 The patient remained afebrile overnight. Currently on  Yes, sorry, needs Rx to [facility] for Nasoneb with steroid.   cefepime/vanc/flagyl. Blood cx are growing staph aureus. The patient refused the MRI lumbar spine overnight d/t being unable to lay flat from back pain. Will give pain meds and attempt to get the MRI today to r/o spinal abscess. The ECHO showed a 1.3 x 1.5 cm elongated, mobile echodensity seen on the tricuspid valve consistent with vegetation, CT surgery was consulted. Will need a SUSANNAH. Infectious disease is consulted. Will repeat blood cx today. 5/9/22 No acute events overnight. The patient reports lower back pain is not well controlled with current pain regimen, will adjust. Repeat blood cx are still positive. Sensitivities are back and Staph is sensitive to oxacillin, will D/C vanc/cefepime. Cardiology consulted and plans for a SUSANNAH today. CT surgery following and recommends continuing medical management with appropriate ABX, no surgical intervention at this time. MRI lumbar spine showed moderate to severe spinal canal stenosis with moderate left-sided neural foraminal stenosis, Neurosurgery consulted. 5/10/22 No acute events overnight. The patient reports some improvement in pain with adjustment in pain meds. Repeat blood cx are +. Continue IV oxacillin and flagyl. Infectious disease is consulted. IR consulted to evaluate possible paraspinous muscle myositis versus abscess. Recommend IR drainage. Continue current management. 5/11/22 No acute events overnight. The patient reports pain is still not well controlled at times. The patient reports that she is very anxious about the upcoming SUSANNAH and IR drainage. SUSANNAH was pushed back to this afternoon because the patient ate candy this AM. Continue treatment with IV oxacillin and flagyl. Infectious disease is following. WBC trended down to 31K. Continue current management.       Interval History: No acute events overnight. The patient reports pain is still not well controlled at times. The patient reports that she is very anxious about the upcoming SUSANNAH and IR drainage. SUSANNAH  was pushed back to this afternoon because the patient ate candy this AM. Continue treatment with IV oxacillin and flagyl. Infectious disease is following. WBC trended down to 31K. Continue current management.     Review of Systems   Constitutional:  Positive for fatigue and fever. Negative for activity change, appetite change, chills, diaphoresis and unexpected weight change.   HENT: Negative.  Negative for congestion, drooling, facial swelling, rhinorrhea, sinus pressure, sneezing and sore throat.    Eyes: Negative.  Negative for discharge, redness, itching and visual disturbance.   Respiratory:  Positive for cough, chest tightness and shortness of breath. Negative for apnea, choking, wheezing and stridor.    Cardiovascular:  Negative for chest pain, palpitations and leg swelling.   Gastrointestinal: Negative.  Negative for abdominal distention, abdominal pain, anal bleeding, blood in stool, constipation, diarrhea, nausea and vomiting.   Endocrine: Negative.    Genitourinary: Negative.  Negative for decreased urine volume, difficulty urinating, dysuria, frequency, hematuria, pelvic pain, urgency, vaginal bleeding and vaginal discharge.   Musculoskeletal:  Positive for arthralgias and back pain. Negative for gait problem, joint swelling, myalgias, neck pain and neck stiffness.   Skin: Negative.  Negative for color change, pallor, rash and wound.   Allergic/Immunologic: Negative.    Neurological:  Positive for weakness. Negative for dizziness, seizures, facial asymmetry, speech difficulty, light-headedness, numbness and headaches.   Psychiatric/Behavioral:  Positive for confusion. Negative for agitation, hallucinations and suicidal ideas.    All other systems reviewed and are negative.  Objective:     Vital Signs (Most Recent):  Temp: 98.4 °F (36.9 °C) (05/11/22 0738)  Pulse: 86 (05/11/22 0738)  Resp: 18 (05/11/22 1129)  BP: (!) 98/55 (05/11/22 0738)  SpO2: 96 % (05/11/22 0752)   Vital Signs (24h Range):  Temp:   [97.9 °F (36.6 °C)-99.2 °F (37.3 °C)] 98.4 °F (36.9 °C)  Pulse:  [] 86  Resp:  [16-20] 18  SpO2:  [95 %-100 %] 96 %  BP: ()/(53-57) 98/55     Weight: 98.3 kg (216 lb 11.4 oz)  Body mass index is 36.06 kg/m².    Intake/Output Summary (Last 24 hours) at 5/11/2022 1138  Last data filed at 5/11/2022 0600  Gross per 24 hour   Intake 240 ml   Output 2350 ml   Net -2110 ml      Physical Exam  Vitals and nursing note reviewed. Exam conducted with a chaperone present (family).   Constitutional:       General: She is not in acute distress.     Appearance: She is well-developed. She is ill-appearing. She is not toxic-appearing.   HENT:      Head: Normocephalic and atraumatic.   Eyes:      Conjunctiva/sclera: Conjunctivae normal.      Pupils: Pupils are equal, round, and reactive to light.   Cardiovascular:      Rate and Rhythm: Normal rate and regular rhythm.      Heart sounds: Normal heart sounds. No murmur heard.  Pulmonary:      Effort: Pulmonary effort is normal. No respiratory distress.      Breath sounds: Normal breath sounds.   Abdominal:      General: Bowel sounds are normal. There is no distension.      Palpations: Abdomen is soft.      Tenderness: There is no abdominal tenderness.   Musculoskeletal:         General: Swelling and tenderness present. No deformity. Normal range of motion.      Cervical back: Normal range of motion and neck supple.      Right lower leg: No edema.      Left lower leg: No edema.   Skin:     General: Skin is warm and dry.      Coloration: Skin is pale.      Findings: Erythema present.   Neurological:      Mental Status: She is alert and oriented to person, place, and time.      Motor: Weakness present.      Deep Tendon Reflexes: Reflexes are normal and symmetric.   Psychiatric:         Behavior: Behavior normal.       Significant Labs: All pertinent labs within the past 24 hours have been reviewed.    Significant Imaging:   Imaging Results              CTA Chest Non-Coronary (PE  Study) (Final result)  Result time 05/06/22 18:26:20      Final result by Demario Morin MD (05/06/22 18:26:20)                   Impression:      No definite pulmonary thromboembolism.    Multiple nodular and cavitary opacities concerning for septic emboli with larger opacities possibly necrotizing pneumonia.    Moderate loculated right pleural fluid.  Empyema not excluded.    Small pericardial effusion of unclear etiology.    Splenic hypodensity possibly related to infarct or contrast timing.    Additional details as above.    All CT scans at this facility are performed  using dose modulation techniques as appropriate to performed exam including the following:  automated exposure control; adjustment of mA and/or kV according to the patients size (this includes techniques or standardized protocols for targeted exams where dose is matched to indication/reason for exam: i.e. extremities or head);  iterative reconstruction technique.      Electronically signed by: Demario Morin  Date:    05/06/2022  Time:    18:26               Narrative:    EXAMINATION:  CTA CHEST NON CORONARY    CLINICAL HISTORY:  Pulmonary embolism (PE) suspected, neg D-dimer;    TECHNIQUE:  Low dose axial images, sagittal and coronal reformations were obtained from the thoracic inlet to the lung bases following the IV administration of 100 mL of Omnipaque 350.  Contrast timing was optimized to evaluate the pulmonary arteries.  MIP images were performed.    COMPARISON:  Multiple priors.    FINDINGS:  Base of Neck: No significant abnormality.    Thoracic soft tissues: Unremarkable.    Aorta: Left-sided aortic arch.  No aneurysm and no significant atherosclerosis    Heart: Normal size.  Small effusion.    Pulmonary vasculature: Pulmonary arteries are well opacified.  There is no pulmonary thromboembolism.    Esha/Mediastinum: No pathologic ariana enlargement.    Airways: Patent.    Lungs/Pleura: Multiple nodular and cavitary opacities concerning  for septic emboli.  Additional larger opacities most notably in the right lung base with some internal clearing possibly related to necrotizing pneumonia.  Moderate loculated right pleural fluid and no definite left pleural fluid.  Empyema not excluded.    Esophagus: Unremarkable.    Upper Abdomen: Geographic wedge-shaped splenic hypodensity possibly related to contrast timing or infarct.    Bones: No acute fracture. No suspicious lytic or sclerotic lesions.                                        CT Lumbar Spine Without Contrast (Final result)  Result time 05/06/22 18:29:13      Final result by Demario Morin MD (05/06/22 18:29:13)                   Impression:      No fracture or traumatic malalignment of the lumbar spine.  No definite findings to suggest osteomyelitis.  Further evaluation as clinically warranted.    Possible progression of degenerative changes most notable at L4-5 with moderate to severe spinal canal stenosis at this level.  Additional details as above.    This report was flagged in Epic as abnormal.    All CT scans at this facility are performed  using dose modulation techniques as appropriate to performed exam including the following:  automated exposure control; adjustment of mA and/or kV according to the patients size (this includes techniques or standardized protocols for targeted exams where dose is matched to indication/reason for exam: i.e. extremities or head);  iterative reconstruction technique.      Electronically signed by: Demario Morin  Date:    05/06/2022  Time:    18:29               Narrative:    EXAMINATION:  CT LUMBAR SPINE WITHOUT CONTRAST    CLINICAL HISTORY:  Low back pain, infection suspected;    TECHNIQUE:  Low-dose CT images obtained throughout the region of the lumbar spine.  Axial, sagittal and coronal reformations were performed.  Contrast was not administered.    COMPARISON:  12/05/2019    FINDINGS:  The vertebral bodies are normal in height and morphology without  evidence of fracture or osseous destructive process.    Normal sagittal alignment is preserved.  No spondylolisthesis.    Circumferential disc bulges L3 through S1 with mild spinal canal stenosis L3-4, moderate to severe spinal canal stenosis L4-5 and probable moderate spinal canal stenosis L5-S1.    Facet arthropathy with moderate left neural foraminal narrowing at L4-5 and severe left neural foraminal narrowing at L5-S1.  Other levels are better preserved.    Limited evaluation of the intraspinal contents demonstrates no hematoma or mass.    Paraspinal soft tissues exhibit no acute abnormalities.                                       CT Thoracic Spine Without Contrast (Final result)  Result time 05/06/22 18:32:20      Final result by Demario Morin MD (05/06/22 18:32:20)                   Impression:      No definite acute abnormality.  No traumatic malalignment, fracture, or osseous destructive process.  Further evaluation as clinically warranted.    Pulmonary opacities as on the dedicated CT exam.    All CT scans at this facility are performed  using dose modulation techniques as appropriate to performed exam including the following:  automated exposure control; adjustment of mA and/or kV according to the patients size (this includes techniques or standardized protocols for targeted exams where dose is matched to indication/reason for exam: i.e. extremities or head);  iterative reconstruction technique.      Electronically signed by: Demario Morin  Date:    05/06/2022  Time:    18:32               Narrative:    EXAMINATION:  CT THORACIC SPINE WITHOUT CONTRAST    CLINICAL HISTORY:  Epidural abscess suspected;    TECHNIQUE:  Low-dose axial noncontrast CT images of the thoracic spine.  Multiplanar reformats generated    COMPARISON:  None    FINDINGS:  Thoracic spine alignment is well preserved.  No findings to suggest vertebral body fracture.  No osseous lytic or blastic process.    No significant degenerative  changes.  No disc abnormalities or facet arthrosis.    Pulmonary opacities as on the dedicated CT exam.                                       CT Cervical Spine Without Contrast (Final result)  Result time 05/06/22 18:34:24      Final result by Demario Morin MD (05/06/22 18:34:24)                   Impression:      No fracture or traumatic malalignment of the cervical spine.  No definite CT evidence of osteomyelitis.  Further evaluation as clinically warranted.    Additional degenerative findings as above.    All CT scans at this facility are performed  using dose modulation techniques as appropriate to performed exam including the following:  automated exposure control; adjustment of mA and/or kV according to the patients size (this includes techniques or standardized protocols for targeted exams where dose is matched to indication/reason for exam: i.e. extremities or head);  iterative reconstruction technique.      Electronically signed by: Demario Morin  Date:    05/06/2022  Time:    18:34               Narrative:    EXAMINATION:  CT CERVICAL SPINE WITHOUT CONTRAST    CLINICAL HISTORY:  Neck pain, acute, infection suspected;    TECHNIQUE:  Low dose axial CT images through the cervical spine, with sagittal and coronal reformations.  Contrast was not administered.    COMPARISON:  12/05/2019    FINDINGS:  The vertebral bodies are normal in height and morphology without evidence of fracture or osseous destructive process.  Normal sagittal alignment is preserved.    Progression of degenerative change at C4-5 in comparison the prior exam with posterior disc osteophyte complex at this level producing mild spinal canal stenosis.  Smaller C5-6 posterior disc osteophyte complex without definite spinal canal stenosis.    No definite neural foraminal narrowing.    Limited evaluation of the intraspinal contents demonstrates no hematoma or mass.Paraspinal soft tissues exhibit no acute abnormalities.                                        X-Ray Knee Complete 4 Or More Views Right (Final result)  Result time 05/06/22 16:01:49      Final result by Nate Brasher MD (05/06/22 16:01:49)                   Impression:      No evidence of fracture or dislocation. Moderate suprapatellar knee joint effusion. Mild soft tissue edema lateral to the knee.      Electronically signed by: Nate Brasher  Date:    05/06/2022  Time:    16:01               Narrative:    EXAMINATION:  XR KNEE COMP 4 OR MORE VIEWS RIGHT    CLINICAL HISTORY:  Pain in unspecified knee    TECHNIQUE:  AP, lateral, and Merchant views of the right knee were performed.    COMPARISON:  None    FINDINGS:  No evidence of fracture or dislocation.  Moderate suprapatellar knee joint effusion.  Mild soft tissue edema lateral to the knee.  No foreign body.  Soft tissues otherwise unremarkable.                                       X-Ray Finger 2 or More Views Right (Final result)  Result time 05/06/22 16:02:28      Final result by Nate Brasher MD (05/06/22 16:02:28)                   Impression:      Negative exam.      Electronically signed by: Nate Brasher  Date:    05/06/2022  Time:    16:02               Narrative:    EXAMINATION:  XR FINGER 2 OR MORE VIEWS RIGHT    CLINICAL HISTORY:  finger swelling;    TECHNIQUE:  Three views of the right 4th digit.    COMPARISON:  None    FINDINGS:  No evidence of fracture or dislocation.  Joint spaces appear well maintained.  Soft tissues unremarkable.  No evidence of foreign body.                                        X-Ray Chest AP Portable (Final result)  Result time 05/06/22 16:04:37      Final result by Nate Brasher MD (05/06/22 16:04:37)                   Impression:      Patchy bilateral opacities, some of which appear nodular and possibly cavitary. Recommend further evaluation with chest CT with IV contrast.    This report was flagged in Epic as abnormal.      Electronically signed by: Nate Brasher  Date:    05/06/2022  Time:    16:04                Narrative:    EXAMINATION:  XR CHEST AP PORTABLE    CLINICAL HISTORY:  Sepsis;.    TECHNIQUE:  Single frontal portable view of the chest was performed.    COMPARISON:  12/05/2019    FINDINGS:  Support devices: None    Patchy bilateral opacities, some of which appear nodular and possibly cavitary.  Recommend further evaluation with chest CT with IV contrast.    Heart normal size.  No pneumothorax or pleural effusion    Bones are intact.                                          Assessment/Plan:      * Bacteremia  Gram positive   Infectious disease consulted  Continue broad spectrum intravenous antibiotic(s) given h/o ivdu  Remains febrile and leukocytosis persists  5/8/22 The patient remained afebrile overnight. Currently on cefepime/vanc/flagyl. Blood cx are growing staph aureus. The patient refused the MRI lumbar spine overnight d/t being unable to lay flat from back pain. Will give pain meds and attempt to get the MRI today to r/o spinal abscess. The ECHO showed a 1.3 x 1.5 cm elongated, mobile echodensity seen on the tricuspid valve consistent with vegetation, CT surgery was consulted. Will need a SUSANNAH. Infectious disease is consulted. Will repeat blood cx today.   5/9/22 Repeat blood cx are still positive. Sensitivities are back and Staph is sensitive to oxacillin, will D/C vanc/cefepime. Infectious disease is following   5/10/22 Repeat blood cx are +. Continue IV oxacillin and flagyl. Infectious disease is consulted. IR consulted to evaluate possible paraspinous muscle myositis versus abscess. Recommend IR drainage. Continue current management.    5/11/22 The patient reports pain is still not well controlled at times. The patient reports that she is very anxious about the upcoming SUSANNAH and IR drainage. SUSANNAH was pushed back to this afternoon because the patient ate candy this AM. Continue treatment with IV oxacillin and flagyl. Infectious disease is following. WBC trended down to 31K. Continue current  management.     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 IR consulted to evaluate possible paraspinous muscle myositis versus abscess. Recommend IR drainage. Continue current management with ABX.        Lumbar stenosis without neurogenic claudication        Subacute bacterial endocarditis        Vegetation of heart valve  5/8/22  The ECHO showed a 1.3 x 1.5 cm elongated, mobile echodensity seen on the tricuspid valve consistent with vegetation, CT surgery was consulted. Will need a SUSANNAH. Infectious disease is consulted. Will repeat blood cx today. Continue IV ABX.   5/9/22 Sensitivities are back and Staph is sensitive to oxacillin, will D/C vanc/cefepime. Cardiology consulted and plans for a SUSANNAH today. CT surgery following and recommends continuing medical management with appropriate ABX, no surgical intervention at this time.  5/10/22 Continue IV oxacillin and flagyl. Infectious disease is consulted.  5/11/22 The patient reports that she is very anxious about the upcoming SUSANNAH and IR drainage. SUSANNAH was pushed back to this afternoon because the patient ate candy this AM. Continue treatment with IV oxacillin and flagyl. Infectious disease is following. WBC trended down to 31K. Continue current management.     Chronic pulmonary heart disease  - Pulmonology following       Parapneumonic effusion  Pulmonology consulted  Status post thoracentesis  Fluid studies pending  5/11/22 IR to collect sample and send for culture today    IVDU (intravenous drug user)   Will ordet TTE to r/o IE  Will order MRI lumbar wwo   Cont broad spectrum IVAB     5/7   Studies pending for additional sites of infection given h/o ivdu  5/8/22  on cessation     Severe sepsis  This patient does have evidence of infective focus  My overall impression is sepsis. Vital signs were reviewed and noted in progress note.  Antibiotics given-   Antibiotics (From admission, onward)            Start     Stop  Route Frequency Ordered    05/09/22 1800  metroNIDAZOLE tablet 500 mg         -- Oral Every 8 hours 05/09/22 1201    05/09/22 1100  oxacillin 12 g in  mL CONTINUOUS INFUSION         -- IV Every 24 hours (non-standard times) 05/09/22 0932    05/07/22 2100  mupirocin 2 % ointment         05/12 2059 Nasl 2 times daily 05/07/22 1646        Cultures were taken-   Microbiology Results (last 7 days)     Procedure Component Value Units Date/Time    Blood culture [[id number]]     Order Status: Sent Specimen: Blood     Blood culture [[id number]]     Order Status: Sent Specimen: Blood     Blood culture [[id number]]  (Abnormal) Collected: 05/08/22 1516    Order Status: Completed Specimen: Blood from Peripheral, Right Hand Updated: 05/11/22 1009     Blood Culture, Routine Gram stain aer bottle: Gram positive cocci in clusters resembling Staph       Results called to and read back by: Chasity Raymundo RN  05/09/2022  11:31      STAPHYLOCOCCUS AUREUS  [facility].  For susceptibility see order #[id number]      Narrative:      Collection has been rescheduled by [email or web address] at 05/08/2022 13:22 Reason:   Pt in mri will be there after another hour [id number]  Collection has been rescheduled by [email or web address] at 05/08/2022 13:22 Reason:   Pt in mri will be there after another hour [id number]    Blood culture [[id number]]  (Abnormal) Collected: 05/08/22 1515    Order Status: Completed Specimen: Blood from Peripheral, Left Arm Updated: 05/11/22 1009     Blood Culture, Routine Gram stain aer bottle: Gram positive cocci in clusters resembling Staph      Results called to and read back by: Chasity Raymundo RN  05/09/2022  15:40      STAPHYLOCOCCUS AUREUS  [facility]  For susceptibility see order #[id number]      Narrative:      Collection has been rescheduled by [email or web address] at 05/08/2022 13:22 Reason:   Pt in mri will be there after another hour [id number]  Collection  has been rescheduled by [email or web address] at 05/08/2022 13:22 Reason:   Pt in mri will be there after another hour [id number]    Blood culture x two cultures. Draw prior to antibiotics. [[id number]]  (Abnormal) Collected: 05/06/22 1617    Order Status: Completed Specimen: Blood from Peripheral, Antecubital, Right Updated: 05/09/22 0824     Blood Culture, Routine Gram stain aer bottle: Gram positive cocci in clusters resembling Staph       Results called to and read back by: Lila Hernandez RN 05/07/2022  10:57      Gram stain missael bottle: Gram positive cocci in clusters resembling Staph       05/07/2022  13:40      STAPHYLOCOCCUS AUREUS  ID consult required at [facility].  For susceptibility see order #[id number]      Narrative:      Aerobic and anaerobic    Blood culture x two cultures. Draw prior to antibiotics. [[id number]]  (Abnormal)  (Susceptibility) Collected: 05/06/22 1616    Order Status: Completed Specimen: Blood from Peripheral, Antecubital, Left Updated: 05/09/22 0823     Blood Culture, Routine Gram stain aer bottle: Gram positive cocci in clusters resembling Staph       Gram stain missael bottle: Gram positive cocci in clusters resembling Staph       Results called to and read back by: Lila Hernandez RN 05/07/2022  10:57      STAPHYLOCOCCUS AUREUS  ID consult required at [facility].      Narrative:      Aerobic and anaerobic    Gram stain [[id number]] Collected: 05/07/22 0901    Order Status: Sent Specimen: Body Fluid from Pleural Fluid Updated: 05/07/22 0905    Fungus culture [[id number]] Collected: 05/07/22 0901    Order Status: Sent Specimen: Body Fluid from Pleural Fluid Updated: 05/07/22 0905    AFB culture (includes stain) [[id number]] Collected: 05/07/22 0901    Order Status: Sent Specimen: Body Fluid from Pleural Fluid Updated: 05/07/22 0905        Latest lactate reviewed, they are-  No results for input(s): LACTATE in the last 72 hours.    Organ dysfunction  indicated by Acute kidney injury  Source-  lung    Source control Achieved by-   Cont Broad spectrum IVAB     Bacteremia  Infectious disease consulted      PNA (pneumonia)  H/O IVDU   Cont broad spectrum IVAB   F/U blood and sputum cx   Will consult pulmonology for thoracentesis  To r/u empyema     5/7  Status post thoracentesis  Chest x-ray without signs of pneumothorax  Fluid studies pending        Tobacco abuse  - Patient thoroughly counseled on smoking cessation, pt verbalized understanding. Time of counseling 10 minutes.        COPD (chronic obstructive pulmonary disease)  3Cont breathing tx prn  Pulmonology following     Lower back pain  Chronic in nature\  H/O IVDU  CT lumbar show spinal stenosis   No neurological deficit at this time   Will order a MRI lumbar wwo  To r/o spinal abscess     5/7  Mri pending  5/8/22 The patient refused the MRI lumbar spine overnight d/t being unable to lay flat from back pain. Will give pain meds and attempt to get the MRI today to r/o spinal abscess.  5/9/22 The patient reports lower back pain is not well controlled with current pain regimen, will adjust. MRI lumbar spine showed moderate to severe spinal canal stenosis with moderate left-sided neural foraminal stenosis, Neurosurgery consulted.   5/10/22 No acute events overnight. The patient reports some improvement in pain with adjustment in pain meds. Continue current management.    5/11/22 The patient reports pain is still not well controlled at times. Continue management with PRN analgesia      VTE Risk Mitigation (From admission, onward)         Ordered     enoxaparin injection 40 mg  Daily         05/06/22 2336     IP VTE HIGH RISK PATIENT  Once         05/06/22 2336     Place sequential compression device  Until discontinued         05/06/22 2336                Discharge Planning   YESSICA:      Code Status: Full Code   Is the patient medically ready for discharge?:     Reason for patient still in hospital (select all that  apply): Patient trending condition, Laboratory test, Treatment, Consult recommendations and Pending disposition  Discharge Plan A: Home                  Kike Buckley NP  Department of Hospital Medicine   [place] [facility]

## 2022-05-16 ENCOUNTER — TELEPHONE (OUTPATIENT)
Dept: ORTHOPEDICS | Facility: CLINIC | Age: 72
End: 2022-05-16
Payer: MEDICARE

## 2022-05-16 NOTE — TELEPHONE ENCOUNTER
I called the patient to confirm her xray appointment tomorrow at  before her appointment with Dr. Avery. The patient did not answer. I left a voicemail with a call back number.

## 2022-05-17 ENCOUNTER — HOSPITAL ENCOUNTER (OUTPATIENT)
Dept: RADIOLOGY | Facility: HOSPITAL | Age: 72
Discharge: HOME | End: 2022-05-17
Attending: ORTHOPAEDIC SURGERY
Payer: MEDICARE

## 2022-05-17 ENCOUNTER — OFFICE VISIT (OUTPATIENT)
Dept: ORTHOPEDICS | Facility: CLINIC | Age: 72
End: 2022-05-17
Payer: MEDICARE

## 2022-05-17 VITALS — BODY MASS INDEX: 33.81 KG/M2 | HEIGHT: 65 IN | WEIGHT: 202.94 LBS

## 2022-05-17 DIAGNOSIS — M17.11 PRIMARY OSTEOARTHRITIS OF RIGHT KNEE: ICD-10-CM

## 2022-05-17 DIAGNOSIS — Z96.652 STATUS POST LEFT KNEE REPLACEMENT: Primary | ICD-10-CM

## 2022-05-17 DIAGNOSIS — Z96.652 STATUS POST LEFT KNEE REPLACEMENT: ICD-10-CM

## 2022-05-17 PROCEDURE — 73564 XR KNEE ORTHO BILAT WITH FLEXION: ICD-10-PCS | Mod: 26,50,, | Performed by: RADIOLOGY

## 2022-05-17 PROCEDURE — 99999 PR PBB SHADOW E&M-EST. PATIENT-LVL III: CPT | Mod: PBBFAC,,, | Performed by: ORTHOPAEDIC SURGERY

## 2022-05-17 PROCEDURE — 73564 X-RAY EXAM KNEE 4 OR MORE: CPT | Mod: TC,50

## 2022-05-17 PROCEDURE — 20610 DRAIN/INJ JOINT/BURSA W/O US: CPT | Mod: PBBFAC,RT | Performed by: ORTHOPAEDIC SURGERY

## 2022-05-17 PROCEDURE — 20610 LARGE JOINT ASPIRATION/INJECTION: R KNEE: ICD-10-PCS | Mod: S$PBB,RT,, | Performed by: ORTHOPAEDIC SURGERY

## 2022-05-17 PROCEDURE — 99999 PR PBB SHADOW E&M-EST. PATIENT-LVL III: ICD-10-PCS | Mod: PBBFAC,,, | Performed by: ORTHOPAEDIC SURGERY

## 2022-05-17 PROCEDURE — 99213 PR OFFICE/OUTPT VISIT, EST, LEVL III, 20-29 MIN: ICD-10-PCS | Mod: 25,S$PBB,, | Performed by: ORTHOPAEDIC SURGERY

## 2022-05-17 PROCEDURE — 73564 X-RAY EXAM KNEE 4 OR MORE: CPT | Mod: 26,50,, | Performed by: RADIOLOGY

## 2022-05-17 PROCEDURE — 99213 OFFICE O/P EST LOW 20 MIN: CPT | Mod: 25,S$PBB,, | Performed by: ORTHOPAEDIC SURGERY

## 2022-05-17 PROCEDURE — 99213 OFFICE O/P EST LOW 20 MIN: CPT | Mod: PBBFAC,25 | Performed by: ORTHOPAEDIC SURGERY

## 2022-05-17 RX ORDER — TRIAMCINOLONE ACETONIDE 40 MG/ML
40 INJECTION, SUSPENSION INTRA-ARTICULAR; INTRAMUSCULAR
Status: SHIPPED | OUTPATIENT
Start: 2022-05-17

## 2022-05-17 RX ADMIN — TRIAMCINOLONE ACETONIDE 40 MG: 40 INJECTION, SUSPENSION INTRA-ARTICULAR; INTRAMUSCULAR at 01:05

## 2022-05-17 NOTE — PROGRESS NOTES
Subjective:     HPI:   Sherrill Avery is a 71 y.o. female who presents for annual follow up left TKA    Date of surgery: 6/14/21    Medications: gabapentin, tylenol PRN for R knee    Assistive Devices: none    Limitations: R knee is more limiting, L knee: kneeling    L knee doing ok, better than a year ago, still feels tight and some anterior soreness/heaviness.     Did not go to pain management as suggested because was getting radiation in [place] for renal cell carcinoma with mets dispo pending next PET scan June 2022         Objective:   Body mass index is 33.77 kg/m².  Exam:    Gait: limp/antalgic none    Incision: healed    Stability:  Knee stable anterior-posterior varus and valgus stresses, no extensor lag    Extension: 0    Flexion: 130    Valgus angle: 5    R knee 0-130    Imaging:  Indication:  Exam status post left total knee arthroplasty  Exam Ordered: Radiographs of the left knee include a standing anteroposterior view, a lateral view, and a sunrise view  Details of Examination: Todays exam show a well fixed, well positioned total knee arthroplasty with no evidence of wear, osteolysis, or loosening.  Impression:  Status post left total knee arthroplasty, implant in good position with no abnormality        Assessment:       ICD-10-CM ICD-9-CM   1. Status post left knee replacement  Z96.652 V43.65   2. Primary osteoarthritis of right knee  M17.11 715.16      L TKA mechanically Doing well, ?neuropathic pain +/- fribromyalgia  R knee OA     Plan:       Patient is doing very well with their total knee arthroplasty.  They will continue with their routine care of the knee replacement and see me back for their follow-up at the routine interval.  If there are problems in the interim they will see me back sooner.    She will consider pursuing pain clinic eval for genicular block/RFA and discuss gabapentin dosing     Kneeling protocol    R knee CSI today to try to get through PET scan in June, f/u PRN R knee    5  year follow up for standard xrays      No orders of the defined types were placed in this encounter.            Past Medical History:   Diagnosis Date    Anxiety     Family history of malignant melanoma 8/25/2014    Fibromyalgia affecting lower leg     GERD (gastroesophageal reflux disease)     Hypercholesteremia     Hypertension     Hypothyroidism     Inflamed seborrheic keratosis 8/25/2014    Microscopic hematuria 2/2/2017    Multiple benign melanocytic nevi 8/25/2014       Past Surgical History:   Procedure Laterality Date    AUGMENTATION OF BREAST      bladder lift      breast implants      BREAST SURGERY Bilateral 1996    saline implants    COLONOSCOPY  2007    HYSTERECTOMY      ectopic pregnancy x 2, with LSO    KNEE ARTHROPLASTY Left 6/14/2021    Procedure: ARTHROPLASTY, KNEE:LEFT:DEPUY-SIGMA;  Surgeon: Osmar Avery III, MD;  Location: [facility];  Service: Orthopedics;  Laterality: Left;    LAPAROSCOPIC NEPHRECTOMY, HAND ASSISTED  07/25/2013    LUNG REMOVAL, PARTIAL Left 2020    At [facility]    NEPHRECTOMY      OOPHORECTOMY      x1    right ganglion cyst      throat polyp      TRANSOBTURATOR SLING  2011    with RSO for persistent complex cyst & MARGOT; done by yris    UPPER GASTROINTESTINAL ENDOSCOPY  2007       Family History   Problem Relation Age of Onset    Diabetes Mother     Hypertension Mother     Heart disease Mother     Cancer Father         lung    Migraines Father     Glaucoma Paternal Grandmother     Glaucoma Sister     Thyroid disease Neg Hx     Asthma Neg Hx        Social History     Socioeconomic History    Marital status:      Spouse name: KAIDEN    Number of children: 5   Occupational History    Occupation: retired     Comment: Dance Instructor   Tobacco Use    Smoking status: Never Smoker    Smokeless tobacco: Never Used   Substance and Sexual Activity    Alcohol use: Yes     Alcohol/week: 0.0 standard drinks     Comment: social    Drug use:  No    Sexual activity: Yes     Partners: Male     Birth control/protection: Surgical   Social History Narrative    Patient is a retired dance instructor and live with . Has stairs at home 12- has an elevator

## 2022-05-17 NOTE — PROGRESS NOTES
Injection Information    Triamcinolone Acetonide Injectable Suspension (40mg/mL)  Lot Number: [id number]    Expiration Date: 10/31/2023

## 2022-05-17 NOTE — PROCEDURES
Large Joint Aspiration/Injection: R knee    Date/Time: 5/17/2022 1:45 PM  Performed by: Osmar Avery III, MD  Authorized by: Osmar Avery III, MD     Consent Done?:  Yes (Verbal)  Indications:  Pain  Timeout: prior to procedure the correct patient, procedure, and site was verified    Prep: patient was prepped and draped in usual sterile fashion      Local anesthesia used?: Yes    Local anesthetic:  Lidocaine 1% without epinephrine  Anesthetic total (ml):  5      Details:  Needle Size:  21 G  Location:  Knee  Site:  R knee  Medications:  40 mg triamcinolone acetonide 40 mg/mL  Patient tolerance:  Patient tolerated the procedure well with no immediate complications

## 2022-05-18 ENCOUNTER — OFFICE VISIT (OUTPATIENT)
Dept: OTOLARYNGOLOGY | Facility: CLINIC | Age: 72
End: 2022-05-18
Payer: MEDICARE

## 2022-05-18 VITALS
SYSTOLIC BLOOD PRESSURE: 135 MMHG | WEIGHT: 203.69 LBS | DIASTOLIC BLOOD PRESSURE: 77 MMHG | TEMPERATURE: 98 F | BODY MASS INDEX: 33.9 KG/M2 | HEART RATE: 84 BPM

## 2022-05-18 DIAGNOSIS — J30.89 NON-SEASONAL ALLERGIC RHINITIS, UNSPECIFIED TRIGGER: Primary | ICD-10-CM

## 2022-05-18 DIAGNOSIS — J34.3 HYPERTROPHY OF INFERIOR NASAL TURBINATE: ICD-10-CM

## 2022-05-18 DIAGNOSIS — J32.4 CHRONIC PANSINUSITIS: ICD-10-CM

## 2022-05-18 PROCEDURE — 99999 PR PBB SHADOW E&M-EST. PATIENT-LVL IV: CPT | Mod: PBBFAC,,, | Performed by: OTOLARYNGOLOGY

## 2022-05-18 PROCEDURE — 99214 OFFICE O/P EST MOD 30 MIN: CPT | Mod: S$PBB,,, | Performed by: OTOLARYNGOLOGY

## 2022-05-18 PROCEDURE — 99214 PR OFFICE/OUTPT VISIT, EST, LEVL IV, 30-39 MIN: ICD-10-PCS | Mod: S$PBB,,, | Performed by: OTOLARYNGOLOGY

## 2022-05-18 PROCEDURE — 99999 PR PBB SHADOW E&M-EST. PATIENT-LVL IV: ICD-10-PCS | Mod: PBBFAC,,, | Performed by: OTOLARYNGOLOGY

## 2022-05-18 PROCEDURE — 99214 OFFICE O/P EST MOD 30 MIN: CPT | Mod: PBBFAC | Performed by: OTOLARYNGOLOGY

## 2022-05-19 ENCOUNTER — CLINICAL SUPPORT (OUTPATIENT)
Dept: REHABILITATION | Facility: HOSPITAL | Age: 72
End: 2022-05-19
Payer: MEDICARE

## 2022-05-19 DIAGNOSIS — M54.2 NECK PAIN: Primary | ICD-10-CM

## 2022-05-19 PROCEDURE — 97110 THERAPEUTIC EXERCISES: CPT | Mod: CQ

## 2022-05-19 PROCEDURE — 97140 MANUAL THERAPY 1/> REGIONS: CPT

## 2022-05-19 NOTE — PROGRESS NOTES
[facility]   Physical Therapist Assistant Treatment Note     Name: Sherrill Avery  Clinic Number: [id number]    Therapy Diagnosis:   Encounter Diagnosis   Name Primary?    Neck pain Yes     Physician: Alexandre Macias MD    Visit Date: 5/19/2022  Physician Orders: PT Eval and Treat   Medical Diagnosis from Referral: neck pain   Evaluation Date: 4/12/2022  Authorization Period Expiration: 6/30/2023  Plan of Care Expiration: 6/25/2022  Progress Note Due: 10th visit  Visit # / Visits authorized: 5/ 20   FOTO: 2/ 3          CMS Impairment/Limitation/Restriction for FOTO Neck Survey     Therapist reviewed FOTO scores for Sherrill Avery on 5/19/2022.   FOTO documents entered into EPIC - see Media section.     Limitation Score: 53%          PTA Visit #: 3/5     Time In: 2:50  Time Out: 3:40  Total Billable Time: 50 minutes    SUBJECTIVE     Pt reports: she went to doctor about knee pain and was referred to pain management for nerve block. Patient reports 3 days of relief after last session and feels like a pop would get relief. Continued headaches report  She was compliant with home exercise program.  Response to previous treatment: positive  Functional change: improved CROM    Pain: 3/10  Location: neck      OBJECTIVE     Objective Measures updated at progress report unless specified.     Treatment     Sherrill received the treatments listed below:      therapeutic exercises to develop strength, endurance, ROM, flexibility, posture and core stabilization for 35 minutes including:  Nustep 6' for mobility  Supine chin tucks 2x10  DNF 2'  Bridge w/ball squeeze 2x10  SLR 2x10 ea  Prone cervical retraction  Open books x10 ea  Hip abd 1x10 ea    manual therapy techniques for 15 minutes, including:  Suboccipital release  Sidelying OA tractions mobilizations grade 3  Thoracic HVLAT  C1-C2 HVLAT    PT performed manual therapy today (5-)    HP x 0 min to neck and thoracic spine      Patient Education and  Home Exercises     Home Exercises Provided and Patient Education Provided    Education provided:   - yes    Written Home Exercises Provided: Patient instructed to cont prior HEP. Exercises were reviewed and Sherrill was able to demonstrate them prior to the end of the session.  Sherrill demonstrated good  understanding of the education provided. See EMR under Patient Instructions for exercises provided during therapy sessions    ASSESSMENT   Sherrill presented today with mild neck and knee pain. She has increased tension in suboccipitals- manual therapy performed by PT to decrease tension and pain with good tolerance and no increase in adverse symptoms. She continued exercises for cervical and lower extremity strengthening. She demonstrates much improvement with sit to stand transfer with equal weight bearing noted. Plan to advance to weight lifting via machine use next session in preparation for wellness plan as we were unable to do so today.     Sherrill Is progressing well towards her goals.   Pt prognosis is Good.     Pt will continue to benefit from skilled outpatient physical therapy to address the deficits listed in the problem list box on initial evaluation, provide pt/family education and to maximize pt's level of independence in the home and community environment.     Pt's spiritual, cultural and educational needs considered and pt agreeable to plan of care and goals.     Anticipated barriers to physical therapy: distance    Goals:   Goals: Reviewed:4/12/2022    Short Term Goals:  In 6 weeks   1.Pt to be educated on HEP.  2.Patient to increase cervical/LB ROM  to 60 deg rotation, in order to improve available range of motion for ADL's.  3.Patient to increase UE/LE MMT strength by 1/2 grade, in order to improve endurance with activity.  4.Patient to have pain less than 3/10 at worst, in order to improve QOL.  5.Patient to improve score on the FOTO, in order to improve QOL.    6. Pt to be educated on postural/body  mechanics awareness.     Long Term Goals: In 10 weeks  1. Patient to improve score on the FOTO to 40% or less, in order to improve QOL.  2. Patient to demo increase in UE/LE strength to 4+/5, in order to improve endurance with activity.  3. Patient to have decreased pain to 2/10 at worst, in order to improve QOL.  4. Patient to demo increase cervical/LB ROM to WFL, in order to improve available range of motion for ADL's.  5. Patient to perform daily activities including sit to stand transfers without increase in symptoms.   6. Patient to increase 30 second sit to stand to 8 repetitions, to decrease fall risk.        PLAN      Plan of care Certification: 4/12/2022 to 6/25/2022.     Outpatient Physical Therapy 2 times weekly for 10 weeks to include the following interventions: Cervical/Lumbar Traction, Electrical Stimulation knee/cervical, Gait Training, Iontophoresis (with NA), Manual Therapy, Moist Heat/ Ice, Neuromuscular Re-ed, Orthotic Management and Training, Patient Education, Self Care, Therapeutic Activities, Therapeutic Exercise and Dry needling.        Nancy Jimenez, PTA

## 2022-05-22 ENCOUNTER — PATIENT MESSAGE (OUTPATIENT)
Dept: OTOLARYNGOLOGY | Facility: CLINIC | Age: 72
End: 2022-05-22
Payer: MEDICARE

## 2022-05-24 ENCOUNTER — PATIENT MESSAGE (OUTPATIENT)
Dept: INTERNAL MEDICINE | Facility: CLINIC | Age: 72
End: 2022-05-24
Payer: MEDICARE

## 2022-05-24 ENCOUNTER — TELEPHONE (OUTPATIENT)
Dept: INTERNAL MEDICINE | Facility: CLINIC | Age: 72
End: 2022-05-24
Payer: MEDICARE

## 2022-05-24 DIAGNOSIS — N39.0 URINARY TRACT INFECTION WITHOUT HEMATURIA, SITE UNSPECIFIED: Primary | ICD-10-CM

## 2022-05-24 NOTE — TELEPHONE ENCOUNTER
----- Message from Litzy Ban sent at 5/24/2022  1:53 PM CDT -----  Contact: patient/[phone]  Patient would like you to call in order to [facility] today she changed her mind about waiting she believes she has a UTI and would like to know and start treatment ASAP    Please call her back at [phone]    Thanks KL

## 2022-05-26 ENCOUNTER — TELEPHONE (OUTPATIENT)
Dept: PAIN MEDICINE | Facility: CLINIC | Age: 72
End: 2022-05-26
Payer: MEDICARE

## 2022-05-26 NOTE — TELEPHONE ENCOUNTER
This message is for patient in regards to his/her appointment 05/27/22 at 1:30 p       [facility] Policy: For the safety of all patients and staff members.     Patient Visitor policy: During this visit we are asking all patients to only have one visitor over the age of 18yrs old to accompany them to be seen by Dr. Vijaya Landin MD. If patient do not required assistance with their visit, we're asking all visitors to remain outside the waiting area.    Upon arriving to your schedule appointment; patients are required to wear a face mask, if patient do not have a face mask one will be provided entering the facility. If you have any questions or concerns please contact [phone]       also inquired IPM within message.    [facility] providers and Mid-levels offer interventional, procedure--based options to treat chronic pain. The goal is to manage chronic pain by reducing pain frequency and intensity and address your functional goals for activities of daily living while simultaneously reducing or eliminating your reliance on medications. Please bring any records or images that you have from prior treatments for your pain. You will be presented with multi-modal treatment plan that may or may not include imaging, interventional procedures, physical/occupational/aqua therapy, pain creams, and non-narcotic pain medications used for the treatments of chronic pain.    Staff magan SG

## 2022-06-20 ENCOUNTER — OFFICE VISIT (OUTPATIENT)
Dept: PAIN MEDICINE | Facility: CLINIC | Age: 72
End: 2022-06-20
Payer: MEDICARE

## 2022-06-20 VITALS
DIASTOLIC BLOOD PRESSURE: 74 MMHG | RESPIRATION RATE: 18 BRPM | SYSTOLIC BLOOD PRESSURE: 135 MMHG | HEIGHT: 65 IN | TEMPERATURE: 98 F | WEIGHT: 198 LBS | BODY MASS INDEX: 32.99 KG/M2 | HEART RATE: 69 BPM

## 2022-06-20 DIAGNOSIS — Z96.652 CHRONIC KNEE PAIN AFTER TOTAL REPLACEMENT OF LEFT KNEE JOINT: Primary | ICD-10-CM

## 2022-06-20 DIAGNOSIS — Z96.652 STATUS POST LEFT KNEE REPLACEMENT: ICD-10-CM

## 2022-06-20 DIAGNOSIS — M25.562 CHRONIC KNEE PAIN AFTER TOTAL REPLACEMENT OF LEFT KNEE JOINT: Primary | ICD-10-CM

## 2022-06-20 DIAGNOSIS — M79.7 FIBROMYALGIA: ICD-10-CM

## 2022-06-20 DIAGNOSIS — G89.29 CHRONIC KNEE PAIN AFTER TOTAL REPLACEMENT OF LEFT KNEE JOINT: Primary | ICD-10-CM

## 2022-06-20 PROCEDURE — 99999 PR PBB SHADOW E&M-EST. PATIENT-LVL V: CPT | Mod: PBBFAC,,, | Performed by: ANESTHESIOLOGY

## 2022-06-20 PROCEDURE — 99215 OFFICE O/P EST HI 40 MIN: CPT | Mod: PBBFAC | Performed by: ANESTHESIOLOGY

## 2022-06-20 PROCEDURE — 99999 PR PBB SHADOW E&M-EST. PATIENT-LVL V: ICD-10-PCS | Mod: PBBFAC,,, | Performed by: ANESTHESIOLOGY

## 2022-06-20 PROCEDURE — 99214 OFFICE O/P EST MOD 30 MIN: CPT | Mod: S$PBB,,, | Performed by: ANESTHESIOLOGY

## 2022-06-20 PROCEDURE — 99214 PR OFFICE/OUTPT VISIT, EST, LEVL IV, 30-39 MIN: ICD-10-PCS | Mod: S$PBB,,, | Performed by: ANESTHESIOLOGY

## 2022-06-20 NOTE — PROGRESS NOTES
PCP: Alexandre Macias MD    REFERRING PHYSICIAN: Osmar Avery III, *    CHIEF COMPLAINT: L knee pain s/p knee replacement    Original HISTORY OF PRESENT ILLNESS: Sherrill Avery w/ pmh of HTN, and renal cell carcinoma (kidney removed in 2013) s/p radiation in February 2022 due to METs which appeared in 2017 who presents to the clinic for the evaluation of the above pain. She had a knee replacement in June 2021, and has continued to have pain since the procedure. She states when she sits for an extended period of time and stands back up she experiences the pain around the bottom to lateral edge of her knee cap. She has a swollen area just below the L knee as well. She currently denies CP, SOB, vision changes, or speech changes.    Original Pain Description:  The pain is located in the L knee and does not radiate. The pain is described as sharp and stinging. Exacerbating factors: Sitting, Standing, Laying, Night Time, Getting out of bed/chair and going up stairs. Mitigating factors massage, medications, physical therapy, rest and sitting. Symptoms interfere with daily activity and sleeping. The patient feels like symptoms have been unchanged. Patient denies night fever/night sweats, urinary incontinence, bowel incontinence and significant motor weakness.    Original PAIN SCORES:  Best: Pain is 0  Worst: Pain is 6  Usually: Pain is 4  Current: Pain is 2    INTERVAL HISTORY:     6 weeks of Conservative therapy (PT/Chiro/Home Exercises with Dates)  PT July 2021-September 2021  PT November 2021-December 2021  PT April 2022-May 2022     Treatments / Medications: (Ice/Heat/NSAIDS/APAP/etc):  Ice  Heat  Massage  PT  Voltaren Gel  Gabapentin     Report:      Interventional Pain Procedures: (Previous injections)  L knee replacement  R knee steroid injection    Past Medical History:   Diagnosis Date    Anxiety     Family history of malignant melanoma 8/25/2014    Fibromyalgia affecting lower leg     GERD  (gastroesophageal reflux disease)     Hypercholesteremia     Hypertension     Hypothyroidism     Inflamed seborrheic keratosis 8/25/2014    Microscopic hematuria 2/2/2017    Multiple benign melanocytic nevi 8/25/2014     Past Surgical History:   Procedure Laterality Date    AUGMENTATION OF BREAST      bladder lift      breast implants      BREAST SURGERY Bilateral 1996    saline implants    COLONOSCOPY  2007    HYSTERECTOMY      ectopic pregnancy x 2, with LSO    KNEE ARTHROPLASTY Left 6/14/2021    Procedure: ARTHROPLASTY, KNEE:LEFT:DEPUY-SIGMA;  Surgeon: Osmar Avery III, MD;  Location: [facility];  Service: Orthopedics;  Laterality: Left;    LAPAROSCOPIC NEPHRECTOMY, HAND ASSISTED  07/25/2013    LUNG REMOVAL, PARTIAL Left 2020    At [facility]    NEPHRECTOMY      OOPHORECTOMY      x1    right ganglion cyst      throat polyp      TRANSOBTURATOR SLING  2011    with RSO for persistent complex cyst & MARGOT; done by yris    UPPER GASTROINTESTINAL ENDOSCOPY  2007     Social History     Socioeconomic History    Marital status:      Spouse name: KAIDEN    Number of children: 5   Occupational History    Occupation: retired     Comment: Dance Instructor   Tobacco Use    Smoking status: Never Smoker    Smokeless tobacco: Never Used   Substance and Sexual Activity    Alcohol use: Yes     Alcohol/week: 0.0 standard drinks     Comment: social    Drug use: No    Sexual activity: Yes     Partners: Male     Birth control/protection: Surgical   Social History Narrative    Patient is a retired dance instructor and live with . Has stairs at home 12- has an elevator     Family History   Problem Relation Age of Onset    Diabetes Mother     Hypertension Mother     Heart disease Mother     Cancer Father         lung    Migraines Father     Glaucoma Paternal Grandmother     Glaucoma Sister     Thyroid disease Neg Hx     Asthma Neg Hx        Review of patient's allergies indicates:    Allergen Reactions    Talwin compound Anxiety     hallucinations/anxiety    Tramadol Itching    Buspirone Anxiety    Codeine Itching    Morphine Itching     jittery    Morpholine analogues Anxiety and Itching    Naproxen Itching     Takes Ibuprofen is ok on rare occasion     Nexium [esomeprazole magnesium] Other (See Comments)     constipation    Wal-phed [pseudoephedrine hcl] Itching       Current Outpatient Medications   Medication Sig    ALPRAZolam (XANAX) 0.5 MG tablet Take daily as needed for anxiety.    butalbital-acetaminophen-caffeine -40 mg (FIORICET, ESGIC) -40 mg per tablet TAKE 1 TABLET EVERY 4 HOURS AS NEEDED    cetirizine (ZYRTEC) 10 MG tablet Take 10 mg by mouth daily as needed.    clotrimazole (LOTRIMIN) 1 % Soln Apply topically 2 (two) times daily. for 7 days    diclofenac sodium (VOLTAREN) 1 % Gel Apply 2 g topically daily as needed.    diflorasone-emollient (APEXICON E) 0.05 % Crea Apply 1 application topically once daily. for 7 days    estradioL (ESTRACE) 1 MG tablet TAKE 1 TABLET EVERY DAY (Patient taking differently: Hold 1 week prior to surgery)    fluocinonide (LIDEX) 0.05 % external solution Apply topically 2 (two) times daily. Do not use morning of surgery    fluticasone propionate (FLONASE) 50 mcg/actuation nasal spray 2 sprays (100 mcg total) by Each Nostril route once daily.    gabapentin (NEURONTIN) 300 MG capsule Take 300 mg by mouth 3 (three) times daily.    hydrocortisone 2.5 % cream Apply topically 2 (two) times daily. apply to affected area for 7 days    ketoconazole (NIZORAL) 2 % shampoo Do not use morning of surgery    levothyroxine (SYNTHROID) 112 MCG tablet Take 1 tablet (112 mcg total) by mouth before breakfast. Take as scheduled    methylPREDNISolone (MEDROL DOSEPACK) 4 mg tablet use as directed    metronidazole 1% (METROGEL) 1 % Gel Apply topically once daily.    omeprazole (PRILOSEC) 10 MG capsule Take 10 mg by mouth.    ondansetron  "(ZOFRAN) 4 MG tablet     oxymetazoline (AFRIN) 0.05 % nasal spray 2 sprays by Nasal route 2 (two) times daily.    pravastatin (PRAVACHOL) 20 MG tablet Take 1 tablet (20 mg total) by mouth once daily.    senna-docusate 8.6-50 mg (PERICOLACE) 8.6-50 mg per tablet Take 1 tablet by mouth. Hold of surgery    zolpidem (AMBIEN) 5 MG Tab Take 1 tablet (5 mg total) by mouth nightly as needed.     No current facility-administered medications for this visit.     Facility-Administered Medications Ordered in Other Visits   Medication    triamcinolone acetonide injection 40 mg       ROS:  GENERAL: No fever. No chills. No fatigue. Denies weight loss. Denies weight gain.  HEENT: Denies headaches. Denies vision change. Denies eye pain. Denies double vision. Denies ear pain.   CV: Denies chest pain.   PULM: Denies of shortness of breath.  GI: Denies constipation. No diarrhea. No abdominal pain. Denies nausea. Denies vomiting. No blood in stool.  HEME: Denies bleeding problems.  : Denies urgency. No painful urination. No blood in urine.  MS: Denies joint stiffness. Denies joint swelling.  Denies back pain.  SKIN: Denies rash.   NEURO: Denies seizures. No weakness.  PSYCH:  Denies difficulty sleeping. No anxiety. Denies depression. No suicidal thoughts.       VITALS:   Vitals:    06/20/22 1310   BP: 135/74   Pulse: 69   Resp: 18   Temp: 97.7 °F (36.5 °C)   TempSrc: Oral   Weight: 89.8 kg (198 lb)   Height: 5' 5" (1.651 m)   PainSc:   3   PainLoc: Knee         PHYSICAL EXAM:   GENERAL: Well appearing, in no acute distress, alert and oriented x3.  PSYCH:  Mood and affect appropriate.  SKIN: Normal turgor and coloration.  HEENT:  Normocephalic, atraumatic. Cranial nerves grossly intact.  NECK: No pain to palpation over the cervical paraspinous muscles. No pain to palpation over facets. No pain with neck flexion, extension, or lateral flexion.   PULM: No evidence of respiratory difficulty, symmetric chest rise.  GI:  " Non-distended  BACK: Normal range of motion. No pain to palpation over the spinous processes. No pain to palpation over facet joints. There is no pain with palpation over the sacroiliac joints bilaterally. Straight leg raising in the supine position is negative to radicular pain.   EXTREMITIES: TTP over the inferior medial aspect of the L patellar. Swollen 3 x 3 inch area just below L patella.  MUSCULOSKELETAL: Shoulder, hip, and knee provocative maneuvers are negative. Bilateral upper and lower extremity strength is normal and symmetric, with some pain limitation with movement of L knee joint. No atrophy is noted.  NEURO: Sensation is equal and appropriate bilaterally with the exception of diminished sensation over the L knee cap. Bilateral upper and lower extremity coordination is normal. Plantar response are downgoing.   GAIT: Antalgic and slow.    IMAGING:      X- Ray knee ortho BL 5/17/22    Narrative & Impression  EXAMINATION:  XR KNEE ORTHO BILAT WITH FLEXION     CLINICAL HISTORY:  Presence of left artificial knee joint     TECHNIQUE:  AP standing of both knees, PA flexion standing views of both knees, and Merchant views of both knees were performed.  Lateral views of both knees were also performed.     COMPARISON:  No 07/27/2021 ne     FINDINGS:  Left knee arthroplasty identified.  The position and alignment is satisfactory and unchanged as compared to the previous study.     DJD involving the right knee with narrowing of the patellofemoral and the medial tibiofemoral joint space.  No fracture or bone destruction identified     Impression:     See above    ASSESSMENT: 71 y.o. year old female w/ pmh of renal cell carcinoma (kidney removed in 2013) s/p radiation in February 2022 due to METs which appeared in 2017with pain, consistent with chronic knee pain after total replacement of the L knee.    DISCUSSION: Ms. Avery has a history of fibromyalgia and RCC. She is currently being treated for lung metastasis.  She comes to us from Dr. Avery with continued pain after left TKA on 6/14/21. She also has some concern about a left pre-tibial swelling. It has been there for 11 years and may be a lipoma.     PLAN:  1. Schedule Left genicular nerve block and will proceed with RFA if appropriate  2. US soft tissue LLE pre tibial lipoma  3. RTC in 4 weeks post procedure    I would like to thank Osmar Avery III, * for the opportunity to assist in the care of this patient. We had a very nice visit and I look forward to continuing their care. Please let me know if I can be of further assistance.     Vijaya Landin  06/20/2022

## 2022-06-21 ENCOUNTER — TELEPHONE (OUTPATIENT)
Dept: ADMINISTRATIVE | Facility: OTHER | Age: 72
End: 2022-06-21
Payer: MEDICARE

## 2022-06-22 ENCOUNTER — PATIENT MESSAGE (OUTPATIENT)
Dept: PAIN MEDICINE | Facility: OTHER | Age: 72
End: 2022-06-22
Payer: MEDICARE

## 2022-06-28 ENCOUNTER — HOSPITAL ENCOUNTER (OUTPATIENT)
Dept: RADIOLOGY | Facility: HOSPITAL | Age: 72
Discharge: HOME | End: 2022-06-28
Attending: STUDENT IN AN ORGANIZED HEALTH CARE EDUCATION/TRAINING PROGRAM
Payer: MEDICARE

## 2022-06-28 DIAGNOSIS — G89.29 CHRONIC KNEE PAIN AFTER TOTAL REPLACEMENT OF LEFT KNEE JOINT: ICD-10-CM

## 2022-06-28 DIAGNOSIS — M25.562 CHRONIC KNEE PAIN AFTER TOTAL REPLACEMENT OF LEFT KNEE JOINT: ICD-10-CM

## 2022-06-28 DIAGNOSIS — Z96.652 CHRONIC KNEE PAIN AFTER TOTAL REPLACEMENT OF LEFT KNEE JOINT: ICD-10-CM

## 2022-06-28 PROCEDURE — 76882 US LMTD JT/FCL EVL NVASC XTR: CPT | Mod: 26,LT,, | Performed by: RADIOLOGY

## 2022-06-28 PROCEDURE — 76882 US LMTD JT/FCL EVL NVASC XTR: CPT | Mod: TC,LT

## 2022-06-28 PROCEDURE — 76882 US EXTREMITY NON VASCULAR LIMITED LEFT: ICD-10-PCS | Mod: 26,LT,, | Performed by: RADIOLOGY

## 2022-07-01 ENCOUNTER — PATIENT MESSAGE (OUTPATIENT)
Dept: PAIN MEDICINE | Facility: OTHER | Age: 72
End: 2022-07-01
Payer: MEDICARE

## 2022-07-05 ENCOUNTER — TELEPHONE (OUTPATIENT)
Dept: PAIN MEDICINE | Facility: CLINIC | Age: 72
End: 2022-07-05
Payer: MEDICARE

## 2022-07-05 NOTE — TELEPHONE ENCOUNTER
----- Message from Heaven Godwin sent at 7/1/2022  5:07 PM CDT -----  Type: Requesting to speak with nurse         Who Called: PT  Regarding: Pt wanting to cancel procedure and will call back to reschedule please advise   Would the patient rather a call back or a response via [facility]? Call back  Best Call Back Number: [phone]  Additional Information: n/a

## 2022-07-06 ENCOUNTER — OFFICE VISIT (OUTPATIENT)
Dept: OBSTETRICS AND GYNECOLOGY | Facility: CLINIC | Age: 72
End: 2022-07-06
Payer: MEDICARE

## 2022-07-06 ENCOUNTER — HOSPITAL ENCOUNTER (OUTPATIENT)
Dept: RADIOLOGY | Facility: HOSPITAL | Age: 72
Discharge: HOME | End: 2022-07-06
Attending: FAMILY MEDICINE
Payer: MEDICARE

## 2022-07-06 VITALS
BODY MASS INDEX: 33.38 KG/M2 | DIASTOLIC BLOOD PRESSURE: 76 MMHG | SYSTOLIC BLOOD PRESSURE: 134 MMHG | WEIGHT: 200.63 LBS

## 2022-07-06 VITALS — HEIGHT: 65 IN | BODY MASS INDEX: 33.38 KG/M2

## 2022-07-06 DIAGNOSIS — Z12.31 ENCOUNTER FOR SCREENING MAMMOGRAM FOR MALIGNANT NEOPLASM OF BREAST: ICD-10-CM

## 2022-07-06 DIAGNOSIS — R39.15 URGENCY OF URINATION: ICD-10-CM

## 2022-07-06 DIAGNOSIS — Z78.0 MENOPAUSE: Primary | ICD-10-CM

## 2022-07-06 DIAGNOSIS — N99.3 VAGINAL VAULT PROLAPSE AFTER HYSTERECTOMY: ICD-10-CM

## 2022-07-06 PROCEDURE — G0101 CA SCREEN;PELVIC/BREAST EXAM: HCPCS | Mod: PBBFAC | Performed by: OBSTETRICS & GYNECOLOGY

## 2022-07-06 PROCEDURE — 99999 PR PBB SHADOW E&M-EST. PATIENT-LVL III: CPT | Mod: PBBFAC,,, | Performed by: OBSTETRICS & GYNECOLOGY

## 2022-07-06 PROCEDURE — G0101 CA SCREEN;PELVIC/BREAST EXAM: HCPCS | Mod: S$PBB,,, | Performed by: OBSTETRICS & GYNECOLOGY

## 2022-07-06 PROCEDURE — G0101 PR CA SCREEN;PELVIC/BREAST EXAM: ICD-10-PCS | Mod: S$PBB,,, | Performed by: OBSTETRICS & GYNECOLOGY

## 2022-07-06 PROCEDURE — 77063 BREAST TOMOSYNTHESIS BI: CPT | Mod: TC

## 2022-07-06 PROCEDURE — 99999 PR PBB SHADOW E&M-EST. PATIENT-LVL III: ICD-10-PCS | Mod: PBBFAC,,, | Performed by: OBSTETRICS & GYNECOLOGY

## 2022-07-06 PROCEDURE — 77067 MAMMO DIGITAL SCREENING BILAT WITH TOMO: ICD-10-PCS | Mod: 26,,, | Performed by: RADIOLOGY

## 2022-07-06 PROCEDURE — 77067 SCR MAMMO BI INCL CAD: CPT | Mod: TC

## 2022-07-06 PROCEDURE — 99213 OFFICE O/P EST LOW 20 MIN: CPT | Mod: PBBFAC | Performed by: OBSTETRICS & GYNECOLOGY

## 2022-07-06 PROCEDURE — 77067 SCR MAMMO BI INCL CAD: CPT | Mod: 26,,, | Performed by: RADIOLOGY

## 2022-07-06 PROCEDURE — 77063 BREAST TOMOSYNTHESIS BI: CPT | Mod: 26,,, | Performed by: RADIOLOGY

## 2022-07-06 PROCEDURE — 77063 MAMMO DIGITAL SCREENING BILAT WITH TOMO: ICD-10-PCS | Mod: 26,,, | Performed by: RADIOLOGY

## 2022-07-06 RX ORDER — TOLTERODINE 2 MG/1
2 CAPSULE, EXTENDED RELEASE ORAL DAILY
Qty: 30 CAPSULE | Refills: 2 | Status: SHIPPED | OUTPATIENT
Start: 2022-07-06 | End: 2022-09-13

## 2022-07-07 NOTE — PROGRESS NOTES
CC: Well woman exam    Sherrill Avery is a 71 y.o. female  presents for a well woman exam. Concerned that her prolapse has worsened. +splints w/ voiding. Urge incontinence early in AM when she first gets up but otherwise no issues. Frequent constipation. Planning to undergo cancer tx in 8-9mo so would like to have surgery if needed prior to tx    Past Medical History:   Diagnosis Date    Anxiety     Family history of malignant melanoma 2014    Fibromyalgia affecting lower leg     GERD (gastroesophageal reflux disease)     Hypercholesteremia     Hypertension     Hypothyroidism     Inflamed seborrheic keratosis 2014    Microscopic hematuria 2017    Multiple benign melanocytic nevi 2014     Past Surgical History:   Procedure Laterality Date    AUGMENTATION OF BREAST      bladder lift      breast implants      BREAST SURGERY Bilateral     saline implants    COLONOSCOPY  2007    HYSTERECTOMY      ectopic pregnancy x 2, with LSO    KNEE ARTHROPLASTY Left 2021    Procedure: ARTHROPLASTY, KNEE:LEFT:DEPUY-SIGMA;  Surgeon: Osmar Avery III, MD;  Location: [facility];  Service: Orthopedics;  Laterality: Left;    LAPAROSCOPIC NEPHRECTOMY, HAND ASSISTED  2013    LUNG REMOVAL, PARTIAL Left 2020    At [facility]    NEPHRECTOMY      OOPHORECTOMY      x1    right ganglion cyst      throat polyp      TRANSOBTURATOR SLING      with RSO for persistent complex cyst & MARGOT; done by yris    UPPER GASTROINTESTINAL ENDOSCOPY  2007     Family History   Problem Relation Age of Onset    Diabetes Mother     Hypertension Mother     Heart disease Mother     Cancer Father         lung    Migraines Father     Glaucoma Paternal Grandmother     Glaucoma Sister     Thyroid disease Neg Hx     Asthma Neg Hx      Social History     Tobacco Use    Smoking status: Never Smoker    Smokeless tobacco: Never Used   Substance Use Topics    Alcohol use: Yes     Alcohol/week: 0.0  standard drinks     Comment: social    Drug use: No     OB History        7    Para   2    Term   2            AB   5    Living   2       SAB   5    IAB        Ectopic        Multiple        Live Births   2                 Current Outpatient Medications:     ALPRAZolam (XANAX) 0.5 MG tablet, Take daily as needed for anxiety., Disp: 30 tablet, Rfl: 5    butalbital-acetaminophen-caffeine -40 mg (FIORICET, ESGIC) -40 mg per tablet, TAKE 1 TABLET EVERY 4 HOURS AS NEEDED, Disp: 30 tablet, Rfl: 0    cetirizine (ZYRTEC) 10 MG tablet, Take 10 mg by mouth daily as needed., Disp: , Rfl:     diclofenac sodium (VOLTAREN) 1 % Gel, Apply 2 g topically daily as needed., Disp: 100 g, Rfl: 11    estradioL (ESTRACE) 1 MG tablet, TAKE 1 TABLET EVERY DAY (Patient taking differently: Hold 1 week prior to surgery), Disp: 90 tablet, Rfl: 3    fluocinonide (LIDEX) 0.05 % external solution, Apply topically 2 (two) times daily. Do not use morning of surgery, Disp: , Rfl:     fluticasone propionate (FLONASE) 50 mcg/actuation nasal spray, 2 sprays (100 mcg total) by Each Nostril route once daily., Disp: 3 mL, Rfl: 3    gabapentin (NEURONTIN) 300 MG capsule, Take 300 mg by mouth 3 (three) times daily., Disp: , Rfl:     ketoconazole (NIZORAL) 2 % shampoo, Do not use morning of surgery, Disp: , Rfl:     levothyroxine (SYNTHROID) 112 MCG tablet, Take 1 tablet (112 mcg total) by mouth before breakfast. Take as scheduled, Disp: 90 tablet, Rfl: 4    metronidazole 1% (METROGEL) 1 % Gel, Apply topically once daily., Disp: 60 g, Rfl: 5    omeprazole (PRILOSEC) 10 MG capsule, Take 10 mg by mouth., Disp: , Rfl:     ondansetron (ZOFRAN) 4 MG tablet, , Disp: , Rfl:     oxymetazoline (AFRIN) 0.05 % nasal spray, 2 sprays by Nasal route 2 (two) times daily., Disp: , Rfl:     pravastatin (PRAVACHOL) 20 MG tablet, Take 1 tablet (20 mg total) by mouth once daily., Disp: 90 tablet, Rfl: 3    senna-docusate 8.6-50 mg  (PERICOLACE) 8.6-50 mg per tablet, Take 1 tablet by mouth. Hold of surgery, Disp: , Rfl:     zolpidem (AMBIEN) 5 MG Tab, Take 1 tablet (5 mg total) by mouth nightly as needed., Disp: 90 tablet, Rfl: 0    clotrimazole (LOTRIMIN) 1 % Soln, Apply topically 2 (two) times daily. for 7 days, Disp: 15 mL, Rfl: 5    diflorasone-emollient (APEXICON E) 0.05 % Crea, Apply 1 application topically once daily. for 7 days, Disp: 30 g, Rfl: 5    hydrocortisone 2.5 % cream, Apply topically 2 (two) times daily. apply to affected area for 7 days, Disp: 20 g, Rfl: 5    methylPREDNISolone (MEDROL DOSEPACK) 4 mg tablet, use as directed (Patient not taking: No sig reported), Disp: 1 each, Rfl: 0    tolterodine (DETROL LA) 2 MG Cp24, Take 1 capsule (2 mg total) by mouth once daily., Disp: 30 capsule, Rfl: 2  No current facility-administered medications for this visit.    Facility-Administered Medications Ordered in Other Visits:     triamcinolone acetonide injection 40 mg, 40 mg, Intra-articular, , Osmar Avery III, MD, 40 mg at 05/17/22 1345    GYNECOLOGY HISTORY:  No abnormal pap/std    DATA REVIEWED:  Last pap: normal Date: 2015  Last mmg: normal Date: 2021; scheduled today  Colonoscopy: 2013 polyp; due 2023  DEXA 2019 normal    /76   Wt 91 kg (200 lb 9.9 oz)   BMI 33.38 kg/m²     ROS:  GENERAL: Denies weight gain or weight loss. Feeling well overall.   SKIN: Denies rash or lesions.   HEAD: Denies head injury or headache.   NODES: Denies enlarged lymph nodes.   CHEST: Denies chest pain or shortness of breath.   CARDIOVASCULAR: Denies palpitations or left sided chest pain.   ABDOMEN: No abdominal pain, constipation, diarrhea, nausea, vomiting or rectal bleeding.   URINARY: No frequency, dysuria, hematuria, or burning on urination.  REPRODUCTIVE: See HPI.   BREASTS: The patient denies pain, lumps, or nipple discharge.   HEMATOLOGIC: No easy bruisability or excessive bleeding.   MUSCULOSKELETAL: Denies joint pain or  swelling.   NEUROLOGIC: Denies syncope or weakness.   PSYCHIATRIC: Denies depression, anxiety or mood swings.    PE:   APPEARANCE: Well nourished, well developed, in no acute distress.  AFFECT: WNL, alert and oriented x 3.  SKIN: No acne or hirsutism.  NECK: Neck symmetric without masses or thyromegaly.  NODES: No inguinal, cervical, axillary or femoral lymph node enlargement.  CHEST: Good respiratory effort.   ABDOMEN: Soft. No tenderness or masses. No hepatosplenomegaly. No hernias.  BREASTS: deferred  PELVIC: Normal external female genitalia without lesions. Normal hair distribution. Adequate perineal body, normal urethral meatus. Vagina atrophic without lesions or discharge. Grade 2-3 cystocele & rectocele. Bimanual exam shows uterus and cervix to be surgically absent. Adnexa without masses or tenderness.  EXTREMITIES: No edema.    Menopause    Urgency of urination    Vaginal vault prolapse after hysterectomy    Other orders  -     tolterodine (DETROL LA) 2 MG Cp24; Take 1 capsule (2 mg total) by mouth once daily.  Dispense: 30 capsule; Refill: 2    Patient was counseled today on A.C.S. Pap guidelines (none needed) and recommendations for yearly pelvic exams, yearly mammograms starting age 40, and clinical breast exams; to see her PCP for other health maintenance. recommend prolapse surgery only if symptomatic

## 2022-07-27 ENCOUNTER — PATIENT MESSAGE (OUTPATIENT)
Dept: OBSTETRICS AND GYNECOLOGY | Facility: CLINIC | Age: 72
End: 2022-07-27
Payer: MEDICARE

## 2022-07-28 RX ORDER — ESTRADIOL 1 MG/1
1 TABLET ORAL DAILY
Qty: 90 TABLET | Refills: 3 | Status: SHIPPED | OUTPATIENT
Start: 2022-07-28 | End: 2023-06-09 | Stop reason: SDUPTHER

## 2022-08-02 RX ORDER — PRAVASTATIN SODIUM 20 MG/1
TABLET ORAL
Qty: 90 TABLET | Refills: 0 | Status: SHIPPED | OUTPATIENT
Start: 2022-08-02 | End: 2023-01-18

## 2022-08-03 NOTE — TELEPHONE ENCOUNTER
Refill Decision Note   Sherrill Avery  is requesting a refill authorization.  Brief Assessment and Rationale for Refill:  Approve     Medication Therapy Plan:       Medication Reconciliation Completed: No   Comments:     No Care Gaps recommended.     Note composed:8:21 PM 08/02/2022

## 2022-08-03 NOTE — TELEPHONE ENCOUNTER
No new care gaps identified.  [facility] Embedded Care Gaps. Reference number: [id number]. 8/02/2022   7:12:54 PM CDT

## 2022-08-04 ENCOUNTER — OFFICE VISIT (OUTPATIENT)
Dept: PODIATRY | Facility: CLINIC | Age: 72
End: 2022-08-04
Payer: MEDICARE

## 2022-08-04 DIAGNOSIS — Z96.652 HISTORY OF LEFT KNEE REPLACEMENT: ICD-10-CM

## 2022-08-04 DIAGNOSIS — C80.1 CLEAR CELL CARCINOMA: ICD-10-CM

## 2022-08-04 DIAGNOSIS — L60.3 ONYCHODYSTROPHY: Primary | ICD-10-CM

## 2022-08-04 PROCEDURE — 99999 PR PBB SHADOW E&M-EST. PATIENT-LVL III: CPT | Mod: PBBFAC,,, | Performed by: PODIATRIST

## 2022-08-04 PROCEDURE — 99999 PR PBB SHADOW E&M-EST. PATIENT-LVL III: ICD-10-PCS | Mod: PBBFAC,,, | Performed by: PODIATRIST

## 2022-08-04 PROCEDURE — 99213 OFFICE O/P EST LOW 20 MIN: CPT | Mod: 25,S$PBB,, | Performed by: PODIATRIST

## 2022-08-04 PROCEDURE — 11750 EXCISION NAIL&NAIL MATRIX: CPT | Mod: TA,PBBFAC | Performed by: PODIATRIST

## 2022-08-04 PROCEDURE — 11750 NAIL REMOVAL: ICD-10-PCS | Mod: TA,S$PBB,, | Performed by: PODIATRIST

## 2022-08-04 PROCEDURE — 99213 PR OFFICE/OUTPT VISIT, EST, LEVL III, 20-29 MIN: ICD-10-PCS | Mod: 25,S$PBB,, | Performed by: PODIATRIST

## 2022-08-04 PROCEDURE — 99213 OFFICE O/P EST LOW 20 MIN: CPT | Mod: PBBFAC | Performed by: PODIATRIST

## 2022-08-04 RX ORDER — CLINDAMYCIN HYDROCHLORIDE 300 MG/1
300 CAPSULE ORAL EVERY 8 HOURS
Qty: 9 CAPSULE | Refills: 0 | Status: SHIPPED | OUTPATIENT
Start: 2022-08-04 | End: 2022-08-07

## 2022-08-04 NOTE — PROGRESS NOTES
Subjective:       Patient ID: Sherrill Avery is a 71 y.o. female.    Chief Complaint: Nail Problem (Hallux nail piece causing discomfort and growing outwards./ )      HPI: Sherrill Avery presents to the office today for removal of a small fragment the nail which she has been having difficulties with.  Patient did have previous nail avulsion procedure performed but continues have a small spicule on the nail.  She is interested in removal of the left hallux medial nail spicule which is causing her difficulties.  She continues to undergo treatment for small cell cancer with radiation.  Doing quite well.  Patient's Primary Care Provider is Alexandre Macias MD.     Review of patient's allergies indicates:   Allergen Reactions    Talwin compound Anxiety     hallucinations/anxiety    Tramadol Itching    Buspirone Anxiety    Codeine Itching    Morphine Itching     jittery    Morpholine analogues Anxiety and Itching    Naproxen Itching     Takes Ibuprofen is ok on rare occasion     Nexium [esomeprazole magnesium] Other (See Comments)     constipation    Wal-phed [pseudoephedrine hcl] Itching       Past Medical History:   Diagnosis Date    Anxiety     Family history of malignant melanoma 8/25/2014    Fibromyalgia affecting lower leg     GERD (gastroesophageal reflux disease)     Hypercholesteremia     Hypertension     Hypothyroidism     Inflamed seborrheic keratosis 8/25/2014    Microscopic hematuria 2/2/2017    Multiple benign melanocytic nevi 8/25/2014       Family History   Problem Relation Age of Onset    Diabetes Mother     Hypertension Mother     Heart disease Mother     Cancer Father         lung    Migraines Father     Glaucoma Paternal Grandmother     Glaucoma Sister     Thyroid disease Neg Hx     Asthma Neg Hx        Social History     Socioeconomic History    Marital status:      Spouse name: KAIDEN    Number of children: 5   Occupational History    Occupation: retired      Comment: Dance Instructor   Tobacco Use    Smoking status: Never Smoker    Smokeless tobacco: Never Used   Substance and Sexual Activity    Alcohol use: Yes     Alcohol/week: 0.0 standard drinks     Comment: social    Drug use: No    Sexual activity: Yes     Partners: Male     Birth control/protection: Surgical   Social History Narrative    Patient is a retired dance instructor and live with . Has stairs at home 12- has an elevator       Past Surgical History:   Procedure Laterality Date    AUGMENTATION OF BREAST      bladder lift      breast implants      BREAST SURGERY Bilateral 1996    saline implants    COLONOSCOPY  2007    HYSTERECTOMY      ectopic pregnancy x 2, with LSO    KNEE ARTHROPLASTY Left 6/14/2021    Procedure: ARTHROPLASTY, KNEE:LEFT:DEPUY-SIGMA;  Surgeon: Osmar Avery III, MD;  Location: [facility];  Service: Orthopedics;  Laterality: Left;    LAPAROSCOPIC NEPHRECTOMY, HAND ASSISTED  07/25/2013    LUNG REMOVAL, PARTIAL Left 2020    [facility]    NEPHRECTOMY      OOPHORECTOMY      x1    right ganglion cyst      throat polyp      TRANSOBTURATOR SLING  2011    with RSO for persistent complex cyst & MARGOT; done by [facility]    UPPER GASTROINTESTINAL ENDOSCOPY  2007       Review of Systems      Objective:   There were no vitals taken for this visit.    Physical Exam  LOWER EXTREMITY PHYSICAL EXAMINATION    NEUROLOGY: Sensation to light touch is intact. Proprioception is intact.     VASCULAR:  The right dorsalis pedis pulse 2/4 and the right posterior tibial pulse 2/4.  The left dorsalis pedis pulse 2/4 and posterior tibial pulse on the left is 2/4.  Capillary refill is intact.  Pedal hair growth intact    DERMATOLOGY: Ingrowing nail spicule of the left foot medial nail border of the great toe.  Nail continues to be growing from the proximal medial nail attachment.  No additional nail is noted.  No signs of acute infection.  No granuloma formation.    ORTHOPEDIC: Manual Muscle  Testing is 5/5 in all planes on the left, without pains, with and without resistance. Gait pattern is slightly antalgic.    Assessment:     1. Onychodystrophy    2. Clear cell carcinoma with lung metastasis    3. History of left knee replacement        Plan:     Onychodystrophy  -     Nail Removal    Clear cell carcinoma with lung metastasis    History of left knee replacement    Other orders  -     clindamycin (CLEOCIN) 300 MG capsule; Take 1 capsule (300 mg total) by mouth every 8 (eight) hours. for 3 days  Dispense: 9 capsule; Refill: 0        We discussed patient's options for treating the ingrown toenail.  We discussed slant back procedure to remove the distal offending edge, we discussed temporary partial avulsion, temporary complete avulsion, and attempted permanent matricectomy.  Patient would like to proceed with attempted permanent matricectomy  Discussed the risk and benefits of the procedure.  Discussed risk of recurrence.  Patient did give written consent to proceed with procedure.    Patient tolerated procedure well without complications.  Large spicule was removed without complications.  History patient will start soaking in warm water and Epson salt twice daily.  Apply antibiotic cream and a Band-Aid to the affected borders following soaking and showering.  Patient will call if there is any acute signs of infection associated with increased redness, swelling, abnormal drainage, increased pain.    Will send patient 3 days of oral clindamycin due to her history of recent total knee arthroplasty to decrease risk periprosthetic infections.    She will follow-up in clinic at her own convenience to discuss possible cystic formation to the dorsal aspect of the right foot        Future Appointments   Date Time Provider Department Center   8/11/2022  3:00 PM Mary Garibay DPM [facility]   9/14/2022  3:00 PM Flor Rdz MD [facility]   10/12/2022  7:35 AM LABORATORY, [facility]  [place]   10/31/2022 10:00 AM Alexandre Macias MD [facility] [place]

## 2022-08-04 NOTE — PROCEDURES
Nail Removal    Date/Time: 8/4/2022 12:30 PM  Performed by: Mary Garibay DPM  Authorized by: Mary Garibay DPM     Consent Done?:  Yes (Written)  Location:     Location:  Left foot    Location detail:  Left big toe  Anesthesia:     Anesthesia:  Digital block    Local anesthetic:  Lidocaine 1% without epinephrine    Anesthetic total (ml):  3  Procedure Details:     Preparation:  Skin prepped with alcohol and skin prepped with Betadine    Side:  Medial    Wedge excision of skin of nail fold: No      Nail bed sutured?: No      Nail matrix removed:  Partial    Dressing applied:  4x4, antibiotic ointment and dressing applied    Patient tolerance:  Patient tolerated the procedure well with no immediate complications

## 2022-08-10 ENCOUNTER — PATIENT MESSAGE (OUTPATIENT)
Dept: PAIN MEDICINE | Facility: OTHER | Age: 72
End: 2022-08-10
Payer: MEDICARE

## 2022-08-10 ENCOUNTER — PATIENT MESSAGE (OUTPATIENT)
Dept: PAIN MEDICINE | Facility: CLINIC | Age: 72
End: 2022-08-10
Payer: MEDICARE

## 2022-08-11 ENCOUNTER — PATIENT MESSAGE (OUTPATIENT)
Dept: PAIN MEDICINE | Facility: OTHER | Age: 72
End: 2022-08-11
Payer: MEDICARE

## 2022-08-11 ENCOUNTER — OFFICE VISIT (OUTPATIENT)
Dept: PODIATRY | Facility: CLINIC | Age: 72
End: 2022-08-11
Payer: MEDICARE

## 2022-08-11 VITALS — HEIGHT: 65 IN | WEIGHT: 200.63 LBS | BODY MASS INDEX: 33.43 KG/M2

## 2022-08-11 DIAGNOSIS — M67.479 GANGLION CYST OF FOOT: Primary | ICD-10-CM

## 2022-08-11 DIAGNOSIS — M19.071 PRIMARY OSTEOARTHRITIS OF RIGHT FOOT: ICD-10-CM

## 2022-08-11 PROCEDURE — 20612 GANGLION CYST: ICD-10-PCS | Mod: 79,S$PBB,RT, | Performed by: PODIATRIST

## 2022-08-11 PROCEDURE — 20612 ASPIRATE/INJ GANGLION CYST: CPT | Mod: PBBFAC | Performed by: PODIATRIST

## 2022-08-11 PROCEDURE — 99999 PR PBB SHADOW E&M-EST. PATIENT-LVL IV: CPT | Mod: PBBFAC,,, | Performed by: PODIATRIST

## 2022-08-11 PROCEDURE — 99213 OFFICE O/P EST LOW 20 MIN: CPT | Mod: 24,25,S$PBB, | Performed by: PODIATRIST

## 2022-08-11 PROCEDURE — 99999 PR PBB SHADOW E&M-EST. PATIENT-LVL IV: ICD-10-PCS | Mod: PBBFAC,,, | Performed by: PODIATRIST

## 2022-08-11 PROCEDURE — 99213 PR OFFICE/OUTPT VISIT, EST, LEVL III, 20-29 MIN: ICD-10-PCS | Mod: 24,25,S$PBB, | Performed by: PODIATRIST

## 2022-08-11 PROCEDURE — 99214 OFFICE O/P EST MOD 30 MIN: CPT | Mod: PBBFAC,25 | Performed by: PODIATRIST

## 2022-08-12 NOTE — PROCEDURES
Ganglion Cyst    Date/Time: 8/11/2022 3:00 PM  Performed by: Mary Garibay DPM  Authorized by: Mary Garibay DPM     Consent Done?:  Yes (Verbal)  Indications:  Pain and diagnostic evaluation  Site marked: the procedure site was marked    Timeout: prior to procedure the correct patient, procedure, and site was verified    Prep: patient was prepped and draped in usual sterile fashion      Local anesthesia used?: Yes    Anesthesia:  Local infiltration  Local anesthetic:  Bupivacaine 0.5% without epinephrine  Anesthetic total (ml):  1    Location:  Foot  Ultrasonic Guidance for Needle Placement?: No    Needle size:  18 G  Approach:  Dorsal  Aspirate:  Clear  Patient tolerance:  Patient tolerated the procedure well with no immediate complications  Site:  Right foot

## 2022-08-12 NOTE — PROGRESS NOTES
Subjective:       Patient ID: Sherrill Avery is a 71 y.o. female.    Chief Complaint: Foot Problem (Coming in to drain cyst on right foot, rates pain 0/10, nondiabetic,wearing socks and shoes, last seen PCP Dr. Macias on 04/21/2022.)    HPI: Sherrill Avery presents to the clinic today, for evaluation concerning a mass at the dorsal aspect of the right foot.  States pain is worsened when wearing in closed shoe gear.  States pain is minimal at times but does increase intermittently.  She does present to clinic today with her  present.  States that she is unable to use steroid medications the area as she does have an upcoming nerve block/procedure which limits her steroid use. Alexandre Macias MD is the patient's primary car provider.    Review of patient's allergies indicates:   Allergen Reactions    Talwin compound Anxiety     hallucinations/anxiety    Tramadol Itching    Buspirone Anxiety    Codeine Itching    Morphine Itching     jittery    Morpholine analogues Anxiety and Itching    Naproxen Itching     Takes Ibuprofen is ok on rare occasion     Nexium [esomeprazole magnesium] Other (See Comments)     constipation    Wal-phed [pseudoephedrine hcl] Itching       Past Medical History:   Diagnosis Date    Anxiety     Family history of malignant melanoma 8/25/2014    Fibromyalgia affecting lower leg     GERD (gastroesophageal reflux disease)     Hypercholesteremia     Hypertension     Hypothyroidism     Inflamed seborrheic keratosis 8/25/2014    Microscopic hematuria 2/2/2017    Multiple benign melanocytic nevi 8/25/2014       Family History   Problem Relation Age of Onset    Diabetes Mother     Hypertension Mother     Heart disease Mother     Cancer Father         lung    Migraines Father     Glaucoma Paternal Grandmother     Glaucoma Sister     Thyroid disease Neg Hx     Asthma Neg Hx        Social History     Socioeconomic History    Marital status:      Spouse name:  "KAIDEN    Number of children: 5   Occupational History    Occupation: retired     Comment: Dance Instructor   Tobacco Use    Smoking status: Never Smoker    Smokeless tobacco: Never Used   Substance and Sexual Activity    Alcohol use: Yes     Alcohol/week: 0.0 standard drinks     Comment: social    Drug use: No    Sexual activity: Yes     Partners: Male     Birth control/protection: Surgical   Social History Narrative    Patient is a retired dance instructor and live with . Has stairs at home 12- has an elevator       Past Surgical History:   Procedure Laterality Date    AUGMENTATION OF BREAST      bladder lift      breast implants      BREAST SURGERY Bilateral 1996    saline implants    COLONOSCOPY  2007    HYSTERECTOMY      ectopic pregnancy x 2, with LSO    KNEE ARTHROPLASTY Left 6/14/2021    Procedure: ARTHROPLASTY, KNEE:LEFT:DEPUY-SIGMA;  Surgeon: Osmar Avery III, MD;  Location: [facility];  Service: Orthopedics;  Laterality: Left;    LAPAROSCOPIC NEPHRECTOMY, HAND ASSISTED  07/25/2013    LUNG REMOVAL, PARTIAL Left 2020    [facility]    NEPHRECTOMY      OOPHORECTOMY      x1    right ganglion cyst      throat polyp      TRANSOBTURATOR SLING  2011    with RSO for persistent complex cyst & MARGOT; done by [facility]    UPPER GASTROINTESTINAL ENDOSCOPY  2007       Review of Systems       Objective:   Ht 5' 5" (1.651 m)   Wt 91 kg (200 lb 9.9 oz)   BMI 33.38 kg/m²     Mammo Digital Screening Bilat w/ Oh  Narrative: Result:  Mammo Digital Screening Bilat w/ Oh    History:  Patient is 71 y.o. and is seen for a screening mammogram.    Films Compared:  Compared to: 03/25/2021 Mammo Digital Screening Bilat w/ Oh and   05/02/2019 Mammo Digital Screening Bilat w/ Oh     Findings:   This procedure was performed using tomosynthesis.   Computer-aided detection was utilized in the interpretation of this   examination.    The breasts have scattered areas of fibroglandular density. There is " no   evidence of suspicious masses, microcalcifications or architectural   distortion.  Impression:    No mammographic evidence of malignancy.    BI-RADS Category 1: Negative    Recommendation:  Routine screening mammogram in 1 year is recommended.    Your estimated lifetime risk of breast cancer (to age 85) based on   Tyrer-Cuzick risk assessment model is 2.74 %.  According to the American   Cancer Society, patients with a lifetime breast cancer risk of 20% or   higher might benefit from supplemental screening tests. ??        Physical Exam    LOWER EXTREMITY PHYSICAL EXAMINATION    ORTHOPEDIC: Manual Muscle Testing is 5/5 in all planes on the right foot without pain or complications.  There is a small circular cystic formation present to the dorsal aspect of the foot around the 2nd/3rd metatarsal base.  There is noted to have deep osseous changes on palpation as well.      DERMATOLOGY:  Soft tissue mass, dorsal aspect of the right 2nd tarsometatarsal joint  The lesion has a side-to-side dimension of 4 mm by 4 mm, and is raised off the skin by approximately 0.2.  The lesion is firm, but slightly fluctuant.  The lesion is nonmobile.  There is moderate discomfort to palpation.  The lesion appears to be circular/spherical in nature.  Upon compression of the area, no drainage is noted. No periwound erythema or cellulitis is noted. No appreciable periwound calor is noted.    Assessment:     1. Ganglion cyst of foot    2. Primary osteoarthritis of right foot        Plan:     Ganglion cyst of foot    Primary osteoarthritis of right foot        Thorough discussion is had with the patient today, concerning the diagnosis, its etiology, and the treatment algorithm at present.    We discussed performing aspiration of the ganglion cyst in hopes that this will prevent recurrence.  We did discuss the risk of recurrence which has been documented in literature.  Unfortunately, to decrease risk of recurrence, patient will need the  use of a steroid injection but given her upcoming procedure, we will defer that method.    Patient does wish to proceed with aspiration lesion.  The area was copiously cleaned with alcohol and Betadine surrounding ganglion cyst.  Area was locally injected with 3 cc of 1% lidocaine plain.  18 gauge needle was introduced to the cyst and aspiration was performed.  Some thickened cystic fluid was removed.  Patient tolerated this well and a Band-Aid was applied.    Upon completing this portion of the procedure, the area was once again palpated and noted to have bony exostosis present to the 2nd tarsometatarsal joint consistent with osteoarthritis.  Did discussed the importance of appropriate shoe gear modifications to loosen up the laces and prevent increased pressure causing irritation and pain to the nerve.      Future Appointments   Date Time Provider Department Center   9/14/2022  3:00 PM Flor Rdz MD [facility]  [facility]   10/12/2022  7:35 AM LABORATORY, [facility]   10/31/2022 10:00 AM Alexandre Macias MD [facility]

## 2022-08-19 ENCOUNTER — HOSPITAL ENCOUNTER (OUTPATIENT)
Facility: OTHER | Age: 72
Discharge: HOME | End: 2022-08-19
Attending: ANESTHESIOLOGY | Admitting: ANESTHESIOLOGY
Payer: MEDICARE

## 2022-08-19 VITALS
HEART RATE: 64 BPM | SYSTOLIC BLOOD PRESSURE: 136 MMHG | OXYGEN SATURATION: 99 % | HEIGHT: 65 IN | DIASTOLIC BLOOD PRESSURE: 63 MMHG | WEIGHT: 196 LBS | TEMPERATURE: 98 F | BODY MASS INDEX: 32.65 KG/M2 | RESPIRATION RATE: 18 BRPM

## 2022-08-19 DIAGNOSIS — G89.29 CHRONIC PAIN: ICD-10-CM

## 2022-08-19 DIAGNOSIS — M25.562 CHRONIC KNEE PAIN AFTER TOTAL REPLACEMENT OF LEFT KNEE JOINT: Primary | ICD-10-CM

## 2022-08-19 DIAGNOSIS — Z96.652 CHRONIC KNEE PAIN AFTER TOTAL REPLACEMENT OF LEFT KNEE JOINT: Primary | ICD-10-CM

## 2022-08-19 DIAGNOSIS — G89.29 CHRONIC KNEE PAIN AFTER TOTAL REPLACEMENT OF LEFT KNEE JOINT: Primary | ICD-10-CM

## 2022-08-19 DIAGNOSIS — M17.12 PRIMARY OSTEOARTHRITIS OF LEFT KNEE: ICD-10-CM

## 2022-08-19 PROCEDURE — 64454 NJX AA&/STRD GNCLR NRV BRNCH: CPT | Mod: KX,LT | Performed by: ANESTHESIOLOGY

## 2022-08-19 PROCEDURE — 64454 NJX AA&/STRD GNCLR NRV BRNCH: CPT | Mod: KX,LT,, | Performed by: ANESTHESIOLOGY

## 2022-08-19 PROCEDURE — 25000003 PHARM REV CODE 250: Performed by: STUDENT IN AN ORGANIZED HEALTH CARE EDUCATION/TRAINING PROGRAM

## 2022-08-19 PROCEDURE — 25000003 PHARM REV CODE 250: Performed by: ANESTHESIOLOGY

## 2022-08-19 PROCEDURE — 64454 PR NERVE BLOCK INJ, ANES/STEROID, GENICULAR NERVE, W/IMG: ICD-10-PCS | Mod: KX,LT,, | Performed by: ANESTHESIOLOGY

## 2022-08-19 PROCEDURE — 63600175 PHARM REV CODE 636 W HCPCS: Performed by: ANESTHESIOLOGY

## 2022-08-19 RX ORDER — BUPIVACAINE HYDROCHLORIDE 2.5 MG/ML
INJECTION, SOLUTION EPIDURAL; INFILTRATION; INTRACAUDAL
Status: DISCONTINUED | OUTPATIENT
Start: 2022-08-19 | End: 2022-08-19 | Stop reason: HOSPADM

## 2022-08-19 RX ORDER — LIDOCAINE HYDROCHLORIDE 20 MG/ML
INJECTION, SOLUTION INFILTRATION; PERINEURAL
Status: DISCONTINUED | OUTPATIENT
Start: 2022-08-19 | End: 2022-08-19 | Stop reason: HOSPADM

## 2022-08-19 RX ORDER — MIDAZOLAM HYDROCHLORIDE 1 MG/ML
INJECTION INTRAMUSCULAR; INTRAVENOUS
Status: DISCONTINUED | OUTPATIENT
Start: 2022-08-19 | End: 2022-08-19 | Stop reason: HOSPADM

## 2022-08-19 RX ORDER — SODIUM CHLORIDE 9 MG/ML
500 INJECTION, SOLUTION INTRAVENOUS CONTINUOUS
Status: DISCONTINUED | OUTPATIENT
Start: 2022-08-19 | End: 2022-08-19 | Stop reason: HOSPADM

## 2022-08-19 RX ORDER — SODIUM CHLORIDE 9 MG/ML
500 INJECTION, SOLUTION INTRAVENOUS CONTINUOUS
Status: ACTIVE | OUTPATIENT
Start: 2022-08-19 | End: 2022-08-20

## 2022-08-19 NOTE — H&P (VIEW-ONLY)
HPI  Patient presenting for Procedure(s) (LRB):  BLOCK, NERVE, GENICULAR, LEFT (Left)     Patient on Anti-coagulation No    No health changes since previous encounter    Past Medical History:   Diagnosis Date    Anxiety     Family history of malignant melanoma 8/25/2014    Fibromyalgia affecting lower leg     GERD (gastroesophageal reflux disease)     Hypercholesteremia     Hypertension     Hypothyroidism     Inflamed seborrheic keratosis 8/25/2014    Microscopic hematuria 2/2/2017    Multiple benign melanocytic nevi 8/25/2014     Past Surgical History:   Procedure Laterality Date    AUGMENTATION OF BREAST      bladder lift      breast implants      BREAST SURGERY Bilateral 1996    saline implants    COLONOSCOPY  2007    HYSTERECTOMY      ectopic pregnancy x 2, with LSO    KNEE ARTHROPLASTY Left 6/14/2021    Procedure: ARTHROPLASTY, KNEE:LEFT:DEPUY-SIGMA;  Surgeon: Osmar Avery III, MD;  Location: [facility];  Service: Orthopedics;  Laterality: Left;    LAPAROSCOPIC NEPHRECTOMY, HAND ASSISTED  07/25/2013    LUNG REMOVAL, PARTIAL Left 2020    At [facility]    NEPHRECTOMY      OOPHORECTOMY      x1    right ganglion cyst      throat polyp      TRANSOBTURATOR SLING  2011    with RSO for persistent complex cyst & MARGOT; done by [facility]    UPPER GASTROINTESTINAL ENDOSCOPY  2007     Review of patient's allergies indicates:   Allergen Reactions    Talwin compound Anxiety     hallucinations/anxiety    Tramadol Itching    Buspirone Anxiety    Codeine Itching    Morphine Itching     jittery    Morpholine analogues Anxiety and Itching    Naproxen Itching     Takes Ibuprofen is ok on rare occasion     Nexium [esomeprazole magnesium] Other (See Comments)     constipation    Wal-phed [pseudoephedrine hcl] Itching      No current facility-administered medications for this encounter.     Facility-Administered Medications Ordered in Other Encounters   Medication    triamcinolone acetonide injection 40  mg       PMHx, PSHx, Allergies, Medications reviewed in epic    ROS negative except pain complaints in HPI    OBJECTIVE:    There were no vitals taken for this visit.    PHYSICAL EXAMINATION:    GENERAL: Well appearing, in no acute distress, alert and oriented x3.  PSYCH:  Mood and affect appropriate.  SKIN: Skin color, texture, turgor normal, no rashes or lesions which will impact the procedure.  CV: RRR with palpation of the radial artery.  PULM: No evidence of respiratory difficulty, symmetric chest rise. Clear to auscultation.  NEURO: Cranial nerves grossly intact.    Plan:    Proceed with procedure as planned Procedure(s) (LRB):  BLOCK, NERVE, GENICULAR, LEFT (Left)    Santana Kim  08/19/2022

## 2022-08-19 NOTE — H&P
HPI  Patient presenting for Procedure(s) (LRB):  BLOCK, NERVE, GENICULAR, LEFT (Left)     Patient on Anti-coagulation No    No health changes since previous encounter    Past Medical History:   Diagnosis Date    Anxiety     Family history of malignant melanoma 8/25/2014    Fibromyalgia affecting lower leg     GERD (gastroesophageal reflux disease)     Hypercholesteremia     Hypertension     Hypothyroidism     Inflamed seborrheic keratosis 8/25/2014    Microscopic hematuria 2/2/2017    Multiple benign melanocytic nevi 8/25/2014     Past Surgical History:   Procedure Laterality Date    AUGMENTATION OF BREAST      bladder lift      breast implants      BREAST SURGERY Bilateral 1996    saline implants    COLONOSCOPY  2007    HYSTERECTOMY      ectopic pregnancy x 2, with LSO    KNEE ARTHROPLASTY Left 6/14/2021    Procedure: ARTHROPLASTY, KNEE:LEFT:DEPUY-SIGMA;  Surgeon: Osmar Avery III, MD;  Location: [facility];  Service: Orthopedics;  Laterality: Left;    LAPAROSCOPIC NEPHRECTOMY, HAND ASSISTED  07/25/2013    LUNG REMOVAL, PARTIAL Left 2020    [facility]    NEPHRECTOMY      OOPHORECTOMY      x1    right ganglion cyst      throat polyp      TRANSOBTURATOR SLING  2011    with RSO for persistent complex cyst & MARGOT; done by [facility]    UPPER GASTROINTESTINAL ENDOSCOPY  2007     Review of patient's allergies indicates:   Allergen Reactions    Talwin compound Anxiety     hallucinations/anxiety    Tramadol Itching    Buspirone Anxiety    Codeine Itching    Morphine Itching     jittery    Morpholine analogues Anxiety and Itching    Naproxen Itching     Takes Ibuprofen is ok on rare occasion     Nexium [esomeprazole magnesium] Other (See Comments)     constipation    Wal-phed [pseudoephedrine hcl] Itching      No current facility-administered medications for this encounter.     Facility-Administered Medications Ordered in Other Encounters   Medication    triamcinolone acetonide injection 40  mg       PMHx, PSHx, Allergies, Medications reviewed in epic    ROS negative except pain complaints in HPI    OBJECTIVE:    There were no vitals taken for this visit.    PHYSICAL EXAMINATION:    GENERAL: Well appearing, in no acute distress, alert and oriented x3.  PSYCH:  Mood and affect appropriate.  SKIN: Skin color, texture, turgor normal, no rashes or lesions which will impact the procedure.  CV: RRR with palpation of the radial artery.  PULM: No evidence of respiratory difficulty, symmetric chest rise. Clear to auscultation.  NEURO: Cranial nerves grossly intact.    Plan:    Proceed with procedure as planned Procedure(s) (LRB):  BLOCK, NERVE, GENICULAR, LEFT (Left)    Santana Kim  08/19/2022

## 2022-08-19 NOTE — DISCHARGE INSTRUCTIONS

## 2022-08-19 NOTE — DISCHARGE SUMMARY
[facility] ([facility])  Discharge Note  Short Stay    Procedure(s) (LRB):  BLOCK, NERVE, GENICULAR, LEFT (Left)    OUTCOME: Patient tolerated treatment/procedure well without complication and is now ready for discharge.    DISPOSITION: Home or Self Care    FINAL DIAGNOSIS:  Knee Osteoarthritis    FOLLOWUP: In clinic    DISCHARGE INSTRUCTIONS:  No discharge procedures on file.     TIME SPENT ON DISCHARGE: 2 minutes

## 2022-08-19 NOTE — OP NOTE
Diagnostic Genicular Nerve Block under Fluoroscopic Guidance    The procedure, risks, benefits, and options were discussed with the patient. There are no contraindications to the procedure. The patent expressed understanding and agreed to the procedure. Informed written consent was obtained prior to the start of the procedure and can be found in the patient's chart.    PATIENT NAME: Sherrill Avery   MRN: [id number]     DATE OF PROCEDURE: 08/19/2022    PROCEDURE: Diagnostic Left Genicular Nerve Block under Fluoroscopic Guidance    PRE-OP DIAGNOSIS: Chronic knee pain after total replacement of left knee joint [M25.562, G89.29, Z96.652]    POST-OP DIAGNOSIS: Same    PHYSICIAN: Vijaya Landin MD    ASSISTANTS: Dr. Dread Ricks     MEDICATIONS INJECTED: Bupivacine 0.25%     LOCAL ANESTHETIC INJECTED: Xylocaine 2%     SEDATION: Versed 1mg    ESTIMATED BLOOD LOSS: None    COMPLICATIONS: None    INTERVAL HISTORY: Patient has clinical findings of chronic knee pain that is not relieved by other therapies.     TECHNIQUE: Time-out was performed to identify the patient and procedure to be performed. With the patient laying in a supine position, the surgical area was prepped and draped in the usual sterile fashion using ChloraPrep and a fenestrated drape. Three target sites including the superior lateral genicular nerve where the lateral femoral shaft meets the epicondyle, the superior medial genicular nerve where the medial femoral shaft meets the epicondyle, and the inferior medial genicular nerve where the medial tibial shaft meets the epicondyle, were determined under fluoroscopy guidance. Skin anesthesia was achieved by injecting Lidocaine 2% over the injection sites. A 25 gauge, 3.5 inch spinal quinke needle was then advanced under fluoroscopy in the AP and lateral views into the positions of the geniculate nerves at each site. Once the needle tip position was confirmed on fluoroscopy, negative pressure was applied  to confirm no intravascular placement. 1 mL of the anesthetic listed above was then slowly injected at each site. The needles were removed and bleeding was nil. A sterile dressing was applied. No specimens collected. The patient tolerated the procedure well.       The patient was monitored after the procedure in the recovery area. They were given post-procedure and discharge instructions to follow at home. The patient was discharged in a stable condition.        Vijaya Landin MD

## 2022-08-22 ENCOUNTER — PATIENT MESSAGE (OUTPATIENT)
Dept: PAIN MEDICINE | Facility: CLINIC | Age: 72
End: 2022-08-22
Payer: MEDICARE

## 2022-08-22 DIAGNOSIS — Z96.652 CHRONIC KNEE PAIN AFTER TOTAL REPLACEMENT OF LEFT KNEE JOINT: Primary | ICD-10-CM

## 2022-08-22 DIAGNOSIS — G89.29 CHRONIC KNEE PAIN AFTER TOTAL REPLACEMENT OF LEFT KNEE JOINT: Primary | ICD-10-CM

## 2022-08-22 DIAGNOSIS — M25.562 CHRONIC KNEE PAIN AFTER TOTAL REPLACEMENT OF LEFT KNEE JOINT: Primary | ICD-10-CM

## 2022-08-23 ENCOUNTER — TELEPHONE (OUTPATIENT)
Dept: PAIN MEDICINE | Facility: CLINIC | Age: 72
End: 2022-08-23
Payer: MEDICARE

## 2022-08-23 ENCOUNTER — TELEPHONE (OUTPATIENT)
Dept: ADMINISTRATIVE | Facility: OTHER | Age: 72
End: 2022-08-23
Payer: MEDICARE

## 2022-08-23 NOTE — TELEPHONE ENCOUNTER
Staff called pt to inform her that her message was forward over to the provider and is now pending review and advise from provider at this time. Pt verbalized understanding and confirmed sg      ----- Message from Melonie Tatum sent at 8/23/2022  9:33 AM CDT -----   Name of Who is Calling:     What is the request in detail:  patient request call back in reference to  procedure scheduling  concerns  Please contact to further discuss and advise      Can the clinic reply by [facility]:     What Number to Call Back if not in [facility]:  [phone]

## 2022-08-25 ENCOUNTER — PATIENT MESSAGE (OUTPATIENT)
Dept: PAIN MEDICINE | Facility: OTHER | Age: 72
End: 2022-08-25
Payer: MEDICARE

## 2022-08-30 ENCOUNTER — HOSPITAL ENCOUNTER (OUTPATIENT)
Facility: OTHER | Age: 72
Discharge: HOME | End: 2022-08-30
Attending: ANESTHESIOLOGY | Admitting: ANESTHESIOLOGY
Payer: MEDICARE

## 2022-08-30 VITALS
RESPIRATION RATE: 16 BRPM | TEMPERATURE: 98 F | DIASTOLIC BLOOD PRESSURE: 68 MMHG | HEIGHT: 65 IN | BODY MASS INDEX: 32.49 KG/M2 | HEART RATE: 67 BPM | WEIGHT: 195 LBS | SYSTOLIC BLOOD PRESSURE: 144 MMHG | OXYGEN SATURATION: 99 %

## 2022-08-30 DIAGNOSIS — M17.12 PRIMARY OSTEOARTHRITIS OF LEFT KNEE: Primary | ICD-10-CM

## 2022-08-30 DIAGNOSIS — G89.29 CHRONIC PAIN: ICD-10-CM

## 2022-08-30 PROCEDURE — 25000003 PHARM REV CODE 250: Performed by: STUDENT IN AN ORGANIZED HEALTH CARE EDUCATION/TRAINING PROGRAM

## 2022-08-30 PROCEDURE — 64624 PR DESTRUCT, NEUROLYTIC AGENT, GENICULAR NERVE, W/IMG: ICD-10-PCS | Mod: LT,,, | Performed by: ANESTHESIOLOGY

## 2022-08-30 PROCEDURE — 64624 DSTRJ NULYT AGT GNCLR NRV: CPT | Mod: LT,,, | Performed by: ANESTHESIOLOGY

## 2022-08-30 PROCEDURE — 64624 DSTRJ NULYT AGT GNCLR NRV: CPT | Mod: LT | Performed by: ANESTHESIOLOGY

## 2022-08-30 PROCEDURE — 63600175 PHARM REV CODE 636 W HCPCS: Performed by: ANESTHESIOLOGY

## 2022-08-30 PROCEDURE — 25000003 PHARM REV CODE 250: Performed by: ANESTHESIOLOGY

## 2022-08-30 PROCEDURE — A4649 SURGICAL SUPPLIES: HCPCS | Performed by: ANESTHESIOLOGY

## 2022-08-30 RX ORDER — TRIAMCINOLONE ACETONIDE 40 MG/ML
INJECTION, SUSPENSION INTRA-ARTICULAR; INTRAMUSCULAR
Status: DISCONTINUED | OUTPATIENT
Start: 2022-08-30 | End: 2022-08-30 | Stop reason: HOSPADM

## 2022-08-30 RX ORDER — BUPIVACAINE HYDROCHLORIDE 2.5 MG/ML
INJECTION, SOLUTION EPIDURAL; INFILTRATION; INTRACAUDAL
Status: DISCONTINUED | OUTPATIENT
Start: 2022-08-30 | End: 2022-08-30 | Stop reason: HOSPADM

## 2022-08-30 RX ORDER — LIDOCAINE HYDROCHLORIDE 20 MG/ML
INJECTION, SOLUTION INFILTRATION; PERINEURAL
Status: DISCONTINUED | OUTPATIENT
Start: 2022-08-30 | End: 2022-08-30 | Stop reason: HOSPADM

## 2022-08-30 RX ORDER — SODIUM CHLORIDE 9 MG/ML
500 INJECTION, SOLUTION INTRAVENOUS CONTINUOUS
Status: DISCONTINUED | OUTPATIENT
Start: 2022-08-30 | End: 2022-08-30 | Stop reason: HOSPADM

## 2022-08-30 RX ORDER — FENTANYL CITRATE 50 UG/ML
INJECTION, SOLUTION INTRAMUSCULAR; INTRAVENOUS
Status: DISCONTINUED | OUTPATIENT
Start: 2022-08-30 | End: 2022-08-30 | Stop reason: HOSPADM

## 2022-08-30 RX ORDER — ONDANSETRON 2 MG/ML
INJECTION INTRAMUSCULAR; INTRAVENOUS
Status: DISCONTINUED | OUTPATIENT
Start: 2022-08-30 | End: 2022-08-30 | Stop reason: HOSPADM

## 2022-08-30 RX ORDER — MIDAZOLAM HYDROCHLORIDE 1 MG/ML
INJECTION INTRAMUSCULAR; INTRAVENOUS
Status: DISCONTINUED | OUTPATIENT
Start: 2022-08-30 | End: 2022-08-30 | Stop reason: HOSPADM

## 2022-08-30 NOTE — DISCHARGE SUMMARY
Discharge Note  Short Stay      SUMMARY     Admit Date: 8/30/2022    Attending Physician: Vijaya Landin      Discharge Physician: Vijaya Landin      Discharge Date: 8/30/2022 2:38 PM    Procedure(s) (LRB):  RADIOFREQUENCY ABLATION, LEFT KNEE COOLED (Left)    Final Diagnosis: Chronic knee pain after total replacement of left knee joint [M25.562, G89.29, Z96.652]    Disposition: Home or self care    Patient Instructions:   Current Discharge Medication List        CONTINUE these medications which have NOT CHANGED    Details   ALPRAZolam (XANAX) 0.5 MG tablet Take daily as needed for anxiety.  Qty: 30 tablet, Refills: 5      butalbital-acetaminophen-caffeine -40 mg (FIORICET, ESGIC) -40 mg per tablet TAKE 1 TABLET EVERY 4 HOURS AS NEEDED  Qty: 30 tablet, Refills: 0    Associated Diagnoses: Headache, unspecified headache type      cetirizine (ZYRTEC) 10 MG tablet Take 10 mg by mouth daily as needed.      clotrimazole (LOTRIMIN) 1 % Soln Apply topically 2 (two) times daily. for 7 days  Qty: 15 mL, Refills: 5      diclofenac sodium (VOLTAREN) 1 % Gel Apply 2 g topically daily as needed.  Qty: 100 g, Refills: 11      diflorasone-emollient (APEXICON E) 0.05 % Crea Apply 1 application topically once daily. for 7 days  Qty: 30 g, Refills: 5      estradioL (ESTRACE) 1 MG tablet Take 1 tablet (1 mg total) by mouth once daily.  Qty: 90 tablet, Refills: 3      fluocinonide (LIDEX) 0.05 % external solution Apply topically 2 (two) times daily. Do not use morning of surgery      fluticasone propionate (FLONASE) 50 mcg/actuation nasal spray 2 sprays (100 mcg total) by Each Nostril route once daily.  Qty: 3 mL, Refills: 3      gabapentin (NEURONTIN) 300 MG capsule Take 300 mg by mouth 3 (three) times daily.      hydrocortisone 2.5 % cream Apply topically 2 (two) times daily. apply to affected area for 7 days  Qty: 20 g, Refills: 5      ketoconazole (NIZORAL) 2 % shampoo Do not use morning of surgery       levothyroxine (SYNTHROID) 112 MCG tablet TAKE 1 TABLET (112 MCG TOTAL) BY MOUTH BEFORE BREAKFAST AS SCHEDULED  Qty: 90 tablet, Refills: 4      methylPREDNISolone (MEDROL DOSEPACK) 4 mg tablet use as directed  Qty: 1 each, Refills: 0      metronidazole 1% (METROGEL) 1 % Gel Apply topically once daily.  Qty: 60 g, Refills: 5      omeprazole (PRILOSEC) 10 MG capsule Take 10 mg by mouth.      ondansetron (ZOFRAN) 4 MG tablet       oxymetazoline (AFRIN) 0.05 % nasal spray 2 sprays by Nasal route 2 (two) times daily.      pravastatin (PRAVACHOL) 20 MG tablet TAKE 1 TABLET EVERY DAY  Qty: 90 tablet, Refills: 0      senna-docusate 8.6-50 mg (PERICOLACE) 8.6-50 mg per tablet Take 1 tablet by mouth. Hold of surgery      tolterodine (DETROL LA) 2 MG Cp24 Take 1 capsule (2 mg total) by mouth once daily.  Qty: 30 capsule, Refills: 2      zolpidem (AMBIEN) 5 MG Tab Take 1 tablet (5 mg total) by mouth nightly as needed.  Qty: 90 tablet, Refills: 0                 Discharge Diagnosis: Chronic knee pain after total replacement of left knee joint [M25.562, G89.29, Z96.652]  Condition on Discharge: Stable with no complications to procedure   Diet on Discharge: Same as before.  Activity: as per instruction sheet.  Discharge to: Home with a responsible adult.  Follow up: 2-4 weeks       Please call my office or pager at [phone] if experienced any weakness or loss of sensation, fever > 101.5, pain uncontrolled with oral medications, persistent nausea/vomiting/or diarrhea, redness or drainage from the incisions, or any other worrisome concerns. If physician on call was not reached or could not communicate with our office for any reason please go to the nearest emergency department      Vijaya Landin  08/30/2022

## 2022-08-30 NOTE — OP NOTE
Therapeutic Genicular Cooled Nerve Radiofrequency Ablation under Fluoroscopy     The procedure, risks, benefits, and options were discussed with the patient. There are no contraindications to the procedure. The patent expressed understanding and agreed to the procedure. Informed written consent was obtained prior to the start of the procedure and can be found in the patient's chart.        PATIENT NAME: Sherrill Avery   MRN: [id number]     DATE OF PROCEDURE: 08/30/2022     PROCEDURE: Therapeutic Left Genicular Cooled Nerve Radiofrequency Ablation under Fluoroscopy    PRE-OP DIAGNOSIS: Chronic knee pain after total replacement of left knee joint [M25.562, G89.29, Z96.652]    POST-OP DIAGNOSIS: Chronic knee pain after total replacement of left knee joint [M25.562, G89.29, Z96.652]    PHYSICIAN: Vijaya Landin MD    ASSISTANTS: none    MEDICATIONS INJECTED:  Preservative-free Kenalog 40mg with 9cc of Bupivicaine 0.25%    LOCAL ANESTHETIC INJECTED:   Xylocaine 2%    SEDATION: Versed 1mg and Fentanyl 75mcg                                                                                                                                                                                     Conscious sedation ordered by M.D. Patient re-evaluation prior to administration of conscious sedation. No changes noted in patient's status from initial evaluation. The patient's vital signs were monitored by RN and patient remained hemodynamically stable throughout the procedure.    Event Time In   Sedation Start 1416       ESTIMATED BLOOD LOSS:  None    COMPLICATIONS:  None     INTERVAL HISTORY: Patient has clinical findings of chronic knee pain. Patients has completed 2 previous diagnostic genicular nerve blocks with at least 80% relief for the expected duration of the local anesthetic utilized.     TECHNIQUE: Time-out was performed to identify the patient and procedure to be performed. With the patient laying in a supine position, the  surgical area was prepped and draped in the usual sterile fashion using ChloraPrep and fenestrated drape. Three target sites including the superior lateral genicular nerve where the lateral femoral shaft meets the epicondyle, the superior medial genicular nerve where the medial femoral shaft meets the epicondyle, and the inferior medial genicular nerve where the medial tibial shaft meets the epicondyle, were determined under fluoroscopic guidance. Skin anesthesia was achieved by injecting Lidocaine 2% over the injection sites. A 17 gauge, 50mm, 10mm active tip needle was then advanced under fluoroscopy in the AP and lateral views into the positions of the geniculate nerves at these levels. This was followed by motor testing at each of the nerves to ensure that there was no dorsiflexion of the foot. After negative aspiration for blood was confirmed, 1 mL of the lidocaine 2% listed above was injected slowly at each site. This was followed by cooled thermal lesioning at 80 degrees celsius for 150 seconds at each site. That was followed by slowly injecting 1 mL of the medication mixture listed above at each site. The needles were removed and bleeding was nil. A sterile dressing was applied. No specimens collected. The patient tolerated the procedure well and did not have any procedure related motor deficit at the conclusion of the procedure.    The patient was monitored after the procedure in the recovery area. They were given post-procedure and discharge instructions to follow at home. The patient was discharged in a stable condition.    Vijaya Landin MD

## 2022-08-30 NOTE — DISCHARGE INSTRUCTIONS

## 2022-08-31 ENCOUNTER — PATIENT MESSAGE (OUTPATIENT)
Dept: PAIN MEDICINE | Facility: OTHER | Age: 72
End: 2022-08-31
Payer: MEDICARE

## 2022-09-13 ENCOUNTER — LAB VISIT (OUTPATIENT)
Dept: LAB | Facility: HOSPITAL | Age: 72
End: 2022-09-13
Attending: PHYSICIAN ASSISTANT
Payer: MEDICARE

## 2022-09-13 ENCOUNTER — OFFICE VISIT (OUTPATIENT)
Dept: INTERNAL MEDICINE | Facility: CLINIC | Age: 72
End: 2022-09-13
Payer: MEDICARE

## 2022-09-13 VITALS
HEART RATE: 88 BPM | SYSTOLIC BLOOD PRESSURE: 130 MMHG | HEIGHT: 65 IN | TEMPERATURE: 98 F | DIASTOLIC BLOOD PRESSURE: 90 MMHG | OXYGEN SATURATION: 98 % | WEIGHT: 197.75 LBS | BODY MASS INDEX: 32.95 KG/M2

## 2022-09-13 DIAGNOSIS — E66.09 CLASS 1 OBESITY DUE TO EXCESS CALORIES WITH SERIOUS COMORBIDITY AND BODY MASS INDEX (BMI) OF 32.0 TO 32.9 IN ADULT: ICD-10-CM

## 2022-09-13 DIAGNOSIS — E04.2 NONTOXIC MULTINODULAR GOITER: ICD-10-CM

## 2022-09-13 DIAGNOSIS — C80.1 CLEAR CELL CARCINOMA: ICD-10-CM

## 2022-09-13 DIAGNOSIS — C64.9 CARCINOMA OF KIDNEY, UNSPECIFIED LATERALITY: ICD-10-CM

## 2022-09-13 DIAGNOSIS — Z74.09 DECREASED MOBILITY AND ENDURANCE: ICD-10-CM

## 2022-09-13 DIAGNOSIS — R53.83 FATIGUE, UNSPECIFIED TYPE: ICD-10-CM

## 2022-09-13 DIAGNOSIS — E03.9 HYPOTHYROIDISM, UNSPECIFIED TYPE: ICD-10-CM

## 2022-09-13 DIAGNOSIS — R53.1 GENERALIZED WEAKNESS: Primary | ICD-10-CM

## 2022-09-13 DIAGNOSIS — I10 PRIMARY HYPERTENSION: ICD-10-CM

## 2022-09-13 PROCEDURE — 99214 PR OFFICE/OUTPT VISIT, EST, LEVL IV, 30-39 MIN: ICD-10-PCS | Mod: S$PBB,,, | Performed by: PHYSICIAN ASSISTANT

## 2022-09-13 PROCEDURE — 83525 ASSAY OF INSULIN: CPT | Performed by: PHYSICIAN ASSISTANT

## 2022-09-13 PROCEDURE — 99999 PR PBB SHADOW E&M-EST. PATIENT-LVL V: ICD-10-PCS | Mod: PBBFAC,,, | Performed by: PHYSICIAN ASSISTANT

## 2022-09-13 PROCEDURE — 99999 PR PBB SHADOW E&M-EST. PATIENT-LVL V: CPT | Mod: PBBFAC,,, | Performed by: PHYSICIAN ASSISTANT

## 2022-09-13 PROCEDURE — 36415 COLL VENOUS BLD VENIPUNCTURE: CPT | Performed by: PHYSICIAN ASSISTANT

## 2022-09-13 PROCEDURE — 99215 OFFICE O/P EST HI 40 MIN: CPT | Mod: PBBFAC | Performed by: PHYSICIAN ASSISTANT

## 2022-09-13 PROCEDURE — 99214 OFFICE O/P EST MOD 30 MIN: CPT | Mod: S$PBB,,, | Performed by: PHYSICIAN ASSISTANT

## 2022-09-13 NOTE — PROGRESS NOTES
Subjective:      Patient ID: Sherrill Avery is a 72 y.o. female.    Chief Complaint: leg issues and Weight Loss    HPI  [facility] to [facility]. [facility] would like her to try PT/OT for cancer related fatigue and decreased endurance.   Pt would like some help losing weight. [facility] recommends ozempic.   No family or personal history of medullary thyroid cancer or MEN II. No history of pancreatitis.   Pt reports white coat hypertension. A little elevated today. Not on blood pressure medication.       Patient Active Problem List   Diagnosis    GERD (gastroesophageal reflux disease)    Hypothyroidism    Nontoxic multinodular goiter    Primary osteoarthritis involving multiple joints    Fibromyalgia    Actinic degeneration    Dermatofibroma    Bilateral sensorineural hearing loss    Hypertension    Hypercholesteremia    Kidney carcinoma    Neuropathy of left sural nerve-mild    Vitamin D deficiency    Anxiety    Chronic insomnia    Multiple lung nodules on CT    Clear cell carcinoma with lung metastasis    Subacromial bursitis    It band syndrome, left    It band syndrome, right    Chondromalacia, patella, right    Chondromalacia, patella, left    Lumbar radiculopathy    Allergic rhinitis    CKD (chronic kidney disease)    Hormone replacement therapy (HRT)    Osteoarthritis of left knee    Chronic knee pain after total replacement of left knee joint    Functional gait abnormality    Neck pain       Current Outpatient Medications:     ALPRAZolam (XANAX) 0.5 MG tablet, Take daily as needed for anxiety., Disp: 30 tablet, Rfl: 5    butalbital-acetaminophen-caffeine -40 mg (FIORICET, ESGIC) -40 mg per tablet, TAKE 1 TABLET EVERY 4 HOURS AS NEEDED, Disp: 30 tablet, Rfl: 0    cetirizine (ZYRTEC) 10 MG tablet, Take 10 mg by mouth daily as needed., Disp: , Rfl:     diclofenac sodium (VOLTAREN) 1 % Gel, Apply 2 g topically daily as needed., Disp: 100 g, Rfl: 11    estradioL (ESTRACE) 1 MG tablet,  Take 1 tablet (1 mg total) by mouth once daily., Disp: 90 tablet, Rfl: 3    fluocinonide (LIDEX) 0.05 % external solution, Apply topically 2 (two) times daily. Do not use morning of surgery, Disp: , Rfl:     fluticasone propionate (FLONASE) 50 mcg/actuation nasal spray, 2 sprays (100 mcg total) by Each Nostril route once daily., Disp: 3 mL, Rfl: 3    gabapentin (NEURONTIN) 300 MG capsule, Take 300 mg by mouth 3 (three) times daily., Disp: , Rfl:     ketoconazole (NIZORAL) 2 % shampoo, Do not use morning of surgery, Disp: , Rfl:     levothyroxine (SYNTHROID) 112 MCG tablet, TAKE 1 TABLET (112 MCG TOTAL) BY MOUTH BEFORE BREAKFAST AS SCHEDULED, Disp: 90 tablet, Rfl: 4    metronidazole 1% (METROGEL) 1 % Gel, Apply topically once daily., Disp: 60 g, Rfl: 5    omeprazole (PRILOSEC) 10 MG capsule, Take 10 mg by mouth., Disp: , Rfl:     ondansetron (ZOFRAN) 4 MG tablet, , Disp: , Rfl:     oxymetazoline (AFRIN) 0.05 % nasal spray, 2 sprays by Nasal route 2 (two) times daily., Disp: , Rfl:     pravastatin (PRAVACHOL) 20 MG tablet, TAKE 1 TABLET EVERY DAY, Disp: 90 tablet, Rfl: 0    senna-docusate 8.6-50 mg (PERICOLACE) 8.6-50 mg per tablet, Take 1 tablet by mouth. Hold of surgery, Disp: , Rfl:     zolpidem (AMBIEN) 5 MG Tab, Take 1 tablet (5 mg total) by mouth nightly as needed., Disp: 90 tablet, Rfl: 0    clotrimazole (LOTRIMIN) 1 % Soln, Apply topically 2 (two) times daily. for 7 days, Disp: 15 mL, Rfl: 5    diflorasone-emollient (APEXICON E) 0.05 % Crea, Apply 1 application topically once daily. for 7 days, Disp: 30 g, Rfl: 5    hydrocortisone 2.5 % cream, Apply topically 2 (two) times daily. apply to affected area for 7 days, Disp: 20 g, Rfl: 5  No current facility-administered medications for this visit.    Facility-Administered Medications Ordered in Other Visits:     triamcinolone acetonide injection 40 mg, 40 mg, Intra-articular, , Osmar Avery III, MD, 40 mg at 05/17/22 1345  [facility] [place] Due   Topic Date  "Due    Shingles Vaccine (1 of 2) Never done    COVID-19 Vaccine (4 - Booster for Pfizer series) 03/17/2022    Influenza Vaccine (1) 09/01/2022       Review of Systems   Constitutional:  Positive for fatigue. Negative for activity change, appetite change, chills, diaphoresis, fever and unexpected weight change.   HENT: Negative.  Negative for congestion, hearing loss, postnasal drip, rhinorrhea, sore throat, trouble swallowing and voice change.    Eyes: Negative.  Negative for visual disturbance.   Respiratory: Negative.  Negative for cough, choking, chest tightness and shortness of breath.    Cardiovascular:  Negative for chest pain, palpitations and leg swelling.   Gastrointestinal:  Negative for abdominal distention, abdominal pain, blood in stool, constipation, diarrhea, nausea and vomiting.   Endocrine: Negative for cold intolerance, heat intolerance, polydipsia and polyuria.   Genitourinary: Negative.  Negative for difficulty urinating and frequency.   Musculoskeletal:  Negative for arthralgias, back pain, gait problem, joint swelling and myalgias.   Skin:  Negative for color change, pallor, rash and wound.   Neurological:  Positive for weakness. Negative for dizziness, tremors, light-headedness, numbness and headaches.   Hematological:  Negative for adenopathy.   Psychiatric/Behavioral:  Negative for behavioral problems, confusion, self-injury, sleep disturbance and suicidal ideas. The patient is not nervous/anxious.    Objective:   BP (!) 130/90 (BP Location: Right arm, Patient Position: Sitting, BP Method: Large (Manual))   Pulse 88   Temp 98 °F (36.7 °C) (Tympanic)   Ht 5' 5" (1.651 m)   Wt 89.7 kg (197 lb 12 oz)   SpO2 98%   BMI 32.91 kg/m²     Physical Exam  Vitals reviewed.   Constitutional:       General: She is not in acute distress.     Appearance: Normal appearance. She is well-developed. She is obese. She is not ill-appearing, toxic-appearing or diaphoretic.   HENT:      Head: Normocephalic " and atraumatic.      Right Ear: External ear normal.      Left Ear: External ear normal.      Nose: Nose normal.   Eyes:      Conjunctiva/sclera: Conjunctivae normal.      Pupils: Pupils are equal, round, and reactive to light.   Cardiovascular:      Rate and Rhythm: Normal rate and regular rhythm.      Heart sounds: Normal heart sounds. No murmur heard.    No friction rub. No gallop.   Pulmonary:      Effort: Pulmonary effort is normal. No respiratory distress.      Breath sounds: Normal breath sounds. No wheezing or rales.   Chest:      Chest wall: No tenderness.   Abdominal:      General: There is no distension.      Palpations: Abdomen is soft.      Tenderness: There is no abdominal tenderness.   Musculoskeletal:         General: Normal range of motion.      Cervical back: Normal range of motion and neck supple.   Lymphadenopathy:      Cervical: No cervical adenopathy.   Skin:     General: Skin is warm and dry.      Capillary Refill: Capillary refill takes less than 2 seconds.      Findings: No rash.   Neurological:      Mental Status: She is alert and oriented to person, place, and time.      Motor: No weakness.      Coordination: Coordination normal.      Gait: Gait normal.   Psychiatric:         Mood and Affect: Mood normal.         Behavior: Behavior normal.         Thought Content: Thought content normal.         Judgment: Judgment normal.     Lab Results   Component Value Date    TSH 2.339 08/21/2020       Assessment:     1. Generalized weakness    2. Fatigue, unspecified type    3. Decreased mobility and endurance    4. Class 1 obesity due to excess calories with serious comorbidity and body mass index (BMI) of 32.0 to 32.9 in adult    5. Clear cell carcinoma with lung metastasis    6. Carcinoma of kidney, unspecified laterality    7. Primary hypertension    8. Hypothyroidism, unspecified type    9. Nontoxic multinodular goiter      Plan:   Generalized weakness  -     Ambulatory referral/consult to  Physical/Occupational Therapy; Future; Expected date: 09/20/2022    Fatigue, unspecified type  -     Ambulatory referral/consult to Physical/Occupational Therapy; Future; Expected date: 09/20/2022  -     Insulin, Random; Future; Expected date: 09/13/2022    Decreased mobility and endurance  -     Ambulatory referral/consult to Physical/Occupational Therapy; Future; Expected date: 09/20/2022    Class 1 obesity due to excess calories with serious comorbidity and body mass index (BMI) of 32.0 to 32.9 in adult  -     Ambulatory referral/consult to [facility]; Future; Expected date: 09/20/2022  -     Insulin, Random; Future; Expected date: 09/13/2022    Clear cell carcinoma with lung metastasis    Carcinoma of kidney, unspecified laterality    Primary hypertension    Hypothyroidism, unspecified type    Nontoxic multinodular goiter  -refer to lifestyle and wellness to see if candidate for ozempic or weight loss medications.     -follow up with pcp next month as scheduled.   -log bp at home and bring in log to follow up with pcp next month for review.     Follow up if symptoms worsen or fail to improve.

## 2022-09-14 LAB
INSULIN COLLECTION INTERVAL: NORMAL
INSULIN SERPL-ACNC: 6 UU/ML

## 2022-09-16 ENCOUNTER — TELEPHONE (OUTPATIENT)
Dept: PRIMARY CARE CLINIC | Facility: CLINIC | Age: 72
End: 2022-09-16

## 2022-09-20 ENCOUNTER — TELEPHONE (OUTPATIENT)
Dept: INTERNAL MEDICINE | Facility: CLINIC | Age: 72
End: 2022-09-20
Payer: MEDICARE

## 2022-09-20 ENCOUNTER — TELEPHONE (OUTPATIENT)
Dept: AUDIOLOGY | Facility: CLINIC | Age: 72
End: 2022-09-20
Payer: MEDICARE

## 2022-09-20 ENCOUNTER — TELEPHONE (OUTPATIENT)
Dept: PSYCHIATRY | Facility: CLINIC | Age: 72
End: 2022-09-20
Payer: MEDICARE

## 2022-09-20 DIAGNOSIS — H91.90 HEARING LOSS, UNSPECIFIED HEARING LOSS TYPE, UNSPECIFIED LATERALITY: Primary | ICD-10-CM

## 2022-09-20 DIAGNOSIS — Z71.89 HEARING AID CONSULTATION: ICD-10-CM

## 2022-09-20 NOTE — TELEPHONE ENCOUNTER
Left VM to let pt know that her insurance requires a referral for audiogram from PCP. Asked that she message Dr. Macias to have him place the referral. Then we will call to get her scheduled.       ----- Message from Whitney Lopez sent at 9/20/2022 10:33 AM CDT -----  Regarding: appt access  Contact: pt  Pt is calling to schedule an appt for hearing test and hearing aids. Pt would like the appt on 10/04/22 around 3:30pm. Please call back at [phone]//thank you acc

## 2022-09-20 NOTE — TELEPHONE ENCOUNTER
Pt is requesting a referral for audiology to be placed. States she no longer wants to outside facility she is currently seeing. Please advise.//ddw

## 2022-09-21 ENCOUNTER — OFFICE VISIT (OUTPATIENT)
Dept: PAIN MEDICINE | Facility: CLINIC | Age: 72
End: 2022-09-21
Payer: MEDICARE

## 2022-09-21 ENCOUNTER — PATIENT MESSAGE (OUTPATIENT)
Dept: PRIMARY CARE CLINIC | Facility: CLINIC | Age: 72
End: 2022-09-21
Payer: MEDICARE

## 2022-09-21 VITALS
TEMPERATURE: 97 F | DIASTOLIC BLOOD PRESSURE: 79 MMHG | HEIGHT: 65 IN | SYSTOLIC BLOOD PRESSURE: 130 MMHG | BODY MASS INDEX: 32.73 KG/M2 | HEART RATE: 67 BPM | WEIGHT: 196.44 LBS

## 2022-09-21 DIAGNOSIS — G89.29 CHRONIC PAIN OF BOTH KNEES: ICD-10-CM

## 2022-09-21 DIAGNOSIS — M17.0 PRIMARY OSTEOARTHRITIS OF BOTH KNEES: ICD-10-CM

## 2022-09-21 DIAGNOSIS — M25.562 CHRONIC PAIN OF BOTH KNEES: ICD-10-CM

## 2022-09-21 DIAGNOSIS — M17.11 PRIMARY OSTEOARTHRITIS OF RIGHT KNEE: Primary | ICD-10-CM

## 2022-09-21 DIAGNOSIS — M25.561 CHRONIC PAIN OF BOTH KNEES: ICD-10-CM

## 2022-09-21 PROCEDURE — 99214 PR OFFICE/OUTPT VISIT, EST, LEVL IV, 30-39 MIN: ICD-10-PCS | Mod: S$PBB,,, | Performed by: ANESTHESIOLOGY

## 2022-09-21 PROCEDURE — 99213 OFFICE O/P EST LOW 20 MIN: CPT | Mod: PBBFAC | Performed by: ANESTHESIOLOGY

## 2022-09-21 PROCEDURE — 99999 PR PBB SHADOW E&M-EST. PATIENT-LVL III: CPT | Mod: PBBFAC,,, | Performed by: ANESTHESIOLOGY

## 2022-09-21 PROCEDURE — 99214 OFFICE O/P EST MOD 30 MIN: CPT | Mod: S$PBB,,, | Performed by: ANESTHESIOLOGY

## 2022-09-21 PROCEDURE — 99999 PR PBB SHADOW E&M-EST. PATIENT-LVL III: ICD-10-PCS | Mod: PBBFAC,,, | Performed by: ANESTHESIOLOGY

## 2022-09-21 NOTE — PROGRESS NOTES
PCP: Alexandre Macias MD    REFERRING PHYSICIAN: No ref. provider found    CHIEF COMPLAINT: L knee pain s/p knee replacement    Original HISTORY OF PRESENT ILLNESS: Sherrill Avery w/ pmh of HTN, and renal cell carcinoma (kidney removed in 2013) s/p radiation in February 2022 due to METs which appeared in 2017 who presents to the clinic for the evaluation of the above pain. She had a knee replacement in June 2021, and has continued to have pain since the procedure. She states when she sits for an extended period of time and stands back up she experiences the pain around the bottom to lateral edge of her knee cap. She has a swollen area just below the L knee as well. She currently denies CP, SOB, vision changes, or speech changes.    Original Pain Description:  The pain is located in the L knee and does not radiate. The pain is described as sharp and stinging . Exacerbating factors: Sitting, Standing, Laying, Night Time, Getting out of bed/chair and going up stairs. Mitigating factors massage, medications, physical therapy, rest and sitting. Symptoms interfere with daily activity and sleeping. The patient feels like symptoms have been unchanged. Patient denies night fever/night sweats, urinary incontinence, bowel incontinence and significant motor weakness.    Original PAIN SCORES:  Best: Pain is 0  Worst: Pain is 6  Usually: Pain is 4  Current: Pain is 2    INTERVAL HISTORY:   Interval History 9/21/22:  Mrs. Avery returns to clinic after left genicular RFA on 8/30/22. She reports significant pain relief and she is able to stand from a seated position and walk more comfortably. She is happy with the results and would like to have the same procedure on her right knee. She has had right knee pain for years and she has received multiple steroid injections which only provide about 2 weeks of pain relief. Pain is exacerbated by stairs, walking, and rising from a chair.  She denies any new weakness and numbness/tingling.        6 weeks of Conservative therapy (PT/Chiro/Home Exercises with Dates)  PT July 2021-September 2021  PT November 2021-December 2021  PT April 2022-May 2022     Treatments / Medications: (Ice/Heat/NSAIDS/APAP/etc):  Ice  Heat  Massage  PT  Voltaren Gel  Gabapentin     Report:      Interventional Pain Procedures: (Previous injections)  L knee replacement  R knee steroid injection    Past Medical History:   Diagnosis Date    Anxiety     Family history of malignant melanoma 8/25/2014    Fibromyalgia affecting lower leg     GERD (gastroesophageal reflux disease)     Hypercholesteremia     Hypertension     Hypothyroidism     Inflamed seborrheic keratosis 8/25/2014    Microscopic hematuria 2/2/2017    Multiple benign melanocytic nevi 8/25/2014     Past Surgical History:   Procedure Laterality Date    AUGMENTATION OF BREAST      bladder lift      breast implants      BREAST SURGERY Bilateral 1996    saline implants    COLONOSCOPY  2007    HYSTERECTOMY      ectopic pregnancy x 2, with LSO    KNEE ARTHROPLASTY Left 6/14/2021    Procedure: ARTHROPLASTY, KNEE:LEFT:DEPUY-SIGMA;  Surgeon: Osmar Avery III, MD;  Location: [facility] OR;  Service: Orthopedics;  Laterality: Left;    LAPAROSCOPIC NEPHRECTOMY, HAND ASSISTED  07/25/2013    LUNG REMOVAL, PARTIAL Left 2020    At [facility]    NEPHRECTOMY      OOPHORECTOMY      x1    RADIOFREQUENCY ABLATION Left 8/30/2022    Procedure: RADIOFREQUENCY ABLATION, LEFT KNEE COOLED;  Surgeon: Vijaya Landin MD;  [facility];  Service: Pain Management;  Laterality: Left;    right ganglion cyst      throat polyp      TRANSOBTURATOR SLING  2011    with RSO for persistent complex cyst & MARGOT; done by [facility]ndt    UPPER GASTROINTESTINAL ENDOSCOPY  2007     Social History     Socioeconomic History    Marital status:      Spouse name: KAIDEN    Number of children: 5   Occupational History    Occupation: retired     Comment: Dance Instructor   Tobacco Use    Smoking status:  Never    Smokeless tobacco: Never   Substance and Sexual Activity    Alcohol use: Yes     Alcohol/week: 0.0 standard drinks     Comment: social    Drug use: No    Sexual activity: Yes     Partners: Male     Birth control/protection: Surgical   Social History Narrative    Patient is a retired dance instructor and live with . Has stairs at home 12- has an elevator     Family History   Problem Relation Age of Onset    Diabetes Mother     Hypertension Mother     Heart disease Mother     Cancer Father         lung    Migraines Father     Glaucoma Paternal Grandmother     Glaucoma Sister     Thyroid disease Neg Hx     Asthma Neg Hx        Review of patient's allergies indicates:   Allergen Reactions    Talwin compound Anxiety     hallucinations/anxiety    Tramadol Itching    Buspirone Anxiety    Codeine Itching    Morphine Itching     jittery    Morpholine analogues Anxiety and Itching    Naproxen Itching     Takes Ibuprofen is ok on rare occasion     Nexium [esomeprazole magnesium] Other (See Comments)     constipation    Wal-phed [pseudoephedrine hcl] Itching       Current Outpatient Medications   Medication Sig    ALPRAZolam (XANAX) 0.5 MG tablet Take daily as needed for anxiety.    butalbital-acetaminophen-caffeine -40 mg (FIORICET, ESGIC) -40 mg per tablet TAKE 1 TABLET EVERY 4 HOURS AS NEEDED    cetirizine (ZYRTEC) 10 MG tablet Take 10 mg by mouth daily as needed.    diclofenac sodium (VOLTAREN) 1 % Gel Apply 2 g topically daily as needed.    estradioL (ESTRACE) 1 MG tablet Take 1 tablet (1 mg total) by mouth once daily.    fluocinonide (LIDEX) 0.05 % external solution Apply topically 2 (two) times daily. Do not use morning of surgery    fluticasone propionate (FLONASE) 50 mcg/actuation nasal spray 2 sprays (100 mcg total) by Each Nostril route once daily.    gabapentin (NEURONTIN) 300 MG capsule Take 300 mg by mouth 3 (three) times daily.    ketoconazole (NIZORAL) 2 % shampoo Do not use morning  "of surgery    levothyroxine (SYNTHROID) 112 MCG tablet TAKE 1 TABLET (112 MCG TOTAL) BY MOUTH BEFORE BREAKFAST AS SCHEDULED    metronidazole 1% (METROGEL) 1 % Gel Apply topically once daily.    omeprazole (PRILOSEC) 10 MG capsule Take 10 mg by mouth.    ondansetron (ZOFRAN) 4 MG tablet     oxymetazoline (AFRIN) 0.05 % nasal spray 2 sprays by Nasal route 2 (two) times daily.    pravastatin (PRAVACHOL) 20 MG tablet TAKE 1 TABLET EVERY DAY    senna-docusate 8.6-50 mg (PERICOLACE) 8.6-50 mg per tablet Take 1 tablet by mouth. Hold of surgery    zolpidem (AMBIEN) 5 MG Tab Take 1 tablet (5 mg total) by mouth nightly as needed.    clotrimazole (LOTRIMIN) 1 % Soln Apply topically 2 (two) times daily. for 7 days    diflorasone-emollient (APEXICON E) 0.05 % Crea Apply 1 application topically once daily. for 7 days    hydrocortisone 2.5 % cream Apply topically 2 (two) times daily. apply to affected area for 7 days     No current facility-administered medications for this visit.     Facility-Administered Medications Ordered in Other Visits   Medication    triamcinolone acetonide injection 40 mg       ROS:  GENERAL: No fever. No chills. No fatigue. Denies weight loss. Denies weight gain.  HEENT: Denies headaches. Denies vision change. Denies eye pain. Denies double vision. Denies ear pain.   CV: Denies chest pain.   PULM: Denies of shortness of breath.  GI: Denies constipation. No diarrhea. No abdominal pain. Denies nausea. Denies vomiting. No blood in stool.  HEME: Denies bleeding problems.  : Denies urgency. No painful urination. No blood in urine.  MS: Denies joint stiffness. Denies joint swelling.  Denies back pain.  SKIN: Denies rash.   NEURO: Denies seizures. No weakness.  PSYCH:  Denies difficulty sleeping. No anxiety. Denies depression. No suicidal thoughts.       VITALS:   Vitals:    09/21/22 1012   BP: 130/79   Pulse: 67   Temp: 97.3 °F (36.3 °C)   Weight: 89.1 kg (196 lb 6.9 oz)   Height: 5' 5" (1.651 m)   PainSc:   " 1         PHYSICAL EXAM:   GENERAL: Well appearing, in no acute distress, alert and oriented x3.  PSYCH:  Mood and affect appropriate.  SKIN: Normal turgor and coloration.  HEENT:  Normocephalic, atraumatic. Cranial nerves grossly intact.  NECK: No pain to palpation over the cervical paraspinous muscles. No pain to palpation over facets. No pain with neck flexion, extension, or lateral flexion.   PULM: No evidence of respiratory difficulty, symmetric chest rise.  GI:  Non-distended  BACK: Normal range of motion. No pain to palpation over the spinous processes. No pain to palpation over facet joints. There is no pain with palpation over the sacroiliac joints bilaterally. Straight leg raising in the supine position is negative to radicular pain.   EXTREMITIES: No ttp left medial or lateral joint line. +ttp right medial joint line and pain with patella ROM. Swollen 3 x 3 inch area just below L patella.   MUSCULOSKELETAL:  Bilateral lower extremity strength is normal and symmetric, with some pain limitation with movement of L knee joint. No atrophy is noted.  NEURO: Sensation is equal and appropriate bilaterally with the exception of diminished sensation over the L knee cap. Bilateral upper and lower extremity coordination is normal. Plantar response are downgoing.   GAIT: Antalgic and slow.    IMAGING:      X- Ray knee ortho BL 5/17/22    Narrative & Impression  EXAMINATION:  XR KNEE ORTHO BILAT WITH FLEXION     CLINICAL HISTORY:  Presence of left artificial knee joint     TECHNIQUE:  AP standing of both knees, PA flexion standing views of both knees, and Merchant views of both knees were performed.  Lateral views of both knees were also performed.     COMPARISON:  No 07/27/2021 ne     FINDINGS:  Left knee arthroplasty identified.  The position and alignment is satisfactory and unchanged as compared to the previous study.     DJD involving the right knee with narrowing of the patellofemoral and the medial tibiofemoral  joint space.  No fracture or bone destruction identified     Impression:  Patient with symptoms, imaging, and PE consistent with:    1. Primary osteoarthritis of right knee  Procedure Order to Pain Management      2. Chronic pain of both knees        3. Primary osteoarthritis of both knees              ASSESSMENT: 72 y.o. year old female w/ pmh of renal cell carcinoma (kidney removed in 2013) s/p radiation in February 2022 due to METs which appeared in 2017 with pain, consistent with and chronic right knee pain secondary to osteoarthritis.    DISCUSSION: Ms. Avery has a history of fibromyalgia and RCC. She is currently being treated for lung metastasis. She comes to us from Dr. Avery with continued pain after left TKA on 6/14/21. She also has some concern about a left pre-tibial swelling. It has been there for 11 years and may be a lipoma.     PLAN:  Schedule right genicular nerve block and will proceed with RFA if appropriate  RTC in 4 weeks post procedure    Lorenzo Naylor MD  [facility]

## 2022-09-21 NOTE — H&P (VIEW-ONLY)
PCP: Alexandre Macias MD    REFERRING PHYSICIAN: No ref. provider found    CHIEF COMPLAINT: L knee pain s/p knee replacement    Original HISTORY OF PRESENT ILLNESS: Sherrill Avery w/ pmh of HTN, and renal cell carcinoma (kidney removed in 2013) s/p radiation in February 2022 due to METs which appeared in 2017 who presents to the clinic for the evaluation of the above pain. She had a knee replacement in June 2021, and has continued to have pain since the procedure. She states when she sits for an extended period of time and stands back up she experiences the pain around the bottom to lateral edge of her knee cap. She has a swollen area just below the L knee as well. She currently denies CP, SOB, vision changes, or speech changes.    Original Pain Description:  The pain is located in the L knee and does not radiate. The pain is described as sharp and stinging . Exacerbating factors: Sitting, Standing, Laying, Night Time, Getting out of bed/chair and going up stairs. Mitigating factors massage, medications, physical therapy, rest and sitting. Symptoms interfere with daily activity and sleeping. The patient feels like symptoms have been unchanged. Patient denies night fever/night sweats, urinary incontinence, bowel incontinence and significant motor weakness.    Original PAIN SCORES:  Best: Pain is 0  Worst: Pain is 6  Usually: Pain is 4  Current: Pain is 2    INTERVAL HISTORY:   Interval History 9/21/22:  Mrs. Avery returns to clinic after left genicular RFA on 8/30/22. She reports significant pain relief and she is able to stand from a seated position and walk more comfortably. She is happy with the results and would like to have the same procedure on her right knee. She has had right knee pain for years and she has received multiple steroid injections which only provide about 2 weeks of pain relief. Pain is exacerbated by stairs, walking, and rising from a chair.  She denies any new weakness and numbness/tingling.        6 weeks of Conservative therapy (PT/Chiro/Home Exercises with Dates)  PT July 2021-September 2021  PT November 2021-December 2021  PT April 2022-May 2022     Treatments / Medications: (Ice/Heat/NSAIDS/APAP/etc):  Ice  Heat  Massage  PT  Voltaren Gel  Gabapentin     Report:      Interventional Pain Procedures: (Previous injections)  L knee replacement  R knee steroid injection    Past Medical History:   Diagnosis Date    Anxiety     Family history of malignant melanoma 8/25/2014    Fibromyalgia affecting lower leg     GERD (gastroesophageal reflux disease)     Hypercholesteremia     Hypertension     Hypothyroidism     Inflamed seborrheic keratosis 8/25/2014    Microscopic hematuria 2/2/2017    Multiple benign melanocytic nevi 8/25/2014     Past Surgical History:   Procedure Laterality Date    AUGMENTATION OF BREAST      bladder lift      breast implants      BREAST SURGERY Bilateral 1996    saline implants    COLONOSCOPY  2007    HYSTERECTOMY      ectopic pregnancy x 2, with LSO    KNEE ARTHROPLASTY Left 6/14/2021    Procedure: ARTHROPLASTY, KNEE:LEFT:DEPUY-SIGMA;  Surgeon: Osmar Avery III, MD;  Location: [facility] OR;  Service: Orthopedics;  Laterality: Left;    LAPAROSCOPIC NEPHRECTOMY, HAND ASSISTED  07/25/2013    LUNG REMOVAL, PARTIAL Left 2020    At [facility]    NEPHRECTOMY      OOPHORECTOMY      x1    RADIOFREQUENCY ABLATION Left 8/30/2022    Procedure: RADIOFREQUENCY ABLATION, LEFT KNEE COOLED;  Surgeon: Vijaya Landin MD;  [facility];  Service: Pain Management;  Laterality: Left;    right ganglion cyst      throat polyp      TRANSOBTURATOR SLING  2011    with RSO for persistent complex cyst & MARGOT; done by [facility]    UPPER GASTROINTESTINAL ENDOSCOPY  2007     Social History     Socioeconomic History    Marital status:      Spouse name: KAIDEN    Number of children: 5   Occupational History    Occupation: retired     Comment: Dance Instructor   Tobacco Use    Smoking status:  Never    Smokeless tobacco: Never   Substance and Sexual Activity    Alcohol use: Yes     Alcohol/week: 0.0 standard drinks     Comment: social    Drug use: No    Sexual activity: Yes     Partners: Male     Birth control/protection: Surgical   Social History Narrative    Patient is a retired dance instructor and live with . Has stairs at home 12- has an elevator     Family History   Problem Relation Age of Onset    Diabetes Mother     Hypertension Mother     Heart disease Mother     Cancer Father         lung    Migraines Father     Glaucoma Paternal Grandmother     Glaucoma Sister     Thyroid disease Neg Hx     Asthma Neg Hx        Review of patient's allergies indicates:   Allergen Reactions    Talwin compound Anxiety     hallucinations/anxiety    Tramadol Itching    Buspirone Anxiety    Codeine Itching    Morphine Itching     jittery    Morpholine analogues Anxiety and Itching    Naproxen Itching     Takes Ibuprofen is ok on rare occasion     Nexium [esomeprazole magnesium] Other (See Comments)     constipation    Wal-phed [pseudoephedrine hcl] Itching       Current Outpatient Medications   Medication Sig    ALPRAZolam (XANAX) 0.5 MG tablet Take daily as needed for anxiety.    butalbital-acetaminophen-caffeine -40 mg (FIORICET, ESGIC) -40 mg per tablet TAKE 1 TABLET EVERY 4 HOURS AS NEEDED    cetirizine (ZYRTEC) 10 MG tablet Take 10 mg by mouth daily as needed.    diclofenac sodium (VOLTAREN) 1 % Gel Apply 2 g topically daily as needed.    estradioL (ESTRACE) 1 MG tablet Take 1 tablet (1 mg total) by mouth once daily.    fluocinonide (LIDEX) 0.05 % external solution Apply topically 2 (two) times daily. Do not use morning of surgery    fluticasone propionate (FLONASE) 50 mcg/actuation nasal spray 2 sprays (100 mcg total) by Each Nostril route once daily.    gabapentin (NEURONTIN) 300 MG capsule Take 300 mg by mouth 3 (three) times daily.    ketoconazole (NIZORAL) 2 % shampoo Do not use morning  "of surgery    levothyroxine (SYNTHROID) 112 MCG tablet TAKE 1 TABLET (112 MCG TOTAL) BY MOUTH BEFORE BREAKFAST AS SCHEDULED    metronidazole 1% (METROGEL) 1 % Gel Apply topically once daily.    omeprazole (PRILOSEC) 10 MG capsule Take 10 mg by mouth.    ondansetron (ZOFRAN) 4 MG tablet     oxymetazoline (AFRIN) 0.05 % nasal spray 2 sprays by Nasal route 2 (two) times daily.    pravastatin (PRAVACHOL) 20 MG tablet TAKE 1 TABLET EVERY DAY    senna-docusate 8.6-50 mg (PERICOLACE) 8.6-50 mg per tablet Take 1 tablet by mouth. Hold of surgery    zolpidem (AMBIEN) 5 MG Tab Take 1 tablet (5 mg total) by mouth nightly as needed.    clotrimazole (LOTRIMIN) 1 % Soln Apply topically 2 (two) times daily. for 7 days    diflorasone-emollient (APEXICON E) 0.05 % Crea Apply 1 application topically once daily. for 7 days    hydrocortisone 2.5 % cream Apply topically 2 (two) times daily. apply to affected area for 7 days     No current facility-administered medications for this visit.     Facility-Administered Medications Ordered in Other Visits   Medication    triamcinolone acetonide injection 40 mg       ROS:  GENERAL: No fever. No chills. No fatigue. Denies weight loss. Denies weight gain.  HEENT: Denies headaches. Denies vision change. Denies eye pain. Denies double vision. Denies ear pain.   CV: Denies chest pain.   PULM: Denies of shortness of breath.  GI: Denies constipation. No diarrhea. No abdominal pain. Denies nausea. Denies vomiting. No blood in stool.  HEME: Denies bleeding problems.  : Denies urgency. No painful urination. No blood in urine.  MS: Denies joint stiffness. Denies joint swelling.  Denies back pain.  SKIN: Denies rash.   NEURO: Denies seizures. No weakness.  PSYCH:  Denies difficulty sleeping. No anxiety. Denies depression. No suicidal thoughts.       VITALS:   Vitals:    09/21/22 1012   BP: 130/79   Pulse: 67   Temp: 97.3 °F (36.3 °C)   Weight: 89.1 kg (196 lb 6.9 oz)   Height: 5' 5" (1.651 m)   PainSc:   " 1         PHYSICAL EXAM:   GENERAL: Well appearing, in no acute distress, alert and oriented x3.  PSYCH:  Mood and affect appropriate.  SKIN: Normal turgor and coloration.  HEENT:  Normocephalic, atraumatic. Cranial nerves grossly intact.  NECK: No pain to palpation over the cervical paraspinous muscles. No pain to palpation over facets. No pain with neck flexion, extension, or lateral flexion.   PULM: No evidence of respiratory difficulty, symmetric chest rise.  GI:  Non-distended  BACK: Normal range of motion. No pain to palpation over the spinous processes. No pain to palpation over facet joints. There is no pain with palpation over the sacroiliac joints bilaterally. Straight leg raising in the supine position is negative to radicular pain.   EXTREMITIES: No ttp left medial or lateral joint line. +ttp right medial joint line and pain with patella ROM. Swollen 3 x 3 inch area just below L patella.   MUSCULOSKELETAL:  Bilateral lower extremity strength is normal and symmetric, with some pain limitation with movement of L knee joint. No atrophy is noted.  NEURO: Sensation is equal and appropriate bilaterally with the exception of diminished sensation over the L knee cap. Bilateral upper and lower extremity coordination is normal. Plantar response are downgoing.   GAIT: Antalgic and slow.    IMAGING:      X- Ray knee ortho BL 5/17/22    Narrative & Impression  EXAMINATION:  XR KNEE ORTHO BILAT WITH FLEXION     CLINICAL HISTORY:  Presence of left artificial knee joint     TECHNIQUE:  AP standing of both knees, PA flexion standing views of both knees, and Merchant views of both knees were performed.  Lateral views of both knees were also performed.     COMPARISON:  No 07/27/2021 ne     FINDINGS:  Left knee arthroplasty identified.  The position and alignment is satisfactory and unchanged as compared to the previous study.     DJD involving the right knee with narrowing of the patellofemoral and the medial tibiofemoral  joint space.  No fracture or bone destruction identified     Impression:  Patient with symptoms, imaging, and PE consistent with:    1. Primary osteoarthritis of right knee  Procedure Order to Pain Management      2. Chronic pain of both knees        3. Primary osteoarthritis of both knees              ASSESSMENT: 72 y.o. year old female w/ pmh of renal cell carcinoma (kidney removed in 2013) s/p radiation in February 2022 due to METs which appeared in 2017 with pain, consistent with and chronic right knee pain secondary to osteoarthritis.    DISCUSSION: Ms. Avery has a history of fibromyalgia and RCC. She is currently being treated for lung metastasis. She comes to us from Dr. Avery with continued pain after left TKA on 6/14/21. She also has some concern about a left pre-tibial swelling. It has been there for 11 years and may be a lipoma.     PLAN:  Schedule right genicular nerve block and will proceed with RFA if appropriate  RTC in 4 weeks post procedure    Lorenzo Naylor MD  [facility]

## 2022-09-22 ENCOUNTER — TELEPHONE (OUTPATIENT)
Dept: ADMINISTRATIVE | Facility: OTHER | Age: 72
End: 2022-09-22
Payer: MEDICARE

## 2022-09-22 ENCOUNTER — TELEPHONE (OUTPATIENT)
Dept: INTERNAL MEDICINE | Facility: CLINIC | Age: 72
End: 2022-09-22
Payer: MEDICARE

## 2022-09-22 VITALS — SYSTOLIC BLOOD PRESSURE: 130 MMHG | DIASTOLIC BLOOD PRESSURE: 79 MMHG

## 2022-09-23 ENCOUNTER — PATIENT MESSAGE (OUTPATIENT)
Dept: PAIN MEDICINE | Facility: CLINIC | Age: 72
End: 2022-09-23
Payer: MEDICARE

## 2022-09-23 ENCOUNTER — PATIENT MESSAGE (OUTPATIENT)
Dept: PAIN MEDICINE | Facility: OTHER | Age: 72
End: 2022-09-23
Payer: MEDICARE

## 2022-09-23 ENCOUNTER — PATIENT MESSAGE (OUTPATIENT)
Dept: INTERNAL MEDICINE | Facility: CLINIC | Age: 72
End: 2022-09-23
Payer: MEDICARE

## 2022-09-23 ENCOUNTER — TELEPHONE (OUTPATIENT)
Dept: PAIN MEDICINE | Facility: CLINIC | Age: 72
End: 2022-09-23
Payer: MEDICARE

## 2022-09-23 NOTE — TELEPHONE ENCOUNTER
Staff replied to pt on pt portal. Pt requested to have appt cancel on 09/30. Staff canceled rush and confirmed. SG

## 2022-09-26 ENCOUNTER — CLINICAL SUPPORT (OUTPATIENT)
Dept: REHABILITATION | Facility: HOSPITAL | Age: 72
End: 2022-09-26
Payer: MEDICARE

## 2022-09-26 DIAGNOSIS — R29.898 WEAKNESS OF BOTH LOWER EXTREMITIES: ICD-10-CM

## 2022-09-26 DIAGNOSIS — R53.83 FATIGUE, UNSPECIFIED TYPE: ICD-10-CM

## 2022-09-26 DIAGNOSIS — M54.2 ACUTE NECK PAIN: ICD-10-CM

## 2022-09-26 DIAGNOSIS — R53.1 GENERALIZED WEAKNESS: ICD-10-CM

## 2022-09-26 DIAGNOSIS — Z74.09 DECREASED MOBILITY AND ENDURANCE: ICD-10-CM

## 2022-09-26 PROCEDURE — 97110 THERAPEUTIC EXERCISES: CPT

## 2022-09-26 PROCEDURE — 97162 PT EVAL MOD COMPLEX 30 MIN: CPT

## 2022-09-26 NOTE — PLAN OF CARE
[facility]   Physical Therapy Initial Evaluation     Date: 9/26/2022   Name: Sherrill Avery  Clinic Number: [id number]    Therapy Diagnosis:   Encounter Diagnoses   Name Primary?    Generalized weakness     Fatigue, unspecified type     Decreased mobility and endurance     Weakness of both lower extremities     Acute neck pain      Physician: May Parmar*    Physician Orders: PT Eval and Treat   Medical Diagnosis from Referral: leg weakness and neck pain  Evaluation Date: 9/26/2022  Authorization Period Expiration: 9/13/2023  Plan of Care Expiration: 12/1/2022  Progress Note Due: 10th visit  Visit # / Visits authorized: 1/ 20   FOTO: 1/ 3     Precautions: Standard and Fall    Time In: 2:00p  Time Out: 2:45p  Total Appointment Time (timed & untimed codes): 45 minutes    SUBJECTIVE   Date of onset: 2 weeks for neck pain and 2 years for leg weakness    History of current condition - Sherrill reports: tenderness and pain at suboccipital region with sharp pain from right upper trapezius region with any movement of her neck.   Pt did undergo radiation for a full month (Feb to March) at [facility]. Her Oncologist would like to her to be complete PT to improve endurance and address deconditioning of LEs and UEs.   Pt cont to have left knee pain since TKE in 2021, but reports little to no compliance with strengthening HEP.        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     Prior Therapy: yes  Social History:  lives with their spouse  Occupation: none  Prior Level of Function: Independent   Current Level of Function: Independent     Pain:  Current 5/10, worst 8/10, best 3/10   Location: bilateral neck  right foot/feet   Description: Aching and Throbbing  Aggravating Factors: Sitting, Standing, Bending, and Walking  Easing Factors: meditation and relaxation    Patients goals: decrease pain and improve LE/UE strength     Medical History:    Past Medical History:   Diagnosis Date    Anxiety     Family history of malignant melanoma 8/25/2014    Fibromyalgia affecting lower leg     GERD (gastroesophageal reflux disease)     Hypercholesteremia     Hypertension     Hypothyroidism     Inflamed seborrheic keratosis 8/25/2014    Microscopic hematuria 2/2/2017    Multiple benign melanocytic nevi 8/25/2014       Surgical History:   Sherrill Avery  has a past surgical history that includes right ganglion cyst; throat polyp; breast implants; Laparoscopic nephrectomy, hand assisted (07/25/2013); Nephrectomy; Upper gastrointestinal endoscopy (2007); Colonoscopy (2007); Oophorectomy; Transobturator sling (2011); Hysterectomy; Breast surgery (Bilateral, 1996); Augmentation of breast; bladder lift; Lung removal, partial (Left, 2020); Knee Arthroplasty (Left, 6/14/2021); and Radiofrequency ablation (Left, 8/30/2022).    Medications:   Sherrill has a current medication list which includes the following prescription(s): alprazolam, butalbital-acetaminophen-caffeine -40 mg, cetirizine, clotrimazole, diclofenac sodium, diflorasone-emollient, estradiol, fluocinonide, fluticasone propionate, gabapentin, hydrocortisone, ketoconazole, levothyroxine, metronidazole 1%, omeprazole, ondansetron, oxymetazoline, pravastatin, senna-docusate 8.6-50 mg, and zolpidem, and the following Facility-Administered Medications: triamcinolone acetonide.    Allergies:   Review of patient's allergies indicates:   Allergen Reactions    Talwin compound Anxiety     hallucinations/anxiety    Tramadol Itching    Buspirone Anxiety    Codeine Itching    Morphine Itching     jittery    Morpholine analogues Anxiety and Itching    Naproxen Itching     Takes Ibuprofen is ok on rare occasion     Nexium [esomeprazole magnesium] Other (See Comments)     constipation    Wal-phed [pseudoephedrine hcl] Itching          OBJECTIVE       CMS Impairment/Limitation/Restriction for FOTO Neck Survey    Therapist  reviewed FOTO scores for Sherrill Avery on 9/26/2022.   FOTO documents entered into [employer] - see Media section.    Limitation Score: 65%           30 second sit-to-stand test 6 reps no UE support           ROM     CERVICAL  (single inclinometer at top of head) AROM  (degrees/%) Pain/Dysfunction with Movement   Flexion 50     Extension 45     Right side bending 25     Left side bending 25     Right rotation 50%     Left rotation 55%              Strength:   U/E MMT Right Left Pain/Dysfunction with Movement   Cervical SB 5/5 5/5     Upper Trapezial 5/5 5/5     Shoulder Flexion 4/5 4/5     Shoulder Abduction 4/5 4/5     Elbow Flexion (C5)  4/5 4/5     Rhomboids 3+/5 4/5     Mid Traps 3+/5 3+/5     Low Traps 3+/5 3+/5           L/E MMT Right Left Pain/Dysfunction with Movement   Hip Flexion 3+/5 3+/5     Hip Extension 3+/5 3+/5     Gluteus medius 3+/5 3+/5     Gluteus duran 4/5 3+/5     Hip IR 3+/5 3+/5     Hip ER 3+/5 3+/5     Knee Flexion 3+/5 3+/5     Knee Extension 3+/5 3+/5     Ankle DF 4/5 4/5                   Joint mobility: hypomobility noted at OA, C1-2, and CTJ         Top Tier   Cervical Flexion Functional - Nonpainful   Cervical Extension Functional - Painful   Cervical Rotation Dysfunctional - Painful   Single Leg Stance Dysfunctional - Painful   Arms Down Deep Squat Dysfunctional - Painful      Palpation:  Tender with increased tone over along upper/mid gluteals, quadratus femoris, hip flexors, piriformis, LB paraspinals, and over PSIS.     Patient tender with increased tone over bilateral (greater in right verse left) upper trapezial muscle, levator scapula, latissimus dorsi, teres minor, anterior  chest muscle, posterior cervical muscle, sternocleidomastoid, suboccipital muscle, medial/lateral scapular muscle's.      TREATMENT     Total Treatment time (time-based codes) separate from Evaluation: 30 minutes      Sherrill received the treatments listed below:    Shuttle: 6B 3min  Shuttle: 3B 1min 2x ea LE  10  moist heat and IFC to right UT region      PATIENT EDUCATION AND HOME EXERCISES     Education provided:   - yes    Written Home Exercises Provided: yes. Exercises were reviewed and Sherrill was able to demonstrate them prior to the end of the session.  Sherrill demonstrated good  understanding of the education provided. See EMR under Patient Instructions for exercises provided during therapy sessions.    ASSESSMENT     Sherrill is a 72 y.o. female referred to outpatient Physical Therapy with a medical diagnosis of generalized weakness and fatigue, pt presents with signs and symptoms consistent with diagnosis along with left knee and right neck pain.  The patient presents with impairments which include weakness, impaired endurance, impaired self care skills, impaired functional mobility, gait instability, impaired balance, decreased lower extremity function, pain, decreased ROM, and impaired joint extensibility.  These impairments are limiting patient's ability to complete household chores, community and home ambulation, bed mobility, and dynamic balance tasks.     Patient prognosis is Good if patient is consistent and compliant with PT and home exercise program.   Patient will benefit from skilled outpatient Physical Therapy to address the deficits stated above and in the chart below, provide pt/family education, and to maximize pt's level of independence.     Plan of care discussed with patient: Yes  Pt's spiritual, cultural and educational needs considered and patient is agreeable to the plan of care and goals as stated below:     Anticipated Barriers for therapy: motivation    Medical Necessity is demonstrated by the following  History  Co-morbidities and personal factors that may impact the plan of care Co-morbidities:   See medical hx    Personal Factors:   lifestyle  character     moderate   Examination  Body Structures and Functions, activity limitations and participation restrictions that may impact the plan of care  Body Regions:   head  neck  lower extremities    Body Systems:    ROM  strength  balance  gait  transfers  joint mobility, muscle tone, muscle length    Participation Restrictions:   See current level of function listed above     Activity limitations:   Learning and applying knowledge  no deficits    General Tasks and Commands  no deficits    Communication  no deficits    Mobility  lifting and carrying objects  walking  moving around using equipment (WC)    Self care  washing oneself (bathing, drying, washing hands)  caring for body parts (brushing teeth, shaving, grooming)    Domestic Life  shopping  cooking  doing house work (cleaning house, washing dishes, laundry)    Interactions/Relationships  complex interpersonal interactions    Life Areas  no deficits    Community and Social Life  community life  recreation and leisure         moderate   Clinical Presentation evolving clinical presentation with changing clinical characteristics moderate   Decision Making/ Complexity Score: moderate     Goals: Reviewed:9/26/2022    Short Term Goals:  In 6 weeks   1.Pt to be educated on HEP.  2.Patient to increase cervical ROM  to 60 deg, in order to improve available range of motion for ADL's.  3.Patient to increase UE/LE MMT strength by 1/2 grade, in order to improve endurance with activity.  4.Patient to have pain less than 4/10 at worst, in order to improve QOL.    Long Term Goals: In 10 weeks  1. Patient to improve score on the FOTO to 40% or less, in order to improve QOL.  2. Patient to demo increase in UE/LE strength to 4+/5, in order to improve endurance with activity.  3. Patient to have decreased pain to 2/10 at worst, in order to improve QOL.  4. Patient to perform daily activities including getting up from chair and completing basic cleaning in home without increase in symptoms.   5. Patient to increase 30 second sit to stand to 9 repetitions, to decrease fall risk.      PLAN     Plan of care Certification:  9/26/2022 to 12/1/2022.    Outpatient Physical Therapy 2 times weekly for 10 weeks to include the following interventions: Aquatic Therapy, Cervical/Lumbar Traction, Electrical Stimulation lumbar/cervical, Manual Therapy, Moist Heat/ Ice, Neuromuscular Re-ed, Orthotic Management and Training, Patient Education, Self Care, Therapeutic Activities, Therapeutic Exercise, and dry needling .     Liz Ramos, PT      I CERTIFY THE NEED FOR THESE SERVICES FURNISHED UNDER THIS PLAN OF TREATMENT AND WHILE UNDER MY CARE   Physician's comments:     Physician's Signature: ___________________________________________________

## 2022-09-27 ENCOUNTER — CLINICAL SUPPORT (OUTPATIENT)
Dept: AUDIOLOGY | Facility: CLINIC | Age: 72
End: 2022-09-27
Payer: MEDICARE

## 2022-09-27 DIAGNOSIS — H90.3 SENSORINEURAL HEARING LOSS, BILATERAL: Primary | ICD-10-CM

## 2022-09-27 PROCEDURE — 99999 PR PBB SHADOW E&M-EST. PATIENT-LVL I: ICD-10-PCS | Mod: PBBFAC,,, | Performed by: AUDIOLOGIST-HEARING AID FITTER

## 2022-09-27 PROCEDURE — 99999 PR PBB SHADOW E&M-EST. PATIENT-LVL I: CPT | Mod: PBBFAC,,, | Performed by: AUDIOLOGIST-HEARING AID FITTER

## 2022-09-27 PROCEDURE — 92557 COMPREHENSIVE HEARING TEST: CPT | Mod: PBBFAC | Performed by: AUDIOLOGIST-HEARING AID FITTER

## 2022-09-27 PROCEDURE — 99211 OFF/OP EST MAY X REQ PHY/QHP: CPT | Mod: PBBFAC | Performed by: AUDIOLOGIST-HEARING AID FITTER

## 2022-09-27 PROCEDURE — 92567 TYMPANOMETRY: CPT | Mod: PBBFAC | Performed by: AUDIOLOGIST-HEARING AID FITTER

## 2022-09-30 ENCOUNTER — CLINICAL SUPPORT (OUTPATIENT)
Dept: AUDIOLOGY | Facility: CLINIC | Age: 72
End: 2022-09-30
Payer: MEDICARE

## 2022-09-30 DIAGNOSIS — Z71.89 HEARING AID CONSULTATION: Primary | ICD-10-CM

## 2022-09-30 PROCEDURE — 92590 PR HEARING AID EXAM, ONE EAR: ICD-10-PCS | Mod: ,,, | Performed by: AUDIOLOGIST-HEARING AID FITTER

## 2022-09-30 PROCEDURE — 92590 PR HEARING AID EXAM, ONE EAR: CPT | Mod: ,,, | Performed by: AUDIOLOGIST-HEARING AID FITTER

## 2022-10-03 ENCOUNTER — PATIENT MESSAGE (OUTPATIENT)
Dept: PRIMARY CARE CLINIC | Facility: CLINIC | Age: 72
End: 2022-10-03
Payer: MEDICARE

## 2022-10-05 ENCOUNTER — LAB VISIT (OUTPATIENT)
Dept: LAB | Facility: HOSPITAL | Age: 72
End: 2022-10-05
Attending: FAMILY MEDICINE
Payer: MEDICARE

## 2022-10-05 ENCOUNTER — CLINICAL SUPPORT (OUTPATIENT)
Dept: AUDIOLOGY | Facility: CLINIC | Age: 72
End: 2022-10-05
Payer: MEDICARE

## 2022-10-05 DIAGNOSIS — Z46.1 HEARING AID FITTING OR ADJUSTMENT: Primary | ICD-10-CM

## 2022-10-05 DIAGNOSIS — I10 PRIMARY HYPERTENSION: ICD-10-CM

## 2022-10-05 LAB
ALBUMIN SERPL BCP-MCNC: 3.6 G/DL (ref 3.5–5.2)
ALP SERPL-CCNC: 78 U/L (ref 55–135)
ALT SERPL W/O P-5'-P-CCNC: 14 U/L (ref 10–44)
ANION GAP SERPL CALC-SCNC: 7 MMOL/L (ref 8–16)
AST SERPL-CCNC: 15 U/L (ref 10–40)
BILIRUB SERPL-MCNC: 0.7 MG/DL (ref 0.1–1)
BUN SERPL-MCNC: 10 MG/DL (ref 8–23)
CALCIUM SERPL-MCNC: 8.4 MG/DL (ref 8.7–10.5)
CHLORIDE SERPL-SCNC: 106 MMOL/L (ref 95–110)
CHOLEST SERPL-MCNC: 180 MG/DL (ref 120–199)
CHOLEST/HDLC SERPL: 2.9 {RATIO} (ref 2–5)
CO2 SERPL-SCNC: 25 MMOL/L (ref 23–29)
CREAT SERPL-MCNC: 0.8 MG/DL (ref 0.5–1.4)
EST. GFR  (NO RACE VARIABLE): >60 ML/MIN/1.73 M^2
GLUCOSE SERPL-MCNC: 86 MG/DL (ref 70–110)
HDLC SERPL-MCNC: 62 MG/DL (ref 40–75)
HDLC SERPL: 34.4 % (ref 20–50)
LDLC SERPL CALC-MCNC: 100.2 MG/DL (ref 63–159)
NONHDLC SERPL-MCNC: 118 MG/DL
POTASSIUM SERPL-SCNC: 4.2 MMOL/L (ref 3.5–5.1)
PROT SERPL-MCNC: 6.1 G/DL (ref 6–8.4)
SODIUM SERPL-SCNC: 138 MMOL/L (ref 136–145)
TRIGL SERPL-MCNC: 89 MG/DL (ref 30–150)

## 2022-10-05 PROCEDURE — 80061 LIPID PANEL: CPT | Performed by: FAMILY MEDICINE

## 2022-10-05 PROCEDURE — 36415 COLL VENOUS BLD VENIPUNCTURE: CPT | Performed by: FAMILY MEDICINE

## 2022-10-05 PROCEDURE — V5261 PR HEARING AID, DIGIT, BIN, BTE: ICD-10-PCS | Mod: CSM,S$GLB,, | Performed by: AUDIOLOGIST-HEARING AID FITTER

## 2022-10-05 PROCEDURE — V5261 HEARING AID, DIGIT, BIN, BTE: HCPCS | Mod: CSM,S$GLB,, | Performed by: AUDIOLOGIST-HEARING AID FITTER

## 2022-10-05 PROCEDURE — EBRTAX-P BATON ROUGE PRESCRIBED 3.0%: ICD-10-PCS | Mod: CSM,S$GLB,, | Performed by: AUDIOLOGIST-HEARING AID FITTER

## 2022-10-05 PROCEDURE — 80053 COMPREHEN METABOLIC PANEL: CPT | Performed by: FAMILY MEDICINE

## 2022-10-05 PROCEDURE — EBRTAX-P BATON ROUGE PRESCRIBED 3.0%: Mod: CSM,S$GLB,, | Performed by: AUDIOLOGIST-HEARING AID FITTER

## 2022-10-10 ENCOUNTER — TELEPHONE (OUTPATIENT)
Dept: ADMINISTRATIVE | Facility: HOSPITAL | Age: 72
End: 2022-10-10
Payer: MEDICARE

## 2022-10-11 ENCOUNTER — HOSPITAL ENCOUNTER (OUTPATIENT)
Facility: OTHER | Age: 72
Discharge: HOME | End: 2022-10-11
Attending: ANESTHESIOLOGY | Admitting: ANESTHESIOLOGY
Payer: MEDICARE

## 2022-10-11 VITALS
HEART RATE: 65 BPM | DIASTOLIC BLOOD PRESSURE: 74 MMHG | OXYGEN SATURATION: 98 % | HEIGHT: 65 IN | BODY MASS INDEX: 32.32 KG/M2 | RESPIRATION RATE: 14 BRPM | SYSTOLIC BLOOD PRESSURE: 190 MMHG | WEIGHT: 194 LBS | TEMPERATURE: 98 F

## 2022-10-11 DIAGNOSIS — G89.29 CHRONIC PAIN OF RIGHT KNEE: ICD-10-CM

## 2022-10-11 DIAGNOSIS — M25.561 CHRONIC PAIN OF RIGHT KNEE: ICD-10-CM

## 2022-10-11 DIAGNOSIS — G89.29 CHRONIC KNEE PAIN AFTER TOTAL REPLACEMENT OF LEFT KNEE JOINT: ICD-10-CM

## 2022-10-11 DIAGNOSIS — M25.562 CHRONIC KNEE PAIN AFTER TOTAL REPLACEMENT OF LEFT KNEE JOINT: ICD-10-CM

## 2022-10-11 DIAGNOSIS — G89.29 CHRONIC PAIN: ICD-10-CM

## 2022-10-11 DIAGNOSIS — M17.11 PRIMARY OSTEOARTHRITIS OF RIGHT KNEE: Primary | ICD-10-CM

## 2022-10-11 DIAGNOSIS — Z96.652 CHRONIC KNEE PAIN AFTER TOTAL REPLACEMENT OF LEFT KNEE JOINT: ICD-10-CM

## 2022-10-11 PROCEDURE — 64454 NJX AA&/STRD GNCLR NRV BRNCH: CPT | Mod: KX,RT,, | Performed by: ANESTHESIOLOGY

## 2022-10-11 PROCEDURE — 64454 PR NERVE BLOCK INJ, ANES/STEROID, GENICULAR NERVE, W/IMG: ICD-10-PCS | Mod: KX,RT,, | Performed by: ANESTHESIOLOGY

## 2022-10-11 PROCEDURE — 25000003 PHARM REV CODE 250: Performed by: STUDENT IN AN ORGANIZED HEALTH CARE EDUCATION/TRAINING PROGRAM

## 2022-10-11 PROCEDURE — 63600175 PHARM REV CODE 636 W HCPCS: Performed by: ANESTHESIOLOGY

## 2022-10-11 PROCEDURE — 25000003 PHARM REV CODE 250: Performed by: ANESTHESIOLOGY

## 2022-10-11 PROCEDURE — 64454 NJX AA&/STRD GNCLR NRV BRNCH: CPT | Mod: KX,RT | Performed by: ANESTHESIOLOGY

## 2022-10-11 RX ORDER — LIDOCAINE HYDROCHLORIDE 20 MG/ML
INJECTION, SOLUTION INFILTRATION; PERINEURAL
Status: DISCONTINUED | OUTPATIENT
Start: 2022-10-11 | End: 2022-10-11 | Stop reason: HOSPADM

## 2022-10-11 RX ORDER — MIDAZOLAM HYDROCHLORIDE 1 MG/ML
INJECTION, SOLUTION INTRAMUSCULAR; INTRAVENOUS
Status: DISCONTINUED | OUTPATIENT
Start: 2022-10-11 | End: 2022-10-11 | Stop reason: HOSPADM

## 2022-10-11 RX ORDER — BUPIVACAINE HYDROCHLORIDE 2.5 MG/ML
INJECTION, SOLUTION EPIDURAL; INFILTRATION; INTRACAUDAL
Status: DISCONTINUED | OUTPATIENT
Start: 2022-10-11 | End: 2022-10-11 | Stop reason: HOSPADM

## 2022-10-11 RX ORDER — ONDANSETRON 2 MG/ML
INJECTION INTRAMUSCULAR; INTRAVENOUS
Status: DISCONTINUED | OUTPATIENT
Start: 2022-10-11 | End: 2022-10-11 | Stop reason: HOSPADM

## 2022-10-11 RX ORDER — SODIUM CHLORIDE 9 MG/ML
500 INJECTION, SOLUTION INTRAVENOUS CONTINUOUS
Status: DISCONTINUED | OUTPATIENT
Start: 2022-10-11 | End: 2022-10-11 | Stop reason: HOSPADM

## 2022-10-11 NOTE — DISCHARGE INSTRUCTIONS
Thank you for allowing us to care for you today. You may receive a survey about the care we provided. Your feedback is valuable and helps us provide excellent care throughout the community.     Home Care Instructions for Pain Management:    1. DIET:   You may resume your normal diet today.   2. BATHING:   You may shower with luke warm water. No tub baths or anything that will soak injection sites under water for the next 24 hours.  3. DRESSING:   You may remove your bandage today.   4. ACTIVITY LEVEL:   You may resume your normal activities 24 hrs after your procedure. Nothing strenuous today.  5. MEDICATIONS:   You may resume your normal medications today. To restart blood thinners, ask your doctor.  6. DRIVING    If you have received any sedatives by mouth today, you may not drive for 12 hours.    If you have received any sedation through your IV, you may not drive for 24 hrs.   7. SPECIAL INSTRUCTIONS:   No heat to the injection site for 24 hrs including, hot bath or shower, heating pad, moist heat, or hot tubs.    Use ice pack to injection site for any pain or discomfort.  Apply ice packs for 20 minute intervals as needed.    IF you have diabetes, be sure to monitor your blood sugar more closely. IF your injection contained steroids your blood sugar levels may become higher than normal.    If you are still having pain upon discharge:  Your pain may improve over the next 48 hours. The anesthetic (numbing medication) works immediately to 48 hours. IF your injection contained a steroid (anti-inflammatory medication), it takes approximately 3 days to start feeling relief and 7-10 days to see your greatest results from the medication. It is possible you may need subsequent injections. This would be discussed at your follow up appointment with pain management or your referring doctor.    Please call the PAIN MANAGEMENT office at [phone] or ON CALL pager at [phone] if you experienced any:   -Weakness or  loss of sensation  -Fever > 101.5  -Pain uncontrolled with oral medications   -Persistent nausea, vomiting, or diarrhea  -Redness or drainage from the injection sites, or any other worrisome concerns.   If physician on call was not reached or could not communicate with our office for any reason please go to the nearest emergency department.   Lumbar/Cervical/Joint Medial Branch Block Pain Diary    Patient Name:  :    Pre-procedure Pain Score: _____/10    Time of injection:     Please fill out the chart below to the best of your ability. We need to know how much percentage of relief in your pain that you have while the numbing medication is active. Please be mindful that this is a diagnostic test and may not last for a long time. If you are asleep during one of the times listed below, you do not have to record that time.     Time After the   Procedure % of pain relief   achieved Pain Score (0-10)   1 hour post-procedure     2 hours post-procedure     3 hours post-procedure     4 hours post-procedure     5 hours post-procedure     6 hours post-procedure     12 hours post-procedure     24 hours post-procedure       Please make sure to return this form or information to the clinic. This information is needed to proceed with your plan of care. There are several options that you can use to send this back to us.    Form can be faxed to us at [phone]  Call us to provide the information at [phone]  Send the information to us through your [facility] Chart    If you have any questions about completing this diary, please call us at [phone].

## 2022-10-11 NOTE — DISCHARGE SUMMARY
Discharge Note  Short Stay      SUMMARY     Admit Date: 10/11/2022    Attending Physician: Vijaya Landin      Discharge Physician: Vijaya Landin      Discharge Date: 10/11/2022 3:04 PM    Procedure(s) (LRB):  BLOCK, NERVE, GENICULAR, RIGHT (Right)    Final Diagnosis: Primary osteoarthritis of right knee [M17.11]    Disposition: Home or self care    Patient Instructions:   Current Discharge Medication List        CONTINUE these medications which have NOT CHANGED    Details   ALPRAZolam (XANAX) 0.5 MG tablet Take daily as needed for anxiety.  Qty: 30 tablet, Refills: 5      butalbital-acetaminophen-caffeine -40 mg (FIORICET, ESGIC) -40 mg per tablet TAKE 1 TABLET EVERY 4 HOURS AS NEEDED  Qty: 30 tablet, Refills: 0    Associated Diagnoses: Headache, unspecified headache type      cetirizine (ZYRTEC) 10 MG tablet Take 10 mg by mouth daily as needed.      clotrimazole (LOTRIMIN) 1 % Soln Apply topically 2 (two) times daily. for 7 days  Qty: 15 mL, Refills: 5      diclofenac sodium (VOLTAREN) 1 % Gel Apply 2 g topically daily as needed.  Qty: 100 g, Refills: 11      diflorasone-emollient (APEXICON E) 0.05 % Crea Apply 1 application topically once daily. for 7 days  Qty: 30 g, Refills: 5      estradioL (ESTRACE) 1 MG tablet Take 1 tablet (1 mg total) by mouth once daily.  Qty: 90 tablet, Refills: 3      fluocinonide (LIDEX) 0.05 % external solution Apply topically 2 (two) times daily. Do not use morning of surgery      fluticasone propionate (FLONASE) 50 mcg/actuation nasal spray 2 sprays (100 mcg total) by Each Nostril route once daily.  Qty: 3 mL, Refills: 3      gabapentin (NEURONTIN) 300 MG capsule Take 300 mg by mouth 3 (three) times daily.      hydrocortisone 2.5 % cream Apply topically 2 (two) times daily. apply to affected area for 7 days  Qty: 20 g, Refills: 5      ketoconazole (NIZORAL) 2 % shampoo Do not use morning of surgery      levothyroxine (SYNTHROID) 112 MCG tablet TAKE 1 TABLET (112  MCG TOTAL) BY MOUTH BEFORE BREAKFAST AS SCHEDULED  Qty: 90 tablet, Refills: 4      metronidazole 1% (METROGEL) 1 % Gel Apply topically once daily.  Qty: 60 g, Refills: 5      omeprazole (PRILOSEC) 10 MG capsule Take 10 mg by mouth.      ondansetron (ZOFRAN) 4 MG tablet       oxymetazoline (AFRIN) 0.05 % nasal spray 2 sprays by Nasal route 2 (two) times daily.      pravastatin (PRAVACHOL) 20 MG tablet TAKE 1 TABLET EVERY DAY  Qty: 90 tablet, Refills: 0      senna-docusate 8.6-50 mg (PERICOLACE) 8.6-50 mg per tablet Take 1 tablet by mouth. Hold of surgery      zolpidem (AMBIEN) 5 MG Tab Take 1 tablet (5 mg total) by mouth nightly as needed.  Qty: 90 tablet, Refills: 0                 Discharge Diagnosis: Primary osteoarthritis of right knee [M17.11]  Condition on Discharge: Stable with no complications to procedure   Diet on Discharge: Same as before.  Activity: as per instruction sheet.  Discharge to: Home with a responsible adult.  Follow up: 2-4 weeks       Please call my office or pager at [phone] if experienced any weakness or loss of sensation, fever > 101.5, pain uncontrolled with oral medications, persistent nausea/vomiting/or diarrhea, redness or drainage from the incisions, or any other worrisome concerns. If physician on call was not reached or could not communicate with our office for any reason please go to the nearest emergency department      Vijaya Landin  10/11/2022

## 2022-10-11 NOTE — OP NOTE
Diagnostic Genicular Nerve Block under Fluoroscopic Guidance    The procedure, risks, benefits, and options were discussed with the patient. There are no contraindications to the procedure. The patent expressed understanding and agreed to the procedure. Informed written consent was obtained prior to the start of the procedure and can be found in the patient's chart.    PATIENT NAME: Sherrill Avery   MRN: [id number]     DATE OF PROCEDURE: 10/11/2022    PROCEDURE: Diagnostic Right Genicular Nerve Block under Fluoroscopic Guidance    PRE-OP DIAGNOSIS: Primary osteoarthritis of right knee [M17.11]    POST-OP DIAGNOSIS: Same    PHYSICIAN: Vijaya Landin MD    ASSISTANTS: Dr. Torres     MEDICATIONS INJECTED: Bupivacine 0.25%     LOCAL ANESTHETIC INJECTED: Xylocaine 2%     SEDATION: None    ESTIMATED BLOOD LOSS: None    COMPLICATIONS: None    INTERVAL HISTORY: Patient has clinical findings of chronic knee pain that is not relieved by other therapies.     TECHNIQUE: Time-out was performed to identify the patient and procedure to be performed. With the patient laying in a supine position, the surgical area was prepped and draped in the usual sterile fashion using ChloraPrep and a fenestrated drape. Three target sites including the superior lateral genicular nerve where the lateral femoral shaft meets the epicondyle, the superior medial genicular nerve where the medial femoral shaft meets the epicondyle, and the inferior medial genicular nerve where the medial tibial shaft meets the epicondyle, were determined under fluoroscopy guidance. Skin anesthesia was achieved by injecting Lidocaine 2% over the injection sites. A 25 gauge, 3.5 inch spinal quinke needle was then advanced under fluoroscopy in the AP and lateral views into the positions of the geniculate nerves at each site. Once the needle tip position was confirmed on fluoroscopy, negative pressure was applied to confirm no intravascular placement.  1 mL of the anesthetic  listed above was then slowly injected at each site. The needles were removed and bleeding was nil. A sterile dressing was applied. No specimens collected. The patient tolerated the procedure well.       The patient was monitored after the procedure in the recovery area. They were given post-procedure and discharge instructions to follow at home. The patient was discharged in a stable condition.    Vijaya Landin MD

## 2022-10-11 NOTE — PROGRESS NOTES
Sherrill Avery was seen on 10/11/2022 for a hearing aid fitting.  Settings were set to prescribed to 100% based on current audiogram.  Patient was counseled on insertion, cleaning and maintenance. Patient was given a copy of the hearing aid purchase agreement and will pay in full today.  Patient's one month trail period will begin today. Patient has been scheduled for a hearing aid follow up in two weeks.      :  Teikon  Model:  Impact Driveneo L90 R  Color:Sandbeige  Speaker length: 0 M  Right aid sn#: [id number]  Left aid sn#:[id number]  Repair warranty: 12/28/2025  L/D warranty:12/28/2025

## 2022-10-11 NOTE — PROGRESS NOTES
Referring Provider:Dr. Gilberto Avery was seen 09/27/2022 for an audiological evaluation.  Patient has a pmh of SNHL. She has hearing aids from [facility] currently. She is very unhappy with them. She is seeking a new audiologist for hearing aids. Her hearing loss is making it difficult for her to communicate effectively. Patient became emotional discussing life events. She not only has hearing loss but has cancer. I advised that speaking with someone like a therapist may be beneficial. She has an appointment in December but will reach out to have the appt moved up.     Results reveal a mild-to-severe sensorineural hearing loss 250-8000 Hz for the right ear, and a mild-to-severe sensorineural hearing loss 250-8000 Hz for the left ear.   Speech Reception Thresholds were  45 dBHL for the right ear and 45 dBHL for the left ear.   Word recognition scores were excellent for the right ear and excellent for the left ear.   Tympanograms were Type A, normal for the right ear and Type A, normal for the left ear.    Patient was counseled on the above findings.    Recommendations:  1. Hearing aid consultation scheduled.  2. Annual Audiograms.

## 2022-10-12 ENCOUNTER — CLINICAL SUPPORT (OUTPATIENT)
Dept: REHABILITATION | Facility: HOSPITAL | Age: 72
End: 2022-10-12
Payer: MEDICARE

## 2022-10-12 ENCOUNTER — PATIENT MESSAGE (OUTPATIENT)
Dept: PAIN MEDICINE | Facility: CLINIC | Age: 72
End: 2022-10-12
Payer: MEDICARE

## 2022-10-12 ENCOUNTER — PATIENT MESSAGE (OUTPATIENT)
Dept: PAIN MEDICINE | Facility: OTHER | Age: 72
End: 2022-10-12
Payer: MEDICARE

## 2022-10-12 DIAGNOSIS — M25.561 CHRONIC PAIN OF RIGHT KNEE: Primary | ICD-10-CM

## 2022-10-12 DIAGNOSIS — G89.29 CHRONIC PAIN OF RIGHT KNEE: Primary | ICD-10-CM

## 2022-10-12 DIAGNOSIS — M17.0 PRIMARY OSTEOARTHRITIS OF BOTH KNEES: ICD-10-CM

## 2022-10-12 DIAGNOSIS — R29.898 WEAKNESS OF BOTH LOWER EXTREMITIES: Primary | ICD-10-CM

## 2022-10-12 DIAGNOSIS — M54.2 ACUTE NECK PAIN: ICD-10-CM

## 2022-10-12 PROCEDURE — 97140 MANUAL THERAPY 1/> REGIONS: CPT

## 2022-10-12 PROCEDURE — 97110 THERAPEUTIC EXERCISES: CPT

## 2022-10-12 NOTE — PROGRESS NOTES
"[facility]   Physical Therapy Treatment Note     Name: Sherrill Avery  Clinic Number: [id number]    Therapy Diagnosis:   Encounter Diagnoses   Name Primary?    Weakness of both lower extremities Yes    Acute neck pain      Physician: May Parmar*    Visit Date: 10/12/2022    Physician Orders: PT Eval and Treat   Medical Diagnosis from Referral: leg weakness and neck pain  Evaluation Date: 9/26/2022  Authorization Period Expiration: 9/13/2023  Plan of Care Expiration: 12/1/2022  Progress Note Due: 10th visit  Visit # / Visits authorized: 1/ 20   FOTO: 1/ 3    PTA Visit #: 0/5     Time In: 1:15p  Time Out: 2:15p  Total Billable Time: 60 minutes    SUBJECTIVE     Pt reports: pain with getting out of car and chair.  She was compliant with home exercise program.  Response to previous treatment: positive  Functional change: improved    Pain: 5/10  Location: bilateral knee  and neck      OBJECTIVE     Objective Measures updated at progress report unless specified.     Treatment     Sherrill received the treatments listed below:      therapeutic exercises to develop strength, endurance, ROM, flexibility, posture, and core stabilization for 45 minutes including:  Bike 6min  Gastroc st at steps  6" step up HHA x1 1x10 B  Lateral step up 6 step 1x10 B  High marching 2 laps  Reverse stepping 2 laps  Lateral stepping 2 laps  Hip resisted 4 way 1x15 B  Alt taps 4" with OH press foam 2x 30"  Sled push 2x  HS st and seated piriformis st  Prone cervical retraction 2x10  Open books 1x10 B    manual therapy techniques: Joint mobilizations, Manual traction, and Soft tissue Mobilization were applied to the: cervical spine for 15 minutes, including:  PA mobs mid cervical to upper t-s  STM to UT,LS, cervical extensors  Cervical distraction  Cervical nods  Subocc release         Patient Education and Home Exercises     Home Exercises Provided and Patient Education Provided     Education provided:   - yes, " HEP    Written Home Exercises Provided: Patient instructed to cont prior HEP. Exercises were reviewed and Sherrill was able to demonstrate them prior to the end of the session.  Sherrill demonstrated good  understanding of the education provided. See EMR under Patient Instructions for exercises provided during therapy sessions    ASSESSMENT     Pt tolerated session well with emphasis placed on LE functional mobility strengthening. Notable fatigue post sled and alternating taps. Manual therapy including spinal mobs and STM performed to reduce tissue tension in cervical/UT region. Pt reports decreased pain and improved mobility post session. Pt would benefit from cont PT services to improve LE strength, ambulation endurance, activity tolerance, and pain management.     Sherrill Is progressing well towards her goals.   Pt prognosis is Good.     Pt will continue to benefit from skilled outpatient physical therapy to address the deficits listed in the problem list box on initial evaluation, provide pt/family education and to maximize pt's level of independence in the home and community environment.     Pt's spiritual, cultural and educational needs considered and pt agreeable to plan of care and goals.     Anticipated barriers to physical therapy: distance to clinic    Goals:  Short Term Goals:  In 6 weeks   1.Pt to be educated on HEP.  2.Patient to increase cervical ROM  to 60 deg, in order to improve available range of motion for ADL's.  3.Patient to increase UE/LE MMT strength by 1/2 grade, in order to improve endurance with activity.  4.Patient to have pain less than 4/10 at worst, in order to improve QOL.     Long Term Goals: In 10 weeks  1. Patient to improve score on the FOTO to 40% or less, in order to improve QOL.  2. Patient to demo increase in UE/LE strength to 4+/5, in order to improve endurance with activity.  3. Patient to have decreased pain to 2/10 at worst, in order to improve QOL.  4. Patient to perform daily  activities including getting up from chair and completing basic cleaning in home without increase in symptoms.   5. Patient to increase 30 second sit to stand to 9 repetitions, to decrease fall risk.        PLAN      Plan of care Certification: 9/26/2022 to 12/1/2022.     Outpatient Physical Therapy 2 times weekly for 10 weeks to include the following interventions: Aquatic Therapy, Cervical/Lumbar Traction, Electrical Stimulation lumbar/cervical, Manual Therapy, Moist Heat/ Ice, Neuromuscular Re-ed, Orthotic Management and Training, Patient Education, Self Care, Therapeutic Activities, Therapeutic Exercise, and dry needling .      Liz Ramos, PT

## 2022-10-13 ENCOUNTER — PATIENT MESSAGE (OUTPATIENT)
Dept: PAIN MEDICINE | Facility: OTHER | Age: 72
End: 2022-10-13
Payer: MEDICARE

## 2022-10-13 NOTE — PROGRESS NOTES
Sherrill Avery was seen for a hearing aid consult to discuss hearing aids.     The following aids will be ordered:    A pair of Phonak Audeo L 90 R's  Color: Sand beige  Speaker Units:  0 R/L  Domes:  Small power      I will call patient once the hearing aids are received to set up the hearing aid fitting appointment.

## 2022-10-15 ENCOUNTER — PATIENT MESSAGE (OUTPATIENT)
Dept: AUDIOLOGY | Facility: CLINIC | Age: 72
End: 2022-10-15
Payer: MEDICARE

## 2022-10-15 ENCOUNTER — PATIENT MESSAGE (OUTPATIENT)
Dept: REHABILITATION | Facility: HOSPITAL | Age: 72
End: 2022-10-15
Payer: MEDICARE

## 2022-10-18 ENCOUNTER — CLINICAL SUPPORT (OUTPATIENT)
Dept: AUDIOLOGY | Facility: CLINIC | Age: 72
End: 2022-10-18
Payer: MEDICARE

## 2022-10-18 DIAGNOSIS — Z46.1 HEARING AID FITTING OR ADJUSTMENT: Primary | ICD-10-CM

## 2022-10-19 ENCOUNTER — CLINICAL SUPPORT (OUTPATIENT)
Dept: REHABILITATION | Facility: HOSPITAL | Age: 72
End: 2022-10-19
Payer: MEDICARE

## 2022-10-19 DIAGNOSIS — R29.898 WEAKNESS OF BOTH LOWER EXTREMITIES: Primary | ICD-10-CM

## 2022-10-19 DIAGNOSIS — M54.2 ACUTE NECK PAIN: ICD-10-CM

## 2022-10-19 PROCEDURE — 97112 NEUROMUSCULAR REEDUCATION: CPT

## 2022-10-19 PROCEDURE — 97110 THERAPEUTIC EXERCISES: CPT

## 2022-10-19 PROCEDURE — 97140 MANUAL THERAPY 1/> REGIONS: CPT

## 2022-10-19 NOTE — PROGRESS NOTES
[facility]   Physical Therapy Treatment Note     Name: Sherrill Avery  Clinic Number: [id number]    Therapy Diagnosis:   Encounter Diagnoses   Name Primary?    Weakness of both lower extremities Yes    Acute neck pain        Physician: May Parmar*    Visit Date: 10/19/2022    Physician Orders: PT Eval and Treat   Medical Diagnosis from Referral: leg weakness and neck pain  Evaluation Date: 9/26/2022  Authorization Period Expiration: 9/13/2023  Plan of Care Expiration: 12/1/2022  Progress Note Due: 10th visit  Visit # / Visits authorized: 3/ 20   FOTO: 1/ 3    PTA Visit #: 0/5     Time In: 1320  Time Out: 14:15  Total Billable Time: 55 minutes    SUBJECTIVE     Pt reports: continued pain with getting out of car and chair 2* Knee pain. Pt reports recent onset of balance issues especially in kitchen changing directions.   She was compliant with home exercise program.  Response to previous treatment: positive  Functional change: improved    Pain: 5/10  Location: bilateral knee  and neck      OBJECTIVE     Objective Measures updated at progress report unless specified.   +Alexandria Hallpike B Negative  +Horizontal Canal Negative    Treatment     Sherrill received the treatments listed below:      therapeutic exercises to develop strength, endurance, ROM, flexibility, posture, and core stabilization for 15 minutes including:  Bike 6min  Gastroc st at steps  Lateral step up 6 step 1x10 B  HS st and seated piriformis st    manual therapy techniques: Joint mobilizations, Manual traction, and Soft tissue Mobilization were applied to the: cervical spine for 10 minutes, including:  STM to UT,LS, cervical extensors  Cervical distraction  Subocc release   Supine PA Mobs to C-Spine  Supine UT Stretch   +FDN to Upper Trap by Liz Ramos, PT      Neuromuscular Re-education for proprioception, balance for 25 minutes including:  +Alexandria Hallpike B: neg  +Horizontal Canal: neg  +Standing VORx1  Vertical/Horizontal x10  +Standing smooth pursuit Vertical/Horizontal x10  +Standing head shake and nod 10x ea LOB post   +standing EC head shake and nod 1x10 (nausea present after horizontal head turn)  +Standing Habituation with Step and Contralateral Head Turns x10    Patient Education and Home Exercises     Home Exercises Provided and Patient Education Provided     Education provided:   - yes, HEP    Written Home Exercises Provided: Patient instructed to cont prior HEP. Exercises were reviewed and Sherrill was able to demonstrate them prior to the end of the session.  Sherrill demonstrated good  understanding of the education provided. See EMR under Patient Instructions for exercises provided during therapy sessions    ASSESSMENT     Pt tolerated session well with emphasis placed on vestibular hypofunction. Pt c/o dizziness during treatment today and tested vestibular system.  Found to be negative with BPPV testing but positive for vestibular hypofunction.  Challenged Vestibular system in standing with Min sway following vertical interventions but Mod Sway with horizontal interventions and a single instance with nausea after head motion with eyes closed.  Performed 10 repetitions with increase in symptoms. Updated HEP provided specific to balance and adaptation. Manual therapy including spinal mobs and STM performed to reduce tissue tension in cervical/UT region. Pt reports decreased pain and improved mobility post session. Pt would benefit from cont PT services to improve LE strength, ambulation endurance, activity tolerance, and pain management.     Sherrill Is progressing well towards her goals.   Pt prognosis is Good.     Pt will continue to benefit from skilled outpatient physical therapy to address the deficits listed in the problem list box on initial evaluation, provide pt/family education and to maximize pt's level of independence in the home and community environment.     Pt's spiritual, cultural and  educational needs considered and pt agreeable to plan of care and goals.     Anticipated barriers to physical therapy: distance to clinic    Goals:  Short Term Goals:  In 6 weeks   1.Pt to be educated on HEP.  2.Patient to increase cervical ROM  to 60 deg, in order to improve available range of motion for ADL's.  3.Patient to increase UE/LE MMT strength by 1/2 grade, in order to improve endurance with activity.  4.Patient to have pain less than 4/10 at worst, in order to improve QOL.     Long Term Goals: In 10 weeks  1. Patient to improve score on the FOTO to 40% or less, in order to improve QOL.  2. Patient to demo increase in UE/LE strength to 4+/5, in order to improve endurance with activity.  3. Patient to have decreased pain to 2/10 at worst, in order to improve QOL.  4. Patient to perform daily activities including getting up from chair and completing basic cleaning in home without increase in symptoms.   5. Patient to increase 30 second sit to stand to 9 repetitions, to decrease fall risk.        PLAN      Plan of care Certification: 9/26/2022 to 12/1/2022.     Outpatient Physical Therapy 2 times weekly for 10 weeks to include the following interventions: Aquatic Therapy, Cervical/Lumbar Traction, Electrical Stimulation lumbar/cervical, Manual Therapy, Moist Heat/ Ice, Neuromuscular Re-ed, Orthotic Management and Training, Patient Education, Self Care, Therapeutic Activities, Therapeutic Exercise, and dry needling .      Liz Ramos, PT

## 2022-10-24 ENCOUNTER — PATIENT MESSAGE (OUTPATIENT)
Dept: INTERNAL MEDICINE | Facility: CLINIC | Age: 72
End: 2022-10-24
Payer: MEDICARE

## 2022-10-24 DIAGNOSIS — R10.2 PELVIC PAIN: Primary | ICD-10-CM

## 2022-10-25 ENCOUNTER — HOSPITAL ENCOUNTER (OUTPATIENT)
Facility: OTHER | Age: 72
Discharge: HOME | End: 2022-10-25
Attending: ANESTHESIOLOGY | Admitting: ANESTHESIOLOGY
Payer: MEDICARE

## 2022-10-25 VITALS
HEART RATE: 60 BPM | SYSTOLIC BLOOD PRESSURE: 168 MMHG | DIASTOLIC BLOOD PRESSURE: 74 MMHG | RESPIRATION RATE: 16 BRPM | BODY MASS INDEX: 32.49 KG/M2 | HEIGHT: 65 IN | OXYGEN SATURATION: 99 % | TEMPERATURE: 98 F | WEIGHT: 195 LBS

## 2022-10-25 DIAGNOSIS — M17.11 PRIMARY OSTEOARTHRITIS OF RIGHT KNEE: Primary | ICD-10-CM

## 2022-10-25 DIAGNOSIS — G89.29 CHRONIC PAIN: ICD-10-CM

## 2022-10-25 PROCEDURE — 63600175 PHARM REV CODE 636 W HCPCS: Performed by: ANESTHESIOLOGY

## 2022-10-25 PROCEDURE — 25000003 PHARM REV CODE 250: Performed by: STUDENT IN AN ORGANIZED HEALTH CARE EDUCATION/TRAINING PROGRAM

## 2022-10-25 PROCEDURE — 64624 PR DESTRUCT, NEUROLYTIC AGENT, GENICULAR NERVE, W/IMG: ICD-10-PCS | Mod: RT,,, | Performed by: ANESTHESIOLOGY

## 2022-10-25 PROCEDURE — 99152 MOD SED SAME PHYS/QHP 5/>YRS: CPT | Mod: ,,, | Performed by: ANESTHESIOLOGY

## 2022-10-25 PROCEDURE — 99152 PR MOD CONSCIOUS SEDATION, SAME PHYS, 5+ YRS, FIRST 15 MIN: ICD-10-PCS | Mod: ,,, | Performed by: ANESTHESIOLOGY

## 2022-10-25 PROCEDURE — 64624 DSTRJ NULYT AGT GNCLR NRV: CPT | Mod: RT,,, | Performed by: ANESTHESIOLOGY

## 2022-10-25 PROCEDURE — 25000003 PHARM REV CODE 250: Performed by: ANESTHESIOLOGY

## 2022-10-25 PROCEDURE — 99152 MOD SED SAME PHYS/QHP 5/>YRS: CPT | Performed by: ANESTHESIOLOGY

## 2022-10-25 PROCEDURE — A4649 SURGICAL SUPPLIES: HCPCS | Performed by: ANESTHESIOLOGY

## 2022-10-25 PROCEDURE — 64624 DSTRJ NULYT AGT GNCLR NRV: CPT | Mod: RT | Performed by: ANESTHESIOLOGY

## 2022-10-25 RX ORDER — LIDOCAINE HYDROCHLORIDE 20 MG/ML
INJECTION, SOLUTION INFILTRATION; PERINEURAL
Status: DISCONTINUED | OUTPATIENT
Start: 2022-10-25 | End: 2022-10-25 | Stop reason: HOSPADM

## 2022-10-25 RX ORDER — SODIUM CHLORIDE 9 MG/ML
500 INJECTION, SOLUTION INTRAVENOUS CONTINUOUS
Status: DISCONTINUED | OUTPATIENT
Start: 2022-10-25 | End: 2022-10-25 | Stop reason: HOSPADM

## 2022-10-25 RX ORDER — TRIAMCINOLONE ACETONIDE 40 MG/ML
INJECTION, SUSPENSION INTRA-ARTICULAR; INTRAMUSCULAR
Status: DISCONTINUED | OUTPATIENT
Start: 2022-10-25 | End: 2022-10-25 | Stop reason: HOSPADM

## 2022-10-25 RX ORDER — FENTANYL CITRATE 50 UG/ML
INJECTION, SOLUTION INTRAMUSCULAR; INTRAVENOUS
Status: DISCONTINUED | OUTPATIENT
Start: 2022-10-25 | End: 2022-10-25 | Stop reason: HOSPADM

## 2022-10-25 RX ORDER — BUPIVACAINE HYDROCHLORIDE 2.5 MG/ML
INJECTION, SOLUTION EPIDURAL; INFILTRATION; INTRACAUDAL
Status: DISCONTINUED | OUTPATIENT
Start: 2022-10-25 | End: 2022-10-25 | Stop reason: HOSPADM

## 2022-10-25 RX ORDER — ONDANSETRON 2 MG/ML
INJECTION INTRAMUSCULAR; INTRAVENOUS
Status: DISCONTINUED | OUTPATIENT
Start: 2022-10-25 | End: 2022-10-25 | Stop reason: HOSPADM

## 2022-10-25 RX ORDER — MIDAZOLAM HYDROCHLORIDE 1 MG/ML
INJECTION INTRAMUSCULAR; INTRAVENOUS
Status: DISCONTINUED | OUTPATIENT
Start: 2022-10-25 | End: 2022-10-25 | Stop reason: HOSPADM

## 2022-10-25 NOTE — PLAN OF CARE
Patient tolerated procedure well. Patient reports 1/10 pain after procedure. Assisted patient up for first time. Gait steady. All discharge instructions given.

## 2022-10-25 NOTE — DISCHARGE SUMMARY
Discharge Note  Short Stay      SUMMARY     Admit Date: 10/25/2022    Attending Physician: Vijaya Landin      Discharge Physician: Vijaya Landin      Discharge Date: 10/25/2022 2:08 PM    Procedure(s) (LRB):  RADIOFREQUENCY ABLATION RIGHT KNEE GENICULAR COOLED (Right)    Final Diagnosis: Primary osteoarthritis of both knees [M17.0]    Disposition: Home or self care    Patient Instructions:   Current Discharge Medication List        CONTINUE these medications which have NOT CHANGED    Details   ALPRAZolam (XANAX) 0.5 MG tablet Take daily as needed for anxiety.  Qty: 30 tablet, Refills: 5      butalbital-acetaminophen-caffeine -40 mg (FIORICET, ESGIC) -40 mg per tablet TAKE 1 TABLET EVERY 4 HOURS AS NEEDED  Qty: 30 tablet, Refills: 0    Associated Diagnoses: Headache, unspecified headache type      cetirizine (ZYRTEC) 10 MG tablet Take 10 mg by mouth daily as needed.      clotrimazole (LOTRIMIN) 1 % Soln Apply topically 2 (two) times daily. for 7 days  Qty: 15 mL, Refills: 5      diclofenac sodium (VOLTAREN) 1 % Gel Apply 2 g topically daily as needed.  Qty: 100 g, Refills: 11      diflorasone-emollient (APEXICON E) 0.05 % Crea Apply 1 application topically once daily. for 7 days  Qty: 30 g, Refills: 5      estradioL (ESTRACE) 1 MG tablet Take 1 tablet (1 mg total) by mouth once daily.  Qty: 90 tablet, Refills: 3      fluocinonide (LIDEX) 0.05 % external solution Apply topically 2 (two) times daily. Do not use morning of surgery      fluticasone propionate (FLONASE) 50 mcg/actuation nasal spray 2 sprays (100 mcg total) by Each Nostril route once daily.  Qty: 3 mL, Refills: 3      gabapentin (NEURONTIN) 300 MG capsule Take 300 mg by mouth 3 (three) times daily.      hydrocortisone 2.5 % cream Apply topically 2 (two) times daily. apply to affected area for 7 days  Qty: 20 g, Refills: 5      ketoconazole (NIZORAL) 2 % shampoo Do not use morning of surgery      levothyroxine (SYNTHROID) 112 MCG tablet  TAKE 1 TABLET (112 MCG TOTAL) BY MOUTH BEFORE BREAKFAST AS SCHEDULED  Qty: 90 tablet, Refills: 4      metronidazole 1% (METROGEL) 1 % Gel Apply topically once daily.  Qty: 60 g, Refills: 5      omeprazole (PRILOSEC) 10 MG capsule Take 10 mg by mouth.      ondansetron (ZOFRAN) 4 MG tablet       oxymetazoline (AFRIN) 0.05 % nasal spray 2 sprays by Nasal route 2 (two) times daily.      pravastatin (PRAVACHOL) 20 MG tablet TAKE 1 TABLET EVERY DAY  Qty: 90 tablet, Refills: 0      senna-docusate 8.6-50 mg (PERICOLACE) 8.6-50 mg per tablet Take 1 tablet by mouth. Hold of surgery      zolpidem (AMBIEN) 5 MG Tab Take 1 tablet (5 mg total) by mouth nightly as needed.  Qty: 90 tablet, Refills: 0                 Discharge Diagnosis: Primary osteoarthritis of both knees [M17.0]  Condition on Discharge: Stable with no complications to procedure   Diet on Discharge: Same as before.  Activity: as per instruction sheet.  Discharge to: Home with a responsible adult.  Follow up: 2-4 weeks       Please call my office or pager at [phone] if experienced any weakness or loss of sensation, fever > 101.5, pain uncontrolled with oral medications, persistent nausea/vomiting/or diarrhea, redness or drainage from the incisions, or any other worrisome concerns. If physician on call was not reached or could not communicate with our office for any reason please go to the nearest emergency department      Vijaya Landin  10/25/2022

## 2022-10-25 NOTE — OP NOTE
Therapeutic Genicular Cooled Nerve Radiofrequency Ablation under Fluoroscopy     The procedure, risks, benefits, and options were discussed with the patient. There are no contraindications to the procedure. The patent expressed understanding and agreed to the procedure. Informed written consent was obtained prior to the start of the procedure and can be found in the patient's chart.        PATIENT NAME: Sherrill Avery   MRN: [id number]     DATE OF PROCEDURE: 10/25/2022     PROCEDURE: Therapeutic Right Genicular Cooled Nerve Radiofrequency Ablation under Fluoroscopy    PRE-OP DIAGNOSIS: Primary osteoarthritis of both knees [M17.0]    POST-OP DIAGNOSIS: Primary osteoarthritis of both knees [M17.0]    PHYSICIAN: Vijaya Landin MD    ASSISTANTS: Oleg Chang DO    MEDICATIONS INJECTED:  Preservative-free Kenalog 40mg with 9cc of Bupivicaine 0.25%    LOCAL ANESTHETIC INJECTED:   Xylocaine 2%    SEDATION: Versed 2mg and Fentanyl 50mcg                                                                                                                                                                                     Conscious sedation ordered by M.D. Patient re-evaluation prior to administration of conscious sedation. No changes noted in patient's status from initial evaluation. The patient's vital signs were monitored by RN and patient remained hemodynamically stable throughout the procedure.    Event Time In   Sedation Start 1350   Sedation End 1407       ESTIMATED BLOOD LOSS:  None    COMPLICATIONS:  None     INTERVAL HISTORY: Patient has clinical findings of chronic knee pain. Patients has completed 2 previous diagnostic genicular nerve blocks with at least 80% relief for the expected duration of the local anesthetic utilized.     TECHNIQUE: Time-out was performed to identify the patient and procedure to be performed. With the patient laying in a supine position, the surgical area was prepped and draped in the usual  sterile fashion using ChloraPrep and fenestrated drape. Three target sites including the superior lateral genicular nerve where the lateral femoral shaft meets the epicondyle, the superior medial genicular nerve where the medial femoral shaft meets the epicondyle, and the inferior medial genicular nerve where the medial tibial shaft meets the epicondyle, were determined under fluoroscopic guidance. Skin anesthesia was achieved by injecting Lidocaine 2% over the injection sites. A 17 gauge, 50mm, 10mm active tip needle was then advanced under fluoroscopy in the AP and lateral views into the positions of the geniculate nerves at these levels. This was followed by motor testing at each of the nerves to ensure that there was no dorsiflexion of the foot. After negative aspiration for blood was confirmed, 1 mL of the lidocaine 2% listed above was injected slowly at each site. This was followed by cooled thermal lesioning at 80 degrees celsius for 150 seconds at each site. That was followed by slowly injecting 3 mL of the medication mixture listed above at each site. The needles were removed and bleeding was nil. A sterile dressing was applied. No specimens collected. The patient tolerated the procedure well and did not have any procedure related motor deficit at the conclusion of the procedure.    The patient was monitored after the procedure in the recovery area. They were given post-procedure and discharge instructions to follow at home. The patient was discharged in a stable condition.      Vijaya Landin MD

## 2022-10-25 NOTE — H&P
HPI  Patient presenting for Procedure(s) (LRB):  RADIOFREQUENCY ABLATION RIGHT KNEE GENICULAR COOLED (Right)     Patient on Anti-coagulation No    No health changes since previous encounter    Past Medical History:   Diagnosis Date    Anxiety     Family history of malignant melanoma 8/25/2014    Fibromyalgia affecting lower leg     GERD (gastroesophageal reflux disease)     Hypercholesteremia     Hypertension     Hypothyroidism     Inflamed seborrheic keratosis 8/25/2014    Microscopic hematuria 2/2/2017    Multiple benign melanocytic nevi 8/25/2014     Past Surgical History:   Procedure Laterality Date    AUGMENTATION OF BREAST      bladder lift      breast implants      BREAST SURGERY Bilateral 1996    saline implants    COLONOSCOPY  2007    HYSTERECTOMY      ectopic pregnancy x 2, with LSO    KNEE ARTHROPLASTY Left 6/14/2021    Procedure: ARTHROPLASTY, KNEE:LEFT:DEPUY-SIGMA;  Surgeon: Osmar Avery III, MD;  Location: [facility];  Service: Orthopedics;  Laterality: Left;    LAPAROSCOPIC NEPHRECTOMY, HAND ASSISTED  07/25/2013    LUNG REMOVAL, PARTIAL Left 2020    At [facility]    NEPHRECTOMY      OOPHORECTOMY      x1    RADIOFREQUENCY ABLATION Left 8/30/2022    Procedure: RADIOFREQUENCY ABLATION, LEFT KNEE COOLED;  Surgeon: Vijaya Landin MD;  Location: [facility]IN [facility];  Service: Pain Management;  Laterality: Left;    right ganglion cyst      throat polyp      TRANSOBTURATOR SLING  2011    with RSO for persistent complex cyst & MARGOT; done by yris    UPPER GASTROINTESTINAL ENDOSCOPY  2007     Review of patient's allergies indicates:   Allergen Reactions    Talwin compound Anxiety     hallucinations/anxiety    Tramadol Itching    Buspirone Anxiety    Codeine Itching    Morphine Itching     jittery    Morpholine analogues Anxiety and Itching    Naproxen Itching     Takes Ibuprofen is ok on rare occasion     Nexium [esomeprazole magnesium] Other (See Comments)     constipation    Wal-phed [pseudoephedrine hcl]  "Itching      Current Facility-Administered Medications   Medication    0.9%  NaCl infusion     Facility-Administered Medications Ordered in Other Encounters   Medication    triamcinolone acetonide injection 40 mg       PMHx, PSHx, Allergies, Medications reviewed in epic    ROS negative except pain complaints in HPI    OBJECTIVE:    BP (!) 153/71   Pulse 70   Temp 97.8 °F (36.6 °C) (Oral)   Resp 16   Ht 5' 5" (1.651 m)   Wt 88.5 kg (195 lb)   SpO2 99%   Breastfeeding No   BMI 32.45 kg/m²     PHYSICAL EXAMINATION:    GENERAL: Well appearing, in no acute distress, alert and oriented x3.  PSYCH:  Mood and affect appropriate.  SKIN: Skin color, texture, turgor normal, no rashes or lesions which will impact the procedure.  CV: RRR with palpation of the radial artery.  PULM: No evidence of respiratory difficulty, symmetric chest rise. Clear to auscultation.  NEURO: Cranial nerves grossly intact.    Plan:    Proceed with procedure as planned Procedure(s) (LRB):  RADIOFREQUENCY ABLATION RIGHT KNEE GENICULAR COOLED (Right)    Tiago Granado  10/25/2022            "

## 2022-10-25 NOTE — DISCHARGE INSTRUCTIONS
Thank you for allowing us to care for you today. You may receive a survey about the care we provided. Your feedback is valuable and helps us provide excellent care throughout the community.     Home Care Instructions for Pain Management:    1. DIET:   You may resume your normal diet today.   2. BATHING:   You may shower with luke warm water. No tub baths or anything that will soak injection sites under water for the next 24 hours.  3. DRESSING:   You may remove your bandage today.   4. ACTIVITY LEVEL:   You may resume your normal activities 24 hrs after your procedure. Nothing strenuous today.  5. MEDICATIONS:   You may resume your normal medications today. To restart blood thinners, ask your doctor.  6. DRIVING    If you have received any sedatives by mouth today, you may not drive for 12 hours.    If you have received any sedation through your IV, you may not drive for 24 hrs.   7. SPECIAL INSTRUCTIONS:   No heat to the injection site for 24 hrs including, hot bath or shower, heating pad, moist heat, or hot tubs.    Use ice pack to injection site for any pain or discomfort.  Apply ice packs for 20 minute intervals as needed.    IF you have diabetes, be sure to monitor your blood sugar more closely. IF your injection contained steroids your blood sugar levels may become higher than normal.    If you are still having pain upon discharge:  Your pain may improve over the next 48 hours. The anesthetic (numbing medication) works immediately to 48 hours. IF your injection contained a steroid (anti-inflammatory medication), it takes approximately 3 days to start feeling relief and 7-10 days to see your greatest results from the medication. It is possible you may need subsequent injections. This would be discussed at your follow up appointment with pain management or your referring doctor.    Please call the PAIN MANAGEMENT office at [phone] or ON CALL pager at [phone] if you experienced any:   -Weakness or  loss of sensation  -Fever > 101.5  -Pain uncontrolled with oral medications   -Persistent nausea, vomiting, or diarrhea  -Redness or drainage from the injection sites, or any other worrisome concerns.   If physician on call was not reached or could not communicate with our office for any reason please go to the nearest emergency department. Adult Procedural Sedation Instructions    Recovery After Procedural Sedation (Adult)  You have been given medicine by vein to make you sleep during your surgery. This may have included both a pain medicine and sleeping medicine. Most of the effects have worn off. But you may still have some drowsiness for the next 6 to 8 hours.  Home care  Follow these guidelines when you get home:  For the next 8 hours, you should be watched by a responsible adult. This person should make sure your condition is not getting worse.  Don't drink any alcohol for the next 24 hours.  Don't drive, operate dangerous machinery, or make important business or personal decisions during the next 24 hours.  Note: Your healthcare provider may tell you not to take any medicine by mouth for pain or sleep in the next 4 hours. These medicines may react with the medicines you were given in the hospital. This could cause a much stronger response than usual.  Follow-up care  Follow up with your healthcare provider if you are not alert and back to your usual level of activity within 12 hours.  When to seek medical advice  Call your healthcare provider right away if any of these occur:  Drowsiness gets worse  Weakness or dizziness gets worse  Repeated vomiting  You can't be awakened   Date Last Reviewed: 10/18/2016  © 3514-0751 [email or web address]. [address] [place] [address], [place] [address]. All rights reserved. This information is not intended as a substitute for professional medical care. Always follow your healthcare professional's instructions.

## 2022-10-26 ENCOUNTER — PATIENT MESSAGE (OUTPATIENT)
Dept: INTERNAL MEDICINE | Facility: CLINIC | Age: 72
End: 2022-10-26
Payer: MEDICARE

## 2022-10-26 ENCOUNTER — PATIENT MESSAGE (OUTPATIENT)
Dept: ORTHOPEDICS | Facility: CLINIC | Age: 72
End: 2022-10-26
Payer: MEDICARE

## 2022-10-31 ENCOUNTER — OFFICE VISIT (OUTPATIENT)
Dept: INTERNAL MEDICINE | Facility: CLINIC | Age: 72
End: 2022-10-31
Payer: MEDICARE

## 2022-10-31 VITALS
BODY MASS INDEX: 32.91 KG/M2 | OXYGEN SATURATION: 98 % | HEART RATE: 66 BPM | DIASTOLIC BLOOD PRESSURE: 80 MMHG | SYSTOLIC BLOOD PRESSURE: 112 MMHG | HEIGHT: 65 IN | WEIGHT: 197.56 LBS | RESPIRATION RATE: 17 BRPM | TEMPERATURE: 97 F

## 2022-10-31 DIAGNOSIS — C80.1 CLEAR CELL CARCINOMA: ICD-10-CM

## 2022-10-31 DIAGNOSIS — E66.9 OBESITY, UNSPECIFIED CLASSIFICATION, UNSPECIFIED OBESITY TYPE, UNSPECIFIED WHETHER SERIOUS COMORBIDITY PRESENT: Primary | ICD-10-CM

## 2022-10-31 DIAGNOSIS — E78.00 HYPERCHOLESTEREMIA: ICD-10-CM

## 2022-10-31 DIAGNOSIS — F41.9 ANXIETY: ICD-10-CM

## 2022-10-31 DIAGNOSIS — E03.9 HYPOTHYROIDISM, UNSPECIFIED TYPE: ICD-10-CM

## 2022-10-31 DIAGNOSIS — I10 PRIMARY HYPERTENSION: ICD-10-CM

## 2022-10-31 DIAGNOSIS — K21.9 GASTROESOPHAGEAL REFLUX DISEASE WITHOUT ESOPHAGITIS: ICD-10-CM

## 2022-10-31 DIAGNOSIS — F51.04 CHRONIC INSOMNIA: ICD-10-CM

## 2022-10-31 DIAGNOSIS — F32.A DEPRESSION, UNSPECIFIED DEPRESSION TYPE: ICD-10-CM

## 2022-10-31 DIAGNOSIS — C64.9 CARCINOMA OF KIDNEY, UNSPECIFIED LATERALITY: ICD-10-CM

## 2022-10-31 DIAGNOSIS — E04.2 NONTOXIC MULTINODULAR GOITER: ICD-10-CM

## 2022-10-31 PROCEDURE — 99214 OFFICE O/P EST MOD 30 MIN: CPT | Mod: S$PBB,,, | Performed by: FAMILY MEDICINE

## 2022-10-31 PROCEDURE — 99215 OFFICE O/P EST HI 40 MIN: CPT | Mod: PBBFAC | Performed by: FAMILY MEDICINE

## 2022-10-31 PROCEDURE — 99214 PR OFFICE/OUTPT VISIT, EST, LEVL IV, 30-39 MIN: ICD-10-PCS | Mod: S$PBB,,, | Performed by: FAMILY MEDICINE

## 2022-10-31 PROCEDURE — 99999 PR PBB SHADOW E&M-EST. PATIENT-LVL V: CPT | Mod: PBBFAC,,, | Performed by: FAMILY MEDICINE

## 2022-10-31 PROCEDURE — 99999 PR PBB SHADOW E&M-EST. PATIENT-LVL V: ICD-10-PCS | Mod: PBBFAC,,, | Performed by: FAMILY MEDICINE

## 2022-10-31 RX ORDER — GABAPENTIN 300 MG/1
300 CAPSULE ORAL 3 TIMES DAILY
Qty: 270 CAPSULE | Refills: 4 | Status: SHIPPED | OUTPATIENT
Start: 2022-10-31 | End: 2023-01-24

## 2022-10-31 NOTE — PROGRESS NOTES
Subjective:       Patient ID: Sherrill Avery is a . female.    Chief Complaint: Multiple issues see below    HPI     Follow-upfor multiple issues update with oncologist at [facility] followed for renal cell carcinoma with metastatic lung nodules hx and dr sheryl VILLANUEVA; had rad tx 2/22 lung    Hypertension: blood pressures typically normal. Tolerating medicine.     GERD asympt. Tolerating medication. No swallowing problems;sees md[facility]. Gi' req rf prilosec    Chronic anxiety/insomnia uses either benzo or ambien few times per week prev dr yusuf and has plans to f/u 12/22but stable enough felt ok for primary care to resume rxing for this and no need to f/u unless problems. She doesn't take benzo same day ambien. Knows potential problems with these interacting;mood good;tried ssris etc but intol and current regimen working well per her and psych in past.; takes 4-5 x /months    Hypothyroidism:  Tolerating med. Asympt; hx dr block ;nl tsh aug      infreq migraine    bilat knee pain dxd djd       Obesity:she is interested in preventing dm and interested in lifestyle wellness clinic    Past Medical History:   Diagnosis Date    Anxiety     Family history of malignant melanoma 8/25/2014    Fibromyalgia affecting lower leg     GERD (gastroesophageal reflux disease)     Hypercholesteremia     Hypertension     Hypothyroidism     Inflamed seborrheic keratosis 8/25/2014    Microscopic hematuria 2/2/2017    Multiple benign melanocytic nevi 8/25/2014     Past Surgical History:   Procedure Laterality Date    AUGMENTATION OF BREAST      bladder lift      breast implants      BREAST SURGERY Bilateral 1996    saline implants    COLONOSCOPY  2007    HYSTERECTOMY      ectopic pregnancy x 2, with LSO    KNEE ARTHROPLASTY Left 6/14/2021    Procedure: ARTHROPLASTY, KNEE:LEFT:DEPUY-SIGMA;  Surgeon: Osmar Avery III, MD;  Location: [facility];  Service: Orthopedics;  Laterality: Left;    LAPAROSCOPIC NEPHRECTOMY, HAND ASSISTED   07/25/2013    LUNG REMOVAL, PARTIAL Left 2020    At [facility]    NEPHRECTOMY      OOPHORECTOMY      x1    RADIOFREQUENCY ABLATION Left 8/30/2022    Procedure: RADIOFREQUENCY ABLATION, LEFT KNEE COOLED;  Surgeon: Vijaya Landin MD;  Location: [facility];  Service: Pain Management;  Laterality: Left;    RADIOFREQUENCY ABLATION Right 10/25/2022    Procedure: RADIOFREQUENCY ABLATION RIGHT KNEE GENICULAR COOLED;  Surgeon: Vijaya Landin MD;  Location: [facility];  Service: Pain Management;  Laterality: Right;    right ganglion cyst      throat polyp      TRANSOBTURATOR SLING  2011    with RSO for persistent complex cyst & MARGOT; done by yris    UPPER GASTROINTESTINAL ENDOSCOPY  2007     Family History   Problem Relation Age of Onset    Diabetes Mother     Hypertension Mother     Heart disease Mother     Cancer Father         lung    Migraines Father     Glaucoma Paternal Grandmother     Glaucoma Sister     Thyroid disease Neg Hx     Asthma Neg Hx      Social History     Socioeconomic History    Marital status:      Spouse name: KAIDEN    Number of children: 5   Occupational History    Occupation: retired     Comment: Dance Instructor   Tobacco Use    Smoking status: Never    Smokeless tobacco: Never   Substance and Sexual Activity    Alcohol use: Yes     Alcohol/week: 0.0 standard drinks     Comment: social    Drug use: No    Sexual activity: Yes     Partners: Male     Birth control/protection: Surgical   Social History Narrative    Patient is a retired dance instructor and live with . Has stairs at home 12- has an elevator         Review of Systems  Cardiovascular: no chest pain  Chest: no shortness of breath  Abd: no abd pain  Remainder review of systems negative    Objective:   husb here  Physical Exam   Constitutional: She is oriented to person, place, and time. She appears well-developed and well-nourished. No distress.   gen nad    a and o x 3  Neck bit limited rom all dirxns ttp r post  neck  cvrrr  Chest ctabilat    Psychiatric: She has a normal mood and affect. Her behavior is normal. Judgment and thought content normal.   Nursing note and vitals reviewed.      Assessment:               3. Chronic neck pain hx   4. Chronic pain of lower extremity, unspecified laterality    5. Anxiety    6. Nontoxic multinodular goiter    7. Multiple lung nodules on CT    8. Hypercholesteremia    9. Clear cell carcinoma with lung metastasis    10. Essential hypertension    11. Hypothyroidism, unspecified type    12. Carcinoma of right kidney    13. Chronic insomnia      obesity  Plan:     *f/u oncol /rad onc and endocrine when due            Has F/u me  same day time  q 6 months xanax  Avoid taking alprazolam within several hours of zolpidem    rf ambien. When due infreq use    Obesity, unspecified classification, unspecified obesity type, unspecified whether serious comorbidity present  -     Ambulatory referral/consult to [facility]; Future; Expected date: 11/07/2022    Carcinoma of kidney, unspecified laterality    Hypothyroidism, unspecified type    Primary hypertension    Gastroesophageal reflux disease without esophagitis    Clear cell carcinoma with lung metastasis    Chronic insomnia    Hypercholesteremia    Anxiety    Nontoxic multinodular goiter    Depression, unspecified depression type  -     Ambulatory referral/consult to Behavioral Health; Future; Expected date: 11/07/2022    Other orders  -     gabapentin (NEURONTIN) 300 MG capsule; Take 1 capsule (300 mg total) by mouth 3 (three) times daily.  Dispense: 270 capsule; Refill: 4

## 2022-11-01 ENCOUNTER — CLINICAL SUPPORT (OUTPATIENT)
Dept: REHABILITATION | Facility: HOSPITAL | Age: 72
End: 2022-11-01
Payer: MEDICARE

## 2022-11-01 DIAGNOSIS — M54.2 ACUTE NECK PAIN: ICD-10-CM

## 2022-11-01 DIAGNOSIS — R29.898 WEAKNESS OF BOTH LOWER EXTREMITIES: Primary | ICD-10-CM

## 2022-11-01 PROCEDURE — 97110 THERAPEUTIC EXERCISES: CPT

## 2022-11-01 NOTE — PROGRESS NOTES
"[facility]   Physical Therapy Treatment Note     Name: Sherrill Avery  Clinic Number: [id number]    Therapy Diagnosis:   Encounter Diagnoses   Name Primary?    Weakness of both lower extremities Yes    Acute neck pain        Physician: May Parmar*    Visit Date: 11/1/2022    Physician Orders: PT Eval and Treat   Medical Diagnosis from Referral: leg weakness and neck pain  Evaluation Date: 9/26/2022  Authorization Period Expiration: 9/13/2023  Plan of Care Expiration: 12/1/2022  Progress Note Due: 10th visit  Visit # / Visits authorized:4/ 20   FOTO: 1/ 3    PTA Visit #: 0/5     Time In: 1:45  Time Out: 2:30  Total Billable Time: 45 minutes    SUBJECTIVE     Pt reports:  burning pain in left knee just distal to patella after kneeling during reconciliation.     She was compliant with home exercise program.  Response to previous treatment: positive  Functional change: improved    Pain: 5/10  Location: bilateral knee  and neck      OBJECTIVE     Objective Measures updated at progress report unless specified.   +Alexandria Hallpike B Negative  +Horizontal Canal Negative    Treatment     Sherrill received the treatments listed below:      therapeutic exercises to develop strength, endurance, ROM, flexibility, posture, and core stabilization for 30 minutes including:  Bike 6min  Gastroc st at steps  6" step up HHA x1 1x10 B  Lateral step up 6 step 1x10 B  High marching 2 laps  Reverse stepping 2 laps  Lateral stepping 2 laps  Matrix hip abd 40# 2x10  Matrix quad ext 15# 2x10  Trunk rot machine 20# 1x10 ea way  [place] carry holding 10# KB and horiz head turn  HS st and seated piriformis st    manual therapy techniques: Joint mobilizations, Manual traction, and Soft tissue Mobilization were applied to the: cervical spine for 0 minutes, including:  STM to UT,LS, cervical extensors  Cervical distraction  Subocc release   Supine PA Mobs to C-Spine  Supine UT Stretch   +FDN to Upper Trap by Liz " Rachel, PT      Neuromuscular Re-education for proprioception, balance for 15 minutes including:  +Standing VORx1 Vertical/Horizontal x10  ++Standing head shake and nod 10x ea LOB post   ++Standing Habituation with Step and Contralateral Head Turns x10    Patient Education and Home Exercises     Home Exercises Provided and Patient Education Provided     Education provided:   - yes, HEP    Written Home Exercises Provided: Patient instructed to cont prior HEP. Exercises were reviewed and Sherrill was able to demonstrate them prior to the end of the session.  Sherrill demonstrated good  understanding of the education provided. See EMR under Patient Instructions for exercises provided during therapy sessions    ASSESSMENT     Pt tolerated session well. Cont a combination of functional LE strengthening along with vestibular components to challenge dynamic balance. Noted Min sway following vertical interventions. Pt reports decreased pain and improved mobility post session. Pt would benefit from cont PT services to improve LE strength, ambulation endurance, activity tolerance, and pain management.     Sherrill Is progressing well towards her goals.   Pt prognosis is Good.     Pt will continue to benefit from skilled outpatient physical therapy to address the deficits listed in the problem list box on initial evaluation, provide pt/family education and to maximize pt's level of independence in the home and community environment.     Pt's spiritual, cultural and educational needs considered and pt agreeable to plan of care and goals.     Anticipated barriers to physical therapy: distance to clinic    Goals:  Short Term Goals:  In 6 weeks   1.Pt to be educated on HEP.  2.Patient to increase cervical ROM  to 60 deg, in order to improve available range of motion for ADL's.  3.Patient to increase UE/LE MMT strength by 1/2 grade, in order to improve endurance with activity.  4.Patient to have pain less than 4/10 at worst, in order to  improve QOL.     Long Term Goals: In 10 weeks  1. Patient to improve score on the FOTO to 40% or less, in order to improve QOL.  2. Patient to demo increase in UE/LE strength to 4+/5, in order to improve endurance with activity.  3. Patient to have decreased pain to 2/10 at worst, in order to improve QOL.  4. Patient to perform daily activities including getting up from chair and completing basic cleaning in home without increase in symptoms.   5. Patient to increase 30 second sit to stand to 9 repetitions, to decrease fall risk.        PLAN      Plan of care Certification: 9/26/2022 to 12/1/2022.     Outpatient Physical Therapy 2 times weekly for 10 weeks to include the following interventions: Aquatic Therapy, Cervical/Lumbar Traction, Electrical Stimulation lumbar/cervical, Manual Therapy, Moist Heat/ Ice, Neuromuscular Re-ed, Orthotic Management and Training, Patient Education, Self Care, Therapeutic Activities, Therapeutic Exercise, and dry needling .      Liz Ramos, PT

## 2022-11-01 NOTE — PROGRESS NOTES
Patient was seen for her first hearing aid follow-up. She reports she is doing well. Already knows she wants to keep her hearing aids. She is having a slight issue with streaming through the hearing aids while listening to an external bluetooth speaker. I explained the hearing aids would always try tot connect to the phone, so if an external speaker is also connected to the phone it will cause slight interruptions to sound.

## 2022-11-03 ENCOUNTER — TELEPHONE (OUTPATIENT)
Dept: PAIN MEDICINE | Facility: CLINIC | Age: 72
End: 2022-11-03
Payer: MEDICARE

## 2022-11-03 NOTE — TELEPHONE ENCOUNTER
Spoke with patient who states that she wants to cancel  her appt on 11/04/2022 and just speak with provider on her next scheduled appt, in 2 weeks

## 2022-11-04 ENCOUNTER — TELEPHONE (OUTPATIENT)
Dept: PSYCHIATRY | Facility: CLINIC | Age: 72
End: 2022-11-04
Payer: MEDICARE

## 2022-11-04 ENCOUNTER — PATIENT MESSAGE (OUTPATIENT)
Dept: PSYCHIATRY | Facility: CLINIC | Age: 72
End: 2022-11-04
Payer: MEDICARE

## 2022-11-07 ENCOUNTER — PATIENT MESSAGE (OUTPATIENT)
Dept: PSYCHIATRY | Facility: CLINIC | Age: 72
End: 2022-11-07
Payer: MEDICARE

## 2022-11-08 ENCOUNTER — CLINICAL SUPPORT (OUTPATIENT)
Dept: REHABILITATION | Facility: HOSPITAL | Age: 72
End: 2022-11-08
Payer: MEDICARE

## 2022-11-08 DIAGNOSIS — R29.898 WEAKNESS OF BOTH LOWER EXTREMITIES: Primary | ICD-10-CM

## 2022-11-08 DIAGNOSIS — M54.2 ACUTE NECK PAIN: ICD-10-CM

## 2022-11-08 PROCEDURE — 97140 MANUAL THERAPY 1/> REGIONS: CPT

## 2022-11-08 PROCEDURE — 97110 THERAPEUTIC EXERCISES: CPT

## 2022-11-08 PROCEDURE — 97112 NEUROMUSCULAR REEDUCATION: CPT

## 2022-11-08 NOTE — PROGRESS NOTES
"[facility]   Physical Therapy Treatment Note     Name: Sherrill Avery  Clinic Number: [id number]    Therapy Diagnosis:   Encounter Diagnoses   Name Primary?    Weakness of both lower extremities Yes    Acute neck pain        Physician: May Parmar*    Visit Date: 11/8/2022    Physician Orders: PT Eval and Treat   Medical Diagnosis from Referral: leg weakness and neck pain  Evaluation Date: 9/26/2022  Authorization Period Expiration: 9/13/2023  Plan of Care Expiration: 12/1/2022  Progress Note Due: 10th visit  Visit # / Visits authorized:4/ 20   FOTO: 1/ 3    PTA Visit #: 0/5     Time In: 1:45  Time Out: 2:30  Total Billable Time: 45 minutes    SUBJECTIVE     Pt reports:  burning pain in left knee just distal to patella after kneeling during reconciliation.     She was compliant with home exercise program.  Response to previous treatment: positive  Functional change: improved    Pain: 5/10  Location: bilateral knee  and neck      OBJECTIVE     Objective Measures updated at progress report unless specified.   +Alex[place]ria Hallpike B Negative  +Horizontal Canal Negative    Treatment     Sherrill received the treatments listed below:      therapeutic exercises to develop strength, endurance, ROM, flexibility, posture, and core stabilization for 15 minutes including:  Bike 6min  Gastroc st at steps  6" step up HHA x1 1x10 B  Lateral step up 6 step 1x10 B  High marching 2 laps  Reverse stepping 2 laps  Lateral stepping 2 laps  Alt taps 4" with [place] press foam 2x 30"  Sled push 2x  HS st and seated piriformis st    manual therapy techniques: Joint mobilizations, Manual traction, and Soft tissue Mobilization were applied to the: cervical spine for 15 minutes, including:  STM to UT,LS, cervical extensors  Cervical distraction  Subocc release   Supine PA Mobs to C-Spine  Supine UT Stretch   +FDN to Upper Trap       Neuromuscular Re-education for proprioception, balance for 15 minutes " including:  +Standing VORx1 Vertical/Horizontal x10  +Standing smooth pursuit Vertical/Horizontal x10  +Standing head shake and nod 10x ea LOB post   ++Standing Habituation with Step and Contralateral Head Turns x10    Patient Education and Home Exercises     Home Exercises Provided and Patient Education Provided     Education provided:   - yes, HEP    Written Home Exercises Provided: Patient instructed to cont prior HEP. Exercises were reviewed and Sherrill was able to demonstrate them prior to the end of the session.  Sherrill demonstrated good  understanding of the education provided. See EMR under Patient Instructions for exercises provided during therapy sessions    ASSESSMENT     Pt tolerated session well. Cont a combination of cervical stretching/mobility, functional LE strengthening along with vestibular components to challenge dynamic balance. Noted minimal sway following vertical interventions. Mobs and STM utilized to address tissue tension in right UT and LS. Pt reports decreased pain and improved mobility post session. Pt would benefit from cont PT services to improve LE strength, ambulation endurance, activity tolerance, and pain management.     Sherrill Is progressing well towards her goals.   Pt prognosis is Good.     Pt will continue to benefit from skilled outpatient physical therapy to address the deficits listed in the problem list box on initial evaluation, provide pt/family education and to maximize pt's level of independence in the home and community environment.     Pt's spiritual, cultural and educational needs considered and pt agreeable to plan of care and goals.     Anticipated barriers to physical therapy: distance to clinic    Goals:  Short Term Goals:  In 6 weeks   1.Pt to be educated on HEP.  2.Patient to increase cervical ROM  to 60 deg, in order to improve available range of motion for ADL's.  3.Patient to increase UE/LE MMT strength by 1/2 grade, in order to improve endurance with  activity.  4.Patient to have pain less than 4/10 at worst, in order to improve QOL.     Long Term Goals: In 10 weeks  1. Patient to improve score on the FOTO to 40% or less, in order to improve QOL.  2. Patient to demo increase in UE/LE strength to 4+/5, in order to improve endurance with activity.  3. Patient to have decreased pain to 2/10 at worst, in order to improve QOL.  4. Patient to perform daily activities including getting up from chair and completing basic cleaning in home without increase in symptoms.   5. Patient to increase 30 second sit to stand to 9 repetitions, to decrease fall risk.        PLAN      Plan of care Certification: 9/26/2022 to 12/1/2022.     Outpatient Physical Therapy 2 times weekly for 10 weeks to include the following interventions: Aquatic Therapy, Cervical/Lumbar Traction, Electrical Stimulation lumbar/cervical, Manual Therapy, Moist Heat/ Ice, Neuromuscular Re-ed, Orthotic Management and Training, Patient Education, Self Care, Therapeutic Activities, Therapeutic Exercise, and dry needling .      Liz Ramos, PT

## 2022-11-09 ENCOUNTER — OFFICE VISIT (OUTPATIENT)
Dept: PSYCHIATRY | Facility: CLINIC | Age: 72
End: 2022-11-09
Payer: MEDICARE

## 2022-11-09 VITALS
HEART RATE: 75 BPM | SYSTOLIC BLOOD PRESSURE: 149 MMHG | WEIGHT: 199.94 LBS | BODY MASS INDEX: 33.27 KG/M2 | DIASTOLIC BLOOD PRESSURE: 95 MMHG

## 2022-11-09 DIAGNOSIS — F32.A DEPRESSION, UNSPECIFIED DEPRESSION TYPE: Primary | ICD-10-CM

## 2022-11-09 PROCEDURE — 99999 PR PBB SHADOW E&M-EST. PATIENT-LVL II: ICD-10-PCS | Mod: PBBFAC,,, | Performed by: PSYCHIATRY & NEUROLOGY

## 2022-11-09 PROCEDURE — 99214 PR OFFICE/OUTPT VISIT, EST, LEVL IV, 30-39 MIN: ICD-10-PCS | Mod: S$PBB,,, | Performed by: PSYCHIATRY & NEUROLOGY

## 2022-11-09 PROCEDURE — 99214 OFFICE O/P EST MOD 30 MIN: CPT | Mod: S$PBB,,, | Performed by: PSYCHIATRY & NEUROLOGY

## 2022-11-09 PROCEDURE — 99999 PR PBB SHADOW E&M-EST. PATIENT-LVL II: CPT | Mod: PBBFAC,,, | Performed by: PSYCHIATRY & NEUROLOGY

## 2022-11-09 PROCEDURE — 99212 OFFICE O/P EST SF 10 MIN: CPT | Mod: PBBFAC | Performed by: PSYCHIATRY & NEUROLOGY

## 2022-11-09 RX ORDER — BUPROPION HYDROCHLORIDE 150 MG/1
150 TABLET ORAL DAILY
Qty: 30 TABLET | Refills: 2 | Status: SHIPPED | OUTPATIENT
Start: 2022-11-09 | End: 2023-02-06 | Stop reason: SDUPTHER

## 2022-11-09 NOTE — PROGRESS NOTES
"Outpatient Psychiatry Follow-up Visit (MD/NP)    11/9/2022    Sherrill Avery, a 72 y.o. female, presenting for follow-up visit. Met with patient.    Reason for Encounter: Follow-up, adjustment disorder.     Interval History: Patient seen and interviewed for psych reassessment, last seen about 4 years ago in context of cancer recurrence. Describes ongoing cancer, had radiation treatment for metastases to the lung in February. Has been getting follow-up monitoring since then. Ongoing recurrences in cancer, has had radiation,   Stressed by the situation. Tired and sad.  Cries frequently. Irritable. Tends to isolate.  Misinterprets people's comments, then regretful. Continues to see .    Background: This 67 y/o WF with hx of Renal CA (clear cell) dx'ed in '13, diagnosed with a recurrence in March '17 (spread to lung). Reports no treatment currently recommended. Was prescribed sertraline, ambien, xanax since receiving news of recurrence related to anxiety and sleep problems she's endorsed to other providers. She couldn't tolerate sertraline, has found the others helpful. Says "Cancer stays on my mind all the time". Takes xanax at most once daily, has increased use from about 10/month prior to recurrence to about twice that since. Reports sad less than half the time, generally with good interest & energy, & feels she's participating fully in life. Denies avoidance behaviors. Is participating in spiritual relationship with a renowned nun known for healing & consolation & she's considered their visits very therapeutic. No eating problems. Was prescribed sertraline (couldn't tolerate). She has been intermittently prescribed duloxetine for fibromyalgia, is back on it currently. PsychHx: as above. Xanax back to '13 around time of cancer diagnosis. No hx of suicidal thoughts, self-harm behaviors, paranoia, hallucinations, rebecca, psych hospitalizations. Past psych meds include sertraline (recently), escitalopram " '14, buspirone. MedHx: as above; HTN, fibromyalgia, bursitis, B sensorineural hearing loss. SocialHx: no problems with gestation, birth, infancy or early childhood development. Good student. Normal number of friends. Grew up with in [place] with parents. Lives with ; have 5 children & grandchildren & great-grandchildren. Good terms with kids, friends, neighbors, Mu-[employer]. Lives in [place].  x 35 years. Supportive relationship. SubstanceHx; rare alcohol, no illicits, no prescription misuse.     Review Of Systems:     GENERAL:  No weight gain or loss  SKIN:  No rashes or lacerations  HEAD:  No headaches  CHEST:  No shortness of breath, hyperventilation or cough  CARDIOVASCULAR:  No tachycardia or chest pain  ABDOMEN:  No nausea, vomiting, pain, constipation or diarrhea  URINARY:  No frequency, dysuria or sexual dysfunction  ENDOCRINE:  No polydipsia, polyuria  MUSCULOSKELETAL:  Joint pain  NEUROLOGIC:  No weakness, sensory changes, seizures, confusion, memory loss, tremor or other abnormal movements    Current Evaluation:     Nutritional Screening: Considering the patient's height and weight, medications, medical history and preferences, should a referral be made to the dietitian? no    Constitutional  Vitals:  Most recent vital signs, dated less than 90 days prior to this appointment, were not reviewed.    Vitals:    11/09/22 1301   BP: (!) 149/95   Pulse: 75   Weight: 90.7 kg (199 lb 15.3 oz)        General:  unremarkable, age appropriate     Musculoskeletal  Muscle Strength/Tone:  no dystonia, no tremor   Gait & Station:  non-ataxic     Psychiatric  Appearance: casually dressed & groomed;   Behavior: calm,   Cooperation: cooperative with assessment  Speech: normal rate, volume, tone  Thought Process: linear, goal-directed  Thought Content: No suicidal or homicidal ideation; no delusions  Affect: tearful  Mood: mildly dysphoric  Perceptions: No auditory or visual hallucinations  Level  of Consciousness: alert throughout interview  Insight: fair  Cognition: Oriented to person, place, time, & situation  Memory: no apparent deficits to general clinical interview; not formally assessed  Attention/Concentration: no apparent deficits to general clinical interview; not formally assessed  Fund of Knowledge: average by vocabulary/education    Laboratory Data  No visits with results within 1 Month(s) from this visit.   Latest known visit with results is:   Lab Visit on 10/05/2022   Component Date Value Ref Range Status    Sodium 10/05/2022 138  136 - 145 mmol/L Final    Potassium 10/05/2022 4.2  3.5 - 5.1 mmol/L Final    Chloride 10/05/2022 106  95 - 110 mmol/L Final    CO2 10/05/2022 25  23 - 29 mmol/L Final    Glucose 10/05/2022 86  70 - 110 mg/dL Final    BUN 10/05/2022 10  8 - 23 mg/dL Final    Creatinine 10/05/2022 0.8  0.5 - 1.4 mg/dL Final    Calcium 10/05/2022 8.4 (L)  8.7 - 10.5 mg/dL Final    Total Protein 10/05/2022 6.1  6.0 - 8.4 g/dL Final    Albumin 10/05/2022 3.6  3.5 - 5.2 g/dL Final    Total Bilirubin 10/05/2022 0.7  0.1 - 1.0 mg/dL Final    Alkaline Phosphatase 10/05/2022 78  55 - 135 U/L Final    AST 10/05/2022 15  10 - 40 U/L Final    ALT 10/05/2022 14  10 - 44 U/L Final    Anion Gap 10/05/2022 7 (L)  8 - 16 mmol/L Final    eGFR 10/05/2022 >60.0  >60 mL/min/1.73 m^2 Final    Cholesterol 10/05/2022 180  120 - 199 mg/dL Final    Triglycerides 10/05/2022 89  30 - 150 mg/dL Final    HDL 10/05/2022 62  40 - 75 mg/dL Final    LDL Cholesterol 10/05/2022 100.2  63.0 - 159.0 mg/dL Final    HDL/Cholesterol Ratio 10/05/2022 34.4  20.0 - 50.0 % Final    Total Cholesterol/HDL Ratio 10/05/2022 2.9  2.0 - 5.0 Final    Non-HDL Cholesterol 10/05/2022 118  mg/dL Final         Medications  Outpatient Encounter Medications as of 11/9/2022   Medication Sig Dispense Refill    ALPRAZolam (XANAX) 0.5 MG tablet Take daily as needed for anxiety. 30 tablet 5    butalbital-acetaminophen-caffeine -40 mg  (FIORICET, ESGIC) -40 mg per tablet TAKE 1 TABLET EVERY 4 HOURS AS NEEDED 30 tablet 0    cetirizine (ZYRTEC) 10 MG tablet Take 10 mg by mouth daily as needed.      clotrimazole (LOTRIMIN) 1 % Soln Apply topically 2 (two) times daily. for 7 days 15 mL 5    diclofenac sodium (VOLTAREN) 1 % Gel Apply 2 g topically daily as needed. 100 g 11    diflorasone-emollient (APEXICON E) 0.05 % Crea Apply 1 application topically once daily. for 7 days 30 g 5    estradioL (ESTRACE) 1 MG tablet Take 1 tablet (1 mg total) by mouth once daily. 90 tablet 3    fluocinonide (LIDEX) 0.05 % external solution Apply topically 2 (two) times daily. Do not use morning of surgery      fluticasone propionate (FLONASE) 50 mcg/actuation nasal spray 2 sprays (100 mcg total) by Each Nostril route once daily. 3 mL 3    gabapentin (NEURONTIN) 300 MG capsule Take 1 capsule (300 mg total) by mouth 3 (three) times daily. 270 capsule 4    hydrocortisone 2.5 % cream Apply topically 2 (two) times daily. apply to affected area for 7 days 20 g 5    ketoconazole (NIZORAL) 2 % shampoo Do not use morning of surgery      levothyroxine (SYNTHROID) 112 MCG tablet TAKE 1 TABLET (112 MCG TOTAL) BY MOUTH BEFORE BREAKFAST AS SCHEDULED 90 tablet 4    metronidazole 1% (METROGEL) 1 % Gel Apply topically once daily. 60 g 5    omeprazole (PRILOSEC) 10 MG capsule Take 10 mg by mouth.      ondansetron (ZOFRAN) 4 MG tablet       oxymetazoline (AFRIN) 0.05 % nasal spray 2 sprays by Nasal route 2 (two) times daily.      pravastatin (PRAVACHOL) 20 MG tablet TAKE 1 TABLET EVERY DAY 90 tablet 0    senna-docusate 8.6-50 mg (PERICOLACE) 8.6-50 mg per tablet Take 1 tablet by mouth. Hold of surgery      zolpidem (AMBIEN) 5 MG Tab Take 1 tablet (5 mg total) by mouth nightly as needed. 90 tablet 0     Facility-Administered Encounter Medications as of 11/9/2022   Medication Dose Route Frequency Provider Last Rate Last Admin    triamcinolone acetonide injection 40 mg  40 mg  Intra-articular  Osmar Avery III, MD   40 mg at 05/17/22 3222     Assessment - Diagnosis - Goals:     Impression: 71 y/o WF with anxiety disorder with adjustment component disorder in context of uncertainty in cancer treatment outcome. Some intermittent problems with sleep, anxiety related to diagnosis/prognosis, existential/mortality related issues. Gets good benefit from spiritual counseling and is quite satisfied with care. Hasn't tolerated several monoaminergic meds, gets benefit from prn benzos, doesn't have a daily need for a prn.     Dx: depression.     Treatment Goals:  Specify outcomes written in observable, behavioral terms:   Comfort and assist in adjustment to diagnosis/treatment/prognosis.     Treatment Plan/Recommendations:     Trial of bupropion.   Encouraged psychotherapy. Patient gets spiritual counseling. Have given her information on how to access and make good use of professional therapy.    Takes prn zolpidem, prn alprazolam. Discussed risks, benefits, and alternatives to treatment plan documented above with patient. I answered all patient questions related to this plan and patient expressed understanding and agreement.     Return to Clinic: 2 months    MICHELLE Asif MD

## 2022-11-10 ENCOUNTER — CLINICAL SUPPORT (OUTPATIENT)
Dept: REHABILITATION | Facility: HOSPITAL | Age: 72
End: 2022-11-10
Payer: MEDICARE

## 2022-11-10 DIAGNOSIS — R29.898 WEAKNESS OF BOTH LOWER EXTREMITIES: Primary | ICD-10-CM

## 2022-11-10 DIAGNOSIS — M54.2 ACUTE NECK PAIN: ICD-10-CM

## 2022-11-10 PROCEDURE — 97110 THERAPEUTIC EXERCISES: CPT

## 2022-11-10 PROCEDURE — 97140 MANUAL THERAPY 1/> REGIONS: CPT

## 2022-11-10 NOTE — PROGRESS NOTES
[facility]   Physical Therapy Treatment Note     Name: Sherrill Avery  Clinic Number: [id number]    Therapy Diagnosis:   Encounter Diagnoses   Name Primary?    Weakness of both lower extremities Yes    Acute neck pain          Physician: May Parmar*    Visit Date: 11/10/2022    Physician Orders: PT Eval and Treat   Medical Diagnosis from Referral: leg weakness and neck pain  Evaluation Date: 9/26/2022  Authorization Period Expiration: 9/13/2023  Plan of Care Expiration: 12/1/2022  Progress Note Due: 10th visit  Visit # / Visits authorized: 5/20   FOTO: 1/ 3    PTA Visit #:    Time In: 1:40  Time Out: 2:18  Total Billable Time: 38 minutes    SUBJECTIVE     Pt reports: neck felt much better after Liz worked on it last visit. Was able to sleep well.   She was compliant with home exercise program.  Response to previous treatment: positive  Functional change: improved    Pain: 5/10  Location: bilateral knee  and neck      OBJECTIVE     Objective Measures updated at progress report unless specified.   +[place] Hallpike B Negative  +Horizontal Canal Negative    Treatment     Sherrill received the treatments listed below:      therapeutic exercises to develop strength, endurance, ROM, flexibility, posture, and core stabilization for 28 minutes including:  Bike 6min  High marching 3 laps  Reverse stepping 3 laps  Backwards walking 3 laps  Shuttle squats 5 cords 3 min  Torso rotation machine     manual therapy techniques: Joint mobilizations, Manual traction, and Soft tissue Mobilization were applied to the: cervical spine for 10 minutes, including:  STM to UT,LS, cervical extensors  Cervical distraction  Subocc release   Supine PA Mobs to C-Spine  Supine UT Stretch       Neuromuscular Re-education for proprioception, balance for 00 minutes including:  +Standing VORx1 Vertical/Horizontal x10  +Standing smooth pursuit Vertical/Horizontal x10  +Standing head shake and nod 10x ea LOB post    ++Standing Habituation with Step and Contralateral Head Turns x10    Patient Education and Home Exercises     Home Exercises Provided and Patient Education Provided     Education provided:   - yes, HEP    Written Home Exercises Provided: Patient instructed to cont prior HEP. Exercises were reviewed and Sherrill was able to demonstrate them prior to the end of the session.  Sherrill demonstrated good  understanding of the education provided. See EMR under Patient Instructions for exercises provided during therapy sessions    ASSESSMENT     Pt tolerated session well. Cont a combination of cervical stretching/mobility and functional LE strengthening, and pt reported neck felt better after session today.    Sherrill Is progressing well towards her goals.   Pt prognosis is Good.     Pt will continue to benefit from skilled outpatient physical therapy to address the deficits listed in the problem list box on initial evaluation, provide pt/family education and to maximize pt's level of independence in the home and community environment.     Pt's spiritual, cultural and educational needs considered and pt agreeable to plan of care and goals.     Anticipated barriers to physical therapy: distance to clinic    Goals:  Short Term Goals:  In 6 weeks   1.Pt to be educated on HEP.  2.Patient to increase cervical ROM  to 60 deg, in order to improve available range of motion for ADL's.  3.Patient to increase UE/LE MMT strength by 1/2 grade, in order to improve endurance with activity.  4.Patient to have pain less than 4/10 at worst, in order to improve QOL.     Long Term Goals: In 10 weeks  1. Patient to improve score on the FOTO to 40% or less, in order to improve QOL.  2. Patient to demo increase in UE/LE strength to 4+/5, in order to improve endurance with activity.  3. Patient to have decreased pain to 2/10 at worst, in order to improve QOL.  4. Patient to perform daily activities including getting up from chair and completing basic  cleaning in home without increase in symptoms.   5. Patient to increase 30 second sit to stand to 9 repetitions, to decrease fall risk.        PLAN      Plan of care Certification: 9/26/2022 to 12/1/2022.     Outpatient Physical Therapy 2 times weekly for 10 weeks to include the following interventions: Aquatic Therapy, Cervical/Lumbar Traction, Electrical Stimulation lumbar/cervical, Manual Therapy, Moist Heat/ Ice, Neuromuscular Re-ed, Orthotic Management and Training, Patient Education, Self Care, Therapeutic Activities, Therapeutic Exercise, and dry needling .      Sarah Vines, PT

## 2022-11-14 ENCOUNTER — DOCUMENTATION ONLY (OUTPATIENT)
Dept: REHABILITATION | Facility: HOSPITAL | Age: 72
End: 2022-11-14

## 2022-11-14 ENCOUNTER — CLINICAL SUPPORT (OUTPATIENT)
Dept: REHABILITATION | Facility: HOSPITAL | Age: 72
End: 2022-11-14
Payer: MEDICARE

## 2022-11-14 DIAGNOSIS — M54.2 ACUTE NECK PAIN: ICD-10-CM

## 2022-11-14 DIAGNOSIS — R29.898 WEAKNESS OF BOTH LOWER EXTREMITIES: Primary | ICD-10-CM

## 2022-11-14 PROCEDURE — 97140 MANUAL THERAPY 1/> REGIONS: CPT | Mod: PN,CQ

## 2022-11-14 PROCEDURE — 97110 THERAPEUTIC EXERCISES: CPT | Mod: PN,CQ

## 2022-11-14 NOTE — PROGRESS NOTES
[facility]   Physical Therapy Assistant Treatment Note     Name: Sherrill Avery  Clinic Number: [id number]    Therapy Diagnosis:   Encounter Diagnoses   Name Primary?    Weakness of both lower extremities Yes    Acute neck pain            Physician: May Parmar*    Visit Date: 11/14/2022    Physician Orders: PT Eval and Treat   Medical Diagnosis from Referral: leg weakness and neck pain  Evaluation Date: 9/26/2022  Authorization Period Expiration: 9/13/2023  Plan of Care Expiration: 12/1/2022  Progress Note Due: 10th visit  Visit # / Visits authorized: 6/20   FOTO: 2/ 3       CMS Impairment/Limitation/Restriction for FOTO Neck Survey     Therapist reviewed FOTO scores for Sherrill Avery on 11/14/2022.   FOTO documents entered into EPIC - see Media section.     Limitation Score: 48%         PTA Visit #: 1    Time In: 1:55  Time Out: 2:50  Total Billable Time: 45 minutes    SUBJECTIVE     Pt reports: her neck hurts her especially when sleeping. She wants to see a massage therapist. She wants to focus more on her neck pain and avoid eye exercises today.  She was compliant with home exercise program.  Response to previous treatment: positive   Functional change: improved    Pain: 4/10  Location: bilateral knee  and neck      OBJECTIVE     Objective Measures updated at progress report unless specified.   +[place] Hallpike B Negative  +Horizontal Canal Negative    Treatment     Sherrill received the treatments listed below:      therapeutic exercises to develop strength, endurance, ROM, flexibility, posture, and core stabilization for 30 minutes including:  Bike 6 min for mobility and joint nutrition  High marching 3 laps  Reverse stepping 3 laps  Backwards walking 3 laps  Shuttle squats 6 cords 3 min  Torso rotation machine 20# x10    manual therapy techniques: Joint mobilizations, Manual traction, and Soft tissue Mobilization were applied to the: cervical spine for 15 minutes, including:  STM  to UT,LS, cervical extensors  Cervical distraction  Subocc release   Supine UT Stretch       Neuromuscular Re-education for proprioception, balance for 00 minutes including:  +Standing VORx1 Vertical/Horizontal x10  +Standing smooth pursuit Vertical/Horizontal x10  +Standing head shake and nod 10x ea LOB post   ++Standing Habituation with Step and Contralateral Head Turns x10    Moist heat to neck x10 minutes.    Patient Education and Home Exercises     Home Exercises Provided and Patient Education Provided     Education provided:   - yes, HEP    Written Home Exercises Provided: Patient instructed to cont prior HEP. Exercises were reviewed and Sherrill was able to demonstrate them prior to the end of the session.  Sherrill demonstrated good  understanding of the education provided. See EMR under Patient Instructions for exercises provided during therapy sessions    ASSESSMENT     Pt presents with mild complaints of neck and knee pain. Her neck limits her with sleeping but she reports improvement in pain levels with manual therapy addressing myofascial tension and pain. She participated in exercises for postural and hip strengthening exercises without any complaints of pain. Cont a combination of cervical stretching/mobility and functional LE strengthening, and pt reported neck felt better after session today.     Sherrill Is progressing well towards her goals.   Pt prognosis is Good.     Pt will continue to benefit from skilled outpatient physical therapy to address the deficits listed in the problem list box on initial evaluation, provide pt/family education and to maximize pt's level of independence in the home and community environment.     Pt's spiritual, cultural and educational needs considered and pt agreeable to plan of care and goals.     Anticipated barriers to physical therapy: distance to clinic    Goals:  Short Term Goals:  In 6 weeks   1.Pt to be educated on HEP.  2.Patient to increase cervical ROM  to 60 deg,  in order to improve available range of motion for ADL's.  3.Patient to increase UE/LE MMT strength by 1/2 grade, in order to improve endurance with activity.  4.Patient to have pain less than 4/10 at worst, in order to improve QOL.     Long Term Goals: In 10 weeks  1. Patient to improve score on the FOTO to 40% or less, in order to improve QOL.  2. Patient to demo increase in UE/LE strength to 4+/5, in order to improve endurance with activity.  3. Patient to have decreased pain to 2/10 at worst, in order to improve QOL.  4. Patient to perform daily activities including getting up from chair and completing basic cleaning in home without increase in symptoms.   5. Patient to increase 30 second sit to stand to 9 repetitions, to decrease fall risk.        PLAN      Plan of care Certification: 9/26/2022 to 12/1/2022.     Outpatient Physical Therapy 2 times weekly for 10 weeks to include the following interventions: Aquatic Therapy, Cervical/Lumbar Traction, Electrical Stimulation lumbar/cervical, Manual Therapy, Moist Heat/ Ice, Neuromuscular Re-ed, Orthotic Management and Training, Patient Education, Self Care, Therapeutic Activities, Therapeutic Exercise, and dry needling .      Nancy Jimenez, PTA

## 2022-11-15 ENCOUNTER — PATIENT MESSAGE (OUTPATIENT)
Dept: HEMATOLOGY/ONCOLOGY | Facility: CLINIC | Age: 72
End: 2022-11-15
Payer: MEDICARE

## 2022-11-15 ENCOUNTER — TELEPHONE (OUTPATIENT)
Dept: HEMATOLOGY/ONCOLOGY | Facility: CLINIC | Age: 72
End: 2022-11-15
Payer: MEDICARE

## 2022-11-15 NOTE — TELEPHONE ENCOUNTER
I spoke to patient. She would only like to see Dr. Jaquez. I scheduled her for 12/5/22 at 10 a.m. I told her if anyone cancels prior to that appointment, I would call to see if she can come in. She was agreeable to that.

## 2022-11-18 ENCOUNTER — OFFICE VISIT (OUTPATIENT)
Dept: PRIMARY CARE CLINIC | Facility: CLINIC | Age: 72
End: 2022-11-18
Payer: MEDICARE

## 2022-11-18 DIAGNOSIS — E78.00 HYPERCHOLESTEREMIA: Primary | ICD-10-CM

## 2022-11-18 DIAGNOSIS — I10 PRIMARY HYPERTENSION: ICD-10-CM

## 2022-11-18 DIAGNOSIS — Z00.6 PATIENT IN CANCER RELATED RESEARCH STUDY: ICD-10-CM

## 2022-11-18 DIAGNOSIS — E66.9 OBESITY, UNSPECIFIED CLASSIFICATION, UNSPECIFIED OBESITY TYPE, UNSPECIFIED WHETHER SERIOUS COMORBIDITY PRESENT: ICD-10-CM

## 2022-11-18 DIAGNOSIS — E03.9 HYPOTHYROIDISM, UNSPECIFIED TYPE: ICD-10-CM

## 2022-11-18 DIAGNOSIS — C64.9 CARCINOMA OF KIDNEY, UNSPECIFIED LATERALITY: ICD-10-CM

## 2022-11-18 PROCEDURE — 99214 PR OFFICE/OUTPT VISIT, EST, LEVL IV, 30-39 MIN: ICD-10-PCS | Mod: 95,,, | Performed by: PHYSICIAN ASSISTANT

## 2022-11-18 PROCEDURE — 99214 OFFICE O/P EST MOD 30 MIN: CPT | Mod: 95,,, | Performed by: PHYSICIAN ASSISTANT

## 2022-11-18 NOTE — PROGRESS NOTES
Subjective:       Patient ID: Sherrill Avery is a 72 y.o. female.    HPI    Lifestyle related medical issues:         The patient location is:  home   The chief complaint leading to consultation is: weight     Visit type: audiovisual    Face to Face time with patient:  30 mins   35 minutes of total time spent on the encounter, which includes face to face time and non-face to face time preparing to see the patient (eg, review of tests), Obtaining and/or reviewing separately obtained history, Documenting clinical information in the electronic or other health record, Independently interpreting results (not separately reported) and communicating results to the patient/family/caregiver, or Care coordination (not separately reported).         Each patient to whom he or she provides medical services by telemedicine is:  (1) informed of the relationship between the physician and patient and the respective role of any other health care provider with respect to management of the patient; and (2) notified that he or she may decline to receive medical services by telemedicine and may withdraw from such care at any time.    Notes:    Past Medical History:   Diagnosis Date    Anxiety     Family history of malignant melanoma 8/25/2014    Fibromyalgia affecting lower leg     GERD (gastroesophageal reflux disease)     Hypercholesteremia     Hypertension     Hypothyroidism     Inflamed seborrheic keratosis 8/25/2014    Microscopic hematuria 2/2/2017    Multiple benign melanocytic nevi 8/25/2014     Social Determinants of Health with Concerns     Alcohol Use: Not on file   Financial Resource Strain: Not on file   Food Insecurity: Not on file   Transportation Needs: Not on file   Physical Activity: Not on file   Stress: Not on file   Social Connections: Not on file   Housing Stability: Not on file     Past Surgical History:   Procedure Laterality Date    AUGMENTATION OF BREAST      bladder lift      breast implants      BREAST SURGERY  Bilateral 1996    saline implants    COLONOSCOPY  2007    HYSTERECTOMY      ectopic pregnancy x 2, with LSO    KNEE ARTHROPLASTY Left 6/14/2021    Procedure: ARTHROPLASTY, KNEE:LEFT:DEPUY-SIGMA;  Surgeon: Osmar Avery III, MD;  Location: [facility];  Service: Orthopedics;  Laterality: Left;    LAPAROSCOPIC NEPHRECTOMY, HAND ASSISTED  07/25/2013    LUNG REMOVAL, PARTIAL Left 2020    At [facility]    NEPHRECTOMY      OOPHORECTOMY      x1    RADIOFREQUENCY ABLATION Left 8/30/2022    Procedure: RADIOFREQUENCY ABLATION, LEFT KNEE COOLED;  Surgeon: Vijaya Landin MD;  Location: [facility];  Service: Pain Management;  Laterality: Left;    RADIOFREQUENCY ABLATION Right 10/25/2022    Procedure: RADIOFREQUENCY ABLATION RIGHT KNEE GENICULAR COOLED;  Surgeon: Vijaya Landin MD;  Location: [facility];  Service: Pain Management;  Laterality: Right;    right ganglion cyst      throat polyp      TRANSOBTURATOR SLING  2011    with RSO for persistent complex cyst & MARGOT; done by arndt    UPPER GASTROINTESTINAL ENDOSCOPY  2007     Family History   Problem Relation Age of Onset    Diabetes Mother     Hypertension Mother     Heart disease Mother     Cancer Father         lung    Migraines Father     Glaucoma Paternal Grandmother     Glaucoma Sister     Thyroid disease Neg Hx     Asthma Neg Hx          Physical activity: low   Diet: varied   But eats variety of things   Fast food at times     Weight goal 5-10% wt loss     Wt Readings from Last 3 Encounters:   10/31/22 89.6 kg (197 lb 8.5 oz)   10/25/22 88.5 kg (195 lb)   10/11/22 88 kg (194 lb)         Current Outpatient Medications   Medication Instructions    ALPRAZolam (XANAX) 0.5 MG tablet Take daily as needed for anxiety.    buPROPion (WELLBUTRIN XL) 150 mg, Oral, Daily    butalbital-acetaminophen-caffeine -40 mg (FIORICET, ESGIC) -40 mg per tablet TAKE 1 TABLET EVERY 4 HOURS AS NEEDED    cetirizine (ZYRTEC) 10 mg, Oral, Daily PRN    clotrimazole  (LOTRIMIN) 1 % Soln Topical (Top), 2 times daily    diclofenac sodium (VOLTAREN) 2 g, Topical (Top), Daily PRN    diflorasone-emollient (APEXICON E) 0.05 % Crea 1 application, Topical (Top), Daily    estradioL (ESTRACE) 1 mg, Oral, Daily    fluocinonide (LIDEX) 0.05 % external solution Topical (Top), 2 times daily, Do not use morning of surgery    fluticasone propionate (FLONASE) 100 mcg, Each Nostril, Daily    gabapentin (NEURONTIN) 300 mg, Oral, 3 times daily    hydrocortisone 2.5 % cream Topical (Top), 2 times daily, apply to affected area    ketoconazole (NIZORAL) 2 % shampoo Do not use morning of surgery    levothyroxine (SYNTHROID) 112 MCG tablet TAKE 1 TABLET (112 MCG TOTAL) BY MOUTH BEFORE BREAKFAST AS SCHEDULED    metronidazole 1% (METROGEL) 1 % Gel Topical (Top), Daily    omeprazole (PRILOSEC) 10 mg, Oral    ondansetron (ZOFRAN) 4 MG tablet No dose, route, or frequency recorded.    oxymetazoline (AFRIN) 0.05 % nasal spray 2 sprays, Nasal, 2 times daily    pravastatin (PRAVACHOL) 20 MG tablet TAKE 1 TABLET EVERY DAY    senna-docusate 8.6-50 mg (PERICOLACE) 8.6-50 mg per tablet 1 tablet, Oral, Hold of surgery    zolpidem (AMBIEN) 5 mg, Oral, Nightly PRN            Review of Systems   Constitutional:  Negative for fatigue, fever and unexpected weight change.   HENT:  Negative for sore throat and trouble swallowing.    Respiratory:  Negative for cough and shortness of breath.    Cardiovascular:  Negative for chest pain.   Gastrointestinal:  Negative for abdominal pain.   Hematological:  Negative for adenopathy. Does not bruise/bleed easily.   All other systems reviewed and are negative.    Objective:   There were no vitals taken for this visit.     Physical Exam  Constitutional:       Appearance: Normal appearance. She is obese.   HENT:      Head: Normocephalic and atraumatic.   Pulmonary:      Effort: Pulmonary effort is normal.   Neurological:      General: No focal deficit present.      Mental Status: She  is alert and oriented to person, place, and time.         Lab Results   Component Value Date    WBC 5.58 08/17/2021    HGB 13.8 08/17/2021    HCT 42.4 08/17/2021     08/17/2021    CHOL 180 10/05/2022    TRIG 89 10/05/2022    HDL 62 10/05/2022    ALT 14 10/05/2022    AST 15 10/05/2022     10/05/2022    K 4.2 10/05/2022     10/05/2022    CREATININE 0.8 10/05/2022    BUN 10 10/05/2022    CO2 25 10/05/2022    TSH 2.339 08/21/2020    INR 0.9 06/08/2021    HGBA1C 5.3 07/23/2014       Assessment:       1. Hypercholesteremia    2. Obesity, unspecified classification, unspecified obesity type, unspecified whether serious comorbidity present    3. Hypothyroidism, unspecified type    4. Carcinoma of kidney, unspecified laterality    5. Primary hypertension    6. Patient in cancer related research study          Plan:   Hypercholesteremia    Obesity, unspecified classification, unspecified obesity type, unspecified whether serious comorbidity present  -     Ambulatory referral/consult to [facility]    Hypothyroidism, unspecified type    Carcinoma of kidney, unspecified laterality    Primary hypertension    Patient in cancer related research study        4 week goal     Increase water!   Some physical activity - walking daily   Diet high in fruits/veggies, low or no added sugars, simple carbs, and heavily processed foods   Meditation, mindfulness     Time spent: 30 minutes in face to face and with review prior and after of chart notes from specialists, in regards to diagnosis, prognosis, review of lab and test results, benefits of treatment as well as management of disease, counseling of patient and coordination of care between various health care providers .

## 2022-11-21 ENCOUNTER — PATIENT MESSAGE (OUTPATIENT)
Dept: PRIMARY CARE CLINIC | Facility: CLINIC | Age: 72
End: 2022-11-21
Payer: MEDICARE

## 2022-12-04 ENCOUNTER — PATIENT MESSAGE (OUTPATIENT)
Dept: PRIMARY CARE CLINIC | Facility: CLINIC | Age: 72
End: 2022-12-04
Payer: MEDICARE

## 2022-12-05 ENCOUNTER — OFFICE VISIT (OUTPATIENT)
Dept: HEMATOLOGY/ONCOLOGY | Facility: CLINIC | Age: 72
End: 2022-12-05
Payer: MEDICARE

## 2022-12-05 ENCOUNTER — TELEPHONE (OUTPATIENT)
Dept: HEMATOLOGY/ONCOLOGY | Facility: CLINIC | Age: 72
End: 2022-12-05

## 2022-12-05 VITALS
OXYGEN SATURATION: 98 % | WEIGHT: 199.31 LBS | HEIGHT: 66 IN | TEMPERATURE: 99 F | SYSTOLIC BLOOD PRESSURE: 133 MMHG | DIASTOLIC BLOOD PRESSURE: 76 MMHG | BODY MASS INDEX: 32.03 KG/M2 | RESPIRATION RATE: 20 BRPM | HEART RATE: 70 BPM

## 2022-12-05 DIAGNOSIS — C80.1 CLEAR CELL CARCINOMA: Primary | ICD-10-CM

## 2022-12-05 DIAGNOSIS — Z90.5 HISTORY OF RIGHT NEPHRECTOMY: ICD-10-CM

## 2022-12-05 DIAGNOSIS — C64.1 CARCINOMA OF RIGHT KIDNEY: ICD-10-CM

## 2022-12-05 DIAGNOSIS — C64.9 CARCINOMA OF KIDNEY, UNSPECIFIED LATERALITY: ICD-10-CM

## 2022-12-05 PROCEDURE — 99999 PR PBB SHADOW E&M-EST. PATIENT-LVL IV: CPT | Mod: PBBFAC,,, | Performed by: INTERNAL MEDICINE

## 2022-12-05 PROCEDURE — 99215 PR OFFICE/OUTPT VISIT, EST, LEVL V, 40-54 MIN: ICD-10-PCS | Mod: S$PBB,,, | Performed by: INTERNAL MEDICINE

## 2022-12-05 PROCEDURE — 99999 PR PBB SHADOW E&M-EST. PATIENT-LVL IV: ICD-10-PCS | Mod: PBBFAC,,, | Performed by: INTERNAL MEDICINE

## 2022-12-05 PROCEDURE — 99215 OFFICE O/P EST HI 40 MIN: CPT | Mod: S$PBB,,, | Performed by: INTERNAL MEDICINE

## 2022-12-05 PROCEDURE — 99214 OFFICE O/P EST MOD 30 MIN: CPT | Mod: PBBFAC | Performed by: INTERNAL MEDICINE

## 2022-12-05 RX ORDER — OXYCODONE AND ACETAMINOPHEN 5; 325 MG/1; MG/1
1 TABLET ORAL EVERY 6 HOURS PRN
Qty: 30 EACH | Refills: 0 | Status: SHIPPED | OUTPATIENT
Start: 2022-12-05 | End: 2022-12-05 | Stop reason: SDUPTHER

## 2022-12-05 RX ORDER — OXYCODONE AND ACETAMINOPHEN 5; 325 MG/1; MG/1
1 TABLET ORAL EVERY 6 HOURS PRN
Qty: 30 EACH | Refills: 0 | Status: SHIPPED | OUTPATIENT
Start: 2022-12-05

## 2022-12-05 NOTE — TELEPHONE ENCOUNTER
----- Message from Shira Ahn sent at 12/5/2022 12:15 PM CST -----  Regarding: Refill  oxyCODONE-acetaminophen (PERCOCET) 5-325 mg per tablet    Pt is calling to get medication sent to another pharmacy. [facility] do not have medications in stock(all). Asking for urgent call back      [address] [address] [facility]  [facility] - [place], [place] - [address]  [address]  [facility] [address]  Phone: [phone] Fax: [phone]

## 2022-12-05 NOTE — PROGRESS NOTES
Subjective:       Patient ID: Sherrill Avery    Chief Complaint:  Met ccRCC    HPI     Sherrill Avery is a 72 y.o. 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 She is s/p left VATS LLL wedge resection on 1/29/20.     Here to follow-up Currently NOT on therapy. Disease has been indolent.      Saw Dr. Leslie at [facility], PET scan 6/2022 shows post-therapy changes in the right lung and no other sites concerning for metastases. CT CAP 9/2022 shows multiple stable pulmonary nodules, DC 0.5 x 0.6 cm pulmonary nodule is slightly larger. Indeterminate 8 mm intramuscular lesion (previously 6 mm) may represent metastatic disease. Now intermediate - constant L rib pain + chronic intermittent back pain that she has noted since VATS. Gabapentin has relieved some symptoms.     Saw Dr. Leslie 9/2022, has scans, IR biopsy and visit scheduled 1/2023. Underwent knee replacement surgery of L in [place], had prolonged recovery. Seeing PT which helps the pain (last saw 3 weeks ago). Sees pain mgmt at [facility]. Recently established with psych onc at [facility]. Considering SBRT to the lung, sees Dr. Mustafa at [facility] from XRT but is concerned about starting on trial. Plan for IR biopsy of muscle lesion 1/2023.     Pt seeing [facility] Janessa, the healing [facility].  Vipin Leslie at [facility].     Oncologic History:    Clear cell carcinoma of right kidney.    6/30/2013 Initial Diagnosis   Presented with gross hematuria. CT CAP: 7.2-cm mass occupying the upper two thirds of the right kidney. 2.8-cm mass in the posterior aspect of the urinary bladder c/w TCC.    7/25/2013 Surgery   Right radical nephrectomy Pathology: 7.5-cm clear cell RCC Yanet nuclear grade 2, with microscopic extension into perinephric adipose tissue and with tumor in the renal margin.  pT3a pN0 pMX    10/16/2013 - No Evidence of Disease ([facility])   CT AP: No CT evidence of recurrence or metastatic disease    8/4/2015 - No  Evidence of Disease ([facility])   CT AP: No CT evidence of recurrence or metastatic disease    12/1/2016 - [facility] with concern of recurrence   CT AP: Mew less than 4 mm pulmonary nodule in the anterior basal segment of the RLL. Stable 4 mm pulmonary nodule posterior basal segment RLL.     3/1/2017 Relapse/Recurrence   CT Chest: Multiple new subcm pulmonary nodules in the RLL and one in the right middle measuring up to 6 mm suspicious for metastatic disease.     2/8/2019 - [facility] with concern of recurrence   1. Small volume pulmonary metastases are slightly larger compared to the prior study.     2. No recurrent or metastatic disease in the abdomen or pelvis.    2/14/2019 Biopsy   CT-guided biopsy of a 1 cm left lower lobe lesion   Pathology: Small focus of atypical adenomatous hyperplasia with no tumor present. PAX-8 negative.    She was initally noted to have spontaneous remission of the lung nodules in the absence of any systemic treatment. However, in 2019, the lung nodules at started to increase in size but were still mostly subcentimeter in greatest dimension. In 2/2019, when the left lower lobe lesion increased to 1.4 cm. IR biopsied a peripheral left lung lesion which was PAX-8 negative thought to be a small focus of atypical adenomatous hyperplasia. We therefore brought her back after only 3 months to monitor those lesions carefully. She was referred to Dr. King who thinks the lesions represent indolent RCC metastases. She underwent resection and RCC was confirmed on pathology at [facility].      Review of Systems   Constitutional: Negative for activity change, appetite change, chills, fatigue, fever and unexpected weight change.   HENT: Negative for congestion, hearing loss, mouth sores, sore throat, tinnitus and voice change.    Eyes: Negative for pain and visual disturbance.   Respiratory: Negative for cough, shortness of breath and wheezing.    Cardiovascular: Negative for chest pain, palpitations and leg swelling.    Gastrointestinal: Negative for abdominal pain, constipation, diarrhea, nausea and vomiting.   Endocrine: Negative for cold intolerance and heat intolerance.   Genitourinary: Negative for difficulty urinating, dyspareunia, dysuria, frequency, menstrual problem, urgency, vaginal bleeding, vaginal discharge and vaginal pain.   Musculoskeletal: Positive for arthralgias. Negative for back pain and myalgias.   Skin: Negative for color change, rash and wound.   Allergic/Immunologic: Negative for environmental allergies and food allergies.   Neurological: Negative for weakness, numbness and headaches.   Hematological: Negative for adenopathy. Does not bruise/bleed easily.   Psychiatric/Behavioral: Negative for agitation, confusion, hallucinations and sleep disturbance. The patient is nervous/anxious.    All other systems reviewed and are negative.          Allergies:  Review of patient's allergies indicates:   Allergen Reactions    Talwin compound Anxiety     hallucinations/anxiety    Tramadol Itching    Buspirone Anxiety    Codeine Itching    Morphine Itching     jittery    Morpholine analogues Anxiety and Itching    Naproxen Itching     Takes Ibuprofen is ok on rare occasion     Nexium [esomeprazole magnesium] Other (See Comments)     constipation    Wal-phed [pseudoephedrine hcl] Itching       Medications:  Current Outpatient Medications   Medication Sig Dispense Refill    ALPRAZolam (XANAX) 0.5 MG tablet Take daily as needed for anxiety. 30 tablet 5    buPROPion (WELLBUTRIN XL) 150 MG TB24 tablet Take 1 tablet (150 mg total) by mouth once daily. 30 tablet 2    butalbital-acetaminophen-caffeine -40 mg (FIORICET, ESGIC) -40 mg per tablet TAKE 1 TABLET EVERY 4 HOURS AS NEEDED 30 tablet 0    cetirizine (ZYRTEC) 10 MG tablet Take 10 mg by mouth daily as needed.      clotrimazole (LOTRIMIN) 1 % Soln Apply topically 2 (two) times daily. for 7 days 15 mL 5    diclofenac sodium (VOLTAREN) 1 % Gel Apply 2 g  topically daily as needed. 100 g 11    diflorasone-emollient (APEXICON E) 0.05 % Crea Apply 1 application topically once daily. for 7 days 30 g 5    estradioL (ESTRACE) 1 MG tablet Take 1 tablet (1 mg total) by mouth once daily. 90 tablet 3    fluocinonide (LIDEX) 0.05 % external solution Apply topically 2 (two) times daily. Do not use morning of surgery      fluticasone propionate (FLONASE) 50 mcg/actuation nasal spray 2 sprays (100 mcg total) by Each Nostril route once daily. 3 mL 3    gabapentin (NEURONTIN) 300 MG capsule Take 1 capsule (300 mg total) by mouth 3 (three) times daily. 270 capsule 4    hydrocortisone 2.5 % cream Apply topically 2 (two) times daily. apply to affected area for 7 days 20 g 5    ketoconazole (NIZORAL) 2 % shampoo Do not use morning of surgery      levothyroxine (SYNTHROID) 112 MCG tablet TAKE 1 TABLET (112 MCG TOTAL) BY MOUTH BEFORE BREAKFAST AS SCHEDULED 90 tablet 4    metronidazole 1% (METROGEL) 1 % Gel Apply topically once daily. 60 g 5    omeprazole (PRILOSEC) 10 MG capsule Take 10 mg by mouth.      ondansetron (ZOFRAN) 4 MG tablet       oxyCODONE-acetaminophen (PERCOCET) 5-325 mg per tablet Take 1 tablet by mouth every 6 (six) hours as needed for Pain. 30 each 0    oxymetazoline (AFRIN) 0.05 % nasal spray 2 sprays by Nasal route 2 (two) times daily.      pravastatin (PRAVACHOL) 20 MG tablet TAKE 1 TABLET EVERY DAY 90 tablet 0    senna-docusate 8.6-50 mg (PERICOLACE) 8.6-50 mg per tablet Take 1 tablet by mouth. Hold of surgery      zolpidem (AMBIEN) 5 MG Tab Take 1 tablet (5 mg total) by mouth nightly as needed. 90 tablet 0     No current facility-administered medications for this visit.     Facility-Administered Medications Ordered in Other Visits   Medication Dose Route Frequency Provider Last Rate Last Admin    triamcinolone acetonide injection 40 mg  40 mg Intra-articular  Osmar Avery III, MD   40 mg at 05/17/22 1345       PMH:  Past Medical History:   Diagnosis Date     Anxiety     Family history of malignant melanoma 8/25/2014    Fibromyalgia affecting lower leg     GERD (gastroesophageal reflux disease)     Hypercholesteremia     Hypertension     Hypothyroidism     Inflamed seborrheic keratosis 8/25/2014    Microscopic hematuria 2/2/2017    Multiple benign melanocytic nevi 8/25/2014       PSH:  Past Surgical History:   Procedure Laterality Date    AUGMENTATION OF BREAST      bladder lift      breast implants      BREAST SURGERY Bilateral 1996    saline implants    COLONOSCOPY  2007    HYSTERECTOMY      ectopic pregnancy x 2, with LSO    KNEE ARTHROPLASTY Left 6/14/2021    Procedure: ARTHROPLASTY, KNEE:LEFT:DEPUY-SIGMA;  Surgeon: Osmar Avery III, MD;  Location: [facility];  Service: Orthopedics;  Laterality: Left;    LAPAROSCOPIC NEPHRECTOMY, HAND ASSISTED  07/25/2013    LUNG REMOVAL, PARTIAL Left 2020    At [facility]    NEPHRECTOMY      OOPHORECTOMY      x1    RADIOFREQUENCY ABLATION Left 8/30/2022    Procedure: RADIOFREQUENCY ABLATION, LEFT KNEE COOLED;  Surgeon: Vijaya Landin MD;  Location: [facility];  Service: Pain Management;  Laterality: Left;    RADIOFREQUENCY ABLATION Right 10/25/2022    Procedure: RADIOFREQUENCY ABLATION RIGHT KNEE GENICULAR COOLED;  Surgeon: Vijaya Landin MD;  Location: [facility];  Service: Pain Management;  Laterality: Right;    right ganglion cyst      throat polyp      TRANSOBTURATOR SLING  2011    with RSO for persistent complex cyst & MARGOT; done by [facility]    UPPER GASTROINTESTINAL ENDOSCOPY  2007       FamHx:  Family History   Problem Relation Age of Onset    Diabetes Mother     Hypertension Mother     Heart disease Mother     Cancer Father         lung    Migraines Father     Glaucoma Paternal Grandmother     Glaucoma Sister     Thyroid disease Neg Hx     Asthma Neg Hx        SocHx:  Social History     Socioeconomic History    Marital status:      Spouse name: KAIDEN    Number of children: 5   Occupational History     Occupation: retired     Comment: Dance Instructor   Tobacco Use    Smoking status: Never    Smokeless tobacco: Never   Substance and Sexual Activity    Alcohol use: Yes     Alcohol/week: 0.0 standard drinks     Comment: social    Drug use: No    Sexual activity: Yes     Partners: Male     Birth control/protection: Surgical   Social History Narrative    Patient is a retired dance instructor and live with . Has stairs at home 12- has an elevator       Objective:      LABS:  WBC   Date Value Ref Range Status   08/17/2021 5.58 3.90 - 12.70 K/uL Final     Hemoglobin   Date Value Ref Range Status   08/17/2021 13.8 12.0 - 16.0 g/dL Final     Hematocrit   Date Value Ref Range Status   08/17/2021 42.4 37.0 - 48.5 % Final     Platelets   Date Value Ref Range Status   08/17/2021 215 150 - 450 K/uL Final       Chemistry        Component Value Date/Time     10/05/2022 1015    K 4.2 10/05/2022 1015     10/05/2022 1015    CO2 25 10/05/2022 1015    BUN 10 10/05/2022 1015    CREATININE 0.8 10/05/2022 1015    GLU 86 10/05/2022 1015        Component Value Date/Time    CALCIUM 8.4 (L) 10/05/2022 1015    ALKPHOS 78 10/05/2022 1015    AST 15 10/05/2022 1015    ALT 14 10/05/2022 1015    BILITOT 0.7 10/05/2022 1015    ESTGFRAFRICA >60 08/17/2021 1135    EGFRNONAA >60 08/17/2021 1135              Assessment:       1. Clear cell carcinoma with lung metastasis    2. Carcinoma of right kidney    3. Carcinoma of kidney, unspecified laterality    4. History of right nephrectomy            Plan:       1. Metastatic ccRCC:    Currently on no systemic treatment. S/p wedge resection [facility].  Scans at [facility] show one small area of possible recurrence, plan for IR biopsy at [facility] 1/2023. Tentative plan for SBRT at [facility] unless patient would like care closer to home. Patient will bring discs of [facility] imaging to next apt.     Seeing ortho in [place].  s/p knee replacement on June 14.  Getting PT now.      Bone Scan (per ortho) was clear.       RTC to see us after [facility] apt with scans to discuss next steps.     Patient discussed with attending physician, Dr. Leonid Pimentel, PGY-VI   Hematology/Oncology Fellow      I have reviewed the notes, assessments, and/or procedures performed by the housestaff, as above.  I have personally interviewed and examined the patient at the beside, and rounded with the housestaff. I concur with her/his assessment and plan and the documentation of Sherrill Avery.  More than 40 mins total time were spent during this encounter.      Kervin Jaquez M.D., M.S., F.A.C.P.  Hematology/Oncology Attending  [facility]            Med Onc Chart Routing      Follow up with physician 2 months. Dr. Jaquez 2/2023   Follow up with MARGARET    Infusion scheduling note    Injection scheduling note    Labs    Imaging    Pharmacy appointment    Other referrals

## 2022-12-05 NOTE — TELEPHONE ENCOUNTER
I spoke to patient. [facility] does not have her medication, so she would like it sent to [facility]. I told her we would take care of that.

## 2022-12-08 ENCOUNTER — PATIENT MESSAGE (OUTPATIENT)
Dept: PAIN MEDICINE | Facility: CLINIC | Age: 72
End: 2022-12-08
Payer: MEDICARE

## 2022-12-12 ENCOUNTER — TELEPHONE (OUTPATIENT)
Dept: PAIN MEDICINE | Facility: CLINIC | Age: 72
End: 2022-12-12
Payer: MEDICARE

## 2022-12-13 ENCOUNTER — PATIENT MESSAGE (OUTPATIENT)
Dept: INTERNAL MEDICINE | Facility: CLINIC | Age: 72
End: 2022-12-13
Payer: MEDICARE

## 2022-12-13 ENCOUNTER — PATIENT MESSAGE (OUTPATIENT)
Dept: PSYCHIATRY | Facility: CLINIC | Age: 72
End: 2022-12-13
Payer: MEDICARE

## 2022-12-13 ENCOUNTER — OFFICE VISIT (OUTPATIENT)
Dept: PAIN MEDICINE | Facility: CLINIC | Age: 72
End: 2022-12-13
Payer: MEDICARE

## 2022-12-13 DIAGNOSIS — M17.11 PRIMARY OSTEOARTHRITIS OF RIGHT KNEE: ICD-10-CM

## 2022-12-13 DIAGNOSIS — G89.29 OTHER CHRONIC PAIN: ICD-10-CM

## 2022-12-13 DIAGNOSIS — M47.816 LUMBAR SPONDYLOSIS: Primary | ICD-10-CM

## 2022-12-13 PROCEDURE — 99213 PR OFFICE/OUTPT VISIT, EST, LEVL III, 20-29 MIN: ICD-10-PCS | Mod: 95,,, | Performed by: NURSE PRACTITIONER

## 2022-12-13 PROCEDURE — 99213 OFFICE O/P EST LOW 20 MIN: CPT | Mod: 95,,, | Performed by: NURSE PRACTITIONER

## 2022-12-13 NOTE — PROGRESS NOTES
PCP: Alexandre Macias MD    REFERRING PHYSICIAN: No ref. provider found    CHIEF COMPLAINT: L knee pain s/p knee replacement    Original HISTORY OF PRESENT ILLNESS: Sherrill Avery w/ pmh of HTN, and renal cell carcinoma (kidney removed in 2013) s/p radiation in February 2022 due to METs which appeared in 2017 who presents to the clinic for the evaluation of the above pain. She had a knee replacement in June 2021, and has continued to have pain since the procedure. She states when she sits for an extended period of time and stands back up she experiences the pain around the bottom to lateral edge of her knee cap. She has a swollen area just below the L knee as well. She currently denies CP, SOB, vision changes, or speech changes.    Original Pain Description:  The pain is located in the L knee and does not radiate. The pain is described as sharp and stinging . Exacerbating factors: Sitting, Standing, Laying, Night Time, Getting out of bed/chair and going up stairs. Mitigating factors massage, medications, physical therapy, rest and sitting. Symptoms interfere with daily activity and sleeping. The patient feels like symptoms have been unchanged. Patient denies night fever/night sweats, urinary incontinence, bowel incontinence and significant motor weakness.    Original PAIN SCORES:  Best: Pain is 0  Worst: Pain is 6  Usually: Pain is 4  Current: Pain is 2    INTERVAL HISTORY:     Interval History 9/21/22:  Mrs. Avery returns to clinic after left genicular RFA on 8/30/22. She reports significant pain relief and she is able to stand from a seated position and walk more comfortably. She is happy with the results and would like to have the same procedure on her right knee. She has had right knee pain for years and she has received multiple steroid injections which only provide about 2 weeks of pain relief. Pain is exacerbated by stairs, walking, and rising from a chair.  She denies any new weakness and numbness/tingling.      Interval History 12/13/2022:  The patient has a virtual follow up for right knee pain. She is now s/p right genicular RFA on 10/25/22 with 80% relief. She is still having benefit from left knee RFA. She is also reporting lower back pain . The pain is sharp in nature. It does not radiate. She has pain when she stands for a long time. She takes Gabapentin regularly with some benefit. She has completed PT in the past with some benefit. She did have a lumbar MRI in 2015.    6 weeks of Conservative therapy (PT/Chiro/Home Exercises with Dates)  PT July 2021-September 2021  PT November 2021-December 2021  PT April 2022-May 2022     Treatments / Medications: (Ice/Heat/NSAIDS/APAP/etc):  Ice  Heat  Massage  PT  Voltaren Gel  Gabapentin     Report:      Interventional Pain Procedures: (Previous injections)  L knee replacement  R knee steroid injection  10/11/22 Right genicular blocks- significant short term benefit  10/25/22 Right genicular cooled RFA- 80% relief    Past Medical History:   Diagnosis Date    Anxiety     Family history of malignant melanoma 8/25/2014    Fibromyalgia affecting lower leg     GERD (gastroesophageal reflux disease)     Hypercholesteremia     Hypertension     Hypothyroidism     Inflamed seborrheic keratosis 8/25/2014    Microscopic hematuria 2/2/2017    Multiple benign melanocytic nevi 8/25/2014     Past Surgical History:   Procedure Laterality Date    AUGMENTATION OF BREAST      bladder lift      breast implants      BREAST SURGERY Bilateral 1996    saline implants    COLONOSCOPY  2007    HYSTERECTOMY      ectopic pregnancy x 2, with LSO    KNEE ARTHROPLASTY Left 6/14/2021    Procedure: ARTHROPLASTY, KNEE:LEFT:DEPUY-SIGMA;  Surgeon: Osmar Avery III, MD;  Location: [facility];  Service: Orthopedics;  Laterality: Left;    LAPAROSCOPIC NEPHRECTOMY, HAND ASSISTED  07/25/2013    LUNG REMOVAL, PARTIAL Left 2020    At [facility]    NEPHRECTOMY      OOPHORECTOMY      x1    RADIOFREQUENCY ABLATION  Left 8/30/2022    Procedure: RADIOFREQUENCY ABLATION, LEFT KNEE COOLED;  Surgeon: Vijaya Landin MD;  Location: [facility];  Service: Pain Management;  Laterality: Left;    RADIOFREQUENCY ABLATION Right 10/25/2022    Procedure: RADIOFREQUENCY ABLATION RIGHT KNEE GENICULAR COOLED;  Surgeon: Vijaya Landin MD;  Location: [facility];  Service: Pain Management;  Laterality: Right;    right ganglion cyst      throat polyp      TRANSOBTURATOR SLING  2011    with RSO for persistent complex cyst & MARGOT; done by arndt    UPPER GASTROINTESTINAL ENDOSCOPY  2007     Social History     Socioeconomic History    Marital status:      Spouse name: KAIDEN    Number of children: 5   Occupational History    Occupation: retired     Comment: Dance Instructor   Tobacco Use    Smoking status: Never    Smokeless tobacco: Never   Substance and Sexual Activity    Alcohol use: Yes     Alcohol/week: 0.0 standard drinks     Comment: social    Drug use: No    Sexual activity: Yes     Partners: Male     Birth control/protection: Surgical   Social History Narrative    Patient is a retired dance instructor and live with . Has stairs at home 12- has an elevator     Family History   Problem Relation Age of Onset    Diabetes Mother     Hypertension Mother     Heart disease Mother     Cancer Father         lung    Migraines Father     Glaucoma Paternal Grandmother     Glaucoma Sister     Thyroid disease Neg Hx     Asthma Neg Hx        Review of patient's allergies indicates:   Allergen Reactions    Talwin compound Anxiety     hallucinations/anxiety    Tramadol Itching    Buspirone Anxiety    Codeine Itching    Morphine Itching     jittery    Morpholine analogues Anxiety and Itching    Naproxen Itching     Takes Ibuprofen is ok on rare occasion     Nexium [esomeprazole magnesium] Other (See Comments)     constipation    Wal-phed [pseudoephedrine hcl] Itching       Current Outpatient Medications   Medication Sig    ALPRAZolam  (XANAX) 0.5 MG tablet Take daily as needed for anxiety.    buPROPion (WELLBUTRIN XL) 150 MG TB24 tablet Take 1 tablet (150 mg total) by mouth once daily.    butalbital-acetaminophen-caffeine -40 mg (FIORICET, ESGIC) -40 mg per tablet TAKE 1 TABLET EVERY 4 HOURS AS NEEDED    cetirizine (ZYRTEC) 10 MG tablet Take 10 mg by mouth daily as needed.    clotrimazole (LOTRIMIN) 1 % Soln Apply topically 2 (two) times daily. for 7 days    diclofenac sodium (VOLTAREN) 1 % Gel Apply 2 g topically daily as needed.    diflorasone-emollient (APEXICON E) 0.05 % Crea Apply 1 application topically once daily. for 7 days    estradioL (ESTRACE) 1 MG tablet Take 1 tablet (1 mg total) by mouth once daily.    fluocinonide (LIDEX) 0.05 % external solution Apply topically 2 (two) times daily. Do not use morning of surgery    fluticasone propionate (FLONASE) 50 mcg/actuation nasal spray 2 sprays (100 mcg total) by Each Nostril route once daily.    gabapentin (NEURONTIN) 300 MG capsule Take 1 capsule (300 mg total) by mouth 3 (three) times daily.    hydrocortisone 2.5 % cream Apply topically 2 (two) times daily. apply to affected area for 7 days    ketoconazole (NIZORAL) 2 % shampoo Do not use morning of surgery    levothyroxine (SYNTHROID) 112 MCG tablet TAKE 1 TABLET (112 MCG TOTAL) BY MOUTH BEFORE BREAKFAST AS SCHEDULED    metronidazole 1% (METROGEL) 1 % Gel Apply topically once daily.    omeprazole (PRILOSEC) 10 MG capsule Take 10 mg by mouth.    ondansetron (ZOFRAN) 4 MG tablet     oxyCODONE-acetaminophen (PERCOCET) 5-325 mg per tablet Take 1 tablet by mouth every 6 (six) hours as needed for Pain.    oxymetazoline (AFRIN) 0.05 % nasal spray 2 sprays by Nasal route 2 (two) times daily.    pravastatin (PRAVACHOL) 20 MG tablet TAKE 1 TABLET EVERY DAY    senna-docusate 8.6-50 mg (PERICOLACE) 8.6-50 mg per tablet Take 1 tablet by mouth. Hold of surgery    zolpidem (AMBIEN) 5 MG Tab Take 1 tablet (5 mg total) by mouth nightly as  needed.     No current facility-administered medications for this visit.     Facility-Administered Medications Ordered in Other Visits   Medication    triamcinolone acetonide injection 40 mg       ROS:  GENERAL: No fever. No chills. No fatigue. Denies weight loss. Denies weight gain.  HEENT: Denies headaches. Denies vision change. Denies eye pain. Denies double vision. Denies ear pain.   CV: Denies chest pain.   PULM: Denies of shortness of breath.  GI: Denies constipation. No diarrhea. No abdominal pain. Denies nausea. Denies vomiting. No blood in stool.  HEME: Denies bleeding problems.  : Denies urgency. No painful urination. No blood in urine.  MS: Denies joint stiffness. Denies joint swelling.  Denies back pain.  SKIN: Denies rash.   NEURO: Denies seizures. No weakness.  PSYCH:  Denies difficulty sleeping. No anxiety. Denies depression. No suicidal thoughts.       PHYSICAL EXAMINATION:    General appearance: Well appearing, in no acute distress, alert and oriented x3.  Psych:  Mood and affect appropriate.  Skin: Skin color normal, no rashes or lesions, in both upper and lower body.  Extremities: Moves all visualized extremities freely.    PHYSICAL EXAM:   GENERAL: Well appearing, in no acute distress, alert and oriented x3.  PSYCH:  Mood and affect appropriate.  SKIN: Normal turgor and coloration.  HEENT:  Normocephalic, atraumatic. Cranial nerves grossly intact.  NECK: No pain to palpation over the cervical paraspinous muscles. No pain to palpation over facets. No pain with neck flexion, extension, or lateral flexion.   PULM: No evidence of respiratory difficulty, symmetric chest rise.  GI:  Non-distended  BACK: Normal range of motion. No pain to palpation over the spinous processes. No pain to palpation over facet joints. There is no pain with palpation over the sacroiliac joints bilaterally. Straight leg raising in the supine position is negative to radicular pain.   EXTREMITIES: No ttp left medial or  lateral joint line. +ttp right medial joint line and pain with patella ROM. Swollen 3 x 3 inch area just below L patella.   MUSCULOSKELETAL:  Bilateral lower extremity strength is normal and symmetric, with some pain limitation with movement of L knee joint. No atrophy is noted.  NEURO: Sensation is equal and appropriate bilaterally with the exception of diminished sensation over the L knee cap. Bilateral upper and lower extremity coordination is normal. Plantar response are downgoing.   GAIT: Antalgic and slow.    IMAGING:      X- Ray knee ortho BL 5/17/22    Narrative & Impression  EXAMINATION:  XR KNEE ORTHO BILAT WITH FLEXION     CLINICAL HISTORY:  Presence of left artificial knee joint     TECHNIQUE:  AP standing of both knees, PA flexion standing views of both knees, and Merchant views of both knees were performed.  Lateral views of both knees were also performed.     COMPARISON:  No 07/27/2021 ne     FINDINGS:  Left knee arthroplasty identified.  The position and alignment is satisfactory and unchanged as compared to the previous study.     DJD involving the right knee with narrowing of the patellofemoral and the medial tibiofemoral joint space.  No fracture or bone destruction identified     Impression:  Patient with symptoms, imaging, and PE consistent with:    1. Lumbar spondylosis  X-Ray Lumbar Complete Including Flex And Ext      2. Primary osteoarthritis of right knee        3. Other chronic pain                ASSESSMENT: 72 y.o. year old female w/ pmh of renal cell carcinoma (kidney removed in 2013) s/p radiation in February 2022 due to METs which appeared in 2017 with pain, consistent with and chronic right knee pain secondary to osteoarthritis.    DISCUSSION: Ms. Avery has a history of fibromyalgia and RCC. She is currently being treated for lung metastasis. She comes to us from Dr. Avery with continued pain after left TKA on 6/14/21. She also is reporting lower back pain. Previous imaging shows  scolioisis and facet arthropathy mainly.    PLAN:  She is s/p right genicular cooled RFA with benefit. Primary complaint today is lower back pain. We discussed MBB/RFA given current symptoms. She is traveling in 3 weeks to [facility] for check up. Will plan to follow up with XRAYs in person after that to further discuss.  RTC in 6 weeks or sooner if needed.      The above plan and management options were discussed at length with patient. Patient is in agreement with the above and verbalized understanding.     SAMAN Solomon  12/13/2022

## 2022-12-14 ENCOUNTER — OFFICE VISIT (OUTPATIENT)
Dept: PSYCHIATRY | Facility: CLINIC | Age: 72
End: 2022-12-14
Payer: MEDICARE

## 2022-12-14 DIAGNOSIS — F41.9 ANXIETY: Primary | ICD-10-CM

## 2022-12-14 DIAGNOSIS — F32.A DEPRESSION, UNSPECIFIED DEPRESSION TYPE: ICD-10-CM

## 2022-12-14 PROCEDURE — 90837 PR PSYCHOTHERAPY W/PATIENT, 60 MIN: ICD-10-PCS | Mod: 95,,, | Performed by: SOCIAL WORKER

## 2022-12-14 PROCEDURE — 90837 PSYTX W PT 60 MINUTES: CPT | Mod: 95,,, | Performed by: SOCIAL WORKER

## 2022-12-19 ENCOUNTER — HOSPITAL ENCOUNTER (OUTPATIENT)
Dept: RADIOLOGY | Facility: HOSPITAL | Age: 72
Discharge: HOME | End: 2022-12-19
Attending: NURSE PRACTITIONER
Payer: MEDICARE

## 2022-12-19 DIAGNOSIS — M47.816 LUMBAR SPONDYLOSIS: ICD-10-CM

## 2022-12-19 PROCEDURE — 72114 X-RAY EXAM L-S SPINE BENDING: CPT | Mod: 26,,, | Performed by: RADIOLOGY

## 2022-12-19 PROCEDURE — 72114 XR LUMBAR SPINE 5 VIEW WITH FLEX AND EXT: ICD-10-PCS | Mod: 26,,, | Performed by: RADIOLOGY

## 2022-12-19 PROCEDURE — 72114 X-RAY EXAM L-S SPINE BENDING: CPT | Mod: TC,FY,PO

## 2022-12-19 NOTE — PROGRESS NOTES
Psychiatry Initial Visit (PhD/LCSW)  Diagnostic Interview - CPT 56841    Patient's stated location at the time of visit: PSY Televisit Pt Location: home/residence in the [facility]    Each patient provided medical services by telemedicine is: (1) informed of the relationship between the provider and patient and the respective role of any other health care provider with respect to management of the patient; and (2) notified that he or she may decline to receive medical services by telemedicine and may withdraw from such care at any time.    Crisis Disclaimer: Patient was informed that due to the virtual nature of the visit, that if a crisis develops, protocols will be implemented to ensure patient safety, including but not limited to: (1) Initiating a welfare check with local Law Enforcement, (2) Calling 1/National Crisis Hotline, and/or (3) Initiating PEC/CEC procedures. Virtual visit with synchronous audio and video.    Date: 12/14/2022    Site: [place]    Referral source: Dr. Feliberto MD    Clinical status of patient: Outpatient    Sherrill Avery, a 72 y.o. female, for initial evaluation visit.  Met with patient.    Chief complaint/reason for encounter: depression, anxiety, and interpersonal    PHQ-9 Depression Patient Health Questionnaire 12/14/2022   Patient agreed to terms: Yes   Little interest or pleasure in doing things 3   Feeling down, depressed, or hopeless 1   Trouble falling or staying asleep, or sleeping too much 1   Feeling tired or having little energy 2   Poor appetite or overeating 0   Feeling bad about yourself - or that you are a failure or have let yourself or your family down 0   Trouble concentrating on things, such as reading the newspaper or watching television 1   Moving or speaking so slowly that other people could have noticed. Or the opposite - being so fidgety or restless that you have been moving around a lot more than usual 1   Thoughts that you would be better off  dead, or of hurting yourself in some way 0   PHQ-9 Total Score 9   If you checked off any problems, how difficult have these problems made it for you to do your work, take care of things at home, or get along with other people? Somewhat difficult   Interpretation Mild       GAD7 11/7/2022 11/4/2019   1. Feeling nervous, anxious, or on edge? 3 1   2. Not being able to stop or control worrying? 2 0   3. Worrying too much about different things? 2 -   4. Trouble relaxing? 2 -   5. Being so restless that it is hard to sit still? 1 -   6. Becoming easily annoyed or irritable? 2 -   7. Feeling afraid as if something awful might happen? 0 -   JULIAN-7 Score 12 -       History of present illness:  Met with this patient for her online initial counseling session.  The patient was in her home throughout the appointment she lives in [facility].  The patient is presenting for counseling due to depression and anxiety related to the recurrence of cancer since 2013.  The patient stated that she has been  for 40 years to her  Jarred.  They have 5 children, 16 grandchildren and 9 great-grandchildren.  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  In 2013 she was diagnosed with kidney cancer, had a kidney removed.  In 2017 the cancer returned as renal cell carcinoma.  At that time she started going to in the [place] for treatment.  After that the cancer was found in her lungs in 2020.  She had surgery on her left long to have 3 growths removed.  And in 2022 she had 19 radiation treatments.  All of this has left her very tired, depressed and at times hopeless.  She has sought the guidance of a spiritual director in [place].  She states that this is very helpful.  Allowed the patient to process her feelings and thoughts of her cancers and reoccurrences.  Allowed the patient helped the patient to  "reframe her sadness and to put her focus on things in her life that she values such as her family and her home.  The patient stated that she loves life in general although she hates cancer.  This statement was given positive affirmations and was insightful.  From Dr Dao, "SocialHx: no problems with gestation, birth, infancy or early childhood development. Good student. Normal number of friends. Grew up with in [place] with parents. Lives with ; have 5 children & grandchildren & great-grandchildren. Good terms with kids, friends, neighbors, Bahai. Lives in [place].  x 35 years. Supportive relationship. SubstanceHx; rare alcohol, no illicits, no prescription misuse.        Pain: noncontributory    Symptoms:   Mood: depressed mood, fatigue, poor concentration, thoughts of death, and tearfulness  Anxiety: excessive anxiety/worry, restlessness/keyed up, and irritability  Substance abuse: denied  Cognitive functioning: denied  Health behaviors: noncontributory    Psychiatric history: currently under psychiatric care    Medical history:   Past Medical History:   Diagnosis Date    Anxiety     Family history of malignant melanoma 8/25/2014    Fibromyalgia affecting lower leg     GERD (gastroesophageal reflux disease)     Hypercholesteremia     Hypertension     Hypothyroidism     Inflamed seborrheic keratosis 8/25/2014    Microscopic hematuria 2/2/2017    Multiple benign melanocytic nevi 8/25/2014       Family history of psychiatric illness:   Family History   Problem Relation Age of Onset    Diabetes Mother     Hypertension Mother     Heart disease Mother     Cancer Father         lung    Migraines Father     Glaucoma Paternal Grandmother     Glaucoma Sister     Thyroid disease Neg Hx     Asthma Neg Hx         Social history (marriage, employment, etc.):   Social History     Social History Narrative    Patient is a retired dance instructor and live with . Has stairs at home " 12- has an elevator        Substance use:     Social History     Tobacco Use    Smoking status: Never    Smokeless tobacco: Never   Substance Use Topics    Alcohol use: Yes     Alcohol/week: 0.0 standard drinks     Comment: social        Current medications and drug reactions (include OTC, herbal):    Current Outpatient Medications:     ALPRAZolam (XANAX) 0.5 MG tablet, Take daily as needed for anxiety., Disp: 30 tablet, Rfl: 5    buPROPion (WELLBUTRIN XL) 150 MG TB24 tablet, Take 1 tablet (150 mg total) by mouth once daily., Disp: 30 tablet, Rfl: 2    butalbital-acetaminophen-caffeine -40 mg (FIORICET, ESGIC) -40 mg per tablet, TAKE 1 TABLET EVERY 4 HOURS AS NEEDED, Disp: 30 tablet, Rfl: 0    cetirizine (ZYRTEC) 10 MG tablet, Take 10 mg by mouth daily as needed., Disp: , Rfl:     clotrimazole (LOTRIMIN) 1 % Soln, Apply topically 2 (two) times daily. for 7 days, Disp: 15 mL, Rfl: 5    diclofenac sodium (VOLTAREN) 1 % Gel, Apply 2 g topically daily as needed., Disp: 100 g, Rfl: 11    diflorasone-emollient (APEXICON E) 0.05 % Crea, Apply 1 application topically once daily. for 7 days, Disp: 30 g, Rfl: 5    estradioL (ESTRACE) 1 MG tablet, Take 1 tablet (1 mg total) by mouth once daily., Disp: 90 tablet, Rfl: 3    fluocinonide (LIDEX) 0.05 % external solution, Apply topically 2 (two) times daily. Do not use morning of surgery, Disp: , Rfl:     fluticasone propionate (FLONASE) 50 mcg/actuation nasal spray, 2 sprays (100 mcg total) by Each Nostril route once daily., Disp: 3 mL, Rfl: 3    gabapentin (NEURONTIN) 300 MG capsule, Take 1 capsule (300 mg total) by mouth 3 (three) times daily., Disp: 270 capsule, Rfl: 4    hydrocortisone 2.5 % cream, Apply topically 2 (two) times daily. apply to affected area for 7 days, Disp: 20 g, Rfl: 5    ketoconazole (NIZORAL) 2 % shampoo, Do not use morning of surgery, Disp: , Rfl:     levothyroxine (SYNTHROID) 112 MCG tablet, TAKE 1 TABLET (112 MCG TOTAL) BY MOUTH BEFORE  BREAKFAST AS SCHEDULED, Disp: 90 tablet, Rfl: 4    metronidazole 1% (METROGEL) 1 % Gel, Apply topically once daily., Disp: 60 g, Rfl: 5    omeprazole (PRILOSEC) 10 MG capsule, Take 10 mg by mouth., Disp: , Rfl:     ondansetron (ZOFRAN) 4 MG tablet, , Disp: , Rfl:     oxyCODONE-acetaminophen (PERCOCET) 5-325 mg per tablet, Take 1 tablet by mouth every 6 (six) hours as needed for Pain., Disp: 30 each, Rfl: 0    oxymetazoline (AFRIN) 0.05 % nasal spray, 2 sprays by Nasal route 2 (two) times daily., Disp: , Rfl:     pravastatin (PRAVACHOL) 20 MG tablet, TAKE 1 TABLET EVERY DAY, Disp: 90 tablet, Rfl: 0    senna-docusate 8.6-50 mg (PERICOLACE) 8.6-50 mg per tablet, Take 1 tablet by mouth. Hold of surgery, Disp: , Rfl:     zolpidem (AMBIEN) 5 MG Tab, Take 1 tablet (5 mg total) by mouth nightly as needed., Disp: 90 tablet, Rfl: 0  No current facility-administered medications for this visit.    Facility-Administered Medications Ordered in Other Visits:     triamcinolone acetonide injection 40 mg, 40 mg, Intra-articular, , Osmar Avery III, MD, 40 mg at 05/17/22 1345      Strengths and liabilities: Strength: Patient accepts guidance/feedback, Liability: Patient has poor health., Liability: Patient lacks coping skills.    Current Evaluation:     Mental Status Exam:  General Appearance:  unremarkable, age appropriate   Speech: normal tone, normal rate, normal pitch, normal volume      Level of Cooperation: cooperative      Thought Processes: normal and logical   Mood: anxious, depressed, sad      Thought Content: normal, no suicidality, no homicidality, delusions, or paranoia   Affect: congruent and appropriate   Orientation: Oriented x3   Memory: recent >  intact   Attention Span & Concentration: intact   Fund of General Knowledge: intact and appropriate to age and level of education   Abstract Reasoning: interpretation of similarities was abstract   Judgment & Insight: fair     Language  intact     Diagnostic Impression -  Plan:       ICD-10-CM ICD-9-CM   1. Anxiety  F41.9 300.00   2. Depression, unspecified depression type  F32.A 311       Plan:individual psychotherapy and medication management by physician    Return to Clinic: as scheduled    Length of Service (minutes): 60       Cydney Hernandez LCSW  12/20/2022   1:50 PM

## 2023-01-10 ENCOUNTER — OFFICE VISIT (OUTPATIENT)
Dept: PSYCHIATRY | Facility: CLINIC | Age: 73
End: 2023-01-10
Payer: MEDICARE

## 2023-01-10 DIAGNOSIS — F32.A DEPRESSION, UNSPECIFIED DEPRESSION TYPE: Primary | ICD-10-CM

## 2023-01-10 DIAGNOSIS — F41.9 ANXIETY: ICD-10-CM

## 2023-01-10 PROCEDURE — 90834 PSYTX W PT 45 MINUTES: CPT | Mod: ,,, | Performed by: SOCIAL WORKER

## 2023-01-10 PROCEDURE — 90834 PR PSYCHOTHERAPY W/PATIENT, 45 MIN: ICD-10-PCS | Mod: ,,, | Performed by: SOCIAL WORKER

## 2023-01-23 ENCOUNTER — TELEPHONE (OUTPATIENT)
Dept: PAIN MEDICINE | Facility: CLINIC | Age: 73
End: 2023-01-23
Payer: MEDICARE

## 2023-01-23 NOTE — PROGRESS NOTES
Individual Psychotherapy Follow-up Visit Progress Note (PhD/LCSW)     Outpatient - Insight oriented psychotherapy 45 min - CPT code 87881 and Outpatient - Supportive psychotherapy 45 min - CPT Code 43262    Date: 12:23 AM      1/10/2023  MRN: [id number]  Primary Care Provider: Alexandre Macias MD    Sherrill Avery is a 72 y.o. female who presents today for follow-up of depression, anxiety, and adjustment problems. Met with patient.        Subjective:       Patient report:  Met with this patient for her follow-up appointment in the clinic.  The patient was on time, alert and oriented the patient stated that she had just returned recently from [facility] where she was treated for her cancer.  The patient is being treated for lung cancer specifically and has undergone treatments for breast cancer several years ago and it is now returned and is in her lungs.  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  She enjoyed her life and still does and has several grand children.  While she is accepting that she may be facing a terminal illness, she is trying to be positive about it.  She stated that she grew up traveling around and that her mother is from [place].  Her father worked for the [employer] as a [employer].  The patient stated that her family provided her with stability and that they were interesting people.  The patient states that she is experiencing some anxiety related to her cancer diagnosis and treatment.  Encouraged the patient to open up and be truthful about anything she wanted to say, that this was a safe place.  At this time she continues to maintain that positivity is the best form of treatment for her.  She stated she would make a follow-up appointment     Current symptoms:   Depression: depressed mood, insomnia, and fatigue  Anxiety: excessive anxiety/worry and restlessness/keyed up  Substance abuse: denied  Cognitive functioning:  denied  Nevaeh: none noted  Psychosis: none noted    PHQ-9 Depression Patient Health Questionnaire 1/8/2023 12/14/2022   Patient agreed to terms: Yes Yes   Little interest or pleasure in doing things 2 3   Feeling down, depressed, or hopeless 1 1   Trouble falling or staying asleep, or sleeping too much 1 1   Feeling tired or having little energy 1 2   Poor appetite or overeating 1 0   Feeling bad about yourself - or that you are a failure or have let yourself or your family down 0 0   Trouble concentrating on things, such as reading the newspaper or watching television 1 1   Moving or speaking so slowly that other people could have noticed. Or the opposite - being so fidgety or restless that you have been moving around a lot more than usual 0 1   Thoughts that you would be better off dead, or of hurting yourself in some way 0 0   PHQ-9 Total Score 7 9   If you checked off any problems, how difficult have these problems made it for you to do your work, take care of things at home, or get along with other people? Not difficult at all Somewhat difficult   Interpretation Mild Mild       GAD7 11/4/2019   1. Feeling nervous, anxious, or on edge? 1   2. Not being able to stop or control worrying? 0   Some encounter information is confidential and restricted. Go to Review Flowsheets activity to see all data.       Psychosocial stressors and topics discussed: identifying feelings, self care, and interpersonal issues      Objective:       Mental Status Evaluation  Appearance: unremarkable, age appropriate  Behavior: normal, cooperative  Speech: normal tone, normal rate, normal pitch, normal volume  Mood: anxious, depressed, sad  Affect: congruent and appropriate  Thought Process: normal and logical  Thought Content: normal, no suicidality, no homicidality, delusions, or paranoia  Sensorium: grossly intact  Cognition: grossly intact  Insight: fair  Judgment: adequate to circumstances    Risk parameters:  Patient reports no  suicidal ideation  Patient reports no homicidal ideation  Patient reports no self-injurious behavior  Patient reports no violent behavior      Assessment & Plan:       Therapeutic interventions used: Educated pt re: how anxious fears are maintained by a cycle of unwarranted fear and avoidance that precludes positive, corrective experiences with the feared object/situation  Discussed how treatment targets worry, anxiety symptoms, and avoidance to help pt manage worry effectively  Assigned pt to practice relaxation on a daily basis  Encouraged pt to share feelings of depression to clarify them and gain insight into their causes   Consistent eye contact, active listening, unconditional positive regard and warm acceptance were used to help build trust  Asked pt to describe his/her feelings about himself/herself and how he/she sees himself/herself as compared with others     The patient's response to the interventions is accepting    The patient's progress toward treatment goals is fair     Homework assigned: daily journaling, practice relaxation skills daily, and attend a support group     Treatment plan:   A. Target symptoms: Depression, Anxiety, and Adjustment Disorder   B. Therapeutic modalities: insight oriented psychotherapy, supportive psychotherapy  C. Why chosen therapy is appropriate versus another modality: patient responds to this modality   D. Outcome monitoring methods: self-report, observation     Visit Diagnosis:   1. Depression, unspecified depression type    2. Anxiety        Follow-up: individual psychotherapy    Return to Clinic: as scheduled    Length of Service (minutes): 45       Cydney Hernandez LCSW  01/24/2023   12:23 AM

## 2023-01-23 NOTE — TELEPHONE ENCOUNTER
----- Message from Roxane Graham sent at 1/23/2023  3:00 PM CST -----  Regarding: Orders              Name of Who is Calling:  Sherrill Avery    Who Left The Message: Sherrill Avery      What is the request in detail:      Patient called requesting to schedule an x-ray of her lower.  Please further advice.   Thank you      Reply by [facility]:  No      Preferred Call Back :  [phone] (O)

## 2023-01-24 ENCOUNTER — OFFICE VISIT (OUTPATIENT)
Dept: PAIN MEDICINE | Facility: CLINIC | Age: 73
End: 2023-01-24
Payer: MEDICARE

## 2023-01-24 ENCOUNTER — PATIENT MESSAGE (OUTPATIENT)
Dept: PAIN MEDICINE | Facility: OTHER | Age: 73
End: 2023-01-24
Payer: MEDICARE

## 2023-01-24 VITALS
BODY MASS INDEX: 33.31 KG/M2 | DIASTOLIC BLOOD PRESSURE: 65 MMHG | SYSTOLIC BLOOD PRESSURE: 117 MMHG | HEIGHT: 66 IN | TEMPERATURE: 97 F | WEIGHT: 207.25 LBS | RESPIRATION RATE: 18 BRPM | HEART RATE: 81 BPM

## 2023-01-24 DIAGNOSIS — G89.29 CHRONIC PAIN OF RIGHT KNEE: Primary | ICD-10-CM

## 2023-01-24 DIAGNOSIS — G89.29 OTHER CHRONIC PAIN: ICD-10-CM

## 2023-01-24 DIAGNOSIS — M25.561 CHRONIC PAIN OF RIGHT KNEE: Primary | ICD-10-CM

## 2023-01-24 DIAGNOSIS — M47.816 LUMBAR SPONDYLOSIS: ICD-10-CM

## 2023-01-24 DIAGNOSIS — M17.11 PRIMARY OSTEOARTHRITIS OF RIGHT KNEE: ICD-10-CM

## 2023-01-24 PROCEDURE — 99999 PR PBB SHADOW E&M-EST. PATIENT-LVL V: CPT | Mod: PBBFAC,,, | Performed by: NURSE PRACTITIONER

## 2023-01-24 PROCEDURE — 99215 OFFICE O/P EST HI 40 MIN: CPT | Mod: PBBFAC | Performed by: NURSE PRACTITIONER

## 2023-01-24 PROCEDURE — 99999 PR PBB SHADOW E&M-EST. PATIENT-LVL V: ICD-10-PCS | Mod: PBBFAC,,, | Performed by: NURSE PRACTITIONER

## 2023-01-24 PROCEDURE — 99214 PR OFFICE/OUTPT VISIT, EST, LEVL IV, 30-39 MIN: ICD-10-PCS | Mod: S$PBB,,, | Performed by: NURSE PRACTITIONER

## 2023-01-24 PROCEDURE — 99214 OFFICE O/P EST MOD 30 MIN: CPT | Mod: S$PBB,,, | Performed by: NURSE PRACTITIONER

## 2023-01-24 RX ORDER — GABAPENTIN 400 MG/1
400 CAPSULE ORAL 3 TIMES DAILY
Qty: 270 CAPSULE | Refills: 0 | Status: SHIPPED | OUTPATIENT
Start: 2023-01-24 | End: 2023-06-09 | Stop reason: SDUPTHER

## 2023-01-24 NOTE — PROGRESS NOTES
PCP: Alexandre Macias MD    REFERRING PHYSICIAN: No ref. provider found    CHIEF COMPLAINT: L knee pain s/p knee replacement    Original HISTORY OF PRESENT ILLNESS: Sherrill Avery w/ pmh of HTN, and renal cell carcinoma (kidney removed in 2013) s/p radiation in February 2022 due to METs which appeared in 2017 who presents to the clinic for the evaluation of the above pain. She had a knee replacement in June 2021, and has continued to have pain since the procedure. She states when she sits for an extended period of time and stands back up she experiences the pain around the bottom to lateral edge of her knee cap. She has a swollen area just below the L knee as well. She currently denies CP, SOB, vision changes, or speech changes.    Original Pain Description:  The pain is located in the L knee and does not radiate. The pain is described as sharp and stinging . Exacerbating factors: Sitting, Standing, Laying, Night Time, Getting out of bed/chair and going up stairs. Mitigating factors massage, medications, physical therapy, rest and sitting. Symptoms interfere with daily activity and sleeping. The patient feels like symptoms have been unchanged. Patient denies night fever/night sweats, urinary incontinence, bowel incontinence and significant motor weakness.    Original PAIN SCORES:  Best: Pain is 0  Worst: Pain is 6  Usually: Pain is 4  Current: Pain is 2    INTERVAL HISTORY:     Interval History 9/21/22:  Mrs. Avery returns to clinic after left genicular RFA on 8/30/22. She reports significant pain relief and she is able to stand from a seated position and walk more comfortably. She is happy with the results and would like to have the same procedure on her right knee. She has had right knee pain for years and she has received multiple steroid injections which only provide about 2 weeks of pain relief. Pain is exacerbated by stairs, walking, and rising from a chair.  She denies any new weakness and numbness/tingling.      Interval History 12/13/2022:  The patient has a virtual follow up for right knee pain. She is now s/p right genicular RFA on 10/25/22 with 80% relief. She is still having benefit from left knee RFA. She is also reporting lower back pain . The pain is sharp in nature. It does not radiate. She has pain when she stands for a long time. She takes Gabapentin regularly with some benefit. She has completed PT in the past with some benefit. She did have a lumbar MRI in 2015.    Interval History 1/24/2023:  The patient returns for follow up of chronic bilateral knee and lower back pain. She has been having more left knee pain recently. She had genicular RFA 8/30/22 with significant benefit. However, she feels like the pain has started to return. She would like to repeat when possible. She still has some back pain with intermittent radiation across the back. She is taking Gabapentin 900 mg daily with some benefit and no side effects. Since previous encounter, she had a recent oncology 3 month follow up at at [facility]. She has a spot to the back and two to the lung which are being watched. She has a follow up in 3 months again. Her pain today is 4/10.    6 weeks of Conservative therapy (PT/Chiro/Home Exercises with Dates)  PT July 2021-September 2021  PT November 2021-December 2021  PT April 2022-May 2022     Treatments / Medications: (Ice/Heat/NSAIDS/APAP/etc):  Ice  Heat  Massage  PT  Voltaren Gel  Gabapentin     Report:      Interventional Pain Procedures: (Previous injections)  L knee replacement  R knee steroid injection  10/11/22 Right genicular blocks- significant short term benefit  10/25/22 Right genicular cooled RFA- 80% relief    Past Medical History:   Diagnosis Date    Anxiety     Family history of malignant melanoma 8/25/2014    Fibromyalgia affecting lower leg     GERD (gastroesophageal reflux disease)     Hypercholesteremia     Hypertension     Hypothyroidism     Inflamed seborrheic keratosis  8/25/2014    Microscopic hematuria 2/2/2017    Multiple benign melanocytic nevi 8/25/2014     Past Surgical History:   Procedure Laterality Date    AUGMENTATION OF BREAST      bladder lift      breast implants      BREAST SURGERY Bilateral 1996    saline implants    COLONOSCOPY  2007    HYSTERECTOMY      ectopic pregnancy x 2, with LSO    KNEE ARTHROPLASTY Left 6/14/2021    Procedure: ARTHROPLASTY, KNEE:LEFT:DEPUY-SIGMA;  Surgeon: Osmar Avery III, MD;  Location: [facility] OR;  Service: Orthopedics;  Laterality: Left;    LAPAROSCOPIC NEPHRECTOMY, HAND ASSISTED  07/25/2013    LUNG REMOVAL, PARTIAL Left 2020    At [facility]    NEPHRECTOMY      OOPHORECTOMY      x1    RADIOFREQUENCY ABLATION Left 8/30/2022    Procedure: RADIOFREQUENCY ABLATION, LEFT KNEE COOLED;  Surgeon: Vijaya Landin MD;  Location: [facility] PAIN MGT;  Service: Pain Management;  Laterality: Left;    RADIOFREQUENCY ABLATION Right 10/25/2022    Procedure: RADIOFREQUENCY ABLATION RIGHT KNEE GENICULAR COOLED;  Surgeon: Vijaya Landin MD;  Location: [facility] PAIN [facility]GT;  Service: Pain Management;  Laterality: Right;    right ganglion cyst      throat polyp      TRANSOBTURATOR SLING  2011    with RSO for persistent complex cyst & MARGOT; done by [facility]    UPPER GASTROINTESTINAL ENDOSCOPY  2007     Social History     Socioeconomic History    Marital status:      Spouse name: KAIDEN    Number of children: 5   Occupational History    Occupation: retired     Comment: Dance Instructor   Tobacco Use    Smoking status: Never    Smokeless tobacco: Never   Substance and Sexual Activity    Alcohol use: Yes     Alcohol/week: 0.0 standard drinks     Comment: social    Drug use: No    Sexual activity: Yes     Partners: Male     Birth control/protection: Surgical   Social History Narrative    Patient is a retired dance instructor and live with . Has stairs at home 12- has an elevator     Family History   Problem Relation Age of Onset    Diabetes Mother      Hypertension Mother     Heart disease Mother     Cancer Father         lung    Migraines Father     Glaucoma Paternal Grandmother     Glaucoma Sister     Thyroid disease Neg Hx     Asthma Neg Hx        Review of patient's allergies indicates:   Allergen Reactions    Talwin compound Anxiety     hallucinations/anxiety    Tramadol Itching    Buspirone Anxiety    Codeine Itching    Morphine Itching     jittery    Morpholine analogues Anxiety and Itching    Naproxen Itching     Takes Ibuprofen is ok on rare occasion     Nexium [esomeprazole magnesium] Other (See Comments)     constipation    Wal-phed [pseudoephedrine hcl] Itching       Current Outpatient Medications   Medication Sig    ALPRAZolam (XANAX) 0.5 MG tablet Take daily as needed for anxiety.    buPROPion (WELLBUTRIN XL) 150 MG TB24 tablet Take 1 tablet (150 mg total) by mouth once daily.    butalbital-acetaminophen-caffeine -40 mg (FIORICET, ESGIC) -40 mg per tablet TAKE 1 TABLET EVERY 4 HOURS AS NEEDED    diclofenac sodium (VOLTAREN) 1 % Gel Apply 2 g topically daily as needed.    estradioL (ESTRACE) 1 MG tablet Take 1 tablet (1 mg total) by mouth once daily.    fluocinonide (LIDEX) 0.05 % external solution Apply topically 2 (two) times daily. Do not use morning of surgery    fluticasone propionate (FLONASE) 50 mcg/actuation nasal spray 2 sprays (100 mcg total) by Each Nostril route once daily.    gabapentin (NEURONTIN) 300 MG capsule Take 1 capsule (300 mg total) by mouth 3 (three) times daily.    ketoconazole (NIZORAL) 2 % shampoo Do not use morning of surgery    levothyroxine (SYNTHROID) 112 MCG tablet TAKE 1 TABLET (112 MCG TOTAL) BY MOUTH BEFORE BREAKFAST AS SCHEDULED    omeprazole (PRILOSEC) 10 MG capsule Take 10 mg by mouth.    ondansetron (ZOFRAN) 4 MG tablet     oxyCODONE-acetaminophen (PERCOCET) 5-325 mg per tablet Take 1 tablet by mouth every 6 (six) hours as needed for Pain.    oxymetazoline (AFRIN) 0.05 % nasal spray 2 sprays by Nasal  "route 2 (two) times daily.    pravastatin (PRAVACHOL) 20 MG tablet TAKE 1 TABLET EVERY DAY    senna-docusate 8.6-50 mg (PERICOLACE) 8.6-50 mg per tablet Take 1 tablet by mouth. Hold of surgery    cetirizine (ZYRTEC) 10 MG tablet Take 10 mg by mouth daily as needed.    clotrimazole (LOTRIMIN) 1 % Soln Apply topically 2 (two) times daily. for 7 days    diflorasone-emollient (APEXICON E) 0.05 % Crea Apply 1 application topically once daily. for 7 days    hydrocortisone 2.5 % cream Apply topically 2 (two) times daily. apply to affected area for 7 days    metronidazole 1% (METROGEL) 1 % Gel Apply topically once daily.    zolpidem (AMBIEN) 5 MG Tab Take 1 tablet (5 mg total) by mouth nightly as needed.     No current facility-administered medications for this visit.     Facility-Administered Medications Ordered in Other Visits   Medication    triamcinolone acetonide injection 40 mg       ROS:  GENERAL: No fever. No chills. No fatigue. Denies weight loss. Denies weight gain.  HEENT: Denies headaches. Denies vision change. Denies eye pain. Denies double vision. Denies ear pain.   CV: Denies chest pain.   PULM: Denies of shortness of breath.  GI: Denies constipation. No diarrhea. No abdominal pain. Denies nausea. Denies vomiting. No blood in stool.  HEME: Denies bleeding problems.  : Denies urgency. No painful urination. No blood in urine.  MS: Denies joint stiffness. Denies joint swelling.  Denies back pain.  SKIN: Denies rash.   NEURO: Denies seizures. No weakness.  PSYCH:  Denies difficulty sleeping. No anxiety. Denies depression. No suicidal thoughts.         /65   Pulse 81   Temp 97.2 °F (36.2 °C)   Resp 18   Ht 5' 5.5" (1.664 m)   Wt 94 kg (207 lb 3.7 oz)   BMI 33.96 kg/m²       PHYSICAL EXAM:   GENERAL: Well appearing, in no acute distress, alert and oriented x3.  PSYCH:  Mood and affect appropriate.  SKIN: Normal turgor and coloration.  HEENT:  Normocephalic, atraumatic. Cranial nerves grossly " intact.  NECK: No pain to palpation over the cervical paraspinous muscles. No pain to palpation over facets. No pain with neck flexion, extension, or lateral flexion.   PULM: No evidence of respiratory difficulty, symmetric chest rise.  GI:  Non-distended  BACK: Normal range of motion. No pain to palpation over the spinous processes. No pain to palpation over facet joints. There is no pain with palpation over the sacroiliac joints bilaterally. Straight leg raising in the supine position is negative to radicular pain.   EXTREMITIES: +ttp left medial joint line and pain with patella ROM. Swollen 3 x 3 inch area just below L patella.   MUSCULOSKELETAL:  Bilateral lower extremity strength is normal and symmetric, with some pain limitation with movement of L knee joint. No atrophy is noted.  NEURO: No change in sensation.  GAIT: Antalgic and slow.    IMAGING:      X- Ray knee ortho BL 5/17/22    Narrative & Impression  EXAMINATION:  XR KNEE ORTHO BILAT WITH FLEXION     CLINICAL HISTORY:  Presence of left artificial knee joint     TECHNIQUE:  AP standing of both knees, PA flexion standing views of both knees, and Merchant views of both knees were performed.  Lateral views of both knees were also performed.     COMPARISON:  No 07/27/2021 ne     FINDINGS:  Left knee arthroplasty identified.  The position and alignment is satisfactory and unchanged as compared to the previous study.     DJD involving the right knee with narrowing of the patellofemoral and the medial tibiofemoral joint space.  No fracture or bone destruction identified     Impression:  Patient with symptoms, imaging, and PE consistent with:    1. Chronic pain of right knee  Procedure Order to Pain Management      2. Primary osteoarthritis of right knee        3. Other chronic pain        4. Lumbar spondylosis            ASSESSMENT: 72 y.o. year old female w/ pmh of renal cell carcinoma (kidney removed in 2013) s/p radiation in February 2022 due to METs which  appeared in 2017 with pain, consistent with and chronic right knee pain secondary to osteoarthritis.    DISCUSSION: Ms. Avery has a history of fibromyalgia and RCC. She is currently being treated for lung metastasis. She comes to us from Dr. Avery with continued pain after left TKA on 6/14/21. She also is reporting lower back pain. Previous imaging shows scolioisis and facet arthropathy mainly.    PLAN:  Schedule for left genicular cooled RFA after 2/28/22. Will monitor right knee in the future.  Increase Gabapentin to 400 mg TID.  RTC 4 weeks after procedure.      The above plan and management options were discussed at length with patient. Patient is in agreement with the above and verbalized understanding.     Dona Arteaga, SAMAN  01/24/2023

## 2023-02-06 ENCOUNTER — PATIENT MESSAGE (OUTPATIENT)
Dept: AUDIOLOGY | Facility: CLINIC | Age: 73
End: 2023-02-06
Payer: MEDICARE

## 2023-02-06 ENCOUNTER — PATIENT MESSAGE (OUTPATIENT)
Dept: PSYCHIATRY | Facility: CLINIC | Age: 73
End: 2023-02-06
Payer: MEDICARE

## 2023-02-14 ENCOUNTER — PATIENT MESSAGE (OUTPATIENT)
Dept: INTERNAL MEDICINE | Facility: CLINIC | Age: 73
End: 2023-02-14
Payer: MEDICARE

## 2023-02-15 ENCOUNTER — PATIENT MESSAGE (OUTPATIENT)
Dept: PSYCHIATRY | Facility: CLINIC | Age: 73
End: 2023-02-15
Payer: MEDICARE

## 2023-02-15 ENCOUNTER — PATIENT MESSAGE (OUTPATIENT)
Dept: INTERNAL MEDICINE | Facility: CLINIC | Age: 73
End: 2023-02-15
Payer: MEDICARE

## 2023-02-15 ENCOUNTER — PATIENT MESSAGE (OUTPATIENT)
Dept: HEMATOLOGY/ONCOLOGY | Facility: CLINIC | Age: 73
End: 2023-02-15
Payer: MEDICARE

## 2023-02-16 ENCOUNTER — CLINICAL SUPPORT (OUTPATIENT)
Dept: AUDIOLOGY | Facility: CLINIC | Age: 73
End: 2023-02-16
Payer: MEDICARE

## 2023-02-16 DIAGNOSIS — H90.3 SENSORINEURAL HEARING LOSS, BILATERAL: Primary | ICD-10-CM

## 2023-02-17 ENCOUNTER — PATIENT MESSAGE (OUTPATIENT)
Dept: HEMATOLOGY/ONCOLOGY | Facility: CLINIC | Age: 73
End: 2023-02-17
Payer: MEDICARE

## 2023-02-20 NOTE — PROGRESS NOTES
Patient was seen for a hearing aid adjustment. She is in need of more clarity of speech. Decreased her acclimation to 100% from 105%. Increased moderate speech frequencies from 750-3 k Hz 3 clicks. She reported more clarity. I will be sending off her old [employer] hearing with SN ending in 074W. Quoted 250 + tax for repair. Will call to schedule her  when the HA arrives for a ear cleaning, audio, and hearing aid adjustment.

## 2023-02-21 ENCOUNTER — PATIENT MESSAGE (OUTPATIENT)
Dept: AUDIOLOGY | Facility: CLINIC | Age: 73
End: 2023-02-21
Payer: MEDICARE

## 2023-02-21 NOTE — TELEPHONE ENCOUNTER
"Called patients and spoke with Mrs. Avery. She wants to wait to unsure Mr. Avery's aids can be repaired and wait until aids are back before scheduling the ENT/Audio/Hearing Aid fitting appointments. We cancelled the appts for tomorrow. Notified Mrs. Avery that Dr. Fragoso will reach out once aids are back to schedule his appointments. They are in "no rush" and appreciation was voiced. /rtoma/  "

## 2023-03-07 ENCOUNTER — HOSPITAL ENCOUNTER (OUTPATIENT)
Facility: OTHER | Age: 73
Discharge: HOME | End: 2023-03-07
Attending: ANESTHESIOLOGY | Admitting: ANESTHESIOLOGY
Payer: MEDICARE

## 2023-03-07 VITALS
BODY MASS INDEX: 32.49 KG/M2 | TEMPERATURE: 97 F | RESPIRATION RATE: 16 BRPM | OXYGEN SATURATION: 98 % | HEIGHT: 65 IN | WEIGHT: 195 LBS | DIASTOLIC BLOOD PRESSURE: 74 MMHG | HEART RATE: 72 BPM | SYSTOLIC BLOOD PRESSURE: 161 MMHG

## 2023-03-07 DIAGNOSIS — G89.29 CHRONIC PAIN: ICD-10-CM

## 2023-03-07 DIAGNOSIS — M17.11 PRIMARY OSTEOARTHRITIS OF RIGHT KNEE: ICD-10-CM

## 2023-03-07 DIAGNOSIS — G89.29 CHRONIC KNEE PAIN AFTER TOTAL REPLACEMENT OF LEFT KNEE JOINT: Primary | ICD-10-CM

## 2023-03-07 DIAGNOSIS — Z96.652 CHRONIC KNEE PAIN AFTER TOTAL REPLACEMENT OF LEFT KNEE JOINT: Primary | ICD-10-CM

## 2023-03-07 DIAGNOSIS — M25.562 CHRONIC KNEE PAIN AFTER TOTAL REPLACEMENT OF LEFT KNEE JOINT: Primary | ICD-10-CM

## 2023-03-07 DIAGNOSIS — M17.0 PRIMARY OSTEOARTHRITIS OF BOTH KNEES: ICD-10-CM

## 2023-03-07 PROCEDURE — 64624 DSTRJ NULYT AGT GNCLR NRV: CPT | Mod: LT,,, | Performed by: ANESTHESIOLOGY

## 2023-03-07 PROCEDURE — 25000003 PHARM REV CODE 250: Performed by: STUDENT IN AN ORGANIZED HEALTH CARE EDUCATION/TRAINING PROGRAM

## 2023-03-07 PROCEDURE — 64624 PR DESTRUCT, NEUROLYTIC AGENT, GENICULAR NERVE, W/IMG: ICD-10-PCS | Mod: LT,,, | Performed by: ANESTHESIOLOGY

## 2023-03-07 PROCEDURE — 64624 DSTRJ NULYT AGT GNCLR NRV: CPT | Mod: LT | Performed by: ANESTHESIOLOGY

## 2023-03-07 PROCEDURE — A4649 SURGICAL SUPPLIES: HCPCS | Performed by: ANESTHESIOLOGY

## 2023-03-07 PROCEDURE — 99152 PR MOD CONSCIOUS SEDATION, SAME PHYS, 5+ YRS, FIRST 15 MIN: ICD-10-PCS | Mod: ,,, | Performed by: ANESTHESIOLOGY

## 2023-03-07 PROCEDURE — 99152 MOD SED SAME PHYS/QHP 5/>YRS: CPT | Performed by: ANESTHESIOLOGY

## 2023-03-07 PROCEDURE — 99152 MOD SED SAME PHYS/QHP 5/>YRS: CPT | Mod: ,,, | Performed by: ANESTHESIOLOGY

## 2023-03-07 PROCEDURE — 25000003 PHARM REV CODE 250: Performed by: ANESTHESIOLOGY

## 2023-03-07 PROCEDURE — 63600175 PHARM REV CODE 636 W HCPCS: Performed by: ANESTHESIOLOGY

## 2023-03-07 RX ORDER — MIDAZOLAM HYDROCHLORIDE 1 MG/ML
INJECTION INTRAMUSCULAR; INTRAVENOUS
Status: DISCONTINUED | OUTPATIENT
Start: 2023-03-07 | End: 2023-03-07 | Stop reason: HOSPADM

## 2023-03-07 RX ORDER — TRIAMCINOLONE ACETONIDE 40 MG/ML
INJECTION, SUSPENSION INTRA-ARTICULAR; INTRAMUSCULAR
Status: DISCONTINUED | OUTPATIENT
Start: 2023-03-07 | End: 2023-03-07 | Stop reason: HOSPADM

## 2023-03-07 RX ORDER — SODIUM CHLORIDE 9 MG/ML
500 INJECTION, SOLUTION INTRAVENOUS CONTINUOUS
Status: DISCONTINUED | OUTPATIENT
Start: 2023-03-07 | End: 2023-03-07 | Stop reason: HOSPADM

## 2023-03-07 RX ORDER — LIDOCAINE HYDROCHLORIDE 20 MG/ML
INJECTION, SOLUTION INFILTRATION; PERINEURAL
Status: DISCONTINUED | OUTPATIENT
Start: 2023-03-07 | End: 2023-03-07 | Stop reason: HOSPADM

## 2023-03-07 RX ORDER — FENTANYL CITRATE 50 UG/ML
INJECTION, SOLUTION INTRAMUSCULAR; INTRAVENOUS
Status: DISCONTINUED | OUTPATIENT
Start: 2023-03-07 | End: 2023-03-07 | Stop reason: HOSPADM

## 2023-03-07 RX ORDER — BUPIVACAINE HYDROCHLORIDE 2.5 MG/ML
INJECTION, SOLUTION EPIDURAL; INFILTRATION; INTRACAUDAL
Status: DISCONTINUED | OUTPATIENT
Start: 2023-03-07 | End: 2023-03-07 | Stop reason: HOSPADM

## 2023-03-07 RX ORDER — ONDANSETRON 2 MG/ML
INJECTION INTRAMUSCULAR; INTRAVENOUS
Status: DISCONTINUED | OUTPATIENT
Start: 2023-03-07 | End: 2023-03-07 | Stop reason: HOSPADM

## 2023-03-07 NOTE — DISCHARGE SUMMARY
Discharge Note  Short Stay      SUMMARY     Admit Date: 3/7/2023    Attending Physician: Vijaya Landin      Discharge Physician: Vijaya Landin      Discharge Date: 3/7/2023 12:14 PM    Procedure(s) (LRB):  RADIOFREQUENCY ABLATION LEFT GENICULAR COOLED RFA (Left)    Final Diagnosis: Chronic pain of right knee [M25.561, G89.29]    Disposition: Home or self care    Patient Instructions:   Current Discharge Medication List        CONTINUE these medications which have NOT CHANGED    Details   ALPRAZolam (XANAX) 0.5 MG tablet Take daily as needed for anxiety.  Qty: 30 tablet, Refills: 5      buPROPion (WELLBUTRIN XL) 150 MG TB24 tablet Take 1 tablet (150 mg total) by mouth once daily.  Qty: 30 tablet, Refills: 2      butalbital-acetaminophen-caffeine -40 mg (FIORICET, ESGIC) -40 mg per tablet TAKE 1 TABLET EVERY 4 HOURS AS NEEDED  Qty: 30 tablet, Refills: 0    Associated Diagnoses: Headache, unspecified headache type      cetirizine (ZYRTEC) 10 MG tablet Take 10 mg by mouth daily as needed.      clotrimazole (LOTRIMIN) 1 % Soln Apply topically 2 (two) times daily. for 7 days  Qty: 15 mL, Refills: 5      diclofenac sodium (VOLTAREN) 1 % Gel Apply 2 g topically daily as needed.  Qty: 100 g, Refills: 11      diflorasone-emollient (APEXICON E) 0.05 % Crea Apply 1 application topically once daily. for 7 days  Qty: 30 g, Refills: 5      estradioL (ESTRACE) 1 MG tablet Take 1 tablet (1 mg total) by mouth once daily.  Qty: 90 tablet, Refills: 3      fluocinonide (LIDEX) 0.05 % external solution Apply topically 2 (two) times daily. Do not use morning of surgery      fluticasone propionate (FLONASE) 50 mcg/actuation nasal spray 2 sprays (100 mcg total) by Each Nostril route once daily.  Qty: 3 mL, Refills: 3      gabapentin (NEURONTIN) 400 MG capsule Take 1 capsule (400 mg total) by mouth 3 (three) times daily.  Qty: 270 capsule, Refills: 0      hydrocortisone 2.5 % cream Apply topically 2 (two) times daily.  apply to affected area for 7 days  Qty: 20 g, Refills: 5      ketoconazole (NIZORAL) 2 % shampoo Do not use morning of surgery      levothyroxine (SYNTHROID) 112 MCG tablet TAKE 1 TABLET (112 MCG TOTAL) BY MOUTH BEFORE BREAKFAST AS SCHEDULED  Qty: 90 tablet, Refills: 4      metronidazole 1% (METROGEL) 1 % Gel Apply topically once daily.  Qty: 60 g, Refills: 5      omeprazole (PRILOSEC) 10 MG capsule Take 10 mg by mouth.      ondansetron (ZOFRAN) 4 MG tablet       oxyCODONE-acetaminophen (PERCOCET) 5-325 mg per tablet Take 1 tablet by mouth every 6 (six) hours as needed for Pain.  Qty: 30 each, Refills: 0    Comments: Quantity prescribed more than 7 day supply? Yes, quantity medically necessary, metastatic cancer patient.  Associated Diagnoses: Clear cell carcinoma; Carcinoma of right kidney; Carcinoma of kidney, unspecified laterality; History of right nephrectomy      oxymetazoline (AFRIN) 0.05 % nasal spray 2 sprays by Nasal route 2 (two) times daily.      pravastatin (PRAVACHOL) 20 MG tablet TAKE 1 TABLET EVERY DAY  Qty: 90 tablet, Refills: 2      senna-docusate 8.6-50 mg (PERICOLACE) 8.6-50 mg per tablet Take 1 tablet by mouth. Hold of surgery      zolpidem (AMBIEN) 5 MG Tab Take 1 tablet (5 mg total) by mouth nightly as needed.  Qty: 90 tablet, Refills: 0                 Discharge Diagnosis: Chronic pain of right knee [M25.561, G89.29]  Condition on Discharge: Stable with no complications to procedure   Diet on Discharge: Same as before.  Activity: as per instruction sheet.  Discharge to: Home with a responsible adult.  Follow up: 2-4 weeks       Please call my office or pager at [phone] if experienced any weakness or loss of sensation, fever > 101.5, pain uncontrolled with oral medications, persistent nausea/vomiting/or diarrhea, redness or drainage from the incisions, or any other worrisome concerns. If physician on call was not reached or could not communicate with our office for any reason please go to  the nearest emergency department      Vijaya Landin  03/07/2023

## 2023-03-07 NOTE — OP NOTE
Therapeutic Genicular Cooled Nerve Radiofrequency Ablation under Fluoroscopy     The procedure, risks, benefits, and options were discussed with the patient. There are no contraindications to the procedure. The patent expressed understanding and agreed to the procedure. Informed written consent was obtained prior to the start of the procedure and can be found in the patient's chart.        PATIENT NAME: Sherrill Avery   MRN: [id number]     DATE OF PROCEDURE: 03/07/2023     PROCEDURE: Therapeutic Left Genicular Cooled Nerve Radiofrequency Ablation under Fluoroscopy    PRE-OP DIAGNOSIS: Chronic pain of right knee [M25.561, G89.29]    POST-OP DIAGNOSIS: Chronic pain of right knee [M25.561, G89.29]    PHYSICIAN: Vijaya Landin MD    ASSISTANTS: Cherry Isbell MD    MEDICATIONS INJECTED:  Preservative-free Kenalog 40mg with 9cc of Bupivicaine 0.25%    LOCAL ANESTHETIC INJECTED:   Xylocaine 2%    SEDATION: Versed 1mg and Fentanyl 50mcg                                                                                                                                                                                     Conscious sedation ordered by M.D. Patient re-evaluation prior to administration of conscious sedation. No changes noted in patient's status from initial evaluation. The patient's vital signs were monitored by RN and patient remained hemodynamically stable throughout the procedure.    Event Time In   Sedation Start 1147   Sedation End 1214       ESTIMATED BLOOD LOSS:  None    COMPLICATIONS:  None     INTERVAL HISTORY: Patient has clinical findings of chronic knee pain. Patients has completed 2 previous diagnostic genicular nerve blocks with at least 80% relief for the expected duration of the local anesthetic utilized.     TECHNIQUE: Time-out was performed to identify the patient and procedure to be performed. With the patient laying in a supine position, the surgical area was prepped and draped in the usual sterile  fashion using ChloraPrep and fenestrated drape. Three target sites including the superior lateral genicular nerve where the lateral femoral shaft meets the epicondyle, the superior medial genicular nerve where the medial femoral shaft meets the epicondyle, and the inferior medial genicular nerve where the medial tibial shaft meets the epicondyle, were determined under fluoroscopic guidance. Skin anesthesia was achieved by injecting Lidocaine 2% over the injection sites. A 17 gauge, 50mm, 10mm active tip needle was then advanced under fluoroscopy in the AP and lateral views into the positions of the geniculate nerves at these levels. This was followed by motor testing at each of the nerves to ensure that there was no dorsiflexion of the foot. After negative aspiration for blood was confirmed, 1 mL of the lidocaine 2% listed above was injected slowly at each site. This was followed by cooled thermal lesioning at 80 degrees celsius for 150 seconds at each site. That was followed by slowly injecting 1 mL of the medication mixture listed above at each site. The needles were removed and bleeding was nil. A sterile dressing was applied. No specimens collected. The patient tolerated the procedure well and did not have any procedure related motor deficit at the conclusion of the procedure.    The patient was monitored after the procedure in the recovery area. They were given post-procedure and discharge instructions to follow at home. The patient was discharged in a stable condition.    Vijaya Landin MD

## 2023-03-07 NOTE — DISCHARGE INSTRUCTIONS
Thank you for allowing us to care for you today. You may receive a survey about the care we provided. Your feedback is valuable and helps us provide excellent care throughout the community.     Home Care Instructions for Pain Management:    1. DIET:   You may resume your normal diet today.   2. BATHING:   You may shower with luke warm water. No tub baths or anything that will soak injection sites under water for the next 24 hours.  3. DRESSING:   You may remove your bandage today.   4. ACTIVITY LEVEL:   You may resume your normal activities 24 hrs after your procedure. Nothing strenuous today.  5. MEDICATIONS:   You may resume your normal medications today. To restart blood thinners, ask your doctor.  6. DRIVING    If you have received any sedatives by mouth today, you may not drive for 12 hours.    If you have received any sedation through your IV, you may not drive for 24 hrs.   7. SPECIAL INSTRUCTIONS:   No heat to the injection site for 24 hrs including, hot bath or shower, heating pad, moist heat, or hot tubs.    Use ice pack to injection site for any pain or discomfort.  Apply ice packs for 20 minute intervals as needed.    IF you have diabetes, be sure to monitor your blood sugar more closely. IF your injection contained steroids your blood sugar levels may become higher than normal.    If you are still having pain upon discharge:  Your pain may improve over the next 48 hours. The anesthetic (numbing medication) works immediately to 48 hours. IF your injection contained a steroid (anti-inflammatory medication), it takes approximately 3 days to start feeling relief and 7-10 days to see your greatest results from the medication. It is possible you may need subsequent injections. This would be discussed at your follow up appointment with pain management or your referring doctor.    Please call the PAIN MANAGEMENT office at [phone] or ON CALL pager at [phone] if you experienced any:   -Weakness or  loss of sensation  -Fever > 101.5  -Pain uncontrolled with oral medications   -Persistent nausea, vomiting, or diarrhea  -Redness or drainage from the injection sites, or any other worrisome concerns.   If physician on call was not reached or could not communicate with our office for any reason please go to the nearest emergency department. Adult Procedural Sedation Instructions    Recovery After Procedural Sedation (Adult)  You have been given medicine by vein to make you sleep during your surgery. This may have included both a pain medicine and sleeping medicine. Most of the effects have worn off. But you may still have some drowsiness for the next 6 to 8 hours.  Home care  Follow these guidelines when you get home:  For the next 8 hours, you should be watched by a responsible adult. This person should make sure your condition is not getting worse.  Don't drink any alcohol for the next 24 hours.  Don't drive, operate dangerous machinery, or make important business or personal decisions during the next 24 hours.  Note: Your healthcare provider may tell you not to take any medicine by mouth for pain or sleep in the next 4 hours. These medicines may react with the medicines you were given in the hospital. This could cause a much stronger response than usual.  Follow-up care  Follow up with your healthcare provider if you are not alert and back to your usual level of activity within 12 hours.  When to seek medical advice  Call your healthcare provider right away if any of these occur:  Drowsiness gets worse  Weakness or dizziness gets worse  Repeated vomiting  You can't be awakened   Date Last Reviewed: 10/18/2016  © 8617-5389 [employer]. [address] [place] [address], [place] [address]. All rights reserved. This information is not intended as a substitute for professional medical care. Always follow your healthcare professional's instructions.

## 2023-03-07 NOTE — PLAN OF CARE
1249--Patient states she is not ready to leave yet    1259--Patient tolerated procedure well. Patient reports 0/10 pain after procedure. Assisted patient up for first time. Gait steady. All discharge instructions given. Patient and spouse walked to bathroom.

## 2023-03-07 NOTE — DISCHARGE SUMMARY
Discharge Note  Short Stay      SUMMARY     Admit Date: 3/7/2023    Attending Physician: Vijaya Landin      Discharge Physician: Vijaya Landin      Discharge Date: 3/7/2023 12:14 PM    Procedure(s) (LRB):  RADIOFREQUENCY ABLATION LEFT GENICULAR COOLED RFA (Left)    Final Diagnosis: Chronic pain of right knee [M25.561, G89.29]    Disposition: Home or self care    Patient Instructions:   Current Discharge Medication List        CONTINUE these medications which have NOT CHANGED    Details   ALPRAZolam (XANAX) 0.5 MG tablet Take daily as needed for anxiety.  Qty: 30 tablet, Refills: 5      buPROPion (WELLBUTRIN XL) 150 MG TB24 tablet Take 1 tablet (150 mg total) by mouth once daily.  Qty: 30 tablet, Refills: 2      butalbital-acetaminophen-caffeine -40 mg (FIORICET, ESGIC) -40 mg per tablet TAKE 1 TABLET EVERY 4 HOURS AS NEEDED  Qty: 30 tablet, Refills: 0    Associated Diagnoses: Headache, unspecified headache type      cetirizine (ZYRTEC) 10 MG tablet Take 10 mg by mouth daily as needed.      clotrimazole (LOTRIMIN) 1 % Soln Apply topically 2 (two) times daily. for 7 days  Qty: 15 mL, Refills: 5      diclofenac sodium (VOLTAREN) 1 % Gel Apply 2 g topically daily as needed.  Qty: 100 g, Refills: 11      diflorasone-emollient (APEXICON E) 0.05 % Crea Apply 1 application topically once daily. for 7 days  Qty: 30 g, Refills: 5      estradioL (ESTRACE) 1 MG tablet Take 1 tablet (1 mg total) by mouth once daily.  Qty: 90 tablet, Refills: 3      fluocinonide (LIDEX) 0.05 % external solution Apply topically 2 (two) times daily. Do not use morning of surgery      fluticasone propionate (FLONASE) 50 mcg/actuation nasal spray 2 sprays (100 mcg total) by Each Nostril route once daily.  Qty: 3 mL, Refills: 3      gabapentin (NEURONTIN) 400 MG capsule Take 1 capsule (400 mg total) by mouth 3 (three) times daily.  Qty: 270 capsule, Refills: 0      hydrocortisone 2.5 % cream Apply topically 2 (two) times daily.  apply to affected area for 7 days  Qty: 20 g, Refills: 5      ketoconazole (NIZORAL) 2 % shampoo Do not use morning of surgery      levothyroxine (SYNTHROID) 112 MCG tablet TAKE 1 TABLET (112 MCG TOTAL) BY MOUTH BEFORE BREAKFAST AS SCHEDULED  Qty: 90 tablet, Refills: 4      metronidazole 1% (METROGEL) 1 % Gel Apply topically once daily.  Qty: 60 g, Refills: 5      omeprazole (PRILOSEC) 10 MG capsule Take 10 mg by mouth.      ondansetron (ZOFRAN) 4 MG tablet       oxyCODONE-acetaminophen (PERCOCET) 5-325 mg per tablet Take 1 tablet by mouth every 6 (six) hours as needed for Pain.  Qty: 30 each, Refills: 0    Comments: Quantity prescribed more than 7 day supply? Yes, quantity medically necessary, metastatic cancer patient.  Associated Diagnoses: Clear cell carcinoma; Carcinoma of right kidney; Carcinoma of kidney, unspecified laterality; History of right nephrectomy      oxymetazoline (AFRIN) 0.05 % nasal spray 2 sprays by Nasal route 2 (two) times daily.      pravastatin (PRAVACHOL) 20 MG tablet TAKE 1 TABLET EVERY DAY  Qty: 90 tablet, Refills: 2      senna-docusate 8.6-50 mg (PERICOLACE) 8.6-50 mg per tablet Take 1 tablet by mouth. Hold of surgery      zolpidem (AMBIEN) 5 MG Tab Take 1 tablet (5 mg total) by mouth nightly as needed.  Qty: 90 tablet, Refills: 0                 Discharge Diagnosis: Chronic pain of right knee [M25.561, G89.29]  Condition on Discharge: Stable with no complications to procedure   Diet on Discharge: Same as before.  Activity: as per instruction sheet.  Discharge to: Home with a responsible adult.  Follow up: 2-4 weeks       Please call my office or pager at [phone] if experienced any weakness or loss of sensation, fever > 101.5, pain uncontrolled with oral medications, persistent nausea/vomiting/or diarrhea, redness or drainage from the incisions, or any other worrisome concerns. If physician on call was not reached or could not communicate with our office for any reason please go to  the nearest emergency department      Vijaya Landin  03/07/2023

## 2023-03-07 NOTE — H&P
HPI  Patient presenting for Procedure(s) (LRB):  RADIOFREQUENCY ABLATION LEFT GENICULAR COOLED RFA (Left)     Patient on Anti-coagulation No    No health changes since previous encounter. No changes in pain since procedure was scheduled at previous visit. Denies any fevers or infections.     Current Facility-Administered Medications on File Prior to Encounter   Medication Dose Route Frequency Provider Last Rate Last Admin    triamcinolone acetonide injection 40 mg  40 mg Intra-articular  Osmar Avery III, MD   40 mg at 05/17/22 5565     Current Outpatient Medications on File Prior to Encounter   Medication Sig Dispense Refill    ALPRAZolam (XANAX) 0.5 MG tablet Take daily as needed for anxiety. 30 tablet 5    butalbital-acetaminophen-caffeine -40 mg (FIORICET, ESGIC) -40 mg per tablet TAKE 1 TABLET EVERY 4 HOURS AS NEEDED 30 tablet 0    cetirizine (ZYRTEC) 10 MG tablet Take 10 mg by mouth daily as needed.      clotrimazole (LOTRIMIN) 1 % Soln Apply topically 2 (two) times daily. for 7 days 15 mL 5    diclofenac sodium (VOLTAREN) 1 % Gel Apply 2 g topically daily as needed. 100 g 11    diflorasone-emollient (APEXICON E) 0.05 % Crea Apply 1 application topically once daily. for 7 days 30 g 5    estradioL (ESTRACE) 1 MG tablet Take 1 tablet (1 mg total) by mouth once daily. 90 tablet 3    fluocinonide (LIDEX) 0.05 % external solution Apply topically 2 (two) times daily. Do not use morning of surgery      fluticasone propionate (FLONASE) 50 mcg/actuation nasal spray 2 sprays (100 mcg total) by Each Nostril route once daily. 3 mL 3    gabapentin (NEURONTIN) 400 MG capsule Take 1 capsule (400 mg total) by mouth 3 (three) times daily. 270 capsule 0    hydrocortisone 2.5 % cream Apply topically 2 (two) times daily. apply to affected area for 7 days 20 g 5    ketoconazole (NIZORAL) 2 % shampoo Do not use morning of surgery      levothyroxine (SYNTHROID) 112 MCG tablet TAKE 1 TABLET (112 MCG TOTAL) BY MOUTH  BEFORE BREAKFAST AS SCHEDULED 90 tablet 4    metronidazole 1% (METROGEL) 1 % Gel Apply topically once daily. 60 g 5    omeprazole (PRILOSEC) 10 MG capsule Take 10 mg by mouth.      ondansetron (ZOFRAN) 4 MG tablet       oxyCODONE-acetaminophen (PERCOCET) 5-325 mg per tablet Take 1 tablet by mouth every 6 (six) hours as needed for Pain. 30 each 0    oxymetazoline (AFRIN) 0.05 % nasal spray 2 sprays by Nasal route 2 (two) times daily.      pravastatin (PRAVACHOL) 20 MG tablet TAKE 1 TABLET EVERY DAY 90 tablet 2    senna-docusate 8.6-50 mg (PERICOLACE) 8.6-50 mg per tablet Take 1 tablet by mouth. Hold of surgery      zolpidem (AMBIEN) 5 MG Tab Take 1 tablet (5 mg total) by mouth nightly as needed. 90 tablet 0     Past Medical History:   Diagnosis Date    Anxiety     Family history of malignant melanoma 8/25/2014    Fibromyalgia affecting lower leg     GERD (gastroesophageal reflux disease)     Hypercholesteremia     Hypertension     Hypothyroidism     Inflamed seborrheic keratosis 8/25/2014    Microscopic hematuria 2/2/2017    Multiple benign melanocytic nevi 8/25/2014     Past Surgical History:   Procedure Laterality Date    AUGMENTATION OF BREAST      bladder lift      breast implants      BREAST SURGERY Bilateral 1996    saline implants    COLONOSCOPY  2007    HYSTERECTOMY      ectopic pregnancy x 2, with LSO    KNEE ARTHROPLASTY Left 6/14/2021    Procedure: ARTHROPLASTY, KNEE:LEFT:DEPUY-SIGMA;  Surgeon: Osmar Avery III, MD;  Location: [facility] OR;  Service: Orthopedics;  Laterality: Left;    LAPAROSCOPIC NEPHRECTOMY, HAND ASSISTED  07/25/2013    LUNG REMOVAL, PARTIAL Left 2020    At [facility]    NEPHRECTOMY      OOPHORECTOMY      x1    RADIOFREQUENCY ABLATION Left 8/30/2022    Procedure: RADIOFREQUENCY ABLATION, LEFT KNEE COOLED;  Surgeon: Vijaya Landin MD;  Location: [facility];  Service: Pain Management;  Laterality: Left;    RADIOFREQUENCY ABLATION Right 10/25/2022    Procedure: RADIOFREQUENCY  "ABLATION RIGHT KNEE GENICULAR COOLED;  Surgeon: Vijaya Landin MD;  [facility];  Service: Pain Management;  Laterality: Right;    right ganglion cyst      throat polyp      TRANSOBTURATOR SLING  2011    with [facility]SO for persistent complex cyst & MARGOT; done by yris    UPPER GASTROINTESTINAL ENDOSCOPY  2007     Review of patient's allergies indicates:   Allergen Reactions    Talwin compound Anxiety     hallucinations/anxiety    Tramadol Itching    Buspirone Anxiety    Codeine Itching    Morphine Itching     jittery    Morpholine analogues Anxiety and Itching    Naproxen Itching     Takes Ibuprofen is ok on rare occasion     Nexium [esomeprazole magnesium] Other (See Comments)     constipation    Wal-phed [pseudoephedrine hcl] Itching      Current Facility-Administered Medications   Medication    0.9%  NaCl infusion     Facility-Administered Medications Ordered in Other Encounters   Medication    triamcinolone acetonide injection 40 mg       PMHx, PSHx, Allergies, Medications reviewed in epic    ROS negative except pain complaints in HPI    OBJECTIVE:    /74 (BP Location: Right arm, Patient Position: Sitting)   Pulse 78   Temp 97.4 °F (36.3 °C) (Oral)   Resp 16   Ht 5' 5" (1.651 m)   Wt 88.5 kg (195 lb)   SpO2 97%   BMI 32.45 kg/m²     PHYSICAL EXAMINATION:    GENERAL: Well appearing, in no acute distress, alert and oriented.  PSYCH:  Mood and affect appropriate.  SKIN: Skin color, texture, turgor normal in procedure area, no rashes or lesions which will impact the procedure.  CV: RRR with palpation of the radial artery.  PULM: No evidence of respiratory difficulty, symmetric chest rise. Clear to auscultation.  NEURO: Alert. Oriented. Speech fluent and appropriate. Moving all extremities.    Plan:    Proceed with procedure as planned Procedure(s) (LRB):  RADIOFREQUENCY ABLATION LEFT GENICULAR COOLED RFA (Left)    Anthony Waddell  03/07/2023            "

## 2023-04-04 ENCOUNTER — OFFICE VISIT (OUTPATIENT)
Dept: PAIN MEDICINE | Facility: CLINIC | Age: 73
End: 2023-04-04
Payer: MEDICARE

## 2023-04-04 DIAGNOSIS — M17.0 PRIMARY OSTEOARTHRITIS OF BOTH KNEES: Primary | ICD-10-CM

## 2023-04-04 PROCEDURE — 99213 OFFICE O/P EST LOW 20 MIN: CPT | Mod: 95,,, | Performed by: NURSE PRACTITIONER

## 2023-04-04 PROCEDURE — 99213 PR OFFICE/OUTPT VISIT, EST, LEVL III, 20-29 MIN: ICD-10-PCS | Mod: 95,,, | Performed by: NURSE PRACTITIONER

## 2023-04-04 NOTE — PROGRESS NOTES
PCP: Alexandre Macias MD    REFERRING PHYSICIAN: No ref. provider found    CHIEF COMPLAINT: L knee pain s/p knee replacement    Original HISTORY OF PRESENT ILLNESS: Sherrill Avery w/ pmh of HTN, and renal cell carcinoma (kidney removed in 2013) s/p radiation in February 2022 due to METs which appeared in 2017 who presents to the clinic for the evaluation of the above pain. She had a knee replacement in June 2021, and has continued to have pain since the procedure. She states when she sits for an extended period of time and stands back up she experiences the pain around the bottom to lateral edge of her knee cap. She has a swollen area just below the L knee as well. She currently denies CP, SOB, vision changes, or speech changes.    Original Pain Description:  The pain is located in the L knee and does not radiate. The pain is described as sharp and stinging . Exacerbating factors: Sitting, Standing, Laying, Night Time, Getting out of bed/chair and going up stairs. Mitigating factors massage, medications, physical therapy, rest and sitting. Symptoms interfere with daily activity and sleeping. The patient feels like symptoms have been unchanged. Patient denies night fever/night sweats, urinary incontinence, bowel incontinence and significant motor weakness.    Original PAIN SCORES:  Best: Pain is 0  Worst: Pain is 6  Usually: Pain is 4  Current: Pain is 2    INTERVAL HISTORY:     Interval History 9/21/22:  Mrs. Avery returns to clinic after left genicular RFA on 8/30/22. She reports significant pain relief and she is able to stand from a seated position and walk more comfortably. She is happy with the results and would like to have the same procedure on her right knee. She has had right knee pain for years and she has received multiple steroid injections which only provide about 2 weeks of pain relief. Pain is exacerbated by stairs, walking, and rising from a chair.  She denies any new weakness and numbness/tingling.      Interval History 12/13/2022:  The patient has a virtual follow up for right knee pain. She is now s/p right genicular RFA on 10/25/22 with 80% relief. She is still having benefit from left knee RFA. She is also reporting lower back pain . The pain is sharp in nature. It does not radiate. She has pain when she stands for a long time. She takes Gabapentin regularly with some benefit. She has completed PT in the past with some benefit. She did have a lumbar MRI in 2015.    Interval History 1/24/2023:  The patient returns for follow up of chronic bilateral knee and lower back pain. She has been having more left knee pain recently. She had genicular RFA 8/30/22 with significant benefit. However, she feels like the pain has started to return. She would like to repeat when possible. She still has some back pain with intermittent radiation across the back. She is taking Gabapentin 900 mg daily with some benefit and no side effects. Since previous encounter, she had a recent oncology 3 month follow up at at [facility]. She has a spot to the back and two to the lung which are being watched. She has a follow up in 3 months again. Her pain today is 4/10.    Interval History 4/4/2023:  The patient has a virtual follow up for bilateral knee pain. She is now s/p left genicular RFA with 80% relief. Her left knee pain has significantly improved. She is feeling more pain on the right knee and lower back recently. She previously had excellent relief for right knee pain from RFA for 5 months. She would like to repeat the procedure. She is following up with [facility] next week to discuss back pain and possible radiation treatment. She stopped Gabapentin due to memory issues but her pain worsened. She restarted this week with one daily.    6 weeks of Conservative therapy (PT/Chiro/Home Exercises with Dates)  PT July 2021-September 2021  PT November 2021-December 2021  PT April 2022-May 2022     Treatments / Medications:  (Ice/Heat/NSAIDS/APAP/etc):  Ice  Heat  Massage  PT  Voltaren Gel  Gabapentin     Report:      Interventional Pain Procedures: (Previous injections)  L knee replacement  R knee steroid injection  10/11/22 Right genicular blocks- significant short term benefit  10/25/22 Right genicular cooled RFA- 80% relief  3/7/23 Left genicular cooled RFA- 80% relief    Past Medical History:   Diagnosis Date    Anxiety     Family history of malignant melanoma 8/25/2014    Fibromyalgia affecting lower leg     GERD (gastroesophageal reflux disease)     Hypercholesteremia     Hypertension     Hypothyroidism     Inflamed seborrheic keratosis 8/25/2014    Microscopic hematuria 2/2/2017    Multiple benign melanocytic nevi 8/25/2014     Past Surgical History:   Procedure Laterality Date    AUGMENTATION OF BREAST      bladder lift      breast implants      BREAST SURGERY Bilateral 1996    saline implants    COLONOSCOPY  2007    HYSTERECTOMY      ectopic pregnancy x 2, with LSO    KNEE ARTHROPLASTY Left 6/14/2021    Procedure: ARTHROPLASTY, KNEE:LEFT:DEPUY-SIGMA;  Surgeon: Osmar Avery III, MD;  Location: [facility];  Service: Orthopedics;  Laterality: Left;    LAPAROSCOPIC NEPHRECTOMY, HAND ASSISTED  07/25/2013    LUNG REMOVAL, PARTIAL Left 2020    At [facility]    NEPHRECTOMY      OOPHORECTOMY      x1    RADIOFREQUENCY ABLATION Left 8/30/2022    Procedure: RADIOFREQUENCY ABLATION, LEFT KNEE COOLED;  Surgeon: Vijaya Landin MD;  Location: [facility];  Service: Pain Management;  Laterality: Left;    RADIOFREQUENCY ABLATION Right 10/25/2022    Procedure: RADIOFREQUENCY ABLATION RIGHT KNEE GENICULAR COOLED;  Surgeon: Vijaya Landin MD;  Location: [facility];  Service: Pain Management;  Laterality: Right;    RADIOFREQUENCY ABLATION Left 3/7/2023    Procedure: RADIOFREQUENCY ABLATION LEFT GENICULAR COOLED RFA;  Surgeon: Vijaya Landin MD;  Location: [facility];  Service: Pain Management;  Laterality: Left;    right  ganglion cyst      throat polyp      TRANSOBTURATOR SLING  2011    with RSO for persistent complex cyst & MARGOT; done by yris    UPPER GASTROINTESTINAL ENDOSCOPY  2007     Social History     Socioeconomic History    Marital status:      Spouse name: KAIDEN    Number of children: 5   Occupational History    Occupation: retired     Comment: Dance Instructor   Tobacco Use    Smoking status: Never    Smokeless tobacco: Never   Substance and Sexual Activity    Alcohol use: Yes     Alcohol/week: 0.0 standard drinks     Comment: social    Drug use: No    Sexual activity: Yes     Partners: Male     Birth control/protection: Surgical   Social History Narrative    Patient is a retired dance instructor and live with . Has stairs at home 12- has an elevator     Family History   Problem Relation Age of Onset    Diabetes Mother     Hypertension Mother     Heart disease Mother     Cancer Father         lung    Migraines Father     Glaucoma Paternal Grandmother     Glaucoma Sister     Thyroid disease Neg Hx     Asthma Neg Hx        Review of patient's allergies indicates:   Allergen Reactions    Talwin compound Anxiety     hallucinations/anxiety    Tramadol Itching    Buspirone Anxiety    Codeine Itching    Morphine Itching     jittery    Morpholine analogues Anxiety and Itching    Naproxen Itching     Takes Ibuprofen is ok on rare occasion     Nexium [esomeprazole magnesium] Other (See Comments)     constipation    Wal-phed [pseudoephedrine hcl] Itching       Current Outpatient Medications   Medication Sig    ALPRAZolam (XANAX) 0.5 MG tablet Take daily as needed for anxiety.    buPROPion (WELLBUTRIN XL) 150 MG TB24 tablet Take 1 tablet (150 mg total) by mouth once daily.    butalbital-acetaminophen-caffeine -40 mg (FIORICET, ESGIC) -40 mg per tablet TAKE 1 TABLET EVERY 4 HOURS AS NEEDED    cetirizine (ZYRTEC) 10 MG tablet Take 10 mg by mouth daily as needed.    clotrimazole (LOTRIMIN) 1 % Soln Apply  topically 2 (two) times daily. for 7 days    diclofenac sodium (VOLTAREN) 1 % Gel Apply 2 g topically daily as needed.    diflorasone-emollient (APEXICON E) 0.05 % Crea Apply 1 application topically once daily. for 7 days    estradioL (ESTRACE) 1 MG tablet Take 1 tablet (1 mg total) by mouth once daily.    fluocinonide (LIDEX) 0.05 % external solution Apply topically 2 (two) times daily. Do not use morning of surgery    fluticasone propionate (FLONASE) 50 mcg/actuation nasal spray 2 sprays (100 mcg total) by Each Nostril route once daily.    gabapentin (NEURONTIN) 400 MG capsule Take 1 capsule (400 mg total) by mouth 3 (three) times daily.    hydrocortisone 2.5 % cream Apply topically 2 (two) times daily. apply to affected area for 7 days    ketoconazole (NIZORAL) 2 % shampoo Do not use morning of surgery    levothyroxine (SYNTHROID) 112 MCG tablet TAKE 1 TABLET (112 MCG TOTAL) BY MOUTH BEFORE BREAKFAST AS SCHEDULED    metronidazole 1% (METROGEL) 1 % Gel Apply topically once daily.    omeprazole (PRILOSEC) 10 MG capsule Take 10 mg by mouth.    ondansetron (ZOFRAN) 4 MG tablet     oxyCODONE-acetaminophen (PERCOCET) 5-325 mg per tablet Take 1 tablet by mouth every 6 (six) hours as needed for Pain.    oxymetazoline (AFRIN) 0.05 % nasal spray 2 sprays by Nasal route 2 (two) times daily.    pravastatin (PRAVACHOL) 20 MG tablet TAKE 1 TABLET EVERY DAY    senna-docusate 8.6-50 mg (PERICOLACE) 8.6-50 mg per tablet Take 1 tablet by mouth. Hold of surgery    zolpidem (AMBIEN) 5 MG Tab Take 1 tablet (5 mg total) by mouth nightly as needed.     No current facility-administered medications for this visit.     Facility-Administered Medications Ordered in Other Visits   Medication    triamcinolone acetonide injection 40 mg       ROS:  GENERAL: No fever. No chills. No fatigue. Denies weight loss. Denies weight gain.  HEENT: Denies headaches. Denies vision change. Denies eye pain. Denies double vision. Denies ear pain.   CV: Denies  chest pain.   PULM: Denies of shortness of breath.  GI: Denies constipation. No diarrhea. No abdominal pain. Denies nausea. Denies vomiting. No blood in stool.  HEME: Denies bleeding problems.  : Denies urgency. No painful urination. No blood in urine.  MS: Denies joint stiffness. Denies joint swelling.  Denies back pain.  SKIN: Denies rash.   NEURO: Denies seizures. No weakness.  PSYCH:  Denies difficulty sleeping. No anxiety. Denies depression. No suicidal thoughts.     PHYSICAL EXAMINATION:    General appearance: Well appearing, in no acute distress, alert and oriented x3.  Psych:  Mood and affect appropriate.  Skin: Skin color normal, no rashes or lesions, in both upper and lower body.  Extremities: Moves all visualized extremities freely.    PREVIOUS PHYSICAL EXAM:   GENERAL: Well appearing, in no acute distress, alert and oriented x3.  PSYCH:  Mood and affect appropriate.  SKIN: Normal turgor and coloration.  HEENT:  Normocephalic, atraumatic. Cranial nerves grossly intact.  NECK: No pain to palpation over the cervical paraspinous muscles. No pain to palpation over facets. No pain with neck flexion, extension, or lateral flexion.   PULM: No evidence of respiratory difficulty, symmetric chest rise.  GI:  Non-distended  BACK: Normal range of motion. No pain to palpation over the spinous processes. No pain to palpation over facet joints. There is no pain with palpation over the sacroiliac joints bilaterally. Straight leg raising in the supine position is negative to radicular pain.   EXTREMITIES: +ttp left medial joint line and pain with patella ROM. Swollen 3 x 3 inch area just below L patella.   MUSCULOSKELETAL:  Bilateral lower extremity strength is normal and symmetric, with some pain limitation with movement of L knee joint. No atrophy is noted.  NEURO: No change in sensation.  GAIT: Antalgic and slow.    IMAGING:      X- Ray knee ortho BL 5/17/22    Narrative & Impression  EXAMINATION:  XR KNEE ORTHO  BILAT WITH FLEXION     CLINICAL HISTORY:  Presence of left artificial knee joint     TECHNIQUE:  AP standing of both knees, PA flexion standing views of both knees, and Merchant views of both knees were performed.  Lateral views of both knees were also performed.     COMPARISON:  No 07/27/2021 ne     FINDINGS:  Left knee arthroplasty identified.  The position and alignment is satisfactory and unchanged as compared to the previous study.     DJD involving the right knee with narrowing of the patellofemoral and the medial tibiofemoral joint space.  No fracture or bone destruction identified     Impression:  Patient with symptoms, imaging, and PE consistent with:    No diagnosis found.      ASSESSMENT: 72 y.o. year old female w/ pmh of renal cell carcinoma (kidney removed in 2013) s/p radiation in February 2022 due to METs which appeared in 2017 with pain, consistent with and chronic right knee pain secondary to osteoarthritis.    DISCUSSION: Ms. Avery has a history of fibromyalgia and RCC. She is currently being treated for lung metastasis. She comes to us from Dr. Avery with continued pain after left TKA on 6/14/21. She also is reporting lower back pain. Previous imaging shows scolioisis and facet arthropathy mainly.    PLAN:  She is s/p left genicular cooled RFA with benefit. Right knee pain has returned. Will schedule for repeat right genicular RFA- previously was helpful for 5 months.  Consider lumbar MBB/RFA which we discussed today after knee pain has improved. She will discuss after plan from [facility].  Continue Gabapentin 400 mg QD. She can slowly increase back up if not causing memory issues.  RTC 4 weeks after procedure.      The above plan and management options were discussed at length with patient. Patient is in agreement with the above and verbalized understanding.     Dona Arteaga, SAMAN  04/04/2023

## 2023-04-05 ENCOUNTER — PATIENT MESSAGE (OUTPATIENT)
Dept: PAIN MEDICINE | Facility: OTHER | Age: 73
End: 2023-04-05
Payer: MEDICARE

## 2023-04-18 NOTE — PROGRESS NOTES
Subjective:       Patient ID: Sherrill Avery    Chief Complaint:  Met ccRCC    HPI     Sherrill Avery is a 72 y.o. 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 She is s/p left VATS LLL wedge resection on 1/29/20.     Here to follow-up Currently NOT on therapy. Disease has been indolent.      Saw Dr. Leslie at [facility], PET scan 6/2022 shows post-therapy changes in the right lung and no other sites concerning for metastases. CT CAP 9/2022 shows multiple stable pulmonary nodules, DC 0.5 x 0.6 cm pulmonary nodule is slightly larger. Indeterminate 8 mm intramuscular lesion (previously 6 mm) may represent metastatic disease. Now intermediate - constant L rib pain + chronic intermittent back pain that she has noted since VATS. Gabapentin has relieved some symptoms.     Saw Dr. Leslie 9/2022, has scans, IR biopsy and visit scheduled 1/2023. Underwent knee replacement surgery of L in [place], had prolonged recovery. Seeing PT which helps the pain (last saw 3 weeks ago). Sees pain mgmt at [facility]. Recently established with psych onc at [facility]. Considering SBRT to the lung, sees Dr. Mustafa at [facility] from XRT but is concerned about starting on trial. Plan for IR biopsy of muscle lesion 1/2023.     Pt seeing [facility] Janessa, the healing [facility].  Vipin Leslie at [facility].     Oncologic History:    Clear cell carcinoma of right kidney.    6/30/2013 Initial Diagnosis   Presented with gross hematuria. CT CAP: 7.2-cm mass occupying the upper two thirds of the right kidney. 2.8-cm mass in the posterior aspect of the urinary bladder c/w TCC.    7/25/2013 Surgery   Right radical nephrectomy Pathology: 7.5-cm clear cell RCC Yanet nuclear grade 2, with microscopic extension into perinephric adipose tissue and with tumor in the renal margin.  pT3a pN0 pMX    10/16/2013 - No Evidence of Disease ([facility])   CT AP: No CT evidence of recurrence or metastatic disease    8/4/2015 - No  Evidence of Disease ([facility])   CT AP: No CT evidence of recurrence or metastatic disease    12/1/2016 - [facility] with concern of recurrence   CT AP: Mew less than 4 mm pulmonary nodule in the anterior basal segment of the RLL. Stable 4 mm pulmonary nodule posterior basal segment RLL.     3/1/2017 Relapse/Recurrence   CT Chest: Multiple new subcm pulmonary nodules in the RLL and one in the right middle measuring up to 6 mm suspicious for metastatic disease.     2/8/2019 - [facility] with concern of recurrence   1. Small volume pulmonary metastases are slightly larger compared to the prior study.     2. No recurrent or metastatic disease in the abdomen or pelvis.    2/14/2019 Biopsy   CT-guided biopsy of a 1 cm left lower lobe lesion   Pathology: Small focus of atypical adenomatous hyperplasia with no tumor present. PAX-8 negative.    She was initally noted to have spontaneous remission of the lung nodules in the absence of any systemic treatment. However, in 2019, the lung nodules at started to increase in size but were still mostly subcentimeter in greatest dimension. In 2/2019, when the left lower lobe lesion increased to 1.4 cm. IR biopsied a peripheral left lung lesion which was PAX-8 negative thought to be a small focus of atypical adenomatous hyperplasia. We therefore brought her back after only 3 months to monitor those lesions carefully. She was referred to Dr. King who thinks the lesions represent indolent RCC metastases. She underwent resection and RCC was confirmed on pathology at [facility].      Review of Systems   Constitutional: Negative for activity change, appetite change, chills, fatigue, fever and unexpected weight change.   HENT: Negative for congestion, hearing loss, mouth sores, sore throat, tinnitus and voice change.    Eyes: Negative for pain and visual disturbance.   Respiratory: Negative for cough, shortness of breath and wheezing.    Cardiovascular: Negative for chest pain, palpitations and leg swelling.    Gastrointestinal: Negative for abdominal pain, constipation, diarrhea, nausea and vomiting.   Endocrine: Negative for cold intolerance and heat intolerance.   Genitourinary: Negative for difficulty urinating, dyspareunia, dysuria, frequency, menstrual problem, urgency, vaginal bleeding, vaginal discharge and vaginal pain.   Musculoskeletal: Positive for arthralgias. Negative for back pain and myalgias.   Skin: Negative for color change, rash and wound.   Allergic/Immunologic: Negative for environmental allergies and food allergies.   Neurological: Negative for weakness, numbness and headaches.   Hematological: Negative for adenopathy. Does not bruise/bleed easily.   Psychiatric/Behavioral: Negative for agitation, confusion, hallucinations and sleep disturbance. The patient is nervous/anxious.    All other systems reviewed and are negative.          Allergies:  Review of patient's allergies indicates:   Allergen Reactions    Talwin compound Anxiety     hallucinations/anxiety    Tramadol Itching    Buspirone Anxiety    Codeine Itching    Morphine Itching     jittery    Morpholine analogues Anxiety and Itching    Naproxen Itching     Takes Ibuprofen is ok on rare occasion     Nexium [esomeprazole magnesium] Other (See Comments)     constipation    Wal-phed [pseudoephedrine hcl] Itching       Medications:  Current Outpatient Medications   Medication Sig Dispense Refill    ALPRAZolam (XANAX) 0.5 MG tablet Take daily as needed for anxiety. 30 tablet 5    buPROPion (WELLBUTRIN XL) 150 MG TB24 tablet Take 1 tablet (150 mg total) by mouth once daily. 90 tablet 1    butalbital-acetaminophen-caffeine -40 mg (FIORICET, ESGIC) -40 mg per tablet TAKE 1 TABLET EVERY 4 HOURS AS NEEDED 30 tablet 0    cetirizine (ZYRTEC) 10 MG tablet Take 10 mg by mouth daily as needed.      clotrimazole (LOTRIMIN) 1 % Soln Apply topically 2 (two) times daily. for 7 days 15 mL 5    diclofenac sodium (VOLTAREN) 1 % Gel Apply 2 g  topically daily as needed. 100 g 11    diflorasone-emollient (APEXICON E) 0.05 % Crea Apply 1 application topically once daily. for 7 days 30 g 5    estradioL (ESTRACE) 1 MG tablet Take 1 tablet (1 mg total) by mouth once daily. 90 tablet 3    fluocinonide (LIDEX) 0.05 % external solution Apply topically 2 (two) times daily. Do not use morning of surgery      fluticasone propionate (FLONASE) 50 mcg/actuation nasal spray 2 sprays (100 mcg total) by Each Nostril route once daily. 3 mL 3    gabapentin (NEURONTIN) 400 MG capsule Take 1 capsule (400 mg total) by mouth 3 (three) times daily. 270 capsule 0    hydrocortisone 2.5 % cream Apply topically 2 (two) times daily. apply to affected area for 7 days 20 g 5    ketoconazole (NIZORAL) 2 % shampoo Do not use morning of surgery      levothyroxine (SYNTHROID) 112 MCG tablet TAKE 1 TABLET (112 MCG TOTAL) BY MOUTH BEFORE BREAKFAST AS SCHEDULED 90 tablet 4    metronidazole 1% (METROGEL) 1 % Gel Apply topically once daily. 60 g 5    omeprazole (PRILOSEC) 10 MG capsule Take 10 mg by mouth.      ondansetron (ZOFRAN) 4 MG tablet       oxyCODONE-acetaminophen (PERCOCET) 5-325 mg per tablet Take 1 tablet by mouth every 6 (six) hours as needed for Pain. 30 each 0    oxymetazoline (AFRIN) 0.05 % nasal spray 2 sprays by Nasal route 2 (two) times daily.      pravastatin (PRAVACHOL) 20 MG tablet TAKE 1 TABLET EVERY DAY 90 tablet 2    senna-docusate 8.6-50 mg (PERICOLACE) 8.6-50 mg per tablet Take 1 tablet by mouth. Hold of surgery      zolpidem (AMBIEN) 5 MG Tab Take 1 tablet (5 mg total) by mouth nightly as needed. 90 tablet 0     No current facility-administered medications for this visit.     Facility-Administered Medications Ordered in Other Visits   Medication Dose Route Frequency Provider Last Rate Last Admin    triamcinolone acetonide injection 40 mg  40 mg Intra-articular  Osmar Avery III, MD   40 mg at 05/17/22 1345       PMH:  Past Medical History:   Diagnosis Date     Anxiety     Family history of malignant melanoma 8/25/2014    Fibromyalgia affecting lower leg     GERD (gastroesophageal reflux disease)     Hypercholesteremia     Hypertension     Hypothyroidism     Inflamed seborrheic keratosis 8/25/2014    Microscopic hematuria 2/2/2017    Multiple benign melanocytic nevi 8/25/2014       PSH:  Past Surgical History:   Procedure Laterality Date    AUGMENTATION OF BREAST      bladder lift      breast implants      BREAST SURGERY Bilateral 1996    saline implants    COLONOSCOPY  2007    HYSTERECTOMY      ectopic pregnancy x 2, with LSO    KNEE ARTHROPLASTY Left 6/14/2021    Procedure: ARTHROPLASTY, KNEE:LEFT:DEPUY-SIGMA;  Surgeon: Osmar Avery III, MD;  Location: [facility] OR;  Service: Orthopedics;  Laterality: Left;    LAPAROSCOPIC NEPHRECTOMY, HAND ASSISTED  07/25/2013    LUNG REMOVAL, PARTIAL Left 2020    At [facility]    NEPHRECTOMY      OOPHORECTOMY      x1    RADIOFREQUENCY ABLATION Left 8/30/2022    Procedure: RADIOFREQUENCY ABLATION, LEFT KNEE COOLED;  Surgeon: Vijaya Landin MD;  Location: [facility] PAIN [facility]GT;  Service: Pain Management;  Laterality: Left;    RADIOFREQUENCY ABLATION Right 10/25/2022    Procedure: RADIOFREQUENCY ABLATION RIGHT KNEE GENICULAR COOLED;  Surgeon: Vijaya Landin MD;  Location: [facility] PAIN [facility]GT;  Service: Pain Management;  Laterality: Right;    RADIOFREQUENCY ABLATION Left 3/7/2023    Procedure: RADIOFREQUENCY ABLATION LEFT GENICULAR COOLED RFA;  Surgeon: Vijaya Landin MD;  Location: [facility]IN [facility]GT;  Service: Pain Management;  Laterality: Left;    right ganglion cyst      throat polyp      TRANSOBTURATOR SLING  2011    with RSO for persistent complex cyst & MARGOT; done by yris    UPPER GASTROINTESTINAL ENDOSCOPY  2007       FamHx:  Family History   Problem Relation Age of Onset    Diabetes Mother     Hypertension Mother     Heart disease Mother     Cancer Father         lung    Migraines Father     Glaucoma Paternal Grandmother     Glaucoma  Sister     Thyroid disease Neg Hx     Asthma Neg Hx        SocHx:  Social History     Socioeconomic History    Marital status:      Spouse name: KAIDEN    Number of children: 5   Occupational History    Occupation: retired     Comment: Dance Instructor   Tobacco Use    Smoking status: Never    Smokeless tobacco: Never   Substance and Sexual Activity    Alcohol use: Yes     Alcohol/week: 0.0 standard drinks     Comment: social    Drug use: No    Sexual activity: Yes     Partners: Male     Birth control/protection: Surgical   Social History Narrative    Patient is a retired dance instructor and live with . Has stairs at home 12- has an elevator       Objective:      LABS:  WBC   Date Value Ref Range Status   08/17/2021 5.58 3.90 - 12.70 K/uL Final     Hemoglobin   Date Value Ref Range Status   08/17/2021 13.8 12.0 - 16.0 g/dL Final     Hematocrit   Date Value Ref Range Status   08/17/2021 42.4 37.0 - 48.5 % Final     Platelets   Date Value Ref Range Status   08/17/2021 215 150 - 450 K/uL Final       Chemistry        Component Value Date/Time     10/05/2022 1015    K 4.2 10/05/2022 1015     10/05/2022 1015    CO2 25 10/05/2022 1015    BUN 10 10/05/2022 1015    CREATININE 0.8 10/05/2022 1015    GLU 86 10/05/2022 1015        Component Value Date/Time    CALCIUM 8.4 (L) 10/05/2022 1015    ALKPHOS 78 10/05/2022 1015    AST 15 10/05/2022 1015    ALT 14 10/05/2022 1015    BILITOT 0.7 10/05/2022 1015    ESTGFRAFRICA >60 08/17/2021 1135    EGFRNONAA >60 08/17/2021 1135              Assessment:       No diagnosis found.          Plan:       1. Metastatic ccRCC:    Currently on no systemic treatment. S/p wedge resection [facility].  Scans at [facility] show one small area of possible recurrence, plan for IR biopsy at [facility] 1/2023. Tentative plan for SBRT at [facility] unless patient would like care closer to home. Patient will bring discs of [facility] imaging to next apt.     Seeing ortho in [place].  s/p knee replacement on June  14.  Getting PT now.      Bone Scan (per ortho) was clear.      RTC to see us after [facility] apt with scans to discuss next steps.       I have reviewed the notes, assessments, and/or procedures performed by the housestaff, as above.  I have personally interviewed and examined the patient at the beside, and rounded with the housestaff. I concur with her/his assessment and plan and the documentation of Sherrill Avery.  More than 40 mins total time were spent during this encounter.      Kervin Jaquez M.D., M.S., F.A.C.P.  Hematology/Oncology Attending  [facility]          Med Onc Route Chart for Scheduling

## 2023-04-19 ENCOUNTER — OFFICE VISIT (OUTPATIENT)
Dept: HEMATOLOGY/ONCOLOGY | Facility: CLINIC | Age: 73
End: 2023-04-19
Payer: MEDICARE

## 2023-04-19 VITALS
OXYGEN SATURATION: 97 % | DIASTOLIC BLOOD PRESSURE: 79 MMHG | SYSTOLIC BLOOD PRESSURE: 150 MMHG | HEIGHT: 65 IN | TEMPERATURE: 98 F | WEIGHT: 196.19 LBS | RESPIRATION RATE: 18 BRPM | BODY MASS INDEX: 32.69 KG/M2 | HEART RATE: 62 BPM

## 2023-04-19 DIAGNOSIS — C80.1 CLEAR CELL CARCINOMA: Primary | ICD-10-CM

## 2023-04-19 PROCEDURE — 99999 PR PBB SHADOW E&M-EST. PATIENT-LVL V: CPT | Mod: PBBFAC,,, | Performed by: INTERNAL MEDICINE

## 2023-04-19 PROCEDURE — 99215 PR OFFICE/OUTPT VISIT, EST, LEVL V, 40-54 MIN: ICD-10-PCS | Mod: S$PBB,,, | Performed by: INTERNAL MEDICINE

## 2023-04-19 PROCEDURE — 99999 PR PBB SHADOW E&M-EST. PATIENT-LVL V: ICD-10-PCS | Mod: PBBFAC,,, | Performed by: INTERNAL MEDICINE

## 2023-04-19 PROCEDURE — 99215 OFFICE O/P EST HI 40 MIN: CPT | Mod: PBBFAC | Performed by: INTERNAL MEDICINE

## 2023-04-19 PROCEDURE — 99215 OFFICE O/P EST HI 40 MIN: CPT | Mod: S$PBB,,, | Performed by: INTERNAL MEDICINE

## 2023-04-19 NOTE — TELEPHONE ENCOUNTER
----- Message from Pita Oneill sent at 11/15/2022  3:57 PM CST -----  Regarding: Appointment  Contact: [phone]  Calling to advise nurse that she only wants an appointment with doctor. Please call and schedule.     ondansetron 4 MG tablet  Commonly known as: Zofran  Take every six hours as needed     oxyCODONE-acetaminophen 5-325 MG per tablet  Commonly known as: Percocet  Take 1 tablet by mouth every 6 hours as needed for Pain for up to 7 days. . Take lowest dose possible to manage pain Max Daily Amount: 4 tablets            CONTINUE taking these medications      aluminum & magnesium hydroxide-simethicone 400-400-40 MG/5ML Susp  Commonly known as: Maalox Advanced Max St  Take 15 mLs by mouth every 6 hours as needed (nausea)     famotidine 20 MG tablet  Commonly known as: Pepcid  Take 1 tablet by mouth 2 times daily     gabapentin 600 MG tablet  Commonly known as: NEURONTIN            STOP taking these medications      cyclobenzaprine 10 MG tablet  Commonly known as: FLEXERIL     FLUoxetine 10 MG capsule  Commonly known as: PROZAC     ibuprofen 800 MG tablet  Commonly known as: ADVIL;MOTRIN     ondansetron 4 MG disintegrating tablet  Commonly known as: Zofran ODT     RA Vitamin D-3 50 MCG (2000 UT) Caps  Generic drug: Cholecalciferol               Where to Get Your Medications        These medications were sent to [facility] [address], [address] [facility] [address]      Phone: [phone]   cephALEXin 500 MG capsule  ondansetron 4 MG tablet  oxyCODONE-acetaminophen 5-325 MG per tablet         Time Spent on discharge is  35 mins in patient examination, evaluation, counseling as well as medication reconciliation, prescriptions for required medications, discharge plan and follow up. Electronically signed by   Usama Schwartz MD  4/19/2023  10:52 AM      Thank you MATTHEW Thorpe for the opportunity to be involved in this patient's care.

## 2023-04-20 NOTE — PROGRESS NOTES
Subjective:       Patient ID: Sherrill Avery    Chief Complaint:  Met ccRCC    HPI     Sherrill Avery is a 72 y.o. 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 She is s/p left VATS LLL wedge resection on 1/29/20.     Here to follow-up Currently NOT on therapy. Disease has been indolent.      Saw Dr. Leslie at [facility], PET scan 6/2022 shows post-therapy changes in the right lung and no other sites concerning for metastases. CT CAP 9/2022 shows multiple stable pulmonary nodules, DC 0.5 x 0.6 cm pulmonary nodule is slightly larger. Indeterminate 8 mm intramuscular lesion (previously 6 mm) may represent metastatic disease. Now intermediate - constant L rib pain + chronic intermittent back pain that she has noted since VATS. Gabapentin has relieved some symptoms.     Saw Dr. Leslie 9/2022, has scans, IR biopsy and visit scheduled 1/2023. Underwent knee replacement surgery of L in [place], had prolonged recovery. Seeing PT which helps the pain (last saw 3 weeks ago).     Sees pain mgmt at [facility]. Recently established with psych onc at [facility].     Had IR biopsy of muscle lesion 1/2023.     Pt seeing Sister Janessa, the healing nun.  Ses Dr. Leslie at [facility].     Oncologic History:    Clear cell carcinoma of right kidney.    6/30/2013 Initial Diagnosis   Presented with gross hematuria. CT CAP: 7.2-cm mass occupying the upper two thirds of the right kidney. 2.8-cm mass in the posterior aspect of the urinary bladder c/w TCC.    7/25/2013 Surgery   Right radical nephrectomy Pathology: 7.5-cm clear cell RCC Yanet nuclear grade 2, with microscopic extension into perinephric adipose tissue and with tumor in the renal margin.  pT3a pN0 pMX    10/16/2013 - No Evidence of Disease (LINSEY)   CT AP: No CT evidence of recurrence or metastatic disease    8/4/2015 - No Evidence of Disease (LINSEY)   CT AP: No CT evidence of recurrence or metastatic disease    12/1/2016 -  [facility]SE[facility] with concern of recurrence   CT AP: Mew less than 4 mm pulmonary nodule in the anterior basal segment of the RLL. Stable 4 mm pulmonary nodule posterior basal segment RLL.     3/1/2017 Relapse/Recurrence   CT Chest: Multiple new subcm pulmonary nodules in the RLL and one in the right middle measuring up to 6 mm suspicious for metastatic disease.     2/8/2019 - LINSEY with concern of recurrence   1. Small volume pulmonary metastases are slightly larger compared to the prior study.     2. No recurrent or metastatic disease in the abdomen or pelvis.    2/14/2019 Biopsy   CT-guided biopsy of a 1 cm left lower lobe lesion   Pathology: Small focus of atypical adenomatous hyperplasia with no tumor present. PAX-8 negative.    2/7/2022 - 3/3/2022 TX-Radiation Therapy   1 RUL x06 VMAT 02/07/2022 02/25/2022 400 cGy 15/15 6000/6000 cGy   1 RLL x06 STEREOTACTIC 02/28/2022 03/03/2022 1250 cGy 4/4 5000cGy    She was initally noted to have spontaneous remission of the lung nodules in the absence of any systemic treatment. However, in 2019, the lung nodules at started to increase in size but were still mostly subcentimeter in greatest dimension. In 2/2019, when the left lower lobe lesion increased to 1.4 cm. IR biopsied a peripheral left lung lesion which was PAX-8 negative thought to be a small focus of atypical adenomatous hyperplasia. We therefore brought her back after only 3 months to monitor those lesions carefully. She was referred to Dr. King who thinks the lesions represent indolent RCC metastases. She underwent resection and RCC was confirmed on pathology at [facility].      Review of Systems   Constitutional: Negative for activity change, appetite change, chills, fatigue, fever and unexpected weight change.   HENT: Negative for congestion, hearing loss, mouth sores, sore throat, tinnitus and voice change.    Eyes: Negative for pain and visual disturbance.   Respiratory: Negative for cough, shortness of breath and  wheezing.    Cardiovascular: Negative for chest pain, palpitations and leg swelling.   Gastrointestinal: Negative for abdominal pain, constipation, diarrhea, nausea and vomiting.   Endocrine: Negative for cold intolerance and heat intolerance.   Genitourinary: Negative for difficulty urinating, dyspareunia, dysuria, frequency, menstrual problem, urgency, vaginal bleeding, vaginal discharge and vaginal pain.   Musculoskeletal: Positive for arthralgias. Negative for back pain and myalgias.   Skin: Negative for color change, rash and wound.   Allergic/Immunologic: Negative for environmental allergies and food allergies.   Neurological: Negative for weakness, numbness and headaches.   Hematological: Negative for adenopathy. Does not bruise/bleed easily.   Psychiatric/Behavioral: Negative for agitation, confusion, hallucinations and sleep disturbance. The patient is nervous/anxious.    All other systems reviewed and are negative.          Allergies:  Review of patient's allergies indicates:   Allergen Reactions    Talwin compound Anxiety     hallucinations/anxiety    Tramadol Itching    Buspirone Anxiety    Codeine Itching    Morphine Itching     jittery    Morpholine analogues Anxiety and Itching    Naproxen Itching     Takes Ibuprofen is ok on rare occasion     Nexium [esomeprazole magnesium] Other (See Comments)     constipation    Wal-phed [pseudoephedrine hcl] Itching       Medications:  Current Outpatient Medications   Medication Sig Dispense Refill    ALPRAZolam (XANAX) 0.5 MG tablet Take daily as needed for anxiety. 30 tablet 5    buPROPion (WELLBUTRIN XL) 150 MG TB24 tablet Take 1 tablet (150 mg total) by mouth once daily. 90 tablet 1    butalbital-acetaminophen-caffeine -40 mg (FIORICET, ESGIC) -40 mg per tablet TAKE 1 TABLET EVERY 4 HOURS AS NEEDED 30 tablet 0    diclofenac sodium (VOLTAREN) 1 % Gel Apply 2 g topically daily as needed. 100 g 11    estradioL (ESTRACE) 1 MG tablet Take  1 tablet (1 mg total) by mouth once daily. 90 tablet 3    fluocinonide (LIDEX) 0.05 % external solution Apply topically 2 (two) times daily. Do not use morning of surgery      fluticasone propionate (FLONASE) 50 mcg/actuation nasal spray 2 sprays (100 mcg total) by Each Nostril route once daily. 3 mL 3    gabapentin (NEURONTIN) 400 MG capsule Take 1 capsule (400 mg total) by mouth 3 (three) times daily. 270 capsule 0    ketoconazole (NIZORAL) 2 % shampoo Do not use morning of surgery      levothyroxine (SYNTHROID) 112 MCG tablet TAKE 1 TABLET (112 MCG TOTAL) BY MOUTH BEFORE BREAKFAST AS SCHEDULED 90 tablet 4    omeprazole (PRILOSEC) 10 MG capsule Take 10 mg by mouth.      ondansetron (ZOFRAN) 4 MG tablet       oxyCODONE-acetaminophen (PERCOCET) 5-325 mg per tablet Take 1 tablet by mouth every 6 (six) hours as needed for Pain. 30 each 0    oxymetazoline (AFRIN) 0.05 % nasal spray 2 sprays by Nasal route 2 (two) times daily.      pravastatin (PRAVACHOL) 20 MG tablet TAKE 1 TABLET EVERY DAY 90 tablet 2    senna-docusate 8.6-50 mg (PERICOLACE) 8.6-50 mg per tablet Take 1 tablet by mouth. Hold of surgery      cetirizine (ZYRTEC) 10 MG tablet Take 10 mg by mouth daily as needed.      clotrimazole (LOTRIMIN) 1 % Soln Apply topically 2 (two) times daily. for 7 days 15 mL 5    diflorasone-emollient (APEXICON E) 0.05 % Crea Apply 1 application topically once daily. for 7 days 30 g 5    hydrocortisone 2.5 % cream Apply topically 2 (two) times daily. apply to affected area for 7 days 20 g 5    metronidazole 1% (METROGEL) 1 % Gel Apply topically once daily. 60 g 5    zolpidem (AMBIEN) 5 MG Tab Take 1 tablet (5 mg total) by mouth nightly as needed. 90 tablet 0     No current facility-administered medications for this visit.     Facility-Administered Medications Ordered in Other Visits   Medication Dose Route Frequency Provider Last Rate Last Admin    triamcinolone acetonide injection 40 mg  40 mg Intra-articular   Osmar Avery III, MD   40 mg at 05/17/22 1345       PMH:  Past Medical History:   Diagnosis Date    Anxiety     Family history of malignant melanoma 8/25/2014    Fibromyalgia affecting lower leg     GERD (gastroesophageal reflux disease)     Hypercholesteremia     Hypertension     Hypothyroidism     Inflamed seborrheic keratosis 8/25/2014    Microscopic hematuria 2/2/2017    Multiple benign melanocytic nevi 8/25/2014       PSH:  Past Surgical History:   Procedure Laterality Date    AUGMENTATION OF BREAST      bladder lift      breast implants      BREAST SURGERY Bilateral 1996    saline implants    COLONOSCOPY  2007    HYSTERECTOMY      ectopic pregnancy x 2, with LSO    KNEE ARTHROPLASTY Left 6/14/2021    Procedure: ARTHROPLASTY, KNEE:LEFT:DEPUY-SIGMA;  Surgeon: Osmar Avery III, MD;  Location: [facility] OR;  Service: Orthopedics;  Laterality: Left;    LAPAROSCOPIC NEPHRECTOMY, HAND ASSISTED  07/25/2013    LUNG REMOVAL, PARTIAL Left 2020    At [facility]    NEPHRECTOMY      OOPHORECTOMY      x1    RADIOFREQUENCY ABLATION Left 8/30/2022    Procedure: RADIOFREQUENCY ABLATION, LEFT KNEE COOLED;  Surgeon: Vijaya Landin MD;  Location: [facility];  Service: Pain Management;  Laterality: Left;    RADIOFREQUENCY ABLATION Right 10/25/2022    Procedure: RADIOFREQUENCY ABLATION RIGHT KNEE GENICULAR COOLED;  Surgeon: Vijaya Landin MD;  Location: [facility];  Service: Pain Management;  Laterality: Right;    RADIOFREQUENCY ABLATION Left 3/7/2023    Procedure: RADIOFREQUENCY ABLATION LEFT GENICULAR COOLED RFA;  Surgeon: Vijaya Landin MD;  Location: [facility];  Service: Pain Management;  Laterality: Left;    right ganglion cyst      throat polyp      TRANSOBTURATOR SLING  2011    with RSO for persistent complex cyst & MARGOT; done by arndt    UPPER GASTROINTESTINAL ENDOSCOPY  2007       FamHx:  Family History   Problem Relation Age of Onset    Diabetes Mother      "Hypertension Mother     Heart disease Mother     Cancer Father         lung    Migraines Father     Glaucoma Paternal Grandmother     Glaucoma Sister     Thyroid disease Neg Hx     Asthma Neg Hx        SocHx:  Social History     Socioeconomic History    Marital status:      Spouse name: KAIDEN    Number of children: 5   Occupational History    Occupation: retired     Comment: Dance Instructor   Tobacco Use    Smoking status: Never    Smokeless tobacco: Never   Substance and Sexual Activity    Alcohol use: Yes     Alcohol/week: 0.0 standard drinks     Comment: social    Drug use: No    Sexual activity: Yes     Partners: Male     Birth control/protection: Surgical   Social History Narrative    Patient is a retired dance instructor and live with . Has stairs at home 12- has an elevator       Objective:      BP (!) 150/79 (BP Location: Left arm, Patient Position: Sitting, BP Method: Medium (Automatic))   Pulse 62   Temp 97.8 °F (36.6 °C) (Oral)   Resp 18   Ht 5' 4.5" (1.638 m)   Wt 89 kg (196 lb 3.4 oz)   SpO2 97%   BMI 33.16 kg/m²     Physical Exam  Vitals and nursing note reviewed.   Constitutional:       General: She is not in acute distress.     Appearance: She is well-developed. She is not diaphoretic.   HENT:      Head: Normocephalic and atraumatic.      Nose: Nose normal.      Mouth/Throat:      Pharynx: No oropharyngeal exudate.   Eyes:      General:         Right eye: No discharge.         Left eye: No discharge.      Conjunctiva/sclera: Conjunctivae normal.      Pupils: Pupils are equal, round, and reactive to light.   Neck:      Trachea: No tracheal deviation.   Musculoskeletal:         General: No tenderness. Normal range of motion.   Skin:     General: Skin is warm and dry.      Coloration: Skin is not pale.      Findings: No erythema or rash.   Neurological:      Mental Status: She is alert and oriented to person, place, and time.      Cranial Nerves: No cranial nerve " deficit.      Coordination: Coordination normal.      Deep Tendon Reflexes: Reflexes are normal and symmetric.   Psychiatric:         Behavior: Behavior normal.         Thought Content: Thought content normal.         Anesthesia/Surgery risks, benefits and alternative options discussed and understood by patient/family.  LABS:  WBC   Date Value Ref Range Status   08/17/2021 5.58 3.90 - 12.70 K/uL Final     Hemoglobin   Date Value Ref Range Status   08/17/2021 13.8 12.0 - 16.0 g/dL Final     Hematocrit   Date Value Ref Range Status   08/17/2021 42.4 37.0 - 48.5 % Final     Platelets   Date Value Ref Range Status   08/17/2021 215 150 - 450 K/uL Final       Chemistry        Component Value Date/Time     10/05/2022 1015    K 4.2 10/05/2022 1015     10/05/2022 1015    CO2 25 10/05/2022 1015    BUN 10 10/05/2022 1015    CREATININE 0.8 10/05/2022 1015    GLU 86 10/05/2022 1015        Component Value Date/Time    CALCIUM 8.4 (L) 10/05/2022 1015    ALKPHOS 78 10/05/2022 1015    AST 15 10/05/2022 1015    ALT 14 10/05/2022 1015    BILITOT 0.7 10/05/2022 1015    ESTGFRAFRICA >60 08/17/2021 1135    EGFRNONAA >60 08/17/2021 1135              Assessment:       1. Clear cell carcinoma            Plan:       1. Metastatic ccRCC:    Currently on no systemic treatment. S/p wedge resection [facility] and XRT.      Seeing ortho in [place].  s/p knee replacement on June 14.      Per [facility] will get MRI spine.      Due back at [facility] in July.   RTC to see us after [facility] apt.      The patient agrees with the plan, and all questions have been answered to their satisfaction.      More than 40 mins total time were spent during this encounter.      Kervin Jaquez M.D., M.S., F.A.C.P.  Hematology/Oncology Attending  [facility]            Med Onc Chart Routing  Urgent    Follow up with physician 3 months. Leonid   Follow up with MARGARET    Infusion scheduling note    Injection scheduling note    Labs CBC and CMP   Scheduling:  Preferred  lab:  Lab interval:     Imaging MRI   MRI cervical, thoarcic, and lumbar spine in [facility].   Pharmacy appointment    Other referrals

## 2023-04-24 ENCOUNTER — TELEPHONE (OUTPATIENT)
Dept: ADMINISTRATIVE | Facility: OTHER | Age: 73
End: 2023-04-24
Payer: MEDICARE

## 2023-04-26 ENCOUNTER — PATIENT MESSAGE (OUTPATIENT)
Dept: HEMATOLOGY/ONCOLOGY | Facility: CLINIC | Age: 73
End: 2023-04-26
Payer: MEDICARE

## 2023-04-27 ENCOUNTER — PATIENT MESSAGE (OUTPATIENT)
Dept: ADMINISTRATIVE | Facility: OTHER | Age: 73
End: 2023-04-27
Payer: MEDICARE

## 2023-05-04 ENCOUNTER — OFFICE VISIT (OUTPATIENT)
Dept: INTERNAL MEDICINE | Facility: CLINIC | Age: 73
End: 2023-05-04
Payer: MEDICARE

## 2023-05-04 ENCOUNTER — PATIENT MESSAGE (OUTPATIENT)
Dept: HEMATOLOGY/ONCOLOGY | Facility: CLINIC | Age: 73
End: 2023-05-04
Payer: MEDICARE

## 2023-05-04 VITALS
BODY MASS INDEX: 32.47 KG/M2 | DIASTOLIC BLOOD PRESSURE: 80 MMHG | WEIGHT: 194.88 LBS | HEART RATE: 89 BPM | OXYGEN SATURATION: 98 % | HEIGHT: 65 IN | SYSTOLIC BLOOD PRESSURE: 130 MMHG | TEMPERATURE: 98 F

## 2023-05-04 DIAGNOSIS — F51.04 CHRONIC INSOMNIA: ICD-10-CM

## 2023-05-04 DIAGNOSIS — F41.9 ANXIETY: ICD-10-CM

## 2023-05-04 DIAGNOSIS — C80.1 CLEAR CELL CARCINOMA: ICD-10-CM

## 2023-05-04 DIAGNOSIS — Z78.0 ASYMPTOMATIC POSTMENOPAUSAL STATUS: Primary | ICD-10-CM

## 2023-05-04 DIAGNOSIS — E78.00 HYPERCHOLESTEREMIA: ICD-10-CM

## 2023-05-04 DIAGNOSIS — E66.9 OBESITY, UNSPECIFIED CLASSIFICATION, UNSPECIFIED OBESITY TYPE, UNSPECIFIED WHETHER SERIOUS COMORBIDITY PRESENT: ICD-10-CM

## 2023-05-04 DIAGNOSIS — E04.2 NONTOXIC MULTINODULAR GOITER: ICD-10-CM

## 2023-05-04 DIAGNOSIS — E03.9 HYPOTHYROIDISM, UNSPECIFIED TYPE: ICD-10-CM

## 2023-05-04 DIAGNOSIS — I10 PRIMARY HYPERTENSION: ICD-10-CM

## 2023-05-04 PROCEDURE — 99214 PR OFFICE/OUTPT VISIT, EST, LEVL IV, 30-39 MIN: ICD-10-PCS | Mod: S$PBB,,, | Performed by: FAMILY MEDICINE

## 2023-05-04 PROCEDURE — 99999 PR PBB SHADOW E&M-EST. PATIENT-LVL V: ICD-10-PCS | Mod: PBBFAC,,, | Performed by: FAMILY MEDICINE

## 2023-05-04 PROCEDURE — 99214 OFFICE O/P EST MOD 30 MIN: CPT | Mod: S$PBB,,, | Performed by: FAMILY MEDICINE

## 2023-05-04 PROCEDURE — 99215 OFFICE O/P EST HI 40 MIN: CPT | Mod: PBBFAC | Performed by: FAMILY MEDICINE

## 2023-05-04 PROCEDURE — 99999 PR PBB SHADOW E&M-EST. PATIENT-LVL V: CPT | Mod: PBBFAC,,, | Performed by: FAMILY MEDICINE

## 2023-05-04 RX ORDER — AZELASTINE 1 MG/ML
1 SPRAY, METERED NASAL 2 TIMES DAILY PRN
Qty: 30 ML | Refills: 2 | Status: SHIPPED | OUTPATIENT
Start: 2023-05-04 | End: 2023-12-04

## 2023-05-04 NOTE — PROGRESS NOTES
Subjective:       Patient ID: Sherrill Avery is a . female.    Chief Complaint: Multiple issues see below    HPI     Follow-upfor multiple issues update with oncologist at [facility] followed for renal cell carcinoma with metastatic lung nodules hx and dr sheryl VILLANUEVA; had rad tx 2/22 lung    Hypertension: blood pressures typically normal. Tolerating medicine.     GERD asympt. Tolerating medication. No swallowing problems;sees [facility]. Gi' req rf prilosec    Chronic anxiety/insomnia uses either benzo or ambien few times per week prev dr yusuf and has plans to f/u 12/22but stable enough felt ok for primary care to resume rxing for this and no need to f/u unless problems. She doesn't take benzo same day ambien. Knows potential problems with these interacting;mood good;tried ssris etc but intol and current regimen working well per her and psych in past.; takes 4-5 x /months; see lcsw    Hypothyroidism:  Tolerating med. Asympt; hx dr block ;nl tsh aug      infreq migraine    bilat knee pain dxd djd       Obesity:she is interested in preventing dm and interested in lifestyle wellness clinic    Past Medical History:   Diagnosis Date    Anxiety     Family history of malignant melanoma 8/25/2014    Fibromyalgia affecting lower leg     GERD (gastroesophageal reflux disease)     Hypercholesteremia     Hypertension     Hypothyroidism     Inflamed seborrheic keratosis 8/25/2014    Microscopic hematuria 2/2/2017    Multiple benign melanocytic nevi 8/25/2014     Past Surgical History:   Procedure Laterality Date    AUGMENTATION OF BREAST      bladder lift      breast implants      BREAST SURGERY Bilateral 1996    saline implants    COLONOSCOPY  2007    HYSTERECTOMY      ectopic pregnancy x 2, with LSO    KNEE ARTHROPLASTY Left 6/14/2021    Procedure: ARTHROPLASTY, KNEE:LEFT:DEPUY-SIGMA;  Surgeon: Osmar Avery III, MD;  Location: [facility];  Service: Orthopedics;  Laterality: Left;    LAPAROSCOPIC NEPHRECTOMY, HAND ASSISTED   07/25/2013    LUNG REMOVAL, PARTIAL Left 2020    At [facility]    NEPHRECTOMY      OOPHORECTOMY      x1    RADIOFREQUENCY ABLATION Left 8/30/2022    Procedure: RADIOFREQUENCY ABLATION, LEFT KNEE COOLED;  Surgeon: Vijaya Landin MD;  Location: [facility] PAIN [facility]GT;  Service: Pain Management;  Laterality: Left;    RADIOFREQUENCY ABLATION Right 10/25/2022    Procedure: RADIOFREQUENCY ABLATION RIGHT KNEE GENICULAR COOLED;  Surgeon: Vijaya Landin MD;  Location: [facility] PAIN [facility]GT;  Service: Pain Management;  Laterality: Right;    right ganglion cyst      throat polyp      TRANSOBTURATOR SLING  2011    with RSO for persistent complex cyst & MARGOT; done by yris    UPPER GASTROINTESTINAL ENDOSCOPY  2007     Family History   Problem Relation Age of Onset    Diabetes Mother     Hypertension Mother     Heart disease Mother     Cancer Father         lung    Migraines Father     Glaucoma Paternal Grandmother     Glaucoma Sister     Thyroid disease Neg Hx     Asthma Neg Hx      Social History     Socioeconomic History    Marital status:      Spouse name: KAIDEN    Number of children: 5   Occupational History    Occupation: retired     Comment: Dance Instructor   Tobacco Use    Smoking status: Never    Smokeless tobacco: Never   Substance and Sexual Activity    Alcohol use: Yes     Alcohol/week: 0.0 standard drinks     Comment: social    Drug use: No    Sexual activity: Yes     Partners: Male     Birth control/protection: Surgical   Social History Narrative    Patient is a retired dance instructor and live with . Has stairs at home 12- has an elevator         Review of Systems  Cardiovascular: no chest pain  Chest: no shortness of breath  Abd: no abd pain  Remainder review of systems negative    Objective:   husb here  Physical Exam   Constitutional: She is oriented to person, place, and time. She appears well-developed and well-nourished. No distress.   gen nad    a and o x 3  Neck bit limited rom all dirxns ttp r  post neck  cvrrr  Chest ctabilat  Abd bs softndnt     Psychiatric: She has a normal mood and affect. Her behavior is normal. Judgment and thought content normal.   Nursing note and vitals reviewed.      Assessment:               3. Chronic neck pain hx   4. Chronic pain of lower extremity, unspecified laterality    5. Anxiety    6. Nontoxic multinodular goiter    7. Multiple lung nodules on CT    8. Hypercholesteremia    9. Clear cell carcinoma with lung metastasis    10. Essential hypertension    11. Hypothyroidism, unspecified type    12. Carcinoma of right kidney    13. Chronic insomnia      obesity  Plan:     *f/u oncol /rad onc and endocrine when due            Has F/u me  same day time  q 6 months xanax  Avoid taking alprazolam within several hours of zolpidem    rf ambien. When due infreq use    She plans to f/u dr altman    1. Asymptomatic postmenopausal status  -     DXA Bone Density Axial Skeleton 1 or more sites; Future; Expected date: 05/04/2023    2. Hypothyroidism, unspecified type    3. Primary hypertension    4. Clear cell carcinoma    5. Chronic insomnia    6. Anxiety    7. Nontoxic multinodular goiter    8. Obesity, unspecified classification, unspecified obesity type, unspecified whether serious comorbidity present    9. Hypercholesteremia  -     Lipid Panel; Future; Expected date: 08/02/2023    Other orders  -     azelastine (ASTELIN) 137 mcg (0.1 %) nasal spray; 1 spray (137 mcg total) by Nasal route 2 (two) times daily as needed for Rhinitis.  Dispense: 30 mL; Refill: 2

## 2023-05-04 NOTE — PATIENT INSTRUCTIONS
Shingrix new shingles vaccine  via a pharmacy  Recommend covid booster via pharmacy   Tetanus/whooping cough vaccine via pharmacy

## 2023-05-09 ENCOUNTER — PATIENT MESSAGE (OUTPATIENT)
Dept: HEMATOLOGY/ONCOLOGY | Facility: CLINIC | Age: 73
End: 2023-05-09
Payer: MEDICARE

## 2023-05-09 DIAGNOSIS — C64.9 CARCINOMA OF KIDNEY, UNSPECIFIED LATERALITY: Primary | ICD-10-CM

## 2023-05-25 ENCOUNTER — TELEPHONE (OUTPATIENT)
Dept: PAIN MEDICINE | Facility: CLINIC | Age: 73
End: 2023-05-25
Payer: MEDICARE

## 2023-05-25 NOTE — TELEPHONE ENCOUNTER
Staff reached out to the patient regarding rescheduling virtual appointment on 05/30/23 with SAMAN Hoff due to the provider being out of the office. Staff did leave patient a message informing her to give the office a callback regarding rescheduling appointment.

## 2023-06-02 ENCOUNTER — TELEPHONE (OUTPATIENT)
Dept: OBSTETRICS AND GYNECOLOGY | Facility: CLINIC | Age: 73
End: 2023-06-02
Payer: MEDICARE

## 2023-06-02 NOTE — TELEPHONE ENCOUNTER
Rtn pt call in regards to refills for estradiol. Advised pt that Dr. Tran is no longer with [facility], and she would have to come in for appt. Pt declined and hung up phone       Jessica ORTIZ LPN  OB/GYN

## 2023-06-02 NOTE — TELEPHONE ENCOUNTER
----- Message from Akua Humphreys sent at 6/2/2023  8:37 AM CDT -----  Contact: pt  Type:  RX Refill Request    Who Called:  pt  Refill or New Rx: refill  RX Name and Strength: estradioL (ESTRACE) 1 MG tablet  How is the patient currently taking it? (ex. 1XDay):  Is this a 30 day or 90 day RX:  Preferred Pharmacy with phone number: [phone]  Local or Mail Order: mail  Ordering Provider: Chelsea (Retired as 10/31/2022)  Would the patient rather a call back or a response via [facility]? phone  Best Call Back Number:[phone]  Additional Information:       [facility] - [place], [place] - [address]  [address] [address]  Phone: [phone] Fax: [phone]

## 2023-06-04 ENCOUNTER — PATIENT MESSAGE (OUTPATIENT)
Dept: PAIN MEDICINE | Facility: CLINIC | Age: 73
End: 2023-06-04
Payer: MEDICARE

## 2023-06-09 ENCOUNTER — PATIENT MESSAGE (OUTPATIENT)
Dept: INTERNAL MEDICINE | Facility: CLINIC | Age: 73
End: 2023-06-09
Payer: MEDICARE

## 2023-06-09 DIAGNOSIS — M47.816 LUMBAR SPONDYLOSIS: Primary | ICD-10-CM

## 2023-06-09 DIAGNOSIS — R51.9 HEADACHE, UNSPECIFIED HEADACHE TYPE: ICD-10-CM

## 2023-06-09 RX ORDER — GABAPENTIN 400 MG/1
400 CAPSULE ORAL 3 TIMES DAILY
Qty: 270 CAPSULE | Refills: 0 | Status: SHIPPED | OUTPATIENT
Start: 2023-06-09 | End: 2023-11-06

## 2023-06-09 NOTE — TELEPHONE ENCOUNTER
No care due was identified.  [facility] Embedded Care Due Messages. Reference number: [id number].   6/09/2023 11:48:12 AM CDT

## 2023-06-12 RX ORDER — DICLOFENAC SODIUM 10 MG/G
2 GEL TOPICAL DAILY PRN
Qty: 100 G | Refills: 11 | Status: SHIPPED | OUTPATIENT
Start: 2023-06-12

## 2023-06-12 RX ORDER — LEVOTHYROXINE SODIUM 112 UG/1
112 TABLET ORAL
Qty: 90 TABLET | Refills: 4 | Status: SHIPPED | OUTPATIENT
Start: 2023-06-12

## 2023-06-12 RX ORDER — ZOLPIDEM TARTRATE 5 MG/1
5 TABLET ORAL NIGHTLY PRN
Qty: 90 TABLET | Refills: 0 | Status: SHIPPED | OUTPATIENT
Start: 2023-06-12 | End: 2023-12-11

## 2023-06-12 RX ORDER — ESTRADIOL 1 MG/1
1 TABLET ORAL DAILY
Qty: 90 TABLET | Refills: 1 | Status: SHIPPED | OUTPATIENT
Start: 2023-06-12 | End: 2023-10-31 | Stop reason: SDUPTHER

## 2023-06-12 RX ORDER — METRONIDAZOLE 10 MG/G
GEL TOPICAL DAILY
Qty: 60 G | Refills: 5 | Status: SHIPPED | OUTPATIENT
Start: 2023-06-12 | End: 2024-06-11

## 2023-06-12 RX ORDER — BUTALBITAL, ACETAMINOPHEN AND CAFFEINE 50; 325; 40 MG/1; MG/1; MG/1
TABLET ORAL
Qty: 30 TABLET | Refills: 0 | Status: SHIPPED | OUTPATIENT
Start: 2023-06-12 | End: 2023-11-06 | Stop reason: SDUPTHER

## 2023-06-12 RX ORDER — HYDROCORTISONE 25 MG/G
CREAM TOPICAL 2 TIMES DAILY
Qty: 20 G | Refills: 5 | Status: SHIPPED | OUTPATIENT
Start: 2023-06-12 | End: 2023-06-19

## 2023-06-12 RX ORDER — ALPRAZOLAM 0.5 MG/1
TABLET ORAL
Qty: 30 TABLET | Refills: 5 | Status: SHIPPED | OUTPATIENT
Start: 2023-06-12 | End: 2023-11-06 | Stop reason: SDUPTHER

## 2023-06-12 RX ORDER — CLOTRIMAZOLE 1 G/ML
SOLUTION TOPICAL 2 TIMES DAILY
Qty: 15 ML | Refills: 5 | Status: SHIPPED | OUTPATIENT
Start: 2023-06-12 | End: 2023-08-17

## 2023-06-13 ENCOUNTER — OFFICE VISIT (OUTPATIENT)
Dept: PAIN MEDICINE | Facility: CLINIC | Age: 73
End: 2023-06-13
Payer: MEDICARE

## 2023-06-13 DIAGNOSIS — M47.816 LUMBAR SPONDYLOSIS: ICD-10-CM

## 2023-06-13 DIAGNOSIS — M25.562 CHRONIC KNEE PAIN AFTER TOTAL REPLACEMENT OF LEFT KNEE JOINT: Primary | ICD-10-CM

## 2023-06-13 DIAGNOSIS — Z96.652 CHRONIC KNEE PAIN AFTER TOTAL REPLACEMENT OF LEFT KNEE JOINT: Primary | ICD-10-CM

## 2023-06-13 DIAGNOSIS — M17.11 PRIMARY OSTEOARTHRITIS OF RIGHT KNEE: ICD-10-CM

## 2023-06-13 DIAGNOSIS — M79.641 RIGHT HAND PAIN: ICD-10-CM

## 2023-06-13 DIAGNOSIS — G89.29 CHRONIC KNEE PAIN AFTER TOTAL REPLACEMENT OF LEFT KNEE JOINT: Primary | ICD-10-CM

## 2023-06-13 PROCEDURE — 99213 OFFICE O/P EST LOW 20 MIN: CPT | Mod: 95,,, | Performed by: NURSE PRACTITIONER

## 2023-06-13 PROCEDURE — 99213 PR OFFICE/OUTPT VISIT, EST, LEVL III, 20-29 MIN: ICD-10-PCS | Mod: 95,,, | Performed by: NURSE PRACTITIONER

## 2023-06-13 NOTE — PROGRESS NOTES
Chronic Pain-Tele-Medicine-Established Note (Follow up visit)        The patient location is: Home  The chief complaint leading to consultation is: pain  Visit type: Virtual visit with synchronous audio and video  Total time spent with patient: 15 min  Each patient to whom he or she provides medical services by telemedicine is:  (1) informed of the relationship between the physician and patient and the respective role of any other health care provider with respect to management of the patient; and (2) notified that he or she may decline to receive medical services by telemedicine and may withdraw from such care at any time.      PCP: Alexandre Macias MD    REFERRING PHYSICIAN: No ref. provider found    CHIEF COMPLAINT: L knee pain s/p knee replacement    Original HISTORY OF PRESENT ILLNESS: Sherrill Avery w/ pmh of HTN, and renal cell carcinoma (kidney removed in 2013) s/p radiation in February 2022 due to METs which appeared in 2017 who presents to the clinic for the evaluation of the above pain. She had a knee replacement in June 2021, and has continued to have pain since the procedure. She states when she sits for an extended period of time and stands back up she experiences the pain around the bottom to lateral edge of her knee cap. She has a swollen area just below the L knee as well. She currently denies CP, SOB, vision changes, or speech changes.    Original Pain Description:  The pain is located in the L knee and does not radiate. The pain is described as sharp and stinging . Exacerbating factors: Sitting, Standing, Laying, Night Time, Getting out of bed/chair and going up stairs. Mitigating factors massage, medications, physical therapy, rest and sitting. Symptoms interfere with daily activity and sleeping. The patient feels like symptoms have been unchanged. Patient denies night fever/night sweats, urinary incontinence, bowel incontinence and significant motor weakness.    Original PAIN SCORES:  Best:  Pain is 0  Worst: Pain is 6  Usually: Pain is 4  Current: Pain is 2    INTERVAL HISTORY:     Interval History 9/21/22:  Mrs. Avery returns to clinic after left genicular RFA on 8/30/22. She reports significant pain relief and she is able to stand from a seated position and walk more comfortably. She is happy with the results and would like to have the same procedure on her right knee. She has had right knee pain for years and she has received multiple steroid injections which only provide about 2 weeks of pain relief. Pain is exacerbated by stairs, walking, and rising from a chair.  She denies any new weakness and numbness/tingling.     Interval History 12/13/2022:  The patient has a virtual follow up for right knee pain. She is now s/p right genicular RFA on 10/25/22 with 80% relief. She is still having benefit from left knee RFA. She is also reporting lower back pain . The pain is sharp in nature. It does not radiate. She has pain when she stands for a long time. She takes Gabapentin regularly with some benefit. She has completed PT in the past with some benefit. She did have a lumbar MRI in 2015.    Interval History 1/24/2023:  The patient returns for follow up of chronic bilateral knee and lower back pain. She has been having more left knee pain recently. She had genicular RFA 8/30/22 with significant benefit. However, she feels like the pain has started to return. She would like to repeat when possible. She still has some back pain with intermittent radiation across the back. She is taking Gabapentin 900 mg daily with some benefit and no side effects. Since previous encounter, she had a recent oncology 3 month follow up at at [facility]. She has a spot to the back and two to the lung which are being watched. She has a follow up in 3 months again. Her pain today is 4/10.    Interval History 4/4/2023:  The patient has a virtual follow up for bilateral knee pain. She is now s/p left genicular RFA with 80% relief.  Her left knee pain has significantly improved. She is feeling more pain on the right knee and lower back recently. She previously had excellent relief for right knee pain from RFA for 5 months. She would like to repeat the procedure. She is following up with [facility] next week to discuss back pain and possible radiation treatment. She stopped Gabapentin due to memory issues but her pain worsened. She restarted this week with one daily.    Interval History 6/13/2023:  The patient returns for virtual follow up for worsened knee pain, R>L. There was difficulty with her connection and the latter part of the visit was converted to audio. She has been having more sharp and aching right knee pain over the past month. It bothers her with standing and going down stairs. She has had benefit from both left and right genicular RFAs in the past. She would like to repeat the right knee RFA. She has tried stretching, PT exercises, ice and heat without benefit. She is also reporting right hand pain. No history or trauma. She would like an orthopedic referral. Her pain today is 6/10.    6 weeks of Conservative therapy (PT/Chiro/Home Exercises with Dates)  PT July 2021-September 2021  PT November 2021-December 2021  PT April 2022-May 2022     Treatments / Medications: (Ice/Heat/NSAIDS/APAP/etc):  Ice  Heat  Massage  PT  Voltaren Gel  Gabapentin     Report: N/A      Interventional Pain Procedures: (Previous injections)  L knee replacement  R knee steroid injection  10/11/22 Right genicular blocks- significant short term benefit  10/25/22 Right genicular cooled RFA- 80% relief  3/7/23 Left genicular cooled RFA- 80% relief    Past Medical History:   Diagnosis Date    Anxiety     Family history of malignant melanoma 8/25/2014    Fibromyalgia affecting lower leg     GERD (gastroesophageal reflux disease)     Hypercholesteremia     Hypertension     Hypothyroidism     Inflamed seborrheic keratosis 8/25/2014    Microscopic hematuria  2/2/2017    Multiple benign melanocytic nevi 8/25/2014     Past Surgical History:   Procedure Laterality Date    AUGMENTATION OF BREAST      bladder lift      breast implants      BREAST SURGERY Bilateral 1996    saline implants    COLONOSCOPY  2007    HYSTERECTOMY      ectopic pregnancy x 2, with LSO    KNEE ARTHROPLASTY Left 6/14/2021    Procedure: ARTHROPLASTY, KNEE:LEFT:DEPUY-SIGMA;  Surgeon: Osmar Avery III, MD;  Location: [facility];  Service: Orthopedics;  Laterality: Left;    LAPAROSCOPIC NEPHRECTOMY, HAND ASSISTED  07/25/2013    LUNG REMOVAL, PARTIAL Left 2020    At [facility]    NEPHRECTOMY      OOPHORECTOMY      x1    RADIOFREQUENCY ABLATION Left 8/30/2022    Procedure: RADIOFREQUENCY ABLATION, LEFT KNEE COOLED;  Surgeon: Vijaya Landin MD;  Location: [facility], [place], operated by [facility];  Service: Pain Management;  Laterality: Left;    RADIOFREQUENCY ABLATION Right 10/25/2022    Procedure: RADIOFREQUENCY ABLATION RIGHT KNEE GENICULAR COOLED;  Surgeon: Vijaya Landin MD;  Location: [facility], [place], operated by [facility];  Service: Pain Management;  Laterality: Right;    RADIOFREQUENCY ABLATION Left 3/7/2023    Procedure: RADIOFREQUENCY ABLATION LEFT GENICULAR COOLED RFA;  Surgeon: Vijaya Landin MD;  Location: [facility], [place], operated by [facility];  Service: Pain Management;  Laterality: Left;    right ganglion cyst      throat polyp      TRANSOBTURATOR SLING  2011    with RSO for persistent complex cyst & MARGOT; done by arndt    UPPER GASTROINTESTINAL ENDOSCOPY  2007     Social History     Socioeconomic History    Marital status:      Spouse name: KAIDEN    Number of children: 5   Occupational History    Occupation: retired     Comment: Dance Instructor   Tobacco Use    Smoking status: Never    Smokeless tobacco: Never   Substance and Sexual Activity    Alcohol use: Yes     Alcohol/week: 0.0 standard drinks     Comment: social    Drug use: No    Sexual activity: Yes     Partners: Male     Birth control/protection: Surgical   Social History Narrative     Patient is a retired dance instructor and live with . Has stairs at home 12- has an elevator     Family History   Problem Relation Age of Onset    Diabetes Mother     Hypertension Mother     Heart disease Mother     Cancer Father         lung    Migraines Father     Glaucoma Paternal Grandmother     Glaucoma Sister     Thyroid disease Neg Hx     Asthma Neg Hx        Review of patient's allergies indicates:   Allergen Reactions    Talwin compound Anxiety     hallucinations/anxiety    Tramadol Itching    Buspirone Anxiety    Codeine Itching    Morphine Itching     jittery    Morpholine analogues Anxiety and Itching    Naproxen Itching     Takes Ibuprofen is ok on rare occasion     Nexium [esomeprazole magnesium] Other (See Comments)     constipation    Wal-phed [pseudoephedrine hcl] Itching       Current Outpatient Medications   Medication Sig    ALPRAZolam (XANAX) 0.5 MG tablet Take daily as needed for anxiety.    azelastine (ASTELIN) 137 mcg (0.1 %) nasal spray 1 spray (137 mcg total) by Nasal route 2 (two) times daily as needed for Rhinitis.    buPROPion (WELLBUTRIN XL) 150 MG TB24 tablet Take 1 tablet (150 mg total) by mouth once daily.    butalbital-acetaminophen-caffeine -40 mg (FIORICET, ESGIC) -40 mg per tablet TAKE 1 TABLET EVERY 4 HOURS AS NEEDED    cetirizine (ZYRTEC) 10 MG tablet Take 10 mg by mouth daily as needed.    clotrimazole (LOTRIMIN) 1 % Soln Apply topically 2 (two) times daily. for 7 days    diclofenac sodium (VOLTAREN) 1 % Gel Apply 2 g topically daily as needed.    diflorasone-emollient (APEXICON E) 0.05 % Crea Apply 1 application topically once daily. for 7 days    estradioL (ESTRACE) 1 MG tablet Take 1 tablet (1 mg total) by mouth once daily.    fluocinonide (LIDEX) 0.05 % external solution Apply topically 2 (two) times daily. Do not use morning of surgery    fluticasone propionate (FLONASE) 50 mcg/actuation nasal spray 2 sprays (100 mcg total) by Each Nostril route once  daily.    gabapentin (NEURONTIN) 400 MG capsule Take 1 capsule (400 mg total) by mouth 3 (three) times daily.    hydrocortisone 2.5 % cream Apply topically 2 (two) times daily. apply to affected area for 7 days    ketoconazole (NIZORAL) 2 % shampoo Do not use morning of surgery    levothyroxine (SYNTHROID) 112 MCG tablet Take 1 tablet (112 mcg total) by mouth before breakfast. TAKE 1 TABLET (112 MCG TOTAL) BY MOUTH BEFORE BREAKFAST AS SCHEDULED Strength: 112 mcg    metronidazole 1% (METROGEL) 1 % Gel Apply topically once daily.    omeprazole (PRILOSEC) 10 MG capsule Take 10 mg by mouth.    ondansetron (ZOFRAN) 4 MG tablet     oxyCODONE-acetaminophen (PERCOCET) 5-325 mg per tablet Take 1 tablet by mouth every 6 (six) hours as needed for Pain.    oxymetazoline (AFRIN) 0.05 % nasal spray 2 sprays by Nasal route 2 (two) times daily.    pravastatin (PRAVACHOL) 20 MG tablet TAKE 1 TABLET EVERY DAY    senna-docusate 8.6-50 mg (PERICOLACE) 8.6-50 mg per tablet Take 1 tablet by mouth. Hold of surgery    zolpidem (AMBIEN) 5 MG Tab Take 1 tablet (5 mg total) by mouth nightly as needed.     No current facility-administered medications for this visit.     Facility-Administered Medications Ordered in Other Visits   Medication    triamcinolone acetonide injection 40 mg       ROS:  GENERAL: No fever. No chills. No fatigue. Denies weight loss. Denies weight gain.  HEENT: Denies headaches. Denies vision change. Denies eye pain. Denies double vision. Denies ear pain.   CV: Denies chest pain.   PULM: Denies of shortness of breath.  GI: Denies constipation. No diarrhea. No abdominal pain. Denies nausea. Denies vomiting. No blood in stool.  HEME: Denies bleeding problems.  : Denies urgency. No painful urination. No blood in urine.  MS: Denies joint stiffness. Denies joint swelling.  Denies back pain.  SKIN: Denies rash.   NEURO: Denies seizures. No weakness.  PSYCH:  Denies difficulty sleeping. No anxiety. Denies depression. No  suicidal thoughts.     PHYSICAL EXAMINATION:    General appearance: Well appearing, in no acute distress, alert and oriented x3.  Psych:  Mood and affect appropriate.  Skin: Skin color normal, no rashes or lesions, in both upper and lower body.  Extremities: Moves all visualized extremities freely.    PREVIOUS PHYSICAL EXAM:   GENERAL: Well appearing, in no acute distress, alert and oriented x3.  PSYCH:  Mood and affect appropriate.  SKIN: Normal turgor and coloration.  HEENT:  Normocephalic, atraumatic. Cranial nerves grossly intact.  NECK: No pain to palpation over the cervical paraspinous muscles. No pain to palpation over facets. No pain with neck flexion, extension, or lateral flexion.   PULM: No evidence of respiratory difficulty, symmetric chest rise.  GI:  Non-distended  BACK: Normal range of motion. No pain to palpation over the spinous processes. No pain to palpation over facet joints. There is no pain with palpation over the sacroiliac joints bilaterally. Straight leg raising in the supine position is negative to radicular pain.   EXTREMITIES: +ttp left medial joint line and pain with patella ROM. Swollen 3 x 3 inch area just below L patella.   MUSCULOSKELETAL:  Bilateral lower extremity strength is normal and symmetric, with some pain limitation with movement of L knee joint. No atrophy is noted.  NEURO: No change in sensation.  GAIT: Antalgic and slow.    IMAGING:      X- Ray knee ortho BL 5/17/22    Narrative & Impression  EXAMINATION:  XR KNEE ORTHO BILAT WITH FLEXION     CLINICAL HISTORY:  Presence of left artificial knee joint     TECHNIQUE:  AP standing of both knees, PA flexion standing views of both knees, and Merchant views of both knees were performed.  Lateral views of both knees were also performed.     COMPARISON:  No 07/27/2021 ne     FINDINGS:  Left knee arthroplasty identified.  The position and alignment is satisfactory and unchanged as compared to the previous study.     DJD involving  the right knee with narrowing of the patellofemoral and the medial tibiofemoral joint space.  No fracture or bone destruction identified     Impression:  Patient with symptoms, imaging, and PE consistent with:    1. Right hand pain  Ambulatory referral/consult to Orthopedics      2. Lumbar spondylosis        3. Primary osteoarthritis of right knee        4. Chronic knee pain after total replacement of left knee joint              ASSESSMENT: 72 y.o. year old female w/ pmh of renal cell carcinoma (kidney removed in 2013) s/p radiation in February 2022 due to METs which appeared in 2017 with pain, consistent with and chronic right knee pain secondary to osteoarthritis.    DISCUSSION: Ms. Avery has a history of fibromyalgia and RCC. She is currently being treated for lung metastasis. She comes to us from Dr. Avery with continued pain after left TKA on 6/14/21. She also is reporting lower back pain. Previous imaging shows scolioisis and facet arthropathy mainly.    PLAN:  Schedule for repeat right genicular cooled RFA with benefit. Can repeat left knee in September if needed.   Continue current medications.  The patient will continue a home exercise routine to help with pain and strengthening.    RTC 4 weeks after procedure.      The above plan and management options were discussed at length with patient. Patient is in agreement with the above and verbalized understanding.     Dona Arteaga, SAMAN  06/13/2023

## 2023-06-16 ENCOUNTER — TELEPHONE (OUTPATIENT)
Dept: ADMINISTRATIVE | Facility: OTHER | Age: 73
End: 2023-06-16
Payer: MEDICARE

## 2023-06-16 ENCOUNTER — TELEPHONE (OUTPATIENT)
Dept: INTERNAL MEDICINE | Facility: CLINIC | Age: 73
End: 2023-06-16
Payer: MEDICARE

## 2023-06-16 NOTE — TELEPHONE ENCOUNTER
I spoke with the patient informing her that Dr Macias would like her to follow up with ob. The patient was scheduled an appointment on 6/26/23 at 8:40 am the [facility]. The patient stated understanding

## 2023-06-16 NOTE — TELEPHONE ENCOUNTER
----- Message from Rosario Molina sent at 6/16/2023  2:11 PM CDT -----  Contact: Sherrill  .Type:  Patient Returning Call    Who Called:Sherrill  Who Left Message for Patient:Nurse  Does the patient know what this is regarding?:[place]  Would the patient rather a call back or a response via [facility]? Call back  Best Call Back Number:[phone]   Additional Information: Pt returning call

## 2023-06-20 ENCOUNTER — OFFICE VISIT (OUTPATIENT)
Dept: PRIMARY CARE CLINIC | Facility: CLINIC | Age: 73
End: 2023-06-20
Payer: MEDICARE

## 2023-06-20 VITALS
DIASTOLIC BLOOD PRESSURE: 82 MMHG | WEIGHT: 190.94 LBS | SYSTOLIC BLOOD PRESSURE: 130 MMHG | HEIGHT: 65 IN | TEMPERATURE: 98 F | OXYGEN SATURATION: 98 % | HEART RATE: 68 BPM | BODY MASS INDEX: 31.81 KG/M2

## 2023-06-20 DIAGNOSIS — C80.1 CLEAR CELL CARCINOMA: ICD-10-CM

## 2023-06-20 DIAGNOSIS — E66.09 CLASS 1 OBESITY DUE TO EXCESS CALORIES WITH SERIOUS COMORBIDITY AND BODY MASS INDEX (BMI) OF 30.0 TO 30.9 IN ADULT: ICD-10-CM

## 2023-06-20 DIAGNOSIS — I10 PRIMARY HYPERTENSION: Primary | ICD-10-CM

## 2023-06-20 DIAGNOSIS — K21.9 GASTROESOPHAGEAL REFLUX DISEASE WITHOUT ESOPHAGITIS: ICD-10-CM

## 2023-06-20 DIAGNOSIS — E03.9 HYPOTHYROIDISM, UNSPECIFIED TYPE: ICD-10-CM

## 2023-06-20 DIAGNOSIS — R63.5 WEIGHT GAIN: ICD-10-CM

## 2023-06-20 PROBLEM — E66.811 CLASS 1 OBESITY DUE TO EXCESS CALORIES WITH SERIOUS COMORBIDITY AND BODY MASS INDEX (BMI) OF 30.0 TO 30.9 IN ADULT: Status: ACTIVE | Noted: 2023-06-20

## 2023-06-20 PROCEDURE — 99214 OFFICE O/P EST MOD 30 MIN: CPT | Mod: S$PBB,,, | Performed by: FAMILY MEDICINE

## 2023-06-20 PROCEDURE — 99999 PR PBB SHADOW E&M-EST. PATIENT-LVL V: CPT | Mod: PBBFAC,,, | Performed by: FAMILY MEDICINE

## 2023-06-20 PROCEDURE — 99214 PR OFFICE/OUTPT VISIT, EST, LEVL IV, 30-39 MIN: ICD-10-PCS | Mod: S$PBB,,, | Performed by: FAMILY MEDICINE

## 2023-06-20 PROCEDURE — 99215 OFFICE O/P EST HI 40 MIN: CPT | Mod: PBBFAC | Performed by: FAMILY MEDICINE

## 2023-06-20 PROCEDURE — 99999 PR PBB SHADOW E&M-EST. PATIENT-LVL V: ICD-10-PCS | Mod: PBBFAC,,, | Performed by: FAMILY MEDICINE

## 2023-06-20 NOTE — PROGRESS NOTES
Subjective   Pmhx, fam hx, soc hx, surg hx, allergies, med list reviewed  Referring MD: Gilberto  PCP: same  BMI noted 31  Diet: variable  Exercise/Activity:  Sleep: fair  Stressors: same  Anxiety/Depression Screen/PHQ-2:     Wt Readings from Last 3 Encounters:   06/20/23 1308 86.6 kg (190 lb 14.7 oz)   05/04/23 1339 88.4 kg (194 lb 14.2 oz)   04/19/23 1124 89 kg (196 lb 3.4 oz)        Labs reviewed:   Sees Dr. Solomon (endo)-thyroid management  Liopids within normal limits  Insulin random within normal limits      Patient ID: Sherrill Avery is a 72 y.o. female.    Chief Complaint: Establish Care (Weight loss)      Patient Active Problem List   Diagnosis    GERD (gastroesophageal reflux disease)    Hypothyroidism    Nontoxic multinodular goiter    Primary osteoarthritis involving multiple joints    Fibromyalgia    Actinic degeneration    Dermatofibroma    Bilateral sensorineural hearing loss    Hypertension    Hypercholesteremia    Kidney carcinoma    Neuropathy of left sural nerve-mild    Vitamin D deficiency    Anxiety    Chronic insomnia    Multiple lung nodules on CT    Clear cell carcinoma with lung metastasis    Subacromial bursitis    It band syndrome, left    Chronic pain of both knees    Chondromalacia, patella, right    Chondromalacia, patella, left    Lumbar radiculopathy    Allergic rhinitis    CKD (chronic kidney disease)    Hormone replacement therapy (HRT)    Osteoarthritis of left knee    Chronic knee pain after total replacement of left knee joint    Functional gait abnormality    Neck pain    Primary osteoarthritis of both knees    Primary osteoarthritis of right knee    Leg weakness    Acute neck pain        Pt has had L  knee replacement (would need R). Hx of ablation on these.   Sees [facility] and is awaiting treatment. Hx of renal cancer s/p R and nephrectomy and now in lungs (4 yrs ago)--hx of nodules. S/P radiation (q 3 mos) --goes to [facility] and [facility] in [place].   May need  "immunotherapy in future so pt wanted to work on losing weight.     She has lost 6-7 lb.       Food recall:  Bfast:  poptart, sometimes egg  Lunch: sanwhich, hamburger, fried chicken  Dinner: heavy starch, meat and veggie  Snacks: rare  Water: suboptimal  Sugar Sweetened beverages: none  Coffee: cut back from 2 to 1 tsp sugar and some cream    Tends to be on road 3-4 days/week for MD visits.     Was looking forward to trip but not able to [facility].     5 kids/4 live close to them.      HPI  Review of Systems   Constitutional:  Negative for activity change, appetite change, fatigue and fever.   HENT:  Negative for mouth dryness and goiter.    Eyes:  Negative for visual disturbance.   Respiratory:  Negative for apnea, cough, chest tightness and shortness of breath.    Cardiovascular:  Negative for chest pain, palpitations and leg swelling.   Gastrointestinal:  Negative for abdominal pain, constipation and diarrhea.   Endocrine: Negative for cold intolerance, heat intolerance, polydipsia, polyphagia and polyuria.   Genitourinary:  Negative for frequency and menstrual problem.   Musculoskeletal:  Negative for arthralgias and myalgias.   Integumentary:  Negative for color change and rash.   Psychiatric/Behavioral:  Negative for sleep disturbance. The patient is not nervous/anxious.         Objective   Waist:   " deferred  Physical Exam  Vitals and nursing note reviewed.   Constitutional:       General: She is not in acute distress.  HENT:      Head: Normocephalic and atraumatic.      Mouth/Throat:      Pharynx: Oropharynx is clear.   Eyes:      General: No scleral icterus.     Pupils: Pupils are equal, round, and reactive to light.   Neck:      Comments: No TM  Cardiovascular:      Rate and Rhythm: Normal rate and regular rhythm.      Pulses: Normal pulses.      Heart sounds: Normal heart sounds. No murmur heard.    No friction rub. No gallop.   Pulmonary:      Effort: Pulmonary effort is normal. No respiratory " distress.      Breath sounds: Normal breath sounds. No wheezing, rhonchi or rales.   Abdominal:      General: Bowel sounds are normal. There is no distension.      Palpations: Abdomen is soft.      Tenderness: There is no abdominal tenderness.   Musculoskeletal:         General: No swelling.      Cervical back: Normal range of motion and neck supple. No tenderness.   Lymphadenopathy:      Cervical: No cervical adenopathy.   Skin:     General: Skin is warm.      Capillary Refill: Capillary refill takes less than 2 seconds.      Findings: No erythema or rash.   Neurological:      Mental Status: She is alert and oriented to person, place, and time.   Psychiatric:         Mood and Affect: Mood normal.         Behavior: Behavior normal.            ICD-10-CM ICD-9-CM   1. Primary hypertension  I10 401.9   2. Hypothyroidism, unspecified type  E03.9 244.9   3. Gastroesophageal reflux disease without esophagitis  K21.9 530.81   4. Class 1 obesity due to excess calories with serious comorbidity and body mass index (BMI) of 30.0 to 30.9 in adult  E66.09 278.00    Z68.30 V85.30   5. 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

## 2023-06-20 NOTE — PATIENT INSTRUCTIONS
"Michele lemon            PRODUCE  [] All fresh fruit   [] All fresh vegetables   [] All fresh herbs  [] All herb purees + pastes  [] Pre-spiralized vegetable noodles   [] Steam-In-The-Bag begetables  [] Riced cauliflower  [] Jicama sticks  [] Love Beets  all varieties  [] Wholly Guacamole  all varieties  [] Hummus  all varieties, chickpea + vegetable  [] Tofu Shirataki noodles    [] Tofu  all varieties  [] Tempeh  all varieties    PROTEIN  CHICKEN   [] Boneless, skinless breasts  [] Boneless, skinless thighs  [] Ground chicken breast, at least 93% lean  [] Chicken breast cutlet  [] Aidell's  Chicken Sausage + Chicken Meatballs    TURKEY   [] Turkey breast tenderloin   [] Ground turkey breast, at least 93% lean  [] Millie Naturals  Turkey Sausage    BEEF  [] Tenderloin  [] Sirloin  [] Top Loin  [] Flank Steak  [] Round Steak  [] Filet  [] Lean ground beef, at least 93% lean + grass-fed preferable    PORK  [] Tenderloin  [] Pork Chop  [] Center Cut  [] Trinidad Naturals  No-Sugar Andrea    BISON  [] Merritt  90 - 95% lean    SEAFOOD  [] All fresh fish + seafood; locally sourced when possible  [] Smoked salmon    HEAT + EAT ENTREES   [] Quentin's Natural Foods  Chicken, Pork, Beef  [] Hcristofer  "All Natural" Grilled Chicken Breast + Strips, all varieties    SAUCES SPREADS + DIPS  [] Bitchin Sauce  Original, Chipotle, Cilantro White Salmon  [] Ananth's Kitchen  Tzatziki Yogurt Dip, Babaganoush, Hummus  [] Wholly Guacamole  all varieties  [] Hummus  all varieties  [] Carrabelle Gringo Salsa  all varieties  [] Mrs. Sonali's Salsa  all varieties  [] Stubb's All Natural BBQ Sauce  [] Primal Kitchen  Duffy, Ketchup, BBQ Sauce  [] Primal Kitchen Pasta Sauce  Roasted Garlic, Tomato Basil, no-dairy Vodka Sauce  [] Sal & Brie's  HeartSmart Pasta Sauce    DAIRY/DAIRY SUBSTITUTES/EGGS  EGGS   [] All eggs  cage-free, pasture-raise preferable  [] Cremanjui  egg wraps  [] Vital Farms  Pasture-Raised Egg Bites  [] JUST Egg " [vegan]     CREAMERS   [] Califia  Better Half, original + vanilla unsweetened  [] NutPods  all varieties    MILK   [] Horizon Organic  all varieties except chocolate  [] Organic Valley  all varieties except chocolate  [] Organic Valley  ultra-filtered, reduced fat milk     PLANT_BASED MILK ALTERNATIVES  [] All unsweetened almond milks  original, vanilla + chocolate  [] Ripple  unsweetened   [] Milkadamia  original +_ vanilla, unsweetened   [] Forager  original + vanilla, unsweetened   [] Silk Organic  soy milk, unsweetened  [] Oatly  unsweetened  [] Califia  regular + protein-fortified oat milk, unsweetened     CHEESES  [] Regular or reduced fat cheeses  [] BelGioso  Fresh Mozzarella Snack Packs, Parmesan Power-full Snack   [] Goat cheese  [] Fresh mozzarella  [] String cheese  all varieties  [] Samanta Cottage Cheese  [] Macy's Cultured Cottage Cheese  [] Lidia Life 'Just Like Mozzarella'  plant-based shreds and other varieties  [] Parmela Creamery  plant-based shredded cheese    YOGURT  [] Fage  2% low-fat, plain  [] Siggi's  plain, vanilla  [] Chobani Greek  nonfat + whole milk yogurt, plain   [] Chobani Less Sugar  all flavored varieties   [] Oikos Greek  nonfat, plain  [] Two Good  all varieties   [] [facility] Provisions  plain  [] Wallaby Organic  low-fat + nonfat, plain  [] [facility]  goat milk yogurt, plain  [] Kefit  unsweetened, plain  [] Forager  Greek style unsweetened, plain [dairy-free]  [] [place]  unsweetened Greek style, plain [dairy-free]  [] [facility]  almond milk yogurt, vanilla or plain, unsweetened [dairy-free]    FREEZER SECTION  FROZEN VEGGIES  [] All plain frozen veggies + greens [e.g. broccoli, brussels, carrots, okra, mushrooms, zucchini, yellow squash, butternut squash, kale, spinach, erica greens]  [] Riced veggies [e.g. cauliflower, broccoli, butternut squash]  [] Edamame  all varieties  [] Green Giant  [] Veggie Spirals  [] Marinated Veggies [e.g.  eggplant, peppers, zucchini]  [] Simply Steam [place] Sprouts  [] Birds Eye  [] Power Blend Italian Style  lentils, broccoli, kale, zucchini  []  [place] Sprouts & Carrots  [] Oven Roasters Broccoli & Cauliflower  [] [place] Blend  [] Tattooed   [] Green Bean Blend  [] Farmer's Market Ratatouille  [] Butter Balsamic Glazed Vegetables  [] Riced Cauliflower & Quinoa Mediterranean Style  [] Alanis's Good Life  Southern Style Greens [sauteed kale + onion]    FROZEN FRUITS  [] All unsweetened frozen fruits  all varieties  [] Dole Fruits & Veggie Blends  Berries 'n Kale  [] Dole Mix-ins  Triple Berry     FROZEN ENTREES  [] The Good Kitchen meals  all varieties [ e.g. Chili Lime Chicken Over Riced Cauliflower]  [] Premium Paleo  Not Mark Momma's Meatloaf  [] Primal Kitchen  Chicken Pesto + Steak Fajitas w/ Peppers & Onions  [] Eating Well Frozen Entrees  Butter Chicken Masala, Steak Carne Asada, Creamy Pesto Chicken, Chicken + Wild Rice Stroganoff, Yellow [place] Chicken, Sun-dried Tomato Chicken, Chicken Lo Mein  [] Realgood Entree Bowls  Sami Inspired Beef Bowl over Riced Cauliflower, Chicken Burrito Bowl   [] Great Karma Coconut [place]  [] Whitney's  [place] Dodie with Black Beans, Vegetable Lasagna  [] Kashi Mayan [place] Bake  [] Healthy Choice  Simply Steamers Chicken Fried Rice  [] Basil Pesto Chicken & Baltazar Style Pork Power Bowls  [] Tattooed   Enchilada Bowl  [] Sergey Farms  Spicy Black Bean Burgers    FROZEN PIZZAS  [] Cauli'flour Foods  Cauliflower Pizza Crusts  [] Outer Aisle  Cauliflower Crust  [] Whitney's  Veggie Crust Cheese Pizza  [] Quest Pizza     VEGETARIAN PRODUCTS  [] Beyond Meat  ground 'meat' + grilled 'chicken' strips  [] Tofurkey  Original Italian Sausage + Original Tempeh  [] Gardein  Beefless Ground + Meatless Meatballs  [] Sergey Farms Grillers  Original Burger, Crumbles, Meatballs    ICE CREAMS + FROZEN DESSERTS  [] Halo Top  regular + keto  series, pops  [] Rebel  ice cream + dessert sandwiches  [] Enlightened  ice cream + bars  [] Nightfood  ice cream  [] Realgood  ice cream  [] Arctic Zero Fit  frozen pint  [] The Frozen Farmer  sorbets  [] Wholly Rollies  Protein Balls, all varieties  [] Dream Pops  Coconut Latte    FROZEN BREAKFASTS  [] Realgood  Breakfast Sandwiches on Cauliflower Cheesy Bread  [] Rebel  ice cream + dessert sandwiches  [] Enlightened  ice cream + bars  [] Nightfood  ice cream  [] Realgood  ice cream  [] Arctic Zero Fit  frozen pint  [] The Frozen Farmer  sorbets  [] Wholly Rollies  Protein Balls, all varieties  [] Dream Pops  Coconut Latte    BREADS/BUNS/WRAPS  [] Jonathon Bread: All Types - In Freezer Section   [] Flat Out Light Wraps - All Varieties   [] Flat Out Protein Up Carb Down Flat Bread   [] Kontos Whole Wheat Pocket Nikky   [] Chano RICH 100% Whole Wheat Tortillas   [] LaTortilla Factory Tortillas - Smart & Delicious; 50 or 80-calorie   [] Nature's Own 100% Whole Wheat Bread   [] Orowheat Healthful - 100% Whole Wheat Slice Bread and [place] Thins   [] Orowheat Healthful - Whole Wheat Nuts & Grain Bread; Flax & Seed Bread   [] Pepperidge Farm Natural Whole Grain 15 Bread   [] Pepperidge Farm Natural Whole Grain English Muffin - 100% Whole Wheat   [] Pepperidge Farm Very Thin 100% Whole Wheat   [] Haley Hernandez 45 Calories and Delightful   [] Brandt' 100% Whole Wheat Thin-Sliced Bagels and English Muffins   [] Western Bagel: Perfect 10     GLUTEN FREE  [] Alexander's Gluten Free Bread   [] [place] Bakehouse 7-Grain Gluten Free Bread     LEGUME PASTA   [] Explore Asian Organic Black Bean Spaghetti   [] Modern Table   [] Tolerant Foods       NUT BUTTERS & JELLIES    NUT BUTTERS   [] Better'n Chocolate: Coconut Chocolate Peanut Butter Spread   [] Better'n Peanut Butter - All Types   [] Earth Balance Coconut and Peanut Spread   [] Bob's Nut [place]   [] MaraNatha: All Natural Roasted Cashew Butter - [place] or Creamy    [] MaraNatha: Roasted Peanut Butter   [] Nuts 'N More Peanut [place] - All Flavors   [] PB2 Powder - Original or Chocolate   [] Skippy Natural - Creamy, Super Chunk   [] Smart Balance Peanut Butter - [place] or Creamy   [] Peanut Butter & Company:   [] Smooth , Crunch Time, The Heat Is On, Old Fashioned Smooth, Mighty Nut- Powdered Peanut Butter, Squeeze Pack   [] Smucker's Natural Peanut Butter - [place] or Creamy   [] Sunbutter Nut Butter   [] Wild Friends Protein Peanut Butter/[place] o Butter - Vanilla or Chocolate     JELLIES  o Polaner's All Fruit   o Clearly Organic Best Choice: Strawberry Fruit Spread       SNACKS    BARS  [] Kashi Bars - Chewy or Crunchy; Honey [place] o Flax or Peanut Butter   [] KIND Bars - 5 Grams of Sugar or Less   [] KIND Protein Bars - Strong and KIND   [] [facility] Heart Protein Bar - All Varieties   [] Nature Valley Roasted Nut Crunch - [place] Crunch; Peanut Crunch   [] [facility] Heart Simple Nut Bar - Roasted Peanut & Honey   [] [facility] Heart Simple Nut Bar - [place], Cashew & Sea Salt   [] [facility] Heart Nut Sutter Bar - Salted Caramel Peanut   [] Think Thin Protein Bars   [] Quest Bars, Power Crunch Bars, Pure Protein Bars     BEEF JERKY - NITRATE FREE   [] Game On   [] Grass Run Farms   [] Krave   [] Ostrim   [] Perky Jerky   [] Primal Strips Meatless Vegan Jerky   [] [place]     CHIPS   [] Beanitos Chips   [] Fruit Crisps - e.g. Brother's-All-Natural, Bare Fruit, Yoga Chips   [] Gege's Soy Crisps: 1.3 ounce bag   [] Quest Protein Chips   [] Wasa Whole Wheat Crisp Bread     CRACKERS  [] Akua's Gone Crackers   [] Nabisco Triscuit: Regular and Thin Crisp Crackers   [] Vans Say Cheese Crackers (G-F)     POPCORN/NUTS   [] Jay Meléndez's Smart Pop Popcorn - Single Serving   [] 100-Calorie Pack of Nuts - All Varieties     PROTEIN POWDERS & DRINKS  []  Protein -  Whey Protein Powder   [] Garden of Life Raw Protein Powder   [] Iconic Ready-To-Drink Protein Drink    [] Mckeon One Protein Powder   [] VegaSport Protein Powder     SALSA/HUMMUS/DIPS   [] Eat Well Embrace Life: Zesty Sriracha Carrot o Hummus   [] Pre-Portioned Guacamole Packs   [] Bazzi's   [] Tostitos Restaurant Style Salsa       SOUPS   [] Whitney's Organic Soups - Lentil, Vegetable, Split Pea, Low-Sodium     CANNED GOODS   [] 100% Pure Pumpkin   [] BlueRunner Creole Cream-Style Red Beans or Navy Beans   [] Cajun Power Chicken Gumbo Base   [] Chicken of the [place]   [] Collin Fresh Cut Sliced Beets   [] Hormel Breast of Chicken in Water   [] LeSuer Tender Baby Whole Carrots   [] Gaetano Tabasco [place] Starter   [] StarKist: Chunk Lite Tuna in Water, Gourmet Select Pouches   [] StarKist: Yellowfin Tuna Fillets   [] Trappey's: Kidney, Butter, Hernandez, Black Eye, Field, and Black Beans   [] AYALA Ventura's Turnip Greens or Jay Spinach     CONDIMENTS/ SAUCES/SPREADS/ SPICES  [] Gray Barajas's Magic Seasonings - Regular or Salt Free   [] Lito Mckeon's Sauces - All Flavors   [] Laughing Cow Light - All Flavors   [] Dash Salt-Free Marinade - All Flavors   [] Mikel & Brie's: Heart Smart Pasta Sauces   [] Tabasco     SALAD DRESSINGS  [] Marycarmen's Naturals: Lite Honey Mustard   [] Troy's Own: Lighten Up Salad Dressing - All Varieties   [] OPA Greek Yogurt Dressings - Ranch, Blue o Cheese, Caesar, Feta Dill     SWEETENERS  [] Sweet [place] Sweetener   [] Swerve   [] Truvia     BEVERAGES  [] Coconut Water   [] Crystal Light PURE - All Flavors   [] Honest Tea: Just Green Tea, Unsweetened   [] Kombucha Tea   [] [place]   [] [facility] Bloody Akua Mix   [] Metromint - Zero-Sugar; All Natural Flavored   [] Erick - Plain or Flavored   [] Janna King   [] Steaz - Zero-Sugar, All-Natural, Sparkling Tea   [] Tea Bags: Any Brand - e.g. Charmaine, Yogi, Tazzo, Celestial   [] V8 100% Vegetable Juice   [] Vitamin Water Zero   [] Water   [] Zevia - Stevia Sweetened Soft Drink     BEER/RAMILA/LIQUORS  []Salty's Premier  Light 64 Calories   [] Bud Select - 55 Calories   [] [facility] Bloody Akua Mix   [] Mendoza Genuine Draft - 64 Calories   [] Red or White Wine - All Varieties     CEREALS: HOT/COLD   [] Di's Puffin's Original Cereal  [] Jocy's Mill Oat Bran Hot Cereal - Cracked Wheat, Multi-Grain  [] Kashi GoLean Cereal  [] Kashi GoLean Hot Cereal packets - Vanilla; Honey Cinnamon  [] Pee's Special K Protein Cereal  [] Brant's Steel Cut Lula Oatmeal  [] Nature's Path Smart Bran  [] Islam Instant Oatmeal packet, Original  [] Islam Old Fashioned Islam Oats  []  Juancho's Whole Wheat & Flaxseed Original Cereal

## 2023-06-21 ENCOUNTER — OFFICE VISIT (OUTPATIENT)
Dept: OPHTHALMOLOGY | Facility: CLINIC | Age: 73
End: 2023-06-21
Payer: MEDICARE

## 2023-06-21 DIAGNOSIS — H00.12 CHALAZION RIGHT LOWER EYELID: ICD-10-CM

## 2023-06-21 DIAGNOSIS — H10.13 ALLERGIC CONJUNCTIVITIS OF BOTH EYES: Primary | ICD-10-CM

## 2023-06-21 PROCEDURE — 99999 PR PBB SHADOW E&M-EST. PATIENT-LVL III: ICD-10-PCS | Mod: PBBFAC,,, | Performed by: OPTOMETRIST

## 2023-06-21 PROCEDURE — 99213 OFFICE O/P EST LOW 20 MIN: CPT | Mod: S$PBB,,, | Performed by: OPTOMETRIST

## 2023-06-21 PROCEDURE — 99213 OFFICE O/P EST LOW 20 MIN: CPT | Mod: PBBFAC,PO | Performed by: OPTOMETRIST

## 2023-06-21 PROCEDURE — 99999 PR PBB SHADOW E&M-EST. PATIENT-LVL III: CPT | Mod: PBBFAC,,, | Performed by: OPTOMETRIST

## 2023-06-21 PROCEDURE — 99213 PR OFFICE/OUTPT VISIT, EST, LEVL III, 20-29 MIN: ICD-10-PCS | Mod: S$PBB,,, | Performed by: OPTOMETRIST

## 2023-06-21 RX ORDER — NEOMYCIN SULFATE, POLYMYXIN B SULFATE, AND DEXAMETHASONE 3.5; 10000; 1 MG/G; [USP'U]/G; MG/G
OINTMENT OPHTHALMIC 3 TIMES DAILY
Qty: 3.5 G | Refills: 0 | Status: SHIPPED | OUTPATIENT
Start: 2023-06-21 | End: 2023-06-28

## 2023-06-22 NOTE — PROGRESS NOTES
HPI    NP to DKT  Patient here today for itchy eyes OU  Patient states symptoms have been present for about 1 week  C/O small bump RLL    Last edited by Purnima Dave, PCT on 6/21/2023 10:03 AM.            Assessment /Plan     For exam results, see Encounter Report.    Allergic conjunctivitis of both eyes  -     neomycin-polymyxin-dexamethasone (MAXITROL) 3.5 mg/g-10,000 unit/g-0.1 % Oint; Place into both eyes 3 (three) times daily. for 7 days  Dispense: 3.5 g; Refill: 0    Chalazion right lower eyelid      Apply Maxitrol ointment 3 times daily to affected area for 10 days. RTC with MD on procedure day for removal if no improvement with conservative treatment.     RTC as scheduled for annual eye exam, sooner if any changes to vision worsening symptoms.

## 2023-07-25 ENCOUNTER — PATIENT MESSAGE (OUTPATIENT)
Dept: ADMINISTRATIVE | Facility: OTHER | Age: 73
End: 2023-07-25
Payer: MEDICARE

## 2023-07-27 ENCOUNTER — PATIENT MESSAGE (OUTPATIENT)
Dept: PAIN MEDICINE | Facility: CLINIC | Age: 73
End: 2023-07-27
Payer: MEDICARE

## 2023-07-28 ENCOUNTER — PATIENT MESSAGE (OUTPATIENT)
Dept: ADMINISTRATIVE | Facility: OTHER | Age: 73
End: 2023-07-28
Payer: MEDICARE

## 2023-07-28 ENCOUNTER — TELEPHONE (OUTPATIENT)
Dept: SURGERY | Facility: CLINIC | Age: 73
End: 2023-07-28
Payer: MEDICARE

## 2023-07-28 NOTE — TELEPHONE ENCOUNTER
"----- Message from Shaina Ahn sent at 7/27/2023  2:33 PM CDT -----  Reschedule Existing Appointment     Appt Date: 08/17    Type of appt: NFL;       Physician:Leonid    Reason for rescheduling?  Requesting to r/s for 09/14. Pt r/s her appt with Dr. Blake would also like to complete lab in [place]    Caller: Self    Contact Preference: [phone]                   Additional Information:  "Thank you for all that you do for our patients"         "

## 2023-08-01 ENCOUNTER — HOSPITAL ENCOUNTER (OUTPATIENT)
Facility: OTHER | Age: 73
Discharge: HOME | End: 2023-08-01
Attending: ANESTHESIOLOGY | Admitting: ANESTHESIOLOGY
Payer: MEDICARE

## 2023-08-01 VITALS
SYSTOLIC BLOOD PRESSURE: 132 MMHG | HEART RATE: 68 BPM | TEMPERATURE: 98 F | HEIGHT: 65 IN | RESPIRATION RATE: 16 BRPM | BODY MASS INDEX: 31.99 KG/M2 | DIASTOLIC BLOOD PRESSURE: 78 MMHG | WEIGHT: 192 LBS | OXYGEN SATURATION: 98 %

## 2023-08-01 DIAGNOSIS — G89.29 CHRONIC PAIN: ICD-10-CM

## 2023-08-01 DIAGNOSIS — G89.29 CHRONIC PAIN OF BOTH KNEES: Primary | ICD-10-CM

## 2023-08-01 DIAGNOSIS — M17.0 OSTEOARTHRITIS OF BOTH KNEES, UNSPECIFIED OSTEOARTHRITIS TYPE: ICD-10-CM

## 2023-08-01 DIAGNOSIS — M25.561 CHRONIC PAIN OF BOTH KNEES: Primary | ICD-10-CM

## 2023-08-01 DIAGNOSIS — M25.562 CHRONIC PAIN OF BOTH KNEES: Primary | ICD-10-CM

## 2023-08-01 PROCEDURE — A4649 SURGICAL SUPPLIES: HCPCS | Performed by: ANESTHESIOLOGY

## 2023-08-01 PROCEDURE — 64624 DSTRJ NULYT AGT GNCLR NRV: CPT | Mod: RT | Performed by: ANESTHESIOLOGY

## 2023-08-01 PROCEDURE — 25000003 PHARM REV CODE 250: Performed by: ANESTHESIOLOGY

## 2023-08-01 PROCEDURE — 25000003 PHARM REV CODE 250: Performed by: STUDENT IN AN ORGANIZED HEALTH CARE EDUCATION/TRAINING PROGRAM

## 2023-08-01 PROCEDURE — 63600175 PHARM REV CODE 636 W HCPCS: Performed by: ANESTHESIOLOGY

## 2023-08-01 PROCEDURE — 64624 PR DESTRUCT, NEUROLYTIC AGENT, GENICULAR NERVE, W/IMG: ICD-10-PCS | Mod: RT,,, | Performed by: ANESTHESIOLOGY

## 2023-08-01 PROCEDURE — 64624 DSTRJ NULYT AGT GNCLR NRV: CPT | Mod: RT,,, | Performed by: ANESTHESIOLOGY

## 2023-08-01 RX ORDER — DEXAMETHASONE SODIUM PHOSPHATE 10 MG/ML
INJECTION INTRAMUSCULAR; INTRAVENOUS
Status: DISCONTINUED | OUTPATIENT
Start: 2023-08-01 | End: 2023-08-01 | Stop reason: HOSPADM

## 2023-08-01 RX ORDER — LIDOCAINE HYDROCHLORIDE 20 MG/ML
INJECTION, SOLUTION INFILTRATION; PERINEURAL
Status: DISCONTINUED | OUTPATIENT
Start: 2023-08-01 | End: 2023-08-01 | Stop reason: HOSPADM

## 2023-08-01 RX ORDER — SODIUM CHLORIDE 9 MG/ML
INJECTION, SOLUTION INTRAVENOUS CONTINUOUS
Status: DISCONTINUED | OUTPATIENT
Start: 2023-08-01 | End: 2023-08-01 | Stop reason: HOSPADM

## 2023-08-01 RX ORDER — FENTANYL CITRATE 50 UG/ML
INJECTION, SOLUTION INTRAMUSCULAR; INTRAVENOUS
Status: DISCONTINUED | OUTPATIENT
Start: 2023-08-01 | End: 2023-08-01 | Stop reason: HOSPADM

## 2023-08-01 RX ORDER — MIDAZOLAM HYDROCHLORIDE 1 MG/ML
INJECTION INTRAMUSCULAR; INTRAVENOUS
Status: DISCONTINUED | OUTPATIENT
Start: 2023-08-01 | End: 2023-08-01 | Stop reason: HOSPADM

## 2023-08-01 RX ORDER — BUPIVACAINE HYDROCHLORIDE 2.5 MG/ML
INJECTION, SOLUTION EPIDURAL; INFILTRATION; INTRACAUDAL
Status: DISCONTINUED | OUTPATIENT
Start: 2023-08-01 | End: 2023-08-01 | Stop reason: HOSPADM

## 2023-08-01 RX ORDER — ONDANSETRON 2 MG/ML
4 INJECTION INTRAMUSCULAR; INTRAVENOUS ONCE
Status: COMPLETED | OUTPATIENT
Start: 2023-08-01 | End: 2023-08-01

## 2023-08-01 RX ADMIN — ONDANSETRON 4 MG: 2 INJECTION INTRAMUSCULAR; INTRAVENOUS at 03:08

## 2023-08-01 NOTE — DISCHARGE SUMMARY
Discharge Note  Short Stay      SUMMARY     Admit Date: 8/1/2023    Attending Physician: Vijaya Landin MD      Discharge Physician: Radha Culp MD      Discharge Date: 8/1/2023 4:03 PM    Procedure(s) (LRB):  RADIOFREQUENCY ABLATION, RIGHT GENICULAR COOLED (Right)    Final Diagnosis: Chronic knee pain after total replacement of left knee joint [M25.562, G89.29, Z96.652]    Disposition: Home or self care    Patient Instructions:   Current Discharge Medication List        CONTINUE these medications which have NOT CHANGED    Details   ALPRAZolam (XANAX) 0.5 MG tablet Take daily as needed for anxiety.  Qty: 30 tablet, Refills: 5      azelastine (ASTELIN) 137 mcg (0.1 %) nasal spray 1 spray (137 mcg total) by Nasal route 2 (two) times daily as needed for Rhinitis.  Qty: 30 mL, Refills: 2      buPROPion (WELLBUTRIN XL) 150 MG TB24 tablet Take 1 tablet (150 mg total) by mouth once daily.  Qty: 90 tablet, Refills: 1      butalbital-acetaminophen-caffeine -40 mg (FIORICET, ESGIC) -40 mg per tablet TAKE 1 TABLET EVERY 4 HOURS AS NEEDED  Qty: 30 tablet, Refills: 0    Associated Diagnoses: Headache, unspecified headache type      cetirizine (ZYRTEC) 10 MG tablet Take 10 mg by mouth daily as needed.      clotrimazole (LOTRIMIN) 1 % Soln Apply topically 2 (two) times daily. for 7 days  Qty: 15 mL, Refills: 5      diclofenac sodium (VOLTAREN) 1 % Gel Apply 2 g topically daily as needed.  Qty: 100 g, Refills: 11      diflorasone-emollient (APEXICON E) 0.05 % Crea Apply 1 application topically once daily. for 7 days  Qty: 30 g, Refills: 5      estradioL (ESTRACE) 1 MG tablet Take 1 tablet (1 mg total) by mouth once daily.  Qty: 90 tablet, Refills: 1      fluocinonide (LIDEX) 0.05 % external solution Apply topically 2 (two) times daily. Do not use morning of surgery      fluticasone propionate (FLONASE) 50 mcg/actuation nasal spray 2 sprays (100 mcg total) by Each Nostril route once daily.  Qty: 3 mL, Refills: 3       gabapentin (NEURONTIN) 400 MG capsule Take 1 capsule (400 mg total) by mouth 3 (three) times daily.  Qty: 270 capsule, Refills: 0    Associated Diagnoses: Lumbar spondylosis      hydrocortisone 2.5 % cream Apply topically 2 (two) times daily. apply to affected area for 7 days  Qty: 20 g, Refills: 5      ketoconazole (NIZORAL) 2 % shampoo Do not use morning of surgery      levothyroxine (SYNTHROID) 112 MCG tablet Take 1 tablet (112 mcg total) by mouth before breakfast. TAKE 1 TABLET (112 MCG TOTAL) BY MOUTH BEFORE BREAKFAST AS SCHEDULED Strength: 112 mcg  Qty: 90 tablet, Refills: 4      metronidazole 1% (METROGEL) 1 % Gel Apply topically once daily.  Qty: 60 g, Refills: 5      omeprazole (PRILOSEC) 10 MG capsule Take 10 mg by mouth.      ondansetron (ZOFRAN) 4 MG tablet       oxyCODONE-acetaminophen (PERCOCET) 5-325 mg per tablet Take 1 tablet by mouth every 6 (six) hours as needed for Pain.  Qty: 30 each, Refills: 0    Comments: Quantity prescribed more than 7 day supply? Yes, quantity medically necessary, metastatic cancer patient.  Associated Diagnoses: Clear cell carcinoma; Carcinoma of right kidney; Carcinoma of kidney, unspecified laterality; History of right nephrectomy      oxymetazoline (AFRIN) 0.05 % nasal spray 2 sprays by Nasal route 2 (two) times daily.      pravastatin (PRAVACHOL) 20 MG tablet TAKE 1 TABLET EVERY DAY  Qty: 90 tablet, Refills: 2      senna-docusate 8.6-50 mg (PERICOLACE) 8.6-50 mg per tablet Take 1 tablet by mouth. Hold of surgery      zolpidem (AMBIEN) 5 MG Tab Take 1 tablet (5 mg total) by mouth nightly as needed.  Qty: 90 tablet, Refills: 0                 Discharge Diagnosis: Chronic knee pain after total replacement of left knee joint [M25.562, G89.29, Z96.652]  Condition on Discharge: Stable with no complications to procedure   Diet on Discharge: Same as before.  Activity: as per instruction sheet.  Discharge to: Home with a responsible adult.  Follow up: 2-4 weeks       Please  call my office or pager at [phone] if experienced any weakness or loss of sensation, fever > 101.5, pain uncontrolled with oral medications, persistent nausea/vomiting/or diarrhea, redness or drainage from the incisions, or any other worrisome concerns. If physician on call was not reached or could not communicate with our office for any reason please go to the nearest emergency department

## 2023-08-01 NOTE — DISCHARGE INSTRUCTIONS

## 2023-08-01 NOTE — PLAN OF CARE
New order for zofran 4mg IV per Dr Landin.   
Safety precautions maintained. Bed locked and in lowest position. Instructed pt to call for assistance. Pt verbalizes understanding.      
0 Hour(s) 25 Minute(s)

## 2023-08-01 NOTE — H&P
HPI  Patient presenting for Procedure(s) (LRB):  RADIOFREQUENCY ABLATION, RIGHT GENICULAR COOLED (Right)     Patient on Anti-coagulation No    No health changes since previous encounter    Past Medical History:   Diagnosis Date    Anxiety     Family history of malignant melanoma 8/25/2014    Fibromyalgia affecting lower leg     GERD (gastroesophageal reflux disease)     Hypercholesteremia     Hypertension     Hypothyroidism     Inflamed seborrheic keratosis 8/25/2014    Microscopic hematuria 2/2/2017    Multiple benign melanocytic nevi 8/25/2014     Past Surgical History:   Procedure Laterality Date    AUGMENTATION OF BREAST      bladder lift      breast implants      BREAST SURGERY Bilateral 1996    saline implants    COLONOSCOPY  2007    HYSTERECTOMY      ectopic pregnancy x 2, with LSO    KNEE ARTHROPLASTY Left 6/14/2021    Procedure: ARTHROPLASTY, KNEE:LEFT:DEPUY-SIGMA;  Surgeon: Osmar Avery III, MD;  [facility];  Service: Orthopedics;  Laterality: Left;    LAPAROSCOPIC NEPHRECTOMY, HAND ASSISTED  07/25/2013    LUNG REMOVAL, PARTIAL Left 2020    At [facility]    NEPHRECTOMY      OOPHORECTOMY      x1    RADIOFREQUENCY ABLATION Left 8/30/2022    Procedure: RADIOFREQUENCY ABLATION, LEFT KNEE COOLED;  Surgeon: Vijaya Landin MD;  Location: [facility];  Service: Pain Management;  Laterality: Left;    RADIOFREQUENCY ABLATION Right 10/25/2022    Procedure: RADIOFREQUENCY ABLATION RIGHT KNEE GENICULAR COOLED;  Surgeon: Vijaya Landin MD;  Location: [facility];  Service: Pain Management;  Laterality: Right;    RADIOFREQUENCY ABLATION Left 3/7/2023    Procedure: RADIOFREQUENCY ABLATION LEFT GENICULAR COOLED RFA;  Surgeon: Vijaya Landin MD;  Location: [facility];  Service: Pain Management;  Laterality: Left;    right ganglion cyst      throat polyp      TRANSOBTURATOR SLING  2011    with RSO for persistent complex cyst & MARGOT; done by arndt    UPPER GASTROINTESTINAL ENDOSCOPY  2007  "    Review of patient's allergies indicates:   Allergen Reactions    Talwin compound Anxiety     hallucinations/anxiety    Tramadol Itching    Buspirone Anxiety    Codeine Itching    Morphine Itching     jittery    Morpholine analogues Anxiety and Itching    Naproxen Itching     Takes Ibuprofen is ok on rare occasion     Nexium [esomeprazole magnesium] Other (See Comments)     constipation    Wal-phed [pseudoephedrine hcl] Itching      Current Facility-Administered Medications   Medication    0.9%  NaCl infusion    BUPivacaine (PF) 0.25% (2.5 mg/ml) injection    dexAMETHasone sodium phos (PF) injection    LIDOcaine HCL 20 mg/ml (2%) injection     Facility-Administered Medications Ordered in Other Encounters   Medication    triamcinolone acetonide injection 40 mg       PMHx, PSHx, Allergies, Medications reviewed in epic    ROS negative except pain complaints in HPI    OBJECTIVE:    BP (!) 152/71 (BP Location: Right arm, Patient Position: Sitting)   Pulse 72   Temp 97.9 °F (36.6 °C) (Oral)   Resp 16   Ht 5' 5" (1.651 m)   Wt 87.1 kg (192 lb)   SpO2 98%   Breastfeeding No   BMI 31.95 kg/m²     PHYSICAL EXAMINATION:    GENERAL: Well appearing, in no acute distress, alert and oriented x3.  PSYCH:  Mood and affect appropriate.  SKIN: Skin color, texture, turgor normal, no rashes or lesions which will impact the procedure.  CV: RRR with palpation of the radial artery.  PULM: No evidence of respiratory difficulty, symmetric chest rise. Clear to auscultation.  NEURO: Cranial nerves grossly intact.    Plan:    Proceed with procedure as planned Procedure(s) (LRB):  RADIOFREQUENCY ABLATION, RIGHT GENICULAR COOLED (Right)    Anthony Waddell  08/01/2023            "

## 2023-08-01 NOTE — OP NOTE
Therapeutic Genicular Cooled Nerve Radiofrequency Ablation under Fluoroscopy     The procedure, risks, benefits, and options were discussed with the patient. There are no contraindications to the procedure. The patent expressed understanding and agreed to the procedure. Informed written consent was obtained prior to the start of the procedure and can be found in the patient's chart.        PATIENT NAME: Sherrill Avery   MRN: [id number]     DATE OF PROCEDURE: 08/01/2023     PROCEDURE: Therapeutic Right Genicular Cooled Nerve Radiofrequency Ablation under Fluoroscopy    PRE-OP DIAGNOSIS: Chronic knee pain after total replacement of left knee joint [M25.562, G89.29, Z96.652]    POST-OP DIAGNOSIS: Chronic knee pain after total replacement of left knee joint [M25.562, G89.29, Z96.652]    PHYSICIAN: Vijaya Landin MD    ASSISTANTS: Radha Culp MD    MEDICATIONS INJECTED:  Preservative-free Kenalog 40mg with 9cc of Bupivicaine 0.25%    LOCAL ANESTHETIC INJECTED:   Xylocaine 2%    SEDATION: Versed 1.5mg and Fentanyl 25mcg                                                                                                                                                                                     Conscious sedation ordered by M.D. Patient re-evaluation prior to administration of conscious sedation. No changes noted in patient's status from initial evaluation. The patient's vital signs were monitored by RN and patient remained hemodynamically stable throughout the procedure.    Event Time In   Sedation Start 1543   Sedation End 1601       ESTIMATED BLOOD LOSS:  None    COMPLICATIONS:  None     INTERVAL HISTORY: Patient has clinical findings of chronic knee pain. Patients has completed 2 previous diagnostic genicular nerve blocks with at least 80% relief for the expected duration of the local anesthetic utilized.     TECHNIQUE: Time-out was performed to identify the patient and procedure to be performed. With the patient  laying in a supine position, the surgical area was prepped and draped in the usual sterile fashion using ChloraPrep and fenestrated drape. Three target sites including the superior lateral genicular nerve where the lateral femoral shaft meets the epicondyle, the superior medial genicular nerve where the medial femoral shaft meets the epicondyle, and the inferior medial genicular nerve where the medial tibial shaft meets the epicondyle, were determined under fluoroscopic guidance. Skin anesthesia was achieved by injecting Lidocaine 2% over the injection sites. A 17 gauge, 50mm, 10mm active tip needle was then advanced under fluoroscopy in the AP and lateral views into the positions of the geniculate nerves at these levels. This was followed by motor testing at each of the nerves to ensure that there was no dorsiflexion of the foot. After negative aspiration for blood was confirmed, 1 mL of the lidocaine 2% listed above was injected slowly at each site. This was followed by cooled thermal lesioning at 80 degrees celsius for 150 seconds at each site. That was followed by slowly injecting 1.5 mL of the medication mixture listed above at each site. The needles were removed and bleeding was nil. A sterile dressing was applied. No specimens collected. The patient tolerated the procedure well and did not have any procedure related motor deficit at the conclusion of the procedure.    The patient was monitored after the procedure in the recovery area. They were given post-procedure and discharge instructions to follow at home. The patient was discharged in a stable condition.    I reviewed and edited the fellow's note. I conducted my own interview and physical examination. I agree with the findings. I was present and supervising all critical portions of the procedure.    Anthony Waddell MD

## 2023-08-03 ENCOUNTER — PATIENT MESSAGE (OUTPATIENT)
Dept: ADMINISTRATIVE | Facility: OTHER | Age: 73
End: 2023-08-03
Payer: MEDICARE

## 2023-08-17 RX ORDER — CLOTRIMAZOLE 1 G/ML
SOLUTION TOPICAL
Qty: 30 ML | Refills: 2 | Status: SHIPPED | OUTPATIENT
Start: 2023-08-17

## 2023-08-17 NOTE — TELEPHONE ENCOUNTER
Refill Routing Note   Medication(s) are not appropriate for processing by [facility] for the following reason(s):      Medication outside of protocol    [facility] action(s):  Route Care Due:  None identified            Appointments  past 12m or future 3m with PCP    Date Provider   Last Visit   5/4/2023 Alexandre Macias MD   Next Visit   11/6/2023 Alexandre Macias MD   ED visits in past 90 days: 0        Note composed:9:13 AM 08/17/2023

## 2023-09-07 RX ORDER — BUPROPION HYDROCHLORIDE 150 MG/1
150 TABLET ORAL
Qty: 90 TABLET | Refills: 0 | Status: SHIPPED | OUTPATIENT
Start: 2023-09-07 | End: 2023-09-25

## 2023-09-21 ENCOUNTER — OFFICE VISIT (OUTPATIENT)
Dept: PODIATRY | Facility: CLINIC | Age: 73
End: 2023-09-21
Payer: MEDICARE

## 2023-09-21 VITALS — HEIGHT: 65 IN | BODY MASS INDEX: 31.99 KG/M2 | WEIGHT: 192 LBS

## 2023-09-21 DIAGNOSIS — M67.479 GANGLION CYST OF FOOT: ICD-10-CM

## 2023-09-21 DIAGNOSIS — M19.071 PRIMARY OSTEOARTHRITIS OF RIGHT FOOT: ICD-10-CM

## 2023-09-21 DIAGNOSIS — C80.1 CLEAR CELL CARCINOMA: ICD-10-CM

## 2023-09-21 DIAGNOSIS — R22.41 LOCALIZED SWELLING, MASS, OR LUMP OF RIGHT LOWER EXTREMITY: Primary | ICD-10-CM

## 2023-09-21 PROCEDURE — 99214 OFFICE O/P EST MOD 30 MIN: CPT | Mod: S$PBB,,, | Performed by: PODIATRIST

## 2023-09-21 PROCEDURE — 99999 PR PBB SHADOW E&M-EST. PATIENT-LVL IV: ICD-10-PCS | Mod: PBBFAC,,, | Performed by: PODIATRIST

## 2023-09-21 PROCEDURE — 99214 OFFICE O/P EST MOD 30 MIN: CPT | Mod: PBBFAC | Performed by: PODIATRIST

## 2023-09-21 PROCEDURE — 99999 PR PBB SHADOW E&M-EST. PATIENT-LVL IV: CPT | Mod: PBBFAC,,, | Performed by: PODIATRIST

## 2023-09-21 PROCEDURE — 99214 PR OFFICE/OUTPT VISIT, EST, LEVL IV, 30-39 MIN: ICD-10-PCS | Mod: S$PBB,,, | Performed by: PODIATRIST

## 2023-09-21 NOTE — PROGRESS NOTES
Subjective:       Patient ID: Sherrill Avery is a 73 y.o. female.    Chief Complaint: Foot Pain (Pt presents with foot and toe pain on both feet. Pain 2/10, Nondiabetic wearing sneakers and socks , last seen 08/11/22)    HPI: Sherrill Avery presents to the clinic today, to follow-up on cystic lesion present to the dorsal aspect of the right foot.  States this is causing 2/10 pain.  Reports that she does have a scheduled appointment on 10/10/2023 at [facility] as there was noted to be some lesions found on her back.  Does have an MRI scheduled to compared to previous imaging that was obtained.   States pain is worsened when wearing in closed shoe gear.  States pain is minimal at times but does increase intermittently.  She does present to clinic today with her  present. Alexandre Macias MD is the patient's primary car provider.    Review of patient's allergies indicates:   Allergen Reactions    Talwin compound Anxiety     hallucinations/anxiety    Tramadol Itching    Buspirone Anxiety    Codeine Itching    Morphine Itching     jittery    Morpholine analogues Anxiety and Itching    Naproxen Itching     Takes Ibuprofen is ok on rare occasion     Nexium [esomeprazole magnesium] Other (See Comments)     constipation    Wal-phed [pseudoephedrine hcl] Itching       Past Medical History:   Diagnosis Date    Anxiety     Family history of malignant melanoma 8/25/2014    Fibromyalgia affecting lower leg     GERD (gastroesophageal reflux disease)     Hypercholesteremia     Hypertension     Hypothyroidism     Inflamed seborrheic keratosis 8/25/2014    Microscopic hematuria 2/2/2017    Multiple benign melanocytic nevi 8/25/2014       Family History   Problem Relation Age of Onset    Diabetes Mother     Hypertension Mother     Heart disease Mother     Cancer Father         lung    Migraines Father     Glaucoma Paternal Grandmother     Glaucoma Sister     Thyroid disease Neg Hx     Asthma Neg Hx        Social History      Socioeconomic History    Marital status:      Spouse name: KAIDEN    Number of children: 5   Occupational History    Occupation: retired     Comment: Dance Instructor   Tobacco Use    Smoking status: Never    Smokeless tobacco: Never   Substance and Sexual Activity    Alcohol use: Yes     Alcohol/week: 0.0 standard drinks of alcohol     Comment: social    Drug use: No    Sexual activity: Yes     Partners: Male     Birth control/protection: Surgical   Social History Narrative    Patient is a retired dance instructor and live with . Has stairs at home 12- has an elevator       Past Surgical History:   Procedure Laterality Date    AUGMENTATION OF BREAST      bladder lift      breast implants      BREAST SURGERY Bilateral 1996    saline implants    COLONOSCOPY  2007    HYSTERECTOMY      ectopic pregnancy x 2, with LSO    KNEE ARTHROPLASTY Left 6/14/2021    Procedure: ARTHROPLASTY, KNEE:LEFT:DEPUY-SIGMA;  Surgeon: Osmar Avery III, MD;  Location: [facility] OR;  Service: Orthopedics;  Laterality: Left;    LAPAROSCOPIC NEPHRECTOMY, HAND ASSISTED  07/25/2013    LUNG REMOVAL, PARTIAL Left 2020    At [facility]    NEPHRECTOMY      OOPHORECTOMY      x1    RADIOFREQUENCY ABLATION Left 8/30/2022    Procedure: RADIOFREQUENCY ABLATION, LEFT KNEE COOLED;  Surgeon: Vijaya Landin MD;  [facility];  Service: Pain Management;  Laterality: Left;    RADIOFREQUENCY ABLATION Right 10/25/2022    Procedure: RADIOFREQUENCY ABLATION RIGHT KNEE GENICULAR COOLED;  Surgeon: Vijaya Landin MD;  Location: [facility] [place];  Service: Pain Management;  Laterality: Right;    RADIOFREQUENCY ABLATION Left 3/7/2023    Procedure: RADIOFREQUENCY ABLATION LEFT GENICULAR COOLED RFA;  Surgeon: Vijaya Landin MD;  Location: [facility] [place];  Service: Pain Management;  Laterality: Left;    RADIOFREQUENCY ABLATION Right 8/1/2023    Procedure: RADIOFREQUENCY ABLATION, RIGHT GENICULAR COOLED;  Surgeon: Vijaya Landin MD;   "Location: [facility] PAIN [facility]GT;  Service: Pain Management;  Laterality: Right;    right ganglion cyst      throat polyp      TRANSOBTURATOR SLING  2011    with RSO for persistent complex cyst & MARGOT; done by yris    UPPER GASTROINTESTINAL ENDOSCOPY  2007       Review of Systems       Objective:   Ht 5' 5" (1.651 m)   Wt 87.1 kg (192 lb)   BMI 31.95 kg/m²     FL [facility]  See [facility] notes for report.       Physical Exam    LOWER EXTREMITY PHYSICAL EXAMINATION    ORTHOPEDIC: Manual Muscle Testing is 5/5 in all planes on the right foot without pain or complications.  There is a small circular cystic formation present to the dorsal aspect of the foot around the 2nd/3rd metatarsal base.  There is noted to have deep osseous changes on palpation as well.      DERMATOLOGY:  Soft tissue mass, dorsal aspect of the right 2nd tarsometatarsal joint  The lesion has a side-to-side dimension of 4 mm by 4 mm, and is raised off the skin by approximately 0.2.  The lesion is firm, but slightly fluctuant.  The lesion is nonmobile.  There is moderate discomfort to palpation.  The lesion appears to be circular/spherical in nature.  Upon compression of the area, no drainage is noted. No periwound erythema or cellulitis is noted. No appreciable periwound calor is noted.    Assessment:     1. Localized swelling, mass, or lump of right lower extremity    2. Ganglion cyst of foot    3. Primary osteoarthritis of right foot    4. Clear cell carcinoma with lung metastasis        Plan:     Localized swelling, mass, or lump of right lower extremity  -     Cancel: MRI Foot (Midfoot) Right W W/O Contrast; Future; Expected date: 09/21/2023  -     MRI Foot (Midfoot) Right W W/O Contrast; Future; Expected date: 09/21/2023    Ganglion cyst of foot    Primary osteoarthritis of right foot    Clear cell carcinoma with lung metastasis        Thorough discussion is had with the patient today, concerning the diagnosis, its " etiology, and the treatment algorithm at present.    As patient has had recurrence of the lesion to the dorsal aspect of the right foot following aspiration injection, would recommend MRI of the right foot be completed.  Patient does require contrast due to claustrophobia and anxiety.  She does have a scheduled MRI at [facility] on 10/10/2023.  An order for an MRI was placed in this order was also fax to [facility] as well.  Goal is to have both of these imaging completed during the same time.  As patient will currently be under moderate sedation.     Will discuss results with patient is following the studies from her back to determine when follow-up will be needed and discuss potential surgical plan    Future Appointments   Date Time Provider Department Center   9/25/2023  3:30 PM Henry Dao MD [facility] PSYCH [facility]   10/18/2023  3:00 PM [facility]   10/19/2023  1:30 PM Kervin Jaquez MD [facility] [id number] [place]   11/6/2023  2:00 PM [facility] BMD1: BONE DENSITY SCAN [facility] DEXABMD [facility]   11/6/2023  3:00 PM Alexandre Macias MD [facility]

## 2023-09-25 ENCOUNTER — OFFICE VISIT (OUTPATIENT)
Dept: PSYCHIATRY | Facility: CLINIC | Age: 73
End: 2023-09-25
Payer: MEDICARE

## 2023-09-25 DIAGNOSIS — F32.A DEPRESSION, UNSPECIFIED DEPRESSION TYPE: Primary | ICD-10-CM

## 2023-09-25 DIAGNOSIS — F41.9 ANXIETY: ICD-10-CM

## 2023-09-25 PROCEDURE — 99214 PR OFFICE/OUTPT VISIT, EST, LEVL IV, 30-39 MIN: ICD-10-PCS | Mod: S$PBB,,, | Performed by: PSYCHIATRY & NEUROLOGY

## 2023-09-25 PROCEDURE — 99214 OFFICE O/P EST MOD 30 MIN: CPT | Mod: S$PBB,,, | Performed by: PSYCHIATRY & NEUROLOGY

## 2023-09-25 RX ORDER — ESCITALOPRAM OXALATE 5 MG/1
5 TABLET ORAL DAILY
Qty: 30 TABLET | Refills: 3 | Status: SHIPPED | OUTPATIENT
Start: 2023-09-25 | End: 2023-12-04

## 2023-09-25 RX ORDER — ESCITALOPRAM OXALATE 5 MG/1
5 TABLET ORAL DAILY
Qty: 30 TABLET | Refills: 3 | Status: SHIPPED | OUTPATIENT
Start: 2023-09-25 | End: 2023-09-25 | Stop reason: SDUPTHER

## 2023-09-25 NOTE — PROGRESS NOTES
"Outpatient Psychiatry Follow-up Visit (MD/NP)    9/25/2023  Sherrill Avery, a 73 y.o. female, presenting for follow-up visit. Met with patient.    Reason for Encounter: Follow-up, adjustment disorder.     Interval History: Patient seen and interviewed for psych reassessment, last seen about eleven months ago. Much less distraught and tearful, now more frequently euthymic. Has been getting follow-up cancer monitoring every three months, getting MRI. "Two new spots on my spine", getting follow-up. No new medical conditions or complaints. No new medications since last visit. Has been off gabapentin. Low energy. No new health problems. Had two visits with [facility] for psychotherapy. Continues to see . Adherent to medication. Denies side effects.     Background: This 67 y/o WF with hx of Renal CA (clear cell) dx'ed in '13, diagnosed with a recurrence in March '17 (spread to lung). Reports no treatment currently recommended. Was prescribed sertraline, ambien, xanax since receiving news of recurrence related to anxiety and sleep problems she's endorsed to other providers. She couldn't tolerate sertraline, has found the others helpful. Says "Cancer stays on my mind all the time". Takes xanax at most once daily, has increased use from about 10/month prior to recurrence to about twice that since. Reports sad less than half the time, generally with good interest & energy, & feels she's participating fully in life. Denies avoidance behaviors. Is participating in spiritual relationship with a renowned nun known for healing & consolation & she's considered their visits very therapeutic. No eating problems. Was prescribed sertraline (couldn't tolerate). She has been intermittently prescribed duloxetine for fibromyalgia, is back on it currently. PsychHx: as above. Xanax back to '13 around time of cancer diagnosis. No hx of suicidal thoughts, self-harm behaviors, paranoia, hallucinations, rebecca, psych " hospitalizations. Past psych meds include sertraline (recently), escitalopram '14, buspirone. MedHx: as above; HTN, fibromyalgia, bursitis, B sensorineural hearing loss. SocialHx: no problems with gestation, birth, infancy or early childhood development. Good student. Normal number of friends. Grew up with in [place] with parents. Lives with ; have 5 children & grandchildren & great-grandchildren. Good terms with kids, friends, neighbors, Faith. Lives in [place].  x 35 years. Supportive relationship. SubstanceHx; rare alcohol, no illicits, no prescription misuse.     Review Of Systems:     GENERAL:  No weight gain or loss  SKIN:  No rashes or lacerations  HEAD:  No headaches  CHEST:  No shortness of breath, hyperventilation or cough  CARDIOVASCULAR:  No tachycardia or chest pain  ABDOMEN:  No nausea, vomiting, pain, constipation or diarrhea  URINARY:  No frequency, dysuria or sexual dysfunction  ENDOCRINE:  No polydipsia, polyuria  MUSCULOSKELETAL:  Joint pain  NEUROLOGIC:  No weakness, sensory changes, seizures, confusion, memory loss, tremor or other abnormal movements    Current Evaluation:     Nutritional Screening: Considering the patient's height and weight, medications, medical history and preferences, should a referral be made to the dietitian? no    Constitutional  Vitals:  Most recent vital signs, dated less than 90 days prior to this appointment, were not reviewed.    There were no vitals filed for this visit.       General:  unremarkable, age appropriate     Musculoskeletal  Muscle Strength/Tone:  no dystonia, no tremor   Gait & Station:  non-ataxic     Psychiatric  Appearance: casually dressed & groomed;   Behavior: calm,   Cooperation: cooperative with assessment  Speech: normal rate, volume, tone  Thought Process: linear, goal-directed  Thought Content: No suicidal or homicidal ideation; no delusions  Affect: tearful  Mood: mildly dysphoric  Perceptions: No auditory  or visual hallucinations  Level of Consciousness: alert throughout interview  Insight: fair  Cognition: Oriented to person, place, time, & situation  Memory: no apparent deficits to general clinical interview; not formally assessed  Attention/Concentration: no apparent deficits to general clinical interview; not formally assessed  Fund of Knowledge: average by vocabulary/education    Laboratory Data  No visits with results within 1 Month(s) from this visit.   Latest known visit with results is:   Lab Visit on 10/05/2022   Component Date Value Ref Range Status    Sodium 10/05/2022 138  136 - 145 mmol/L Final    Potassium 10/05/2022 4.2  3.5 - 5.1 mmol/L Final    Chloride 10/05/2022 106  95 - 110 mmol/L Final    CO2 10/05/2022 25  23 - 29 mmol/L Final    Glucose 10/05/2022 86  70 - 110 mg/dL Final    BUN 10/05/2022 10  8 - 23 mg/dL Final    Creatinine 10/05/2022 0.8  0.5 - 1.4 mg/dL Final    Calcium 10/05/2022 8.4 (L)  8.7 - 10.5 mg/dL Final    Total Protein 10/05/2022 6.1  6.0 - 8.4 g/dL Final    Albumin 10/05/2022 3.6  3.5 - 5.2 g/dL Final    Total Bilirubin 10/05/2022 0.7  0.1 - 1.0 mg/dL Final    Alkaline Phosphatase 10/05/2022 78  55 - 135 U/L Final    AST 10/05/2022 15  10 - 40 U/L Final    ALT 10/05/2022 14  10 - 44 U/L Final    Anion Gap 10/05/2022 7 (L)  8 - 16 mmol/L Final    eGFR 10/05/2022 >60.0  >60 mL/min/1.73 m^2 Final    Cholesterol 10/05/2022 180  120 - 199 mg/dL Final    Triglycerides 10/05/2022 89  30 - 150 mg/dL Final    HDL 10/05/2022 62  40 - 75 mg/dL Final    LDL Cholesterol 10/05/2022 100.2  63.0 - 159.0 mg/dL Final    HDL/Cholesterol Ratio 10/05/2022 34.4  20.0 - 50.0 % Final    Total Cholesterol/HDL Ratio 10/05/2022 2.9  2.0 - 5.0 Final    Non-HDL Cholesterol 10/05/2022 118  mg/dL Final         Medications  Outpatient Encounter Medications as of 9/25/2023   Medication Sig Dispense Refill    ALPRAZolam (XANAX) 0.5 MG tablet Take daily as needed for anxiety. 30 tablet 5    azelastine (ASTELIN)  137 mcg (0.1 %) nasal spray 1 spray (137 mcg total) by Nasal route 2 (two) times daily as needed for Rhinitis. 30 mL 2    buPROPion (WELLBUTRIN XL) 150 MG TB24 tablet TAKE 1 TABLET ONE TIME DAILY 90 tablet 0    butalbital-acetaminophen-caffeine -40 mg (FIORICET, ESGIC) -40 mg per tablet TAKE 1 TABLET EVERY 4 HOURS AS NEEDED 30 tablet 0    cetirizine (ZYRTEC) 10 MG tablet Take 10 mg by mouth daily as needed.      clotrimazole (LOTRIMIN) 1 % Soln APPLY TOPICALLY 2 TIMES DAILY FOR 7 DAYS 30 mL 2    diclofenac sodium (VOLTAREN) 1 % Gel Apply 2 g topically daily as needed. 100 g 11    diflorasone-emollient (APEXICON E) 0.05 % Crea Apply 1 application topically once daily. for 7 days 30 g 5    estradioL (ESTRACE) 1 MG tablet Take 1 tablet (1 mg total) by mouth once daily. 90 tablet 1    fluocinonide (LIDEX) 0.05 % external solution Apply topically 2 (two) times daily. Do not use morning of surgery      fluticasone propionate (FLONASE) 50 mcg/actuation nasal spray 2 sprays (100 mcg total) by Each Nostril route once daily. 3 mL 3    gabapentin (NEURONTIN) 400 MG capsule Take 1 capsule (400 mg total) by mouth 3 (three) times daily. 270 capsule 0    hydrocortisone 2.5 % cream Apply topically 2 (two) times daily. apply to affected area for 7 days 20 g 5    ketoconazole (NIZORAL) 2 % shampoo Do not use morning of surgery      levothyroxine (SYNTHROID) 112 MCG tablet Take 1 tablet (112 mcg total) by mouth before breakfast. TAKE 1 TABLET (112 MCG TOTAL) BY MOUTH BEFORE BREAKFAST AS SCHEDULED Strength: 112 mcg 90 tablet 4    metronidazole 1% (METROGEL) 1 % Gel Apply topically once daily. 60 g 5    omeprazole (PRILOSEC) 10 MG capsule Take 10 mg by mouth.      ondansetron (ZOFRAN) 4 MG tablet       oxyCODONE-acetaminophen (PERCOCET) 5-325 mg per tablet Take 1 tablet by mouth every 6 (six) hours as needed for Pain. 30 each 0    oxymetazoline (AFRIN) 0.05 % nasal spray 2 sprays by Nasal route 2 (two) times daily.       pravastatin (PRAVACHOL) 20 MG tablet TAKE 1 TABLET EVERY DAY 90 tablet 2    senna-docusate 8.6-50 mg (PERICOLACE) 8.6-50 mg per tablet Take 1 tablet by mouth. Hold of surgery      zolpidem (AMBIEN) 5 MG Tab Take 1 tablet (5 mg total) by mouth nightly as needed. 90 tablet 0    [DISCONTINUED] buPROPion (WELLBUTRIN XL) 150 MG TB24 tablet Take 1 tablet (150 mg total) by mouth once daily. 90 tablet 1     Facility-Administered Encounter Medications as of 9/25/2023   Medication Dose Route Frequency Provider Last Rate Last Admin    triamcinolone acetonide injection 40 mg  40 mg Intra-articular  Osmar Avery III, MD   40 mg at 05/17/22 1345     Assessment - Diagnosis - Goals:     Impression: 74 y/o WF with anxiety disorder with adjustment component disorder in context of uncertainty in cancer treatment outcome. Some intermittent problems with sleep, anxiety related to diagnosis/prognosis, existential/mortality related issues. Gets good benefit from spiritual counseling and is quite satisfied with care. Hasn't tolerated several monoaminergic meds, gets benefit from prn benzos, doesn't have a daily need for a prn. Tried psychotherapy. New cancer recurrence, s/p treatment, getting regular monitoring.     Dx: depression.     Treatment Goals:  Specify outcomes written in observable, behavioral terms:   Comfort and assist in adjustment to diagnosis/treatment/prognosis.     Treatment Plan/Recommendations:     Trial of bupropion.   Encouraged psychotherapy. Patient gets spiritual counseling. Have given her information on how to access and make good use of professional therapy.    Takes prn zolpidem, prn alprazolam. Discussed risks, benefits, and alternatives to treatment plan documented above with patient. I answered all patient questions related to this plan and patient expressed understanding and agreement.     Return to Clinic: 2 months    C. Bill Asif MD

## 2023-10-04 ENCOUNTER — PATIENT MESSAGE (OUTPATIENT)
Dept: PRIMARY CARE CLINIC | Facility: CLINIC | Age: 73
End: 2023-10-04
Payer: MEDICARE

## 2023-10-05 ENCOUNTER — OFFICE VISIT (OUTPATIENT)
Dept: OPHTHALMOLOGY | Facility: CLINIC | Age: 73
End: 2023-10-05
Payer: MEDICARE

## 2023-10-05 ENCOUNTER — TELEPHONE (OUTPATIENT)
Dept: OPHTHALMOLOGY | Facility: CLINIC | Age: 73
End: 2023-10-05
Payer: MEDICARE

## 2023-10-05 DIAGNOSIS — H00.021 HORDEOLUM INTERNUM OF RIGHT UPPER EYELID: Primary | ICD-10-CM

## 2023-10-05 PROCEDURE — 99999 PR PBB SHADOW E&M-EST. PATIENT-LVL III: CPT | Mod: PBBFAC,,, | Performed by: OPTOMETRIST

## 2023-10-05 PROCEDURE — 99213 OFFICE O/P EST LOW 20 MIN: CPT | Mod: PBBFAC,PO | Performed by: OPTOMETRIST

## 2023-10-05 PROCEDURE — 99213 PR OFFICE/OUTPT VISIT, EST, LEVL III, 20-29 MIN: ICD-10-PCS | Mod: S$PBB,,, | Performed by: OPTOMETRIST

## 2023-10-05 PROCEDURE — 99999 PR PBB SHADOW E&M-EST. PATIENT-LVL III: ICD-10-PCS | Mod: PBBFAC,,, | Performed by: OPTOMETRIST

## 2023-10-05 PROCEDURE — 99213 OFFICE O/P EST LOW 20 MIN: CPT | Mod: S$PBB,,, | Performed by: OPTOMETRIST

## 2023-10-05 RX ORDER — ERYTHROMYCIN 5 MG/G
OINTMENT OPHTHALMIC
Qty: 3.5 G | Refills: 0 | Status: ON HOLD | OUTPATIENT
Start: 2023-10-05 | End: 2024-02-06 | Stop reason: HOSPADM

## 2023-10-05 RX ORDER — DOXYCYCLINE 100 MG/1
100 CAPSULE ORAL EVERY 12 HOURS
Qty: 20 CAPSULE | Refills: 0 | Status: SHIPPED | OUTPATIENT
Start: 2023-10-05 | End: 2023-10-15

## 2023-10-05 NOTE — PROGRESS NOTES
HPI     Eye Problem            Comments: Patient here today for itchy right eye associated with white   bump under RL  C/O double vision           Comments    Pain Scale:  3  Onset:   3-4 days  OD, OS, OU:   OD  Discharge:   No  A.M. Matting:  Yes  Itch:   Yes  Redness:   Yes  Photophobia:   No  Foreign body sensation:   No  Deep pain:   No  Previous occurrence:   Yes  Drops:   Using Husbands drops Vigamox             Last edited by Purnima Dave, PCT on 10/5/2023 10:12 AM.            Assessment /Plan     For exam results, see Encounter Report.    Hordeolum internum of right upper eyelid  -     doxycycline (VIBRAMYCIN) 100 MG Cap; Take 1 capsule (100 mg total) by mouth every 12 (twelve) hours. for 10 days  Dispense: 20 capsule; Refill: 0  -     erythromycin (ROMYCIN) ophthalmic ointment; Apply ointment to lids and lashes two times daily for 7 days.  Dispense: 3.5 g; Refill: 0    Warm compress/lid hygiene reviewed. Start Doxycycline 100mg bid x 10 days with erythromycin konrad to affected area bid.     RTC as scheduled for annual eye exam, sooner if any changes to vision worsening symptoms.

## 2023-10-05 NOTE — TELEPHONE ENCOUNTER
Called patient to schedule appointment asked patient to call us back       ----- Message from Guillermina Stout sent at 10/5/2023  9:13 AM CDT -----  Regarding: pt call back  Name of Who is Calling:Pt         What is the request in detail: Pt requesting call back in regards to appt. Please advise           Can the clinic reply by [facility]: no         What Number to Call Back if not in [employer]:Telephone Information:  Mobile          [phone]

## 2023-10-06 ENCOUNTER — TELEPHONE (OUTPATIENT)
Dept: PRIMARY CARE CLINIC | Facility: CLINIC | Age: 73
End: 2023-10-06
Payer: MEDICARE

## 2023-10-12 ENCOUNTER — OFFICE VISIT (OUTPATIENT)
Dept: PRIMARY CARE CLINIC | Facility: CLINIC | Age: 73
End: 2023-10-12
Payer: MEDICARE

## 2023-10-12 ENCOUNTER — TELEPHONE (OUTPATIENT)
Dept: PRIMARY CARE CLINIC | Facility: CLINIC | Age: 73
End: 2023-10-12
Payer: MEDICARE

## 2023-10-12 DIAGNOSIS — F41.9 ANXIETY: ICD-10-CM

## 2023-10-12 DIAGNOSIS — E78.00 HYPERCHOLESTEREMIA: ICD-10-CM

## 2023-10-12 DIAGNOSIS — K21.9 GASTROESOPHAGEAL REFLUX DISEASE WITHOUT ESOPHAGITIS: ICD-10-CM

## 2023-10-12 DIAGNOSIS — I10 PRIMARY HYPERTENSION: Primary | ICD-10-CM

## 2023-10-12 DIAGNOSIS — E66.09 CLASS 1 OBESITY DUE TO EXCESS CALORIES WITH SERIOUS COMORBIDITY AND BODY MASS INDEX (BMI) OF 31.0 TO 31.9 IN ADULT: ICD-10-CM

## 2023-10-12 PROCEDURE — 99214 OFFICE O/P EST MOD 30 MIN: CPT | Mod: 95,,, | Performed by: FAMILY MEDICINE

## 2023-10-12 PROCEDURE — 99214 PR OFFICE/OUTPT VISIT, EST, LEVL IV, 30-39 MIN: ICD-10-PCS | Mod: 95,,, | Performed by: FAMILY MEDICINE

## 2023-10-12 NOTE — PROGRESS NOTES
Subjective       The patient location is: home/[place]  The chief complaint leading to consultation is: follow up    Visit type: audiovisual    Face to Face time with patient: 27  35 minutes of total time spent on the encounter, which includes face to face time and non-face to face time preparing to see the patient (eg, review of tests), Obtaining and/or reviewing separately obtained history, Documenting clinical information in the electronic or other health record, Independently interpreting results (not separately reported) and communicating results to the patient/family/caregiver, or Care coordination (not separately reported).         Each patient to whom he or she provides medical services by telemedicine is:  (1) informed of the relationship between the physician and patient and the respective role of any other health care provider with respect to management of the patient; and (2) notified that he or she may decline to receive medical services by telemedicine and may withdraw from such care at any time.    Notes:    Patient ID: Sherrill Avery is a 73 y.o. female.    Chief Complaint: f/u    Pt/hsuband present for appt in June. Reviewed potential medication options for weight mgmt at that time.   Pt BMI > 30. She was contemplative in regards to AOMs but now would like to consider one. She reports has gained 5 lb. She has been cooking more.   Weight at home 196.    On bupropion per Dr. Dao, Prn ambien, xanax. Wellbutrin was working well but then noticed hands felt jittery so wanted to try something different. Feeling much better with it. NO sz d/o. No increased anxiety. She felt like this se would be more tolerable than starting a new medication. Sleep was good on it. No issues with bp meds.   (She has elected to not start the lexapro).   She is not on narcotic pain medication at this time. Has some leftover     Hx of HTN, stable    Followed by [facility]; clear cell carcinoma of R kidney (2013) with  secondary lung mets (2017) Plan as per last visit repeat imaging. Went to [facility] last week. She reports cancer is same; will have repeat imaging next Jan.    Pt has seen Dr. Solomon for thyroid mgmt.     Not on chronic narcotics. No gabapentin or percocet in 8 months.     On prilosec for GERD.    She feels has     HPI  Review of Systems   Constitutional:  Negative for activity change, appetite change, fatigue and fever.   HENT:  Negative for mouth dryness and goiter.    Eyes:  Negative for visual disturbance.   Respiratory:  Negative for apnea, cough, chest tightness and shortness of breath.    Cardiovascular:  Negative for chest pain, palpitations and leg swelling.   Gastrointestinal:  Negative for abdominal pain, constipation and diarrhea.   Endocrine: Negative for cold intolerance, heat intolerance, polydipsia, polyphagia and polyuria.   Genitourinary:  Negative for frequency and menstrual problem.   Musculoskeletal:  Negative for arthralgias and myalgias.   Integumentary:  Negative for color change and rash.   Psychiatric/Behavioral:  Negative for sleep disturbance. The patient is not nervous/anxious.           Objective     There were no vitals filed for this visit.  Wt Readings from Last 10 Encounters:   09/21/23 87.1 kg (192 lb)   08/01/23 87.1 kg (192 lb)   06/20/23 86.6 kg (190 lb 14.7 oz)   05/04/23 88.4 kg (194 lb 14.2 oz)   04/19/23 89 kg (196 lb 3.4 oz)   03/07/23 88.5 kg (195 lb)   01/24/23 94 kg (207 lb 3.7 oz)   12/05/22 90.4 kg (199 lb 4.7 oz)   10/31/22 89.6 kg (197 lb 8.5 oz)   10/25/22 88.5 kg (195 lb)       Physical Exam  Constitutional:       General: She is not in acute distress.     Appearance: Normal appearance.   HENT:      Head: Normocephalic and atraumatic.   Eyes:      General: No scleral icterus.  Pulmonary:      Effort: Pulmonary effort is normal. No respiratory distress.   Neurological:      Mental Status: She is alert and oriented to person, place, and time.   Psychiatric:          Mood and Affect: Mood normal.         Behavior: Behavior normal.         PAST MEDICAL HISTORY:  Past Medical History:   Diagnosis Date    Anxiety     Family history of malignant melanoma 8/25/2014    Fibromyalgia affecting lower leg     GERD (gastroesophageal reflux disease)     Hypercholesteremia     Hypertension     Hypothyroidism     Inflamed seborrheic keratosis 8/25/2014    Microscopic hematuria 2/2/2017    Multiple benign melanocytic nevi 8/25/2014         PAST SURGICAL HISTORY:  Past Surgical History:   Procedure Laterality Date    AUGMENTATION OF BREAST      bladder lift      breast implants      BREAST SURGERY Bilateral 1996    saline implants    COLONOSCOPY  2007    HYSTERECTOMY      ectopic pregnancy x 2, with LSO    KNEE ARTHROPLASTY Left 6/14/2021    Procedure: ARTHROPLASTY, KNEE:LEFT:DEPUY-SIGMA;  Surgeon: Osmar Avery III, MD;  Location: [facility] OR;  Service: Orthopedics;  Laterality: Left;    LAPAROSCOPIC NEPHRECTOMY, HAND ASSISTED  07/25/2013    LUNG REMOVAL, PARTIAL Left 2020    At [facility]    NEPHRECTOMY      OOPHORECTOMY      x1    RADIOFREQUENCY ABLATION Left 8/30/2022    Procedure: RADIOFREQUENCY ABLATION, LEFT KNEE COOLED;  Surgeon: Vijaya Landin MD;  Location: [facility]GT;  Service: Pain Management;  Laterality: Left;    RADIOFREQUENCY ABLATION Right 10/25/2022    Procedure: RADIOFREQUENCY ABLATION RIGHT KNEE GENICULAR COOLED;  Surgeon: Vijaya Landin MD;  Location: [facility];  Service: Pain Management;  Laterality: Right;    RADIOFREQUENCY ABLATION Left 3/7/2023    Procedure: RADIOFREQUENCY ABLATION LEFT GENICULAR COOLED RFA;  Surgeon: Vijaya Landin MD;  Location: [facility];  Service: Pain Management;  Laterality: Left;    RADIOFREQUENCY ABLATION Right 8/1/2023    Procedure: RADIOFREQUENCY ABLATION, RIGHT GENICULAR COOLED;  Surgeon: Vijaya Landin MD;  Location: [facility];  Service: Pain Management;  Laterality: Right;    right ganglion cyst       throat polyp      TRANSOBTURATOR SLING  2011    with RSO for persistent complex cyst & MARGOT; done by yris    UPPER GASTROINTESTINAL ENDOSCOPY  2007       FAMILY HISTORY:  Family History   Problem Relation Age of Onset    Diabetes Mother     Hypertension Mother     Heart disease Mother     Cancer Father         lung    Migraines Father     Glaucoma Paternal Grandmother     Glaucoma Sister     Thyroid disease Neg Hx     Asthma Neg Hx           SOCIAL HISTORY:  Social History     Social History Narrative    Patient is a retired dance instructor and live with . Has stairs at home 12- has an elevator       MEDICATIONS:  Medications have been reviewed.    ALLERGIES:  Allergies have been reviewed.       Assessment and Plan           ICD-10-CM ICD-9-CM   1. Primary hypertension  I10 401.9   2. Hypercholesteremia  E78.00 272.0   3. Gastroesophageal reflux disease without esophagitis  K21.9 530.81   4. Class 1 obesity due to excess calories with serious comorbidity and body mass index (BMI) of 31.0 to 31.9 in adult  E66.09 278.00    Z68.31 V85.31        Primary hypertension  Chronic/stable; continue current meds    Hypercholesteremia  Chronic/stable    Gastroesophageal reflux disease without esophagitis  Comments:  chronic/stable    Anxiety  Chronic/stable  On bupropion    Class 1 obesity due to excess calories with serious comorbidity and body mass index (BMI) of 31.0 to 31.9 in adult  Comments:  will message Dr. Dao     Reviewed with pt at length risks/benefits/se's of wellbutrin and naltrexone  Lio Pulido  Pt already on on wellbutrin; may be an option to add low dose in    Goal 176 to have bmi < 30

## 2023-10-12 NOTE — PATIENT INSTRUCTIONS
"Contrave components        Access [employer] Online for additional drug information, tools, and databases.  Copyright 5289-9816 [employer] All rights reserved.  Contributor Disclosures  (For additional information see "Naltrexone: Drug information")    You must carefully read the "Consumer Information Use and Disclaimer" below in order to understand and correctly use this information.  Brand Names: [place]  Vivitrol    Brand Names: Lois  APO-Naltrexone;     ReVia    What is this drug used for?  It is used to help keep you alcohol-free.  It is used to help keep you opioid-free. Opioid drugs include heroin and prescription pain drugs like oxycodone and morphine.  It may be given to you for other reasons. Talk with the doctor.    What do I need to tell my doctor BEFORE I take this drug?  If you are allergic to this drug; any part of this drug; or any other drugs, foods, or substances. Tell your doctor about the allergy and what signs you had.  If you are taking an opioid drug like morphine or oxycodone, are addicted to an opioid drug, or are having withdrawal signs.  If you have taken a pain drug within the past 7 to 14 days.  This is not a list of all drugs or health problems that interact with this drug.  Tell your doctor and pharmacist about all of your drugs (prescription or OTC, natural products, vitamins) and health problems. You must check to make sure that it is safe for you to take this drug with all of your drugs and health problems. Do not start, stop, or change the dose of any drug without checking with your doctor.    What are some things I need to know or do while I take this drug?  All products:  Tell all of your health care providers that you take this drug. This includes your doctors, nurses, pharmacists, and dentists.  Avoid driving and doing other tasks or actions that call for you to be alert until you see how this drug affects you.  Have blood work checked as you have been told by the doctor. Talk " with the doctor.  This drug may affect certain lab tests. Tell all of your health care providers and lab workers that you take this drug.  Avoid drinking alcohol while taking this drug.  Do not take opioid drugs while you are taking this drug. Opioid drugs will not work. Do not take more opioid drugs to try to get them to work. Doing this may cause severe injury, coma, or death.  A drug called naloxone can be used to help treat an opioid overdose. Your doctor may order naloxone for you to keep with you if it is needed. If you have questions about how to get or use naloxone, talk with your doctor or pharmacist. If you think there has been an opioid overdose, get medical care right away even if naloxone has been used.  If you are addicted to opioid drugs and are given this drug, you may have signs of withdrawal. If you have questions, talk with your doctor.  Tell your doctor if you are pregnant, plan on getting pregnant, or are breast-feeding. You will need to talk about the benefits and risks to you and the baby.  Tablets:  Have patient safety card with you at all times.  People taking this drug to keep an opioid-free state may get more effects from opioid drugs when this drug is stopped. Even low amounts of opioid drugs or amounts that you have used before may lead to overdose and death. If you have questions, talk with your doctor.  Injection:  After you get a dose of this drug, its blocking effect on opioids will go away over time. Low amounts of opioid drugs or amounts that you have used before may lead to overdose and death. This effect may also be seen when it is time for your next dose of this drug, if you miss a dose, if you stop treatment, or if you have gone through treatment and are opioid-free.  Very bad skin problems have happened where the shot was given. Sometimes surgery was needed for these skin problems. Talk with the doctor.  A type of lung infection caused by an allergic reaction has happened with  this drug. This may need to be treated in a hospital.    What are some side effects that I need to call my doctor about right away?  WARNING/CAUTION: Even though it may be rare, some people may have very bad and sometimes deadly side effects when taking a drug. Tell your doctor or get medical help right away if you have any of the following signs or symptoms that may be related to a very bad side effect:  All products:  Signs of an allergic reaction, like rash; hives; itching; red, swollen, blistered, or peeling skin with or without fever; wheezing; tightness in the chest or throat; trouble breathing, swallowing, or talking; unusual hoarseness; or swelling of the mouth, face, lips, tongue, or throat.  Signs of liver problems like dark urine, tiredness, decreased appetite, upset stomach or stomach pain, light-colored stools, throwing up, or yellow skin or eyes.  Signs of high blood pressure like very bad headache or dizziness, passing out, or change in eyesight.  New or worse behavior or mood changes like depression or thoughts of suicide.  Feeling confused.  Trouble breathing, slow breathing, or shallow breathing.  Sex problems in males.  Injection:  Signs of lung or breathing problems like shortness of breath or other trouble breathing, cough, or fever.  Area that feels hard, blisters, dark scab, lumps, open wound, pain, swelling, or other very bad skin irritation where the shot was given.    What are some other side effects of this drug?  All drugs may cause side effects. However, many people have no side effects or only have minor side effects. Call your doctor or get medical help if any of these side effects or any other side effects bother you or do not go away:  All products:  Feeling nervous and excitable.  Anxiety.  Headache.  Muscle cramps.  Constipation, diarrhea, stomach pain, upset stomach, throwing up, or decreased appetite.  Unusual thirst.  Trouble sleeping.  Feeling dizzy, sleepy, tired, or  weak.  Back, muscle, or joint pain.  Signs of a common cold.  Tooth pain.  Injection:  Irritation where the shot is given.  Nose or throat irritation.  Dry mouth.  These are not all of the side effects that may occur. If you have questions about side effects, call your doctor. Call your doctor for medical advice about side effects.  You may report side effects to your national health agency.    How is this drug best taken?  Use this drug as ordered by your doctor. Read all information given to you. Follow all instructions closely.  Tablets:  Keep taking this drug as you have been told by your doctor or other health care provider, even if you feel well.  Injection:  It is given as a shot into a muscle.    What do I do if I miss a dose?  Tablets:  Take a missed dose as soon as you think about it.  If it is close to the time for your next dose, skip the missed dose and go back to your normal time.  Do not take 2 doses at the same time or extra doses.  Injection:  Call your doctor to find out what to do.    How do I store and/or throw out this drug?  Tablets:  Store at room temperature in a dry place. Do not store in a bathroom.  Injection:  If you need to store this drug at home, talk with your doctor, nurse, or pharmacist about how to store it.  All products:  Keep all drugs in a safe place. Keep all drugs out of the reach of children and pets.  Throw away unused or  drugs. Do not flush down a toilet or pour down a drain unless you are told to do so. Check with your pharmacist if you have questions about the best way to throw out drugs. There may be drug take-back programs in your area.    General drug facts  If your symptoms or health problems do not get better or if they become worse, call your doctor.  Do not share your drugs with others and do not take anyone else's drugs.  Some drugs may have another patient information leaflet. If you have any questions about this drug, please talk with your doctor,  nurse, pharmacist, or other health care provider.  If you think there has been an overdose, call your poison control center or get medical care right away. Be ready to tell or show what was taken, how much, and when it happened.    Last Reviewed Date  2021-03-30  Consumer Information Use and Disclaimer  This generalized information is a limited summary of diagnosis, treatment, and/or medication information. It is not meant to be comprehensive and should be used as a tool to help the user understand and/or assess potential diagnostic and treatment options. It does NOT include all information about conditions, treatments, medications, side effects, or risks that may apply to a specific patient. It is not intended to be medical advice or a substitute for the medical advice, diagnosis, or treatment of a health care provider based on the health care provider's examination and assessment of a patient's specific and unique circumstances. Patients must speak with a health care provider for complete information about their health, medical questions, and treatment options, including any risks or benefits regarding use of medications. This information does not endorse any treatments or medications as safe, effective, or approved for treating a specific patient. [employer] and its affiliates disclaim any warranty or liability relating to this information or the use thereof. The use of this information is governed by the Terms of Use, available at https://www.woltersYakazuwer.com/en/know/clinical-effectiveness-terms.    © 2023 [employer], Inc. and its affiliates and/or licensors. All rights reserved.  Use of [employer] is subject to the Terms of Use.

## 2023-10-17 ENCOUNTER — PATIENT MESSAGE (OUTPATIENT)
Dept: PRIMARY CARE CLINIC | Facility: CLINIC | Age: 73
End: 2023-10-17
Payer: MEDICARE

## 2023-10-18 ENCOUNTER — LAB VISIT (OUTPATIENT)
Dept: LAB | Facility: HOSPITAL | Age: 73
End: 2023-10-18
Attending: FAMILY MEDICINE
Payer: MEDICARE

## 2023-10-18 ENCOUNTER — PATIENT MESSAGE (OUTPATIENT)
Dept: PRIMARY CARE CLINIC | Facility: CLINIC | Age: 73
End: 2023-10-18
Payer: MEDICARE

## 2023-10-18 DIAGNOSIS — E66.09 CLASS 1 OBESITY DUE TO EXCESS CALORIES WITH SERIOUS COMORBIDITY AND BODY MASS INDEX (BMI) OF 31.0 TO 31.9 IN ADULT: Primary | ICD-10-CM

## 2023-10-18 DIAGNOSIS — E78.00 HYPERCHOLESTEREMIA: ICD-10-CM

## 2023-10-18 LAB
CHOLEST SERPL-MCNC: 194 MG/DL (ref 120–199)
CHOLEST/HDLC SERPL: 2.9 {RATIO} (ref 2–5)
HDLC SERPL-MCNC: 66 MG/DL (ref 40–75)
HDLC SERPL: 34 % (ref 20–50)
LDLC SERPL CALC-MCNC: 108.6 MG/DL (ref 63–159)
NONHDLC SERPL-MCNC: 128 MG/DL
TRIGL SERPL-MCNC: 97 MG/DL (ref 30–150)

## 2023-10-18 PROCEDURE — 80061 LIPID PANEL: CPT | Performed by: FAMILY MEDICINE

## 2023-10-18 PROCEDURE — 36415 COLL VENOUS BLD VENIPUNCTURE: CPT | Mod: PO | Performed by: FAMILY MEDICINE

## 2023-10-18 RX ORDER — NALTREXONE HYDROCHLORIDE 50 MG/1
TABLET, FILM COATED ORAL
Qty: 15 TABLET | Refills: 0 | Status: SHIPPED | OUTPATIENT
Start: 2023-10-18 | End: 2023-11-22 | Stop reason: SDUPTHER

## 2023-10-18 NOTE — PROGRESS NOTES
Subjective:       Patient ID: Sherrill Avery    Chief Complaint:  Met ccRCC    HPI     Sherrill Avery is a 73 y.o. 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 She is s/p left VATS LLL wedge resection on 1/29/20.     Here to follow-up Currently NOT on therapy. Disease has been indolent.      Saw Dr. Leslie at [facility], PET scan 6/2022 shows post-therapy changes in the right lung and no other sites concerning for metastases. CT CAP 9/2022 shows multiple stable pulmonary nodules, DC 0.5 x 0.6 cm pulmonary nodule is slightly larger. Indeterminate 8 mm intramuscular lesion (previously 6 mm) may represent metastatic disease. Now intermediate - constant L rib pain + chronic intermittent back pain that she has noted since VATS. Gabapentin has relieved some symptoms.     Saw Dr. Leslie 9/2022, has scans, IR biopsy and visit scheduled 1/2023. Underwent knee replacement surgery of L in [place], had prolonged recovery. Seeing PT which helps the pain (last saw 3 weeks ago).     Sees pain mgmt at [facility]. Recently established with psych onc at [facility].     Had IR biopsy of muscle lesion 1/2023.     Pt seeing [facility] Janessa, the [facility].  Ses Dr. Leslie at [facility].     Recent scans at [facility] were stable.   Plans to repeat scans in 3 mos at [facility].      Recently started naltrexone for weight loss.      Feels anxious, seeing psych at [facility].        Oncologic History:    Clear cell carcinoma of right kidney.    6/30/2013 Initial Diagnosis   Presented with gross hematuria. CT CAP: 7.2-cm mass occupying the upper two thirds of the right kidney. 2.8-cm mass in the posterior aspect of the urinary bladder c/w TCC.    7/25/2013 Surgery   Right radical nephrectomy Pathology: 7.5-cm clear cell RCC Yanet nuclear grade 2, with microscopic extension into perinephric adipose tissue and with tumor in the renal margin.  pT3a pN0 pMX    10/16/2013 - No Evidence of Disease  ([facility])   CT AP: No CT evidence of recurrence or metastatic disease    8/4/2015 - No Evidence of Disease ([facility])   CT AP: No CT evidence of recurrence or metastatic disease    12/1/2016 - [facility] with concern of recurrence   CT AP: Mew less than 4 mm pulmonary nodule in the anterior basal segment of the RLL. Stable 4 mm pulmonary nodule posterior basal segment RLL.     3/1/2017 Relapse/Recurrence   CT Chest: Multiple new subcm pulmonary nodules in the RLL and one in the right middle measuring up to 6 mm suspicious for metastatic disease.     2/8/2019 - [facility] with concern of recurrence   1. Small volume pulmonary metastases are slightly larger compared to the prior study.     2. No recurrent or metastatic disease in the abdomen or pelvis.    2/14/2019 Biopsy   CT-guided biopsy of a 1 cm left lower lobe lesion   Pathology: Small focus of atypical adenomatous hyperplasia with no tumor present. PAX-8 negative.    2/7/2022 - 3/3/2022 TX-Radiation Therapy   1 RUL x06 VMAT 02/07/2022 02/25/2022 400 cGy 15/15 6000/6000 cGy   1 RLL x06 STEREOTACTIC 02/28/2022 03/03/2022 1250 cGy 4/4 5000cGy    She was initally noted to have spontaneous remission of the lung nodules in the absence of any systemic treatment. However, in 2019, the lung nodules at started to increase in size but were still mostly subcentimeter in greatest dimension. In 2/2019, when the left lower lobe lesion increased to 1.4 cm. IR biopsied a peripheral left lung lesion which was PAX-8 negative thought to be a small focus of atypical adenomatous hyperplasia. We therefore brought her back after only 3 months to monitor those lesions carefully. She was referred to Dr. King who thinks the lesions represent indolent RCC metastases. She underwent resection and RCC was confirmed on pathology at [facility].      Review of Systems   Constitutional: Negative for activity change, appetite change, chills, fatigue, fever and unexpected weight change.   HENT: Negative for congestion,  hearing loss, mouth sores, sore throat, tinnitus and voice change.    Eyes: Negative for pain and visual disturbance.   Respiratory: Negative for cough, shortness of breath and wheezing.    Cardiovascular: Negative for chest pain, palpitations and leg swelling.   Gastrointestinal: Negative for abdominal pain, constipation, diarrhea, nausea and vomiting.   Endocrine: Negative for cold intolerance and heat intolerance.   Genitourinary: Negative for difficulty urinating, dyspareunia, dysuria, frequency, menstrual problem, urgency, vaginal bleeding, vaginal discharge and vaginal pain.   Musculoskeletal: Positive for arthralgias. Negative for back pain and myalgias.   Skin: Negative for color change, rash and wound.   Allergic/Immunologic: Negative for environmental allergies and food allergies.   Neurological: Negative for weakness, numbness and headaches.   Hematological: Negative for adenopathy. Does not bruise/bleed easily.   Psychiatric/Behavioral: Negative for agitation, confusion, hallucinations and sleep disturbance. The patient is nervous/anxious.    All other systems reviewed and are negative.          Allergies:  Review of patient's allergies indicates:   Allergen Reactions    Talwin compound Anxiety     hallucinations/anxiety    Tramadol Itching    Buspirone Anxiety    Codeine Itching    Morphine Itching     jittery    Morpholine analogues Anxiety and Itching    Naproxen Itching     Takes Ibuprofen is ok on rare occasion     Nexium [esomeprazole magnesium] Other (See Comments)     constipation    Wal-phed [pseudoephedrine hcl] Itching       Medications:  Current Outpatient Medications   Medication Sig Dispense Refill    ALPRAZolam (XANAX) 0.5 MG tablet Take daily as needed for anxiety. 30 tablet 5    azelastine (ASTELIN) 137 mcg (0.1 %) nasal spray 1 spray (137 mcg total) by Nasal route 2 (two) times daily as needed for Rhinitis. 30 mL 2    butalbital-acetaminophen-caffeine -40 mg (FIORICET, ESGIC)  -40 mg per tablet TAKE 1 TABLET EVERY 4 HOURS AS NEEDED 30 tablet 0    cetirizine (ZYRTEC) 10 MG tablet Take 10 mg by mouth daily as needed.      clotrimazole (LOTRIMIN) 1 % Soln APPLY TOPICALLY 2 TIMES DAILY FOR 7 DAYS 30 mL 2    diclofenac sodium (VOLTAREN) 1 % Gel Apply 2 g topically daily as needed. 100 g 11    diflorasone-emollient (APEXICON E) 0.05 % Crea Apply 1 application topically once daily. for 7 days 30 g 5    erythromycin (ROMYCIN) ophthalmic ointment Apply ointment to lids and lashes two times daily for 7 days. 3.5 g 0    EScitalopram oxalate (LEXAPRO) 5 MG Tab Take 1 tablet (5 mg total) by mouth once daily. 30 tablet 3    estradioL (ESTRACE) 1 MG tablet Take 1 tablet (1 mg total) by mouth once daily. 90 tablet 1    fluocinonide (LIDEX) 0.05 % external solution Apply topically 2 (two) times daily. Do not use morning of surgery      fluticasone propionate (FLONASE) 50 mcg/actuation nasal spray 2 sprays (100 mcg total) by Each Nostril route once daily. 3 mL 3    gabapentin (NEURONTIN) 400 MG capsule Take 1 capsule (400 mg total) by mouth 3 (three) times daily. 270 capsule 0    hydrocortisone 2.5 % cream Apply topically 2 (two) times daily. apply to affected area for 7 days 20 g 5    ketoconazole (NIZORAL) 2 % shampoo Do not use morning of surgery      levothyroxine (SYNTHROID) 112 MCG tablet Take 1 tablet (112 mcg total) by mouth before breakfast. TAKE 1 TABLET (112 MCG TOTAL) BY MOUTH BEFORE BREAKFAST AS SCHEDULED Strength: 112 mcg 90 tablet 4    metronidazole 1% (METROGEL) 1 % Gel Apply topically once daily. 60 g 5    naltrexone (DEPADE) 50 mg tablet 1/4 pill once daily x 2 weeks then 1/4 pill twice daily 15 tablet 0    omeprazole (PRILOSEC) 10 MG capsule Take 10 mg by mouth.      ondansetron (ZOFRAN) 4 MG tablet       oxyCODONE-acetaminophen (PERCOCET) 5-325 mg per tablet Take 1 tablet by mouth every 6 (six) hours as needed for Pain. 30 each 0    oxymetazoline (AFRIN) 0.05 % nasal spray 2  sprays by Nasal route 2 (two) times daily.      pravastatin (PRAVACHOL) 20 MG tablet TAKE 1 TABLET EVERY DAY 90 tablet 2    senna-docusate 8.6-50 mg (PERICOLACE) 8.6-50 mg per tablet Take 1 tablet by mouth. Hold of surgery      zolpidem (AMBIEN) 5 MG Tab Take 1 tablet (5 mg total) by mouth nightly as needed. 90 tablet 0     No current facility-administered medications for this visit.     Facility-Administered Medications Ordered in Other Visits   Medication Dose Route Frequency Provider Last Rate Last Admin    triamcinolone acetonide injection 40 mg  40 mg Intra-articular  Osmar Avery III, MD   40 mg at 05/17/22 1345       PMH:  Past Medical History:   Diagnosis Date    Anxiety     Family history of malignant melanoma 8/25/2014    Fibromyalgia affecting lower leg     GERD (gastroesophageal reflux disease)     Hypercholesteremia     Hypertension     Hypothyroidism     Inflamed seborrheic keratosis 8/25/2014    Microscopic hematuria 2/2/2017    Multiple benign melanocytic nevi 8/25/2014       PSH:  Past Surgical History:   Procedure Laterality Date    AUGMENTATION OF BREAST      bladder lift      breast implants      BREAST SURGERY Bilateral 1996    saline implants    COLONOSCOPY  2007    HYSTERECTOMY      ectopic pregnancy x 2, with LSO    KNEE ARTHROPLASTY Left 6/14/2021    Procedure: ARTHROPLASTY, KNEE:LEFT:DEPUY-SIGMA;  Surgeon: Osmar Avery III, MD;  Location: [facility] OR;  Service: Orthopedics;  Laterality: Left;    LAPAROSCOPIC NEPHRECTOMY, HAND ASSISTED  07/25/2013    LUNG REMOVAL, PARTIAL Left 2020    At [facility]    NEPHRECTOMY      OOPHORECTOMY      x1    RADIOFREQUENCY ABLATION Left 8/30/2022    Procedure: RADIOFREQUENCY ABLATION, LEFT KNEE COOLED;  Surgeon: Vijaya Landin MD;  Location: [facility];  Service: Pain Management;  Laterality: Left;    RADIOFREQUENCY ABLATION Right 10/25/2022    Procedure: RADIOFREQUENCY ABLATION RIGHT KNEE GENICULAR COOLED;  Surgeon: Vijaya Landin MD;   "Location: [facility] PAIN MGT;  Service: Pain Management;  Laterality: Right;    RADIOFREQUENCY ABLATION Left 3/7/2023    Procedure: RADIOFREQUENCY ABLATION LEFT GENICULAR COOLED RFA;  Surgeon: Vijaya Landin MD;  Location: [facility] PAIN MGT;  Service: Pain Management;  Laterality: Left;    RADIOFREQUENCY ABLATION Right 8/1/2023    Procedure: RADIOFREQUENCY ABLATION, RIGHT GENICULAR COOLED;  Surgeon: Vijaya Landin MD;  Location: [facility] PAIN MGT;  Service: Pain Management;  Laterality: Right;    right ganglion cyst      throat polyp      TRANSOBTURATOR SLING  2011    with RSO for persistent complex cyst & MARGOT; done by arndt    UPPER GASTROINTESTINAL ENDOSCOPY  2007       FamHx:  Family History   Problem Relation Age of Onset    Diabetes Mother     Hypertension Mother     Heart disease Mother     Cancer Father         lung    Migraines Father     Glaucoma Paternal Grandmother     Glaucoma Sister     Thyroid disease Neg Hx     Asthma Neg Hx        SocHx:  Social History     Socioeconomic History    Marital status:      Spouse name: KAIDEN    Number of children: 5   Occupational History    Occupation: retired     Comment: Dance Instructor   Tobacco Use    Smoking status: Never    Smokeless tobacco: Never   Substance and Sexual Activity    Alcohol use: Yes     Alcohol/week: 0.0 standard drinks of alcohol     Comment: social    Drug use: No    Sexual activity: Yes     Partners: Male     Birth control/protection: Surgical   Social History Narrative    Patient is a retired dance instructor and live with . Has stairs at home 12- has an elevator       Objective:      BP (!) 157/79 (BP Location: Right arm, Patient Position: Sitting, BP Method: Medium (Automatic))   Pulse 78   Temp 98.1 °F (36.7 °C) (Oral)   Resp 18   Ht 5' 4.5" (1.638 m)   Wt 90.1 kg (198 lb 10.2 oz)   SpO2 98%   BMI 33.57 kg/m²     Physical Exam  Vitals and nursing note reviewed.   Constitutional:       General: She is not in acute " distress.     Appearance: She is well-developed. She is not diaphoretic.   HENT:      Head: Normocephalic and atraumatic.      Nose: Nose normal.      Mouth/Throat:      Pharynx: No oropharyngeal exudate.   Eyes:      General:         Right eye: No discharge.         Left eye: No discharge.      Conjunctiva/sclera: Conjunctivae normal.      Pupils: Pupils are equal, round, and reactive to light.   Neck:      Trachea: No tracheal deviation.   Musculoskeletal:         General: No tenderness. Normal range of motion.   Skin:     General: Skin is warm and dry.      Coloration: Skin is not pale.      Findings: No erythema or rash.   Neurological:      Mental Status: She is alert and oriented to person, place, and time.      Cranial Nerves: No cranial nerve deficit.      Coordination: Coordination normal.      Deep Tendon Reflexes: Reflexes are normal and symmetric.   Psychiatric:         Behavior: Behavior normal.         Thought Content: Thought content normal.           Anesthesia/Surgery risks, benefits and alternative options discussed and understood by patient/family.  LABS:  WBC   Date Value Ref Range Status   08/17/2021 5.58 3.90 - 12.70 K/uL Final     Hemoglobin   Date Value Ref Range Status   08/17/2021 13.8 12.0 - 16.0 g/dL Final     Hematocrit   Date Value Ref Range Status   08/17/2021 42.4 37.0 - 48.5 % Final     Platelets   Date Value Ref Range Status   08/17/2021 215 150 - 450 K/uL Final       Chemistry        Component Value Date/Time     10/05/2022 1015    K 4.2 10/05/2022 1015     10/05/2022 1015    CO2 25 10/05/2022 1015    BUN 10 10/05/2022 1015    CREATININE 1.00 (H) 10/09/2023 1309    CREATININE 0.8 10/05/2022 1015    GLU 86 10/05/2022 1015        Component Value Date/Time    CALCIUM 8.4 (L) 10/05/2022 1015    ALKPHOS 78 10/05/2022 1015    AST 15 10/05/2022 1015    ALT 14 10/05/2022 1015    BILITOT 0.7 10/05/2022 1015    ESTGFRAFRICA >60 08/17/2021 1135    EGFRNONAA >60 08/17/2021 1135               Assessment:       1. Clear cell carcinoma    2. Carcinoma of right kidney    3. Carcinoma of kidney, unspecified laterality    4. History of right nephrectomy            Plan:       1. Metastatic ccRCC, very indolent cancer, current off therapy:    Currently on no systemic treatment. S/p wedge resection [facility] and XRT.      Scans at [facility] stable off treatment.      Pt returns to [facility] for restaging in January.       Schedule for virtual visit with onc psych ASAP.  RTC to see in February.      The patient agrees with the plan, and all questions have been answered to their satisfaction.      More than 40 mins total time were spent during this encounter.      Kervin Jaquez M.D., M.S., F.A.C.P.  Hematology/Oncology Attending  [facility]            Med Onc Chart Routing      Follow up with physician Other. Schedule for virtual visit with onc psych ASAP.  RTC to see me in February.   Follow up with MARGARET    Infusion scheduling note    Injection scheduling note    Labs    Imaging    Pharmacy appointment    Other referrals       Additional referrals needed  Schedule for virtual visit with onc psych ASAP

## 2023-10-19 ENCOUNTER — OFFICE VISIT (OUTPATIENT)
Dept: HEMATOLOGY/ONCOLOGY | Facility: CLINIC | Age: 73
End: 2023-10-19
Payer: MEDICARE

## 2023-10-19 VITALS
WEIGHT: 198.63 LBS | DIASTOLIC BLOOD PRESSURE: 79 MMHG | TEMPERATURE: 98 F | OXYGEN SATURATION: 98 % | SYSTOLIC BLOOD PRESSURE: 157 MMHG | RESPIRATION RATE: 18 BRPM | HEIGHT: 65 IN | BODY MASS INDEX: 33.09 KG/M2 | HEART RATE: 78 BPM

## 2023-10-19 DIAGNOSIS — C64.9 CARCINOMA OF KIDNEY, UNSPECIFIED LATERALITY: ICD-10-CM

## 2023-10-19 DIAGNOSIS — C64.1 CARCINOMA OF RIGHT KIDNEY: ICD-10-CM

## 2023-10-19 DIAGNOSIS — C80.1 CLEAR CELL CARCINOMA: Primary | ICD-10-CM

## 2023-10-19 DIAGNOSIS — Z90.5 HISTORY OF RIGHT NEPHRECTOMY: ICD-10-CM

## 2023-10-19 PROCEDURE — 99215 OFFICE O/P EST HI 40 MIN: CPT | Mod: S$PBB,,, | Performed by: INTERNAL MEDICINE

## 2023-10-19 PROCEDURE — 99215 PR OFFICE/OUTPT VISIT, EST, LEVL V, 40-54 MIN: ICD-10-PCS | Mod: S$PBB,,, | Performed by: INTERNAL MEDICINE

## 2023-10-19 PROCEDURE — 99215 OFFICE O/P EST HI 40 MIN: CPT | Mod: PBBFAC | Performed by: INTERNAL MEDICINE

## 2023-10-19 PROCEDURE — 99999 PR PBB SHADOW E&M-EST. PATIENT-LVL V: CPT | Mod: PBBFAC,,, | Performed by: INTERNAL MEDICINE

## 2023-10-19 PROCEDURE — 99999 PR PBB SHADOW E&M-EST. PATIENT-LVL V: ICD-10-PCS | Mod: PBBFAC,,, | Performed by: INTERNAL MEDICINE

## 2023-10-19 RX ORDER — FAMOTIDINE 20 MG/1
20 TABLET, FILM COATED ORAL
COMMUNITY
Start: 2023-10-10

## 2023-10-24 ENCOUNTER — TELEPHONE (OUTPATIENT)
Dept: INTERNAL MEDICINE | Facility: CLINIC | Age: 73
End: 2023-10-24
Payer: MEDICARE

## 2023-10-24 DIAGNOSIS — Z12.31 ENCOUNTER FOR SCREENING MAMMOGRAM FOR MALIGNANT NEOPLASM OF BREAST: Primary | ICD-10-CM

## 2023-10-24 NOTE — TELEPHONE ENCOUNTER
----- Message from Kerry Christine sent at 10/24/2023  9:08 AM CDT -----  Regarding: mammo Orders  Contact: Sherrill  Type:  Mammogram    Caller is requesting to schedule their annual mammogram appointment.  Order is not listed in EPIC.  Please enter order and contact patient to schedule.  Name of Caller: Sherrill  Where would they like the mammogram performed? [facility] [place]   Would the patient rather a call back or a response via [facility]? call  Best Call Back Number: [phone] (home)   Additional Information:  Sherrill rceived a letter requesting her mammo be scheduled and she need orders.

## 2023-10-24 NOTE — TELEPHONE ENCOUNTER
Spoke with the patient and schedule Mammo appointment on 12/7/23 at 12:00 pm. Reminder notice mailed out.

## 2023-10-26 ENCOUNTER — TELEPHONE (OUTPATIENT)
Dept: INFUSION THERAPY | Facility: HOSPITAL | Age: 73
End: 2023-10-26
Payer: MEDICARE

## 2023-10-30 ENCOUNTER — TELEPHONE (OUTPATIENT)
Dept: OBSTETRICS AND GYNECOLOGY | Facility: CLINIC | Age: 73
End: 2023-10-30

## 2023-10-30 DIAGNOSIS — N95.1 MENOPAUSE SYNDROME: Primary | ICD-10-CM

## 2023-10-30 NOTE — TELEPHONE ENCOUNTER
Patient called to schedule an appt to discuss HRT. She used to be a patient of Dr. Tran. Wants to establish care with a new female OBGYN. Scheduled virtual visit per patient request for 12/04/23 at 11:45 with Dr. CHICHI Mandel. Patient verbalized understanding. She is requesting a refill of estradiol until her appt.     Please advise.

## 2023-10-31 RX ORDER — ESTRADIOL 1 MG/1
1 TABLET ORAL DAILY
Qty: 90 TABLET | Refills: 0 | Status: SHIPPED | OUTPATIENT
Start: 2023-10-31

## 2023-11-06 ENCOUNTER — APPOINTMENT (OUTPATIENT)
Dept: RADIOLOGY | Facility: HOSPITAL | Age: 73
End: 2023-11-06
Attending: FAMILY MEDICINE
Payer: MEDICARE

## 2023-11-06 ENCOUNTER — OFFICE VISIT (OUTPATIENT)
Dept: INTERNAL MEDICINE | Facility: CLINIC | Age: 73
End: 2023-11-06
Payer: MEDICARE

## 2023-11-06 VITALS
RESPIRATION RATE: 18 BRPM | WEIGHT: 196.63 LBS | BODY MASS INDEX: 32.76 KG/M2 | DIASTOLIC BLOOD PRESSURE: 78 MMHG | HEIGHT: 65 IN | TEMPERATURE: 98 F | OXYGEN SATURATION: 98 % | HEART RATE: 82 BPM | SYSTOLIC BLOOD PRESSURE: 142 MMHG

## 2023-11-06 DIAGNOSIS — I10 PRIMARY HYPERTENSION: ICD-10-CM

## 2023-11-06 DIAGNOSIS — R51.9 HEADACHE, UNSPECIFIED HEADACHE TYPE: ICD-10-CM

## 2023-11-06 DIAGNOSIS — C64.9 CARCINOMA OF KIDNEY, UNSPECIFIED LATERALITY: ICD-10-CM

## 2023-11-06 DIAGNOSIS — E03.9 HYPOTHYROIDISM, UNSPECIFIED TYPE: Primary | ICD-10-CM

## 2023-11-06 DIAGNOSIS — Z78.0 ASYMPTOMATIC POSTMENOPAUSAL STATUS: ICD-10-CM

## 2023-11-06 PROCEDURE — 77080 DXA BONE DENSITY AXIAL SKELETON 1 OR MORE SITES: ICD-10-PCS | Mod: 26,,, | Performed by: RADIOLOGY

## 2023-11-06 PROCEDURE — 99999 PR PBB SHADOW E&M-EST. PATIENT-LVL IV: CPT | Mod: PBBFAC,,, | Performed by: FAMILY MEDICINE

## 2023-11-06 PROCEDURE — 99214 OFFICE O/P EST MOD 30 MIN: CPT | Mod: PBBFAC,25 | Performed by: FAMILY MEDICINE

## 2023-11-06 PROCEDURE — 77080 DXA BONE DENSITY AXIAL: CPT | Mod: 26,,, | Performed by: RADIOLOGY

## 2023-11-06 PROCEDURE — 99214 OFFICE O/P EST MOD 30 MIN: CPT | Mod: S$PBB,,, | Performed by: FAMILY MEDICINE

## 2023-11-06 PROCEDURE — 77080 DXA BONE DENSITY AXIAL: CPT | Mod: TC

## 2023-11-06 RX ORDER — BUTALBITAL, ACETAMINOPHEN AND CAFFEINE 50; 325; 40 MG/1; MG/1; MG/1
TABLET ORAL
Qty: 30 TABLET | Refills: 0 | Status: SHIPPED | OUTPATIENT
Start: 2023-11-06 | End: 2024-05-06 | Stop reason: SDUPTHER

## 2023-11-06 RX ORDER — ALPRAZOLAM 0.5 MG/1
TABLET ORAL
Qty: 30 TABLET | Refills: 5 | Status: SHIPPED | OUTPATIENT
Start: 2023-11-06 | End: 2024-05-06 | Stop reason: SDUPTHER

## 2023-11-06 NOTE — PATIENT INSTRUCTIONS
Please obtain the RSV vaccine via your pharmacy  Shingrix new shingles vaccine  via a pharmacy  Tetanus/whooping cough vaccine via pharmacy

## 2023-11-07 ENCOUNTER — PATIENT MESSAGE (OUTPATIENT)
Dept: OBSTETRICS AND GYNECOLOGY | Facility: CLINIC | Age: 73
End: 2023-11-07
Payer: MEDICARE

## 2023-11-16 ENCOUNTER — OFFICE VISIT (OUTPATIENT)
Dept: PSYCHIATRY | Facility: CLINIC | Age: 73
End: 2023-11-16
Payer: MEDICARE

## 2023-11-16 ENCOUNTER — PATIENT MESSAGE (OUTPATIENT)
Dept: PSYCHIATRY | Facility: CLINIC | Age: 73
End: 2023-11-16
Payer: MEDICARE

## 2023-11-16 DIAGNOSIS — C64.1 CARCINOMA OF RIGHT KIDNEY: ICD-10-CM

## 2023-11-16 DIAGNOSIS — C80.1 CLEAR CELL CARCINOMA: ICD-10-CM

## 2023-11-16 DIAGNOSIS — C64.9 CARCINOMA OF KIDNEY, UNSPECIFIED LATERALITY: ICD-10-CM

## 2023-11-16 DIAGNOSIS — F41.1 GENERALIZED ANXIETY DISORDER: Primary | Chronic | ICD-10-CM

## 2023-11-16 PROCEDURE — 90791 PSYCH DIAGNOSTIC EVALUATION: CPT | Mod: 95,,, | Performed by: PSYCHOLOGIST

## 2023-11-16 PROCEDURE — 90791 PR PSYCHIATRIC DIAGNOSTIC EVALUATION: ICD-10-PCS | Mod: 95,,, | Performed by: PSYCHOLOGIST
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
## 2023-11-16 NOTE — LETTER
November 22, 2023        Kervin Jaquez MD  [address] [address] [address]             [facility] - Psychiatry  [address] [place] [id number]  Phone: [phone]  Fax: [phone]   Patient: Sherrill Avery   MR Number: [id number]   YOB: 1950   Date of Visit: 11/16/2023       Dear Dr. Jaquez:    Thank you for referring Sherrill Avery to me for evaluation. Below are the relevant portions of my assessment and plan of care.     Sherrill Avery is a 73 y.o. female referred by Dr. Jaquez for psychological evaluation and treatment.  Ms. Avery appears to be experiencing de[pressed mood and an increase in her baseline elevated level of anxiety due to coping with her diagnosis/treatment/prognosis. Patient will continue with Psychiatry for medication evaluation/management. She is amenable to CBT to address depression/anxiety/insomnia and will follow up with me for that purpose. She was also provided with self-help relaxation training resources. Patient may benefit from connection to other patients with cancer diagnoses and was provided with information about support groups and [id number]-mr-[id number] programs. She also appears to have some confusion/misunderstandings about her cancer and may benefit from additional teaching/explanation during future visits.      If you have questions, please do not hesitate to call me. I look forward to following Sherrill along with you.    Sincerely,      Viet Dorantes, PhD           CC  No Recipients

## 2023-11-21 ENCOUNTER — TELEPHONE (OUTPATIENT)
Dept: PSYCHIATRY | Facility: CLINIC | Age: 73
End: 2023-11-21
Payer: MEDICARE

## 2023-11-22 ENCOUNTER — PATIENT MESSAGE (OUTPATIENT)
Dept: PRIMARY CARE CLINIC | Facility: CLINIC | Age: 73
End: 2023-11-22
Payer: MEDICARE

## 2023-11-22 DIAGNOSIS — N18.9 CKD (CHRONIC KIDNEY DISEASE): ICD-10-CM

## 2023-11-22 DIAGNOSIS — E66.09 CLASS 1 OBESITY DUE TO EXCESS CALORIES WITH SERIOUS COMORBIDITY AND BODY MASS INDEX (BMI) OF 31.0 TO 31.9 IN ADULT: ICD-10-CM

## 2023-11-22 PROBLEM — F41.1 GENERALIZED ANXIETY DISORDER: Chronic | Status: ACTIVE | Noted: 2023-11-22

## 2023-11-22 RX ORDER — NALTREXONE HYDROCHLORIDE 50 MG/1
TABLET, FILM COATED ORAL
Qty: 15 TABLET | Refills: 0 | Status: SHIPPED | OUTPATIENT
Start: 2023-11-22 | End: 2023-12-28 | Stop reason: SDUPTHER

## 2023-11-22 RX ORDER — NALTREXONE HYDROCHLORIDE 50 MG/1
TABLET, FILM COATED ORAL
Qty: 15 TABLET | Refills: 0 | OUTPATIENT
Start: 2023-11-22

## 2023-11-22 NOTE — TELEPHONE ENCOUNTER
Care Due:                  Date            Visit Type   Department     Provider  --------------------------------------------------------------------------------                                EP -                              PRIMARY      [facility] INTERNAL  Last Visit: 11-      CARE ([facility])   VANCE Macias                              EP -                              PRIMARY      [facility] INTERNAL  Next Visit: 05-      [facility] ([facility])   VANCE Macias                                                            Last  Test          Frequency    Reason                     Performed    Due Date  --------------------------------------------------------------------------------    CBC.........  12 months..  diclofenac...............  Not Found    Overdue    CMP.........  12 months..  diclofenac, pravastatin..  10-   09-    TSH.........  12 months..  levothyroxine............  Not Found    Overdue    [employer] Embedded Care Due Messages. Reference number: [id number].   11/22/2023 9:29:25 AM CST

## 2023-12-04 ENCOUNTER — TELEPHONE (OUTPATIENT)
Dept: OBSTETRICS AND GYNECOLOGY | Facility: CLINIC | Age: 73
End: 2023-12-04

## 2023-12-04 ENCOUNTER — OFFICE VISIT (OUTPATIENT)
Dept: OBSTETRICS AND GYNECOLOGY | Facility: CLINIC | Age: 73
End: 2023-12-04
Payer: MEDICARE

## 2023-12-04 DIAGNOSIS — Z79.890 HORMONE REPLACEMENT THERAPY (HRT): Primary | ICD-10-CM

## 2023-12-04 PROCEDURE — 99213 OFFICE O/P EST LOW 20 MIN: CPT | Mod: 95,,, | Performed by: OBSTETRICS & GYNECOLOGY

## 2023-12-04 PROCEDURE — 99213 PR OFFICE/OUTPT VISIT, EST, LEVL III, 20-29 MIN: ICD-10-PCS | Mod: 95,,, | Performed by: OBSTETRICS & GYNECOLOGY

## 2023-12-04 RX ORDER — DIAZEPAM 5 MG/1
5 TABLET ORAL
COMMUNITY
Start: 2023-10-10

## 2023-12-04 RX ORDER — DOCUSATE SODIUM 100 MG/1
100 CAPSULE, LIQUID FILLED ORAL
COMMUNITY
Start: 2023-07-05

## 2023-12-04 RX ORDER — BUPROPION HYDROCHLORIDE 150 MG/1
150 TABLET ORAL EVERY MORNING
COMMUNITY
End: 2024-02-22

## 2023-12-04 RX ORDER — SENNOSIDES 8.6 MG/1
1 TABLET ORAL
COMMUNITY
Start: 2023-07-05

## 2023-12-04 RX ORDER — BLOOD SUGAR DIAGNOSTIC
STRIP MISCELLANEOUS
COMMUNITY
Start: 2023-01-17

## 2023-12-04 NOTE — TELEPHONE ENCOUNTER
----- Message from Yoko Mandel MD sent at 12/4/2023 12:41 PM CST -----  Please schedule in-office visit with me in 1-2 months.

## 2023-12-04 NOTE — PROGRESS NOTES
The patient location is: [place]  The chief complaint leading to consultation is: hormone replacement therapy    Visit type: audiovisual    Face to Face time with patient: 20 minutes  30 minutes of total time spent on the encounter, which includes face to face time and non-face to face time preparing to see the patient (eg, review of tests), Obtaining and/or reviewing separately obtained history, Documenting clinical information in the electronic or other health record, Independently interpreting results (not separately reported) and communicating results to the patient/family/caregiver, or Care coordination (not separately reported).         Each patient to whom he or she provides medical services by telemedicine is:  (1) informed of the relationship between the physician and patient and the respective role of any other health care provider with respect to management of the patient; and (2) notified that he or she may decline to receive medical services by telemedicine and may withdraw from such care at any time.    Notes:     Subjective:      Patient ID: Sherrill Avery is a 73 y.o. female.    Chief Complaint:  Consult      History of Present Illness  HPI  Presents via telemedicine for hormone replacement therapy.  Pt has hx of hysterectomy/LSO for benign indications, then subsequent RSO for hemorrhagic cyst.  Was established with Dr. Tran, and has been on estradiol 1mg HRT for years.  Pt has metastatic renal carcinoma, and has undergone treatment and follow-up for that with no VTE's.  Develops bothersome hot flushes if she stops hormones.  Ultimately wants to get off of them.  Pt has also been having some issues with possible prolapse and urinary dribbling.    GYN & OB History  No LMP recorded. Patient has had a hysterectomy.   Date of Last Pap: No result found    OB History    Para Term  AB Living   7 2 2   5 2   SAB IAB Ectopic Multiple Live Births   5       2      # Outcome Date GA Lbr Kevin/2nd  Weight Sex Delivery Anes PTL Lv   7 SAB            6 SAB            5 SAB            4 SAB            3 SAB            2 Term         JEFFERY   1 Term         JEFFERY       Review of Systems  Review of Systems       Objective:     Physical Exam:   Constitutional: She is oriented to person, place, and time. She appears well-developed and well-nourished. No distress.                           Neurological: She is alert and oriented to person, place, and time.     Psychiatric: She has a normal mood and affect. Her behavior is normal. Judgment and thought content normal.         Assessment:     1. Hormone replacement therapy (HRT)               Plan:     Sherrill was seen today for consult.    Diagnoses and all orders for this visit:    Hormone replacement therapy (HRT)     A full discussion of the benefit-risk ratio of hormonal replacement therapy was carried out. Improvement in vasomotor and other climacteric symptoms is discussed, including possible improvements in sleep and mood. Reduction of risk for osteoporosis was explained. We discussed the study data showing increased risk of thrombo-embolic events such as myocardial infarction, stroke and also possibly breast cancer with estrogen replacement, and how this might affect her. The range of side effects such as breast tenderness, weight gain and including possible increases in lifetime risk of breast cancer and possible thrombotic complications was discussed. We also discussed ACOG's recommendation to use hormone replacement therapy for the relief of hot flashes alone and to be on the lowest dose possible for the shortest amount of time.  Alternative such as herbal and soy-based products were reviewed. All of her questions about this therapy were answered.  Pt has essentially remained stable on estradiol 1mg with no VTE's since developing metastatic renal cancer.  Has bothersome hot flushes off ERT.  Discussed decreasing her dose to .5 mg and will see how she does.  If  tolerates that, can consider stopping estradiol completely.  RTC for in-office pelvic exam to address possible prolapse and incontinence issues.

## 2023-12-07 ENCOUNTER — HOSPITAL ENCOUNTER (OUTPATIENT)
Dept: RADIOLOGY | Facility: HOSPITAL | Age: 73
Discharge: HOME | End: 2023-12-07
Attending: FAMILY MEDICINE
Payer: MEDICARE

## 2023-12-07 DIAGNOSIS — Z12.31 ENCOUNTER FOR SCREENING MAMMOGRAM FOR MALIGNANT NEOPLASM OF BREAST: ICD-10-CM

## 2023-12-07 PROCEDURE — 77067 MAMMO DIGITAL SCREENING BILAT WITH TOMO: ICD-10-PCS | Mod: 26,,, | Performed by: RADIOLOGY

## 2023-12-07 PROCEDURE — 77067 SCR MAMMO BI INCL CAD: CPT | Mod: TC

## 2023-12-07 PROCEDURE — 77063 BREAST TOMOSYNTHESIS BI: CPT | Mod: 26,,, | Performed by: RADIOLOGY

## 2023-12-07 PROCEDURE — 77063 MAMMO DIGITAL SCREENING BILAT WITH TOMO: ICD-10-PCS | Mod: 26,,, | Performed by: RADIOLOGY

## 2023-12-07 PROCEDURE — 77067 SCR MAMMO BI INCL CAD: CPT | Mod: 26,,, | Performed by: RADIOLOGY

## 2023-12-08 ENCOUNTER — OFFICE VISIT (OUTPATIENT)
Dept: SPINE | Facility: CLINIC | Age: 73
End: 2023-12-08
Payer: MEDICARE

## 2023-12-08 DIAGNOSIS — M25.562 CHRONIC PAIN OF BOTH KNEES: Primary | ICD-10-CM

## 2023-12-08 DIAGNOSIS — M25.561 CHRONIC PAIN OF BOTH KNEES: Primary | ICD-10-CM

## 2023-12-08 DIAGNOSIS — G89.29 CHRONIC PAIN OF BOTH KNEES: Primary | ICD-10-CM

## 2023-12-08 DIAGNOSIS — G89.4 CHRONIC PAIN SYNDROME: ICD-10-CM

## 2023-12-08 PROCEDURE — 99213 OFFICE O/P EST LOW 20 MIN: CPT | Mod: 95,,, | Performed by: NURSE PRACTITIONER

## 2023-12-08 PROCEDURE — 99213 PR OFFICE/OUTPT VISIT, EST, LEVL III, 20-29 MIN: ICD-10-PCS | Mod: 95,,, | Performed by: NURSE PRACTITIONER

## 2023-12-08 NOTE — H&P (VIEW-ONLY)
Chronic Pain-Tele-Medicine-Established Note (Follow up visit)        The patient location is: Home  The chief complaint leading to consultation is: pain  Visit type: Virtual visit with synchronous audio and video  Total time spent with patient: 15 min  Each patient to whom he or she provides medical services by telemedicine is:  (1) informed of the relationship between the physician and patient and the respective role of any other health care provider with respect to management of the patient; and (2) notified that he or she may decline to receive medical services by telemedicine and may withdraw from such care at any time.      PCP: Alexandre Macias MD    REFERRING PHYSICIAN: No ref. provider found    CHIEF COMPLAINT: L knee pain s/p knee replacement    Original HISTORY OF PRESENT ILLNESS: Sherrill Avery w/ pmh of HTN, and renal cell carcinoma (kidney removed in 2013) s/p radiation in February 2022 due to METs which appeared in 2017 who presents to the clinic for the evaluation of the above pain. She had a knee replacement in June 2021, and has continued to have pain since the procedure. She states when she sits for an extended period of time and stands back up she experiences the pain around the bottom to lateral edge of her knee cap. She has a swollen area just below the L knee as well. She currently denies CP, SOB, vision changes, or speech changes.    Original Pain Description:  The pain is located in the L knee and does not radiate. The pain is described as sharp and stinging . Exacerbating factors: Sitting, Standing, Laying, Night Time, Getting out of bed/chair and going up stairs. Mitigating factors massage, medications, physical therapy, rest and sitting. Symptoms interfere with daily activity and sleeping. The patient feels like symptoms have been unchanged. Patient denies night fever/night sweats, urinary incontinence, bowel incontinence and significant motor weakness.    Original PAIN SCORES:  Best: Pain  is 0  Worst: Pain is 6  Usually: Pain is 4  Current: Pain is 2    INTERVAL HISTORY:     Interval History 9/21/22:  Mrs. Avery returns to clinic after left genicular RFA on 8/30/22. She reports significant pain relief and she is able to stand from a seated position and walk more comfortably. She is happy with the results and would like to have the same procedure on her right knee. She has had right knee pain for years and she has received multiple steroid injections which only provide about 2 weeks of pain relief. Pain is exacerbated by stairs, walking, and rising from a chair.  She denies any new weakness and numbness/tingling.     Interval History 12/13/2022:  The patient has a virtual follow up for right knee pain. She is now s/p right genicular RFA on 10/25/22 with 80% relief. She is still having benefit from left knee RFA. She is also reporting lower back pain . The pain is sharp in nature. It does not radiate. She has pain when she stands for a long time. She takes Gabapentin regularly with some benefit. She has completed PT in the past with some benefit. She did have a lumbar MRI in 2015.    Interval History 1/24/2023:  The patient returns for follow up of chronic bilateral knee and lower back pain. She has been having more left knee pain recently. She had genicular RFA 8/30/22 with significant benefit. However, she feels like the pain has started to return. She would like to repeat when possible. She still has some back pain with intermittent radiation across the back. She is taking Gabapentin 900 mg daily with some benefit and no side effects. Since previous encounter, she had a recent oncology 3 month follow up at at [facility]. She has a spot to the back and two to the lung which are being watched. She has a follow up in 3 months again. Her pain today is 4/10.    Interval History 4/4/2023:  The patient has a virtual follow up for bilateral knee pain. She is now s/p left genicular RFA with 80% relief. Her  left knee pain has significantly improved. She is feeling more pain on the right knee and lower back recently. She previously had excellent relief for right knee pain from RFA for 5 months. She would like to repeat the procedure. She is following up with [facility] next week to discuss back pain and possible radiation treatment. She stopped Gabapentin due to memory issues but her pain worsened. She restarted this week with one daily.    Interval History 6/13/2023:  The patient returns for virtual follow up for worsened knee pain, R>L. There was difficulty with her connection and the latter part of the visit was converted to audio. She has been having more sharp and aching right knee pain over the past month. It bothers her with standing and going down stairs. She has had benefit from both left and right genicular RFAs in the past. She would like to repeat the right knee RFA. She has tried stretching, PT exercises, ice and heat without benefit. She is also reporting right hand pain. No history or trauma. She would like an orthopedic referral. Her pain today is 6/10.    Interval History 12/8/2023:  The patient has a virtual appointment to discuss worsened bilateral knee pain, L>R. She has had significant benefit with genicular RFAs in the past and wishes to repeat. The pain bothers her more with standing and walking. She has some benefit with rest. Pain today is 7/10.    6 weeks of Conservative therapy (PT/Chiro/Home Exercises with Dates)  PT July 2021-September 2021  PT November 2021-December 2021  PT April 2022-May 2022     Treatments / Medications: (Ice/Heat/NSAIDS/APAP/etc):  Ice  Heat  Massage  PT  Voltaren Gel  Gabapentin     Report: N/A      Interventional Pain Procedures: (Previous injections)  L knee replacement  R knee steroid injection  10/11/22 Right genicular blocks- significant short term benefit  10/25/22 Right genicular cooled RFA- 80% relief  3/7/23 Left genicular cooled RFA- 80% relief    Past  Medical History:   Diagnosis Date    Anxiety     Family history of malignant melanoma 08/25/2014    Fibromyalgia affecting lower leg     GERD (gastroesophageal reflux disease)     Hx of psychiatric care     Hypercholesteremia     Hypertension     Hypothyroidism     Inflamed seborrheic keratosis 08/25/2014    Microscopic hematuria 02/02/2017    Multiple benign melanocytic nevi 08/25/2014    Renal cell carcinoma of right kidney metastatic to other site     Therapy      Past Surgical History:   Procedure Laterality Date    AUGMENTATION OF BREAST      bladder lift      breast implants      BREAST SURGERY Bilateral 1996    saline implants    COLONOSCOPY  2007    HYSTERECTOMY      ectopic pregnancy x 2, with LSO    KNEE ARTHROPLASTY Left 06/14/2021    Procedure: ARTHROPLASTY, KNEE:LEFT:DEPUY-SIGMA;  Surgeon: Osamr Avery III, MD;  Location: [facility] OR;  Service: Orthopedics;  Laterality: Left;    LAPAROSCOPIC NEPHRECTOMY, HAND ASSISTED  07/25/2013    LUNG REMOVAL, PARTIAL Left 2020    At [facility]    NEPHRECTOMY      OOPHORECTOMY Bilateral     RADIOFREQUENCY ABLATION Left 08/30/2022    Procedure: RADIOFREQUENCY ABLATION, LEFT KNEE COOLED;  Surgeon: Vijaya Landin MD;  Location: [facility] PAIN MGT;  Service: Pain Management;  Laterality: Left;    RADIOFREQUENCY ABLATION Right 10/25/2022    Procedure: RADIOFREQUENCY ABLATION RIGHT KNEE GENICULAR COOLED;  Surgeon: Vijaya Landin MD;  Location: [facility] PAIN [facility]GT;  Service: Pain Management;  Laterality: Right;    RADIOFREQUENCY ABLATION Left 03/07/2023    Procedure: RADIOFREQUENCY ABLATION LEFT GENICULAR COOLED RFA;  Surgeon: Vijaya Landin MD;  Location: [facility] PAIN [facility]GT;  Service: Pain Management;  Laterality: Left;    RADIOFREQUENCY ABLATION Right 08/01/2023    Procedure: RADIOFREQUENCY ABLATION, RIGHT GENICULAR COOLED;  Surgeon: Vijaya Landin MD;  Location: [facility] PAIN [facility]GT;  Service: Pain Management;  Laterality: Right;    right ganglion cyst      throat polyp       TRANSOBTURATOR SLING  2011    with RSO for persistent complex cyst & MARGOT; done by yris    UPPER GASTROINTESTINAL ENDOSCOPY  2007     Social History     Socioeconomic History    Marital status:      Spouse name: KAIDEN    Number of children: 5   Occupational History    Occupation: retired     Comment: Dance Instructor   Tobacco Use    Smoking status: Never    Smokeless tobacco: Never   Substance and Sexual Activity    Alcohol use: Yes     Alcohol/week: 0.0 standard drinks of alcohol     Comment: social    Drug use: No    Sexual activity: Yes     Partners: Male     Birth control/protection: Surgical   Social History Narrative    Patient is a retired dance instructor and live with . Has stairs at home 12- has an elevator     Family History   Problem Relation Age of Onset    Diabetes Mother     Hypertension Mother     Heart disease Mother     Cancer Father         lung    Migraines Father     Glaucoma Paternal Grandmother     Glaucoma Sister     Thyroid disease Neg Hx     Asthma Neg Hx        Review of patient's allergies indicates:   Allergen Reactions    Talwin compound Anxiety     hallucinations/anxiety    Tramadol Itching    Buspirone Anxiety    Codeine Itching    Morphine Itching     jittery    Morpholine analogues Anxiety and Itching    Naproxen Itching     Takes Ibuprofen is ok on rare occasion     Nexium [esomeprazole magnesium] Other (See Comments)     constipation    Wal-phed [pseudoephedrine hcl] Itching       Current Outpatient Medications   Medication Sig    ALPRAZolam (XANAX) 0.5 MG tablet Take daily as needed for anxiety.    buPROPion (WELLBUTRIN XL) 150 MG TB24 tablet Take 150 mg by mouth every morning.    butalbital-acetaminophen-caffeine -40 mg (FIORICET, ESGIC) -40 mg per tablet TAKE 1 TABLET EVERY 4 HOURS AS NEEDED    clotrimazole (LOTRIMIN) 1 % Soln APPLY TOPICALLY 2 TIMES DAILY FOR 7 DAYS    COVID-19 antigen test ([facility] COVID-19 AG HOME TEST) Kit use as directed     diazePAM (VALIUM) 5 MG tablet Take 5 mg by mouth.    diclofenac sodium (VOLTAREN) 1 % Gel Apply 2 g topically daily as needed.    diflorasone-emollient (APEXICON E) 0.05 % Crea Apply 1 application topically once daily. for 7 days    docusate sodium (COLACE) 100 MG capsule Take 100 mg by mouth.    erythromycin (ROMYCIN) ophthalmic ointment Apply ointment to lids and lashes two times daily for 7 days.    estradioL (ESTRACE) 1 MG tablet Take 1 tablet (1 mg total) by mouth once daily.    famotidine (PEPCID) 20 MG tablet Take 20 mg by mouth.    fluocinonide (LIDEX) 0.05 % external solution Apply topically 2 (two) times daily. Do not use morning of surgery    hydrocortisone 2.5 % cream Apply topically 2 (two) times daily. apply to affected area for 7 days    ketoconazole (NIZORAL) 2 % shampoo Do not use morning of surgery    levothyroxine (SYNTHROID) 112 MCG tablet Take 1 tablet (112 mcg total) by mouth before breakfast. TAKE 1 TABLET (112 MCG TOTAL) BY MOUTH BEFORE BREAKFAST AS SCHEDULED Strength: 112 mcg    metronidazole 1% (METROGEL) 1 % Gel Apply topically once daily.    naltrexone (DEPADE) 50 mg tablet 1/4 pill twice daily    omeprazole (PRILOSEC) 10 MG capsule Take 10 mg by mouth.    ondansetron (ZOFRAN) 4 MG tablet     oxyCODONE-acetaminophen (PERCOCET) 5-325 mg per tablet Take 1 tablet by mouth every 6 (six) hours as needed for Pain.    oxymetazoline (AFRIN) 0.05 % nasal spray 2 sprays by Nasal route 2 (two) times daily.    pravastatin (PRAVACHOL) 20 MG tablet TAKE 1 TABLET EVERY DAY    senna (SENOKOT) 8.6 mg tablet Take 1 tablet by mouth.    zolpidem (AMBIEN) 5 MG Tab Take 1 tablet (5 mg total) by mouth nightly as needed.     No current facility-administered medications for this visit.     Facility-Administered Medications Ordered in Other Visits   Medication    triamcinolone acetonide injection 40 mg       ROS:  GENERAL: No fever. No chills. No fatigue. Denies weight loss. Denies weight gain.  HEENT: Denies  headaches. Denies vision change. Denies eye pain. Denies double vision. Denies ear pain.   CV: Denies chest pain.   PULM: Denies of shortness of breath.  GI: Denies constipation. No diarrhea. No abdominal pain. Denies nausea. Denies vomiting. No blood in stool.  HEME: Denies bleeding problems.  : Denies urgency. No painful urination. No blood in urine.  MS: Denies joint stiffness. Denies joint swelling.  Denies back pain.  SKIN: Denies rash.   NEURO: Denies seizures. No weakness.  PSYCH:  Denies difficulty sleeping. No anxiety. Denies depression. No suicidal thoughts.     PHYSICAL EXAMINATION:    General appearance: Well appearing, in no acute distress, alert and oriented x3.  Psych:  Mood and affect appropriate.  Skin: Skin color normal, no rashes or lesions, in both upper and lower body.  Extremities: Moves all visualized extremities freely.    PREVIOUS PHYSICAL EXAM:   GENERAL: Well appearing, in no acute distress, alert and oriented x3.  PSYCH:  Mood and affect appropriate.  SKIN: Normal turgor and coloration.  HEENT:  Normocephalic, atraumatic. Cranial nerves grossly intact.  NECK: No pain to palpation over the cervical paraspinous muscles. No pain to palpation over facets. No pain with neck flexion, extension, or lateral flexion.   PULM: No evidence of respiratory difficulty, symmetric chest rise.  GI:  Non-distended  BACK: Normal range of motion. No pain to palpation over the spinous processes. No pain to palpation over facet joints. There is no pain with palpation over the sacroiliac joints bilaterally. Straight leg raising in the supine position is negative to radicular pain.   EXTREMITIES: +ttp left medial joint line and pain with patella ROM. Swollen 3 x 3 inch area just below L patella.   MUSCULOSKELETAL:  Bilateral lower extremity strength is normal and symmetric, with some pain limitation with movement of L knee joint. No atrophy is noted.  NEURO: No change in sensation.  GAIT: Antalgic and  slow.    IMAGING:      X- Ray knee ortho BL 5/17/22    Narrative & Impression  EXAMINATION:  XR KNEE ORTHO BILAT WITH FLEXION     CLINICAL HISTORY:  Presence of left artificial knee joint     TECHNIQUE:  AP standing of both knees, PA flexion standing views of both knees, and Merchant views of both knees were performed.  Lateral views of both knees were also performed.     COMPARISON:  No 07/27/2021 ne     FINDINGS:  Left knee arthroplasty identified.  The position and alignment is satisfactory and unchanged as compared to the previous study.     DJD involving the right knee with narrowing of the patellofemoral and the medial tibiofemoral joint space.  No fracture or bone destruction identified     Impression:  Patient with symptoms, imaging, and PE consistent with:    1. Chronic pain of both knees  Procedure Order to Pain Management    Procedure Order to Pain Management      2. Chronic pain syndrome                ASSESSMENT: 73 y.o. year old female w/ pmh of renal cell carcinoma (kidney removed in 2013) s/p radiation in February 2022 due to METs which appeared in 2017 with pain, consistent with and chronic right knee pain secondary to osteoarthritis.    PLAN:  Schedule for repeat left genicular cooled RFA. Also will schedule for right side after 2/1/24.  Continue current medications.  The patient will continue a home exercise routine to help with pain and strengthening.    RTC 4 weeks after procedure.      The above plan and management options were discussed at length with patient. Patient is in agreement with the above and verbalized understanding.     Dona Arteaga, SAMAN  12/08/2023

## 2023-12-08 NOTE — PROGRESS NOTES
Chronic Pain-Tele-Medicine-Established Note (Follow up visit)        The patient location is: Home  The chief complaint leading to consultation is: pain  Visit type: Virtual visit with synchronous audio and video  Total time spent with patient: 15 min  Each patient to whom he or she provides medical services by telemedicine is:  (1) informed of the relationship between the physician and patient and the respective role of any other health care provider with respect to management of the patient; and (2) notified that he or she may decline to receive medical services by telemedicine and may withdraw from such care at any time.      PCP: Alexandre Macias MD    REFERRING PHYSICIAN: No ref. provider found    CHIEF COMPLAINT: L knee pain s/p knee replacement    Original HISTORY OF PRESENT ILLNESS: Sherrill Avery w/ pmh of HTN, and renal cell carcinoma (kidney removed in 2013) s/p radiation in February 2022 due to METs which appeared in 2017 who presents to the clinic for the evaluation of the above pain. She had a knee replacement in June 2021, and has continued to have pain since the procedure. She states when she sits for an extended period of time and stands back up she experiences the pain around the bottom to lateral edge of her knee cap. She has a swollen area just below the L knee as well. She currently denies CP, SOB, vision changes, or speech changes.    Original Pain Description:  The pain is located in the L knee and does not radiate. The pain is described as sharp and stinging . Exacerbating factors: Sitting, Standing, Laying, Night Time, Getting out of bed/chair and going up stairs. Mitigating factors massage, medications, physical therapy, rest and sitting. Symptoms interfere with daily activity and sleeping. The patient feels like symptoms have been unchanged. Patient denies night fever/night sweats, urinary incontinence, bowel incontinence and significant motor weakness.    Original PAIN SCORES:  Best: Pain  is 0  Worst: Pain is 6  Usually: Pain is 4  Current: Pain is 2    INTERVAL HISTORY:     Interval History 9/21/22:  Mrs. Avery returns to clinic after left genicular RFA on 8/30/22. She reports significant pain relief and she is able to stand from a seated position and walk more comfortably. She is happy with the results and would like to have the same procedure on her right knee. She has had right knee pain for years and she has received multiple steroid injections which only provide about 2 weeks of pain relief. Pain is exacerbated by stairs, walking, and rising from a chair.  She denies any new weakness and numbness/tingling.     Interval History 12/13/2022:  The patient has a virtual follow up for right knee pain. She is now s/p right genicular RFA on 10/25/22 with 80% relief. She is still having benefit from left knee RFA. She is also reporting lower back pain . The pain is sharp in nature. It does not radiate. She has pain when she stands for a long time. She takes Gabapentin regularly with some benefit. She has completed PT in the past with some benefit. She did have a lumbar MRI in 2015.    Interval History 1/24/2023:  The patient returns for follow up of chronic bilateral knee and lower back pain. She has been having more left knee pain recently. She had genicular RFA 8/30/22 with significant benefit. However, she feels like the pain has started to return. She would like to repeat when possible. She still has some back pain with intermittent radiation across the back. She is taking Gabapentin 900 mg daily with some benefit and no side effects. Since previous encounter, she had a recent oncology 3 month follow up at at [facility]. She has a spot to the back and two to the lung which are being watched. She has a follow up in 3 months again. Her pain today is 4/10.    Interval History 4/4/2023:  The patient has a virtual follow up for bilateral knee pain. She is now s/p left genicular RFA with 80% relief. Her  left knee pain has significantly improved. She is feeling more pain on the right knee and lower back recently. She previously had excellent relief for right knee pain from RFA for 5 months. She would like to repeat the procedure. She is following up with [facility] next week to discuss back pain and possible radiation treatment. She stopped Gabapentin due to memory issues but her pain worsened. She restarted this week with one daily.    Interval History 6/13/2023:  The patient returns for virtual follow up for worsened knee pain, R>L. There was difficulty with her connection and the latter part of the visit was converted to audio. She has been having more sharp and aching right knee pain over the past month. It bothers her with standing and going down stairs. She has had benefit from both left and right genicular RFAs in the past. She would like to repeat the right knee RFA. She has tried stretching, PT exercises, ice and heat without benefit. She is also reporting right hand pain. No history or trauma. She would like an orthopedic referral. Her pain today is 6/10.    Interval History 12/8/2023:  The patient has a virtual appointment to discuss worsened bilateral knee pain, L>R. She has had significant benefit with genicular RFAs in the past and wishes to repeat. The pain bothers her more with standing and walking. She has some benefit with rest. Pain today is 7/10.    6 weeks of Conservative therapy (PT/Chiro/Home Exercises with Dates)  PT July 2021-September 2021  PT November 2021-December 2021  PT April 2022-May 2022     Treatments / Medications: (Ice/Heat/NSAIDS/APAP/etc):  Ice  Heat  Massage  PT  Voltaren Gel  Gabapentin     Report: N/A      Interventional Pain Procedures: (Previous injections)  L knee replacement  R knee steroid injection  10/11/22 Right genicular blocks- significant short term benefit  10/25/22 Right genicular cooled RFA- 80% relief  3/7/23 Left genicular cooled RFA- 80% relief    Past  Medical History:   Diagnosis Date    Anxiety     Family history of malignant melanoma 08/25/2014    Fibromyalgia affecting lower leg     GERD (gastroesophageal reflux disease)     Hx of psychiatric care     Hypercholesteremia     Hypertension     Hypothyroidism     Inflamed seborrheic keratosis 08/25/2014    Microscopic hematuria 02/02/2017    Multiple benign melanocytic nevi 08/25/2014    Renal cell carcinoma of right kidney metastatic to other site     Therapy      Past Surgical History:   Procedure Laterality Date    AUGMENTATION OF BREAST      bladder lift      breast implants      BREAST SURGERY Bilateral 1996    saline implants    COLONOSCOPY  2007    HYSTERECTOMY      ectopic pregnancy x 2, with LSO    KNEE ARTHROPLASTY Left 06/14/2021    Procedure: ARTHROPLASTY, KNEE:LEFT:DEPUY-SIGMA;  Surgeon: Osmar Avery III, MD;  Location: [facility] OR;  Service: Orthopedics;  Laterality: Left;    LAPAROSCOPIC NEPHRECTOMY, HAND ASSISTED  07/25/2013    LUNG REMOVAL, PARTIAL Left 2020    At [facility]    NEPHRECTOMY      OOPHORECTOMY Bilateral     RADIOFREQUENCY ABLATION Left 08/30/2022    Procedure: RADIOFREQUENCY ABLATION, LEFT KNEE COOLED;  Surgeon: Vijaya Landin MD;  Location: [facility], [place], operated by [facility];  Service: Pain Management;  Laterality: Left;    RADIOFREQUENCY ABLATION Right 10/25/2022    Procedure: RADIOFREQUENCY ABLATION RIGHT KNEE GENICULAR COOLED;  Surgeon: Vijaya Landin MD;  Location: [facility], [place], operated by [facility];  Service: Pain Management;  Laterality: Right;    RADIOFREQUENCY ABLATION Left 03/07/2023    Procedure: RADIOFREQUENCY ABLATION LEFT GENICULAR COOLED RFA;  Surgeon: Vijaya Landin MD;  Location: [facility], [place], operated by [facility];  Service: Pain Management;  Laterality: Left;    RADIOFREQUENCY ABLATION Right 08/01/2023    Procedure: RADIOFREQUENCY ABLATION, RIGHT GENICULAR COOLED;  Surgeon: Vijaya Landin MD;  Location: [facility], [place], operated by [facility];  Service: Pain Management;  Laterality: Right;    right ganglion cyst      throat polyp       TRANSOBTURATOR SLING  2011    with RSO for persistent complex cyst & MARGOT; done by yris    UPPER GASTROINTESTINAL ENDOSCOPY  2007     Social History     Socioeconomic History    Marital status:      Spouse name: KAIDEN    Number of children: 5   Occupational History    Occupation: retired     Comment: Dance Instructor   Tobacco Use    Smoking status: Never    Smokeless tobacco: Never   Substance and Sexual Activity    Alcohol use: Yes     Alcohol/week: 0.0 standard drinks of alcohol     Comment: social    Drug use: No    Sexual activity: Yes     Partners: Male     Birth control/protection: Surgical   Social History Narrative    Patient is a retired dance instructor and live with . Has stairs at home 12- has an elevator     Family History   Problem Relation Age of Onset    Diabetes Mother     Hypertension Mother     Heart disease Mother     Cancer Father         lung    Migraines Father     Glaucoma Paternal Grandmother     Glaucoma Sister     Thyroid disease Neg Hx     Asthma Neg Hx        Review of patient's allergies indicates:   Allergen Reactions    Talwin compound Anxiety     hallucinations/anxiety    Tramadol Itching    Buspirone Anxiety    Codeine Itching    Morphine Itching     jittery    Morpholine analogues Anxiety and Itching    Naproxen Itching     Takes Ibuprofen is ok on rare occasion     Nexium [esomeprazole magnesium] Other (See Comments)     constipation    Wal-phed [pseudoephedrine hcl] Itching       Current Outpatient Medications   Medication Sig    ALPRAZolam (XANAX) 0.5 MG tablet Take daily as needed for anxiety.    buPROPion (WELLBUTRIN XL) 150 MG TB24 tablet Take 150 mg by mouth every morning.    butalbital-acetaminophen-caffeine -40 mg (FIORICET, ESGIC) -40 mg per tablet TAKE 1 TABLET EVERY 4 HOURS AS NEEDED    clotrimazole (LOTRIMIN) 1 % Soln APPLY TOPICALLY 2 TIMES DAILY FOR 7 DAYS    COVID-19 antigen test ([facility] COVID-19 AG HOME TEST) Kit use as directed     diazePAM (VALIUM) 5 MG tablet Take 5 mg by mouth.    diclofenac sodium (VOLTAREN) 1 % Gel Apply 2 g topically daily as needed.    diflorasone-emollient (APEXICON E) 0.05 % Crea Apply 1 application topically once daily. for 7 days    docusate sodium (COLACE) 100 MG capsule Take 100 mg by mouth.    erythromycin (ROMYCIN) ophthalmic ointment Apply ointment to lids and lashes two times daily for 7 days.    estradioL (ESTRACE) 1 MG tablet Take 1 tablet (1 mg total) by mouth once daily.    famotidine (PEPCID) 20 MG tablet Take 20 mg by mouth.    fluocinonide (LIDEX) 0.05 % external solution Apply topically 2 (two) times daily. Do not use morning of surgery    hydrocortisone 2.5 % cream Apply topically 2 (two) times daily. apply to affected area for 7 days    ketoconazole (NIZORAL) 2 % shampoo Do not use morning of surgery    levothyroxine (SYNTHROID) 112 MCG tablet Take 1 tablet (112 mcg total) by mouth before breakfast. TAKE 1 TABLET (112 MCG TOTAL) BY MOUTH BEFORE BREAKFAST AS SCHEDULED Strength: 112 mcg    metronidazole 1% (METROGEL) 1 % Gel Apply topically once daily.    naltrexone (DEPADE) 50 mg tablet 1/4 pill twice daily    omeprazole (PRILOSEC) 10 MG capsule Take 10 mg by mouth.    ondansetron (ZOFRAN) 4 MG tablet     oxyCODONE-acetaminophen (PERCOCET) 5-325 mg per tablet Take 1 tablet by mouth every 6 (six) hours as needed for Pain.    oxymetazoline (AFRIN) 0.05 % nasal spray 2 sprays by Nasal route 2 (two) times daily.    pravastatin (PRAVACHOL) 20 MG tablet TAKE 1 TABLET EVERY DAY    senna (SENOKOT) 8.6 mg tablet Take 1 tablet by mouth.    zolpidem (AMBIEN) 5 MG Tab Take 1 tablet (5 mg total) by mouth nightly as needed.     No current facility-administered medications for this visit.     Facility-Administered Medications Ordered in Other Visits   Medication    triamcinolone acetonide injection 40 mg       ROS:  GENERAL: No fever. No chills. No fatigue. Denies weight loss. Denies weight gain.  HEENT: Denies  headaches. Denies vision change. Denies eye pain. Denies double vision. Denies ear pain.   CV: Denies chest pain.   PULM: Denies of shortness of breath.  GI: Denies constipation. No diarrhea. No abdominal pain. Denies nausea. Denies vomiting. No blood in stool.  HEME: Denies bleeding problems.  : Denies urgency. No painful urination. No blood in urine.  MS: Denies joint stiffness. Denies joint swelling.  Denies back pain.  SKIN: Denies rash.   NEURO: Denies seizures. No weakness.  PSYCH:  Denies difficulty sleeping. No anxiety. Denies depression. No suicidal thoughts.     PHYSICAL EXAMINATION:    General appearance: Well appearing, in no acute distress, alert and oriented x3.  Psych:  Mood and affect appropriate.  Skin: Skin color normal, no rashes or lesions, in both upper and lower body.  Extremities: Moves all visualized extremities freely.    PREVIOUS PHYSICAL EXAM:   GENERAL: Well appearing, in no acute distress, alert and oriented x3.  PSYCH:  Mood and affect appropriate.  SKIN: Normal turgor and coloration.  HEENT:  Normocephalic, atraumatic. Cranial nerves grossly intact.  NECK: No pain to palpation over the cervical paraspinous muscles. No pain to palpation over facets. No pain with neck flexion, extension, or lateral flexion.   PULM: No evidence of respiratory difficulty, symmetric chest rise.  GI:  Non-distended  BACK: Normal range of motion. No pain to palpation over the spinous processes. No pain to palpation over facet joints. There is no pain with palpation over the sacroiliac joints bilaterally. Straight leg raising in the supine position is negative to radicular pain.   EXTREMITIES: +ttp left medial joint line and pain with patella ROM. Swollen 3 x 3 inch area just below L patella.   MUSCULOSKELETAL:  Bilateral lower extremity strength is normal and symmetric, with some pain limitation with movement of L knee joint. No atrophy is noted.  NEURO: No change in sensation.  GAIT: Antalgic and  slow.    IMAGING:      X- Ray knee ortho BL 5/17/22    Narrative & Impression  EXAMINATION:  XR KNEE ORTHO BILAT WITH FLEXION     CLINICAL HISTORY:  Presence of left artificial knee joint     TECHNIQUE:  AP standing of both knees, PA flexion standing views of both knees, and Merchant views of both knees were performed.  Lateral views of both knees were also performed.     COMPARISON:  No 07/27/2021 ne     FINDINGS:  Left knee arthroplasty identified.  The position and alignment is satisfactory and unchanged as compared to the previous study.     DJD involving the right knee with narrowing of the patellofemoral and the medial tibiofemoral joint space.  No fracture or bone destruction identified     Impression:  Patient with symptoms, imaging, and PE consistent with:    1. Chronic pain of both knees  Procedure Order to Pain Management    Procedure Order to Pain Management      2. Chronic pain syndrome                ASSESSMENT: 73 y.o. year old female w/ pmh of renal cell carcinoma (kidney removed in 2013) s/p radiation in February 2022 due to METs which appeared in 2017 with pain, consistent with and chronic right knee pain secondary to osteoarthritis.    PLAN:  Schedule for repeat left genicular cooled RFA. Also will schedule for right side after 2/1/24.  Continue current medications.  The patient will continue a home exercise routine to help with pain and strengthening.    RTC 4 weeks after procedure.      The above plan and management options were discussed at length with patient. Patient is in agreement with the above and verbalized understanding.     Dona Arteaga, SAMAN  12/08/2023

## 2023-12-12 ENCOUNTER — OFFICE VISIT (OUTPATIENT)
Dept: PSYCHIATRY | Facility: CLINIC | Age: 73
End: 2023-12-12
Payer: MEDICARE

## 2023-12-12 DIAGNOSIS — C80.1 CLEAR CELL CARCINOMA: ICD-10-CM

## 2023-12-12 DIAGNOSIS — R91.8 MULTIPLE LUNG NODULES ON CT: Chronic | ICD-10-CM

## 2023-12-12 DIAGNOSIS — C64.9 CARCINOMA OF KIDNEY, UNSPECIFIED LATERALITY: ICD-10-CM

## 2023-12-12 DIAGNOSIS — F41.1 GENERALIZED ANXIETY DISORDER: Primary | Chronic | ICD-10-CM

## 2023-12-12 PROCEDURE — 90834 PSYTX W PT 45 MINUTES: CPT | Mod: 95,,, | Performed by: PSYCHOLOGIST

## 2023-12-12 PROCEDURE — 90834 PR PSYCHOTHERAPY W/PATIENT, 45 MIN: ICD-10-PCS | Mod: 95,,, | Performed by: PSYCHOLOGIST

## 2023-12-12 NOTE — PROGRESS NOTES
Telemedicine PSYCHO-ONCOLOGY NOTE/ Individual Psychotherapy     Consultation started: 12/12/2023 at 359 PM   The chief complaint leading to consultation is: adaptation to disease and treatment, anxiety  The patient location is: Patient home in [place], [place]  Virtual visit with synchronous audio and video    Each patient provided medical services by telemedicine is:  (1) informed of the relationship between the physician and patient and the respective role of any other health care provider with respect to management of the patient; and (2) notified that he or she may decline to receive medical services by telemedicine and may withdraw from such care at any time.    Crisis Disclaimer: Patient was informed that due to the virtual nature of the visit, that if a crisis develops, protocols will be implemented to ensure patient safety, including but not limited to: 1) Initiating a welfare check with local Law Enforcement, 2) Calling 1/National Crisis Hotline, and/or 3) Initiating PEC/CEC procedures.     Date: 12/12/2023   Site of therapist:  [facility]         Therapeutic Intervention: Met with patient.   Outpatient - Behavior modifying psychotherapy 45 min - CPT code 38792    Referring provider:  Leonid    Chief complaint/reason for encounter: anxiety   Met with patient to evaluate psychosocial adaptation to survivorship of clear cell cancer with metastases     Patient was last seen by me on 11/16/2023    Medical History:  Patient Active Problem List   Diagnosis    GERD (gastroesophageal reflux disease)    Hypothyroidism    Nontoxic multinodular goiter    Primary osteoarthritis involving multiple joints    Fibromyalgia    Actinic degeneration    Dermatofibroma    Bilateral sensorineural hearing loss    Hypertension    Hypercholesteremia    Kidney carcinoma    Neuropathy of left sural nerve-mild    Vitamin D deficiency    Anxiety    Chronic insomnia    Multiple lung nodules on CT    Clear cell carcinoma  with lung metastasis    Subacromial bursitis    It band syndrome, left    Chronic pain of both knees    Chondromalacia, patella, right    Chondromalacia, patella, left    Lumbar radiculopathy    Allergic rhinitis    CKD (chronic kidney disease)    Hormone replacement therapy (HRT)    Osteoarthritis of left knee    Chronic knee pain after total replacement of left knee joint    Functional gait abnormality    Neck pain    Primary osteoarthritis of both knees    Primary osteoarthritis of right knee    Leg weakness    Acute neck pain    Class 1 obesity due to excess calories with serious comorbidity and body mass index (BMI) of 31.0 to 31.9 in adult    Generalized anxiety disorder       Objective:      Sherrill Avery arrived promptly for the session (by video).  Ms. Avery was independently ambulatory at the time of session. The patient was fully cooperative throughout the session.  Appearance: age appropriate, casually  dressed, well groomed  Behavior/Cooperation: friendly and cooperative  Speech: normal in rate, volume, and tone and appropriate quality, quantity and organization of sentences  Mood: anxious  Affect: anxious  Thought Process: goal-directed, logical  Thought Content: normal,  No delusions or paranoia; did not appear to be responding to internal stimuli during the session  Orientation: grossly intact  Memory: Grossly intact  Attention Span/Concentration: Attends to session without distraction; reports no difficulty  Fund of Knowledge: average  Estimate of Intelligence: average from verbal skills and history  Cognition: grossly intact  Insight: patient has awareness of illness; good insight into own behavior and behavior of others  Judgment: the patient's behavior is adequate to circumstances    Current Outpatient Medications   Medication    ALPRAZolam (XANAX) 0.5 MG tablet    buPROPion (WELLBUTRIN XL) 150 MG TB24 tablet    butalbital-acetaminophen-caffeine -40 mg (FIORICET, ESGIC) -40 mg per  tablet    clotrimazole (LOTRIMIN) 1 % Soln    COVID-19 antigen test ([facility] COVID-19 AG HOME TEST) Kit    diazePAM (VALIUM) 5 MG tablet    diclofenac sodium (VOLTAREN) 1 % Gel    diflorasone-emollient (APEXICON E) 0.05 % Crea    docusate sodium (COLACE) 100 MG capsule    erythromycin (ROMYCIN) ophthalmic ointment    estradioL (ESTRACE) 1 MG tablet    famotidine (PEPCID) 20 MG tablet    fluocinonide (LIDEX) 0.05 % external solution    hydrocortisone 2.5 % cream    ketoconazole (NIZORAL) 2 % shampoo    levothyroxine (SYNTHROID) 112 MCG tablet    metronidazole 1% (METROGEL) 1 % Gel    naltrexone (DEPADE) 50 mg tablet    omeprazole (PRILOSEC) 10 MG capsule    ondansetron (ZOFRAN) 4 MG tablet    oxyCODONE-acetaminophen (PERCOCET) 5-325 mg per tablet    oxymetazoline (AFRIN) 0.05 % nasal spray    pravastatin (PRAVACHOL) 20 MG tablet    senna (SENOKOT) 8.6 mg tablet    zolpidem (AMBIEN) 5 MG Tab     No current facility-administered medications for this visit.     Facility-Administered Medications Ordered in Other Visits   Medication Frequency    triamcinolone acetonide injection 40 mg        Interval history and content of current session: Patient discussed events and activities since the time of last visit. Discussed struggles with breathing/relaxation exercises. Taught box breathing.    Cognitive Behavioral Therapy for Anxiety (CBT-A), Session #1   Session Focus:  Normal Worry vs. Generalized Anxiety  Why It's Important to Record  Record Keeping              Worry Record              Daily Mood Record                 Practice Assignment:  Monitor anxiety episodes and daily anxiety using the Worry Record and the Daily Mood Record.  Daily box breathing while coffee drips       Risk parameters:   Patient reports no suicidal ideation  Patient reports no homicidal ideation  Patient reports no self-injurious behavior  Patient reports no violent behavior   Safety needs:  None at this time      Verbal deficits:  None     Patient's response to intervention:The patient's response to intervention is accepting, motivated.     Progress to date:Progress as Expected      Goals from last visit: Attempted, partially met      Patient reported outcomes:      Distress Thermometer:   Distress Score    Distress Score: (P) 4        Practical Problems Physical Problems                                                   Family Problems                                         Emotional Problems                                                         Spiritual/Religions Concerns     Spiritual / Shinto Concerns: (P) No         Other Problems    Other Problems: (P) I get angry too fast.         PHQ-9=  Initial visit: 11    PHQ ANSWERS    Q1. Little interest or pleasure in doing things: (P) More than half the days (12/12/23 0756)  Q2. Feeling down, depressed, or hopeless: (P) More than half the days (12/12/23 0756)  Q3. Trouble falling or staying asleep, or sleeping too much: (P) More than half the days (12/12/23 0756)  Q4. Feeling tired or having little energy: (P) More than half the days (12/12/23 0756)  Q5. Poor appetite or overeating: (P) Several days (12/12/23 0756)  Q6. Feeling bad about yourself - or that you are a failure or have let yourself or your family down: (P) Several days (12/12/23 0756)  Q7. Trouble concentrating on things, such as reading the newspaper or watching television: (P) Nearly every day (12/12/23 0756)  Q8. Moving or speaking so slowly that other people could have noticed. Or the opposite - being so fidgety or restless that you have been moving around a lot more than usual: (P) Several days (12/12/23 0756)  Q9.      PHQ8 Score : (P) 14 (12/12/23 0756)  PHQ-9 Total Score: (P) 14 (12/12/23 0756)        JULIAN-7= Initial visit: 14         12/12/2023     7:50 AM   GAD7   1. Feeling nervous, anxious, or on edge? 1   2. Not being able to stop or control worrying? 2   3. Worrying too much about different things? 2   4.  Trouble relaxing? 2   5. Being so restless that it is hard to sit still? 1   6. Becoming easily annoyed or irritable? 3   7. Feeling afraid as if something awful might happen? 1   JULIAN-7 Score 12         Client Strengths: verbal, intelligent, successful, good social support, good insight, commitment to wellness, strong leonel, strong cultural traditions      Treatment Plan:individual psychotherapy  Target symptoms: anxiety , adjustment  Why chosen therapy is appropriate versus another modality: relevant to diagnosis, patient responds to this modality, evidence based practice  Outcome monitoring methods: self-report, checklist/rating scale  Therapeutic intervention type: insight oriented psychotherapy, behavior modifying psychotherapy, supportive psychotherapy  Prognosis: Good      Behavioral goals: above    Return to clinic: 1 week     Length of Service (minutes direct face-to-face contact): 45      ICD-10-CM ICD-9-CM   1. Generalized anxiety disorder  F41.1 300.02   2. Multiple lung nodules on CT  R91.8 793.19   3. Carcinoma of kidney, unspecified laterality  C64.9 189.0   4. Clear cell carcinoma with lung metastasis  C80.1 199.1         Viet Dorantes, PhD  [place] License #[id number]  [place] License #[id number]  [place] License #[id number]

## 2023-12-13 ENCOUNTER — PATIENT MESSAGE (OUTPATIENT)
Dept: PAIN MEDICINE | Facility: OTHER | Age: 73
End: 2023-12-13
Payer: MEDICARE

## 2023-12-26 ENCOUNTER — HOSPITAL ENCOUNTER (OUTPATIENT)
Facility: OTHER | Age: 73
Discharge: HOME | End: 2023-12-26
Attending: ANESTHESIOLOGY | Admitting: ANESTHESIOLOGY
Payer: MEDICARE

## 2023-12-26 VITALS
RESPIRATION RATE: 16 BRPM | DIASTOLIC BLOOD PRESSURE: 74 MMHG | BODY MASS INDEX: 31.99 KG/M2 | HEART RATE: 78 BPM | TEMPERATURE: 98 F | OXYGEN SATURATION: 98 % | WEIGHT: 192 LBS | HEIGHT: 65 IN | SYSTOLIC BLOOD PRESSURE: 150 MMHG

## 2023-12-26 DIAGNOSIS — G89.29 CHRONIC PAIN OF BOTH KNEES: ICD-10-CM

## 2023-12-26 DIAGNOSIS — M25.561 CHRONIC PAIN OF BOTH KNEES: ICD-10-CM

## 2023-12-26 DIAGNOSIS — M17.0 PRIMARY OSTEOARTHRITIS OF BOTH KNEES: ICD-10-CM

## 2023-12-26 DIAGNOSIS — M17.11 PRIMARY OSTEOARTHRITIS OF RIGHT KNEE: Primary | ICD-10-CM

## 2023-12-26 DIAGNOSIS — G89.29 CHRONIC PAIN: ICD-10-CM

## 2023-12-26 DIAGNOSIS — M25.562 CHRONIC PAIN OF BOTH KNEES: ICD-10-CM

## 2023-12-26 PROCEDURE — 64624 PR DESTRUCT, NEUROLYTIC AGENT, GENICULAR NERVE, W/IMG: ICD-10-PCS | Mod: LT,,, | Performed by: ANESTHESIOLOGY

## 2023-12-26 PROCEDURE — 99152 MOD SED SAME PHYS/QHP 5/>YRS: CPT | Mod: ,,, | Performed by: ANESTHESIOLOGY

## 2023-12-26 PROCEDURE — 25000003 PHARM REV CODE 250

## 2023-12-26 PROCEDURE — 99152 MOD SED SAME PHYS/QHP 5/>YRS: CPT | Performed by: ANESTHESIOLOGY

## 2023-12-26 PROCEDURE — 64624 DSTRJ NULYT AGT GNCLR NRV: CPT | Mod: LT,,, | Performed by: ANESTHESIOLOGY

## 2023-12-26 PROCEDURE — 99152 PR MOD CONSCIOUS SEDATION, SAME PHYS, 5+ YRS, FIRST 15 MIN: ICD-10-PCS | Mod: ,,, | Performed by: ANESTHESIOLOGY

## 2023-12-26 PROCEDURE — A4649 SURGICAL SUPPLIES: HCPCS | Performed by: ANESTHESIOLOGY

## 2023-12-26 PROCEDURE — 63600175 PHARM REV CODE 636 W HCPCS: Performed by: ANESTHESIOLOGY

## 2023-12-26 PROCEDURE — 64624 DSTRJ NULYT AGT GNCLR NRV: CPT | Mod: LT | Performed by: ANESTHESIOLOGY

## 2023-12-26 PROCEDURE — 25000003 PHARM REV CODE 250: Performed by: ANESTHESIOLOGY

## 2023-12-26 RX ORDER — LIDOCAINE HYDROCHLORIDE 20 MG/ML
INJECTION, SOLUTION INFILTRATION; PERINEURAL
Status: DISCONTINUED | OUTPATIENT
Start: 2023-12-26 | End: 2023-12-26 | Stop reason: HOSPADM

## 2023-12-26 RX ORDER — BUPIVACAINE HYDROCHLORIDE 2.5 MG/ML
INJECTION, SOLUTION EPIDURAL; INFILTRATION; INTRACAUDAL
Status: DISCONTINUED | OUTPATIENT
Start: 2023-12-26 | End: 2023-12-26 | Stop reason: HOSPADM

## 2023-12-26 RX ORDER — SODIUM CHLORIDE 9 MG/ML
INJECTION, SOLUTION INTRAVENOUS CONTINUOUS
Status: DISCONTINUED | OUTPATIENT
Start: 2023-12-26 | End: 2023-12-26 | Stop reason: HOSPADM

## 2023-12-26 RX ORDER — TRIAMCINOLONE ACETONIDE 40 MG/ML
INJECTION, SUSPENSION INTRA-ARTICULAR; INTRAMUSCULAR
Status: DISCONTINUED | OUTPATIENT
Start: 2023-12-26 | End: 2023-12-26 | Stop reason: HOSPADM

## 2023-12-26 RX ORDER — FENTANYL CITRATE 50 UG/ML
INJECTION, SOLUTION INTRAMUSCULAR; INTRAVENOUS
Status: DISCONTINUED | OUTPATIENT
Start: 2023-12-26 | End: 2023-12-26 | Stop reason: HOSPADM

## 2023-12-26 RX ORDER — ONDANSETRON 2 MG/ML
INJECTION INTRAMUSCULAR; INTRAVENOUS
Status: DISCONTINUED | OUTPATIENT
Start: 2023-12-26 | End: 2023-12-26 | Stop reason: HOSPADM

## 2023-12-26 RX ORDER — MIDAZOLAM HYDROCHLORIDE 1 MG/ML
INJECTION INTRAMUSCULAR; INTRAVENOUS
Status: DISCONTINUED | OUTPATIENT
Start: 2023-12-26 | End: 2023-12-26 | Stop reason: HOSPADM

## 2023-12-26 NOTE — DISCHARGE INSTRUCTIONS

## 2023-12-26 NOTE — DISCHARGE SUMMARY
Discharge Note  Short Stay      SUMMARY     Admit Date: 12/26/2023    Attending Physician: LASHAY GARCIA       Discharge Physician: Heaven Degroot ([place] Name: Raghavendra)      Discharge Date: 12/26/2023 3:01 PM    Procedure(s) (LRB):  RADIOFREQUENCY ABLATION LEFT GENICULAR 1 OF 2 (Left)    Final Diagnosis: Chronic pain of both knees [M25.561, M25.562, G89.29]    Disposition: Home or self care    Patient Instructions:   Current Discharge Medication List        CONTINUE these medications which have NOT CHANGED    Details   ALPRAZolam (XANAX) 0.5 MG tablet Take daily as needed for anxiety.  Qty: 30 tablet, Refills: 5      buPROPion (WELLBUTRIN XL) 150 MG TB24 tablet Take 150 mg by mouth every morning.      butalbital-acetaminophen-caffeine -40 mg (FIORICET, ESGIC) -40 mg per tablet TAKE 1 TABLET EVERY 4 HOURS AS NEEDED  Qty: 30 tablet, Refills: 0    Associated Diagnoses: Headache, unspecified headache type      clotrimazole (LOTRIMIN) 1 % Soln APPLY TOPICALLY 2 TIMES DAILY FOR 7 DAYS  Qty: 30 mL, Refills: 2      COVID-19 antigen test ([facility] COVID-19 AG HOME TEST) Kit use as directed      diazePAM (VALIUM) 5 MG tablet Take 5 mg by mouth.      diclofenac sodium (VOLTAREN) 1 % Gel Apply 2 g topically daily as needed.  Qty: 100 g, Refills: 11      diflorasone-emollient (APEXICON E) 0.05 % Crea Apply 1 application topically once daily. for 7 days  Qty: 30 g, Refills: 5      docusate sodium (COLACE) 100 MG capsule Take 100 mg by mouth.      erythromycin (ROMYCIN) ophthalmic ointment Apply ointment to lids and lashes two times daily for 7 days.  Qty: 3.5 g, Refills: 0    Associated Diagnoses: Hordeolum internum of right upper eyelid      estradioL (ESTRACE) 1 MG tablet Take 1 tablet (1 mg total) by mouth once daily.  Qty: 90 tablet, Refills: 0    Associated Diagnoses: Menopause syndrome      famotidine (PEPCID) 20 MG tablet Take 20 mg by mouth.      fluocinonide (LIDEX) 0.05 % external solution Apply topically  2 (two) times daily. Do not use morning of surgery      hydrocortisone 2.5 % cream Apply topically 2 (two) times daily. apply to affected area for 7 days  Qty: 20 g, Refills: 5      ketoconazole (NIZORAL) 2 % shampoo Do not use morning of surgery      levothyroxine (SYNTHROID) 112 MCG tablet Take 1 tablet (112 mcg total) by mouth before breakfast. TAKE 1 TABLET (112 MCG TOTAL) BY MOUTH BEFORE BREAKFAST AS SCHEDULED Strength: 112 mcg  Qty: 90 tablet, Refills: 4      metronidazole 1% (METROGEL) 1 % Gel Apply topically once daily.  Qty: 60 g, Refills: 5      naltrexone (DEPADE) 50 mg tablet 1/4 pill twice daily  Qty: 15 tablet, Refills: 0    Associated Diagnoses: Class 1 obesity due to excess calories with serious comorbidity and body mass index (BMI) of 31.0 to 31.9 in adult      omeprazole (PRILOSEC) 10 MG capsule Take 10 mg by mouth.      ondansetron (ZOFRAN) 4 MG tablet       oxyCODONE-acetaminophen (PERCOCET) 5-325 mg per tablet Take 1 tablet by mouth every 6 (six) hours as needed for Pain.  Qty: 30 each, Refills: 0    Comments: Quantity prescribed more than 7 day supply? Yes, quantity medically necessary, metastatic cancer patient.  Associated Diagnoses: Clear cell carcinoma; Carcinoma of right kidney; Carcinoma of kidney, unspecified laterality; History of right nephrectomy      oxymetazoline (AFRIN) 0.05 % nasal spray 2 sprays by Nasal route 2 (two) times daily.      pravastatin (PRAVACHOL) 20 MG tablet TAKE 1 TABLET EVERY DAY  Qty: 90 tablet, Refills: 2      senna (SENOKOT) 8.6 mg tablet Take 1 tablet by mouth.      zolpidem (AMBIEN) 5 MG Tab Take 1 tablet (5 mg total) by mouth nightly as needed.  Qty: 90 tablet, Refills: 0                 Discharge Diagnosis: Chronic pain of both knees [M25.561, M25.562, G89.29]  Condition on Discharge: Stable with no complications to procedure   Diet on Discharge: Same as before.  Activity: as per instruction sheet.  Discharge to: Home with a responsible adult.  Follow up:  2-4 weeks       Please call my office or pager at [phone] if experienced any weakness or loss of sensation, fever > 101.5, pain uncontrolled with oral medications, persistent nausea/vomiting/or diarrhea, redness or drainage from the incisions, or any other worrisome concerns. If physician on call was not reached or could not communicate with our office for any reason please go to the nearest emergency department

## 2023-12-26 NOTE — OP NOTE
Therapeutic Genicular Cooled Nerve Radiofrequency Ablation under Fluoroscopy     The procedure, risks, benefits, and options were discussed with the patient. There are no contraindications to the procedure. The patent expressed understanding and agreed to the procedure. Informed written consent was obtained prior to the start of the procedure and can be found in the patient's chart.        PATIENT NAME: Sherrill Avery   MRN: [id number]     DATE OF PROCEDURE: 12/26/2023     PROCEDURE: Therapeutic Left Genicular Cooled Nerve Radiofrequency Ablation under Fluoroscopy    PRE-OP DIAGNOSIS: Chronic pain of both knees [M25.561, M25.562, G89.29]    POST-OP DIAGNOSIS: Chronic pain of both knees [M25.561, M25.562, G89.29]    PHYSICIAN: Vijaya Landin MD    ASSISTANTS: Heaven Degroot MD    MEDICATIONS INJECTED:  Preservative-free Kenalog 40mg with 9cc of Bupivicaine 0.25%    LOCAL ANESTHETIC INJECTED:   Xylocaine 2%    SEDATION: Versed 2mg and Fentanyl 75mcg                                                                                                                                                                                     Conscious sedation ordered by M.D. Patient re-evaluation prior to administration of conscious sedation. No changes noted in patient's status from initial evaluation. The patient's vital signs were monitored by RN and patient remained hemodynamically stable throughout the procedure.    Event Time In   Sedation Start 1515   Sedation End 1534       ESTIMATED BLOOD LOSS:  None    COMPLICATIONS:  None     INTERVAL HISTORY: Patient has clinical findings of chronic knee pain. Patients has completed 2 previous diagnostic genicular nerve blocks with at least 80% relief for the expected duration of the local anesthetic utilized.     TECHNIQUE: Time-out was performed to identify the patient and procedure to be performed. With the patient laying in a supine position, the surgical area was prepped and draped in  the usual sterile fashion using ChloraPrep and fenestrated drape. Three target sites including the superior lateral genicular nerve where the lateral femoral shaft meets the epicondyle, the superior medial genicular nerve where the medial femoral shaft meets the epicondyle, and the inferior medial genicular nerve where the medial tibial shaft meets the epicondyle, were determined under fluoroscopic guidance. Skin anesthesia was achieved by injecting Lidocaine 2% over the injection sites. A 17 gauge, 75mm, 10mm active tip needle was then advanced under fluoroscopy in the AP and lateral views into the positions of the geniculate nerves at these levels. This was followed by motor testing at each of the nerves to ensure that there was no dorsiflexion of the foot. After negative aspiration for blood was confirmed, 1 mL of the lidocaine 2% listed above was injected slowly at each site. This was followed by cooled thermal lesioning at 80 degrees celsius for 150 seconds at each site. That was followed by slowly injecting 1.5 mL of the medication mixture listed above at each site. The needles were removed and bleeding was nil. A sterile dressing was applied. No specimens collected. The patient tolerated the procedure well and did not have any procedure related motor deficit at the conclusion of the procedure.    The patient was monitored after the procedure in the recovery area. They were given post-procedure and discharge instructions to follow at home. The patient was discharged in a stable condition.    Heaven Degroot ([place] Name: Raghavendra), MD    I reviewed and edited the fellow's note. I conducted my own interview and physical examination. I agree with the findings. I was present and supervising all critical portions of the procedure.

## 2023-12-27 ENCOUNTER — PATIENT MESSAGE (OUTPATIENT)
Dept: PAIN MEDICINE | Facility: CLINIC | Age: 73
End: 2023-12-27
Payer: MEDICARE

## 2023-12-27 ENCOUNTER — TELEPHONE (OUTPATIENT)
Dept: PAIN MEDICINE | Facility: CLINIC | Age: 73
End: 2023-12-27
Payer: MEDICARE

## 2023-12-27 NOTE — TELEPHONE ENCOUNTER
communicated with patient upon the matter. Provider let her know that she was fine when she cleaned it.

## 2023-12-27 NOTE — TELEPHONE ENCOUNTER
----- Message from Mag Zavaleta MA sent at 12/27/2023  9:17 AM CST -----  Name of Who is Calling:ALEXIA SAAVEDRA [[id number]]                What is the request in detail: Pt is requesting a call back to discuss her ablation wound that her puppy stuck his paw in this morning and it bled. Pt cleaned it and wants to know what she should do. Please assist.                 Can the clinic reply by [facility]: No                [facility] [phone]

## 2023-12-28 ENCOUNTER — OFFICE VISIT (OUTPATIENT)
Dept: PRIMARY CARE CLINIC | Facility: CLINIC | Age: 73
End: 2023-12-28
Payer: MEDICARE

## 2023-12-28 DIAGNOSIS — E03.9 HYPOTHYROIDISM, UNSPECIFIED TYPE: ICD-10-CM

## 2023-12-28 DIAGNOSIS — M17.0 PRIMARY OSTEOARTHRITIS OF BOTH KNEES: ICD-10-CM

## 2023-12-28 DIAGNOSIS — E66.09 CLASS 1 OBESITY DUE TO EXCESS CALORIES WITH SERIOUS COMORBIDITY AND BODY MASS INDEX (BMI) OF 31.0 TO 31.9 IN ADULT: ICD-10-CM

## 2023-12-28 DIAGNOSIS — R63.5 WEIGHT GAIN: ICD-10-CM

## 2023-12-28 DIAGNOSIS — F41.1 GENERALIZED ANXIETY DISORDER: Chronic | ICD-10-CM

## 2023-12-28 DIAGNOSIS — I10 PRIMARY HYPERTENSION: ICD-10-CM

## 2023-12-28 DIAGNOSIS — C80.1 CLEAR CELL CARCINOMA: Primary | ICD-10-CM

## 2023-12-28 DIAGNOSIS — E04.2 NONTOXIC MULTINODULAR GOITER: ICD-10-CM

## 2023-12-28 PROCEDURE — 99214 OFFICE O/P EST MOD 30 MIN: CPT | Mod: 95,,, | Performed by: FAMILY MEDICINE

## 2023-12-28 PROCEDURE — 99214 PR OFFICE/OUTPT VISIT, EST, LEVL IV, 30-39 MIN: ICD-10-PCS | Mod: 95,,, | Performed by: FAMILY MEDICINE

## 2023-12-28 RX ORDER — NALTREXONE HYDROCHLORIDE 50 MG/1
TABLET, FILM COATED ORAL
Qty: 15 TABLET | Refills: 0 | Status: SHIPPED | OUTPATIENT
Start: 2023-12-28

## 2023-12-28 NOTE — PROGRESS NOTES
"Subjective     Patient ID: Sherrill Avery is a 73 y.o. female.    The patient location is: [place]  The chief complaint leading to consultation is: follow up    Visit type: audiovisual    Face to Face time with patient: 15  21 minutes of total time spent on the encounter, which includes face to face time and non-face to face time preparing to see the patient (eg, review of tests), Obtaining and/or reviewing separately obtained history, Documenting clinical information in the electronic or other health record, Independently interpreting results (not separately reported) and communicating results to the patient/family/caregiver, or Care coordination (not separately reported).         Each patient to whom he or she provides medical services by telemedicine is:  (1) informed of the relationship between the physician and patient and the respective role of any other health care provider with respect to management of the patient; and (2) notified that he or she may decline to receive medical services by telemedicine and may withdraw from such care at any time.    Notes:    Chief Complaint: follow up  LOV 10/23 was virtual  Had previously reviewed with pt risks/benefits/se's of wellbutrin and naltrexone.    Pt reports adding in naltrexone has been helpful. Got down to 190 lb. Denies any mh changes. She held in anticipation of knee ablation so was off the mediation. Was able to have on Dec 26th.   Has appt in [place] with heme/onc then has an appt for additional procedure on 16th. At this time she will hold from taking until after has next ablation.     Denies any excessive fatigue or gi upset beyond baseline. Had some mild nausea. Constipation stable: on her usual routine.   Was "happy," not too tired or groggy. Has psych appt in Jan.     She feels like no sweets craving and much less food noise.     She did not notice any elevation of her bp.     Anxiety overall present; brother with cancer. Tolerating wellbutrin.   Has " psychologist.    Undergoing w/up from her renal ca. Has hx of lung mets.     Hx of thyroid nodules.Benign. Last u/s was 12/22. Seeing Dr. Solomon for this. Tolerating thyroid meds.       HPI       Objective     PAST MEDICAL HISTORY:  Past Medical History:   Diagnosis Date    Anxiety     Family history of malignant melanoma 08/25/2014    Fibromyalgia affecting lower leg     GERD (gastroesophageal reflux disease)     Hx of psychiatric care     Hypercholesteremia     Hypertension     Hypothyroidism     Inflamed seborrheic keratosis 08/25/2014    Microscopic hematuria 02/02/2017    Multiple benign melanocytic nevi 08/25/2014    Renal cell carcinoma of right kidney metastatic to other site     Therapy          PAST SURGICAL HISTORY:  Past Surgical History:   Procedure Laterality Date    AUGMENTATION OF BREAST      bladder lift      breast implants      BREAST SURGERY Bilateral 1996    saline implants    COLONOSCOPY  2007    HYSTERECTOMY      ectopic pregnancy x 2, with LSO    KNEE ARTHROPLASTY Left 06/14/2021    Procedure: ARTHROPLASTY, KNEE:LEFT:DEPUY-SIGMA;  Surgeon: Osmar Avery III, MD;  Location: [facility];  Service: Orthopedics;  Laterality: Left;    LAPAROSCOPIC NEPHRECTOMY, HAND ASSISTED  07/25/2013    LUNG REMOVAL, PARTIAL Left 2020    At [facility]    NEPHRECTOMY      OOPHORECTOMY Bilateral     RADIOFREQUENCY ABLATION Left 08/30/2022    Procedure: RADIOFREQUENCY ABLATION, LEFT KNEE COOLED;  Surgeon: Vijaya Landin MD;  Location: [facility];  Service: Pain Management;  Laterality: Left;    RADIOFREQUENCY ABLATION Right 10/25/2022    Procedure: RADIOFREQUENCY ABLATION RIGHT KNEE GENICULAR COOLED;  Surgeon: Vijaya Landin MD;  Location: [facility];  Service: Pain Management;  Laterality: Right;    RADIOFREQUENCY ABLATION Left 03/07/2023    Procedure: RADIOFREQUENCY ABLATION LEFT GENICULAR COOLED RFA;  Surgeon: Vijaya Landin MD;  [facility];  Service: Pain Management;  Laterality:  Left;    RADIOFREQUENCY ABLATION Right 08/01/2023    Procedure: RADIOFREQUENCY ABLATION, RIGHT GENICULAR COOLED;  Surgeon: iVjaya Landin MD;  Location: [facility];  Service: Pain Management;  Laterality: Right;    RADIOFREQUENCY ABLATION Left 12/26/2023    Procedure: RADIOFREQUENCY ABLATION LEFT GENICULAR 1 OF 2;  Surgeon: Vijaya Landin MD;  Location: [facility];  Service: Pain Management;  Laterality: Left;  [phone]    right ganglion cyst      throat polyp      TRANSOBTURATOR SLING  2011    with RSO for persistent complex cyst & MARGOT; done by arndt    UPPER GASTROINTESTINAL ENDOSCOPY  2007       FAMILY HISTORY:  Family History   Problem Relation Age of Onset    Diabetes Mother     Hypertension Mother     Heart disease Mother     Cancer Father         lung    Migraines Father     Glaucoma Paternal Grandmother     Glaucoma Sister     Thyroid disease Neg Hx     Asthma Neg Hx           SOCIAL HISTORY:  Social History     Social History Narrative    Patient is a retired dance instructor and live with . Has stairs at home 12- has an elevator       MEDICATIONS:  Medications have been reviewed.    ALLERGIES:  Allergies have been reviewed.    There were no vitals filed for this visit.  Wt Readings from Last 10 Encounters:   12/26/23 87.1 kg (192 lb)   11/06/23 89.2 kg (196 lb 10.4 oz)   10/19/23 90.1 kg (198 lb 10.2 oz)   09/21/23 87.1 kg (192 lb)   08/01/23 87.1 kg (192 lb)   06/20/23 86.6 kg (190 lb 14.7 oz)   05/04/23 88.4 kg (194 lb 14.2 oz)   04/19/23 89 kg (196 lb 3.4 oz)   03/07/23 88.5 kg (195 lb)   01/24/23 94 kg (207 lb 3.7 oz)       Lab Results   Component Value Date    WBC 5.58 08/17/2021    HGB 13.8 08/17/2021    HCT 42.4 08/17/2021     08/17/2021    CHOL 194 10/18/2023    TRIG 97 10/18/2023    HDL 66 10/18/2023    ALT 14 10/05/2022    AST 15 10/05/2022     10/05/2022    K 4.2 10/05/2022     10/05/2022    CREATININE 1.00 (H) 10/09/2023    BUN 10 10/05/2022    CO2  25 10/05/2022    TSH 1.46 03/29/2023    INR 0.9 06/08/2021    HGBA1C 5.2 10/09/2023       Review of Systems   Constitutional:  Negative for activity change, appetite change, fatigue and fever.   HENT:  Negative for mouth dryness and goiter.    Eyes:  Negative for visual disturbance.   Respiratory:  Negative for apnea, cough, chest tightness and shortness of breath.    Cardiovascular:  Negative for chest pain, palpitations and leg swelling.   Gastrointestinal:  Negative for abdominal pain, constipation, diarrhea, nausea, vomiting and reflux.   Endocrine: Negative for cold intolerance, heat intolerance, polydipsia, polyphagia and polyuria.   Genitourinary:  Negative for frequency and menstrual problem.   Musculoskeletal:  Negative for arthralgias and myalgias.   Integumentary:  Negative for color change and rash.   Neurological:  Negative for dizziness, vertigo, tremors, syncope, weakness, numbness and headaches.   Psychiatric/Behavioral:  Negative for agitation, behavioral problems, confusion, decreased concentration, self-injury, sleep disturbance and suicidal ideas. The patient is not nervous/anxious and is not hyperactive.        Physical Exam  Constitutional:       General: She is not in acute distress.     Appearance: Normal appearance.   HENT:      Head: Normocephalic and atraumatic.   Eyes:      General: No scleral icterus.  Pulmonary:      Effort: Pulmonary effort is normal. No respiratory distress.   Neurological:      Mental Status: She is alert and oriented to person, place, and time.   Psychiatric:         Mood and Affect: Mood normal.         Behavior: Behavior normal.              Assessment and Plan     1. Clear cell carcinoma with lung metastasis  Keep heme onc appt [facility]    2. Weight gain    3. Generalized anxiety disorder    4. Primary hypertension  Comments:  chronic/continue current meds    5. Class 1 obesity due to excess calories with serious comorbidity and body mass index (BMI) of 31.0 to  31.9 in adult  -     naltrexone (DEPADE) 50 mg tablet; 1/4 pill twice daily  Dispense: 15 tablet; Refill: 0    6. Nontoxic multinodular goiter    7. Hypothyroidism, unspecified type  Comments:  followed by Dr. Solomon; compliant with meds;    8. Primary osteoarthritis of both knees  S/p ablation  Holding naltrexone until after procedue Jan 16th  Pt advised should hold7 full days prior and 7 days after last dosage of any narcotic pain meds; doing well with naltrexone so will plan to resume  She will also keep psych appt  Pt also asked to send me update  Rx on file but she is aware about NOT taking until after has no narcotic meds in system  Chronic medical conditions stale             Pt to speak with hem/onc when goes to [place]; will likely resume naltrexone with her wellbutrin.   No glp1 coverage  She continues to work on lifestyle interventions  Follow up in about 8 weeks (around 2/22/2024), or if symptoms worsen or fail to improve.

## 2024-01-03 NOTE — TELEPHONE ENCOUNTER
No care due was identified.  [facility] Embedded Care Due Messages. Reference number: [id number].   1/03/2024 12:16:03 PM CST

## 2024-01-04 RX ORDER — PRAVASTATIN SODIUM 20 MG/1
TABLET ORAL
Qty: 90 TABLET | Refills: 4 | Status: SHIPPED | OUTPATIENT
Start: 2024-01-04

## 2024-01-04 NOTE — TELEPHONE ENCOUNTER
Refill Routing Note   Medication(s) are not appropriate for processing by [facility] for the following reason(s):        Required labs outdated    [facility] action(s):  Defer               Appointments  past 12m or future 3m with PCP    Date Provider   Last Visit   11/6/2023 Alexandre Macias MD   Next Visit   5/6/2024 Alexandre Macias MD   ED visits in past 90 days: 0        Note composed:6:52 AM 01/04/2024

## 2024-01-19 ENCOUNTER — OFFICE VISIT (OUTPATIENT)
Dept: PSYCHIATRY | Facility: CLINIC | Age: 74
End: 2024-01-19
Payer: MEDICARE

## 2024-01-19 DIAGNOSIS — F41.1 GENERALIZED ANXIETY DISORDER: Primary | Chronic | ICD-10-CM

## 2024-01-19 PROCEDURE — 90837 PSYTX W PT 60 MINUTES: CPT | Mod: 95,,, | Performed by: PSYCHOLOGIST

## 2024-01-19 NOTE — PROGRESS NOTES
Telemedicine PSYCHO-ONCOLOGY NOTE/ Individual Psychotherapy     Consultation started: 1/19/2024 at 3:02 PM   The chief complaint leading to consultation is: adaptation to disease and treatment, anxiety  The patient location is: Patient home in [place], [place]  Virtual visit with synchronous audio and video    Each patient provided medical services by telemedicine is:  (1) informed of the relationship between the physician and patient and the respective role of any other health care provider with respect to management of the patient; and (2) notified that he or she may decline to receive medical services by telemedicine and may withdraw from such care at any time.    Crisis Disclaimer: Patient was informed that due to the virtual nature of the visit, that if a crisis develops, protocols will be implemented to ensure patient safety, including but not limited to: 1) Initiating a welfare check with local Law Enforcement, 2) Calling 1/National Crisis Hotline, and/or 3) Initiating PEC/CEC procedures.     Date: 1/19/2024   Site of therapist:  [facility]         Therapeutic Intervention: Met with patient.   Outpatient - Behavior modifying psychotherapy 60 min - CPT code 89490    Referring provider:  Leonid    Chief complaint/reason for encounter: anxiety   Met with patient to evaluate psychosocial adaptation to survivorship of clear cell cancer with metastases   The patient's last visit with me was on 12/12/2023.    Medical History:  Patient Active Problem List   Diagnosis    GERD (gastroesophageal reflux disease)    Hypothyroidism    Nontoxic multinodular goiter    Primary osteoarthritis involving multiple joints    Fibromyalgia    Actinic degeneration    Dermatofibroma    Bilateral sensorineural hearing loss    Hypertension    Hypercholesteremia    Kidney carcinoma    Neuropathy of left sural nerve-mild    Vitamin D deficiency    Anxiety    Chronic insomnia    Multiple lung nodules on CT    Clear cell  carcinoma with lung metastasis    Subacromial bursitis    It band syndrome, left    Chronic pain of both knees    Chondromalacia, patella, right    Chondromalacia, patella, left    Lumbar radiculopathy    Allergic rhinitis    CKD (chronic kidney disease)    Hormone replacement therapy (HRT)    Osteoarthritis of left knee    Chronic knee pain after total replacement of left knee joint    Functional gait abnormality    Neck pain    Primary osteoarthritis of both knees    Primary osteoarthritis of right knee    Leg weakness    Acute neck pain    Class 1 obesity due to excess calories with serious comorbidity and body mass index (BMI) of 31.0 to 31.9 in adult    Generalized anxiety disorder       Objective:      Sherrill Avery arrived promptly for the session (by video).  Ms. Avery was independently ambulatory at the time of session. The patient was fully cooperative throughout the session.  Appearance: age appropriate, casually  dressed, well groomed  Behavior/Cooperation: friendly and cooperative  Speech: normal in rate, volume, and tone and appropriate quality, quantity and organization of sentences  Mood: anxious  Affect: anxious  Thought Process: goal-directed, logical  Thought Content: normal,  No delusions or paranoia; did not appear to be responding to internal stimuli during the session  Orientation: grossly intact  Memory: Grossly intact  Attention Span/Concentration: Attends to session without distraction; reports no difficulty  Fund of Knowledge: average  Estimate of Intelligence: average from verbal skills and history  Cognition: grossly intact  Insight: patient has awareness of illness; good insight into own behavior and behavior of others  Judgment: the patient's behavior is adequate to circumstances    Current Outpatient Medications   Medication    ALPRAZolam (XANAX) 0.5 MG tablet    buPROPion (WELLBUTRIN XL) 150 MG TB24 tablet    butalbital-acetaminophen-caffeine -40 mg (FIORICET, ESGIC) -40  mg per tablet    clotrimazole (LOTRIMIN) 1 % Soln    COVID-19 antigen test ([facility] COVID-19 AG HOME TEST) Kit    diazePAM (VALIUM) 5 MG tablet    diclofenac sodium (VOLTAREN) 1 % Gel    diflorasone-emollient (APEXICON E) 0.05 % Crea    docusate sodium (COLACE) 100 MG capsule    erythromycin (ROMYCIN) ophthalmic ointment    estradioL (ESTRACE) 1 MG tablet    famotidine (PEPCID) 20 MG tablet    fluocinonide (LIDEX) 0.05 % external solution    hydrocortisone 2.5 % cream    ketoconazole (NIZORAL) 2 % shampoo    levothyroxine (SYNTHROID) 112 MCG tablet    metronidazole 1% (METROGEL) 1 % Gel    naltrexone (DEPADE) 50 mg tablet    omeprazole (PRILOSEC) 10 MG capsule    ondansetron (ZOFRAN) 4 MG tablet    oxyCODONE-acetaminophen (PERCOCET) 5-325 mg per tablet    oxymetazoline (AFRIN) 0.05 % nasal spray    pravastatin (PRAVACHOL) 20 MG tablet    senna (SENOKOT) 8.6 mg tablet    zolpidem (AMBIEN) 5 MG Tab     No current facility-administered medications for this visit.     Facility-Administered Medications Ordered in Other Visits   Medication Frequency    triamcinolone acetonide injection 40 mg        Interval history and content of current session: Patient discussed events and activities since the time of last visit. Discussed claustrophobic reaction to MRIs at [facility]. She terminated the second MRI due to fear. She also discussed a prior CT where her infusion leaked and sprayed all over the inside of the machine, scaring her significantly.      Cognitive Behavioral Therapy for Anxiety (CBT-A), Session #2   Session Focus:  Review Homework- patient did not do the assigned monitoring, again discussed purpose of monitoring  Understand the purpose of anxiety and its components  Recognize your own physical symptoms of anxiety and record them  Fill out Sequence of Anxiety Components form  Continue record-keeping  Complete Self-assessment                 Practice Assignment:  Start record-keeping using the Worry Record and the Daily  Mood Record.  Use information on your Worry Record forms to identify positive feedback loops among your anxiety components.       Risk parameters:   Patient reports no suicidal ideation  Patient reports no homicidal ideation  Patient reports no self-injurious behavior  Patient reports no violent behavior   Safety needs:  None at this time      Verbal deficits: None     Patient's response to intervention:The patient's response to intervention is accepting, motivated.     Progress to date:Progress as Expected      Goals from last visit: Not attempted      Patient reported outcomes:      Distress Thermometer:       1/17/2024    11:01 PM 1/3/2024     2:24 PM 12/11/2023    11:50 PM 11/15/2023    12:37 PM 10/19/2023     1:19 PM 10/15/2023    11:02 AM 4/19/2023    11:24 AM   DISTRESS SCREENING   Distress Score 5 5 4 8 0 - No Distress 5 0 - No Distress   Practical Concerns Treatment decisions Treatment decisions Treatment Decisions Treatment Decisions None of these Treatment Decisions None of these   Social Concerns Communication with health care team Relationship with children None of these Family Health Issues None of these None of these None of these   Emotional Concerns Worry or anxiety;Sadness or depression Worry or anxiety;Sadness or depression Depression;Fears;Nervousness;Sadness;Worry;Loss of Interest Depression;Fears;Nervousness;Sadness;Worry;Loss of Interest None of these Depression;Nervousness None of these   Retire Spiritual or Yazdanism Concerns   No No No No No   Spiritual or Yazdanism Concerns None of these None of these        Physical Concerns Pain;Memory or concentration Pain;Fatigue;Memory or concentration;Loss or change of physical abilities Constipation;Fatigue;Memory/Concentration;Nausea;Pain;Skin Dry/Itchy;Sleep Appearance;Bathing/Dressing;Constipation;Fatigue;Feeling Swollen;Indigestion;Memory/Concentration;Nausea;Pain;Skin Dry/Itchy;Sleep None of these Bathing/Dressing;Constipation;Fatigue;Getting  Around;Memory/Concentration;Nausea;Pain;Skin Dry/Itchy None of these   Other Problems  No problem with my leonel. I get angry too fast. I am Over weight and trying to lose weight.  My feet hurt. My arms and knees hurt.  I am over weight and finally i got my doctor to prescribe me a drug so I can lose weight. I think this will help my legs ( getting some weight off of me), then I could do more in life.           PHQ-9=  Initial visit: 11    PHQ ANSWERS    Q1. Little interest or pleasure in doing things: Several days (01/17/24 2305)  Q2. Feeling down, depressed, or hopeless: More than half the days (01/17/24 2305)  Q3. Trouble falling or staying asleep, or sleeping too much: More than half the days (01/17/24 2305)  Q4. Feeling tired or having little energy: More than half the days (01/17/24 2305)  Q5. Poor appetite or overeating: Not at all (01/17/24 2305)  Q6. Feeling bad about yourself - or that you are a failure or have let yourself or your family down: Not at all (01/17/24 2305)  Q7. Trouble concentrating on things, such as reading the newspaper or watching television: Several days (01/17/24 2305)  Q8. Moving or speaking so slowly that other people could have noticed. Or the opposite - being so fidgety or restless that you have been moving around a lot more than usual: Not at all (01/17/24 2305)  Q9.      PHQ8 Score : 8 (01/17/24 2305)  PHQ-9 Total Score: 8 (01/17/24 2305)        JULIAN-7= Initial visit: 14         1/17/2024    11:02 PM   GAD7   1. Feeling nervous, anxious, or on edge? 2   2. Not being able to stop or control worrying? 1   3. Worrying too much about different things? 2   4. Trouble relaxing? 2   5. Being so restless that it is hard to sit still? 1   6. Becoming easily annoyed or irritable? 1   7. Feeling afraid as if something awful might happen? 1   JULIAN-7 Score 10         Client Strengths: verbal, intelligent, successful, good social support, good insight, commitment to wellness, strong leonel, strong  cultural traditions      Treatment Plan:individual psychotherapy  Target symptoms: anxiety , adjustment  Why chosen therapy is appropriate versus another modality: relevant to diagnosis, patient responds to this modality, evidence based practice  Outcome monitoring methods: self-report, checklist/rating scale  Therapeutic intervention type: insight oriented psychotherapy, behavior modifying psychotherapy, supportive psychotherapy  Prognosis: Good      Behavioral goals: above    Return to clinic: 2 weeks     Length of Service (minutes direct face-to-face contact): 60      ICD-10-CM ICD-9-CM   1. Generalized anxiety disorder  F41.1 300.02           Viet Dorantes, PhD  [place] License #[id number]  [place] License #[id number]  [place] License #[id number]

## 2024-01-30 ENCOUNTER — TELEPHONE (OUTPATIENT)
Dept: AUDIOLOGY | Facility: CLINIC | Age: 74
End: 2024-01-30
Payer: MEDICARE

## 2024-01-31 ENCOUNTER — PATIENT MESSAGE (OUTPATIENT)
Dept: ADMINISTRATIVE | Facility: OTHER | Age: 74
End: 2024-01-31
Payer: MEDICARE

## 2024-02-05 ENCOUNTER — PATIENT MESSAGE (OUTPATIENT)
Dept: PAIN MEDICINE | Facility: CLINIC | Age: 74
End: 2024-02-05
Payer: MEDICARE

## 2024-02-05 ENCOUNTER — OFFICE VISIT (OUTPATIENT)
Dept: PSYCHIATRY | Facility: CLINIC | Age: 74
End: 2024-02-05
Payer: MEDICARE

## 2024-02-05 DIAGNOSIS — F41.1 GENERALIZED ANXIETY DISORDER: Primary | Chronic | ICD-10-CM

## 2024-02-05 PROCEDURE — 90834 PSYTX W PT 45 MINUTES: CPT | Mod: 95,,, | Performed by: PSYCHOLOGIST

## 2024-02-05 NOTE — PROGRESS NOTES
Telemedicine PSYCHO-ONCOLOGY NOTE/ Individual Psychotherapy     Consultation started: 2/5/2024 at 3:06 PM   The chief complaint leading to consultation is: adaptation to disease and treatment, anxiety  The patient location is: Patient home in [place], [place]  Virtual visit with synchronous audio and video    Each patient provided medical services by telemedicine is:  (1) informed of the relationship between the physician and patient and the respective role of any other health care provider with respect to management of the patient; and (2) notified that he or she may decline to receive medical services by telemedicine and may withdraw from such care at any time.    Crisis Disclaimer: Patient was informed that due to the virtual nature of the visit, that if a crisis develops, protocols will be implemented to ensure patient safety, including but not limited to: 1) Initiating a welfare check with local Law Enforcement, 2) Calling 1/National Crisis Hotline, and/or 3) Initiating PEC/CEC procedures.     Date: 2/5/2024   Site of therapist:  [facility]         Therapeutic Intervention: Met with patient.   Outpatient - Behavior modifying psychotherapy 45 min - CPT code 66911    Referring provider:  Leonid    Chief complaint/reason for encounter: anxiety   Met with patient to evaluate psychosocial adaptation to survivorship of clear cell cancer with metastases   The patient's last visit with me was on 1/19/2024.      Medical History:  Patient Active Problem List   Diagnosis    GERD (gastroesophageal reflux disease)    Hypothyroidism    Nontoxic multinodular goiter    Primary osteoarthritis involving multiple joints    Fibromyalgia    Actinic degeneration    Dermatofibroma    Bilateral sensorineural hearing loss    Hypertension    Hypercholesteremia    Kidney carcinoma    Neuropathy of left sural nerve-mild    Vitamin D deficiency    Anxiety    Chronic insomnia    Multiple lung nodules on CT    Clear cell  carcinoma with lung metastasis    Subacromial bursitis    It band syndrome, left    Chronic pain of both knees    Chondromalacia, patella, right    Chondromalacia, patella, left    Lumbar radiculopathy    Allergic rhinitis    CKD (chronic kidney disease)    Hormone replacement therapy (HRT)    Osteoarthritis of left knee    Chronic knee pain after total replacement of left knee joint    Functional gait abnormality    Neck pain    Primary osteoarthritis of both knees    Primary osteoarthritis of right knee    Leg weakness    Acute neck pain    Class 1 obesity due to excess calories with serious comorbidity and body mass index (BMI) of 31.0 to 31.9 in adult    Generalized anxiety disorder       Objective:      Sherrill Avery arrived promptly for the session (by video).  Ms. Avery was independently ambulatory at the time of session. The patient was fully cooperative throughout the session.  Appearance: age appropriate, casually  dressed, well groomed  Behavior/Cooperation: friendly and cooperative  Speech: normal in rate, volume, and tone and appropriate quality, quantity and organization of sentences  Mood: anxious  Affect: anxious  Thought Process: goal-directed, logical  Thought Content: normal,  No delusions or paranoia; did not appear to be responding to internal stimuli during the session  Orientation: grossly intact  Memory: Grossly intact  Attention Span/Concentration: Attends to session without distraction; reports no difficulty  Fund of Knowledge: average  Estimate of Intelligence: average from verbal skills and history  Cognition: grossly intact  Insight: patient has awareness of illness; good insight into own behavior and behavior of others  Judgment: the patient's behavior is adequate to circumstances    Current Outpatient Medications   Medication    ALPRAZolam (XANAX) 0.5 MG tablet    buPROPion (WELLBUTRIN XL) 150 MG TB24 tablet    butalbital-acetaminophen-caffeine -40 mg (FIORICET, ESGIC) -40  mg per tablet    clotrimazole (LOTRIMIN) 1 % Soln    COVID-19 antigen test ([facility] COVID-19 AG HOME TEST) Kit    diazePAM (VALIUM) 5 MG tablet    diclofenac sodium (VOLTAREN) 1 % Gel    diflorasone-emollient (APEXICON E) 0.05 % Crea    docusate sodium (COLACE) 100 MG capsule    erythromycin (ROMYCIN) ophthalmic ointment    estradioL (ESTRACE) 1 MG tablet    famotidine (PEPCID) 20 MG tablet    fluocinonide (LIDEX) 0.05 % external solution    hydrocortisone 2.5 % cream    ketoconazole (NIZORAL) 2 % shampoo    levothyroxine (SYNTHROID) 112 MCG tablet    metronidazole 1% (METROGEL) 1 % Gel    naltrexone (DEPADE) 50 mg tablet    omeprazole (PRILOSEC) 10 MG capsule    ondansetron (ZOFRAN) 4 MG tablet    oxyCODONE-acetaminophen (PERCOCET) 5-325 mg per tablet    oxymetazoline (AFRIN) 0.05 % nasal spray    pravastatin (PRAVACHOL) 20 MG tablet    senna (SENOKOT) 8.6 mg tablet    zolpidem (AMBIEN) 5 MG Tab     No current facility-administered medications for this visit.     Facility-Administered Medications Ordered in Other Visits   Medication Frequency    triamcinolone acetonide injection 40 mg        Interval history and content of current session: Patient discussed events and activities since the time of last visit. Discussed difficulty changing her imaging dates at [facility] and caregiving for brother.  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    Cognitive Behavioral Therapy for Anxiety (CBT-A), Session #2   Session Focus:  Review Homework- patient did not do the assigned monitoring, again discussed purpose of monitoring  Used patient's recent anxiety experiences to complete discussion of cognitive, emotional, physiological, behavioral symptoms  Understand the purpose of anxiety and its components  Recognize your own physical symptoms of anxiety and record them  Fill out Sequence of Anxiety Components form  Continue record-keeping  Complete Self-assessment                 Practice Assignment:  Start  record-keeping using the Worry Record and the Daily Mood Record.  Use information on your Worry Record forms to identify positive feedback loops among your anxiety components.  Decrease exposure to unnecessary triggers (cancer TV shows, movies)  Separate self from media/personal cancer accounts (not my cancer, not my body, not my course)      Practice Assignment:  Continue self-monitoring using the Worry Record and the Daily Mood Record.     Risk parameters:   Patient reports no suicidal ideation  Patient reports no homicidal ideation  Patient reports no self-injurious behavior  Patient reports no violent behavior   Safety needs:  None at this time      Verbal deficits: None     Patient's response to intervention:The patient's response to intervention is accepting, motivated.     Progress to date:Progress as Expected      Goals from last visit: Not attempted      Patient reported outcomes:      Distress Thermometer:       2/5/2024     3:00 PM 1/17/2024    11:01 PM 1/3/2024     2:24 PM 12/11/2023    11:50 PM 11/15/2023    12:37 PM 10/19/2023     1:19 PM 10/15/2023    11:02 AM   DISTRESS SCREENING   Distress Score 5 5 5 4 8 0 - No Distress 5   Practical Concerns Treatment decisions Treatment decisions Treatment decisions Treatment Decisions Treatment Decisions None of these Treatment Decisions   Social Concerns None of these Communication with health care team Relationship with children None of these Family Health Issues None of these None of these   Emotional Concerns Worry or anxiety;Sadness or depression Worry or anxiety;Sadness or depression Worry or anxiety;Sadness or depression Depression;Fears;Nervousness;Sadness;Worry;Loss of Interest Depression;Fears;Nervousness;Sadness;Worry;Loss of Interest None of these Depression;Nervousness   Retire Spiritual or Gnosticist Concerns    No No No No   Spiritual or Gnosticist Concerns Relationship with the sacred None of these None of these       Physical Concerns  Sleep;Fatigue;Memory or concentration Pain;Memory or concentration Pain;Fatigue;Memory or concentration;Loss or change of physical abilities Constipation;Fatigue;Memory/Concentration;Nausea;Pain;Skin Dry/Itchy;Sleep Appearance;Bathing/Dressing;Constipation;Fatigue;Feeling Swollen;Indigestion;Memory/Concentration;Nausea;Pain;Skin Dry/Itchy;Sleep None of these Bathing/Dressing;Constipation;Fatigue;Getting Around;Memory/Concentration;Nausea;Pain;Skin Dry/Itchy   Other Problems   No problem with my leonel. I get angry too fast. I am Over weight and trying to lose weight.  My feet hurt. My arms and knees hurt.  I am over weight and finally i got my doctor to prescribe me a drug so I can lose weight. I think this will help my legs ( getting some weight off of me), then I could do more in life.          PHQ-9=  Initial visit: 11    PHQ ANSWERS    Q1. Little interest or pleasure in doing things: Several days (02/05/24 1502)  Q2. Feeling down, depressed, or hopeless: Several days (02/05/24 1502)  Q3. Trouble falling or staying asleep, or sleeping too much: More than half the days (02/05/24 1502)  Q4. Feeling tired or having little energy: Several days (02/05/24 1502)  Q5. Poor appetite or overeating: Not at all (02/05/24 1502)  Q6. Feeling bad about yourself - or that you are a failure or have let yourself or your family down: Not at all (02/05/24 1502)  Q7. Trouble concentrating on things, such as reading the newspaper or watching television: Several days (02/05/24 1502)  Q8. Moving or speaking so slowly that other people could have noticed. Or the opposite - being so fidgety or restless that you have been moving around a lot more than usual: Not at all (02/05/24 1502)  Q9.      PHQ8 Score : 6 (02/05/24 1502)  PHQ-9 Total Score: 6 (02/05/24 1502)        JULIAN-7= Initial visit: 14         2/5/2024     3:00 PM   GAD7   1. Feeling nervous, anxious, or on edge? 1   2. Not being able to stop or control worrying? 1   3. Worrying too  much about different things? 1   4. Trouble relaxing? 1   5. Being so restless that it is hard to sit still? 1   6. Becoming easily annoyed or irritable? 1   7. Feeling afraid as if something awful might happen? 1   JULIAN-7 Score 7         Client Strengths: verbal, intelligent, successful, good social support, good insight, commitment to wellness, strong leonel, strong cultural traditions      Treatment Plan:individual psychotherapy  Target symptoms: anxiety , adjustment  Why chosen therapy is appropriate versus another modality: relevant to diagnosis, patient responds to this modality, evidence based practice  Outcome monitoring methods: self-report, checklist/rating scale  Therapeutic intervention type: insight oriented psychotherapy, behavior modifying psychotherapy, supportive psychotherapy  Prognosis: Fair      Behavioral goals: above    Return to clinic: 1 month (due to 's surgery and depositions)- in person after visit with Dr. Jaquez     Length of Service (minutes direct face-to-face contact): 45      ICD-10-CM ICD-9-CM   1. Generalized anxiety disorder  F41.1 300.02             Viet Dorantes, PhD  [place] License #[id number]  [place] License #[id number]  [place] License #[id number]

## 2024-02-06 ENCOUNTER — HOSPITAL ENCOUNTER (OUTPATIENT)
Facility: OTHER | Age: 74
Discharge: HOME | End: 2024-02-06
Attending: ANESTHESIOLOGY | Admitting: ANESTHESIOLOGY
Payer: MEDICARE

## 2024-02-06 VITALS
HEIGHT: 65 IN | WEIGHT: 192 LBS | TEMPERATURE: 98 F | RESPIRATION RATE: 16 BRPM | DIASTOLIC BLOOD PRESSURE: 72 MMHG | SYSTOLIC BLOOD PRESSURE: 149 MMHG | HEART RATE: 71 BPM | BODY MASS INDEX: 31.99 KG/M2 | OXYGEN SATURATION: 98 %

## 2024-02-06 DIAGNOSIS — G89.29 CHRONIC PAIN OF BOTH KNEES: ICD-10-CM

## 2024-02-06 DIAGNOSIS — M17.9 KNEE OSTEOARTHRITIS: ICD-10-CM

## 2024-02-06 DIAGNOSIS — M25.561 CHRONIC PAIN OF BOTH KNEES: ICD-10-CM

## 2024-02-06 DIAGNOSIS — M17.11 PRIMARY OSTEOARTHRITIS OF RIGHT KNEE: Primary | ICD-10-CM

## 2024-02-06 DIAGNOSIS — M25.562 CHRONIC PAIN OF BOTH KNEES: ICD-10-CM

## 2024-02-06 PROCEDURE — 64624 DSTRJ NULYT AGT GNCLR NRV: CPT | Mod: RT,,, | Performed by: ANESTHESIOLOGY

## 2024-02-06 PROCEDURE — 64624 DSTRJ NULYT AGT GNCLR NRV: CPT | Mod: RT | Performed by: ANESTHESIOLOGY

## 2024-02-06 PROCEDURE — 25000003 PHARM REV CODE 250: Performed by: STUDENT IN AN ORGANIZED HEALTH CARE EDUCATION/TRAINING PROGRAM

## 2024-02-06 PROCEDURE — 99152 MOD SED SAME PHYS/QHP 5/>YRS: CPT | Performed by: ANESTHESIOLOGY

## 2024-02-06 PROCEDURE — A4649 SURGICAL SUPPLIES: HCPCS | Performed by: ANESTHESIOLOGY

## 2024-02-06 PROCEDURE — 99152 MOD SED SAME PHYS/QHP 5/>YRS: CPT | Mod: ,,, | Performed by: ANESTHESIOLOGY

## 2024-02-06 PROCEDURE — 99153 MOD SED SAME PHYS/QHP EA: CPT | Performed by: ANESTHESIOLOGY

## 2024-02-06 PROCEDURE — 25000003 PHARM REV CODE 250: Performed by: ANESTHESIOLOGY

## 2024-02-06 PROCEDURE — 63600175 PHARM REV CODE 636 W HCPCS: Performed by: ANESTHESIOLOGY

## 2024-02-06 RX ORDER — TRIAMCINOLONE ACETONIDE 40 MG/ML
INJECTION, SUSPENSION INTRA-ARTICULAR; INTRAMUSCULAR
Status: DISCONTINUED | OUTPATIENT
Start: 2024-02-06 | End: 2024-02-06 | Stop reason: HOSPADM

## 2024-02-06 RX ORDER — BUPIVACAINE HYDROCHLORIDE 2.5 MG/ML
INJECTION, SOLUTION EPIDURAL; INFILTRATION; INTRACAUDAL
Status: DISCONTINUED | OUTPATIENT
Start: 2024-02-06 | End: 2024-02-06 | Stop reason: HOSPADM

## 2024-02-06 RX ORDER — MIDAZOLAM HYDROCHLORIDE 1 MG/ML
INJECTION INTRAMUSCULAR; INTRAVENOUS
Status: DISCONTINUED | OUTPATIENT
Start: 2024-02-06 | End: 2024-02-06 | Stop reason: HOSPADM

## 2024-02-06 RX ORDER — LIDOCAINE HYDROCHLORIDE 20 MG/ML
INJECTION, SOLUTION INFILTRATION; PERINEURAL
Status: DISCONTINUED | OUTPATIENT
Start: 2024-02-06 | End: 2024-02-06 | Stop reason: HOSPADM

## 2024-02-06 RX ORDER — FENTANYL CITRATE 50 UG/ML
INJECTION, SOLUTION INTRAMUSCULAR; INTRAVENOUS
Status: DISCONTINUED | OUTPATIENT
Start: 2024-02-06 | End: 2024-02-06 | Stop reason: HOSPADM

## 2024-02-06 RX ORDER — SODIUM CHLORIDE 9 MG/ML
INJECTION, SOLUTION INTRAVENOUS CONTINUOUS
Status: DISCONTINUED | OUTPATIENT
Start: 2024-02-06 | End: 2024-02-06 | Stop reason: HOSPADM

## 2024-02-06 RX ORDER — ONDANSETRON HYDROCHLORIDE 2 MG/ML
INJECTION, SOLUTION INTRAVENOUS
Status: DISCONTINUED | OUTPATIENT
Start: 2024-02-06 | End: 2024-02-06 | Stop reason: HOSPADM

## 2024-02-06 NOTE — H&P
HPI  Patient presenting for Procedure(s) (LRB):  RADIOFREQUENCY ABLATION RIGHT GENICULAR 2 OF 2 (Right)     Patient on Anti-coagulation No    No health changes since previous encounter    Past Medical History:   Diagnosis Date    Anxiety     Family history of malignant melanoma 08/25/2014    Fibromyalgia affecting lower leg     GERD (gastroesophageal reflux disease)     Hx of psychiatric care     Hypercholesteremia     Hypertension     Hypothyroidism     Inflamed seborrheic keratosis 08/25/2014    Microscopic hematuria 02/02/2017    Multiple benign melanocytic nevi 08/25/2014    Renal cell carcinoma of right kidney metastatic to other site     Therapy      Past Surgical History:   Procedure Laterality Date    AUGMENTATION OF BREAST      bladder lift      breast implants      BREAST SURGERY Bilateral 1996    saline implants    COLONOSCOPY  2007    HYSTERECTOMY      ectopic pregnancy x 2, with LSO    KNEE ARTHROPLASTY Left 06/14/2021    Procedure: ARTHROPLASTY, KNEE:LEFT:DEPUY-SIGMA;  Surgeon: Osmar Avery III, MD;  Location: [facility];  Service: Orthopedics;  Laterality: Left;    LAPAROSCOPIC NEPHRECTOMY, HAND ASSISTED  07/25/2013    LUNG REMOVAL, PARTIAL Left 2020    At [facility]    NEPHRECTOMY      OOPHORECTOMY Bilateral     RADIOFREQUENCY ABLATION Left 08/30/2022    Procedure: RADIOFREQUENCY ABLATION, LEFT KNEE COOLED;  Surgeon: Vijaya Landin MD;  Location: [facility];  Service: Pain Management;  Laterality: Left;    RADIOFREQUENCY ABLATION Right 10/25/2022    Procedure: RADIOFREQUENCY ABLATION RIGHT KNEE GENICULAR COOLED;  Surgeon: Vijaya Landin MD;  Location: [facility];  Service: Pain Management;  Laterality: Right;    RADIOFREQUENCY ABLATION Left 03/07/2023    Procedure: RADIOFREQUENCY ABLATION LEFT GENICULAR COOLED RFA;  Surgeon: Vijaya Landin MD;  Location: [facility];  Service: Pain Management;  Laterality: Left;    RADIOFREQUENCY ABLATION Right 08/01/2023    Procedure:  "RADIOFREQUENCY ABLATION, RIGHT GENICULAR COOLED;  Surgeon: Vijaya Landin MD;  Location: [facility];  Service: Pain Management;  Laterality: Right;    RADIOFREQUENCY ABLATION Left 12/26/2023    Procedure: RADIOFREQUENCY ABLATION LEFT GENICULAR 1 OF 2;  Surgeon: Vijaya Landin MD;  Location: [facility];  Service: Pain Management;  Laterality: Left;  [phone]    right ganglion cyst      throat polyp      TRANSOBTURATOR SLING  2011    with RSO for persistent complex cyst & MARGOT; done by [facility]    UPPER GASTROINTESTINAL ENDOSCOPY  2007     Review of patient's allergies indicates:   Allergen Reactions    Talwin compound Anxiety     hallucinations/anxiety    Tramadol Itching    Buspirone Anxiety    Codeine Itching    Morphine Itching     jittery    Morpholine analogues Anxiety and Itching    Naproxen Itching     Takes Ibuprofen is ok on rare occasion     Nexium [esomeprazole magnesium] Other (See Comments)     constipation    Wal-phed [pseudoephedrine hcl] Itching      Current Facility-Administered Medications   Medication    0.9%  NaCl infusion     Facility-Administered Medications Ordered in Other Encounters   Medication    triamcinolone acetonide injection 40 mg       PMHx, PSHx, Allergies, Medications reviewed in epic    ROS negative except pain complaints in HPI    OBJECTIVE:    BP (!) 149/79 (BP Location: Right arm, Patient Position: Lying)   Pulse 78   Temp 97.6 °F (36.4 °C) (Oral)   Resp 14   Ht 5' 5" (1.651 m)   Wt 87.1 kg (192 lb)   SpO2 98%   Breastfeeding No   BMI 31.95 kg/m²     PHYSICAL EXAMINATION:    GENERAL: Well appearing, in no acute distress, alert and oriented x3.  PSYCH:  Mood and affect appropriate.  SKIN: Skin color, texture, turgor normal, no rashes or lesions which will impact the procedure.  CV: RRR with palpation of the radial artery.  PULM: No evidence of respiratory difficulty, symmetric chest rise. Clear to auscultation.  NEURO: Cranial nerves grossly " intact.    Plan:    Proceed with procedure as planned Procedure(s) (LRB):  RADIOFREQUENCY ABLATION RIGHT GENICULAR 2 OF 2 (Right)    Belal Alammar  02/06/2024

## 2024-02-06 NOTE — OP NOTE
Therapeutic Genicular Cooled Nerve Radiofrequency Ablation under Fluoroscopy     The procedure, risks, benefits, and options were discussed with the patient. There are no contraindications to the procedure. The patent expressed understanding and agreed to the procedure. Informed written consent was obtained prior to the start of the procedure and can be found in the patient's chart.        PATIENT NAME: Sherrill Avery   MRN: [id number]     DATE OF PROCEDURE: 02/06/2024     PROCEDURE: Therapeutic Right Genicular Cooled Nerve Radiofrequency Ablation under Fluoroscopy    PRE-OP DIAGNOSIS: Chronic pain of both knees [M25.561, M25.562, G89.29]    POST-OP DIAGNOSIS: Chronic pain of both knees [M25.561, M25.562, G89.29]    PHYSICIAN: Vijaya Landin MD    ASSISTANTS: Errol Rob, [facility] Fellow     MEDICATIONS INJECTED:  Preservative-free Kenalog 40mg with 9cc of Bupivicaine 0.25%    LOCAL ANESTHETIC INJECTED:   Xylocaine 2%    SEDATION: Versed 1.5 and Fentanyl 50mcg                                                                                                                                                                                     Conscious sedation ordered by M.D. Patient re-evaluation prior to administration of conscious sedation. No changes noted in patient's status from initial evaluation. The patient's vital signs were monitored by RN and patient remained hemodynamically stable throughout the procedure.    Event Time In   Sedation Start 1048   Sedation End 1119       ESTIMATED BLOOD LOSS:  None    COMPLICATIONS:  None     INTERVAL HISTORY: Patient has clinical findings of chronic knee pain. Patients has completed 2 previous diagnostic genicular nerve blocks with at least 80% relief for the expected duration of the local anesthetic utilized.     TECHNIQUE: Time-out was performed to identify the patient and procedure to be performed. With the patient laying in a supine position, the surgical area  was prepped and draped in the usual sterile fashion using ChloraPrep and fenestrated drape. Three target sites including the superior lateral genicular nerve where the lateral femoral shaft meets the epicondyle, the superior medial genicular nerve where the medial femoral shaft meets the epicondyle, and the inferior medial genicular nerve where the medial tibial shaft meets the epicondyle, were determined under fluoroscopic guidance. Skin anesthesia was achieved by injecting Lidocaine 2% over the injection sites. A 17 gauge, 50mm, 10mm active tip needle was then advanced under fluoroscopy in the AP and lateral views into the positions of the geniculate nerves at these levels. This was followed by motor testing at each of the nerves to ensure that there was no dorsiflexion of the foot. After negative aspiration for blood was confirmed, 1 mL of the lidocaine 2% listed above was injected slowly at each site. This was followed by cooled thermal lesioning at 80 degrees celsius for 150 seconds at each site. That was followed by slowly injecting 1 mL of the medication mixture listed above at each site. The needles were removed and bleeding was nil. A sterile dressing was applied. No specimens collected. The patient tolerated the procedure well and did not have any procedure related motor deficit at the conclusion of the procedure.    The patient was monitored after the procedure in the recovery area. They were given post-procedure and discharge instructions to follow at home. The patient was discharged in a stable condition.    I reviewed and edited the fellow's note. I conducted my own interview and physical examination. I agree with the findings. I was present and supervising all critical portions of the procedure.    Errol Rob MD

## 2024-02-06 NOTE — DISCHARGE SUMMARY
Discharge Note  Short Stay      SUMMARY     Admit Date: 2/6/2024    Attending Physician: Errol Rob      Discharge Physician: Errol Rob      Discharge Date: 2/6/2024     Procedure(s) (LRB):  RADIOFREQUENCY ABLATION RIGHT GENICULAR 2 OF 2 (Right)    Final Diagnosis: Chronic pain of both knees [M25.561, M25.562, G89.29]    Disposition: Home or self care    Patient Instructions:   Current Discharge Medication List        CONTINUE these medications which have NOT CHANGED    Details   ALPRAZolam (XANAX) 0.5 MG tablet Take daily as needed for anxiety.  Qty: 30 tablet, Refills: 5      buPROPion (WELLBUTRIN XL) 150 MG TB24 tablet Take 150 mg by mouth every morning.      butalbital-acetaminophen-caffeine -40 mg (FIORICET, ESGIC) -40 mg per tablet TAKE 1 TABLET EVERY 4 HOURS AS NEEDED  Qty: 30 tablet, Refills: 0    Associated Diagnoses: Headache, unspecified headache type      clotrimazole (LOTRIMIN) 1 % Soln APPLY TOPICALLY 2 TIMES DAILY FOR 7 DAYS  Qty: 30 mL, Refills: 2      COVID-19 antigen test ([facility] COVID-19 AG HOME TEST) Kit use as directed      diazePAM (VALIUM) 5 MG tablet Take 5 mg by mouth.      diclofenac sodium (VOLTAREN) 1 % Gel Apply 2 g topically daily as needed.  Qty: 100 g, Refills: 11      diflorasone-emollient (APEXICON E) 0.05 % Crea Apply 1 application topically once daily. for 7 days  Qty: 30 g, Refills: 5      docusate sodium (COLACE) 100 MG capsule Take 100 mg by mouth.      erythromycin (ROMYCIN) ophthalmic ointment Apply ointment to lids and lashes two times daily for 7 days.  Qty: 3.5 g, Refills: 0    Associated Diagnoses: Hordeolum internum of right upper eyelid      estradioL (ESTRACE) 1 MG tablet Take 1 tablet (1 mg total) by mouth once daily.  Qty: 90 tablet, Refills: 0    Associated Diagnoses: Menopause syndrome      famotidine (PEPCID) 20 MG tablet Take 20 mg by mouth.      fluocinonide (LIDEX) 0.05 % external solution Apply topically 2 (two) times daily. Do not use  morning of surgery      hydrocortisone 2.5 % cream Apply topically 2 (two) times daily. apply to affected area for 7 days  Qty: 20 g, Refills: 5      ketoconazole (NIZORAL) 2 % shampoo Do not use morning of surgery      levothyroxine (SYNTHROID) 112 MCG tablet Take 1 tablet (112 mcg total) by mouth before breakfast. TAKE 1 TABLET (112 MCG TOTAL) BY MOUTH BEFORE BREAKFAST AS SCHEDULED Strength: 112 mcg  Qty: 90 tablet, Refills: 4      metronidazole 1% (METROGEL) 1 % Gel Apply topically once daily.  Qty: 60 g, Refills: 5      naltrexone (DEPADE) 50 mg tablet 1/4 pill twice daily  Qty: 15 tablet, Refills: 0    Associated Diagnoses: Class 1 obesity due to excess calories with serious comorbidity and body mass index (BMI) of 31.0 to 31.9 in adult      omeprazole (PRILOSEC) 10 MG capsule Take 10 mg by mouth.      ondansetron (ZOFRAN) 4 MG tablet       oxyCODONE-acetaminophen (PERCOCET) 5-325 mg per tablet Take 1 tablet by mouth every 6 (six) hours as needed for Pain.  Qty: 30 each, Refills: 0    Comments: Quantity prescribed more than 7 day supply? Yes, quantity medically necessary, metastatic cancer patient.  Associated Diagnoses: Clear cell carcinoma; Carcinoma of right kidney; Carcinoma of kidney, unspecified laterality; History of right nephrectomy      oxymetazoline (AFRIN) 0.05 % nasal spray 2 sprays by Nasal route 2 (two) times daily.      pravastatin (PRAVACHOL) 20 MG tablet TAKE 1 TABLET EVERY DAY  Qty: 90 tablet, Refills: 4      senna (SENOKOT) 8.6 mg tablet Take 1 tablet by mouth.      zolpidem (AMBIEN) 5 MG Tab Take 1 tablet (5 mg total) by mouth nightly as needed.  Qty: 90 tablet, Refills: 0                 Discharge Diagnosis: Chronic pain of both knees [M25.561, M25.562, G89.29]  Condition on Discharge: Stable with no complications to procedure   Diet on Discharge: Same as before.  Activity: as per instruction sheet.  Discharge to: Home with a responsible adult.  Follow up: 2-4 weeks       Please call my  office or pager at [phone] if experienced any weakness or loss of sensation, fever > 101.5, pain uncontrolled with oral medications, persistent nausea/vomiting/or diarrhea, redness or drainage from the incisions, or any other worrisome concerns. If physician on call was not reached or could not communicate with our office for any reason please go to the nearest emergency department

## 2024-02-06 NOTE — DISCHARGE INSTRUCTIONS

## 2024-02-07 ENCOUNTER — PATIENT MESSAGE (OUTPATIENT)
Dept: PSYCHIATRY | Facility: CLINIC | Age: 74
End: 2024-02-07
Payer: MEDICARE

## 2024-02-22 RX ORDER — BUPROPION HYDROCHLORIDE 150 MG/1
150 TABLET ORAL
Qty: 90 TABLET | Refills: 0 | Status: SHIPPED | OUTPATIENT
Start: 2024-02-22 | End: 2024-03-13 | Stop reason: SDUPTHER

## 2024-02-27 DIAGNOSIS — Z00.00 ENCOUNTER FOR MEDICARE ANNUAL WELLNESS EXAM: ICD-10-CM

## 2024-03-11 ENCOUNTER — PATIENT MESSAGE (OUTPATIENT)
Dept: INTERNAL MEDICINE | Facility: CLINIC | Age: 74
End: 2024-03-11
Payer: MEDICARE

## 2024-03-11 RX ORDER — AZELASTINE 1 MG/ML
1 SPRAY, METERED NASAL 2 TIMES DAILY PRN
Qty: 30 ML | Refills: 2 | Status: SHIPPED | OUTPATIENT
Start: 2024-03-11 | End: 2025-03-11

## 2024-03-11 NOTE — TELEPHONE ENCOUNTER
Care Due:                  Date            Visit Type   Department     Provider  --------------------------------------------------------------------------------                                EP -                              PRIMARY      [facility] INTER[facility]  Last Visit: 11-      [facility] ([facility])   VANCE Macias                              EP -                              PRIMARY      [facility] INTER[facility]  Next Visit: 05-      [facility] ([facility])   VANCE Macias                                                            Last  Test          Frequency    Reason                     Performed    Due Date  --------------------------------------------------------------------------------    CBC.........  12 months..  diclofenac...............  Not Found    Overdue    CMP.........  12 months..  diclofenac, pravastatin..  10-   09-    TSH.........  12 months..  levothyroxine............  Not Found    Overdue    [employer] Embedded Care Due Messages. Reference number: [id number].   3/11/2024 11:02:23 AM CDT

## 2024-03-13 ENCOUNTER — OFFICE VISIT (OUTPATIENT)
Dept: PSYCHIATRY | Facility: CLINIC | Age: 74
End: 2024-03-13
Payer: MEDICARE

## 2024-03-13 VITALS
DIASTOLIC BLOOD PRESSURE: 76 MMHG | WEIGHT: 192.44 LBS | BODY MASS INDEX: 32.03 KG/M2 | HEART RATE: 80 BPM | SYSTOLIC BLOOD PRESSURE: 124 MMHG

## 2024-03-13 DIAGNOSIS — F41.1 GENERALIZED ANXIETY DISORDER: Primary | ICD-10-CM

## 2024-03-13 DIAGNOSIS — G47.00 INSOMNIA, UNSPECIFIED TYPE: ICD-10-CM

## 2024-03-13 PROCEDURE — 99212 OFFICE O/P EST SF 10 MIN: CPT | Mod: PBBFAC | Performed by: PSYCHIATRY & NEUROLOGY

## 2024-03-13 PROCEDURE — 99999 PR PBB SHADOW E&M-EST. PATIENT-LVL II: CPT | Mod: PBBFAC,,, | Performed by: PSYCHIATRY & NEUROLOGY

## 2024-03-13 PROCEDURE — 99214 OFFICE O/P EST MOD 30 MIN: CPT | Mod: S$PBB,,, | Performed by: PSYCHIATRY & NEUROLOGY

## 2024-03-13 RX ORDER — BUPROPION HYDROCHLORIDE 150 MG/1
150 TABLET ORAL DAILY
Qty: 90 TABLET | Refills: 1 | Status: SHIPPED | OUTPATIENT
Start: 2024-03-13

## 2024-03-13 NOTE — PROGRESS NOTES
"Outpatient Psychiatry Follow-up Visit (MD/NP)    3/13/2024  Sherrill Avery, a 73 y.o. female, presenting for follow-up visit. Met with patient.    Reason for Encounter: Follow-up, adjustment disorder.     Interval History: Patient seen and interviewed for psych reassessment, last seen about eleven months ago. Started therapy with Dr. Rojas. Good rapport. Lung nodules - observed. Lesion of concern on Pancreas - getting imaging. Feels ok. Brother dying of CA.   Adherent to medication. Describes benefit. Mild nausea, has zofran when needed. Taking xanax prn. Zolpidem about 1x/week.   Alprazolam for procedures & doctor's appointments. From Dr. Macias. Adherent to medication. Denies side effects.     Background: This 65 y/o WF with hx of Renal CA (clear cell) dx'ed in '13, diagnosed with a recurrence in March '17 (spread to lung). Reports no treatment currently recommended. Was prescribed sertraline, ambien, xanax since receiving news of recurrence related to anxiety and sleep problems she's endorsed to other providers. She couldn't tolerate sertraline, has found the others helpful. Says "Cancer stays on my mind all the time". Takes xanax at most once daily, has increased use from about 10/month prior to recurrence to about twice that since. Reports sad less than half the time, generally with good interest & energy, & feels she's participating fully in life. Denies avoidance behaviors. Is participating in spiritual relationship with a renowned nun known for healing & consolation & she's considered their visits very therapeutic. No eating problems. Was prescribed sertraline (couldn't tolerate). She has been intermittently prescribed duloxetine for fibromyalgia, is back on it currently. PsychHx: as above. Xanax back to '13 around time of cancer diagnosis. No hx of suicidal thoughts, self-harm behaviors, paranoia, hallucinations, rebecca, psych hospitalizations. Past psych meds include sertraline (recently), escitalopram " '14, buspirone. MedHx: as above; HTN, fibromyalgia, bursitis, B sensorineural hearing loss. SocialHx: no problems with gestation, birth, infancy or early childhood development. Good student. Normal number of friends. Grew up with in [place] with parents. Lives with ; have 5 children & grandchildren & great-grandchildren. Good terms with kids, friends, neighbors, Zoroastrian. Lives in [place].  x 35 years. Supportive relationship. SubstanceHx; rare alcohol, no illicits, no prescription misuse.     Review Of Systems:     GENERAL:  No weight gain or loss  SKIN:  No rashes or lacerations  HEAD:  No headaches  CHEST:  No shortness of breath, hyperventilation or cough  CARDIOVASCULAR:  No tachycardia or chest pain  ABDOMEN:  No nausea, vomiting, pain, constipation or diarrhea  URINARY:  No frequency, dysuria or sexual dysfunction  ENDOCRINE:  No polydipsia, polyuria  MUSCULOSKELETAL:  Joint pain  NEUROLOGIC:  No weakness, sensory changes, seizures, confusion, memory loss, tremor or other abnormal movements    Current Evaluation:     Nutritional Screening: Considering the patient's height and weight, medications, medical history and preferences, should a referral be made to the dietitian? no    Constitutional  Vitals:  Most recent vital signs, dated less than 90 days prior to this appointment, were not reviewed.    There were no vitals filed for this visit.       General:  unremarkable, age appropriate     Musculoskeletal  Muscle Strength/Tone:  no dystonia, no tremor   Gait & Station:  non-ataxic     Psychiatric  Appearance: casually dressed & groomed;   Behavior: calm,   Cooperation: cooperative with assessment  Speech: normal rate, volume, tone  Thought Process: linear, goal-directed  Thought Content: No suicidal or homicidal ideation; no delusions  Affect: tearful  Mood: mildly dysphoric  Perceptions: No auditory or visual hallucinations  Level of Consciousness: alert throughout  interview  Insight: fair  Cognition: Oriented to person, place, time, & situation  Memory: no apparent deficits to general clinical interview; not formally assessed  Attention/Concentration: no apparent deficits to general clinical interview; not formally assessed  Fund of Knowledge: average by vocabulary/education    Laboratory Data  No visits with results within 1 Month(s) from this visit.   Latest known visit with results is:   Lab Visit on 10/18/2023   Component Date Value Ref Range Status    Cholesterol 10/18/2023 194  120 - 199 mg/dL Final    Triglycerides 10/18/2023 97  30 - 150 mg/dL Final    HDL 10/18/2023 66  40 - 75 mg/dL Final    LDL Cholesterol 10/18/2023 108.6  63.0 - 159.0 mg/dL Final    HDL/Cholesterol Ratio 10/18/2023 34.0  20.0 - 50.0 % Final    Total Cholesterol/HDL Ratio 10/18/2023 2.9  2.0 - 5.0 Final    Non-HDL Cholesterol 10/18/2023 128  mg/dL Final         Medications  Outpatient Encounter Medications as of 3/13/2024   Medication Sig Dispense Refill    ALPRAZolam (XANAX) 0.5 MG tablet Take daily as needed for anxiety. 30 tablet 5    azelastine (ASTELIN) 137 mcg (0.1 %) nasal spray 1 spray (137 mcg total) by Nasal route 2 (two) times daily as needed for Rhinitis. 30 mL 2    buPROPion (WELLBUTRIN XL) 150 MG TB24 tablet TAKE 1 TABLET EVERY DAY 90 tablet 0    butalbital-acetaminophen-caffeine -40 mg (FIORICET, ESGIC) -40 mg per tablet TAKE 1 TABLET EVERY 4 HOURS AS NEEDED 30 tablet 0    clotrimazole (LOTRIMIN) 1 % Soln APPLY TOPICALLY 2 TIMES DAILY FOR 7 DAYS 30 mL 2    COVID-19 antigen test ([facility] COVID-19 AG HOME TEST) Kit use as directed      diazePAM (VALIUM) 5 MG tablet Take 5 mg by mouth.      diclofenac sodium (VOLTAREN) 1 % Gel Apply 2 g topically daily as needed. 100 g 11    diflorasone-emollient (APEXICON E) 0.05 % Crea Apply 1 application topically once daily. for 7 days 30 g 5    docusate sodium (COLACE) 100 MG capsule Take 100 mg by mouth.      estradioL (ESTRACE) 1 MG  tablet Take 1 tablet (1 mg total) by mouth once daily. 90 tablet 0    famotidine (PEPCID) 20 MG tablet Take 20 mg by mouth.      fluocinonide (LIDEX) 0.05 % external solution Apply topically 2 (two) times daily. Do not use morning of surgery      hydrocortisone 2.5 % cream Apply topically 2 (two) times daily. apply to affected area for 7 days 20 g 5    ketoconazole (NIZORAL) 2 % shampoo Do not use morning of surgery      levothyroxine (SYNTHROID) 112 MCG tablet Take 1 tablet (112 mcg total) by mouth before breakfast. TAKE 1 TABLET (112 MCG TOTAL) BY MOUTH BEFORE BREAKFAST AS SCHEDULED Strength: 112 mcg 90 tablet 4    metronidazole 1% (METROGEL) 1 % Gel Apply topically once daily. 60 g 5    naltrexone (DEPADE) 50 mg tablet 1/4 pill twice daily 15 tablet 0    omeprazole (PRILOSEC) 10 MG capsule Take 10 mg by mouth.      ondansetron (ZOFRAN) 4 MG tablet       oxyCODONE-acetaminophen (PERCOCET) 5-325 mg per tablet Take 1 tablet by mouth every 6 (six) hours as needed for Pain. 30 each 0    pravastatin (PRAVACHOL) 20 MG tablet TAKE 1 TABLET EVERY DAY 90 tablet 4    senna (SENOKOT) 8.6 mg tablet Take 1 tablet by mouth.      zolpidem (AMBIEN) 5 MG Tab Take 1 tablet (5 mg total) by mouth nightly as needed. 90 tablet 0    [DISCONTINUED] buPROPion (WELLBUTRIN XL) 150 MG TB24 tablet Take 150 mg by mouth every morning.       Facility-Administered Encounter Medications as of 3/13/2024   Medication Dose Route Frequency Provider Last Rate Last Admin    triamcinolone acetonide injection 40 mg  40 mg Intra-articular  Osmar Avery III, MD   40 mg at 05/17/22 2574     Assessment - Diagnosis - Goals:     Impression: 74 y/o WF with anxiety disorder with adjustment component disorder in context of uncertainty in cancer treatment outcome. Some intermittent problems with sleep, anxiety related to diagnosis/prognosis, existential/mortality related issues. Gets good benefit from spiritual counseling and is quite satisfied with care.  Hasn't tolerated several monoaminergic meds, gets benefit from prn benzos, doesn't have a daily need for a prn. Tried psychotherapy. New cancer recurrence, s/p treatment, getting regular monitoring.     Dx: depression.     Treatment Goals:  Specify outcomes written in observable, behavioral terms:   Comfort and assist in adjustment to diagnosis/treatment/prognosis.     Treatment Plan/Recommendations:     Continue bupropion.   Continue psychotherapy. Patient also gets spiritual counseling.    Takes prn zolpidem, prn alprazolam. Discussed risks, benefits, and alternatives to treatment plan documented above with patient. I answered all patient questions related to this plan and patient expressed understanding and agreement.     Return to Clinic: 2 months    C. Bill Asif MD

## 2024-03-14 ENCOUNTER — DOCUMENTATION ONLY (OUTPATIENT)
Dept: PSYCHIATRY | Facility: CLINIC | Age: 74
End: 2024-03-14
Payer: MEDICARE

## 2024-03-14 ENCOUNTER — OFFICE VISIT (OUTPATIENT)
Dept: HEMATOLOGY/ONCOLOGY | Facility: CLINIC | Age: 74
End: 2024-03-14
Payer: MEDICARE

## 2024-03-14 VITALS
HEART RATE: 65 BPM | SYSTOLIC BLOOD PRESSURE: 145 MMHG | OXYGEN SATURATION: 97 % | BODY MASS INDEX: 31.85 KG/M2 | DIASTOLIC BLOOD PRESSURE: 80 MMHG | WEIGHT: 191.13 LBS | HEIGHT: 65 IN | RESPIRATION RATE: 14 BRPM | TEMPERATURE: 98 F

## 2024-03-14 DIAGNOSIS — C80.1 CLEAR CELL CARCINOMA: Primary | ICD-10-CM

## 2024-03-14 PROCEDURE — 99215 OFFICE O/P EST HI 40 MIN: CPT | Mod: S$PBB,,, | Performed by: INTERNAL MEDICINE

## 2024-03-14 PROCEDURE — 99215 OFFICE O/P EST HI 40 MIN: CPT | Mod: PBBFAC | Performed by: INTERNAL MEDICINE

## 2024-03-14 PROCEDURE — 99999 PR PBB SHADOW E&M-EST. PATIENT-LVL V: CPT | Mod: PBBFAC,,, | Performed by: INTERNAL MEDICINE

## 2024-03-14 RX ORDER — BRIMONIDINE 5 MG/G
GEL TOPICAL
COMMUNITY
Start: 2024-01-18

## 2024-03-14 RX ORDER — MOMETASONE FUROATE 1 MG/ML
SOLUTION TOPICAL
COMMUNITY
Start: 2023-12-05 | End: 2024-12-04

## 2024-03-14 RX ORDER — LEVOCETIRIZINE DIHYDROCHLORIDE 5 MG/1
5 TABLET, FILM COATED ORAL
COMMUNITY
Start: 2023-12-28 | End: 2024-06-25

## 2024-03-14 NOTE — PROGRESS NOTES
Subjective:       Patient ID: Sherrill Avery    Chief Complaint:  Met ccRCC    HPI     Sherrill Avery is a 73 y.o. 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 She is s/p left VATS LLL wedge resection on 1/29/20.     Here to follow-up Currently NOT on therapy. Disease has been indolent.      Saw Dr. Leslie at [facility], PET scan 6/2022 shows post-therapy changes in the right lung and no other sites concerning for metastases. CT CAP 9/2022 shows multiple stable pulmonary nodules, DC 0.5 x 0.6 cm pulmonary nodule is slightly larger. Indeterminate 8 mm intramuscular lesion (previously 6 mm) may represent metastatic disease. Now intermediate - constant L rib pain + chronic intermittent back pain that she has noted since VATS. Gabapentin has relieved some symptoms.     Saw Dr. Leslie 9/2022, has scans, IR biopsy and visit scheduled 1/2023. Underwent knee replacement surgery of L in [place], had prolonged recovery.    Sees pain mgmt at [facility]. Recently established with psych onc at the [facility].     Pt seeing Sister Janessa, the healing nun.  Ses Dr. Leslie at [facility].     Recent scans at [facility] were stable.   She did have a MRI of the pancreas that showed questionable benign lesions, she would like to repeat that here.        Feels anxious, seeing psych at the [facility].        Oncologic History:    Clear cell carcinoma of right kidney.    6/30/2013 Initial Diagnosis   Presented with gross hematuria. CT CAP: 7.2-cm mass occupying the upper two thirds of the right kidney. 2.8-cm mass in the posterior aspect of the urinary bladder c/w TCC.    7/25/2013 Surgery   Right radical nephrectomy Pathology: 7.5-cm clear cell RCC Yanet nuclear grade 2, with microscopic extension into perinephric adipose tissue and with tumor in the renal margin.  pT3a pN0 pMX    10/16/2013 - No Evidence of Disease ([facility])   CT AP: No CT evidence of recurrence or metastatic disease    8/4/2015  - No Evidence of Disease ([facility])   CT AP: No CT evidence of recurrence or metastatic disease    12/1/2016 - [facility] with concern of recurrence   CT AP: Mew less than 4 mm pulmonary nodule in the anterior basal segment of the RLL. Stable 4 mm pulmonary nodule posterior basal segment RLL.     3/1/2017 Relapse/Recurrence   CT Chest: Multiple new subcm pulmonary nodules in the RLL and one in the right middle measuring up to 6 mm suspicious for metastatic disease.     2/8/2019 - [facility] with concern of recurrence   1. Small volume pulmonary metastases are slightly larger compared to the prior study.     2. No recurrent or metastatic disease in the abdomen or pelvis.    2/14/2019 Biopsy   CT-guided biopsy of a 1 cm left lower lobe lesion   Pathology: Small focus of atypical adenomatous hyperplasia with no tumor present. PAX-8 negative.    2/7/2022 - 3/3/2022 TX-Radiation Therapy   1 RUL x06 VMAT 02/07/2022 02/25/2022 400 cGy 15/15 6000/6000 cGy   1 RLL x06 STEREOTACTIC 02/28/2022 03/03/2022 1250 cGy 4/4 5000cGy    She was initally noted to have spontaneous remission of the lung nodules in the absence of any systemic treatment. However, in 2019, the lung nodules at started to increase in size but were still mostly subcentimeter in greatest dimension. In 2/2019, when the left lower lobe lesion increased to 1.4 cm. IR biopsied a peripheral left lung lesion which was PAX-8 negative thought to be a small focus of atypical adenomatous hyperplasia. We therefore brought her back after only 3 months to monitor those lesions carefully. She was referred to Dr. King who thinks the lesions represent indolent RCC metastases. She underwent resection and RCC was confirmed on pathology at [facility].      Review of Systems   Constitutional: Negative for activity change, appetite change, chills, fatigue, fever and unexpected weight change.   HENT: Negative for congestion, hearing loss, mouth sores, sore throat, tinnitus and voice change.    Eyes:  Negative for pain and visual disturbance.   Respiratory: Negative for cough, shortness of breath and wheezing.    Cardiovascular: Negative for chest pain, palpitations and leg swelling.   Gastrointestinal: Negative for abdominal pain, constipation, diarrhea, nausea and vomiting.   Endocrine: Negative for cold intolerance and heat intolerance.   Genitourinary: Negative for difficulty urinating, dyspareunia, dysuria, frequency, menstrual problem, urgency, vaginal bleeding, vaginal discharge and vaginal pain.   Musculoskeletal: Positive for arthralgias. Negative for back pain and myalgias.   Skin: Negative for color change, rash and wound.   Allergic/Immunologic: Negative for environmental allergies and food allergies.   Neurological: Negative for weakness, numbness and headaches.   Hematological: Negative for adenopathy. Does not bruise/bleed easily.   Psychiatric/Behavioral: Negative for agitation, confusion, hallucinations and sleep disturbance. The patient is nervous/anxious.    All other systems reviewed and are negative.          Allergies:  Review of patient's allergies indicates:   Allergen Reactions    Talwin compound Anxiety     hallucinations/anxiety    Tramadol Itching    Buspirone Anxiety    Codeine Itching    Morphine Itching     jittery    Morpholine analogues Anxiety and Itching    Naproxen Itching     Takes Ibuprofen is ok on rare occasion     Nexium [esomeprazole magnesium] Other (See Comments)     constipation    Wal-phed [pseudoephedrine hcl] Itching       Medications:  Current Outpatient Medications   Medication Sig Dispense Refill    ALPRAZolam (XANAX) 0.5 MG tablet Take daily as needed for anxiety. 30 tablet 5    azelastine (ASTELIN) 137 mcg (0.1 %) nasal spray 1 spray (137 mcg total) by Nasal route 2 (two) times daily as needed for Rhinitis. 30 mL 2    brimonidine (MIRVASO) 0.33 % GlwP Apply topically.      buPROPion (WELLBUTRIN XL) 150 MG TB24 tablet Take 1 tablet (150 mg total) by mouth once  daily. 90 tablet 1    butalbital-acetaminophen-caffeine -40 mg (FIORICET, ESGIC) -40 mg per tablet TAKE 1 TABLET EVERY 4 HOURS AS NEEDED 30 tablet 0    clotrimazole (LOTRIMIN) 1 % Soln APPLY TOPICALLY 2 TIMES DAILY FOR 7 DAYS 30 mL 2    COVID-19 antigen test ([facility] COVID-19 AG HOME TEST) Kit use as directed      diazePAM (VALIUM) 5 MG tablet Take 5 mg by mouth.      diclofenac sodium (VOLTAREN) 1 % Gel Apply 2 g topically daily as needed. 100 g 11    docusate sodium (COLACE) 100 MG capsule Take 100 mg by mouth.      estradioL (ESTRACE) 1 MG tablet Take 1 tablet (1 mg total) by mouth once daily. 90 tablet 0    famotidine (PEPCID) 20 MG tablet Take 20 mg by mouth.      fluocinonide (LIDEX) 0.05 % external solution Apply topically 2 (two) times daily. Do not use morning of surgery      ketoconazole (NIZORAL) 2 % shampoo Do not use morning of surgery      levocetirizine (XYZAL) 5 MG tablet Take 5 mg by mouth.      levothyroxine (SYNTHROID) 112 MCG tablet Take 1 tablet (112 mcg total) by mouth before breakfast. TAKE 1 TABLET (112 MCG TOTAL) BY MOUTH BEFORE BREAKFAST AS SCHEDULED Strength: 112 mcg 90 tablet 4    metronidazole 1% (METROGEL) 1 % Gel Apply topically once daily. 60 g 5    mometasone (ELOCON) 0.1 % solution Apply topically.      naltrexone (DEPADE) 50 mg tablet 1/4 pill twice daily 15 tablet 0    omeprazole (PRILOSEC) 10 MG capsule Take 10 mg by mouth.      ondansetron (ZOFRAN) 4 MG tablet       oxyCODONE-acetaminophen (PERCOCET) 5-325 mg per tablet Take 1 tablet by mouth every 6 (six) hours as needed for Pain. 30 each 0    pravastatin (PRAVACHOL) 20 MG tablet TAKE 1 TABLET EVERY DAY 90 tablet 4    senna (SENOKOT) 8.6 mg tablet Take 1 tablet by mouth.      diflorasone-emollient (APEXICON E) 0.05 % Crea Apply 1 application topically once daily. for 7 days 30 g 5    hydrocortisone 2.5 % cream Apply topically 2 (two) times daily. apply to affected area for 7 days 20 g 5    zolpidem (AMBIEN) 5 MG Tab  Take 1 tablet (5 mg total) by mouth nightly as needed. 90 tablet 0     No current facility-administered medications for this visit.     Facility-Administered Medications Ordered in Other Visits   Medication Dose Route Frequency Provider Last Rate Last Admin    triamcinolone acetonide injection 40 mg  40 mg Intra-articular  Osmar Avery III, MD   40 mg at 05/17/22 1345       PMH:  Past Medical History:   Diagnosis Date    Anxiety     Family history of malignant melanoma 08/25/2014    Fibromyalgia affecting lower leg     GERD (gastroesophageal reflux disease)     Hx of psychiatric care     Hypercholesteremia     Hypertension     Hypothyroidism     Inflamed seborrheic keratosis 08/25/2014    Microscopic hematuria 02/02/2017    Multiple benign melanocytic nevi 08/25/2014    Renal cell carcinoma of right kidney metastatic to other site     Therapy        PSH:  Past Surgical History:   Procedure Laterality Date    AUGMENTATION OF BREAST      bladder lift      breast implants      BREAST SURGERY Bilateral 1996    saline implants    COLONOSCOPY  2007    HYSTERECTOMY      ectopic pregnancy x 2, with LSO    KNEE ARTHROPLASTY Left 06/14/2021    Procedure: ARTHROPLASTY, KNEE:LEFT:DEPUY-SIGMA;  Surgeon: Osmar Avery III, MD;  Location: [facility];  Service: Orthopedics;  Laterality: Left;    LAPAROSCOPIC NEPHRECTOMY, HAND ASSISTED  07/25/2013    LUNG REMOVAL, PARTIAL Left 2020    At [facility]    NEPHRECTOMY      OOPHORECTOMY Bilateral     RADIOFREQUENCY ABLATION Left 08/30/2022    Procedure: RADIOFREQUENCY ABLATION, LEFT KNEE COOLED;  Surgeon: Vijaya Landin MD;  Location: [facility] PAIN [facility]GT;  Service: Pain Management;  Laterality: Left;    RADIOFREQUENCY ABLATION Right 10/25/2022    Procedure: RADIOFREQUENCY ABLATION RIGHT KNEE GENICULAR COOLED;  Surgeon: Vijaya Landin MD;  Location: [facility] PAIN [facility]GT;  Service: Pain Management;  Laterality: Right;    RADIOFREQUENCY ABLATION Left 03/07/2023    Procedure: RADIOFREQUENCY  ABLATION LEFT GENICULAR COOLED RFA;  Surgeon: Vijaya Landin MD;  Location: [facility] PAIN [facility]GT;  Service: Pain Management;  Laterality: Left;    RADIOFREQUENCY ABLATION Right 08/01/2023    Procedure: RADIOFREQUENCY ABLATION, RIGHT GENICULAR COOLED;  Surgeon: Vijaya Landin MD;  Location: [facility] PAIN MGT;  Service: Pain Management;  Laterality: Right;    RADIOFREQUENCY ABLATION Left 12/26/2023    Procedure: RADIOFREQUENCY ABLATION LEFT GENICULAR 1 OF 2;  Surgeon: Vijaya Landin MD;  Location: [facility] PAIN MGT;  Service: Pain Management;  Laterality: Left;  [phone]    RADIOFREQUENCY ABLATION Right 2/6/2024    Procedure: RADIOFREQUENCY ABLATION RIGHT GENICULAR 2 OF 2;  Surgeon: Vijaya Landin MD;  Location: [facility] PAIN MGT;  Service: Pain Management;  Laterality: Right;  [phone]  *after 2/1/24    right ganglion cyst      throat polyp      TRANSOBTURATOR SLING  2011    with RSO for persistent complex cyst & MARGOT; done by [facility]    UPPER GASTROINTESTINAL ENDOSCOPY  2007       FamHx:  Family History   Problem Relation Age of Onset    Diabetes Mother     Hypertension Mother     Heart disease Mother     Cancer Father         lung    Migraines Father     Glaucoma Paternal Grandmother     Glaucoma Sister     Thyroid disease Neg Hx     Asthma Neg Hx        SocHx:  Social History     Socioeconomic History    Marital status:      Spouse name: KAIDEN    Number of children: 5   Occupational History    Occupation: retired     Comment: Dance Instructor   Tobacco Use    Smoking status: Never    Smokeless tobacco: Never   Substance and Sexual Activity    Alcohol use: Yes     Alcohol/week: 0.0 standard drinks of alcohol     Comment: social    Drug use: No    Sexual activity: Yes     Partners: Male     Birth control/protection: Surgical   Social History Narrative    Patient is a retired dance instructor and live with . Has stairs at home 12- has an elevator       Objective:      BP (!) 145/80 (BP Location:  "Left arm, Patient Position: Sitting, BP Method: Large (Automatic))   Pulse 65   Temp 98 °F (36.7 °C) (Oral)   Resp 14   Ht 5' 5" (1.651 m)   Wt 86.7 kg (191 lb 2.2 oz)   SpO2 97%   BMI 31.81 kg/m²     Physical Exam  Vitals and nursing note reviewed.   Constitutional:       General: She is not in acute distress.     Appearance: She is well-developed. She is not diaphoretic.   HENT:      Head: Normocephalic and atraumatic.      Nose: Nose normal.      Mouth/Throat:      Pharynx: No oropharyngeal exudate.   Eyes:      General:         Right eye: No discharge.         Left eye: No discharge.      Conjunctiva/sclera: Conjunctivae normal.      Pupils: Pupils are equal, round, and reactive to light.   Neck:      Trachea: No tracheal deviation.   Musculoskeletal:         General: No tenderness. Normal range of motion.   Skin:     General: Skin is warm and dry.      Coloration: Skin is not pale.      Findings: No erythema or rash.   Neurological:      Mental Status: She is alert and oriented to person, place, and time.      Cranial Nerves: No cranial nerve deficit.      Coordination: Coordination normal.      Deep Tendon Reflexes: Reflexes are normal and symmetric.   Psychiatric:         Behavior: Behavior normal.         Thought Content: Thought content normal.           Anesthesia/Surgery risks, benefits and alternative options discussed and understood by patient/family.  LABS:  WBC   Date Value Ref Range Status   08/17/2021 5.58 3.90 - 12.70 K/uL Final     Hemoglobin   Date Value Ref Range Status   08/17/2021 13.8 12.0 - 16.0 g/dL Final     Hematocrit   Date Value Ref Range Status   08/17/2021 42.4 37.0 - 48.5 % Final     Platelets   Date Value Ref Range Status   08/17/2021 215 150 - 450 K/uL Final       Chemistry        Component Value Date/Time     10/05/2022 1015    K 4.2 10/05/2022 1015     10/05/2022 1015    CO2 25 10/05/2022 1015    BUN 12 10/09/2023 1309    BUN 10 10/05/2022 1015    CREATININE " 1.00 (H) 10/09/2023 1309    CREATININE 0.8 10/05/2022 1015    GLU 86 10/05/2022 1015        Component Value Date/Time    CALCIUM 8.4 (L) 10/05/2022 1015    ALKPHOS 78 10/05/2022 1015    AST 15 10/05/2022 1015    ALT 14 10/05/2022 1015    BILITOT 0.7 10/05/2022 1015    ESTGFRAFRICA >60 08/17/2021 1135    EGFRNONAA >60 08/17/2021 1135              Assessment:       1. Clear cell carcinoma              Plan:       1. Metastatic ccRCC, very indolent cancer, current off therapy:    Currently on no systemic treatment. S/p wedge resection [facility] and XRT.      Recent scans at [facility] stable (not fully completed due to pt anxiety).      Previous MRI showed questionable pancreatic lesion, and [facility] requests that we repeat this. Pt does require sedation due to claustrophobia and anxiety.  Also has thyroid nodules that [facility] recommend we bx.  Will arrange this as well.      RTC for virtual visit to see me in 4 wks to review MRI and thyroid bx.     The patient agrees with the plan, and all questions have been answered to their satisfaction.      More than 40 mins total time were spent during this encounter.      Kervin Jaquez M.D., M.S., F.A.C.P.  Hematology/Oncology Attending  [facility]            Med Onc Chart Routing      Follow up with physician 3 weeks. Virtual appt to see me in 3 wks   Follow up with MARGARET    Infusion scheduling note    Injection scheduling note    Labs    Imaging MRI   Thyroid ultrasound, Abdominal MRI with SEDATION - For Sedation: [facility] [place], & Fri ONLY between 9a-4p at Inpatient MRI, call [phone] for the MRI .   Pharmacy appointment    Other referrals

## 2024-03-14 NOTE — PROGRESS NOTES
"  Patient cancelled visit. States brother is "actively dying." Will RTC in future.  JOSE Dorantes, PhD  "

## 2024-03-18 DIAGNOSIS — C64.9 CARCINOMA OF KIDNEY, UNSPECIFIED LATERALITY: Primary | ICD-10-CM

## 2024-03-18 NOTE — PROGRESS NOTES
Subjective:      Patient ID: Sherrill Avery is a 73 y.o. female.    Chief Complaint: Follow-up    HPI f/u chronic issues prob list below    And osteopenia no tx needed        Occas headache no change pattern req rf butal      Intermitt anxiety req rf xnax    Htn elv bp today. D/w monitoring;inc salty foods     Note off gabap.    Active Problem List with Overview Notes    Diagnosis Date Noted    Clear cell carcinoma with lung metastasis 04/24/2017    Multiple lung nodules on CT 02/08/2017    Anxiety 07/29/2016    Chronic insomnia 07/29/2016    Kidney carcinoma 06/02/2015    Hypertension     Hypercholesteremia     GERD (gastroesophageal reflux disease) 11/12/2013    Hypothyroidism 02/13/2013    Nontoxic multinodular goiter 02/13/2013    Generalized anxiety disorder 11/22/2023    Class 1 obesity due to excess calories with serious comorbidity and body mass index (BMI) of 31.0 to 31.9 in adult 06/20/2023    Leg weakness 09/26/2022    Acute neck pain 09/26/2022    Primary osteoarthritis of both knees 09/21/2022    Primary osteoarthritis of right knee 09/21/2022    Neck pain 04/12/2022    Chronic knee pain after total replacement of left knee joint 06/28/2021    Functional gait abnormality 06/28/2021    Osteoarthritis of left knee 06/14/2021    Hormone replacement therapy (HRT) 06/09/2021    CKD (chronic kidney disease) 06/08/2021    Allergic rhinitis 05/17/2021    It band syndrome, left 02/05/2019    Chronic pain of both knees 02/05/2019    Chondromalacia, patella, right 02/05/2019    Chondromalacia, patella, left 02/05/2019    Lumbar radiculopathy 02/05/2019    Subacromial bursitis 05/23/2017    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    Vitamin D deficiency 07/28/2015    Primary osteoarthritis involving multiple joints 08/26/2014    Fibromyalgia 08/26/2014    Actinic degeneration 08/25/2014    Dermatofibroma 08/25/2014    Bilateral sensorineural hearing loss 02/07/2013       Past Medical History:   Diagnosis Date     Anxiety     Family history of malignant melanoma 08/25/2014    Fibromyalgia affecting lower leg     GERD (gastroesophageal reflux disease)     Hx of psychiatric care     Hypercholesteremia     Hypertension     Hypothyroidism     Inflamed seborrheic keratosis 08/25/2014    Microscopic hematuria 02/02/2017    Multiple benign melanocytic nevi 08/25/2014    Renal cell carcinoma of right kidney metastatic to other site     Therapy       Past Surgical History:   Procedure Laterality Date    AUGMENTATION OF BREAST      bladder lift      breast implants      BREAST SURGERY Bilateral 1996    saline implants    COLONOSCOPY  2007    HYSTERECTOMY      ectopic pregnancy x 2, with LSO    KNEE ARTHROPLASTY Left 06/14/2021    Procedure: ARTHROPLASTY, KNEE:LEFT:DEPUY-SIGMA;  Surgeon: Osmar Avery III, MD;  Location: [facility] OR;  Service: Orthopedics;  Laterality: Left;    LAPAROSCOPIC NEPHRECTOMY, HAND ASSISTED  07/25/2013    LUNG REMOVAL, PARTIAL Left 2020    At [facility]    NEPHRECTOMY      OOPHORECTOMY Bilateral     RADIOFREQUENCY ABLATION Left 08/30/2022    Procedure: RADIOFREQUENCY ABLATION, LEFT KNEE COOLED;  Surgeon: Vijaya Landin MD;  Location: [facility] PAIN MGT;  Service: Pain Management;  Laterality: Left;    RADIOFREQUENCY ABLATION Right 10/25/2022    Procedure: RADIOFREQUENCY ABLATION RIGHT KNEE GENICULAR COOLED;  Surgeon: Vijaya Landin MD;  Location: [facility] PAIN MGT;  Service: Pain Management;  Laterality: Right;    RADIOFREQUENCY ABLATION Left 03/07/2023    Procedure: RADIOFREQUENCY ABLATION LEFT GENICULAR COOLED RFA;  Surgeon: Vijaya Landin MD;  Location: [facility] PAIN [facility]GT;  Service: Pain Management;  Laterality: Left;    RADIOFREQUENCY ABLATION Right 08/01/2023    Procedure: RADIOFREQUENCY ABLATION, RIGHT GENICULAR COOLED;  Surgeon: Vijaya Landin MD;  Location: [facility] PAIN MGT;  Service: Pain Management;  Laterality: Right;    RADIOFREQUENCY ABLATION Left 12/26/2023    Procedure: RADIOFREQUENCY ABLATION  LEFT GENICULAR 1 OF 2;  Surgeon: Vijaya Landin MD;  Location: [facility] PAIN MGT;  Service: Pain Management;  Laterality: Left;  [phone]    RADIOFREQUENCY ABLATION Right 2/6/2024    Procedure: RADIOFREQUENCY ABLATION RIGHT GENICULAR 2 OF 2;  Surgeon: Vijaya Landin MD;  Location: [facility] PAIN MGT;  Service: Pain Management;  Laterality: Right;  [phone]  *after 2/1/24    right ganglion cyst      throat polyp      TRANSOBTURATOR SLING  2011    with RSO for persistent complex cyst & MARGOT; done by [facility]    UPPER GASTROINTESTINAL ENDOSCOPY  2007      Social History     Socioeconomic History    Marital status:      Spouse name: KAIDEN    Number of children: 5   Occupational History    Occupation: retired     Comment: Dance Instructor   Tobacco Use    Smoking status: Never    Smokeless tobacco: Never   Substance and Sexual Activity    Alcohol use: Yes     Alcohol/week: 0.0 standard drinks of alcohol     Comment: social    Drug use: No    Sexual activity: Yes     Partners: Male     Birth control/protection: Surgical   Social History Narrative    Patient is a retired dance instructor and live with . Has stairs at home 12- has an elevator      Family History   Problem Relation Age of Onset    Diabetes Mother     Hypertension Mother     Heart disease Mother     Cancer Father         lung    Migraines Father     Glaucoma Paternal Grandmother     Glaucoma Sister     Thyroid disease Neg Hx     Asthma Neg Hx       Review of Systems        Objective:   Husb present  Physical Exam  Assessment:         ICD-10-CM ICD-9-CM   1. Hypothyroidism, unspecified type  E03.9 244.9   2. Headache, unspecified headache type  R51.9 784.0   3. Primary hypertension  I10 401.9   4. Carcinoma of kidney, unspecified laterality  C64.9 189.0      Plan:        1. Hypothyroidism, unspecified type    2. Headache, unspecified headache type  -     butalbital-acetaminophen-caffeine -40 mg (FIORICET, ESGIC) -40 mg per  tablet; TAKE 1 TABLET EVERY 4 HOURS AS NEEDED  Dispense: 30 tablet; Refill: 0    3. Primary hypertension    4. Carcinoma of kidney, unspecified laterality    Other orders  -     ALPRAZolam (XANAX) 0.5 MG tablet; Take daily as needed for anxiety.  Dispense: 30 tablet; Refill: 5     Monitor bps  Dash diet    F/u 6 months  Nurse bp visit one month    Stop excessive salty food  D/wd adding bp med but wants to try dietary modif first  F/u specialists

## 2024-03-20 ENCOUNTER — TELEPHONE (OUTPATIENT)
Dept: HEMATOLOGY/ONCOLOGY | Facility: CLINIC | Age: 74
End: 2024-03-20
Payer: MEDICARE

## 2024-03-20 DIAGNOSIS — E04.1 THYROID NODULE: Primary | ICD-10-CM

## 2024-03-20 NOTE — TELEPHONE ENCOUNTER
----- Message from Litzy Lacy, RN sent at 3/19/2024 10:24 AM CDT -----  Regarding: RE: US biopsy thyroid  Endocrine - Can you all help? Is this something you all do? If not, can you point us in the correct direction.  Thank you, Michelle  ----- Message -----  From: Kervin Jaquez MD  Sent: 3/19/2024  10:11 AM CDT  To: Litzy Lacy, RN; Eusebia Mane  Subject: RE: US biopsy thyroid                            I think endocrine usually does those, but I'm not sure.     ----- Message -----  From: Eusebia Mane  Sent: 3/19/2024   9:22 AM CDT  To: Kervin Jaquez MD; Litzy Lacy, RN  Subject: US biopsy thyroid                                Hi Dr. Jaquez and Michelle,    She is scheduled for her MRI with anesthesia. There is also an order for US biopsy thyroid. I can't schedule that- does it need a referral to IR?    Thanks  Elena

## 2024-03-21 ENCOUNTER — PATIENT MESSAGE (OUTPATIENT)
Dept: HEMATOLOGY/ONCOLOGY | Facility: CLINIC | Age: 74
End: 2024-03-21
Payer: MEDICARE

## 2024-03-21 ENCOUNTER — TELEPHONE (OUTPATIENT)
Dept: HEMATOLOGY/ONCOLOGY | Facility: CLINIC | Age: 74
End: 2024-03-21
Payer: MEDICARE

## 2024-03-21 DIAGNOSIS — E04.1 THYROID NODULE: Primary | ICD-10-CM

## 2024-03-25 ENCOUNTER — HOSPITAL ENCOUNTER (OUTPATIENT)
Dept: RADIOLOGY | Facility: HOSPITAL | Age: 74
Discharge: HOME | End: 2024-03-25
Attending: NURSE PRACTITIONER
Payer: MEDICARE

## 2024-03-25 DIAGNOSIS — E04.1 THYROID NODULE: ICD-10-CM

## 2024-03-25 PROCEDURE — 76536 US EXAM OF HEAD AND NECK: CPT | Mod: TC,PO

## 2024-03-25 PROCEDURE — 76536 US EXAM OF HEAD AND NECK: CPT | Mod: 26,,, | Performed by: RADIOLOGY

## 2024-03-26 ENCOUNTER — PATIENT MESSAGE (OUTPATIENT)
Dept: HEMATOLOGY/ONCOLOGY | Facility: CLINIC | Age: 74
End: 2024-03-26
Payer: MEDICARE

## 2024-04-03 ENCOUNTER — PATIENT MESSAGE (OUTPATIENT)
Dept: INTERNAL MEDICINE | Facility: CLINIC | Age: 74
End: 2024-04-03
Payer: MEDICARE

## 2024-04-08 ENCOUNTER — OFFICE VISIT (OUTPATIENT)
Dept: PODIATRY | Facility: CLINIC | Age: 74
End: 2024-04-08
Payer: MEDICARE

## 2024-04-08 VITALS — HEIGHT: 65 IN | BODY MASS INDEX: 31.85 KG/M2 | WEIGHT: 191.13 LBS

## 2024-04-08 DIAGNOSIS — G60.9 IDIOPATHIC PERIPHERAL NEUROPATHY: ICD-10-CM

## 2024-04-08 DIAGNOSIS — L60.3 ONYCHODYSTROPHY: Primary | ICD-10-CM

## 2024-04-08 DIAGNOSIS — C80.1 CLEAR CELL CARCINOMA: ICD-10-CM

## 2024-04-08 DIAGNOSIS — Q82.8 POROKERATOSIS: ICD-10-CM

## 2024-04-08 PROCEDURE — 99999 PR PBB SHADOW E&M-EST. PATIENT-LVL IV: CPT | Mod: PBBFAC,,, | Performed by: PODIATRIST

## 2024-04-08 PROCEDURE — 99213 OFFICE O/P EST LOW 20 MIN: CPT | Mod: S$PBB,,, | Performed by: PODIATRIST

## 2024-04-08 PROCEDURE — 99214 OFFICE O/P EST MOD 30 MIN: CPT | Mod: PBBFAC | Performed by: PODIATRIST

## 2024-04-08 NOTE — PROGRESS NOTES
Subjective:       Patient ID: Sherrill Avery is a 73 y.o. female.    Chief Complaint: Foot Problem (C/o foot pain b/l, lateral aspect/ ball of foot, left foot, small piece of nail growing after perm procedure, left great toenail, corn 5th toe, right foot, rates pain 7/10, non-diabetic, wears tennis and socks)    HPI: Sherrill Avery presents to the office today, with areas of concern to the plantar aspect of the right and left foot.  States that it feels as if he/she is walking on rocks.  Denies any puncture wounds or injuries.  States this pain is been ongoing for some time without significant improvement.  No specific memory of walking barefoot or causing injury.  States visualization of new developed lesions to the foot.  No signs of acute infection.  Patient also complains of a small spicule of nail which continues to grow.  States this is causing mild discomfort.    Review of patient's allergies indicates:   Allergen Reactions    Talwin compound Anxiety     hallucinations/anxiety    Tramadol Itching    Buspirone Anxiety    Codeine Itching    Morphine Itching     jittery    Morpholine analogues Anxiety and Itching    Naproxen Itching     Takes Ibuprofen is ok on rare occasion     Nexium [esomeprazole magnesium] Other (See Comments)     constipation    Wal-phed [pseudoephedrine hcl] Itching       Past Medical History:   Diagnosis Date    Anxiety     Family history of malignant melanoma 08/25/2014    Fibromyalgia affecting lower leg     GERD (gastroesophageal reflux disease)     Hx of psychiatric care     Hypercholesteremia     Hypertension     Hypothyroidism     Inflamed seborrheic keratosis 08/25/2014    Microscopic hematuria 02/02/2017    Multiple benign melanocytic nevi 08/25/2014    Renal cell carcinoma of right kidney metastatic to other site     Therapy        Family History   Problem Relation Age of Onset    Diabetes Mother     Hypertension Mother     Heart disease Mother     Cancer Father         lung     Migraines Father     Glaucoma Paternal Grandmother     Glaucoma Sister     Thyroid disease Neg Hx     Asthma Neg Hx        Social History     Socioeconomic History    Marital status:      Spouse name: KAIDEN    Number of children: 5   Occupational History    Occupation: retired     Comment: Dance Instructor   Tobacco Use    Smoking status: Never    Smokeless tobacco: Never   Substance and Sexual Activity    Alcohol use: Yes     Alcohol/week: 0.0 standard drinks of alcohol     Comment: social    Drug use: No    Sexual activity: Yes     Partners: Male     Birth control/protection: Surgical   Social History Narrative    Patient is a retired dance instructor and live with . Has stairs at home 12- has an elevator       Past Surgical History:   Procedure Laterality Date    AUGMENTATION OF BREAST      bladder lift      breast implants      BREAST SURGERY Bilateral 1996    saline implants    COLONOSCOPY  2007    HYSTERECTOMY      ectopic pregnancy x 2, with LSO    KNEE ARTHROPLASTY Left 06/14/2021    Procedure: ARTHROPLASTY, KNEE:LEFT:DEPUY-SIGMA;  Surgeon: Osmar Avery III, MD;  Location: [facility] OR;  Service: Orthopedics;  Laterality: Left;    LAPAROSCOPIC NEPHRECTOMY, HAND ASSISTED  07/25/2013    LUNG REMOVAL, PARTIAL Left 2020    At [facility]    NEPHRECTOMY      OOPHORECTOMY Bilateral     RADIOFREQUENCY ABLATION Left 08/30/2022    Procedure: RADIOFREQUENCY ABLATION, LEFT KNEE COOLED;  Surgeon: Vijaya Landin MD;  [facility];  Service: Pain Management;  Laterality: Left;    RADIOFREQUENCY ABLATION Right 10/25/2022    Procedure: RADIOFREQUENCY ABLATION RIGHT KNEE GENICULAR COOLED;  Surgeon: Vijaya Landin MD;  Location: [facility] PAIN MGT;  Service: Pain Management;  Laterality: Right;    RADIOFREQUENCY ABLATION Left 03/07/2023    Procedure: RADIOFREQUENCY ABLATION LEFT GENICULAR COOLED RFA;  Surgeon: Vijaya Landin MD;  [facility];  Service: Pain Management;  Laterality:  "Left;    RADIOFREQUENCY ABLATION Right 08/01/2023    Procedure: RADIOFREQUENCY ABLATION, RIGHT GENICULAR COOLED;  Surgeon: Vijaya Landin MD;  Location: [facility] PAIN MGT;  Service: Pain Management;  Laterality: Right;    RADIOFREQUENCY ABLATION Left 12/26/2023    Procedure: RADIOFREQUENCY ABLATION LEFT GENICULAR 1 OF 2;  Surgeon: Vijaya Landin MD;  Location: [facility] PAIN MGT;  Service: Pain Management;  Laterality: Left;  [phone]    RADIOFREQUENCY ABLATION Right 2/6/2024    Procedure: RADIOFREQUENCY ABLATION RIGHT GENICULAR 2 OF 2;  Surgeon: Vijaya Landin MD;  Location: [facility] PAIN MGT;  Service: Pain Management;  Laterality: Right;  [phone]  *after 2/1/24    right ganglion cyst      throat polyp      TRANSOBTURATOR SLING  2011    with RSO for persistent complex cyst & MARGOT; done by [facility]    UPPER GASTROINTESTINAL ENDOSCOPY  2007       Review of Systems       Objective:   Ht 5' 5" (1.651 m)   Wt 86.7 kg (191 lb 2.2 oz)   BMI 31.81 kg/m²     US Thyroid  Narrative: EXAMINATION:  US THYROID    CLINICAL HISTORY:  .  Nontoxic single thyroid nodule    TECHNIQUE:  Ultrasound of the thyroid and cervical lymph nodes was performed.    COMPARISON:  None.    FINDINGS:  The right thyroid lobe measures 3.1 x 1.2 x 1.1 cm and the left lobe measures 2.8 x 0.8 x 0.7 cm.  The total thyroid volume is 2.8 cc.    The thyroid parenchyma has a heterogeneous echotexture.  There is a mixed echogenicity 7 mm nodule in the upper pole of the left lobe.  There is a 5 mm hypoechoic nodule in the lower pole of the left lobe and a 3 mm hypoechoic nodule in the upper pole of the right lobe.  No dominant mass.    No cervical lymphadenopathy on either side.  Impression: Small bilateral thyroid nodules as detailed.  No lesions which meet TI-RADS criteria for FNA..    Electronically signed by: LEROY Pulliam MD  Date:    03/25/2024  Time:    10:22       Physical Exam   LOWER EXTREMITY PHYSICAL EXAMINATION    Vascular:  The right " dorsalis pedis pulse 2/4 and the right posterior tibial pulse 2/4.  The left dorsalis pedis pulse 2/4 and posterior tibial pulse on the left is 2/4.  Capillary refill is intact.  Pedal hair growth intact    Neurological:  Protective sensation is intact with [place] Julius monofilament. Proprioception is intact. Intact sensation to light touch.  Vibratory sensation is diminished to the 1st metatarsal phalangeal joint.     Musculoskeletal: Manual Muscle Testing is 5/5 with dorsiflexion, plantar flexion, abduction, and adduction.   There is normal range of motion in the forefoot, hindfoot, and Ankle joint.       Dermatological:  Skin is supple and moist.  There are 3 lesions present to the plantar aspect of the foot which have a resemblance to a plantar porokeratosis.  Centralize core is noted.  There is no acute signs of infection.  No signs of underlying ulceration.  There is no absence of skin line.  No obvious capillary hemorrhaging.  Small spicule nail present to the medial border of the right hallux.  Upon removal, no acute bleeding or complications      Imaging:       Results for orders placed during the hospital encounter of 05/23/17    X-Ray Foot Complete Right    Narrative  Right foot three views.    Findings: There is mild multiarticular degenerative change.  Equivocal subcortical cystic or erosive change at the 5th PIP joint.  Plantar calcaneal spur noted.  No acute fracture or dislocation.  IMPRESSION:  As above.  The      Electronically signed by: LAYTON GARCIA MD  Date:     05/23/17  Time:    11:33          Assessment:     1. Onychodystrophy    2. Idiopathic peripheral neuropathy    3. Clear cell carcinoma with lung metastasis    4. Porokeratosis        Plan:     Onychodystrophy    Idiopathic peripheral neuropathy    Clear cell carcinoma with lung metastasis    Porokeratosis      We discussed the etiology and pathology associated with acquired plantar porokeratosis.  We discussed the importance of  removing the centralize core and alleviating pain discomfort.  We also discussed utilizing a pumice stone at home to reduce callus formation and subsequent recurrence of this area.  Recommend to apply hydrating creams and lotions this area daily to ensure that there is no subsequent buildup.    Porokeratotic skin formation x3, as outlined within the examination portion of this note, is surgically debrided with sharp #10/#15 blade, to alleviate discomfort with weight bearing and ambulation, and to lessen the possibility of skin complications, e.g., ulceration due to pressure. No ulceration(s) is are noted with/post debridement. The lesion is completed healed and resolved. No evidence of infection.     Foot counseling and education is provided at this visit. Patient is advised to wear socks and shoes at all times.  Do not walk barefoot, or with just socks, even when indoors.  Be sure to check and inspect the inside of the shoe before putting them on her feet.  Protect your feet at all times.  Walking shoes and/or athletic shoes are the best types of shoe gear. Do not wear vinyl or plastic type shoe gear, as they do not stretch and/or breathe.  Protect your feet from hot and/or cold. Elevate the extremities when sitting.  Do not wear excessively tight socks and/or shoe gear. Wiggle your toes for a few minutes throughout the day. Move your ankles up and down, in and out, to help blood flow in your lower extremity.     Future Appointments   Date Time Provider Department Center   4/18/2024 10:30 AM [facility]   5/6/2024  3:40 PM Alexandre Macias MD [facility]   9/13/2024 11:30 AM Henry Dao MD [facility]

## 2024-04-09 ENCOUNTER — PATIENT MESSAGE (OUTPATIENT)
Dept: HEMATOLOGY/ONCOLOGY | Facility: CLINIC | Age: 74
End: 2024-04-09
Payer: MEDICARE

## 2024-05-06 ENCOUNTER — OFFICE VISIT (OUTPATIENT)
Dept: INTERNAL MEDICINE | Facility: CLINIC | Age: 74
End: 2024-05-06
Payer: MEDICARE

## 2024-05-06 VITALS
TEMPERATURE: 99 F | SYSTOLIC BLOOD PRESSURE: 130 MMHG | WEIGHT: 186.94 LBS | HEART RATE: 83 BPM | OXYGEN SATURATION: 97 % | BODY MASS INDEX: 31.14 KG/M2 | HEIGHT: 65 IN | DIASTOLIC BLOOD PRESSURE: 88 MMHG

## 2024-05-06 DIAGNOSIS — E03.9 HYPOTHYROIDISM, UNSPECIFIED TYPE: ICD-10-CM

## 2024-05-06 DIAGNOSIS — R51.9 HEADACHE, UNSPECIFIED HEADACHE TYPE: ICD-10-CM

## 2024-05-06 DIAGNOSIS — I10 PRIMARY HYPERTENSION: ICD-10-CM

## 2024-05-06 DIAGNOSIS — F51.04 CHRONIC INSOMNIA: ICD-10-CM

## 2024-05-06 DIAGNOSIS — I70.0 AORTIC ATHEROSCLEROSIS: ICD-10-CM

## 2024-05-06 DIAGNOSIS — K21.9 GASTROESOPHAGEAL REFLUX DISEASE WITHOUT ESOPHAGITIS: ICD-10-CM

## 2024-05-06 DIAGNOSIS — C80.1 CLEAR CELL CARCINOMA: ICD-10-CM

## 2024-05-06 DIAGNOSIS — E04.2 NONTOXIC MULTINODULAR GOITER: Primary | ICD-10-CM

## 2024-05-06 DIAGNOSIS — C64.9 CARCINOMA OF KIDNEY, UNSPECIFIED LATERALITY: ICD-10-CM

## 2024-05-06 PROBLEM — M54.2 ACUTE NECK PAIN: Status: RESOLVED | Noted: 2022-09-26 | Resolved: 2024-05-06

## 2024-05-06 PROCEDURE — 99999 PR PBB SHADOW E&M-EST. PATIENT-LVL III: CPT | Mod: PBBFAC,,, | Performed by: FAMILY MEDICINE

## 2024-05-06 PROCEDURE — 99214 OFFICE O/P EST MOD 30 MIN: CPT | Mod: S$PBB,,, | Performed by: FAMILY MEDICINE

## 2024-05-06 PROCEDURE — 99213 OFFICE O/P EST LOW 20 MIN: CPT | Mod: PBBFAC | Performed by: FAMILY MEDICINE

## 2024-05-06 RX ORDER — BENZONATATE 100 MG/1
100 CAPSULE ORAL 3 TIMES DAILY PRN
Qty: 30 CAPSULE | Refills: 0 | Status: SHIPPED | OUTPATIENT
Start: 2024-05-06 | End: 2024-05-16

## 2024-05-06 RX ORDER — ONDANSETRON 4 MG/1
8 TABLET, FILM COATED ORAL EVERY 12 HOURS PRN
Qty: 20 TABLET | Refills: 2 | Status: SHIPPED | OUTPATIENT
Start: 2024-05-06

## 2024-05-06 RX ORDER — BUTALBITAL, ACETAMINOPHEN AND CAFFEINE 50; 325; 40 MG/1; MG/1; MG/1
TABLET ORAL
Qty: 30 TABLET | Refills: 0 | Status: SHIPPED | OUTPATIENT
Start: 2024-05-06

## 2024-05-06 RX ORDER — ALPRAZOLAM 0.5 MG/1
TABLET ORAL
Qty: 30 TABLET | Refills: 5 | Status: SHIPPED | OUTPATIENT
Start: 2024-05-06

## 2024-05-06 NOTE — PROGRESS NOTES
Subjective:      Patient ID: Sherrill Avery is a 73 y.o. female.    Chief Complaint: Follow-up    HPI f/u chronic issues prob list below    And osteopenia no tx needed    Hypothy,thy nodules utd tsh dr [facility] 24    Occas headache no change pattern req rf butal      Intermitt anxiety req rf xnax    Htn elv bp today. D/w monitoring;inc salty foods     Hx RCC utd oncol    JULIAN utd psych      Uri sympt no fever taking abx,ster of husb. Feeling better.   Active Problem List with Overview Notes    Diagnosis Date Noted    Clear cell carcinoma with lung metastasis 04/24/2017    Multiple lung nodules on CT 02/08/2017    Anxiety 07/29/2016    Chronic insomnia 07/29/2016    Kidney carcinoma 06/02/2015    Hypertension     Hypercholesteremia     GERD (gastroesophageal reflux disease) 11/12/2013    Hypothyroidism 02/13/2013    Nontoxic multinodular goiter 02/13/2013    Generalized anxiety disorder 11/22/2023    Class 1 obesity due to excess calories with serious comorbidity and body mass index (BMI) of 31.0 to 31.9 in adult 06/20/2023    Leg weakness 09/26/2022    Acute neck pain 09/26/2022    Primary osteoarthritis of both knees 09/21/2022    Primary osteoarthritis of right knee 09/21/2022    Neck pain 04/12/2022    Chronic knee pain after total replacement of left knee joint 06/28/2021    Functional gait abnormality 06/28/2021    Osteoarthritis of left knee 06/14/2021    Hormone replacement therapy (HRT) 06/09/2021    CKD (chronic kidney disease) 06/08/2021    Allergic rhinitis 05/17/2021    It band syndrome, left 02/05/2019    Chronic pain of both knees 02/05/2019    Chondromalacia, patella, right 02/05/2019    Chondromalacia, patella, left 02/05/2019    Lumbar radiculopathy 02/05/2019    Subacromial bursitis 05/23/2017    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    Vitamin D deficiency 07/28/2015    Primary osteoarthritis involving multiple joints 08/26/2014    Fibromyalgia 08/26/2014    Actinic degeneration  08/25/2014    Dermatofibroma 08/25/2014    Bilateral sensorineural hearing loss 02/07/2013       Past Medical History:   Diagnosis Date    Anxiety     Family history of malignant melanoma 08/25/2014    Fibromyalgia affecting lower leg     GERD (gastroesophageal reflux disease)     Hx of psychiatric care     Hypercholesteremia     Hypertension     Hypothyroidism     Inflamed seborrheic keratosis 08/25/2014    Microscopic hematuria 02/02/2017    Multiple benign melanocytic nevi 08/25/2014    Renal cell carcinoma of right kidney metastatic to other site     Therapy       Past Surgical History:   Procedure Laterality Date    AUGMENTATION OF BREAST      bladder lift      breast implants      BREAST SURGERY Bilateral 1996    saline implants    COLONOSCOPY  2007    HYSTERECTOMY      ectopic pregnancy x 2, with LSO    KNEE ARTHROPLASTY Left 06/14/2021    Procedure: ARTHROPLASTY, KNEE:LEFT:DEPUY-SIGMA;  Surgeon: Osmar Avery III, MD;  Location: [facility] OR;  Service: Orthopedics;  Laterality: Left;    LAPAROSCOPIC NEPHRECTOMY, HAND ASSISTED  07/25/2013    LUNG REMOVAL, PARTIAL Left 2020    At [facility]    NEPHRECTOMY      OOPHORECTOMY Bilateral     RADIOFREQUENCY ABLATION Left 08/30/2022    Procedure: RADIOFREQUENCY ABLATION, LEFT KNEE COOLED;  Surgeon: Vijaya Landin MD;  Location: [facility] PAIN MGT;  Service: Pain Management;  Laterality: Left;    RADIOFREQUENCY ABLATION Right 10/25/2022    Procedure: RADIOFREQUENCY ABLATION RIGHT KNEE GENICULAR COOLED;  Surgeon: Vijaya Landin MD;  Location: [facility] PAIN MGT;  Service: Pain Management;  Laterality: Right;    RADIOFREQUENCY ABLATION Left 03/07/2023    Procedure: RADIOFREQUENCY ABLATION LEFT GENICULAR COOLED RFA;  Surgeon: Vijaya Landin MD;  Location: [facility] PAIN MGT;  Service: Pain Management;  Laterality: Left;    RADIOFREQUENCY ABLATION Right 08/01/2023    Procedure: RADIOFREQUENCY ABLATION, RIGHT GENICULAR COOLED;  Surgeon: Vijaya Landin MD;  Location: [facility]  PAIN MGT;  Service: Pain Management;  Laterality: Right;    RADIOFREQUENCY ABLATION Left 12/26/2023    Procedure: RADIOFREQUENCY ABLATION LEFT GENICULAR 1 OF 2;  Surgeon: Vijaya Landin MD;  Location: [facility] PAIN MGT;  Service: Pain Management;  Laterality: Left;  [phone]    RADIOFREQUENCY ABLATION Right 2/6/2024    Procedure: RADIOFREQUENCY ABLATION RIGHT GENICULAR 2 OF 2;  Surgeon: Vijaya Landin MD;  Location: [facility] PAIN MGT;  Service: Pain Management;  Laterality: Right;  [phone]  *after 2/1/24    right ganglion cyst      throat polyp      TRANSOBTURATOR SLING  2011    with RSO for persistent complex cyst & MARGOT; done by [facility]    UPPER GASTROINTESTINAL ENDOSCOPY  2007      Social History     Socioeconomic History    Marital status:      Spouse name: KAIDEN    Number of children: 5   Occupational History    Occupation: retired     Comment: Dance Instructor   Tobacco Use    Smoking status: Never    Smokeless tobacco: Never   Substance and Sexual Activity    Alcohol use: Yes     Alcohol/week: 0.0 standard drinks of alcohol     Comment: social    Drug use: No    Sexual activity: Yes     Partners: Male     Birth control/protection: Surgical   Social History Narrative    Patient is a retired dance instructor and live with . Has stairs at home 12- has an elevator      Family History   Problem Relation Age of Onset    Diabetes Mother     Hypertension Mother     Heart disease Mother     Cancer Father         lung    Migraines Father     Glaucoma Paternal Grandmother     Glaucoma Sister     Thyroid disease Neg Hx     Asthma Neg Hx       Review of Systems        Objective:   Husb present  Physical Examgen nad  Heent tms clear  Nares cricket  O/p clear  Cvrrr  Chest ctabilat  Abd +bssoftndnt   Assessment:         ICD-10-CM ICD-9-CM   1. Hypothyroidism, unspecified type  E03.9 244.9         3. Primary hypertension  I10 401.9   4. Carcinoma of kidney, unspecified laterality  C64.9 189.0     Sher  Uri  gerd  Plan:    F/u oncol, endocrine , psych when due    1F/u 6 months  Has f/u gi at [facility]    If no better 3-4 days followup sooner if any worsening or change in symptoms or lack of resolution  Recomm stop steroid dose pack. Can finish husb abx since feeling better    Nontoxic multinodular goiter    Headache, unspecified headache type  -     butalbital-acetaminophen-caffeine -40 mg (FIORICET, ESGIC) -40 mg per tablet; TAKE 1 TABLET EVERY 4 HOURS AS NEEDED  Dispense: 30 tablet; Refill: 0    Carcinoma of kidney, unspecified laterality    Aortic atherosclerosis    Clear cell carcinoma with lung metastasis    Chronic insomnia    Primary hypertension    Hypothyroidism, unspecified type    Gastroesophageal reflux disease without esophagitis    Other orders  -     ALPRAZolam (XANAX) 0.5 MG tablet; Take daily as needed for anxiety.  Dispense: 30 tablet; Refill: 5  -     ondansetron (ZOFRAN) 4 MG tablet; Take 2 tablets (8 mg total) by mouth every 12 (twelve) hours as needed for Nausea.  Dispense: 20 tablet; Refill: 2  -     benzonatate (TESSALON) 100 MG capsule; Take 1 capsule (100 mg total) by mouth 3 (three) times daily as needed for Cough.  Dispense: 30 capsule; Refill: 0

## 2024-05-10 ENCOUNTER — TELEPHONE (OUTPATIENT)
Dept: INTERNAL MEDICINE | Facility: CLINIC | Age: 74
End: 2024-05-10
Payer: MEDICARE

## 2024-05-10 ENCOUNTER — PATIENT MESSAGE (OUTPATIENT)
Dept: INTERNAL MEDICINE | Facility: CLINIC | Age: 74
End: 2024-05-10
Payer: MEDICARE

## 2024-05-10 DIAGNOSIS — J01.90 ACUTE SINUSITIS, RECURRENCE NOT SPECIFIED, UNSPECIFIED LOCATION: Primary | ICD-10-CM

## 2024-05-10 RX ORDER — AZITHROMYCIN 250 MG/1
TABLET, FILM COATED ORAL
Qty: 6 TABLET | Refills: 0 | Status: SHIPPED | OUTPATIENT
Start: 2024-05-10 | End: 2024-05-15

## 2024-05-10 NOTE — TELEPHONE ENCOUNTER
Tried returning the patient call with no answer. A message was left for a return call. The patient can visit an Urgent Care Clinic .    prostate chips

## 2024-05-10 NOTE — TELEPHONE ENCOUNTER
----- Message from Aisha Garcia sent at 5/10/2024 12:14 PM CDT -----  Contact: Sherrill  .Patient is calling to speak with the nurse regarding medication . Reports needing something called in for sinus infection. Due to current meds is making patient rash up  . Please give patient a call back at   .[phone] (home) [phone] (work)

## 2024-05-14 ENCOUNTER — TELEPHONE (OUTPATIENT)
Dept: INTERNAL MEDICINE | Facility: CLINIC | Age: 74
End: 2024-05-14
Payer: MEDICARE

## 2024-05-15 ENCOUNTER — OFFICE VISIT (OUTPATIENT)
Dept: INTERNAL MEDICINE | Facility: CLINIC | Age: 74
End: 2024-05-15
Payer: MEDICARE

## 2024-05-15 ENCOUNTER — HOSPITAL ENCOUNTER (OUTPATIENT)
Dept: RADIOLOGY | Facility: HOSPITAL | Age: 74
Discharge: HOME | End: 2024-05-15
Attending: PHYSICIAN ASSISTANT
Payer: MEDICARE

## 2024-05-15 VITALS
DIASTOLIC BLOOD PRESSURE: 80 MMHG | WEIGHT: 188.25 LBS | OXYGEN SATURATION: 99 % | HEART RATE: 81 BPM | BODY MASS INDEX: 31.36 KG/M2 | TEMPERATURE: 97 F | HEIGHT: 65 IN | SYSTOLIC BLOOD PRESSURE: 138 MMHG

## 2024-05-15 DIAGNOSIS — K21.9 GASTROESOPHAGEAL REFLUX DISEASE, UNSPECIFIED WHETHER ESOPHAGITIS PRESENT: ICD-10-CM

## 2024-05-15 DIAGNOSIS — R05.9 COUGH, UNSPECIFIED TYPE: ICD-10-CM

## 2024-05-15 DIAGNOSIS — J01.90 ACUTE SINUSITIS, RECURRENCE NOT SPECIFIED, UNSPECIFIED LOCATION: ICD-10-CM

## 2024-05-15 DIAGNOSIS — R05.9 COUGH, UNSPECIFIED TYPE: Primary | ICD-10-CM

## 2024-05-15 DIAGNOSIS — J18.9 PNEUMONIA OF LEFT LOWER LOBE DUE TO INFECTIOUS ORGANISM: Primary | ICD-10-CM

## 2024-05-15 DIAGNOSIS — J30.9 ALLERGIC RHINITIS, UNSPECIFIED SEASONALITY, UNSPECIFIED TRIGGER: ICD-10-CM

## 2024-05-15 DIAGNOSIS — J18.9 PNEUMONIA OF LEFT LOWER LOBE DUE TO INFECTIOUS ORGANISM: ICD-10-CM

## 2024-05-15 PROCEDURE — 99999 PR PBB SHADOW E&M-EST. PATIENT-LVL V: CPT | Mod: PBBFAC,,, | Performed by: PHYSICIAN ASSISTANT

## 2024-05-15 PROCEDURE — 99215 OFFICE O/P EST HI 40 MIN: CPT | Mod: PBBFAC,25 | Performed by: PHYSICIAN ASSISTANT

## 2024-05-15 PROCEDURE — 71046 X-RAY EXAM CHEST 2 VIEWS: CPT | Mod: 26,,, | Performed by: RADIOLOGY

## 2024-05-15 PROCEDURE — 71046 X-RAY EXAM CHEST 2 VIEWS: CPT | Mod: TC

## 2024-05-15 PROCEDURE — 99214 OFFICE O/P EST MOD 30 MIN: CPT | Mod: S$PBB,,, | Performed by: PHYSICIAN ASSISTANT

## 2024-05-15 RX ORDER — LEVOCETIRIZINE DIHYDROCHLORIDE 5 MG/1
5 TABLET, FILM COATED ORAL NIGHTLY
Qty: 90 TABLET | Refills: 3 | Status: SHIPPED | OUTPATIENT
Start: 2024-05-15 | End: 2025-05-15

## 2024-05-15 RX ORDER — AMOXICILLIN AND CLAVULANATE POTASSIUM 875; 125 MG/1; MG/1
1 TABLET, FILM COATED ORAL 2 TIMES DAILY
Qty: 20 TABLET | Refills: 0 | Status: CANCELLED | OUTPATIENT
Start: 2024-05-15 | End: 2024-05-25

## 2024-05-15 RX ORDER — AMOXICILLIN AND CLAVULANATE POTASSIUM 875; 125 MG/1; MG/1
1 TABLET, FILM COATED ORAL 2 TIMES DAILY
Qty: 20 TABLET | Refills: 0 | Status: SHIPPED | OUTPATIENT
Start: 2024-05-15 | End: 2024-05-25

## 2024-05-15 NOTE — PROGRESS NOTES
Subjective:      Patient ID: Sherrill Avery is a 73 y.o. female.    Chief Complaint: Nasal Congestion and Cough    Cough  This is a new problem. The current episode started 1 to 4 weeks ago. The problem has been gradually worsening. The problem occurs constantly. The cough is Productive of sputum. Associated symptoms include chest pain and shortness of breath. Pertinent negatives include no chills, ear congestion, ear pain, fever, headaches, heartburn, hemoptysis, myalgias, nasal congestion, postnasal drip, rash, rhinorrhea, sore throat, sweats, weight loss or wheezing. Exacerbated by: any exertion. Treatments tried: cefdinir, azithromycin. There is no history of asthma, bronchiectasis, bronchitis, COPD, emphysema, environmental allergies or pneumonia.     Allergic reaction to cefdinir (flushing in cheeks) took 3 doses.   Completed azithromycin.     Also pt would like to discuss her ongoing GERD. She has failed treatment with multiple PPIs/H2. Chronic cough and spitting up acid. Has had scope with MD montes. Pt states that MD montes recommends pH probe test and possible surgery.     Patient Active Problem List   Diagnosis    GERD (gastroesophageal reflux disease)    Hypothyroidism    Nontoxic multinodular goiter    Primary osteoarthritis involving multiple joints    Fibromyalgia    Actinic degeneration    Dermatofibroma    Bilateral sensorineural hearing loss    Hypertension    Hypercholesteremia    Kidney carcinoma    Neuropathy of left sural nerve-mild    Vitamin D deficiency    Anxiety    Chronic insomnia    Multiple lung nodules on CT    Clear cell carcinoma with lung metastasis    Subacromial bursitis    It band syndrome, left    Chronic pain of both knees    Chondromalacia, patella, right    Chondromalacia, patella, left    Lumbar radiculopathy    Allergic rhinitis    CKD (chronic kidney disease)    Hormone replacement therapy (HRT)    Osteoarthritis of left knee    Chronic knee pain after total  replacement of left knee joint    Functional gait abnormality    Neck pain    Primary osteoarthritis of both knees    Primary osteoarthritis of right knee    Leg weakness    Class 1 obesity due to excess calories with serious comorbidity and body mass index (BMI) of 31.0 to 31.9 in adult    Generalized anxiety disorder    Aortic atherosclerosis         Current Outpatient Medications:     ALPRAZolam (XANAX) 0.5 MG tablet, Take daily as needed for anxiety., Disp: 30 tablet, Rfl: 5    azelastine (ASTELIN) 137 mcg (0.1 %) nasal spray, 1 spray (137 mcg total) by Nasal route 2 (two) times daily as needed for Rhinitis., Disp: 30 mL, Rfl: 2    azithromycin (Z-BONNIE) 250 MG tablet, Take 2 tablets by mouth on day 1; Take 1 tablet by mouth on days 2-5, Disp: 6 tablet, Rfl: 0    benzonatate (TESSALON) 100 MG capsule, Take 1 capsule (100 mg total) by mouth 3 (three) times daily as needed for Cough., Disp: 30 capsule, Rfl: 0    brimonidine (MIRVASO) 0.33 % GlwP, Apply topically., Disp: , Rfl:     buPROPion (WELLBUTRIN XL) 150 MG TB24 tablet, Take 1 tablet (150 mg total) by mouth once daily., Disp: 90 tablet, Rfl: 1    butalbital-acetaminophen-caffeine -40 mg (FIORICET, ESGIC) -40 mg per tablet, TAKE 1 TABLET EVERY 4 HOURS AS NEEDED, Disp: 30 tablet, Rfl: 0    clotrimazole (LOTRIMIN) 1 % Soln, APPLY TOPICALLY 2 TIMES DAILY FOR 7 DAYS, Disp: 30 mL, Rfl: 2    diazePAM (VALIUM) 5 MG tablet, Take 5 mg by mouth., Disp: , Rfl:     diclofenac sodium (VOLTAREN) 1 % Gel, Apply 2 g topically daily as needed., Disp: 100 g, Rfl: 11    docusate sodium (COLACE) 100 MG capsule, Take 100 mg by mouth., Disp: , Rfl:     estradioL (ESTRACE) 1 MG tablet, Take 1 tablet (1 mg total) by mouth once daily., Disp: 90 tablet, Rfl: 0    famotidine (PEPCID) 20 MG tablet, Take 20 mg by mouth., Disp: , Rfl:     fluocinonide (LIDEX) 0.05 % external solution, Apply topically 2 (two) times daily. Do not use morning of surgery, Disp: , Rfl:      ketoconazole (NIZORAL) 2 % shampoo, Do not use morning of surgery, Disp: , Rfl:     levocetirizine (XYZAL) 5 MG tablet, Take 5 mg by mouth., Disp: , Rfl:     levothyroxine (SYNTHROID) 112 MCG tablet, Take 1 tablet (112 mcg total) by mouth before breakfast. TAKE 1 TABLET (112 MCG TOTAL) BY MOUTH BEFORE BREAKFAST AS SCHEDULED Strength: 112 mcg, Disp: 90 tablet, Rfl: 4    metronidazole 1% (METROGEL) 1 % Gel, Apply topically once daily., Disp: 60 g, Rfl: 5    mometasone (ELOCON) 0.1 % solution, Apply topically., Disp: , Rfl:     naltrexone (DEPADE) 50 mg tablet, 1/4 pill twice daily, Disp: 15 tablet, Rfl: 0    omeprazole (PRILOSEC) 10 MG capsule, Take 10 mg by mouth., Disp: , Rfl:     ondansetron (ZOFRAN) 4 MG tablet, Take 2 tablets (8 mg total) by mouth every 12 (twelve) hours as needed for Nausea., Disp: 20 tablet, Rfl: 2    oxyCODONE-acetaminophen (PERCOCET) 5-325 mg per tablet, Take 1 tablet by mouth every 6 (six) hours as needed for Pain., Disp: 30 each, Rfl: 0    pravastatin (PRAVACHOL) 20 MG tablet, TAKE 1 TABLET EVERY DAY, Disp: 90 tablet, Rfl: 4    senna (SENOKOT) 8.6 mg tablet, Take 1 tablet by mouth., Disp: , Rfl:     amoxicillin-clavulanate 875-125mg (AUGMENTIN) 875-125 mg per tablet, Take 1 tablet by mouth 2 (two) times daily. for 10 days, Disp: 20 tablet, Rfl: 0    diflorasone-emollient (APEXICON E) 0.05 % Crea, Apply 1 application topically once daily. for 7 days, Disp: 30 g, Rfl: 5    hydrocortisone 2.5 % cream, Apply topically 2 (two) times daily. apply to affected area for 7 days, Disp: 20 g, Rfl: 5    levocetirizine (XYZAL) 5 MG tablet, Take 1 tablet (5 mg total) by mouth every evening., Disp: 90 tablet, Rfl: 3    zolpidem (AMBIEN) 5 MG Tab, Take 1 tablet (5 mg total) by mouth nightly as needed., Disp: 90 tablet, Rfl: 0  No current facility-administered medications for this visit.    Facility-Administered Medications Ordered in Other Visits:     triamcinolone acetonide injection 40 mg, 40 mg,  "Intra-articular, , Osmar Avery III, MD, 40 mg at 05/17/22 7049    Review of Systems   Constitutional:  Negative for activity change, appetite change, chills, diaphoresis, fatigue, fever, unexpected weight change and weight loss.   HENT: Negative.  Negative for congestion, ear pain, hearing loss, postnasal drip, rhinorrhea, sore throat, trouble swallowing and voice change.    Eyes: Negative.  Negative for visual disturbance.   Respiratory:  Positive for cough, chest tightness and shortness of breath. Negative for apnea, hemoptysis, choking, wheezing and stridor.    Cardiovascular:  Positive for chest pain. Negative for palpitations and leg swelling.   Gastrointestinal:  Positive for abdominal pain and nausea. Negative for abdominal distention, blood in stool, constipation, diarrhea, heartburn and vomiting.   Endocrine: Negative for cold intolerance, heat intolerance, polydipsia and polyuria.   Genitourinary: Negative.  Negative for difficulty urinating and frequency.   Musculoskeletal:  Negative for arthralgias, back pain, gait problem, joint swelling and myalgias.   Skin:  Negative for color change, pallor, rash and wound.   Allergic/Immunologic: Negative for environmental allergies.   Neurological:  Negative for dizziness, tremors, weakness, light-headedness, numbness and headaches.   Hematological:  Negative for adenopathy.   Psychiatric/Behavioral:  Negative for behavioral problems, confusion, self-injury, sleep disturbance and suicidal ideas. The patient is not nervous/anxious.      Objective:   /80 (BP Location: Left arm, Patient Position: Sitting, BP Method: Medium (Manual))   Pulse 81   Temp 96.9 °F (36.1 °C) (Tympanic)   Ht 5' 5" (1.651 m)   Wt 85.4 kg (188 lb 4.4 oz)   SpO2 99%   BMI 31.33 kg/m²     Physical Exam  Vitals and nursing note reviewed.   Constitutional:       General: She is not in acute distress.     Appearance: Normal appearance. She is well-developed. She is not " ill-appearing, toxic-appearing or diaphoretic.   HENT:      Head: Normocephalic and atraumatic.      Right Ear: Hearing, tympanic membrane, ear canal and external ear normal. No tenderness.      Left Ear: Hearing, tympanic membrane, ear canal and external ear normal. No tenderness.      Nose: Mucosal edema, congestion and rhinorrhea present.      Right Sinus: Maxillary sinus tenderness and frontal sinus tenderness present.      Left Sinus: Maxillary sinus tenderness and frontal sinus tenderness present.      Mouth/Throat:      Mouth: Mucous membranes are moist. No oral lesions.      Dentition: Normal dentition. No dental caries or dental abscesses.      Tongue: No lesions.      Palate: No lesions.      Pharynx: Uvula midline. Oropharyngeal exudate and posterior oropharyngeal erythema present. No pharyngeal swelling or uvula swelling.      Tonsils: No tonsillar exudate or tonsillar abscesses.   Eyes:      General:         Right eye: No discharge.         Left eye: No discharge.      Extraocular Movements: Extraocular movements intact.      Conjunctiva/sclera: Conjunctivae normal.      Pupils: Pupils are equal, round, and reactive to light.   Cardiovascular:      Rate and Rhythm: Normal rate and regular rhythm.      Heart sounds: Normal heart sounds. No murmur heard.     No friction rub. No gallop.   Pulmonary:      Effort: Pulmonary effort is normal. No respiratory distress.      Breath sounds: Normal breath sounds. No wheezing or rales.   Musculoskeletal:      Cervical back: Normal range of motion and neck supple.   Lymphadenopathy:      Cervical: No cervical adenopathy.   Skin:     General: Skin is warm.      Coloration: Skin is not pale.      Findings: No erythema or rash.   Neurological:      Mental Status: She is alert and oriented to person, place, and time.   Psychiatric:         Mood and Affect: Mood normal.         Behavior: Behavior normal.         Thought Content: Thought content normal.         Judgment:  Judgment normal.       X-Ray Chest PA And Lateral  Narrative: EXAMINATION:  XR CHEST PA AND LATERAL    CLINICAL HISTORY:  Cough, unspecified    TECHNIQUE:  PA and lateral views of the chest were performed.    COMPARISON:  Prior radiographs    FINDINGS:  Cardiac silhouette and mediastinal contours are normal.  Right lung clear.  Left basilar haziness present.  No large pleural effusion.  Osseous structures are intact.  Impression: Left basilar haziness which may represent some component of soft tissue attenuation.  Atelectasis versus pneumonitis.  Correlate clinically.    Electronically signed by: Te Jacobson MD  Date:    05/15/2024  Time:    11:34     Assessment:     1. Cough, unspecified type    2. Allergic rhinitis, unspecified seasonality, unspecified trigger    3. Acute sinusitis, recurrence not specified, unspecified location    4. Gastroesophageal reflux disease, unspecified whether esophagitis present    5. Pneumonia of left lower lobe due to infectious organism      Plan:   Cough, unspecified type  -     X-Ray Chest PA And Lateral; Future; Expected date: 05/15/2024    Allergic rhinitis, unspecified seasonality, unspecified trigger  -     levocetirizine (XYZAL) 5 MG tablet; Take 1 tablet (5 mg total) by mouth every evening.  Dispense: 90 tablet; Refill: 3    Acute sinusitis, recurrence not specified, unspecified location    Gastroesophageal reflux disease, unspecified whether esophagitis present  -     Ambulatory referral/consult to Gastroenterology    Pneumonia of left lower lobe due to infectious organism  -     amoxicillin-clavulanate 875-125mg (AUGMENTIN) 875-125 mg per tablet; Take 1 tablet by mouth 2 (two) times daily. for 10 days  Dispense: 20 tablet; Refill: 0    -recheck cxr in 4 weeks.   -Follow up in 1 week virtually to see how doing    Follow up if symptoms worsen or fail to improve.

## 2024-05-27 NOTE — PROGRESS NOTES
Subjective:       Patient ID: Sherrill Avery    Chief Complaint:  Met ccRCC    HPI     Sherrill Avery is a 73 y.o. 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 She is s/p left VATS LLL wedge resection on 1/29/20.     Here to follow-up Currently NOT on therapy. Disease has been indolent.      Saw Dr. Leslie at [facility], PET scan 6/2022 shows post-therapy changes in the right lung and no other sites concerning for metastases. CT CAP 9/2022 shows multiple stable pulmonary nodules, DC 0.5 x 0.6 cm pulmonary nodule is slightly larger. Indeterminate 8 mm intramuscular lesion (previously 6 mm) may represent metastatic disease. Now intermediate - constant L rib pain + chronic intermittent back pain that she has noted since VATS. Gabapentin has relieved some symptoms.     Saw Dr. Leslie 9/2022, has scans, IR biopsy and visit scheduled 1/2023. Underwent knee replacement surgery of L in [place], had prolonged recovery.    Sees pain mgmt at [facility]. Recently established with psych onc at the [place].     Recent PNA, feeling better, but still some SOB.     Pt seeing Sister Janessa, the healing nun.  Ses Dr. Leslie at [facility].     Recent scans at [facility] were stable.           Oncologic History:    Clear cell carcinoma of right kidney.    6/30/2013 Initial Diagnosis   Presented with gross hematuria. CT CAP: 7.2-cm mass occupying the upper two thirds of the right kidney. 2.8-cm mass in the posterior aspect of the urinary bladder c/w TCC.    7/25/2013 Surgery   Right radical nephrectomy Pathology: 7.5-cm clear cell RCC Yanet nuclear grade 2, with microscopic extension into perinephric adipose tissue and with tumor in the renal margin.  pT3a pN0 pMX    10/16/2013 - No Evidence of Disease ([facility])   CT AP: No CT evidence of recurrence or metastatic disease    8/4/2015 - No Evidence of Disease ([facility])   CT AP: No CT evidence of recurrence or metastatic disease    12/1/2016 -  [facility] with concern of recurrence   CT AP: Mew less than 4 mm pulmonary nodule in the anterior basal segment of the RLL. Stable 4 mm pulmonary nodule posterior basal segment RLL.     3/1/2017 Relapse/Recurrence   CT Chest: Multiple new subcm pulmonary nodules in the RLL and one in the right middle measuring up to 6 mm suspicious for metastatic disease.     2/8/2019 - [facility] with concern of recurrence   1. Small volume pulmonary metastases are slightly larger compared to the prior study.     2. No recurrent or metastatic disease in the abdomen or pelvis.    2/14/2019 Biopsy   CT-guided biopsy of a 1 cm left lower lobe lesion   Pathology: Small focus of atypical adenomatous hyperplasia with no tumor present. PAX-8 negative.    2/7/2022 - 3/3/2022 TX-Radiation Therapy   1 RUL x06 VMAT 02/07/2022 02/25/2022 400 cGy 15/15 6000/6000 cGy   1 RLL x06 STEREOTACTIC 02/28/2022 03/03/2022 1250 cGy 4/4 5000cGy    She was initally noted to have spontaneous remission of the lung nodules in the absence of any systemic treatment. However, in 2019, the lung nodules at started to increase in size but were still mostly subcentimeter in greatest dimension. In 2/2019, when the left lower lobe lesion increased to 1.4 cm. IR biopsied a peripheral left lung lesion which was PAX-8 negative thought to be a small focus of atypical adenomatous hyperplasia. We therefore brought her back after only 3 months to monitor those lesions carefully. She was referred to Dr. King who thinks the lesions represent indolent RCC metastases. She underwent resection and RCC was confirmed on pathology at [facility].      Review of Systems   Constitutional: Negative for activity change, appetite change, chills, fatigue, fever and unexpected weight change.   HENT: Negative for congestion, hearing loss, mouth sores, sore throat, tinnitus and voice change.    Eyes: Negative for pain and visual disturbance.   Respiratory: Negative for cough, shortness of breath and  wheezing.    Cardiovascular: Negative for chest pain, palpitations and leg swelling.   Gastrointestinal: Negative for abdominal pain, constipation, diarrhea, nausea and vomiting.   Endocrine: Negative for cold intolerance and heat intolerance.   Genitourinary: Negative for difficulty urinating, dyspareunia, dysuria, frequency, menstrual problem, urgency, vaginal bleeding, vaginal discharge and vaginal pain.   Musculoskeletal: Positive for arthralgias. Negative for back pain and myalgias.   Skin: Negative for color change, rash and wound.   Allergic/Immunologic: Negative for environmental allergies and food allergies.   Neurological: Negative for weakness, numbness and headaches.   Hematological: Negative for adenopathy. Does not bruise/bleed easily.   Psychiatric/Behavioral: Negative for agitation, confusion, hallucinations and sleep disturbance. The patient is nervous/anxious.    All other systems reviewed and are negative.          Allergies:  Review of patient's allergies indicates:   Allergen Reactions    Talwin compound Anxiety     hallucinations/anxiety    Tramadol Itching    Buspirone Anxiety    Codeine Itching    Morphine Itching     jittery    Morpholine analogues Anxiety and Itching    Naproxen Itching     Takes Ibuprofen is ok on rare occasion     Nexium [esomeprazole magnesium] Other (See Comments)     constipation    Wal-phed [pseudoephedrine hcl] Itching       Medications:  Current Outpatient Medications   Medication Sig Dispense Refill    ALPRAZolam (XANAX) 0.5 MG tablet Take daily as needed for anxiety. 30 tablet 5    azelastine (ASTELIN) 137 mcg (0.1 %) nasal spray 1 spray (137 mcg total) by Nasal route 2 (two) times daily as needed for Rhinitis. 30 mL 2    brimonidine (MIRVASO) 0.33 % GlwP Apply topically.      buPROPion (WELLBUTRIN XL) 150 MG TB24 tablet Take 1 tablet (150 mg total) by mouth once daily. 90 tablet 1    butalbital-acetaminophen-caffeine -40 mg (FIORICET, ESGIC) -40 mg  per tablet TAKE 1 TABLET EVERY 4 HOURS AS NEEDED 30 tablet 0    clotrimazole (LOTRIMIN) 1 % Soln APPLY TOPICALLY 2 TIMES DAILY FOR 7 DAYS 30 mL 2    diazePAM (VALIUM) 5 MG tablet Take 5 mg by mouth.      diclofenac sodium (VOLTAREN) 1 % Gel Apply 2 g topically daily as needed. 100 g 11    diflorasone-emollient (APEXICON E) 0.05 % Crea Apply 1 application topically once daily. for 7 days 30 g 5    docusate sodium (COLACE) 100 MG capsule Take 100 mg by mouth.      estradioL (ESTRACE) 1 MG tablet Take 1 tablet (1 mg total) by mouth once daily. 90 tablet 0    famotidine (PEPCID) 20 MG tablet Take 20 mg by mouth.      fluocinonide (LIDEX) 0.05 % external solution Apply topically 2 (two) times daily. Do not use morning of surgery      hydrocortisone 2.5 % cream Apply topically 2 (two) times daily. apply to affected area for 7 days 20 g 5    ketoconazole (NIZORAL) 2 % shampoo Do not use morning of surgery      levocetirizine (XYZAL) 5 MG tablet Take 5 mg by mouth.      levocetirizine (XYZAL) 5 MG tablet Take 1 tablet (5 mg total) by mouth every evening. 90 tablet 3    levothyroxine (SYNTHROID) 112 MCG tablet Take 1 tablet (112 mcg total) by mouth before breakfast. TAKE 1 TABLET (112 MCG TOTAL) BY MOUTH BEFORE BREAKFAST AS SCHEDULED Strength: 112 mcg 90 tablet 4    metronidazole 1% (METROGEL) 1 % Gel Apply topically once daily. 60 g 5    mometasone (ELOCON) 0.1 % solution Apply topically.      naltrexone (DEPADE) 50 mg tablet 1/4 pill twice daily 15 tablet 0    omeprazole (PRILOSEC) 10 MG capsule Take 10 mg by mouth.      ondansetron (ZOFRAN) 4 MG tablet Take 2 tablets (8 mg total) by mouth every 12 (twelve) hours as needed for Nausea. 20 tablet 2    oxyCODONE-acetaminophen (PERCOCET) 5-325 mg per tablet Take 1 tablet by mouth every 6 (six) hours as needed for Pain. 30 each 0    pravastatin (PRAVACHOL) 20 MG tablet TAKE 1 TABLET EVERY DAY 90 tablet 4    senna (SENOKOT) 8.6 mg tablet Take 1 tablet by mouth.      zolpidem  (AMBIEN) 5 MG Tab Take 1 tablet (5 mg total) by mouth nightly as needed. 90 tablet 0     No current facility-administered medications for this visit.     Facility-Administered Medications Ordered in Other Visits   Medication Dose Route Frequency Provider Last Rate Last Admin    triamcinolone acetonide injection 40 mg  40 mg Intra-articular  Osmar Avery III, MD   40 mg at 05/17/22 1345       PMH:  Past Medical History:   Diagnosis Date    Anxiety     Family history of malignant melanoma 08/25/2014    Fibromyalgia affecting lower leg     GERD (gastroesophageal reflux disease)     Hx of psychiatric care     Hypercholesteremia     Hypertension     Hypothyroidism     Inflamed seborrheic keratosis 08/25/2014    Microscopic hematuria 02/02/2017    Multiple benign melanocytic nevi 08/25/2014    Renal cell carcinoma of right kidney metastatic to other site     Therapy        PSH:  Past Surgical History:   Procedure Laterality Date    AUGMENTATION OF BREAST      bladder lift      breast implants      BREAST SURGERY Bilateral 1996    saline implants    COLONOSCOPY  2007    HYSTERECTOMY      ectopic pregnancy x 2, with LSO    KNEE ARTHROPLASTY Left 06/14/2021    Procedure: ARTHROPLASTY, KNEE:LEFT:DEPUY-SIGMA;  Surgeon: Osmar Avery III, MD;  Location: [facility] OR;  Service: Orthopedics;  Laterality: Left;    LAPAROSCOPIC NEPHRECTOMY, HAND ASSISTED  07/25/2013    LUNG REMOVAL, PARTIAL Left 2020    At [facility]    NEPHRECTOMY      OOPHORECTOMY Bilateral     RADIOFREQUENCY ABLATION Left 08/30/2022    Procedure: RADIOFREQUENCY ABLATION, LEFT KNEE COOLED;  Surgeon: Vijaya Landin MD;  Location: [facility] PAIN MGT;  Service: Pain Management;  Laterality: Left;    RADIOFREQUENCY ABLATION Right 10/25/2022    Procedure: RADIOFREQUENCY ABLATION RIGHT KNEE GENICULAR COOLED;  Surgeon: Vijaya Landin MD;  Location: [facility] PAIN MGT;  Service: Pain Management;  Laterality: Right;    RADIOFREQUENCY ABLATION Left 03/07/2023     Procedure: RADIOFREQUENCY ABLATION LEFT GENICULAR COOLED RFA;  Surgeon: Vijaya Landin MD;  Location: [facility] PAIN MGT;  Service: Pain Management;  Laterality: Left;    RADIOFREQUENCY ABLATION Right 08/01/2023    Procedure: RADIOFREQUENCY ABLATION, RIGHT GENICULAR COOLED;  Surgeon: Vijaya Landin MD;  Location: [facility] PAIN MGT;  Service: Pain Management;  Laterality: Right;    RADIOFREQUENCY ABLATION Left 12/26/2023    Procedure: RADIOFREQUENCY ABLATION LEFT GENICULAR 1 OF 2;  Surgeon: Vijaya aLndin MD;  Location: [facility] PAIN MGT;  Service: Pain Management;  Laterality: Left;  [phone]    RADIOFREQUENCY ABLATION Right 2/6/2024    Procedure: RADIOFREQUENCY ABLATION RIGHT GENICULAR 2 OF 2;  Surgeon: Vijaya Landin MD;  Location: [facility] PAIN MGT;  Service: Pain Management;  Laterality: Right;  [phone]  *after 2/1/24    right ganglion cyst      throat polyp      TRANSOBTURATOR SLING  2011    with RSO for persistent complex cyst & MARGOT; done by arndt    UPPER GASTROINTESTINAL ENDOSCOPY  2007       FamHx:  Family History   Problem Relation Name Age of Onset    Diabetes Mother      Hypertension Mother      Heart disease Mother      Cancer Father          lung    Migraines Father      Glaucoma Paternal Grandmother      Glaucoma Sister      Thyroid disease Neg Hx      Asthma Neg Hx         SocHx:  Social History     Socioeconomic History    Marital status:      Spouse name: KAIDEN    Number of children: 5   Occupational History    Occupation: retired     Comment: Dance Instructor   Tobacco Use    Smoking status: Never    Smokeless tobacco: Never   Substance and Sexual Activity    Alcohol use: Yes     Alcohol/week: 0.0 standard drinks of alcohol     Comment: social    Drug use: No    Sexual activity: Yes     Partners: Male     Birth control/protection: Surgical   Social History Narrative    Patient is a retired dance instructor and live with . Has stairs at home 12- has an elevator     Social  Determinants of Health     Financial Resource Strain: Low Risk  (5/26/2024)    Overall Financial Resource Strain (CARDIA)     Difficulty of Paying Living Expenses: Not hard at all   Food Insecurity: No Food Insecurity (5/26/2024)    Hunger Vital Sign     Worried About Running Out of Food in the Last Year: Never true     Ran Out of Food in the Last Year: Never true   Transportation Needs: Patient Declined (5/15/2024)    PRAPARE - Transportation     Lack of Transportation (Medical): Patient declined     Lack of Transportation (Non-Medical): Patient declined   Physical Activity: Inactive (5/26/2024)    Exercise Vital Sign     Days of Exercise per Week: 0 days     Minutes of Exercise per Session: 0 min   Stress: Stress Concern Present (5/26/2024)    [employer] [place] [employer] - Occupational Stress Questionnaire     Feeling of Stress : To some extent   Housing Stability: Unknown (5/26/2024)    Housing Stability Vital Sign     Unable to Pay for Housing in the Last Year: No       Objective:        Anesthesia/Surgery risks, benefits and alternative options discussed and understood by patient/family.  LABS:  WBC   Date Value Ref Range Status   08/17/2021 5.58 3.90 - 12.70 K/uL Final     Hemoglobin   Date Value Ref Range Status   08/17/2021 13.8 12.0 - 16.0 g/dL Final     Hematocrit   Date Value Ref Range Status   08/17/2021 42.4 37.0 - 48.5 % Final     Platelets   Date Value Ref Range Status   08/17/2021 215 150 - 450 K/uL Final       Chemistry        Component Value Date/Time     10/05/2022 1015    K 4.2 10/05/2022 1015     10/05/2022 1015    CO2 25 10/05/2022 1015    BUN 12 10/09/2023 1309    BUN 10 10/05/2022 1015    CREATININE 1.00 (H) 10/09/2023 1309    CREATININE 0.8 10/05/2022 1015    GLU 86 10/05/2022 1015        Component Value Date/Time    CALCIUM 8.9 10/09/2023 1309    CALCIUM 8.4 (L) 10/05/2022 1015    ALKPHOS 78 10/05/2022 1015    AST 15 10/05/2022 1015    ALT 14 10/05/2022 1015     BILITOT 0.7 10/05/2022 1015    ESTGFRAFRICA >60 08/17/2021 1135    EGFRNONAA >60 08/17/2021 1135              Assessment:       1. Carcinoma of kidney, unspecified laterality    2. Clear cell carcinoma    3. Carcinoma of right kidney    4. History of right nephrectomy              Plan:       1. Metastatic ccRCC, very indolent cancer, current off therapy:    Currently on no systemic treatment. S/p wedge resection [facility] and XRT.      Recent scans at [facility] stable.  Plan to continue to monitor.  No systemic therapy unless clear PD on imaging.      RTC for virtual visit to see me in 4 months     The patient agrees with the plan, and all questions have been answered to their satisfaction.      More than 40 mins total time were spent during this encounter.      Kervin Jaquez M.D., M.S., F.A.C.P.  Hematology/Oncology Attending  [facility]            Med Onc Chart Routing      Follow up with physician 4 months. RTC for virtual visit to see me in 4 months   Follow up with MARGARET    Infusion scheduling note    Injection scheduling note    Labs    Imaging    Pharmacy appointment    Other referrals

## 2024-05-28 ENCOUNTER — OFFICE VISIT (OUTPATIENT)
Dept: INTERNAL MEDICINE | Facility: CLINIC | Age: 74
End: 2024-05-28
Payer: MEDICARE

## 2024-05-28 DIAGNOSIS — R06.09 DYSPNEA ON EXERTION: Primary | ICD-10-CM

## 2024-05-28 DIAGNOSIS — R43.1 PAROSMIA: ICD-10-CM

## 2024-05-28 DIAGNOSIS — J18.9 PNEUMONIA OF LEFT LOWER LOBE DUE TO INFECTIOUS ORGANISM: ICD-10-CM

## 2024-05-28 PROCEDURE — 99214 OFFICE O/P EST MOD 30 MIN: CPT | Mod: 95,,, | Performed by: PHYSICIAN ASSISTANT

## 2024-05-28 NOTE — PROGRESS NOTES
The patient location is: [place]  The chief complaint leading to consultation is: pneumonia follow up    Visit type: audiovisual    Face to Face time with patient: 15  20 minutes of total time spent on the encounter, which includes face to face time and non-face to face time preparing to see the patient (eg, review of tests), Obtaining and/or reviewing separately obtained history, Documenting clinical information in the electronic or other health record, Independently interpreting results (not separately reported) and communicating results to the patient/family/caregiver, or Care coordination (not separately reported).         Each patient to whom he or she provides medical services by telemedicine is:  (1) informed of the relationship between the physician and patient and the respective role of any other health care provider with respect to management of the patient; and (2) notified that he or she may decline to receive medical services by telemedicine and may withdraw from such care at any time.    Notes:    Subjective:      Patient ID: Sherrill Avery is a 73 y.o. female.    Chief Complaint: No chief complaint on file.    HPI  Here today for a 1 week follow up on her pneumonia. Started on zithromax and augmentin last visit. Completed her augmentin.  Cough and sinus symptoms have resolved. No new symptoms  Still feeling fatigued and very short of breath with exertion. Not able to do the activities she was able to do a month ago.   MRI and CT done at [facility] earlier this month to monitor her cancer.       Also for years she has had problems with her sense of smell. Often smells wood or like something burning when it is not there. Phantom smells.     Patient Active Problem List   Diagnosis    GERD (gastroesophageal reflux disease)    Hypothyroidism    Nontoxic multinodular goiter    Primary osteoarthritis involving multiple joints    Fibromyalgia    Actinic degeneration    Dermatofibroma    Bilateral  sensorineural hearing loss    Hypertension    Hypercholesteremia    Kidney carcinoma    Neuropathy of left sural nerve-mild    Vitamin D deficiency    Anxiety    Chronic insomnia    Multiple lung nodules on CT    Clear cell carcinoma with lung metastasis    Subacromial bursitis    It band syndrome, left    Chronic pain of both knees    Chondromalacia, patella, right    Chondromalacia, patella, left    Lumbar radiculopathy    Allergic rhinitis    CKD (chronic kidney disease)    Hormone replacement therapy (HRT)    Osteoarthritis of left knee    Chronic knee pain after total replacement of left knee joint    Functional gait abnormality    Neck pain    Primary osteoarthritis of both knees    Primary osteoarthritis of right knee    Leg weakness    Class 1 obesity due to excess calories with serious comorbidity and body mass index (BMI) of 31.0 to 31.9 in adult    Generalized anxiety disorder    Aortic atherosclerosis         Current Outpatient Medications:     ALPRAZolam (XANAX) 0.5 MG tablet, Take daily as needed for anxiety., Disp: 30 tablet, Rfl: 5    azelastine (ASTELIN) 137 mcg (0.1 %) nasal spray, 1 spray (137 mcg total) by Nasal route 2 (two) times daily as needed for Rhinitis., Disp: 30 mL, Rfl: 2    brimonidine (MIRVASO) 0.33 % GlwP, Apply topically., Disp: , Rfl:     buPROPion (WELLBUTRIN XL) 150 MG TB24 tablet, Take 1 tablet (150 mg total) by mouth once daily., Disp: 90 tablet, Rfl: 1    butalbital-acetaminophen-caffeine -40 mg (FIORICET, ESGIC) -40 mg per tablet, TAKE 1 TABLET EVERY 4 HOURS AS NEEDED, Disp: 30 tablet, Rfl: 0    clotrimazole (LOTRIMIN) 1 % Soln, APPLY TOPICALLY 2 TIMES DAILY FOR 7 DAYS, Disp: 30 mL, Rfl: 2    diazePAM (VALIUM) 5 MG tablet, Take 5 mg by mouth., Disp: , Rfl:     diclofenac sodium (VOLTAREN) 1 % Gel, Apply 2 g topically daily as needed., Disp: 100 g, Rfl: 11    diflorasone-emollient (APEXICON E) 0.05 % Crea, Apply 1 application topically once daily. for 7 days, Disp:  30 g, Rfl: 5    docusate sodium (COLACE) 100 MG capsule, Take 100 mg by mouth., Disp: , Rfl:     estradioL (ESTRACE) 1 MG tablet, Take 1 tablet (1 mg total) by mouth once daily., Disp: 90 tablet, Rfl: 0    famotidine (PEPCID) 20 MG tablet, Take 20 mg by mouth., Disp: , Rfl:     fluocinonide (LIDEX) 0.05 % external solution, Apply topically 2 (two) times daily. Do not use morning of surgery, Disp: , Rfl:     hydrocortisone 2.5 % cream, Apply topically 2 (two) times daily. apply to affected area for 7 days, Disp: 20 g, Rfl: 5    ketoconazole (NIZORAL) 2 % shampoo, Do not use morning of surgery, Disp: , Rfl:     levocetirizine (XYZAL) 5 MG tablet, Take 5 mg by mouth., Disp: , Rfl:     levocetirizine (XYZAL) 5 MG tablet, Take 1 tablet (5 mg total) by mouth every evening., Disp: 90 tablet, Rfl: 3    levothyroxine (SYNTHROID) 112 MCG tablet, Take 1 tablet (112 mcg total) by mouth before breakfast. TAKE 1 TABLET (112 MCG TOTAL) BY MOUTH BEFORE BREAKFAST AS SCHEDULED Strength: 112 mcg, Disp: 90 tablet, Rfl: 4    metronidazole 1% (METROGEL) 1 % Gel, Apply topically once daily., Disp: 60 g, Rfl: 5    mometasone (ELOCON) 0.1 % solution, Apply topically., Disp: , Rfl:     naltrexone (DEPADE) 50 mg tablet, 1/4 pill twice daily, Disp: 15 tablet, Rfl: 0    omeprazole (PRILOSEC) 10 MG capsule, Take 10 mg by mouth., Disp: , Rfl:     ondansetron (ZOFRAN) 4 MG tablet, Take 2 tablets (8 mg total) by mouth every 12 (twelve) hours as needed for Nausea., Disp: 20 tablet, Rfl: 2    oxyCODONE-acetaminophen (PERCOCET) 5-325 mg per tablet, Take 1 tablet by mouth every 6 (six) hours as needed for Pain., Disp: 30 each, Rfl: 0    pravastatin (PRAVACHOL) 20 MG tablet, TAKE 1 TABLET EVERY DAY, Disp: 90 tablet, Rfl: 4    senna (SENOKOT) 8.6 mg tablet, Take 1 tablet by mouth., Disp: , Rfl:     zolpidem (AMBIEN) 5 MG Tab, Take 1 tablet (5 mg total) by mouth nightly as needed., Disp: 90 tablet, Rfl: 0  No current facility-administered medications for  this visit.    Facility-Administered Medications Ordered in Other Visits:     triamcinolone acetonide injection 40 mg, 40 mg, Intra-articular, , Osmar Avery III, MD, 40 mg at 05/17/22 1345    Review of Systems   Constitutional:  Positive for activity change and fatigue. Negative for appetite change, chills, diaphoresis, fever and unexpected weight change.   HENT:  Positive for rhinorrhea. Negative for congestion, hearing loss, postnasal drip, sore throat, trouble swallowing and voice change.    Eyes: Negative.  Negative for discharge and visual disturbance.   Respiratory:  Positive for shortness of breath. Negative for cough, choking, chest tightness and wheezing.    Cardiovascular:  Negative for chest pain, palpitations and leg swelling.   Gastrointestinal:  Negative for abdominal distention, abdominal pain, blood in stool, constipation, diarrhea, nausea and vomiting.   Endocrine: Negative for cold intolerance, heat intolerance, polydipsia and polyuria.   Genitourinary: Negative.  Negative for difficulty urinating, dysuria, frequency, hematuria and menstrual problem.   Musculoskeletal:  Positive for arthralgias and joint swelling. Negative for back pain, gait problem, myalgias and neck pain.   Skin:  Negative for color change, pallor, rash and wound.   Neurological:  Positive for weakness and headaches. Negative for dizziness, tremors, light-headedness and numbness.   Hematological:  Negative for adenopathy.   Psychiatric/Behavioral:  Negative for behavioral problems, confusion, dysphoric mood, self-injury, sleep disturbance and suicidal ideas. The patient is not nervous/anxious.      Objective:   There were no vitals taken for this visit.    Physical Exam  Constitutional:       General: She is not in acute distress.     Appearance: Normal appearance. She is not ill-appearing, toxic-appearing or diaphoretic.   HENT:      Head: Normocephalic and atraumatic.   Pulmonary:      Effort: Pulmonary effort is normal.  No respiratory distress.      Breath sounds: Normal breath sounds.   Skin:     Coloration: Skin is not pale.      Findings: No bruising, erythema or rash.   Neurological:      Mental Status: She is alert and oriented to person, place, and time.   Psychiatric:         Mood and Affect: Mood normal.         Behavior: Behavior normal.         Thought Content: Thought content normal.         Judgment: Judgment normal.         X-Ray Chest PA And Lateral  Narrative: EXAMINATION:  XR CHEST PA AND LATERAL    CLINICAL HISTORY:  Cough, unspecified    TECHNIQUE:  PA and lateral views of the chest were performed.    COMPARISON:  Prior radiographs    FINDINGS:  Cardiac silhouette and mediastinal contours are normal.  Right lung clear.  Left basilar haziness present.  No large pleural effusion.  Osseous structures are intact.  Impression: Left basilar haziness which may represent some component of soft tissue attenuation.  Atelectasis versus pneumonitis.  Correlate clinically.    Electronically signed by: Te Jacobson MD  Date:    05/15/2024  Time:    11:34       Lab Results   Component Value Date    CREATININE 1.00 (H) 10/09/2023    BUN 12 10/09/2023     10/05/2022    K 4.2 10/05/2022     10/05/2022    CO2 25 10/05/2022     Examination: CT CHEST ABDOMEN PELVIS W CONTRAST on 5/21/2024 12:02 PM.    Clinical History: Metastatic malignant neoplasm to muscle  Secondary malignant neoplasm of bilateral lungs  Secondary malignant neoplasm of bilateral lungs  Clear cell carcinoma of right kidney.    Indication: Cancer staging or restaging, metastatic renal cell carcinoma.    Comparison: CT 01/08/2024.    Technique: CT CHEST ABDOMEN PELVIS W CONTRAST.    Findings:    CHEST:    Lungs and Pleura: Stable postoperative changes in the left lower lobe. Stable areas of post radiation fibrosis in the right lung. Stable pulmonary nodules suspicious for metastases. As an example:  *  Stable 8 mm nodule in the lingula (series 4,  image 70).  *  Stable 5 mm left lower lobe nodule (4/100).  No confluent consolidation. No pleural effusion.    Cardiomediastinum: The heart is normal in size. Stable ectasia of the ascending aorta measuring approximately 3.8 x 3.7 cm (3/57). No pericardial effusion.    Lymph nodes: No pathologically - enlarged lymph nodes.    ABDOMEN AND PELVIS:    Hepatobiliary: No suspicious hepatic lesion. Stable cyst in segment 3 (series 3, 156). Patent portal and hepatic veins. No biliary dilatation. No cholecystitis. There is cholelithiasis.    Spleen: No splenomegaly.    Pancreas: No mass or ductal dilatation.    Adrenal Glands: Unremarkable left adrenal gland. The right adrenal gland is surgically absent    Kidneys, Ureters, Bladder: The left kidney shows normal contrast enhancement and excretion, without hydronephrosis. No suspicious renal lesion. Left parapelvic cysts. 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  Stable postoperative changes of right radical nephrectomy. No mass in the right renal fossa.  No bladder mass.    Gastrointestinal Tract: No obstruction.    Pelvic Organs: No pelvic mass. Post hysterectomy. No adnexal masses.    Peritoneum/Retroperitoneum: No ascites.    Lymph Nodes: No lymphadenopathy.    MUSCULOSKELETAL:  9 mm hypervascular nodule in the left paraspinal musculature not significantly changed (series 3, image 137), possibly metastatic in etiology.  No suspicious skeletal lesion. Degenerative changes in the spine.  Addendum    Addendum by Jordan Alonso MD on 05/22/2024  8:42 PM CDT  There is a nonspecific punctate hypodensity in the body of the pancreas  (series 3, image 172), possibly a minute cyst, not significantly changed  on multiple prior exams dating back to 2017, and therefore probably benign  in etiology.  Assessment:     1. Dyspnea on exertion    2. Parosmia    3. Pneumonia of left lower lobe due to infectious organism      Plan:   Dyspnea on  exertion  -     Ambulatory referral/consult to Cardiology; Future; Expected date: 06/04/2024  -     Stress Echo Which stress agent will be used? Pharmacological; Color Flow Doppler? No; Future    Parosmia    Pneumonia of left lower lobe due to infectious organism    -no pneumonia on recent CT scan. Will have her follow up with a cardiologist and get an updated stress echo for her new and worsening dyspnea with exertion.  -pt will follow up with her ENT regarding her parosmia.     Follow up if symptoms worsen or fail to improve.

## 2024-06-03 ENCOUNTER — PATIENT MESSAGE (OUTPATIENT)
Dept: SPINE | Facility: CLINIC | Age: 74
End: 2024-06-03
Payer: MEDICARE

## 2024-06-04 DIAGNOSIS — Z00.00 ROUTINE ADULT HEALTH MAINTENANCE: ICD-10-CM

## 2024-06-04 DIAGNOSIS — Z76.89 ENCOUNTER TO ESTABLISH CARE: Primary | ICD-10-CM

## 2024-06-05 ENCOUNTER — PATIENT MESSAGE (OUTPATIENT)
Dept: HEMATOLOGY/ONCOLOGY | Facility: CLINIC | Age: 74
End: 2024-06-05

## 2024-06-05 ENCOUNTER — OFFICE VISIT (OUTPATIENT)
Dept: HEMATOLOGY/ONCOLOGY | Facility: CLINIC | Age: 74
End: 2024-06-05
Payer: MEDICARE

## 2024-06-05 ENCOUNTER — TELEPHONE (OUTPATIENT)
Dept: CARDIOLOGY | Facility: HOSPITAL | Age: 74
End: 2024-06-05
Payer: MEDICARE

## 2024-06-05 DIAGNOSIS — Z90.5 HISTORY OF RIGHT NEPHRECTOMY: ICD-10-CM

## 2024-06-05 DIAGNOSIS — C80.1 CLEAR CELL CARCINOMA: ICD-10-CM

## 2024-06-05 DIAGNOSIS — C64.1 CARCINOMA OF RIGHT KIDNEY: ICD-10-CM

## 2024-06-05 DIAGNOSIS — C64.9 CARCINOMA OF KIDNEY, UNSPECIFIED LATERALITY: Primary | ICD-10-CM

## 2024-06-05 PROCEDURE — 99215 OFFICE O/P EST HI 40 MIN: CPT | Mod: 95,,, | Performed by: INTERNAL MEDICINE

## 2024-06-05 PROCEDURE — G2211 COMPLEX E/M VISIT ADD ON: HCPCS | Mod: 95,,, | Performed by: INTERNAL MEDICINE

## 2024-06-06 ENCOUNTER — PATIENT MESSAGE (OUTPATIENT)
Dept: PODIATRY | Facility: CLINIC | Age: 74
End: 2024-06-06
Payer: MEDICARE

## 2024-06-11 ENCOUNTER — HOSPITAL ENCOUNTER (OUTPATIENT)
Dept: RADIOLOGY | Facility: HOSPITAL | Age: 74
Discharge: HOME | End: 2024-06-11
Attending: PODIATRIST
Payer: MEDICARE

## 2024-06-11 ENCOUNTER — OFFICE VISIT (OUTPATIENT)
Dept: PAIN MEDICINE | Facility: CLINIC | Age: 74
End: 2024-06-11
Payer: MEDICARE

## 2024-06-11 ENCOUNTER — OFFICE VISIT (OUTPATIENT)
Dept: PODIATRY | Facility: CLINIC | Age: 74
End: 2024-06-11
Payer: MEDICARE

## 2024-06-11 VITALS — BODY MASS INDEX: 31.36 KG/M2 | WEIGHT: 188.25 LBS | HEIGHT: 65 IN

## 2024-06-11 DIAGNOSIS — M25.561 CHRONIC PAIN OF BOTH KNEES: ICD-10-CM

## 2024-06-11 DIAGNOSIS — M17.10 PRIMARY OSTEOARTHRITIS OF KNEE, UNSPECIFIED LATERALITY: Primary | ICD-10-CM

## 2024-06-11 DIAGNOSIS — G89.29 CHRONIC PAIN OF BOTH KNEES: ICD-10-CM

## 2024-06-11 DIAGNOSIS — C80.1 CLEAR CELL CARCINOMA: ICD-10-CM

## 2024-06-11 DIAGNOSIS — T14.8XXA CONTUSION OF BONE: ICD-10-CM

## 2024-06-11 DIAGNOSIS — M17.11 PRIMARY OSTEOARTHRITIS OF RIGHT KNEE: ICD-10-CM

## 2024-06-11 DIAGNOSIS — M79.672 PAIN IN LEFT FOOT: ICD-10-CM

## 2024-06-11 DIAGNOSIS — M25.571 PAIN IN RIGHT ANKLE AND JOINTS OF RIGHT FOOT: ICD-10-CM

## 2024-06-11 DIAGNOSIS — M25.562 CHRONIC PAIN OF BOTH KNEES: ICD-10-CM

## 2024-06-11 DIAGNOSIS — M19.071 OSTEOARTHRITIS OF RIGHT ANKLE OR FOOT: ICD-10-CM

## 2024-06-11 DIAGNOSIS — G89.4 CHRONIC PAIN SYNDROME: ICD-10-CM

## 2024-06-11 DIAGNOSIS — C64.9 CARCINOMA OF KIDNEY, UNSPECIFIED LATERALITY: ICD-10-CM

## 2024-06-11 DIAGNOSIS — M19.071 OSTEOARTHRITIS OF RIGHT ANKLE OR FOOT: Primary | ICD-10-CM

## 2024-06-11 DIAGNOSIS — Z85.9 HISTORY OF CANCER: ICD-10-CM

## 2024-06-11 PROCEDURE — 73630 X-RAY EXAM OF FOOT: CPT | Mod: TC,50

## 2024-06-11 PROCEDURE — 99999 PR PBB SHADOW E&M-EST. PATIENT-LVL IV: CPT | Mod: PBBFAC,,, | Performed by: PODIATRIST

## 2024-06-11 PROCEDURE — 99213 OFFICE O/P EST LOW 20 MIN: CPT | Mod: 95,,, | Performed by: NURSE PRACTITIONER

## 2024-06-11 PROCEDURE — 73630 X-RAY EXAM OF FOOT: CPT | Mod: 26,50,, | Performed by: RADIOLOGY

## 2024-06-11 PROCEDURE — 99214 OFFICE O/P EST MOD 30 MIN: CPT | Mod: S$PBB,,, | Performed by: PODIATRIST

## 2024-06-11 PROCEDURE — 99214 OFFICE O/P EST MOD 30 MIN: CPT | Mod: PBBFAC | Performed by: PODIATRIST

## 2024-06-11 RX ORDER — MELOXICAM 15 MG/1
15 TABLET ORAL DAILY
Qty: 30 TABLET | Refills: 0 | Status: SHIPPED | OUTPATIENT
Start: 2024-06-11 | End: 2024-07-11

## 2024-06-11 NOTE — PROGRESS NOTES
Chronic Pain-Tele-Medicine-Established Note (Follow up visit)        The patient location is: Home  The chief complaint leading to consultation is: pain  Visit type: Virtual visit with synchronous audio and video  Total time spent with patient: 15 min  Each patient to whom he or she provides medical services by telemedicine is:  (1) informed of the relationship between the physician and patient and the respective role of any other health care provider with respect to management of the patient; and (2) notified that he or she may decline to receive medical services by telemedicine and may withdraw from such care at any time.      PCP: Alexandre Macias MD    REFERRING PHYSICIAN: No ref. provider found    CHIEF COMPLAINT: L knee pain s/p knee replacement    Original HISTORY OF PRESENT ILLNESS: Sherrill Avery w/ pmh of HTN, and renal cell carcinoma (kidney removed in 2013) s/p radiation in February 2022 due to METs which appeared in 2017 who presents to the clinic for the evaluation of the above pain. She had a knee replacement in June 2021, and has continued to have pain since the procedure. She states when she sits for an extended period of time and stands back up she experiences the pain around the bottom to lateral edge of her knee cap. She has a swollen area just below the L knee as well. She currently denies CP, SOB, vision changes, or speech changes.    Original Pain Description:  The pain is located in the L knee and does not radiate. The pain is described as sharp and stinging . Exacerbating factors: Sitting, Standing, Laying, Night Time, Getting out of bed/chair and going up stairs. Mitigating factors massage, medications, physical therapy, rest and sitting. Symptoms interfere with daily activity and sleeping. The patient feels like symptoms have been unchanged. Patient denies night fever/night sweats, urinary incontinence, bowel incontinence and significant motor weakness.    Original PAIN SCORES:  Best: Pain  is 0  Worst: Pain is 6  Usually: Pain is 4  Current: Pain is 2    INTERVAL HISTORY:       Interval History 9/21/22:  Mrs. Avery returns to clinic after left genicular RFA on 8/30/22. She reports significant pain relief and she is able to stand from a seated position and walk more comfortably. She is happy with the results and would like to have the same procedure on her right knee. She has had right knee pain for years and she has received multiple steroid injections which only provide about 2 weeks of pain relief. Pain is exacerbated by stairs, walking, and rising from a chair.  She denies any new weakness and numbness/tingling.     Interval History 12/13/2022:  The patient has a virtual follow up for right knee pain. She is now s/p right genicular RFA on 10/25/22 with 80% relief. She is still having benefit from left knee RFA. She is also reporting lower back pain . The pain is sharp in nature. It does not radiate. She has pain when she stands for a long time. She takes Gabapentin regularly with some benefit. She has completed PT in the past with some benefit. She did have a lumbar MRI in 2015.    Interval History 1/24/2023:  The patient returns for follow up of chronic bilateral knee and lower back pain. She has been having more left knee pain recently. She had genicular RFA 8/30/22 with significant benefit. However, she feels like the pain has started to return. She would like to repeat when possible. She still has some back pain with intermittent radiation across the back. She is taking Gabapentin 900 mg daily with some benefit and no side effects. Since previous encounter, she had a recent oncology 3 month follow up at at [facility]. She has a spot to the back and two to the lung which are being watched. She has a follow up in 3 months again. Her pain today is 4/10.    Interval History 4/4/2023:  The patient has a virtual follow up for bilateral knee pain. She is now s/p left genicular RFA with 80% relief.  Her left knee pain has significantly improved. She is feeling more pain on the right knee and lower back recently. She previously had excellent relief for right knee pain from RFA for 5 months. She would like to repeat the procedure. She is following up with [facility] next week to discuss back pain and possible radiation treatment. She stopped Gabapentin due to memory issues but her pain worsened. She restarted this week with one daily.    Interval History 6/13/2023:  The patient returns for virtual follow up for worsened knee pain, R>L. There was difficulty with her connection and the latter part of the visit was converted to audio. She has been having more sharp and aching right knee pain over the past month. It bothers her with standing and going down stairs. She has had benefit from both left and right genicular RFAs in the past. She would like to repeat the right knee RFA. She has tried stretching, PT exercises, ice and heat without benefit. She is also reporting right hand pain. No history or trauma. She would like an orthopedic referral. Her pain today is 6/10.    Interval History 12/8/2023:  The patient has a virtual appointment to discuss worsened bilateral knee pain, L>R. She has had significant benefit with genicular RFAs in the past and wishes to repeat. The pain bothers her more with standing and walking. She has some benefit with rest. Pain today is 7/10.    Interval History 6/11/2024:  The patient returns for a virtual visit today to discuss knee pain. Her pain today is /10.    6 weeks of Conservative therapy (PT/Chiro/Home Exercises with Dates)  PT July 2021-September 2021  PT November 2021-December 2021  PT April 2022-May 2022   12/26/23 Left genicular RFA- 80% relief  2/6/24 Right genicular RFA- 80% relief    Treatments / Medications: (Ice/Heat/NSAIDS/APAP/etc):  Ice  Heat  Massage  PT  Voltaren Gel  Gabapentin     Report: N/A      Past Medical History:   Diagnosis Date    Anxiety     Family  history of malignant melanoma 08/25/2014    Fibromyalgia affecting lower leg     GERD (gastroesophageal reflux disease)     Hx of psychiatric care     Hypercholesteremia     Hypertension     Hypothyroidism     Inflamed seborrheic keratosis 08/25/2014    Microscopic hematuria 02/02/2017    Multiple benign melanocytic nevi 08/25/2014    Renal cell carcinoma of right kidney metastatic to other site     Therapy      Past Surgical History:   Procedure Laterality Date    AUGMENTATION OF BREAST      bladder lift      breast implants      BREAST SURGERY Bilateral 1996    saline implants    COLONOSCOPY  2007    HYSTERECTOMY      ectopic pregnancy x 2, with LSO    KNEE ARTHROPLASTY Left 06/14/2021    Procedure: ARTHROPLASTY, KNEE:LEFT:DEPUY-SIGMA;  Surgeon: Osmar Avery III, MD;  Location: [facility] OR;  Service: Orthopedics;  Laterality: Left;    LAPAROSCOPIC NEPHRECTOMY, HAND ASSISTED  07/25/2013    LUNG REMOVAL, PARTIAL Left 2020    At [facility]    NEPHRECTOMY      OOPHORECTOMY Bilateral     RADIOFREQUENCY ABLATION Left 08/30/2022    Procedure: RADIOFREQUENCY ABLATION, LEFT KNEE COOLED;  Surgeon: Vijaya Landin MD;  Location: [facility]IN MGT;  Service: Pain Management;  Laterality: Left;    RADIOFREQUENCY ABLATION Right 10/25/2022    Procedure: RADIOFREQUENCY ABLATION RIGHT KNEE GENICULAR COOLED;  Surgeon: Vijaya Landin MD;  Location: [facility];  Service: Pain Management;  Laterality: Right;    RADIOFREQUENCY ABLATION Left 03/07/2023    Procedure: RADIOFREQUENCY ABLATION LEFT GENICULAR COOLED RFA;  Surgeon: Vijaya Landin MD;  Location: [facility];  Service: Pain Management;  Laterality: Left;    RADIOFREQUENCY ABLATION Right 08/01/2023    Procedure: RADIOFREQUENCY ABLATION, RIGHT GENICULAR COOLED;  Surgeon: Vijaya Landin MD;  Location: [facility];  Service: Pain Management;  Laterality: Right;    RADIOFREQUENCY ABLATION Left 12/26/2023    Procedure: RADIOFREQUENCY ABLATION LEFT GENICULAR 1 OF 2;   Surgeon: Vijaya Landin MD;  Location: [facility];  Service: Pain Management;  Laterality: Left;  [phone]    RADIOFREQUENCY ABLATION Right 2/6/2024    Procedure: RADIOFREQUENCY ABLATION RIGHT GENICULAR 2 OF 2;  Surgeon: Vijaya Landin MD;  Location: [facility] [place];  Service: Pain Management;  Laterality: Right;  [phone]  *after 2/1/24    right ganglion cyst      throat polyp      TRANSOBTURATOR SLING  2011    with RSO for persistent complex cyst & MARGOT; done by [facility]    UPPER GASTROINTESTINAL ENDOSCOPY  2007     Social History     Socioeconomic History    Marital status:      Spouse name: KAIDEN    Number of children: 5   Occupational History    Occupation: retired     Comment: Dance Instructor   Tobacco Use    Smoking status: Never    Smokeless tobacco: Never   Substance and Sexual Activity    Alcohol use: Yes     Alcohol/week: 0.0 standard drinks of alcohol     Comment: social    Drug use: No    Sexual activity: Yes     Partners: Male     Birth control/protection: Surgical   Social History Narrative    Patient is a retired dance instructor and live with . Has stairs at home 12- has an elevator     Social Determinants of Health     Financial Resource Strain: Low Risk  (5/26/2024)    Overall Financial Resource Strain (CARDIA)     Difficulty of Paying Living Expenses: Not hard at all   Food Insecurity: No Food Insecurity (5/26/2024)    Hunger Vital Sign     Worried About Running Out of Food in the Last Year: Never true     Ran Out of Food in the Last Year: Never true   Transportation Needs: Patient Declined (5/15/2024)    PRAPARE - Transportation     Lack of Transportation (Medical): Patient declined     Lack of Transportation (Non-Medical): Patient declined   Physical Activity: Inactive (5/26/2024)    Exercise Vital Sign     Days of Exercise per Week: 0 days     Minutes of Exercise per Session: 0 min   Stress: Stress Concern Present (5/26/2024)    [place] [employer] - Occupational Stress Questionnaire     Feeling of Stress : To some extent   Housing Stability: Unknown (5/26/2024)    Housing Stability Vital Sign     Unable to Pay for Housing in the Last Year: No     Family History   Problem Relation Name Age of Onset    Diabetes Mother      Hypertension Mother      Heart disease Mother      Cancer Father          lung    Migraines Father      Glaucoma Paternal Grandmother      Glaucoma Sister      Thyroid disease Neg Hx      Asthma Neg Hx         Review of patient's allergies indicates:   Allergen Reactions    Talwin compound Anxiety     hallucinations/anxiety    Tramadol Itching    Buspirone Anxiety    Codeine Itching    Morphine Itching     jittery    Morpholine analogues Anxiety and Itching    Naproxen Itching     Takes Ibuprofen is ok on rare occasion     Nexium [esomeprazole magnesium] Other (See Comments)     constipation    Wal-phed [pseudoephedrine hcl] Itching       Current Outpatient Medications   Medication Sig    ALPRAZolam (XANAX) 0.5 MG tablet Take daily as needed for anxiety.    azelastine (ASTELIN) 137 mcg (0.1 %) nasal spray 1 spray (137 mcg total) by Nasal route 2 (two) times daily as needed for Rhinitis.    brimonidine (MIRVASO) 0.33 % GlwP Apply topically.    buPROPion (WELLBUTRIN XL) 150 MG TB24 tablet Take 1 tablet (150 mg total) by mouth once daily.    butalbital-acetaminophen-caffeine -40 mg (FIORICET, ESGIC) -40 mg per tablet TAKE 1 TABLET EVERY 4 HOURS AS NEEDED    clotrimazole (LOTRIMIN) 1 % Soln APPLY TOPICALLY 2 TIMES DAILY FOR 7 DAYS    diazePAM (VALIUM) 5 MG tablet Take 5 mg by mouth.    diclofenac sodium (VOLTAREN) 1 % Gel Apply 2 g topically daily as needed.    diflorasone-emollient (APEXICON E) 0.05 % Crea Apply 1 application topically once daily. for 7 days    docusate sodium (COLACE) 100 MG capsule Take 100 mg by mouth.    estradioL (ESTRACE) 1 MG tablet Take 1 tablet (1 mg total) by mouth once daily.    famotidine (PEPCID)  20 MG tablet Take 20 mg by mouth.    fluocinonide (LIDEX) 0.05 % external solution Apply topically 2 (two) times daily. Do not use morning of surgery    hydrocortisone 2.5 % cream Apply topically 2 (two) times daily. apply to affected area for 7 days    ketoconazole (NIZORAL) 2 % shampoo Do not use morning of surgery    levocetirizine (XYZAL) 5 MG tablet Take 5 mg by mouth.    levocetirizine (XYZAL) 5 MG tablet Take 1 tablet (5 mg total) by mouth every evening.    levothyroxine (SYNTHROID) 112 MCG tablet Take 1 tablet (112 mcg total) by mouth before breakfast. TAKE 1 TABLET (112 MCG TOTAL) BY MOUTH BEFORE BREAKFAST AS SCHEDULED Strength: 112 mcg    metronidazole 1% (METROGEL) 1 % Gel Apply topically once daily.    mometasone (ELOCON) 0.1 % solution Apply topically.    naltrexone (DEPADE) 50 mg tablet 1/4 pill twice daily    omeprazole (PRILOSEC) 10 MG capsule Take 10 mg by mouth.    ondansetron (ZOFRAN) 4 MG tablet Take 2 tablets (8 mg total) by mouth every 12 (twelve) hours as needed for Nausea.    oxyCODONE-acetaminophen (PERCOCET) 5-325 mg per tablet Take 1 tablet by mouth every 6 (six) hours as needed for Pain.    pravastatin (PRAVACHOL) 20 MG tablet TAKE 1 TABLET EVERY DAY    senna (SENOKOT) 8.6 mg tablet Take 1 tablet by mouth.    zolpidem (AMBIEN) 5 MG Tab Take 1 tablet (5 mg total) by mouth nightly as needed.     No current facility-administered medications for this visit.     Facility-Administered Medications Ordered in Other Visits   Medication    triamcinolone acetonide injection 40 mg       ROS:  GENERAL: No fever. No chills. No fatigue. Denies weight loss. Denies weight gain.  HEENT: Denies headaches. Denies vision change. Denies eye pain. Denies double vision. Denies ear pain.   CV: Denies chest pain.   PULM: Denies of shortness of breath.  GI: Denies constipation. No diarrhea. No abdominal pain. Denies nausea. Denies vomiting. No blood in stool.  HEME: Denies bleeding problems.  : Denies urgency. No  painful urination. No blood in urine.  MS: Denies joint stiffness. Denies joint swelling.  Denies back pain.  SKIN: Denies rash.   NEURO: Denies seizures. No weakness.  PSYCH:  Denies difficulty sleeping. No anxiety. Denies depression. No suicidal thoughts.     PHYSICAL EXAMINATION:    General appearance: Well appearing, in no acute distress, alert and oriented x3.  Psych:  Mood and affect appropriate.  Skin: Skin color normal, no rashes or lesions, in both upper and lower body.  Extremities: Moves all visualized extremities freely.    PREVIOUS PHYSICAL EXAM:   GENERAL: Well appearing, in no acute distress, alert and oriented x3.  PSYCH:  Mood and affect appropriate.  SKIN: Normal turgor and coloration.  HEENT:  Normocephalic, atraumatic. Cranial nerves grossly intact.  NECK: No pain to palpation over the cervical paraspinous muscles. No pain to palpation over facets. No pain with neck flexion, extension, or lateral flexion.   PULM: No evidence of respiratory difficulty, symmetric chest rise.  GI:  Non-distended  BACK: Normal range of motion. No pain to palpation over the spinous processes. No pain to palpation over facet joints. There is no pain with palpation over the sacroiliac joints bilaterally. Straight leg raising in the supine position is negative to radicular pain.   EXTREMITIES: +ttp left medial joint line and pain with patella ROM. Swollen 3 x 3 inch area just below L patella.   MUSCULOSKELETAL:  Bilateral lower extremity strength is normal and symmetric, with some pain limitation with movement of L knee joint. No atrophy is noted.  NEURO: No change in sensation.  GAIT: Antalgic and slow.    IMAGING:      X- Ray knee ortho BL 5/17/22    Narrative & Impression  EXAMINATION:  XR KNEE ORTHO BILAT WITH FLEXION     CLINICAL HISTORY:  Presence of left artificial knee joint     TECHNIQUE:  AP standing of both knees, PA flexion standing views of both knees, and Merchant views of both knees were performed.   Lateral views of both knees were also performed.     COMPARISON:  No 07/27/2021 ne     FINDINGS:  Left knee arthroplasty identified.  The position and alignment is satisfactory and unchanged as compared to the previous study.     DJD involving the right knee with narrowing of the patellofemoral and the medial tibiofemoral joint space.  No fracture or bone destruction identified     Impression:  Patient with symptoms, imaging, and PE consistent with:    1. Primary osteoarthritis of knee, unspecified laterality  Procedure Order to Pain Management    Procedure Order to Pain Management      2. Chronic pain syndrome        3. Primary osteoarthritis of right knee        4. Chronic pain of both knees              ASSESSMENT: 73 y.o. year old female  with bilateral knee pain.     PLAN:  Schedule for repeat left and right genicular cooled RFAs at least 6 months from previous. Previous provided 80% relief for 4 months.  Continue current medications.  The patient will continue a home exercise routine to help with pain and strengthening.    RTC 4 weeks after completion of procedure.      The above plan and management options were discussed at length with patient. Patient is in agreement with the above and verbalized understanding.     Dona Arteaga, SAMAN  06/11/2024                     [Transvaginal OB Sonogram] : Transvaginal OB Sonogram [Intrauterine Pregnancy] : intrauterine pregnancy [Date: ___] : Date: [unfilled] [Current GA by Sonogram: ___ (wks)] : Current GA by Sonogram: [unfilled]Uwks [___ day(s)] : [unfilled] days [Transvaginal OB Sonogram WNL] : Transvaginal OB Sonogram WNL [FreeTextEntry1] : cwd; +fh

## 2024-06-11 NOTE — H&P (VIEW-ONLY)
Chronic Pain-Tele-Medicine-Established Note (Follow up visit)        The patient location is: Home  The chief complaint leading to consultation is: pain  Visit type: Virtual visit with synchronous audio and video  Total time spent with patient: 15 min  Each patient to whom he or she provides medical services by telemedicine is:  (1) informed of the relationship between the physician and patient and the respective role of any other health care provider with respect to management of the patient; and (2) notified that he or she may decline to receive medical services by telemedicine and may withdraw from such care at any time.      PCP: Alexandre Macias MD    REFERRING PHYSICIAN: No ref. provider found    CHIEF COMPLAINT: L knee pain s/p knee replacement    Original HISTORY OF PRESENT ILLNESS: Sherrill Avery w/ pmh of HTN, and renal cell carcinoma (kidney removed in 2013) s/p radiation in February 2022 due to METs which appeared in 2017 who presents to the clinic for the evaluation of the above pain. She had a knee replacement in June 2021, and has continued to have pain since the procedure. She states when she sits for an extended period of time and stands back up she experiences the pain around the bottom to lateral edge of her knee cap. She has a swollen area just below the L knee as well. She currently denies CP, SOB, vision changes, or speech changes.    Original Pain Description:  The pain is located in the L knee and does not radiate. The pain is described as sharp and stinging . Exacerbating factors: Sitting, Standing, Laying, Night Time, Getting out of bed/chair and going up stairs. Mitigating factors massage, medications, physical therapy, rest and sitting. Symptoms interfere with daily activity and sleeping. The patient feels like symptoms have been unchanged. Patient denies night fever/night sweats, urinary incontinence, bowel incontinence and significant motor weakness.    Original PAIN SCORES:  Best: Pain  is 0  Worst: Pain is 6  Usually: Pain is 4  Current: Pain is 2    INTERVAL HISTORY:       Interval History 9/21/22:  Mrs. Avery returns to clinic after left genicular RFA on 8/30/22. She reports significant pain relief and she is able to stand from a seated position and walk more comfortably. She is happy with the results and would like to have the same procedure on her right knee. She has had right knee pain for years and she has received multiple steroid injections which only provide about 2 weeks of pain relief. Pain is exacerbated by stairs, walking, and rising from a chair.  She denies any new weakness and numbness/tingling.     Interval History 12/13/2022:  The patient has a virtual follow up for right knee pain. She is now s/p right genicular RFA on 10/25/22 with 80% relief. She is still having benefit from left knee RFA. She is also reporting lower back pain . The pain is sharp in nature. It does not radiate. She has pain when she stands for a long time. She takes Gabapentin regularly with some benefit. She has completed PT in the past with some benefit. She did have a lumbar MRI in 2015.    Interval History 1/24/2023:  The patient returns for follow up of chronic bilateral knee and lower back pain. She has been having more left knee pain recently. She had genicular RFA 8/30/22 with significant benefit. However, she feels like the pain has started to return. She would like to repeat when possible. She still has some back pain with intermittent radiation across the back. She is taking Gabapentin 900 mg daily with some benefit and no side effects. Since previous encounter, she had a recent oncology 3 month follow up at at [facility]. She has a spot to the back and two to the lung which are being watched. She has a follow up in 3 months again. Her pain today is 4/10.    Interval History 4/4/2023:  The patient has a virtual follow up for bilateral knee pain. She is now s/p left genicular RFA with 80% relief.  Her left knee pain has significantly improved. She is feeling more pain on the right knee and lower back recently. She previously had excellent relief for right knee pain from RFA for 5 months. She would like to repeat the procedure. She is following up with [facility] next week to discuss back pain and possible radiation treatment. She stopped Gabapentin due to memory issues but her pain worsened. She restarted this week with one daily.    Interval History 6/13/2023:  The patient returns for virtual follow up for worsened knee pain, R>L. There was difficulty with her connection and the latter part of the visit was converted to audio. She has been having more sharp and aching right knee pain over the past month. It bothers her with standing and going down stairs. She has had benefit from both left and right genicular RFAs in the past. She would like to repeat the right knee RFA. She has tried stretching, PT exercises, ice and heat without benefit. She is also reporting right hand pain. No history or trauma. She would like an orthopedic referral. Her pain today is 6/10.    Interval History 12/8/2023:  The patient has a virtual appointment to discuss worsened bilateral knee pain, L>R. She has had significant benefit with genicular RFAs in the past and wishes to repeat. The pain bothers her more with standing and walking. She has some benefit with rest. Pain today is 7/10.    Interval History 6/11/2024:  The patient returns for a virtual visit today to discuss knee pain. Her pain today is /10.    6 weeks of Conservative therapy (PT/Chiro/Home Exercises with Dates)  PT July 2021-September 2021  PT November 2021-December 2021  PT April 2022-May 2022   12/26/23 Left genicular RFA- 80% relief  2/6/24 Right genicular RFA- 80% relief    Treatments / Medications: (Ice/Heat/NSAIDS/APAP/etc):  Ice  Heat  Massage  PT  Voltaren Gel  Gabapentin     Report: N/A      Past Medical History:   Diagnosis Date    Anxiety     Family  history of malignant melanoma 08/25/2014    Fibromyalgia affecting lower leg     GERD (gastroesophageal reflux disease)     Hx of psychiatric care     Hypercholesteremia     Hypertension     Hypothyroidism     Inflamed seborrheic keratosis 08/25/2014    Microscopic hematuria 02/02/2017    Multiple benign melanocytic nevi 08/25/2014    Renal cell carcinoma of right kidney metastatic to other site     Therapy      Past Surgical History:   Procedure Laterality Date    AUGMENTATION OF BREAST      bladder lift      breast implants      BREAST SURGERY Bilateral 1996    saline implants    COLONOSCOPY  2007    HYSTERECTOMY      ectopic pregnancy x 2, with LSO    KNEE ARTHROPLASTY Left 06/14/2021    Procedure: ARTHROPLASTY, KNEE:LEFT:DEPUY-SIGMA;  Surgeon: Osmar Avery III, MD;  Location: [facility] OR;  Service: Orthopedics;  Laterality: Left;    LAPAROSCOPIC NEPHRECTOMY, HAND ASSISTED  07/25/2013    LUNG REMOVAL, PARTIAL Left 2020    At [facility]    NEPHRECTOMY      OOPHORECTOMY Bilateral     RADIOFREQUENCY ABLATION Left 08/30/2022    Procedure: RADIOFREQUENCY ABLATION, LEFT KNEE COOLED;  Surgeon: Vijaya Landin MD;  Location: [facility] PAIN MGT;  Service: Pain Management;  Laterality: Left;    RADIOFREQUENCY ABLATION Right 10/25/2022    Procedure: RADIOFREQUENCY ABLATION RIGHT KNEE GENICULAR COOLED;  Surgeon: Vijaya Landin MD;  Location: [facility] PAIN MGT;  Service: Pain Management;  Laterality: Right;    RADIOFREQUENCY ABLATION Left 03/07/2023    Procedure: RADIOFREQUENCY ABLATION LEFT GENICULAR COOLED RFA;  Surgeon: Vijaya Landin MD;  Location: [facility] PAIN MGT;  Service: Pain Management;  Laterality: Left;    RADIOFREQUENCY ABLATION Right 08/01/2023    Procedure: RADIOFREQUENCY ABLATION, RIGHT GENICULAR COOLED;  Surgeon: Vijaya Landin MD;  Location: [facility] PAIN MGT;  Service: Pain Management;  Laterality: Right;    RADIOFREQUENCY ABLATION Left 12/26/2023    Procedure: RADIOFREQUENCY ABLATION LEFT GENICULAR 1 OF 2;   Surgeon: Vijaya Landin MD;  Location: [facility];  Service: Pain Management;  Laterality: Left;  [phone]    RADIOFREQUENCY ABLATION Right 2/6/2024    Procedure: RADIOFREQUENCY ABLATION RIGHT GENICULAR 2 OF 2;  Surgeon: Vijaya Landin MD;  Location: [facility] MGT;  Service: Pain Management;  Laterality: Right;  [phone]  *after 2/1/24    right ganglion cyst      throat polyp      TRANSOBTURATOR SLING  2011    with RSO for persistent complex cyst & MARGOT; done by [facility]    UPPER GASTROINTESTINAL ENDOSCOPY  2007     Social History     Socioeconomic History    Marital status:      Spouse name: KAIDEN    Number of children: 5   Occupational History    Occupation: retired     Comment: Dance Instructor   Tobacco Use    Smoking status: Never    Smokeless tobacco: Never   Substance and Sexual Activity    Alcohol use: Yes     Alcohol/week: 0.0 standard drinks of alcohol     Comment: social    Drug use: No    Sexual activity: Yes     Partners: Male     Birth control/protection: Surgical   Social History Narrative    Patient is a retired dance instructor and live with . Has stairs at home 12- has an elevator     Social Determinants of Health     Financial Resource Strain: Low Risk  (5/26/2024)    Overall Financial Resource Strain (CARDIA)     Difficulty of Paying Living Expenses: Not hard at all   Food Insecurity: No Food Insecurity (5/26/2024)    Hunger Vital Sign     Worried About Running Out of Food in the Last Year: Never true     Ran Out of Food in the Last Year: Never true   Transportation Needs: Patient Declined (5/15/2024)    PRAPARE - Transportation     Lack of Transportation (Medical): Patient declined     Lack of Transportation (Non-Medical): Patient declined   Physical Activity: Inactive (5/26/2024)    Exercise Vital Sign     Days of Exercise per Week: 0 days     Minutes of Exercise per Session: 0 min   Stress: Stress Concern Present (5/26/2024)    [employer] [place] [employer] - Occupational Stress Questionnaire     Feeling of Stress : To some extent   Housing Stability: Unknown (5/26/2024)    Housing Stability Vital Sign     Unable to Pay for Housing in the Last Year: No     Family History   Problem Relation Name Age of Onset    Diabetes Mother      Hypertension Mother      Heart disease Mother      Cancer Father          lung    Migraines Father      Glaucoma Paternal Grandmother      Glaucoma Sister      Thyroid disease Neg Hx      Asthma Neg Hx         Review of patient's allergies indicates:   Allergen Reactions    Talwin compound Anxiety     hallucinations/anxiety    Tramadol Itching    Buspirone Anxiety    Codeine Itching    Morphine Itching     jittery    Morpholine analogues Anxiety and Itching    Naproxen Itching     Takes Ibuprofen is ok on rare occasion     Nexium [esomeprazole magnesium] Other (See Comments)     constipation    Wal-phed [pseudoephedrine hcl] Itching       Current Outpatient Medications   Medication Sig    ALPRAZolam (XANAX) 0.5 MG tablet Take daily as needed for anxiety.    azelastine (ASTELIN) 137 mcg (0.1 %) nasal spray 1 spray (137 mcg total) by Nasal route 2 (two) times daily as needed for Rhinitis.    brimonidine (MIRVASO) 0.33 % GlwP Apply topically.    buPROPion (WELLBUTRIN XL) 150 MG TB24 tablet Take 1 tablet (150 mg total) by mouth once daily.    butalbital-acetaminophen-caffeine -40 mg (FIORICET, ESGIC) -40 mg per tablet TAKE 1 TABLET EVERY 4 HOURS AS NEEDED    clotrimazole (LOTRIMIN) 1 % Soln APPLY TOPICALLY 2 TIMES DAILY FOR 7 DAYS    diazePAM (VALIUM) 5 MG tablet Take 5 mg by mouth.    diclofenac sodium (VOLTAREN) 1 % Gel Apply 2 g topically daily as needed.    diflorasone-emollient (APEXICON E) 0.05 % Crea Apply 1 application topically once daily. for 7 days    docusate sodium (COLACE) 100 MG capsule Take 100 mg by mouth.    estradioL (ESTRACE) 1 MG tablet Take 1 tablet (1 mg total) by mouth once daily.    famotidine (PEPCID)  20 MG tablet Take 20 mg by mouth.    fluocinonide (LIDEX) 0.05 % external solution Apply topically 2 (two) times daily. Do not use morning of surgery    hydrocortisone 2.5 % cream Apply topically 2 (two) times daily. apply to affected area for 7 days    ketoconazole (NIZORAL) 2 % shampoo Do not use morning of surgery    levocetirizine (XYZAL) 5 MG tablet Take 5 mg by mouth.    levocetirizine (XYZAL) 5 MG tablet Take 1 tablet (5 mg total) by mouth every evening.    levothyroxine (SYNTHROID) 112 MCG tablet Take 1 tablet (112 mcg total) by mouth before breakfast. TAKE 1 TABLET (112 MCG TOTAL) BY MOUTH BEFORE BREAKFAST AS SCHEDULED Strength: 112 mcg    metronidazole 1% (METROGEL) 1 % Gel Apply topically once daily.    mometasone (ELOCON) 0.1 % solution Apply topically.    naltrexone (DEPADE) 50 mg tablet 1/4 pill twice daily    omeprazole (PRILOSEC) 10 MG capsule Take 10 mg by mouth.    ondansetron (ZOFRAN) 4 MG tablet Take 2 tablets (8 mg total) by mouth every 12 (twelve) hours as needed for Nausea.    oxyCODONE-acetaminophen (PERCOCET) 5-325 mg per tablet Take 1 tablet by mouth every 6 (six) hours as needed for Pain.    pravastatin (PRAVACHOL) 20 MG tablet TAKE 1 TABLET EVERY DAY    senna (SENOKOT) 8.6 mg tablet Take 1 tablet by mouth.    zolpidem (AMBIEN) 5 MG Tab Take 1 tablet (5 mg total) by mouth nightly as needed.     No current facility-administered medications for this visit.     Facility-Administered Medications Ordered in Other Visits   Medication    triamcinolone acetonide injection 40 mg       ROS:  GENERAL: No fever. No chills. No fatigue. Denies weight loss. Denies weight gain.  HEENT: Denies headaches. Denies vision change. Denies eye pain. Denies double vision. Denies ear pain.   CV: Denies chest pain.   PULM: Denies of shortness of breath.  GI: Denies constipation. No diarrhea. No abdominal pain. Denies nausea. Denies vomiting. No blood in stool.  HEME: Denies bleeding problems.  : Denies urgency. No  painful urination. No blood in urine.  MS: Denies joint stiffness. Denies joint swelling.  Denies back pain.  SKIN: Denies rash.   NEURO: Denies seizures. No weakness.  PSYCH:  Denies difficulty sleeping. No anxiety. Denies depression. No suicidal thoughts.     PHYSICAL EXAMINATION:    General appearance: Well appearing, in no acute distress, alert and oriented x3.  Psych:  Mood and affect appropriate.  Skin: Skin color normal, no rashes or lesions, in both upper and lower body.  Extremities: Moves all visualized extremities freely.    PREVIOUS PHYSICAL EXAM:   GENERAL: Well appearing, in no acute distress, alert and oriented x3.  PSYCH:  Mood and affect appropriate.  SKIN: Normal turgor and coloration.  HEENT:  Normocephalic, atraumatic. Cranial nerves grossly intact.  NECK: No pain to palpation over the cervical paraspinous muscles. No pain to palpation over facets. No pain with neck flexion, extension, or lateral flexion.   PULM: No evidence of respiratory difficulty, symmetric chest rise.  GI:  Non-distended  BACK: Normal range of motion. No pain to palpation over the spinous processes. No pain to palpation over facet joints. There is no pain with palpation over the sacroiliac joints bilaterally. Straight leg raising in the supine position is negative to radicular pain.   EXTREMITIES: +ttp left medial joint line and pain with patella ROM. Swollen 3 x 3 inch area just below L patella.   MUSCULOSKELETAL:  Bilateral lower extremity strength is normal and symmetric, with some pain limitation with movement of L knee joint. No atrophy is noted.  NEURO: No change in sensation.  GAIT: Antalgic and slow.    IMAGING:      X- Ray knee ortho BL 5/17/22    Narrative & Impression  EXAMINATION:  XR KNEE ORTHO BILAT WITH FLEXION     CLINICAL HISTORY:  Presence of left artificial knee joint     TECHNIQUE:  AP standing of both knees, PA flexion standing views of both knees, and Merchant views of both knees were performed.   Lateral views of both knees were also performed.     COMPARISON:  No 07/27/2021 ne     FINDINGS:  Left knee arthroplasty identified.  The position and alignment is satisfactory and unchanged as compared to the previous study.     DJD involving the right knee with narrowing of the patellofemoral and the medial tibiofemoral joint space.  No fracture or bone destruction identified     Impression:  Patient with symptoms, imaging, and PE consistent with:    1. Primary osteoarthritis of knee, unspecified laterality  Procedure Order to Pain Management    Procedure Order to Pain Management      2. Chronic pain syndrome        3. Primary osteoarthritis of right knee        4. Chronic pain of both knees              ASSESSMENT: 73 y.o. year old female  with bilateral knee pain.     PLAN:  Schedule for repeat left and right genicular cooled RFAs at least 6 months from previous. Previous provided 80% relief for 4 months.  Continue current medications.  The patient will continue a home exercise routine to help with pain and strengthening.    RTC 4 weeks after completion of procedure.      The above plan and management options were discussed at length with patient. Patient is in agreement with the above and verbalized understanding.     Dona Arteaga, SAMAN  06/11/2024

## 2024-06-11 NOTE — PROGRESS NOTES
Subjective:       Patient ID: Sherrill Avery is a 73 y.o. female.    Chief Complaint: Foot Pain and Foot Problem (Cyst on right foot, dropped pot lid on left foot, pt is wearing tennis shoes and socks, nondiabetic, rate pain 5/10)      HPI: Sherrill Avery presents to the clinic today for evaluation concerning stated moderate pains to the right foot/ankle at the midfoot. Patient states pains are approx. 5/10. Pains are described as achy and sharp. Pains have been present for duration of several weeks. Patient states the pains are exacerbated with walking and standing and prolonged activities. States no recent  medical evaluation by a MD/DO/DPM/NP. States no NSAIDs. States Tylenol for minimal relief. Trauma is not stated.  Patient's Primary Care Provider is Alexandre Macias MD.     Review of patient's allergies indicates:   Allergen Reactions    Talwin compound Anxiety     hallucinations/anxiety    Tramadol Itching    Buspirone Anxiety    Codeine Itching    Morphine Itching     jittery    Morpholine analogues Anxiety and Itching    Naproxen Itching     Takes Ibuprofen is ok on rare occasion     Nexium [esomeprazole magnesium] Other (See Comments)     constipation    Wal-phed [pseudoephedrine hcl] Itching       Past Medical History:   Diagnosis Date    Anxiety     Family history of malignant melanoma 08/25/2014    Fibromyalgia affecting lower leg     GERD (gastroesophageal reflux disease)     Hx of psychiatric care     Hypercholesteremia     Hypertension     Hypothyroidism     Inflamed seborrheic keratosis 08/25/2014    Microscopic hematuria 02/02/2017    Multiple benign melanocytic nevi 08/25/2014    Renal cell carcinoma of right kidney metastatic to other site     Therapy        Family History   Problem Relation Name Age of Onset    Diabetes Mother      Hypertension Mother      Heart disease Mother      Cancer Father          lung    Migraines Father      Glaucoma Paternal Grandmother      Glaucoma Sister       Thyroid disease Neg Hx      Asthma Neg Hx         Social History     Socioeconomic History    Marital status:      Spouse name: KAIDEN    Number of children: 5   Occupational History    Occupation: retired     Comment: Dance Instructor   Tobacco Use    Smoking status: Never    Smokeless tobacco: Never   Substance and Sexual Activity    Alcohol use: Yes     Alcohol/week: 0.0 standard drinks of alcohol     Comment: social    Drug use: No    Sexual activity: Yes     Partners: Male     Birth control/protection: Surgical   Social History Narrative    Patient is a retired dance instructor and live with . Has stairs at home 12- has an elevator     Social Determinants of Health     Financial Resource Strain: Low Risk  (5/26/2024)    Overall Financial Resource Strain (CARDIA)     Difficulty of Paying Living Expenses: Not hard at all   Food Insecurity: No Food Insecurity (5/26/2024)    Hunger Vital Sign     Worried About Running Out of Food in the Last Year: Never true     Ran Out of Food in the Last Year: Never true   Transportation Needs: Patient Declined (5/15/2024)    PRAPARE - Transportation     Lack of Transportation (Medical): Patient declined     Lack of Transportation (Non-Medical): Patient declined   Physical Activity: Inactive (5/26/2024)    Exercise Vital Sign     Days of Exercise per Week: 0 days     Minutes of Exercise per Session: 0 min   Stress: Stress Concern Present (5/26/2024)    [employer] - Occupational Stress Questionnaire     Feeling of Stress : To some extent   Housing Stability: Unknown (5/26/2024)    Housing Stability Vital Sign     Unable to Pay for Housing in the Last Year: No       Past Surgical History:   Procedure Laterality Date    AUGMENTATION OF BREAST      bladder lift      breast implants      BREAST SURGERY Bilateral 1996    saline implants    COLONOSCOPY  2007    HYSTERECTOMY      ectopic pregnancy x 2, with LSO    KNEE ARTHROPLASTY Left  06/14/2021    Procedure: ARTHROPLASTY, KNEE:LEFT:DEPUY-SIGMA;  Surgeon: Osmar Avery III, MD;  Location: [facility] OR;  Service: Orthopedics;  Laterality: Left;    LAPAROSCOPIC NEPHRECTOMY, HAND ASSISTED  07/25/2013    LUNG REMOVAL, PARTIAL Left 2020    At [facility]    NEPHRECTOMY      OOPHORECTOMY Bilateral     RADIOFREQUENCY ABLATION Left 08/30/2022    Procedure: RADIOFREQUENCY ABLATION, LEFT KNEE COOLED;  Surgeon: Vijaya Landin MD;  Location: [facility] PAIN MGT;  Service: Pain Management;  Laterality: Left;    RADIOFREQUENCY ABLATION Right 10/25/2022    Procedure: RADIOFREQUENCY ABLATION RIGHT KNEE GENICULAR COOLED;  Surgeon: Vijaya Landin MD;  Location: [facility] PAIN [facility]GT;  Service: Pain Management;  Laterality: Right;    RADIOFREQUENCY ABLATION Left 03/07/2023    Procedure: RADIOFREQUENCY ABLATION LEFT GENICULAR COOLED RFA;  Surgeon: Vijaya Landin MD;  Location: [facility] PAIN MGT;  Service: Pain Management;  Laterality: Left;    RADIOFREQUENCY ABLATION Right 08/01/2023    Procedure: RADIOFREQUENCY ABLATION, RIGHT GENICULAR COOLED;  Surgeon: Vijaya Landin MD;  Location: [facility] PAIN [facility]GT;  Service: Pain Management;  Laterality: Right;    RADIOFREQUENCY ABLATION Left 12/26/2023    Procedure: RADIOFREQUENCY ABLATION LEFT GENICULAR 1 OF 2;  Surgeon: Vijaya Landin MD;  Location: [facility] PAIN [facility]GT;  Service: Pain Management;  Laterality: Left;  [phone]    RADIOFREQUENCY ABLATION Right 2/6/2024    Procedure: RADIOFREQUENCY ABLATION RIGHT GENICULAR 2 OF 2;  Surgeon: Vijaya Landin MD;  Location: [facility] PAIN [facility]GT;  Service: Pain Management;  Laterality: Right;  [phone]  *after 2/1/24    right ganglion cyst      throat polyp      TRANSOBTURATOR SLING  2011    with RSO for persistent complex cyst & MARGOT; done by [facility]    UPPER GASTROINTESTINAL ENDOSCOPY  2007       Review of Systems   Constitutional:  Negative for chills, fatigue and fever.   HENT:  Negative for hearing loss.    Eyes:  Negative for  "photophobia and visual disturbance.   Respiratory:  Negative for cough, chest tightness, shortness of breath and wheezing.    Cardiovascular:  Negative for chest pain and palpitations.   Gastrointestinal:  Negative for constipation, diarrhea, nausea and vomiting.   Endocrine: Negative for cold intolerance and heat intolerance.   Genitourinary:  Negative for flank pain.   Musculoskeletal:  Positive for gait problem. Negative for neck pain and neck stiffness.   Neurological:  Negative for light-headedness and headaches.   Psychiatric/Behavioral:  Negative for sleep disturbance.          Objective:   Ht 5' 5" (1.651 m)   Wt 85.4 kg (188 lb 4.4 oz)   BMI 31.33 kg/m²       Physical Exam  LOWER EXTREMITY PHYSICAL EXAMINATION    DERMATOLOGY: Skin is supple, dry and intact.     ORTHOPEDIC: MMT is 5/5 on the RLE as compared to the contra-lateral.  No edema is noted, left than right foot.  Pain to palpation along the osteophytes and DJD, bilateral midfoot, right greater than left.  No appreciable/palpable ganglion cyst or mucoid cysts are noted.  Pain to palpation along the dorsal midfoot due to prior bone contusion from blunt force trauma.  No crepitus noted.  Stress adduction at adduction is negative.      Assessment:     1. Osteoarthritis of right ankle or foot    2. Pain in right ankle and joints of right foot    3. Contusion of bone    4. Pain in left foot    5. History of cancer    6. Clear cell carcinoma with lung metastasis    7. Carcinoma of kidney, unspecified laterality          Plan:     Osteoarthritis of right ankle or foot  -     X-Ray Foot Complete Bilateral; Future; Expected date: 06/11/2024    Pain in right ankle and joints of right foot  -     X-Ray Foot Complete Bilateral; Future; Expected date: 06/11/2024  -     meloxicam (MOBIC) 15 MG tablet; Take 1 tablet (15 mg total) by mouth once daily.  Dispense: 30 tablet; Refill: 0    Contusion of bone  -     X-Ray Foot Complete Bilateral; Future; Expected date: " 06/11/2024    Pain in left foot  -     X-Ray Foot Complete Bilateral; Future; Expected date: 06/11/2024  -     meloxicam (MOBIC) 15 MG tablet; Take 1 tablet (15 mg total) by mouth once daily.  Dispense: 30 tablet; Refill: 0    History of cancer    Clear cell carcinoma with lung metastasis    Carcinoma of kidney, unspecified laterality      Thorough discussion is had with the patient today, concerning the diagnosis, its etiology, and the treatment algorithm at present.     No clinical evidence of ganglion cyst at this time.    Patient does have substantial midfoot osteoarthritis noted.    Given history of cancer, please obtain XR.    Rec. shoe gear with soft and accommodative foot bed and with a high toe box for alleviation of symptoms. Possible EE or EEE in width as well for alleviation of symptoms.    Start Mobic for B/L pedal pathology.           Future Appointments   Date Time Provider Department Center   6/13/2024 10:00 AM ECHOCARDIOGRAM, [facility] SPECCPR [facility]   6/26/2024 10:15 AM [facility] XR2 [facility] XRAY [facility]   6/26/2024 10:30 AM EKG, [facility] SPECCPR [facility]   6/26/2024 11:00 AM Rivera Grant MD [facility] CARDIO [facility]   9/13/2024 11:30 AM Henry Dao MD [facility] PSYCH [facility]   10/9/2024 11:00 AM Kervin Jaquze MD [facility] [id number] [place]   11/11/2024  2:20 PM Alexandre Macais MD [facility] IM [facility]   12/9/2024  9:00 AM Yoko Mandel MD ON OBGYN  [facility]

## 2024-06-13 ENCOUNTER — HOSPITAL ENCOUNTER (OUTPATIENT)
Dept: CARDIOLOGY | Facility: HOSPITAL | Age: 74
Discharge: HOME | End: 2024-06-13
Attending: PHYSICIAN ASSISTANT
Payer: MEDICARE

## 2024-06-13 VITALS
WEIGHT: 188.25 LBS | SYSTOLIC BLOOD PRESSURE: 137 MMHG | BODY MASS INDEX: 31.33 KG/M2 | DIASTOLIC BLOOD PRESSURE: 70 MMHG

## 2024-06-13 DIAGNOSIS — R06.09 DYSPNEA ON EXERTION: ICD-10-CM

## 2024-06-13 LAB
AORTIC ROOT ANNULUS: 2.71 CM
AV INDEX (PROSTH): 0.4
AV MEAN GRADIENT: 3 MMHG
AV PEAK GRADIENT: 5 MMHG
AV VALVE AREA BY VELOCITY RATIO: 1.83 CM²
AV VALVE AREA: 1.27 CM²
AV VELOCITY RATIO: 0.58
CV ECHO LV RWT: 0.4 CM
CV STRESS BASE HR: 69 BPM
DIASTOLIC BLOOD PRESSURE: 70 MMHG
DOP CALC AO PEAK VEL: 1.08 M/S
DOP CALC AO VTI: 27.3 CM
DOP CALC LVOT AREA: 3.1 CM2
DOP CALC LVOT DIAMETER: 2 CM
DOP CALC LVOT PEAK VEL: 0.63 M/S
DOP CALC LVOT STROKE VOLUME: 34.54 CM3
DOP CALC RVOT PEAK VEL: 0.6 M/S
DOP CALC RVOT VTI: 12.9 CM
DOP CALCLVOT PEAK VEL VTI: 11 CM
E WAVE DECELERATION TIME: 217.87 MSEC
E/A RATIO: 0.96
ECHO LV POSTERIOR WALL: 0.96 CM (ref 0.6–1.1)
FRACTIONAL SHORTENING: 36 % (ref 28–44)
INTERVENTRICULAR SEPTUM: 1.14 CM (ref 0.6–1.1)
IVRT: 98.95 MSEC
LA MAJOR: 3.91 CM
LA MINOR: 4 CM
LA WIDTH: 2.8 CM
LEFT ATRIUM SIZE: 3.39 CM
LEFT ATRIUM VOLUME MOD: 38.73 CM3
LEFT ATRIUM VOLUME: 31.91 CM3
LEFT INTERNAL DIMENSION IN SYSTOLE: 3.12 CM (ref 2.1–4)
LEFT VENTRICLE DIASTOLIC VOLUME: 110.39 ML
LEFT VENTRICLE SYSTOLIC VOLUME: 38.51 ML
LEFT VENTRICULAR INTERNAL DIMENSION IN DIASTOLE: 4.85 CM (ref 3.5–6)
LEFT VENTRICULAR MASS: 184.99 G
LVOT MG: 0.81 MMHG
LVOT MV: 0.4 CM/S
MV PEAK A VEL: 0.72 M/S
MV PEAK E VEL: 0.69 M/S
OHS CV CPX 1 MINUTE RECOVERY HEART RATE: 110 BPM
OHS CV CPX 85 PERCENT MAX PREDICTED HEART RATE MALE: 125
OHS CV CPX MAX PREDICTED HEART RATE: 147
OHS CV CPX PATIENT IS FEMALE: 1
OHS CV CPX PATIENT IS MALE: 0
OHS CV CPX PEAK DIASTOLIC BLOOD PRESSURE: 52 MMHG
OHS CV CPX PEAK HEAR RATE: 125 BPM
OHS CV CPX PEAK RATE PRESSURE PRODUCT: NORMAL
OHS CV CPX PEAK SYSTOLIC BLOOD PRESSURE: 141 MMHG
OHS CV CPX PERCENT MAX PREDICTED HEART RATE ACHIEVED: 88
OHS CV CPX RATE PRESSURE PRODUCT PRESENTING: 9453
OHS CV INITIAL DOSE: 10 MCG/KG/MIN
OHS CV PEAK DOSE: 20 MCG/KG/MIN
OHS CV RV/LV RATIO: 0.42 CM
PISA TR MAX VEL: 2.56 M/S
PV MEAN GRADIENT: 1 MMHG
PV MV: 0.55 M/S
PV PEAK GRADIENT: 3 MMHG
PV PEAK VELOCITY: 0.8 M/S
RA MAJOR: 3.65 CM
RA PRESSURE ESTIMATED: 3 MMHG
RA WIDTH: 2.51 CM
RIGHT VENTRICULAR END-DIASTOLIC DIMENSION: 2.04 CM
RV TB RVSP: 6 MMHG
SINUS: 2.41 CM
STJ: 2.41 CM
STRESS ECHO POST EXERCISE DUR MIN: 4 MINUTES
STRESS ECHO POST EXERCISE DUR SEC: 15 SECONDS
SYSTOLIC BLOOD PRESSURE: 137 MMHG
TR MAX PG: 26 MMHG
TV REST PULMONARY ARTERY PRESSURE: 29 MMHG

## 2024-06-13 PROCEDURE — 93351 STRESS TTE COMPLETE: CPT | Mod: 26,,, | Performed by: INTERNAL MEDICINE

## 2024-06-13 PROCEDURE — 93351 STRESS TTE COMPLETE: CPT

## 2024-06-13 PROCEDURE — 25500020 PHARM REV CODE 255: Performed by: INTERNAL MEDICINE

## 2024-06-13 PROCEDURE — 63600175 PHARM REV CODE 636 W HCPCS: Performed by: INTERNAL MEDICINE

## 2024-06-13 RX ORDER — DOBUTAMINE HYDROCHLORIDE 400 MG/100ML
10 INJECTION INTRAVENOUS CONTINUOUS
Status: DISCONTINUED | OUTPATIENT
Start: 2024-06-13 | End: 2024-06-13

## 2024-06-13 RX ADMIN — DOBUTAMINE HYDROCHLORIDE 10 MCG/KG/MIN: 400 INJECTION INTRAVENOUS at 11:06

## 2024-06-13 RX ADMIN — HUMAN ALBUMIN MICROSPHERES AND PERFLUTREN 0.11 MG: 10; .22 INJECTION, SOLUTION INTRAVENOUS at 11:06

## 2024-06-19 DIAGNOSIS — E04.1 THYROID NODULE: Primary | ICD-10-CM

## 2024-06-26 ENCOUNTER — HOSPITAL ENCOUNTER (OUTPATIENT)
Dept: CARDIOLOGY | Facility: HOSPITAL | Age: 74
Discharge: HOME | End: 2024-06-26
Attending: INTERNAL MEDICINE
Payer: MEDICARE

## 2024-06-26 ENCOUNTER — HOSPITAL ENCOUNTER (OUTPATIENT)
Dept: RADIOLOGY | Facility: HOSPITAL | Age: 74
Discharge: HOME | End: 2024-06-26
Attending: PHYSICIAN ASSISTANT
Payer: MEDICARE

## 2024-06-26 ENCOUNTER — OFFICE VISIT (OUTPATIENT)
Dept: CARDIOLOGY | Facility: CLINIC | Age: 74
End: 2024-06-26
Attending: INTERNAL MEDICINE
Payer: MEDICARE

## 2024-06-26 VITALS
DIASTOLIC BLOOD PRESSURE: 80 MMHG | WEIGHT: 189.38 LBS | BODY MASS INDEX: 31.55 KG/M2 | HEIGHT: 65 IN | SYSTOLIC BLOOD PRESSURE: 136 MMHG | HEART RATE: 72 BPM | OXYGEN SATURATION: 97 %

## 2024-06-26 DIAGNOSIS — K21.9 GASTROESOPHAGEAL REFLUX DISEASE WITHOUT ESOPHAGITIS: ICD-10-CM

## 2024-06-26 DIAGNOSIS — Z76.89 ENCOUNTER TO ESTABLISH CARE: ICD-10-CM

## 2024-06-26 DIAGNOSIS — C64.9 CARCINOMA OF KIDNEY, UNSPECIFIED LATERALITY: ICD-10-CM

## 2024-06-26 DIAGNOSIS — J18.9 PNEUMONIA OF LEFT LOWER LOBE DUE TO INFECTIOUS ORGANISM: ICD-10-CM

## 2024-06-26 DIAGNOSIS — R00.2 PALPITATIONS: ICD-10-CM

## 2024-06-26 DIAGNOSIS — C80.1 CLEAR CELL CARCINOMA: ICD-10-CM

## 2024-06-26 DIAGNOSIS — R94.31 NONSPECIFIC ABNORMAL ELECTROCARDIOGRAM (ECG) (EKG): Primary | ICD-10-CM

## 2024-06-26 DIAGNOSIS — Z00.00 ROUTINE ADULT HEALTH MAINTENANCE: ICD-10-CM

## 2024-06-26 DIAGNOSIS — R06.09 DYSPNEA ON EXERTION: ICD-10-CM

## 2024-06-26 LAB
OHS QRS DURATION: 80 MS
OHS QTC CALCULATION: 416 MS

## 2024-06-26 PROCEDURE — 71046 X-RAY EXAM CHEST 2 VIEWS: CPT | Mod: TC

## 2024-06-26 PROCEDURE — 71046 X-RAY EXAM CHEST 2 VIEWS: CPT | Mod: 26,,, | Performed by: RADIOLOGY

## 2024-06-26 PROCEDURE — 99999 PR PBB SHADOW E&M-EST. PATIENT-LVL IV: CPT | Mod: PBBFAC,,, | Performed by: INTERNAL MEDICINE

## 2024-06-26 PROCEDURE — 93005 ELECTROCARDIOGRAM TRACING: CPT

## 2024-06-26 PROCEDURE — 99214 OFFICE O/P EST MOD 30 MIN: CPT | Mod: PBBFAC,25 | Performed by: INTERNAL MEDICINE

## 2024-06-26 NOTE — PROGRESS NOTES
Subjective:   Patient ID:  Sherrill Avery is a 73 y.o. female who presents for cardiac consult of Annual Exam (Pt states CANDIE Parmar said she needs to complete a nuclear stress test. ), Shortness of Breath (SOB when exerting self these last 4 weeks. ), and Fatigue      Referral by: May Parmar [facility]  [id number] [facility]  [place] [id number]     Reason for consult:       HPI  The patient came in today for cardiac consult of Annual Exam (Pt states CANDIE Parmar said she needs to complete a nuclear stress test. ), Shortness of Breath (SOB when exerting self these last 4 weeks. ), and Fatigue      Sherrill Avery is a 73 y.o. female pt with HTN, HLD, obesity, aortic atherosc, renal cell CA, Vit D, GERD, hypothyroidism, OA joints presents for CV eval of SOB.     BP and Hr stable. BMI 31  She has more MONTESINOS at times - thought she may have PNA she still has SOB. 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       Summary  Show Result Comparison     Left Ventricle: The left ventricle is normal in size. Ventricular mass is normal. Normal wall thickness. There is normal systolic function with a visually estimated ejection fraction of 55 - 60%. There is normal diastolic function.    Right Ventricle: Normal right ventricular cavity size. Wall thickness is normal. Systolic function is normal.    IVC/SVC: Normal venous pressure at 3 mmHg.    Baseline ECG: The Baseline ECG reveals sinus rhythm. The axis is normal. The ST segments are normal.    Stress ECG: There are no ST segment deviation identified during the protocol. During stress, rare PVCs are noted. There is normal blood pressure response with stress.    ECG Conclusion: The ECG portion of the study is negative for ischemia.    Post-stress Impression: The study is  negative with no echocardiographic evidence of stress induced ischemia.      Results for orders placed during the hospital encounter of 09/07/21    Echo    Interpretation Summary  · The left ventricle is normal in size with concentric remodeling and normal systolic function. The estimated ejection fraction is 60%.  · Normal left ventricular diastolic function.  · Normal right ventricular size with normal right ventricular systolic function.  · Mild tricuspid regurgitation.  · The estimated PA systolic pressure is 25 mmHg.  · Trivial circumferential pericardial effusion.  · Normal central venous pressure (3 mmHg).      No results found for this or any previous visit.      No results found for this or any previous visit.      No cardiac monitor results found for the past 12 months         Past Medical History:   Diagnosis Date    Anxiety     Family history of malignant melanoma 08/25/2014    Fibromyalgia affecting lower leg     GERD (gastroesophageal reflux disease)     Hx of psychiatric care     Hypercholesteremia     Hypertension     Hypothyroidism     Inflamed seborrheic keratosis 08/25/2014    Microscopic hematuria 02/02/2017    Multiple benign melanocytic nevi 08/25/2014    Renal cell carcinoma of right kidney metastatic to other site     Therapy        Past Surgical History:   Procedure Laterality Date    AUGMENTATION OF BREAST      bladder lift      breast implants      BREAST SURGERY Bilateral 1996    saline implants    COLONOSCOPY  2007    HYSTERECTOMY      ectopic pregnancy x 2, with LSO    KNEE ARTHROPLASTY Left 06/14/2021    Procedure: ARTHROPLASTY, KNEE:LEFT:DEPUY-SIGMA;  Surgeon: Osmar Avery III, MD;  Location: [facility];  Service: Orthopedics;  Laterality: Left;    LAPAROSCOPIC NEPHRECTOMY, HAND ASSISTED  07/25/2013    LUNG REMOVAL, PARTIAL Left 2020    At [facility]    NEPHRECTOMY      OOPHORECTOMY Bilateral     RADIOFREQUENCY ABLATION Left 08/30/2022    Procedure: RADIOFREQUENCY ABLATION, LEFT  KNEE COOLED;  Surgeon: Vijaya Landin MD;  Location: [facility];  Service: Pain Management;  Laterality: Left;    RADIOFREQUENCY ABLATION Right 10/25/2022    Procedure: RADIOFREQUENCY ABLATION RIGHT KNEE GENICULAR COOLED;  Surgeon: Vijaya Landin MD;  Location: [facility];  Service: Pain Management;  Laterality: Right;    RADIOFREQUENCY ABLATION Left 03/07/2023    Procedure: RADIOFREQUENCY ABLATION LEFT GENICULAR COOLED RFA;  Surgeon: Vijaya Landin MD;  Location: [facility];  Service: Pain Management;  Laterality: Left;    RADIOFREQUENCY ABLATION Right 08/01/2023    Procedure: RADIOFREQUENCY ABLATION, RIGHT GENICULAR COOLED;  Surgeon: Vijaya Landin MD;  Location: [facility];  Service: Pain Management;  Laterality: Right;    RADIOFREQUENCY ABLATION Left 12/26/2023    Procedure: RADIOFREQUENCY ABLATION LEFT GENICULAR 1 OF 2;  Surgeon: Vijaya Landin MD;  Location: [facility];  Service: Pain Management;  Laterality: Left;  [phone]    RADIOFREQUENCY ABLATION Right 2/6/2024    Procedure: RADIOFREQUENCY ABLATION RIGHT GENICULAR 2 OF 2;  Surgeon: Vijaya Landin MD;  Location: [facility];  Service: Pain Management;  Laterality: Right;  [phone]  *after 2/1/24    right ganglion cyst      throat polyp      TRANSOBTURATOR SLING  2011    with RSO for persistent complex cyst & MARGOT; done by [facility]    UPPER GASTROINTESTINAL ENDOSCOPY  2007       Social History     Tobacco Use    Smoking status: Never    Smokeless tobacco: Never   Substance Use Topics    Alcohol use: Yes     Alcohol/week: 0.0 standard drinks of alcohol     Comment: social    Drug use: No       Family History   Problem Relation Name Age of Onset    Diabetes Mother      Hypertension Mother      Heart disease Mother      Cancer Father          lung    Migraines Father      Glaucoma Paternal Grandmother      Glaucoma Sister      Thyroid disease Neg Hx      Asthma Neg Hx         Patient's Medications   New  Prescriptions    No medications on file   Previous Medications    ALPRAZOLAM (XANAX) 0.5 MG TABLET    Take daily as needed for anxiety.    AZELASTINE (ASTELIN) 137 MCG (0.1 %) NASAL SPRAY    1 spray (137 mcg total) by Nasal route 2 (two) times daily as needed for Rhinitis.    BRIMONIDINE (MIRVASO) 0.33 % GLWP    Apply topically.    BUPROPION (WELLBUTRIN XL) 150 MG TB24 TABLET    Take 1 tablet (150 mg total) by mouth once daily.    BUTALBITAL-ACETAMINOPHEN-CAFFEINE -40 MG (FIORICET, ESGIC) -40 MG PER TABLET    TAKE 1 TABLET EVERY 4 HOURS AS NEEDED    CLOTRIMAZOLE (LOTRIMIN) 1 % SOLN    APPLY TOPICALLY 2 TIMES DAILY FOR 7 DAYS    DIAZEPAM (VALIUM) 5 MG TABLET    Take 5 mg by mouth.    DICLOFENAC SODIUM (VOLTAREN) 1 % GEL    Apply 2 g topically daily as needed.    DIFLORASONE-EMOLLIENT (APEXICON E) 0.05 % CREA    Apply 1 application topically once daily. for 7 days    DOCUSATE SODIUM (COLACE) 100 MG CAPSULE    Take 100 mg by mouth.    ESTRADIOL (ESTRACE) 1 MG TABLET    Take 1 tablet (1 mg total) by mouth once daily.    FAMOTIDINE (PEPCID) 20 MG TABLET    Take 20 mg by mouth.    FLUOCINONIDE (LIDEX) 0.05 % EXTERNAL SOLUTION    Apply topically 2 (two) times daily. Do not use morning of surgery    HYDROCORTISONE 2.5 % CREAM    Apply topically 2 (two) times daily. apply to affected area for 7 days    KETOCONAZOLE (NIZORAL) 2 % SHAMPOO    Do not use morning of surgery    LEVOCETIRIZINE (XYZAL) 5 MG TABLET    Take 5 mg by mouth.    LEVOCETIRIZINE (XYZAL) 5 MG TABLET    Take 1 tablet (5 mg total) by mouth every evening.    LEVOTHYROXINE (SYNTHROID) 112 MCG TABLET    Take 1 tablet (112 mcg total) by mouth before breakfast. TAKE 1 TABLET (112 MCG TOTAL) BY MOUTH BEFORE BREAKFAST AS SCHEDULED Strength: 112 mcg    MELOXICAM (MOBIC) 15 MG TABLET    Take 1 tablet (15 mg total) by mouth once daily.    METRONIDAZOLE 1% (METROGEL) 1 % GEL    Apply topically once daily.    MOMETASONE (ELOCON) 0.1 % SOLUTION    Apply  "topically.    NALTREXONE (DEPADE) 50 MG TABLET    1/4 pill twice daily    OMEPRAZOLE (PRILOSEC) 10 MG CAPSULE    Take 10 mg by mouth.    ONDANSETRON (ZOFRAN) 4 MG TABLET    Take 2 tablets (8 mg total) by mouth every 12 (twelve) hours as needed for Nausea.    OXYCODONE-ACETAMINOPHEN (PERCOCET) 5-325 MG PER TABLET    Take 1 tablet by mouth every 6 (six) hours as needed for Pain.    PRAVASTATIN (PRAVACHOL) 20 MG TABLET    TAKE 1 TABLET EVERY DAY    SENNA (SENOKOT) 8.6 MG TABLET    Take 1 tablet by mouth.    ZOLPIDEM (AMBIEN) 5 MG TAB    Take 1 tablet (5 mg total) by mouth nightly as needed.   Modified Medications    No medications on file   Discontinued Medications    No medications on file       Review of Systems   Constitutional:  Positive for malaise/fatigue.   HENT: Negative.     Eyes: Negative.    Respiratory:  Positive for shortness of breath.    Cardiovascular:  Positive for chest pain and palpitations.   Gastrointestinal: Negative.    Genitourinary: Negative.    Musculoskeletal:  Positive for joint pain.   Skin: Negative.    Neurological: Negative.    Endo/Heme/Allergies: Negative.    Psychiatric/Behavioral: Negative.     All 12 systems otherwise negative.      Wt Readings from Last 3 Encounters:   06/26/24 85.9 kg (189 lb 6 oz)   06/13/24 85.4 kg (188 lb 4.4 oz)   06/11/24 85.4 kg (188 lb 4.4 oz)     Temp Readings from Last 3 Encounters:   05/15/24 96.9 °F (36.1 °C) (Tympanic)   05/06/24 99.1 °F (37.3 °C) (Tympanic)   03/14/24 98 °F (36.7 °C) (Oral)     BP Readings from Last 3 Encounters:   06/26/24 136/80   06/13/24 137/70   05/15/24 138/80     Pulse Readings from Last 3 Encounters:   06/26/24 72   05/15/24 81   05/06/24 83       /80 (BP Location: Right arm, Patient Position: Sitting, BP Method: Medium (Automatic))   Pulse 72   Ht 5' 5" (1.651 m)   Wt 85.9 kg (189 lb 6 oz)   SpO2 97%   BMI 31.51 kg/m²     Objective:   Physical Exam  Vitals and nursing note reviewed.   Constitutional:       General: " She is not in acute distress.     Appearance: She is well-developed. She is obese. She is not diaphoretic.   HENT:      Head: Normocephalic and atraumatic.      Nose: Nose normal.   Eyes:      General: No scleral icterus.     Conjunctiva/sclera: Conjunctivae normal.   Neck:      Thyroid: No thyromegaly.      Vascular: No JVD.   Cardiovascular:      Rate and Rhythm: Normal rate and regular rhythm.      Heart sounds: S1 normal and S2 normal. Murmur heard.      No friction rub. No gallop. No S3 or S4 sounds.   Pulmonary:      Effort: Pulmonary effort is normal. No respiratory distress.      Breath sounds: Normal breath sounds. No stridor. No wheezing or rales.   Chest:      Chest wall: No tenderness.   Abdominal:      General: Bowel sounds are normal. There is no distension.      Palpations: Abdomen is soft. There is no mass.      Tenderness: There is no abdominal tenderness. There is no rebound.   Genitourinary:     Comments: Deferred  Musculoskeletal:         General: No tenderness or deformity. Normal range of motion.      Cervical back: Normal range of motion and neck supple.   Lymphadenopathy:      Cervical: No cervical adenopathy.   Skin:     General: Skin is warm and dry.      Coloration: Skin is not pale.      Findings: No erythema or rash.   Neurological:      Mental Status: She is alert and oriented to person, place, and time.      Motor: No abnormal muscle tone.      Coordination: Coordination normal.   Psychiatric:         Behavior: Behavior normal.         Thought Content: Thought content normal.         Judgment: Judgment normal.         Lab Results   Component Value Date     10/05/2022    K 4.2 10/05/2022     10/05/2022    CO2 25 10/05/2022    BUN 12 10/09/2023    BUN 10 10/05/2022    CREATININE 1.00 (H) 10/09/2023    CREATININE 0.8 10/05/2022    GLU 86 10/05/2022    HGBA1C 5.2 10/09/2023    HGBA1C 5.3 07/23/2014    AST 15 10/05/2022    ALT 14 10/05/2022    ALBUMIN 3.6 10/05/2022    PROT 6.1  10/05/2022    BILITOT 0.7 10/05/2022    WBC 5.58 08/17/2021    HGB 13.8 08/17/2021    HCT 42.4 08/17/2021    MCV 92 08/17/2021     08/17/2021    INR 0.9 06/08/2021    TSH 5.20 (H) 03/05/2024    TSH 2.339 08/21/2020    CHOL 194 10/18/2023    HDL 66 10/18/2023    LDLCALC 108.6 10/18/2023    TRIG 97 10/18/2023    BNP 42 06/05/2021         BNP (pg/mL)   Date Value   06/05/2021 42   02/24/2020 16   04/25/2018 <10   04/25/2017 22     INR (no units)   Date Value   06/08/2021 0.9   04/25/2017 0.9   07/14/2013 0.9          Assessment:      1. Nonspecific abnormal electrocardiogram (ECG) (EKG)    2. Dyspnea on exertion    3. Gastroesophageal reflux disease without esophagitis    4. Palpitations    5. Carcinoma of kidney, unspecified laterality    6. Clear cell carcinoma with lung metastasis        Plan:     MONTESINOS, CP - few times x year with abnormal ECG  - recent stress ECHO overall neg  - order pharm nuclear stress test - coronary atherosc in CT noted  - refer to pulm as well    2. GERD  - cont PPI    3. Palpitations  - order vital monitor    4. Cell Cell CA - with mets  - f/u heme/onc and pulm    Visit today included increased complexity associated with the care of the episodic problem dyspnea addressed and managing the longitudinal care of the patient due to the serious and/or complex managed problem(s) .      Thank you for allowing me to participate in this patient's care. Please do not hesitate to contact me with any questions or concerns. Consult note has been forwarded to the referral physician.

## 2024-06-27 ENCOUNTER — PATIENT MESSAGE (OUTPATIENT)
Dept: PAIN MEDICINE | Facility: OTHER | Age: 74
End: 2024-06-27
Payer: MEDICARE

## 2024-06-27 ENCOUNTER — PATIENT MESSAGE (OUTPATIENT)
Dept: CARDIOLOGY | Facility: CLINIC | Age: 74
End: 2024-06-27
Payer: MEDICARE

## 2024-06-27 ENCOUNTER — PATIENT MESSAGE (OUTPATIENT)
Dept: ADMINISTRATIVE | Facility: OTHER | Age: 74
End: 2024-06-27
Payer: MEDICARE

## 2024-06-28 ENCOUNTER — PATIENT MESSAGE (OUTPATIENT)
Dept: PAIN MEDICINE | Facility: CLINIC | Age: 74
End: 2024-06-28
Payer: MEDICARE

## 2024-07-02 ENCOUNTER — HOSPITAL ENCOUNTER (OUTPATIENT)
Facility: OTHER | Age: 74
Discharge: HOME | End: 2024-07-02
Attending: ANESTHESIOLOGY | Admitting: ANESTHESIOLOGY
Payer: MEDICARE

## 2024-07-02 VITALS
RESPIRATION RATE: 16 BRPM | BODY MASS INDEX: 30.99 KG/M2 | OXYGEN SATURATION: 100 % | SYSTOLIC BLOOD PRESSURE: 138 MMHG | WEIGHT: 186 LBS | TEMPERATURE: 98 F | HEIGHT: 65 IN | DIASTOLIC BLOOD PRESSURE: 89 MMHG | HEART RATE: 73 BPM

## 2024-07-02 DIAGNOSIS — G89.29 CHRONIC PAIN: ICD-10-CM

## 2024-07-02 DIAGNOSIS — M25.562 CHRONIC KNEE PAIN AFTER TOTAL REPLACEMENT OF LEFT KNEE JOINT: Primary | ICD-10-CM

## 2024-07-02 DIAGNOSIS — Z96.652 CHRONIC KNEE PAIN AFTER TOTAL REPLACEMENT OF LEFT KNEE JOINT: Primary | ICD-10-CM

## 2024-07-02 DIAGNOSIS — G89.29 CHRONIC KNEE PAIN AFTER TOTAL REPLACEMENT OF LEFT KNEE JOINT: Primary | ICD-10-CM

## 2024-07-02 PROCEDURE — 25000003 PHARM REV CODE 250: Performed by: ANESTHESIOLOGY

## 2024-07-02 PROCEDURE — 64624 DSTRJ NULYT AGT GNCLR NRV: CPT | Mod: LT,,, | Performed by: ANESTHESIOLOGY

## 2024-07-02 PROCEDURE — 25000003 PHARM REV CODE 250: Performed by: STUDENT IN AN ORGANIZED HEALTH CARE EDUCATION/TRAINING PROGRAM

## 2024-07-02 PROCEDURE — 99152 MOD SED SAME PHYS/QHP 5/>YRS: CPT | Performed by: ANESTHESIOLOGY

## 2024-07-02 PROCEDURE — 63600175 PHARM REV CODE 636 W HCPCS: Performed by: ANESTHESIOLOGY

## 2024-07-02 PROCEDURE — 99152 MOD SED SAME PHYS/QHP 5/>YRS: CPT | Mod: ,,, | Performed by: ANESTHESIOLOGY

## 2024-07-02 PROCEDURE — 64624 DSTRJ NULYT AGT GNCLR NRV: CPT | Mod: LT | Performed by: ANESTHESIOLOGY

## 2024-07-02 PROCEDURE — 99153 MOD SED SAME PHYS/QHP EA: CPT | Performed by: ANESTHESIOLOGY

## 2024-07-02 PROCEDURE — A4649 SURGICAL SUPPLIES: HCPCS | Performed by: ANESTHESIOLOGY

## 2024-07-02 RX ORDER — TRIAMCINOLONE ACETONIDE 40 MG/ML
INJECTION, SUSPENSION INTRA-ARTICULAR; INTRAMUSCULAR
Status: DISCONTINUED | OUTPATIENT
Start: 2024-07-02 | End: 2024-07-02 | Stop reason: HOSPADM

## 2024-07-02 RX ORDER — SODIUM CHLORIDE 9 MG/ML
INJECTION, SOLUTION INTRAVENOUS CONTINUOUS
Status: DISCONTINUED | OUTPATIENT
Start: 2024-07-02 | End: 2024-07-02 | Stop reason: HOSPADM

## 2024-07-02 RX ORDER — LIDOCAINE HYDROCHLORIDE 20 MG/ML
INJECTION, SOLUTION INFILTRATION; PERINEURAL
Status: DISCONTINUED | OUTPATIENT
Start: 2024-07-02 | End: 2024-07-02 | Stop reason: HOSPADM

## 2024-07-02 RX ORDER — FENTANYL CITRATE 50 UG/ML
INJECTION, SOLUTION INTRAMUSCULAR; INTRAVENOUS
Status: DISCONTINUED | OUTPATIENT
Start: 2024-07-02 | End: 2024-07-02 | Stop reason: HOSPADM

## 2024-07-02 RX ORDER — BUPIVACAINE HYDROCHLORIDE 2.5 MG/ML
INJECTION, SOLUTION EPIDURAL; INFILTRATION; INTRACAUDAL
Status: DISCONTINUED | OUTPATIENT
Start: 2024-07-02 | End: 2024-07-02 | Stop reason: HOSPADM

## 2024-07-02 RX ORDER — MIDAZOLAM HYDROCHLORIDE 1 MG/ML
INJECTION INTRAMUSCULAR; INTRAVENOUS
Status: DISCONTINUED | OUTPATIENT
Start: 2024-07-02 | End: 2024-07-02 | Stop reason: HOSPADM

## 2024-07-02 NOTE — OP NOTE
Therapeutic Genicular Cooled Nerve Radiofrequency Ablation under Fluoroscopy     The procedure, risks, benefits, and options were discussed with the patient. There are no contraindications to the procedure. The patent expressed understanding and agreed to the procedure. Informed written consent was obtained prior to the start of the procedure and can be found in the patient's chart.        PATIENT NAME: Sherrill Avery   MRN: [id number]     DATE OF PROCEDURE: 07/02/2024     PROCEDURE: Therapeutic Left Genicular Cooled Nerve Radiofrequency Ablation under Fluoroscopy    PRE-OP DIAGNOSIS: Primary osteoarthritis of knee, unspecified laterality [M17.10]    POST-OP DIAGNOSIS: Primary osteoarthritis of knee, unspecified laterality [M17.10]    PHYSICIAN: Vijaya Landin MD    ASSISTANTS: Steven Albert [employer] pain fellow    MEDICATIONS INJECTED:  Preservative-free Kenalog 40mg with 9cc of Bupivicaine 0.25%    LOCAL ANESTHETIC INJECTED:   Xylocaine 2%    SEDATION: Versed 1.5 and Fentanyl 75mcg                                                                                                                                                                                     Conscious sedation ordered by M.D. Patient re-evaluation prior to administration of conscious sedation. No changes noted in patient's status from initial evaluation. The patient's vital signs were monitored by RN and patient remained hemodynamically stable throughout the procedure.    Event Time In   Sedation Start 1318   Sedation End 1342       ESTIMATED BLOOD LOSS:  None    COMPLICATIONS:  None     INTERVAL HISTORY: Patient has clinical findings of chronic knee pain. Patients has completed 2 previous diagnostic genicular nerve blocks with at least 80% relief for the expected duration of the local anesthetic utilized.     TECHNIQUE: Time-out was performed to identify the patient and procedure to be performed. With the patient laying in a supine position, the surgical  area was prepped and draped in the usual sterile fashion using ChloraPrep and fenestrated drape. Three target sites including the superior lateral genicular nerve where the lateral femoral shaft meets the epicondyle, the superior medial genicular nerve where the medial femoral shaft meets the epicondyle, and the inferior medial genicular nerve where the medial tibial shaft meets the epicondyle, were determined under fluoroscopic guidance. Skin anesthesia was achieved by injecting Lidocaine 2% over the injection sites. A 17 gauge, 50mm, 10mm active tip needle was then advanced under fluoroscopy in the AP and lateral views into the positions of the geniculate nerves at these levels. This was followed by motor testing at each of the nerves to ensure that there was no dorsiflexion of the foot. After negative aspiration for blood was confirmed, 1.5 mL of the lidocaine 2% listed above was injected slowly at each site. This was followed by cooled thermal lesioning at 80 degrees celsius for 150 seconds at each site. That was followed by slowly injecting 1 mL of the medication mixture listed above at each site. The needles were removed and bleeding was nil. A sterile dressing was applied. No specimens collected. The patient tolerated the procedure well and did not have any procedure related motor deficit at the conclusion of the procedure.    The patient was monitored after the procedure in the recovery area. They were given post-procedure and discharge instructions to follow at home. The patient was discharged in a stable condition.    Ki Ford MD    I reviewed and edited the fellow's note. I conducted my own interview and physical examination. I agree with the findings. I was present and supervising all critical portions of the procedure.

## 2024-07-02 NOTE — DISCHARGE INSTRUCTIONS

## 2024-07-02 NOTE — DISCHARGE SUMMARY
Discharge Note  Short Stay      SUMMARY     Admit Date: 7/2/2024    Attending Physician: Ki Ford      Discharge Physician: Ki Ford      Discharge Date: 7/2/2024 1:43 PM    Procedure(s) (LRB):  RADIOFREQUENCY ABLATION LEFT GENICULAR (Left)    Final Diagnosis: Primary osteoarthritis of knee, unspecified laterality [M17.10]    Disposition: Home or self care    Patient Instructions:   Current Discharge Medication List        CONTINUE these medications which have NOT CHANGED    Details   ALPRAZolam (XANAX) 0.5 MG tablet Take daily as needed for anxiety.  Qty: 30 tablet, Refills: 5      azelastine (ASTELIN) 137 mcg (0.1 %) nasal spray 1 spray (137 mcg total) by Nasal route 2 (two) times daily as needed for Rhinitis.  Qty: 30 mL, Refills: 2      brimonidine (MIRVASO) 0.33 % GlwP Apply topically.      buPROPion (WELLBUTRIN XL) 150 MG TB24 tablet Take 1 tablet (150 mg total) by mouth once daily.  Qty: 90 tablet, Refills: 1      butalbital-acetaminophen-caffeine -40 mg (FIORICET, ESGIC) -40 mg per tablet TAKE 1 TABLET EVERY 4 HOURS AS NEEDED  Qty: 30 tablet, Refills: 0    Associated Diagnoses: Headache, unspecified headache type      clotrimazole (LOTRIMIN) 1 % Soln APPLY TOPICALLY 2 TIMES DAILY FOR 7 DAYS  Qty: 30 mL, Refills: 2      diazePAM (VALIUM) 5 MG tablet Take 5 mg by mouth.      diclofenac sodium (VOLTAREN) 1 % Gel Apply 2 g topically daily as needed.  Qty: 100 g, Refills: 11      diflorasone-emollient (APEXICON E) 0.05 % Crea Apply 1 application topically once daily. for 7 days  Qty: 30 g, Refills: 5      docusate sodium (COLACE) 100 MG capsule Take 100 mg by mouth.      estradioL (ESTRACE) 1 MG tablet Take 1 tablet (1 mg total) by mouth once daily.  Qty: 90 tablet, Refills: 0    Associated Diagnoses: Menopause syndrome      famotidine (PEPCID) 20 MG tablet Take 20 mg by mouth.      fluocinonide (LIDEX) 0.05 % external solution Apply topically 2 (two) times daily. Do not use morning of  surgery      hydrocortisone 2.5 % cream Apply topically 2 (two) times daily. apply to affected area for 7 days  Qty: 20 g, Refills: 5      ketoconazole (NIZORAL) 2 % shampoo Do not use morning of surgery      levocetirizine (XYZAL) 5 MG tablet Take 1 tablet (5 mg total) by mouth every evening.  Qty: 90 tablet, Refills: 3    Associated Diagnoses: Allergic rhinitis, unspecified seasonality, unspecified trigger      levothyroxine (SYNTHROID) 112 MCG tablet Take 1 tablet (112 mcg total) by mouth before breakfast. TAKE 1 TABLET (112 MCG TOTAL) BY MOUTH BEFORE BREAKFAST AS SCHEDULED Strength: 112 mcg  Qty: 90 tablet, Refills: 4      meloxicam (MOBIC) 15 MG tablet Take 1 tablet (15 mg total) by mouth once daily.  Qty: 30 tablet, Refills: 0    Associated Diagnoses: Pain in right ankle and joints of right foot; Pain in left foot      metronidazole 1% (METROGEL) 1 % Gel Apply topically once daily.  Qty: 60 g, Refills: 5      mometasone (ELOCON) 0.1 % solution Apply topically.      naltrexone (DEPADE) 50 mg tablet 1/4 pill twice daily  Qty: 15 tablet, Refills: 0    Associated Diagnoses: Class 1 obesity due to excess calories with serious comorbidity and body mass index (BMI) of 31.0 to 31.9 in adult      omeprazole (PRILOSEC) 10 MG capsule Take 10 mg by mouth.      ondansetron (ZOFRAN) 4 MG tablet Take 2 tablets (8 mg total) by mouth every 12 (twelve) hours as needed for Nausea.  Qty: 20 tablet, Refills: 2      oxyCODONE-acetaminophen (PERCOCET) 5-325 mg per tablet Take 1 tablet by mouth every 6 (six) hours as needed for Pain.  Qty: 30 each, Refills: 0    Comments: Quantity prescribed more than 7 day supply? Yes, quantity medically necessary, metastatic cancer patient.  Associated Diagnoses: Clear cell carcinoma; Carcinoma of right kidney; Carcinoma of kidney, unspecified laterality; History of right nephrectomy      pravastatin (PRAVACHOL) 20 MG tablet TAKE 1 TABLET EVERY DAY  Qty: 90 tablet, Refills: 4      senna (SENOKOT)  8.6 mg tablet Take 1 tablet by mouth.      zolpidem (AMBIEN) 5 MG Tab Take 1 tablet (5 mg total) by mouth nightly as needed.  Qty: 90 tablet, Refills: 0                 Discharge Diagnosis: Primary osteoarthritis of knee, unspecified laterality [M17.10]  Condition on Discharge: Stable with no complications to procedure   Diet on Discharge: Same as before.  Activity: as per instruction sheet.  Discharge to: Home with a responsible adult.  Follow up: 2-4 weeks       Please call my office or pager at [phone] if experienced any weakness or loss of sensation, fever > 101.5, pain uncontrolled with oral medications, persistent nausea/vomiting/or diarrhea, redness or drainage from the incisions, or any other worrisome concerns. If physician on call was not reached or could not communicate with our office for any reason please go to the nearest emergency department

## 2024-07-10 ENCOUNTER — PATIENT MESSAGE (OUTPATIENT)
Dept: PRIMARY CARE CLINIC | Facility: CLINIC | Age: 74
End: 2024-07-10
Payer: MEDICARE

## 2024-07-10 ENCOUNTER — PATIENT MESSAGE (OUTPATIENT)
Dept: CARDIOLOGY | Facility: HOSPITAL | Age: 74
End: 2024-07-10
Payer: MEDICARE

## 2024-07-11 ENCOUNTER — PATIENT MESSAGE (OUTPATIENT)
Dept: OBSTETRICS AND GYNECOLOGY | Facility: CLINIC | Age: 74
End: 2024-07-11
Payer: MEDICARE

## 2024-07-22 ENCOUNTER — PATIENT MESSAGE (OUTPATIENT)
Dept: HEMATOLOGY/ONCOLOGY | Facility: CLINIC | Age: 74
End: 2024-07-22
Payer: MEDICARE

## 2024-07-22 DIAGNOSIS — E04.1 THYROID NODULE: Primary | ICD-10-CM

## 2024-07-23 DIAGNOSIS — E04.1 THYROID NODULE: Primary | ICD-10-CM

## 2024-07-24 ENCOUNTER — OFFICE VISIT (OUTPATIENT)
Dept: OBSTETRICS AND GYNECOLOGY | Facility: CLINIC | Age: 74
End: 2024-07-24
Payer: MEDICARE

## 2024-07-24 ENCOUNTER — PATIENT MESSAGE (OUTPATIENT)
Dept: OBSTETRICS AND GYNECOLOGY | Facility: CLINIC | Age: 74
End: 2024-07-24

## 2024-07-24 VITALS
HEIGHT: 65 IN | WEIGHT: 190.69 LBS | BODY MASS INDEX: 31.77 KG/M2 | DIASTOLIC BLOOD PRESSURE: 78 MMHG | SYSTOLIC BLOOD PRESSURE: 144 MMHG

## 2024-07-24 DIAGNOSIS — N39.41 URGE INCONTINENCE OF URINE: Primary | ICD-10-CM

## 2024-07-24 PROCEDURE — 99999 PR PBB SHADOW E&M-EST. PATIENT-LVL V: CPT | Mod: PBBFAC,,, | Performed by: OBSTETRICS & GYNECOLOGY

## 2024-07-24 PROCEDURE — 99215 OFFICE O/P EST HI 40 MIN: CPT | Mod: PBBFAC | Performed by: OBSTETRICS & GYNECOLOGY

## 2024-07-24 PROCEDURE — 99213 OFFICE O/P EST LOW 20 MIN: CPT | Mod: S$PBB,,, | Performed by: OBSTETRICS & GYNECOLOGY

## 2024-07-24 RX ORDER — ERYTHROMYCIN 5 MG/G
OINTMENT OPHTHALMIC NIGHTLY
COMMUNITY
Start: 2024-06-28

## 2024-07-24 NOTE — PROGRESS NOTES
Subjective     Patient ID: Sherrill Avery is a 73 y.o. female.    Chief Complaint:  Consult      History of Present Illness  HPI  Presents with urinary leakage.  Pt has hx of hysterectomy and BSO.  Also has hx of TOT sling for MARGOT.  The last year or so, she has noticed that when she gets up in the morning and bladder is full, she will leak urine before making it to the bathroom.  Also, if she drinks a lot of water during the day, she will leak urine before she can make it to the bathroom, especially if bladder is full.  Denies any urinary leak with cough or sneeze.  States she feels like she empties her bladder well, but urinary stream is slow to start.  No dysuria or hematuria.  Has had several negative urine cultures in the past.  Pt is MM with her .  Uses OTC lubricant.  Takes estradiol HRT.     GYN & OB History  No LMP recorded. Patient has had a hysterectomy.   Date of Last Pap: 2015    OB History    Para Term  AB Living   7 2 2   5 2   SAB IAB Ectopic Multiple Live Births   3   2   2      # Outcome Date GA Lbr Kevin/2nd Weight Sex Type Anes PTL Lv   7 SAB            6 SAB            5 SAB            4 Ectopic            3 Ectopic            2 Term         JEFFERY   1 Term         JEFFERY       Review of Systems  Review of Systems       Objective   Physical Exam:   Constitutional: She is oriented to person, place, and time. She appears well-developed and well-nourished. No distress.             Abdominal: Soft. She exhibits no distension and no mass. There is no abdominal tenderness. There is no rebound and no guarding. Hernia confirmed negative in the right inguinal area and confirmed negative in the left inguinal area.     Genitourinary:    Vagina normal.      Pelvic exam was performed with patient supine.   There is no rash, tenderness, lesion or injury on the right labia. There is no rash, tenderness, lesion or injury on the left labia. Right adnexum displays no mass, no tenderness and no  fullness. Left adnexum displays no mass, no tenderness and no fullness. There is rectocele (mild, grade 1 with valsalva) in the vagina. No erythema, vaginal discharge, tenderness, bleeding, cystocele (no significant cystocele noted) or prolapse of vaginal walls (cuff well supported; no apical prolapse) in the vagina.    No foreign body in the vagina.      No signs of injury in the vagina.   Cervix is absent.Uterus is absent.               Neurological: She is alert and oriented to person, place, and time.     Psychiatric: She has a normal mood and affect.            Assessment and Plan     1. Urge incontinence of urine             Plan:  Sherrill was seen today for consult.    Diagnoses and all orders for this visit:    Urge incontinence of urine  -     Ambulatory referral/consult to Physical/Occupational Therapy; Future     Discussed PFPT, bladder training, and timed voids.    RTC in 3-4 months.  If no improvement, will likely refer to urology for further evaluation and management.

## 2024-07-30 ENCOUNTER — PATIENT MESSAGE (OUTPATIENT)
Dept: HEMATOLOGY/ONCOLOGY | Facility: CLINIC | Age: 74
End: 2024-07-30
Payer: MEDICARE

## 2024-08-13 ENCOUNTER — HOSPITAL ENCOUNTER (OUTPATIENT)
Facility: OTHER | Age: 74
Discharge: HOME | End: 2024-08-13
Attending: ANESTHESIOLOGY | Admitting: ANESTHESIOLOGY
Payer: MEDICARE

## 2024-08-13 VITALS
TEMPERATURE: 97 F | RESPIRATION RATE: 16 BRPM | BODY MASS INDEX: 31.65 KG/M2 | HEIGHT: 65 IN | WEIGHT: 190 LBS | SYSTOLIC BLOOD PRESSURE: 167 MMHG | DIASTOLIC BLOOD PRESSURE: 79 MMHG | HEART RATE: 66 BPM | OXYGEN SATURATION: 99 %

## 2024-08-13 DIAGNOSIS — M25.562 CHRONIC KNEE PAIN AFTER TOTAL REPLACEMENT OF LEFT KNEE JOINT: Primary | ICD-10-CM

## 2024-08-13 DIAGNOSIS — G89.29 CHRONIC KNEE PAIN AFTER TOTAL REPLACEMENT OF LEFT KNEE JOINT: Primary | ICD-10-CM

## 2024-08-13 DIAGNOSIS — M17.12 PRIMARY OSTEOARTHRITIS OF LEFT KNEE: ICD-10-CM

## 2024-08-13 DIAGNOSIS — Z96.652 CHRONIC KNEE PAIN AFTER TOTAL REPLACEMENT OF LEFT KNEE JOINT: Primary | ICD-10-CM

## 2024-08-13 DIAGNOSIS — G89.29 CHRONIC PAIN: ICD-10-CM

## 2024-08-13 PROCEDURE — 99152 MOD SED SAME PHYS/QHP 5/>YRS: CPT | Mod: ,,, | Performed by: ANESTHESIOLOGY

## 2024-08-13 PROCEDURE — 25000003 PHARM REV CODE 250: Performed by: ANESTHESIOLOGY

## 2024-08-13 PROCEDURE — 64624 DSTRJ NULYT AGT GNCLR NRV: CPT | Mod: RT,,, | Performed by: ANESTHESIOLOGY

## 2024-08-13 PROCEDURE — 99152 MOD SED SAME PHYS/QHP 5/>YRS: CPT | Performed by: ANESTHESIOLOGY

## 2024-08-13 PROCEDURE — A4649 SURGICAL SUPPLIES: HCPCS | Performed by: ANESTHESIOLOGY

## 2024-08-13 PROCEDURE — 64624 DSTRJ NULYT AGT GNCLR NRV: CPT | Mod: RT | Performed by: ANESTHESIOLOGY

## 2024-08-13 PROCEDURE — 63600175 PHARM REV CODE 636 W HCPCS: Performed by: ANESTHESIOLOGY

## 2024-08-13 RX ORDER — LIDOCAINE HYDROCHLORIDE 20 MG/ML
INJECTION, SOLUTION INFILTRATION; PERINEURAL
Status: DISCONTINUED | OUTPATIENT
Start: 2024-08-13 | End: 2024-08-13 | Stop reason: HOSPADM

## 2024-08-13 RX ORDER — FENTANYL CITRATE 50 UG/ML
INJECTION, SOLUTION INTRAMUSCULAR; INTRAVENOUS
Status: DISCONTINUED | OUTPATIENT
Start: 2024-08-13 | End: 2024-08-13 | Stop reason: HOSPADM

## 2024-08-13 RX ORDER — BUPIVACAINE HYDROCHLORIDE 2.5 MG/ML
INJECTION, SOLUTION EPIDURAL; INFILTRATION; INTRACAUDAL
Status: DISCONTINUED | OUTPATIENT
Start: 2024-08-13 | End: 2024-08-13 | Stop reason: HOSPADM

## 2024-08-13 RX ORDER — MIDAZOLAM HYDROCHLORIDE 1 MG/ML
INJECTION INTRAMUSCULAR; INTRAVENOUS
Status: DISCONTINUED | OUTPATIENT
Start: 2024-08-13 | End: 2024-08-13 | Stop reason: HOSPADM

## 2024-08-13 RX ORDER — SODIUM CHLORIDE 9 MG/ML
INJECTION, SOLUTION INTRAVENOUS CONTINUOUS
Status: DISCONTINUED | OUTPATIENT
Start: 2024-08-13 | End: 2024-08-13 | Stop reason: HOSPADM

## 2024-08-13 RX ORDER — TRIAMCINOLONE ACETONIDE 40 MG/ML
INJECTION, SUSPENSION INTRA-ARTICULAR; INTRAMUSCULAR
Status: DISCONTINUED | OUTPATIENT
Start: 2024-08-13 | End: 2024-08-13 | Stop reason: HOSPADM

## 2024-08-13 NOTE — OP NOTE
.Therapeutic Genicular Cooled Nerve Radiofrequency Ablation under Fluoroscopy     The procedure, risks, benefits, and options were discussed with the patient. There are no contraindications to the procedure. The patent expressed understanding and agreed to the procedure. Informed written consent was obtained prior to the start of the procedure and can be found in the patient's chart.        PATIENT NAME: Sherrill Avery   MRN: [id number]     DATE OF PROCEDURE: 08/13/2024     PROCEDURE: Therapeutic Right Genicular Cooled Nerve Radiofrequency Ablation under Fluoroscopy    PRE-OP DIAGNOSIS: Primary osteoarthritis of knee, unspecified laterality [M17.10]    POST-OP DIAGNOSIS: Primary osteoarthritis of knee, unspecified laterality [M17.10]    PHYSICIAN: Vijaya Landin MD    ASSISTANTS: Dr. Jad Kennedy    MEDICATIONS INJECTED:  Preservative-free Kenalog 40mg with 9cc of Bupivicaine 0.25%    LOCAL ANESTHETIC INJECTED:   Xylocaine 2%    SEDATION: Versed 1.5mg and Fentanyl 50mcg                                                                                                                                                                                     Conscious sedation ordered by M.D. Patient re-evaluation prior to administration of conscious sedation. No changes noted in patient's status from initial evaluation. The patient's vital signs were monitored by RN and patient remained hemodynamically stable throughout the procedure.    Event Time In   Sedation Start 1345   Sedation End 1406       ESTIMATED BLOOD LOSS:  None    COMPLICATIONS:  None     INTERVAL HISTORY: Patient has clinical findings of chronic knee pain. Patients has completed 2 previous diagnostic genicular nerve blocks with at least 80% relief for the expected duration of the local anesthetic utilized.     TECHNIQUE: Time-out was performed to identify the patient and procedure to be performed. With the patient laying in a supine position, the surgical area  was prepped and draped in the usual sterile fashion using ChloraPrep and fenestrated drape. Three target sites including the superior lateral genicular nerve where the lateral femoral shaft meets the epicondyle, the superior medial genicular nerve where the medial femoral shaft meets the epicondyle, and the inferior medial genicular nerve where the medial tibial shaft meets the epicondyle, were determined under fluoroscopic guidance. Skin anesthesia was achieved by injecting Lidocaine 2% over the injection sites. A 17 gauge, 50mm, 10mm active tip needle was then advanced under fluoroscopy in the AP and lateral views into the positions of the geniculate nerves at these levels. This was followed by motor testing at each of the nerves to ensure that there was no dorsiflexion of the foot. After negative aspiration for blood was confirmed, 1 mL of the lidocaine 2% listed above was injected slowly at each site. This was followed by cooled thermal lesioning at 80 degrees celsius for 150 seconds at each site. That was followed by slowly injecting  1.5 mL of the medication mixture listed above at each site. The needles were removed and bleeding was nil. A sterile dressing was applied. No specimens collected. The patient tolerated the procedure well and did not have any procedure related motor deficit at the conclusion of the procedure.      The patient was monitored after the procedure in the recovery area. They were given post-procedure and discharge instructions to follow at home. The patient was discharged in a stable condition.    Jozef Lindsay MD    I reviewed and edited the fellow's note. I conducted my own interview and physical examination. I agree with the findings. I was present and supervising all critical portions of the procedure.

## 2024-08-13 NOTE — H&P
HPI  Patient presenting for Procedure(s) (LRB):  RADIOFREQUENCY ABLATION RIGHT GENICULAR (Right)     Patient on Anti-coagulation No    No health changes since previous encounter    Past Medical History:   Diagnosis Date    Anxiety     Family history of malignant melanoma 08/25/2014    Fibromyalgia affecting lower leg     GERD (gastroesophageal reflux disease)     Hx of psychiatric care     Hypercholesteremia     Hypertension     Hypothyroidism     Inflamed seborrheic keratosis 08/25/2014    Microscopic hematuria 02/02/2017    Multiple benign melanocytic nevi 08/25/2014    Renal cell carcinoma of right kidney metastatic to other site     Therapy      Past Surgical History:   Procedure Laterality Date    AUGMENTATION OF BREAST      bladder lift      breast implants      BREAST SURGERY Bilateral 1996    saline implants    COLONOSCOPY  2007    HYSTERECTOMY      ectopic pregnancy x 2, with LSO    KNEE ARTHROPLASTY Left 06/14/2021    Procedure: ARTHROPLASTY, KNEE:LEFT:DEPUY-SIGMA;  Surgeon: Osmar Avery III, MD;  Location: [facility] OR;  Service: Orthopedics;  Laterality: Left;    LAPAROSCOPIC NEPHRECTOMY, HAND ASSISTED  07/25/2013    LUNG REMOVAL, PARTIAL Left 2020    At [facility]    NEPHRECTOMY      OOPHORECTOMY Bilateral     RADIOFREQUENCY ABLATION Left 08/30/2022    Procedure: RADIOFREQUENCY ABLATION, LEFT KNEE COOLED;  Surgeon: Vijaya Landin MD;  Location: [facility];  Service: Pain Management;  Laterality: Left;    RADIOFREQUENCY ABLATION Right 10/25/2022    Procedure: RADIOFREQUENCY ABLATION RIGHT KNEE GENICULAR COOLED;  Surgeon: Vijaya Landin MD;  Location: [facility];  Service: Pain Management;  Laterality: Right;    RADIOFREQUENCY ABLATION Left 03/07/2023    Procedure: RADIOFREQUENCY ABLATION LEFT GENICULAR COOLED RFA;  Surgeon: Vijaya Landin MD;  Location: [facility];  Service: Pain Management;  Laterality: Left;    RADIOFREQUENCY ABLATION Right 08/01/2023    Procedure: RADIOFREQUENCY  "ABLATION, RIGHT GENICULAR COOLED;  Surgeon: Vijaya Landin MD;  Location: [facility];  Service: Pain Management;  Laterality: Right;    RADIOFREQUENCY ABLATION Left 12/26/2023    Procedure: RADIOFREQUENCY ABLATION LEFT GENICULAR 1 OF 2;  Surgeon: Vijaya Landin MD;  Location: [facility];  Service: Pain Management;  Laterality: Left;  [phone]    RADIOFREQUENCY ABLATION Right 2/6/2024    Procedure: RADIOFREQUENCY ABLATION RIGHT GENICULAR 2 OF 2;  Surgeon: Vijaya Landin MD;  Location: [facility];  Service: Pain Management;  Laterality: Right;  [phone]  *after 2/1/24    RADIOFREQUENCY ABLATION Left 7/2/2024    Procedure: RADIOFREQUENCY ABLATION LEFT GENICULAR;  Surgeon: Vijaya Landin MD;  Location: [facility];  Service: Pain Management;  Laterality: Left;  [phone]  *SCHEDULE AFTER 6/26    right ganglion cyst      throat polyp      TRANSOBTURATOR SLING  2011    with RSO for persistent complex cyst & MARGOT; done by yris    UPPER GASTROINTESTINAL ENDOSCOPY  2007     Review of patient's allergies indicates:   Allergen Reactions    Talwin compound Anxiety     hallucinations/anxiety    Tramadol Itching    Buspirone Anxiety    Codeine Itching    Morphine Itching     jittery    Morpholine analogues Anxiety and Itching    Naproxen Itching     Takes Ibuprofen is ok on rare occasion     Nexium [esomeprazole magnesium] Other (See Comments)     constipation    Wal-phed [pseudoephedrine hcl] Itching      Current Facility-Administered Medications   Medication    0.9%  NaCl infusion     Facility-Administered Medications Ordered in Other Encounters   Medication    triamcinolone acetonide injection 40 mg       PMHx, PSHx, Allergies, Medications reviewed in epic    ROS negative except pain complaints in HPI    OBJECTIVE:    BP (!) 169/75   Pulse 68   Temp 97.4 °F (36.3 °C) (Oral)   Resp 16   Ht 5' 5" (1.651 m)   Wt 86.2 kg (190 lb)   SpO2 98%   Breastfeeding No   BMI 31.62 kg/m² "     PHYSICAL EXAMINATION:    GENERAL: Well appearing, in no acute distress, alert and oriented x3.  PSYCH:  Mood and affect appropriate.  SKIN: Skin color, texture, turgor normal, no rashes or lesions which will impact the procedure.  CV: RRR with palpation of the radial artery.  PULM: No evidence of respiratory difficulty, symmetric chest rise. Clear to auscultation.  NEURO: Cranial nerves grossly intact.    Plan:    Proceed with procedure as planned Procedure(s) (LRB):  RADIOFREQUENCY ABLATION RIGHT GENICULAR (Right)    Jad Kennedy  08/13/2024          \

## 2024-08-13 NOTE — DISCHARGE INSTRUCTIONS

## 2024-08-13 NOTE — DISCHARGE SUMMARY
Discharge Note  Short Stay      SUMMARY     Admit Date: 8/13/2024    Attending Physician: Vijaya Landin MD    Discharge Physician: Vijaya Landin MD    Discharge Date: 8/13/2024 1:46 PM    Procedure(s) (LRB):  RADIOFREQUENCY ABLATION RIGHT GENICULAR (Right)    Final Diagnosis: Primary osteoarthritis of knee, unspecified laterality [M17.10]    Disposition: Home or self care    Patient Instructions:   Current Discharge Medication List        CONTINUE these medications which have NOT CHANGED    Details   ALPRAZolam (XANAX) 0.5 MG tablet Take daily as needed for anxiety.  Qty: 30 tablet, Refills: 5      azelastine (ASTELIN) 137 mcg (0.1 %) nasal spray 1 spray (137 mcg total) by Nasal route 2 (two) times daily as needed for Rhinitis.  Qty: 30 mL, Refills: 2      brimonidine (MIRVASO) 0.33 % GlwP Apply topically.      buPROPion (WELLBUTRIN XL) 150 MG TB24 tablet Take 1 tablet (150 mg total) by mouth once daily.  Qty: 90 tablet, Refills: 1      butalbital-acetaminophen-caffeine -40 mg (FIORICET, ESGIC) -40 mg per tablet TAKE 1 TABLET EVERY 4 HOURS AS NEEDED  Qty: 30 tablet, Refills: 0    Associated Diagnoses: Headache, unspecified headache type      clotrimazole (LOTRIMIN) 1 % Soln APPLY TOPICALLY 2 TIMES DAILY FOR 7 DAYS  Qty: 30 mL, Refills: 2      diazePAM (VALIUM) 5 MG tablet Take 5 mg by mouth.      diclofenac sodium (VOLTAREN) 1 % Gel Apply 2 g topically daily as needed.  Qty: 100 g, Refills: 11      diflorasone-emollient (APEXICON E) 0.05 % Crea Apply 1 application topically once daily. for 7 days  Qty: 30 g, Refills: 5      docusate sodium (COLACE) 100 MG capsule Take 100 mg by mouth.      erythromycin (ROMYCIN) ophthalmic ointment Place into the right eye every evening.      estradioL (ESTRACE) 1 MG tablet Take 1 tablet (1 mg total) by mouth once daily.  Qty: 90 tablet, Refills: 0    Associated Diagnoses: Menopause syndrome      famotidine (PEPCID) 20 MG tablet Take 20 mg by mouth.       fluocinonide (LIDEX) 0.05 % external solution Apply topically 2 (two) times daily. Do not use morning of surgery      hydrocortisone 2.5 % cream Apply topically 2 (two) times daily. apply to affected area for 7 days  Qty: 20 g, Refills: 5      ketoconazole (NIZORAL) 2 % shampoo Do not use morning of surgery      levocetirizine (XYZAL) 5 MG tablet Take 1 tablet (5 mg total) by mouth every evening.  Qty: 90 tablet, Refills: 3    Associated Diagnoses: Allergic rhinitis, unspecified seasonality, unspecified trigger      levothyroxine (SYNTHROID) 112 MCG tablet TAKE 1 TABLET (112 MCG) BY MOUTH BEFORE BREAKFAST AS SCHEDULED  Qty: 90 tablet, Refills: 2      metronidazole 1% (METROGEL) 1 % Gel Apply topically once daily.  Qty: 60 g, Refills: 5      mometasone (ELOCON) 0.1 % solution Apply topically.      naltrexone (DEPADE) 50 mg tablet 1/4 pill twice daily  Qty: 15 tablet, Refills: 0    Associated Diagnoses: Class 1 obesity due to excess calories with serious comorbidity and body mass index (BMI) of 31.0 to 31.9 in adult      omeprazole (PRILOSEC) 10 MG capsule Take 10 mg by mouth.      ondansetron (ZOFRAN) 4 MG tablet Take 2 tablets (8 mg total) by mouth every 12 (twelve) hours as needed for Nausea.  Qty: 20 tablet, Refills: 2      oxyCODONE-acetaminophen (PERCOCET) 5-325 mg per tablet Take 1 tablet by mouth every 6 (six) hours as needed for Pain.  Qty: 30 each, Refills: 0    Comments: Quantity prescribed more than 7 day supply? Yes, quantity medically necessary, metastatic cancer patient.  Associated Diagnoses: Clear cell carcinoma; Carcinoma of right kidney; Carcinoma of kidney, unspecified laterality; History of right nephrectomy      pravastatin (PRAVACHOL) 20 MG tablet TAKE 1 TABLET EVERY DAY  Qty: 90 tablet, Refills: 4      senna (SENOKOT) 8.6 mg tablet Take 1 tablet by mouth.      zolpidem (AMBIEN) 5 MG Tab Take 1 tablet (5 mg total) by mouth nightly as needed.  Qty: 90 tablet, Refills: 0                  Discharge Diagnosis: Primary osteoarthritis of knee, unspecified laterality [M17.10]  Condition on Discharge: Stable with no complications to procedure   Diet on Discharge: Same as before.  Activity: as per instruction sheet.  Discharge to: Home with a responsible adult.  Follow up: 2-4 weeks       Please call my office or pager at [phone] if experienced any weakness or loss of sensation, fever > 101.5, pain uncontrolled with oral medications, persistent nausea/vomiting/or diarrhea, redness or drainage from the incisions, or any other worrisome concerns. If physician on call was not reached or could not communicate with our office for any reason please go to the nearest emergency department

## 2024-08-26 ENCOUNTER — PATIENT MESSAGE (OUTPATIENT)
Dept: PAIN MEDICINE | Facility: CLINIC | Age: 74
End: 2024-08-26
Payer: MEDICARE

## 2024-08-30 ENCOUNTER — PATIENT MESSAGE (OUTPATIENT)
Dept: CARDIOLOGY | Facility: HOSPITAL | Age: 74
End: 2024-08-30
Payer: MEDICARE

## 2024-09-03 ENCOUNTER — PATIENT MESSAGE (OUTPATIENT)
Dept: HEMATOLOGY/ONCOLOGY | Facility: CLINIC | Age: 74
End: 2024-09-03
Payer: MEDICARE

## 2024-09-03 ENCOUNTER — CLINICAL SUPPORT (OUTPATIENT)
Dept: INTERNAL MEDICINE | Facility: CLINIC | Age: 74
End: 2024-09-03
Payer: MEDICARE

## 2024-09-03 DIAGNOSIS — Z23 NEED FOR VACCINATION: Primary | ICD-10-CM

## 2024-09-03 PROCEDURE — 99213 OFFICE O/P EST LOW 20 MIN: CPT | Mod: PBBFAC,25

## 2024-09-03 PROCEDURE — 99999 PR PBB SHADOW E&M-EST. PATIENT-LVL III: CPT | Mod: PBBFAC,,,

## 2024-09-03 PROCEDURE — 90653 IIV ADJUVANT VACCINE IM: CPT | Mod: PBBFAC

## 2024-09-03 PROCEDURE — 99999PBSHW FLU VACCINE - ADJUVANTED: Mod: PBBFAC,,,

## 2024-09-03 NOTE — PROGRESS NOTES
Pt visited clinic for flu shot. Pt received high dose flu shot in Left deltoid.   Advised pt to wait 15 mins after flu shot. Pt verbalized understanding.

## 2024-09-06 ENCOUNTER — TELEPHONE (OUTPATIENT)
Dept: HEMATOLOGY/ONCOLOGY | Facility: CLINIC | Age: 74
End: 2024-09-06
Payer: MEDICARE

## 2024-09-06 DIAGNOSIS — E04.2 NONTOXIC MULTINODULAR GOITER: ICD-10-CM

## 2024-09-06 DIAGNOSIS — E03.9 HYPOTHYROIDISM, UNSPECIFIED TYPE: Primary | ICD-10-CM

## 2024-09-06 DIAGNOSIS — E04.1 THYROID NODULE: ICD-10-CM

## 2024-09-10 ENCOUNTER — OFFICE VISIT (OUTPATIENT)
Dept: PAIN MEDICINE | Facility: CLINIC | Age: 74
End: 2024-09-10
Payer: MEDICARE

## 2024-09-10 DIAGNOSIS — M17.10 PRIMARY OSTEOARTHRITIS OF KNEE, UNSPECIFIED LATERALITY: ICD-10-CM

## 2024-09-10 DIAGNOSIS — G89.29 CHRONIC PAIN OF BOTH KNEES: ICD-10-CM

## 2024-09-10 DIAGNOSIS — M25.562 CHRONIC PAIN OF BOTH KNEES: ICD-10-CM

## 2024-09-10 DIAGNOSIS — G89.4 CHRONIC PAIN SYNDROME: ICD-10-CM

## 2024-09-10 DIAGNOSIS — M17.12 PRIMARY OSTEOARTHRITIS OF LEFT KNEE: Primary | ICD-10-CM

## 2024-09-10 DIAGNOSIS — M25.561 CHRONIC PAIN OF BOTH KNEES: ICD-10-CM

## 2024-09-10 PROCEDURE — 99213 OFFICE O/P EST LOW 20 MIN: CPT | Mod: 95,,, | Performed by: NURSE PRACTITIONER

## 2024-09-12 ENCOUNTER — TELEPHONE (OUTPATIENT)
Dept: PSYCHIATRY | Facility: CLINIC | Age: 74
End: 2024-09-12
Payer: MEDICARE

## 2024-09-16 ENCOUNTER — OFFICE VISIT (OUTPATIENT)
Dept: OPHTHALMOLOGY | Facility: CLINIC | Age: 74
End: 2024-09-16
Payer: MEDICARE

## 2024-09-16 ENCOUNTER — HOSPITAL ENCOUNTER (OUTPATIENT)
Dept: RADIOLOGY | Facility: HOSPITAL | Age: 74
Discharge: HOME | End: 2024-09-16
Attending: INTERNAL MEDICINE
Payer: MEDICARE

## 2024-09-16 ENCOUNTER — TELEPHONE (OUTPATIENT)
Dept: OPHTHALMOLOGY | Facility: CLINIC | Age: 74
End: 2024-09-16
Payer: MEDICARE

## 2024-09-16 ENCOUNTER — HOSPITAL ENCOUNTER (OUTPATIENT)
Dept: CARDIOLOGY | Facility: HOSPITAL | Age: 74
Discharge: HOME | End: 2024-09-16
Attending: INTERNAL MEDICINE
Payer: MEDICARE

## 2024-09-16 ENCOUNTER — HOSPITAL ENCOUNTER (OUTPATIENT)
Dept: PULMONOLOGY | Facility: HOSPITAL | Age: 74
Discharge: HOME | End: 2024-09-16
Attending: INTERNAL MEDICINE
Payer: MEDICARE

## 2024-09-16 VITALS
BODY MASS INDEX: 31.65 KG/M2 | WEIGHT: 190 LBS | DIASTOLIC BLOOD PRESSURE: 90 MMHG | HEART RATE: 59 BPM | SYSTOLIC BLOOD PRESSURE: 147 MMHG | HEIGHT: 65 IN

## 2024-09-16 DIAGNOSIS — K21.9 GASTROESOPHAGEAL REFLUX DISEASE WITHOUT ESOPHAGITIS: ICD-10-CM

## 2024-09-16 DIAGNOSIS — R94.31 NONSPECIFIC ABNORMAL ELECTROCARDIOGRAM (ECG) (EKG): ICD-10-CM

## 2024-09-16 DIAGNOSIS — R06.09 DYSPNEA ON EXERTION: ICD-10-CM

## 2024-09-16 DIAGNOSIS — H01.006 BLEPHARITIS OF BOTH EYES, UNSPECIFIED EYELID, UNSPECIFIED TYPE: ICD-10-CM

## 2024-09-16 DIAGNOSIS — C80.1 CLEAR CELL CARCINOMA: ICD-10-CM

## 2024-09-16 DIAGNOSIS — H00.011 HORDEOLUM EXTERNUM OF RIGHT UPPER EYELID: Primary | ICD-10-CM

## 2024-09-16 DIAGNOSIS — C64.9 CARCINOMA OF KIDNEY, UNSPECIFIED LATERALITY: ICD-10-CM

## 2024-09-16 DIAGNOSIS — H01.003 BLEPHARITIS OF BOTH EYES, UNSPECIFIED EYELID, UNSPECIFIED TYPE: ICD-10-CM

## 2024-09-16 DIAGNOSIS — R00.2 PALPITATIONS: ICD-10-CM

## 2024-09-16 LAB
CV STRESS BASE HR: 59 BPM
DIASTOLIC BLOOD PRESSURE: 90 MMHG
EJECTION FRACTION- HIGH: 73 %
END DIASTOLIC INDEX-HIGH: 165 ML/M2
END DIASTOLIC INDEX-LOW: 101 ML/M2
END SYSTOLIC INDEX-HIGH: 64 ML/M2
END SYSTOLIC INDEX-LOW: 28 ML/M2
NUC REST EJECTION FRACTION: 79
NUC STRESS EJECTION FRACTION: 83 %
OHS CV CPX 85 PERCENT MAX PREDICTED HEART RATE MALE: 124
OHS CV CPX MAX PREDICTED HEART RATE: 146
OHS CV CPX PATIENT IS FEMALE: 1
OHS CV CPX PATIENT IS MALE: 0
OHS CV CPX PEAK DIASTOLIC BLOOD PRESSURE: 88 MMHG
OHS CV CPX PEAK HEAR RATE: 97 BPM
OHS CV CPX PEAK RATE PRESSURE PRODUCT: NORMAL
OHS CV CPX PEAK SYSTOLIC BLOOD PRESSURE: 161 MMHG
OHS CV CPX PERCENT MAX PREDICTED HEART RATE ACHIEVED: 69
OHS CV CPX RATE PRESSURE PRODUCT PRESENTING: 8673
RETIRED EF AND QEF - SEE NOTES: 59 %
SYSTOLIC BLOOD PRESSURE: 147 MMHG

## 2024-09-16 PROCEDURE — 93018 CV STRESS TEST I&R ONLY: CPT | Mod: ,,, | Performed by: STUDENT IN AN ORGANIZED HEALTH CARE EDUCATION/TRAINING PROGRAM

## 2024-09-16 PROCEDURE — 78452 HT MUSCLE IMAGE SPECT MULT: CPT | Mod: 26,,, | Performed by: STUDENT IN AN ORGANIZED HEALTH CARE EDUCATION/TRAINING PROGRAM

## 2024-09-16 PROCEDURE — 93016 CV STRESS TEST SUPVJ ONLY: CPT | Mod: ,,, | Performed by: STUDENT IN AN ORGANIZED HEALTH CARE EDUCATION/TRAINING PROGRAM

## 2024-09-16 PROCEDURE — 99999 PR PBB SHADOW E&M-EST. PATIENT-LVL III: CPT | Mod: PBBFAC,,, | Performed by: OPTOMETRIST

## 2024-09-16 PROCEDURE — 63600175 PHARM REV CODE 636 W HCPCS: Performed by: INTERNAL MEDICINE

## 2024-09-16 PROCEDURE — A9502 TC99M TETROFOSMIN: HCPCS | Performed by: INTERNAL MEDICINE

## 2024-09-16 PROCEDURE — 99213 OFFICE O/P EST LOW 20 MIN: CPT | Mod: S$PBB,,, | Performed by: OPTOMETRIST

## 2024-09-16 PROCEDURE — 93017 CV STRESS TEST TRACING ONLY: CPT

## 2024-09-16 PROCEDURE — 99213 OFFICE O/P EST LOW 20 MIN: CPT | Mod: PBBFAC,25 | Performed by: OPTOMETRIST

## 2024-09-16 RX ORDER — REGADENOSON 0.08 MG/ML
0.4 INJECTION, SOLUTION INTRAVENOUS ONCE
Status: COMPLETED | OUTPATIENT
Start: 2024-09-16 | End: 2024-09-16

## 2024-09-16 RX ORDER — NEOMYCIN SULFATE, POLYMYXIN B SULFATE, AND DEXAMETHASONE 3.5; 10000; 1 MG/G; [USP'U]/G; MG/G
OINTMENT OPHTHALMIC
Qty: 3.5 G | Refills: 0 | Status: SHIPPED | OUTPATIENT
Start: 2024-09-16

## 2024-09-16 RX ADMIN — TETROFOSMIN 31.2 MILLICURIE: 1.38 INJECTION, POWDER, LYOPHILIZED, FOR SOLUTION INTRAVENOUS at 12:09

## 2024-09-16 RX ADMIN — REGADENOSON 0.4 MG: 0.08 INJECTION, SOLUTION INTRAVENOUS at 12:09

## 2024-09-16 RX ADMIN — TETROFOSMIN 10.3 MILLICURIE: 1.38 INJECTION, POWDER, LYOPHILIZED, FOR SOLUTION INTRAVENOUS at 10:09

## 2024-09-16 NOTE — PROGRESS NOTES
HPI     Eye Pain            Comments: Pt states OD eye lid is painful when blinking and touched  Started off itchy, then became red and red and swollen  Pain Scale:  4 today, last night 8  Onset:   3-4 days ago was itchy  OD, OS, OU:   OD  Discharge:   no  A.M. Matting:  no  Itch:   yes  Redness:   yes  Photophobia:   yes  Foreign body sensation:   no  Deep pain:   sometimes  Previous occurrence:   yes  Drops:   yes           Last edited by Rowan Ibarra on 9/16/2024  3:10 PM.            Assessment /Plan     For exam results, see Encounter Report.    Hordeolum externum of right upper eyelid    Blepharitis of both eyes, unspecified eyelid, unspecified type    Other orders  -     neomycin-polymyxin-dexamethasone (DEXACINE) 3.5 mg/g-10,000 unit/g-0.1 % Oint; Place small amount to affected area three times daily  Dispense: 3.5 g; Refill: 0    Begin warm compresses tid-qid OS for 10-15 minutes  Start Dexacine konrad as directed after warm compresses      RTC PRN if symptoms worsen or not resolved x 1 week  Discussed above and all questions were answered.

## 2024-09-17 ENCOUNTER — HOSPITAL ENCOUNTER (OUTPATIENT)
Dept: RADIOLOGY | Facility: HOSPITAL | Age: 74
Discharge: HOME | End: 2024-09-17
Attending: INTERNAL MEDICINE
Payer: MEDICARE

## 2024-09-17 DIAGNOSIS — E04.1 THYROID NODULE: ICD-10-CM

## 2024-09-17 DIAGNOSIS — E03.9 HYPOTHYROIDISM, UNSPECIFIED TYPE: ICD-10-CM

## 2024-09-17 DIAGNOSIS — E04.2 NONTOXIC MULTINODULAR GOITER: ICD-10-CM

## 2024-09-17 PROCEDURE — 76536 US EXAM OF HEAD AND NECK: CPT | Mod: TC,PN

## 2024-09-17 PROCEDURE — 76536 US EXAM OF HEAD AND NECK: CPT | Mod: 26,,, | Performed by: STUDENT IN AN ORGANIZED HEALTH CARE EDUCATION/TRAINING PROGRAM

## 2024-09-18 ENCOUNTER — OFFICE VISIT (OUTPATIENT)
Facility: CLINIC | Age: 74
End: 2024-09-18
Payer: MEDICARE

## 2024-09-18 DIAGNOSIS — E04.2 NONTOXIC MULTINODULAR GOITER: ICD-10-CM

## 2024-09-18 DIAGNOSIS — E04.2 MULTIPLE THYROID NODULES: Primary | ICD-10-CM

## 2024-09-18 DIAGNOSIS — C64.9 CARCINOMA OF KIDNEY, UNSPECIFIED LATERALITY: ICD-10-CM

## 2024-09-18 DIAGNOSIS — E04.1 THYROID NODULE: ICD-10-CM

## 2024-09-18 DIAGNOSIS — E03.9 HYPOTHYROIDISM, UNSPECIFIED TYPE: ICD-10-CM

## 2024-09-18 PROCEDURE — 99204 OFFICE O/P NEW MOD 45 MIN: CPT | Mod: 95,,, | Performed by: STUDENT IN AN ORGANIZED HEALTH CARE EDUCATION/TRAINING PROGRAM

## 2024-09-18 PROCEDURE — G2211 COMPLEX E/M VISIT ADD ON: HCPCS | Mod: 95,,, | Performed by: STUDENT IN AN ORGANIZED HEALTH CARE EDUCATION/TRAINING PROGRAM

## 2024-09-18 NOTE — PATIENT INSTRUCTIONS
Thank you for visiting with me this week during our virtual appointment.    As reviewed I have no concerns for any of your thyroid nodules needing to be biopsied at this time.  I did review you ultrasound with one of the endocrinologists who performs the biopsies and he is in agreement that all nodules appear stable in size and are not meeting our size criteria to consider biopsy (typically 10-15 mm or large in dimension).  You largest one is 7 mm and stable since the last ultrasound.      We can plan to repeat an ultrasound in 1 year for you to monitor the nodules.  We will have a visit around that time as well.    For your thyroid function you can remain on the current dose of levothyroxine with suggested dosing habits as we had mentioned below.  I will have our staff arrange thyroid labs for you this week including the antibody which helps us determine the cause for your low thyroid function.  Also keep us updated if you ever need immuno therapy with oncology as some of those medicines they use can cause other issues with the endocrine system.      Reach out if you have any questions.      Levothyroxine Dosing:  As a review, it is best to take the levothyroxine in the morning on an empty stomach, and not eat, drink or take medications for at least ~30 minutes after taking it to maximize its absorption.  Please avoid taking any multi-vitamins (anything with iron) or calcium supplements for at least 4 hours after taking the medication.  If you miss the dose on one day, take the missed dose the next morning to make up for it.  You should have the same amount of pills each week and a pill box can help with keeping track.

## 2024-09-18 NOTE — PROGRESS NOTES
ENDOCRINOLOGY NEW PATIENT VISIT: 09/18/2024    The patient location is: Home  The chief complaint leading to consultation is: Thyroid nodule and hypothyroidism    Visit type: audiovisual    Face to Face time with patient: 45  50 minutes of total time spent on the encounter, which includes face to face time and non-face to face time preparing to see the patient (eg, review of tests), Obtaining and/or reviewing separately obtained history, Documenting clinical information in the electronic or other health record, Independently interpreting results (not separately reported) and communicating results to the patient/family/caregiver, or Care coordination (not separately reported).     Each patient to whom he or she provides medical services by telemedicine is:  (1) informed of the relationship between the physician and patient and the respective role of any other health care provider with respect to management of the patient; and (2) notified that he or she may decline to receive medical services by telemedicine and may withdraw from such care at any time.    Subjective:      Patient ID: Sherrill Avery is a 74 y.o. female.    Chief Complaint:  Hypothyroid and thyroid nodules    History of Present Illness  Sherrill Avery presents for evaluation of thyroid nodules and hypothyroidism.  She has been seen by endocrine in [place] (Dr. Solomon) with concerns recently that at least one of the nodules in her left thyroid has had changes over time that may need biopsy.  Concerns she states are related to her history of metastatic renal cancer.  She does still follow with oncology who is currently monitoring a lesion around the spine but at the moment but there has been no discussion of intervention.  They may consider immunotherapy in the future if concerns arise.  Her original surgery for renal cancer was in 2013 and later she was found to have nodules in the lung in 2017 with a surgery completed in 2020 and subsequent radiation  therapy on remaining lung lesions.  She follows with Dr. Jaquez with oncology who is referring her to us at this time.  She preferred to keep her thyroid care in the [facility] network through endocrine in case future biopsies are needed.  Last endocrine visit outside of [facility] was 2024.         Regarding Hypothyroidism and thyroid nodules:    - Duration of hypothyroidism:  Around   - Etiology: Suspect Hashimoto's  - Contributing Factors: None  - Current relevant medications: levothyroxine T4 (Synthroid) 112 mcg daily 6.5 tabs a week.    - Weight based dosing ([weight in kg] x 1.6):  137 mcg  - Takes thyroid medications properly but sometimes misses a dose during the week    Lab Results   Component Value Date    TSH 5.20 (H) 2024    TSH 1.46 2023    TSH 0.48 2022    FREET4 1.17 2020    FREET4 1.30 2019     10/05/2022     2021    GLU 86 10/05/2022    GLU 80 2021     There were no vitals filed for this visit.     - Most recent thyroid imagin2024    FINDINGS:  The thyroid is normal in size.  Right lobe of the thyroid measures 2.3 x 1.0 x 1.1 cm.  Left lobe of the thyroid measures 2.8 x 0.6 x 0.6 cm.  Isthmus measures 0.2 cm.  Mildly heterogeneous thyroid parenchyma.  There are bilateral nodules as follows:     Right:  3 x 1 x 2 mm inferior hypoechoic nodule, which appears solid, TR 4.  Stable.     Left:  7 x 5 x 5 mm superior solid hypoechoic mildly heterogeneous nodule with punctate echogenic foci, TR 5.  Stable.  5 x 3 x 4 mm inferior solid hypoechoic nodule, TR 4.  Stable.     Impression:  Stable bilateral small thyroid nodules.  No nodule meets criteria for FNA.        - Most recent DEXA:  2023    FINDINGS:  The L1 to L4 vertebral bone mineral density is equal to 1.277 g/cm squared with a T score of 0.7.  There has been no significant change relative to the prior study.  The left femoral neck bone mineral density is equal to 0.870  g/cm squared with a T score of -1.2.  There has been  a -4.3% statistically significant change relative to the prior study.  There is a 9.6% risk of a major osteoporotic fracture and a 1.4% risk of hip fracture in the next 10 years (FRAX).     Impression:  Osteopenia    - Current symptoms:    No   Yes  [x]    []   Weight gain  []    [x]   Fatigue  []    [x]   Constipation (on stool softner)  [x]    []   Hair loss (thin hair)  []    [x]   Mental fog  []    [x]   Cold intolerance (alternates with hot)  []    [x]   Memory impair  [x]    []   Neck swelling, pain, pressure  [x]    []   Hoarseness  [x]    []   Periorbital edema  [x]    []   Lithium or Amiodarone use  [x]    []   Chemotherapy (may need immune therapy in the future)  [x]    []   Prior neck radiation (had lung radiation on the right)  [x]    []   Recent severe illness    Hysterectomy at age 25 with one ovary remaining.  Estrogen replacement in her 60's.      - Personal or Family History:  No   Yes  [x]    []   Thyroid disorder  [x]    []   Thyroid cancer      ROS:   As above    Objective:     There were no vitals taken for this visit.  BP Readings from Last 3 Encounters:   09/16/24 (!) 147/90   08/13/24 (!) 167/79   07/24/24 (!) 144/78     Wt Readings from Last 1 Encounters:   09/16/24 1238 86.2 kg (190 lb)     There is no height or weight on file to calculate BMI.    Physical Exam  Constitutional:       Appearance: Normal appearance.   Neurological:      Mental Status: She is alert.   Psychiatric:         Mood and Affect: Mood normal.         Behavior: Behavior normal.      Lab Review:   Lab Results   Component Value Date    HGBA1C 5.2 10/09/2023     Lab Results   Component Value Date    CHOL 194 10/18/2023    HDL 66 10/18/2023    LDLCALC 108.6 10/18/2023    TRIG 97 10/18/2023    CHOLHDL 34.0 10/18/2023     Lab Results   Component Value Date     10/05/2022    K 4.2 10/05/2022     10/05/2022    CO2 25 10/05/2022    GLU 86 10/05/2022    BUN 12  10/09/2023    CREATININE 1.00 (H) 10/09/2023    CALCIUM 8.9 10/09/2023    PROT 6.1 10/05/2022    ALBUMIN 3.6 10/05/2022    BILITOT 0.7 10/05/2022    ALKPHOS 78 10/05/2022    AST 15 10/05/2022    ALT 14 10/05/2022    ANIONGAP 7 (L) 10/05/2022    ESTGFRAFRICA >60 08/17/2021    EGFRNONAA >60 08/17/2021    TSH 5.20 (H) 03/05/2024     Vit D, 25-Hydroxy   Date Value Ref Range Status   09/30/2021 31 30 - 96 ng/mL Final     Comment:     Vitamin D deficiency.........<10 ng/mL                              Vitamin D insufficiency......10-29 ng/mL       Vitamin D sufficiency........> or equal to 30 ng/mL  Vitamin D toxicity............>100 ng/mL         Assessment and Plan     Hypothyroidism  Reviewed history, symptoms and recent labs.  TSH mildly elevated but likely age appropriate on labs from March.  Has remained on 112 mg Levothyroxine dose (6.5 tbs per week) with occasional missed dose during the week.  Some symptoms suggestive of clinical hypothyroidism but in need of repeat labs.  Likely Hashimoto's based on history and thyroid appearance on imaging.    Plan:  - Repeat TSH and add TPO Antibody   - Continue Levothyroxine as current dosing 6.5 tabs per week of 112 mcg  - Adjust dosing as needed based on labs  - Reviewed preferred dosing habits including ability to make up doses later in the week if a pill is missed    Multiple thyroid nodules  Reviewed prior ultrasound reports and available images.  Known history of bilateral nodules.  US performed this week with images reviewed in detail.  All nodules less than 10 mm in size and not meeting FNA criteria.  Right side nodules small and cystic in appearance.  Left side nodules x 2 without obvious concerns which appear similar in size and appearance compared to US earlier in the year.  No physical complaints in the neck.    Reviewed imaging with colleague who performs biopsies who is in agreement that no FNA should be needed at this time.  History of renal cancer with mets in  the past is also not a factor given the left side nodules have been present for multiple years and metastatic disease to the thyroid would have progressed in size more rapidly.    Plan:  - No FNA on any nodules needed  - Repeat thyroid US for monitoring of size and characteristics of nodules in 1 year  - Continue to treat hypothyroidism as planned    Kidney carcinoma  Known history of renal cancer with mets to the lungs in the past requiring surgery and radiation.  No lesions in the thyroid on ultrasound imaging that look concerning at this time.  Reviewed that if immunotherapy is ever needed in the future that thyroid function along with other endocrine labs would likely need monitoring for any changes (risks of diabetes and AI).  Further management via oncology for treatments.      RTC in 1 year.  Labs soon.      **Visit today included increased complexity associated with the care of the problems addressed and managing the longitudinal care of the patient due to the serious and/or complex managed problems      Te Sanabria DO     Disclaimer: This note has been generated using voice-recognition software. There may be typographical errors that have been missed during proof-reading.

## 2024-09-18 NOTE — Clinical Note
Please set up labs soon for this patient.  [facility].  Maybe just confirm if that is a location she can have the labs at  Follow up for now will be in 1 year  Thanks

## 2024-09-19 PROBLEM — E04.1 THYROID NODULE: Status: ACTIVE | Noted: 2024-09-19

## 2024-09-19 PROBLEM — E04.2 MULTIPLE THYROID NODULES: Status: ACTIVE | Noted: 2024-09-19

## 2024-09-19 NOTE — ASSESSMENT & PLAN NOTE
Reviewed history, symptoms and recent labs.  TSH mildly elevated but likely age appropriate on labs from March.  Has remained on 112 mg Levothyroxine dose (6.5 tbs per week) with occasional missed dose during the week.  Some symptoms suggestive of clinical hypothyroidism but in need of repeat labs.  Likely Hashimoto's based on history and thyroid appearance on imaging.    Plan:  - Repeat TSH and add TPO Antibody   - Continue Levothyroxine as current dosing 6.5 tabs per week of 112 mcg  - Adjust dosing as needed based on labs  - Reviewed preferred dosing habits including ability to make up doses later in the week if a pill is missed

## 2024-09-19 NOTE — ASSESSMENT & PLAN NOTE
Reviewed prior ultrasound reports and available images.  Known history of bilateral nodules.  US performed this week with images reviewed in detail.  All nodules less than 10 mm in size and not meeting FNA criteria.  Right side nodules small and cystic in appearance.  Left side nodules x 2 without obvious concerns which appear similar in size and appearance compared to US earlier in the year.  No physical complaints in the neck.    Reviewed imaging with colleague who performs biopsies who is in agreement that no FNA should be needed at this time.  History of renal cancer with mets in the past is also not a factor given the left side nodules have been present for multiple years and metastatic disease to the thyroid would have progressed in size more rapidly.    Plan:  - No FNA on any nodules needed  - Repeat thyroid US for monitoring of size and characteristics of nodules in 1 year  - Continue to treat hypothyroidism as planned

## 2024-09-19 NOTE — ASSESSMENT & PLAN NOTE
Known history of renal cancer with mets to the lungs in the past requiring surgery and radiation.  No lesions in the thyroid on ultrasound imaging that look concerning at this time.  Reviewed that if immunotherapy is ever needed in the future that thyroid function along with other endocrine labs would likely need monitoring for any changes (risks of diabetes and AI).  Further management via oncology for treatments.

## 2024-09-20 ENCOUNTER — LAB VISIT (OUTPATIENT)
Dept: LAB | Facility: HOSPITAL | Age: 74
End: 2024-09-20
Attending: STUDENT IN AN ORGANIZED HEALTH CARE EDUCATION/TRAINING PROGRAM
Payer: MEDICARE

## 2024-09-20 DIAGNOSIS — E03.9 HYPOTHYROIDISM, UNSPECIFIED TYPE: ICD-10-CM

## 2024-09-20 PROCEDURE — 36415 COLL VENOUS BLD VENIPUNCTURE: CPT | Mod: PO | Performed by: STUDENT IN AN ORGANIZED HEALTH CARE EDUCATION/TRAINING PROGRAM

## 2024-09-21 LAB — THYROPEROXIDASE IGG SERPL-ACNC: <6 IU/ML

## 2024-10-03 ENCOUNTER — OFFICE VISIT (OUTPATIENT)
Dept: PSYCHIATRY | Facility: CLINIC | Age: 74
End: 2024-10-03
Payer: MEDICARE

## 2024-10-03 ENCOUNTER — OFFICE VISIT (OUTPATIENT)
Dept: PODIATRY | Facility: CLINIC | Age: 74
End: 2024-10-03
Payer: MEDICARE

## 2024-10-03 DIAGNOSIS — L90.9 PLANTAR FAT PAD ATROPHY: ICD-10-CM

## 2024-10-03 DIAGNOSIS — G60.9 IDIOPATHIC PERIPHERAL NEUROPATHY: ICD-10-CM

## 2024-10-03 DIAGNOSIS — M79.672 PAIN IN LEFT FOOT: Primary | ICD-10-CM

## 2024-10-03 DIAGNOSIS — L84 CORN OR CALLUS: ICD-10-CM

## 2024-10-03 DIAGNOSIS — F32.A DEPRESSION, UNSPECIFIED DEPRESSION TYPE: ICD-10-CM

## 2024-10-03 DIAGNOSIS — M79.671 PAIN IN RIGHT FOOT: ICD-10-CM

## 2024-10-03 DIAGNOSIS — F41.1 GENERALIZED ANXIETY DISORDER: Primary | ICD-10-CM

## 2024-10-03 PROCEDURE — 99214 OFFICE O/P EST MOD 30 MIN: CPT | Mod: 95,,, | Performed by: PSYCHIATRY & NEUROLOGY

## 2024-10-03 PROCEDURE — 99213 OFFICE O/P EST LOW 20 MIN: CPT | Mod: PBBFAC | Performed by: PODIATRIST

## 2024-10-03 PROCEDURE — 99213 OFFICE O/P EST LOW 20 MIN: CPT | Mod: S$PBB,,, | Performed by: PODIATRIST

## 2024-10-03 PROCEDURE — 99999 PR PBB SHADOW E&M-EST. PATIENT-LVL III: CPT | Mod: PBBFAC,,, | Performed by: PODIATRIST

## 2024-10-03 RX ORDER — BUPROPION HYDROCHLORIDE 150 MG/1
150 TABLET ORAL DAILY
Qty: 90 TABLET | Refills: 1 | Status: SHIPPED | OUTPATIENT
Start: 2024-10-03

## 2024-10-03 NOTE — PROGRESS NOTES
Subjective:       Patient ID: Sherrill Avery is a 74 y.o. female.    Chief Complaint: Callouses (Patient is non diabetic and complains of 3/10 pain at present to bilateral plantar callous)    HPI: Sherrill Avery presents to the office today with pain to the lateral aspect of the right foot and left foot.  States callusing formation causing discomfort.  States 3/10 pain.  Presents to clinic today with  present.    Review of patient's allergies indicates:   Allergen Reactions    Talwin compound Anxiety     hallucinations/anxiety    Tramadol Itching    Buspirone Anxiety    Codeine Itching    Morphine Itching     jittery    Morpholine analogues Anxiety and Itching    Naproxen Itching     Takes Ibuprofen is ok on rare occasion     Nexium [esomeprazole magnesium] Other (See Comments)     constipation    Wal-phed [pseudoephedrine hcl] Itching       Past Medical History:   Diagnosis Date    Anxiety     Family history of malignant melanoma 08/25/2014    Fibromyalgia affecting lower leg     GERD (gastroesophageal reflux disease)     Hx of psychiatric care     Hypercholesteremia     Hypertension     Hypothyroidism     Inflamed seborrheic keratosis 08/25/2014    Microscopic hematuria 02/02/2017    Multiple benign melanocytic nevi 08/25/2014    Renal cell carcinoma of right kidney metastatic to other site     Therapy        Family History   Problem Relation Name Age of Onset    Diabetes Mother      Hypertension Mother      Heart disease Mother      Cancer Father          lung    Migraines Father      Glaucoma Paternal Grandmother      Glaucoma Sister      Thyroid disease Neg Hx      Asthma Neg Hx         Social History     Socioeconomic History    Marital status:      Spouse name: KAIDEN    Number of children: 5   Occupational History    Occupation: retired     Comment: Dance Instructor   Tobacco Use    Smoking status: Never    Smokeless tobacco: Never   Substance and Sexual Activity    Alcohol use: Yes      Alcohol/week: 0.0 standard drinks of alcohol     Comment: social    Drug use: No    Sexual activity: Yes     Partners: Male     Birth control/protection: Surgical   Social History Narrative    Patient is a retired dance instructor and live with . Has stairs at home 12- has an elevator     Social Drivers of Health     Financial Resource Strain: Low Risk  (5/26/2024)    Overall Financial Resource Strain (CARDIA)     Difficulty of Paying Living Expenses: Not hard at all   Food Insecurity: No Food Insecurity (5/26/2024)    Hunger Vital Sign     Worried About Running Out of Food in the Last Year: Never true     Ran Out of Food in the Last Year: Never true   Transportation Needs: Patient Declined (5/15/2024)    PRAPARE - Transportation     Lack of Transportation (Medical): Patient declined     Lack of Transportation (Non-Medical): Patient declined   Physical Activity: Inactive (5/26/2024)    Exercise Vital Sign     Days of Exercise per Week: 0 days     Minutes of Exercise per Session: 0 min   Stress: Stress Concern Present (5/26/2024)    [employer] [place] [employer] - Occupational Stress Questionnaire     Feeling of Stress : To some extent   Housing Stability: Unknown (5/26/2024)    Housing Stability Vital Sign     Unable to Pay for Housing in the Last Year: No       Past Surgical History:   Procedure Laterality Date    AUGMENTATION OF BREAST      bladder lift      breast implants      BREAST SURGERY Bilateral 1996    saline implants    COLONOSCOPY  2007    HYSTERECTOMY      ectopic pregnancy x 2, with LSO    KNEE ARTHROPLASTY Left 06/14/2021    Procedure: ARTHROPLASTY, KNEE:LEFT:DEPUY-SIGMA;  Surgeon: Osmar Avery III, MD;  Location: [facility];  Service: Orthopedics;  Laterality: Left;    LAPAROSCOPIC NEPHRECTOMY, HAND ASSISTED  07/25/2013    LUNG REMOVAL, PARTIAL Left 2020    At [facility]    NEPHRECTOMY      OOPHORECTOMY Bilateral     RADIOFREQUENCY ABLATION Left 08/30/2022    Procedure:  RADIOFREQUENCY ABLATION, LEFT KNEE COOLED;  Surgeon: Vijaya Landin MD;  Location: [facility];  Service: Pain Management;  Laterality: Left;    RADIOFREQUENCY ABLATION Right 10/25/2022    Procedure: RADIOFREQUENCY ABLATION RIGHT KNEE GENICULAR COOLED;  Surgeon: Vijaya Landin MD;  Location: [facility];  Service: Pain Management;  Laterality: Right;    RADIOFREQUENCY ABLATION Left 03/07/2023    Procedure: RADIOFREQUENCY ABLATION LEFT GENICULAR COOLED RFA;  Surgeon: Vijaya Landin MD;  Location: [facility];  Service: Pain Management;  Laterality: Left;    RADIOFREQUENCY ABLATION Right 08/01/2023    Procedure: RADIOFREQUENCY ABLATION, RIGHT GENICULAR COOLED;  Surgeon: Vijaya Landin MD;  Location: [facility];  Service: Pain Management;  Laterality: Right;    RADIOFREQUENCY ABLATION Left 12/26/2023    Procedure: RADIOFREQUENCY ABLATION LEFT GENICULAR 1 OF 2;  Surgeon: Vijaya Landin MD;  Location: [facility];  Service: Pain Management;  Laterality: Left;  [phone]    RADIOFREQUENCY ABLATION Right 2/6/2024    Procedure: RADIOFREQUENCY ABLATION RIGHT GENICULAR 2 OF 2;  Surgeon: Vijaya Landin MD;  Location: [facility];  Service: Pain Management;  Laterality: Right;  [phone]  *after 2/1/24    RADIOFREQUENCY ABLATION Left 7/2/2024    Procedure: RADIOFREQUENCY ABLATION LEFT GENICULAR;  Surgeon: Vijaya Landin MD;  Location: [facility];  Service: Pain Management;  Laterality: Left;  [phone]  *SCHEDULE AFTER 6/26    RADIOFREQUENCY ABLATION Right 8/13/2024    Procedure: RADIOFREQUENCY ABLATION RIGHT GENICULAR;  Surgeon: Vijaya Landin MD;  Location: [facility];  Service: Pain Management;  Laterality: Right;  [phone]  4 WK F/U MARGARET  *SCHEDULE AFTER 8/6    right ganglion cyst      throat polyp      TRANSOBTURATOR SLING  2011    with RSO for persistent complex cyst & MARGOT; done by yris    UPPER GASTROINTESTINAL ENDOSCOPY  2007       Review of Systems        Objective:   There were no vitals taken for this visit.    Cardiac Monitor - 3-15 Day Adult (Cupid Only)  Attached at the bottom of this summary is the study interpretation as   provided by the ambulatory cardiac telemetry service provider ([facility]).   The actual submitted strips are posted under the media tab in the   patient's chart. I reviewed them in detail and I agree with the findings   as listed with salient findings, personal comments and edits as listed   below:    Benign study.  No symptoms entered.    Service provider quantitative analysis and interpretation:    The patient was monitored for a total of 5d 17h, underlying rhythm is   Sinus.  The minimum heart rate was 63 bpm; the maximum 132 bpm; the average 87   bpm.  0 % of Atrial fibrillation/Atrial flutter with longest episode of 0 ms.  The total burden of AV Block present was 0 % [Complete Heart Block: 0 %;   Advanced (High Grade):  0 %; 2nd Degree, Mobitz II: 0 %; 2nd Degree, Mobitz I: 0 %].  There were 0 pauses, the longest pause was 0 ms at --.  Total count of Ventricular Tachycardia (VT): 8 episode(s). Longest VT: 3   beats on Day 4 / 10:37:07  am. Fastest VT: 141 bpm on Day 5 / 06:19:06 pm.  13 supraventricular episodes were found. Longest SVT Episode 9 beats,   Fastest  bpm  There were a total of 5467 PVCs with 2 morphologies and 230 couplets.   Overall PVC [place] at 0.78  %  There were a total of 0 Other Beats. There were 0 total number of paced   beats.  There were a total of 446 PSVCs with 1 morphologies and 6 couplets.   Overall PSVC [place] at  0.06 %  There is a total of 0 patient events       Physical Exam   LOWER EXTREMITY PHYSICAL EXAMINATION    Vascular:  The right dorsalis pedis pulse 2/4 and the right posterior tibial pulse 2/4.  The left dorsalis pedis pulse 2/4 and posterior tibial pulse on the left is 2/4.  Capillary refill is intact.  Pedal hair growth intact    Musculoskeletal: Manual Muscle Testing is 5/5 with  dorsiflexion, plantar flexion, abduction, and adduction.   There is normal range of motion in the forefoot, hindfoot, and Ankle joint.    Dermatological:  Skin is thin.  Plantar fat pad atrophy noted.  Hyperkeratosis sub 5th metatarsal head bilaterally.  No signs of underlying ulceration      Imaging:       X-Ray Foot Complete Bilateral    Narrative  EXAM: XR FOOT COMPLETE 3 VIEW BILATERAL    CLINICAL HISTORY: Bilateral foot pain    TECHNIQUE: Bilateral feet, 6 views    COMPARISON:  No studies are available for comparison.    FINDINGS: Large lateral calcaneal spurs.  Mild right and moderate left first MTP degenerative joint disease.  No articular erosions.  Left hallux rigidus.  Negative for fracture.    Impression  Degenerative changes.        Finalized on: 6/11/2024 6:01 PM By:  MARYAN Barajas MD, MD  [facility]# [id number]      2024-06-11 18:03:12.446    [facility]      No results found for this or any previous visit.       No results found for this or any previous visit.          Assessment:     1. Pain in left foot    2. Pain in right foot    3. Idiopathic peripheral neuropathy    4. Corn or callus    5. Plantar fat pad atrophy        Plan:     Pain in left foot    Pain in right foot    Idiopathic peripheral neuropathy    Corn or callus    Plantar fat pad atrophy        Thorough discussion is had with the patient today, concerning the diagnosis, its etiology, and the treatment algorithm at present.    Callus removed without complication    Discussed plantar fat pad atrophy of the importance of protecting the forefoot due to lack plantar fat pad    Future Appointments   Date Time Provider Department Center   10/3/2024  3:30 PM Henry Dao MD [facility]   10/9/2024 11:00 AM Kervin Jaquez MD [place] [id number] [place]   11/11/2024  2:20 PM Alexandre Macias MD [facility]   11/19/2024 11:40 AM Rivera Grant MD [facility]   12/9/2024  1:00 PM Dona Arteaga, P [facility]   2/10/2025  1:45 PM Yoko Mandel MD [facility]

## 2024-10-03 NOTE — PROGRESS NOTES
"Outpatient Psychiatry Follow-up Visit (MD/NP)    10/3/2024  Sherrill Avery, a 74 y.o. female, presenting for follow-up visit. Met with patient.    Reason for Encounter: Follow-up, chronic anxiety.    Interval History: Patient seen and interviewed for psych reassessment, last seen about eleven months ago.   New grief - grieving brother's death in March of complications of mesothelioma/treatment. Says that her own health is  without any new problems. No new medications. Moods are mildly to moderately anxious. No therapy recently - "too much homework". Adherent to prescribed medication. Takes Xanax prior to procedures & doctor's appointments. From Dr. Macias. Adherent to medication. Denies side effects.     Background: This 67 y/o WF with hx of Renal CA (clear cell) dx'ed in '13, diagnosed with a recurrence in March '17 (spread to lung). Reports no treatment currently recommended. Was prescribed sertraline, ambien, xanax since receiving news of recurrence related to anxiety and sleep problems she's endorsed to other providers. She couldn't tolerate sertraline, has found the others helpful. Says "Cancer stays on my mind all the time". Takes xanax at most once daily, has increased use from about 10/month prior to recurrence to about twice that since. Reports sad less than half the time, generally with good interest & energy, & feels she's participating fully in life. Denies avoidance behaviors. Is participating in spiritual relationship with a renowned nun known for healing & consolation & she's considered their visits very therapeutic. No eating problems. Was prescribed sertraline (couldn't tolerate). She has been intermittently prescribed duloxetine for fibromyalgia, is back on it currently. PsychHx: as above. Xanax back to '13 around time of cancer diagnosis. No hx of suicidal thoughts, self-harm behaviors, paranoia, hallucinations, rebecca, psych hospitalizations. Past psych meds include sertraline (recently), " escitalopram '14, buspirone. MedHx: as above; HTN, fibromyalgia, bursitis, B sensorineural hearing loss. SocialHx: no problems with gestation, birth, infancy or early childhood development. Good student. Normal number of friends. Grew up with in [place] with parents. Lives with ; have 5 children & grandchildren & great-grandchildren. Good terms with kids, friends, neighbors, Mu-ism. Lives in [place].  x 35 years. Supportive relationship. SubstanceHx; rare alcohol, no illicits, no prescription misuse.     Review Of Systems:     GENERAL:  No weight gain or loss  SKIN:  No rashes or lacerations  HEAD:  No headaches  CHEST:  No shortness of breath, hyperventilation or cough  CARDIOVASCULAR:  No tachycardia or chest pain  ABDOMEN:  No nausea, vomiting, pain, constipation or diarrhea  URINARY:  No frequency, dysuria or sexual dysfunction  ENDOCRINE:  No polydipsia, polyuria  MUSCULOSKELETAL:  Joint pain  NEUROLOGIC:  No weakness, sensory changes, seizures, confusion, memory loss, tremor or other abnormal movements    Current Evaluation:     Nutritional Screening: Considering the patient's height and weight, medications, medical history and preferences, should a referral be made to the dietitian? no    Constitutional  Vitals:  Most recent vital signs, dated less than 90 days prior to this appointment, were not reviewed.    There were no vitals filed for this visit.       General:  unremarkable, age appropriate     Musculoskeletal  Muscle Strength/Tone:  no dystonia, no tremor   Gait & Station:  non-ataxic     Psychiatric  Appearance: casually dressed & groomed;   Behavior: calm,   Cooperation: cooperative with assessment  Speech: normal rate, volume, tone  Thought Process: linear, goal-directed  Thought Content: No suicidal or homicidal ideation; no delusions  Affect: tearful  Mood: mildly dysphoric  Perceptions: No auditory or visual hallucinations  Level of Consciousness: alert  throughout interview  Insight: fair  Cognition: Oriented to person, place, time, & situation  Memory: no apparent deficits to general clinical interview; not formally assessed  Attention/Concentration: no apparent deficits to general clinical interview; not formally assessed  Fund of Knowledge: average by vocabulary/education    Laboratory Data  Lab Visit on 09/20/2024   Component Date Value Ref Range Status    Thyroperoxidase Antibodies 09/20/2024 <6.0  <6.0 IU/mL Final   Hospital Outpatient Visit on 09/16/2024   Component Date Value Ref Range Status    Sinus min HR 09/16/2024 63   Final    Sinus max hr 09/16/2024 132   Final    Sinus avg hr 09/16/2024 87   Final   Hospital Outpatient Visit on 09/16/2024   Component Date Value Ref Range Status    85% Max Predicted HR 09/16/2024 124   Final    Max Predicted HR 09/16/2024 146   Final    [facility] CV CPX PATIENT IS MALE 09/16/2024 0.0   Final    [facility] CV CPX PATIENT IS FEMALE 09/16/2024 1.0   Final    HR at rest 09/16/2024 59  bpm Final    Systolic blood pressure 09/16/2024 147  mmHg Final    Diastolic blood pressure 09/16/2024 90  mmHg Final    RPP 09/16/2024 8,673   Final    Peak HR 09/16/2024 97  bpm Final    Peak Systolic BP 09/16/2024 161  mmHg Final    Peak Diatolic BP 09/16/2024 88  mmHg Final    Peak RPP 09/16/2024 15,617   Final    % Max HR Achieved 09/16/2024 69   Final    EF + QEF 09/16/2024 59  % Final    Ejection Fraction- High Stress 09/16/2024 73  % Final    End diastolic index (mL/m2) 09/16/2024 101  mL/m2 Final    End diastolic index (mL/m2) 09/16/2024 165  mL/m2 Final    End systolic index (mL/m2) 09/16/2024 28  mL/m2 Final    End systolic index (mL/m2) 09/16/2024 64  mL/m2 Final    Nuc Rest EF 09/16/2024 79   Final    Nuc Stress EF 09/16/2024 83  % Final         Medications  Outpatient Encounter Medications as of 10/3/2024   Medication Sig Dispense Refill    ALPRAZolam (XANAX) 0.5 MG tablet Take daily as needed for anxiety. 30 tablet 5    azelastine  (ASTELIN) 137 mcg (0.1 %) nasal spray 1 spray (137 mcg total) by Nasal route 2 (two) times daily as needed for Rhinitis. 30 mL 2    brimonidine (MIRVASO) 0.33 % GlwP Apply topically.      buPROPion (WELLBUTRIN XL) 150 MG TB24 tablet Take 1 tablet (150 mg total) by mouth once daily. 90 tablet 1    butalbital-acetaminophen-caffeine -40 mg (FIORICET, ESGIC) -40 mg per tablet TAKE 1 TABLET EVERY 4 HOURS AS NEEDED 30 tablet 0    clotrimazole (LOTRIMIN) 1 % Soln APPLY TOPICALLY 2 TIMES DAILY FOR 7 DAYS 30 mL 2    diazePAM (VALIUM) 5 MG tablet Take 5 mg by mouth.      diclofenac sodium (VOLTAREN) 1 % Gel Apply 2 g topically daily as needed. 100 g 11    diflorasone-emollient (APEXICON E) 0.05 % Crea Apply 1 application topically once daily. for 7 days 30 g 5    docusate sodium (COLACE) 100 MG capsule Take 100 mg by mouth.      estradioL (ESTRACE) 1 MG tablet Take 1 tablet (1 mg total) by mouth once daily. 90 tablet 0    famotidine (PEPCID) 20 MG tablet Take 20 mg by mouth.      fluocinonide (LIDEX) 0.05 % external solution Apply topically 2 (two) times daily. Do not use morning of surgery      hydrocortisone 2.5 % cream Apply topically 2 (two) times daily. apply to affected area for 7 days 20 g 5    ketoconazole (NIZORAL) 2 % shampoo Do not use morning of surgery      levocetirizine (XYZAL) 5 MG tablet Take 1 tablet (5 mg total) by mouth every evening. 90 tablet 3    levothyroxine (SYNTHROID) 112 MCG tablet TAKE 1 TABLET (112 MCG) BY MOUTH BEFORE BREAKFAST AS SCHEDULED 90 tablet 2    metronidazole 1% (METROGEL) 1 % Gel Apply topically once daily. 60 g 5    mometasone (ELOCON) 0.1 % solution Apply topically.      naltrexone (DEPADE) 50 mg tablet 1/4 pill twice daily 15 tablet 0    neomycin-polymyxin-dexamethasone (DEXACINE) 3.5 mg/g-10,000 unit/g-0.1 % Oint Place small amount to affected area three times daily 3.5 g 0    omeprazole (PRILOSEC) 10 MG capsule Take 10 mg by mouth.      ondansetron (ZOFRAN) 4 MG  tablet Take 2 tablets (8 mg total) by mouth every 12 (twelve) hours as needed for Nausea. 20 tablet 2    oxyCODONE-acetaminophen (PERCOCET) 5-325 mg per tablet Take 1 tablet by mouth every 6 (six) hours as needed for Pain. 30 each 0    pravastatin (PRAVACHOL) 20 MG tablet TAKE 1 TABLET EVERY DAY 90 tablet 4    senna (SENOKOT) 8.6 mg tablet Take 1 tablet by mouth.      zolpidem (AMBIEN) 5 MG Tab Take 1 tablet (5 mg total) by mouth nightly as needed. 90 tablet 0    [DISCONTINUED] erythromycin (ROMYCIN) ophthalmic ointment Place into the right eye every evening.       Facility-Administered Encounter Medications as of 10/3/2024   Medication Dose Route Frequency Provider Last Rate Last Admin    triamcinolone acetonide injection 40 mg  40 mg Intra-articular  Osmar Avery III, MD   40 mg at 05/17/22 1345     Assessment - Diagnosis - Goals:     Impression: 74 y/o WF with anxiety disorder with adjustment component disorder first diagnosed in context of uncertainty in cancer treatment outcome. Some intermittent problems with sleep, anxiety related to diagnosis/prognosis, existential/mortality related issues. Gets good benefit from spiritual counseling and is quite satisfied with care when she gets it, not currently in treatment. Hasn't tolerated several monoaminergic meds, gets benefit from prn benzos, doesn't have a daily need for a prn. Tried psychotherapy. New cancer, s/p treatment, getting regular monitoring. Stable, with new grief.     Dx: depression. arlene    Treatment Goals:  Specify outcomes written in observable, behavioral terms:   Comfort and assist in adjustment to diagnosis/treatment/prognosis.     Treatment Plan/Recommendations:     Consider continue bupropion.   To seek new Worship-based therapy.     Takes prn zolpidem, prn alprazolam. Discussed risks, benefits, and alternatives to treatment plan documented above with patient. I answered all patient questions related to this plan and patient expressed  understanding and agreement.     Return to Clinic: 2 months    MICHELLE Asif MD

## 2024-10-08 NOTE — PROGRESS NOTES
Subjective:       Patient ID: Sherrill Avery    Chief Complaint:  Met ccRCC    HPI     Sherrill Avery is a 74 y.o. 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 She is s/p left VATS LLL wedge resection on 1/29/20.     Here to follow-up Currently NOT on therapy. Disease has been indolent.      Saw Dr. Leslie at [facility] recently, scans there were stable.      Sees pain mgmt at [facility]. Recently established with psych onc at the [facility].     She notes a lot of congestion, she is working with ENT.     Recently lost her brother and has had a hard time with grief.       Oncologic History:    Clear cell carcinoma of right kidney.    6/30/2013 Initial Diagnosis   Presented with gross hematuria. CT CAP: 7.2-cm mass occupying the upper two thirds of the right kidney. 2.8-cm mass in the posterior aspect of the urinary bladder c/w TCC.    7/25/2013 Surgery   Right radical nephrectomy Pathology: 7.5-cm clear cell RCC Yanet nuclear grade 2, with microscopic extension into perinephric adipose tissue and with tumor in the renal margin.  pT3a pN0 pMX    10/16/2013 - No Evidence of Disease ([facility])   CT AP: No CT evidence of recurrence or metastatic disease    8/4/2015 - No Evidence of Disease ([facility])   CT AP: No CT evidence of recurrence or metastatic disease    12/1/2016 - [facility] with concern of recurrence   CT AP: Mew less than 4 mm pulmonary nodule in the anterior basal segment of the RLL. Stable 4 mm pulmonary nodule posterior basal segment RLL.     3/1/2017 Relapse/Recurrence   CT Chest: Multiple new subcm pulmonary nodules in the RLL and one in the right middle measuring up to 6 mm suspicious for metastatic disease.     2/8/2019 - [facility] with concern of recurrence   1. Small volume pulmonary metastases are slightly larger compared to the prior study.     2. No recurrent or metastatic disease in the abdomen or pelvis.    2/14/2019 Biopsy   CT-guided biopsy of a 1 cm  left lower lobe lesion   Pathology: Small focus of atypical adenomatous hyperplasia with no tumor present. PAX-8 negative.    2/7/2022 - 3/3/2022 TX-Radiation Therapy   1 RUL x06 VMAT 02/07/2022 02/25/2022 400 cGy 15/15 6000/6000 cGy   1 RLL x06 STEREOTACTIC 02/28/2022 03/03/2022 1250 cGy 4/4 5000cGy    She was initally noted to have spontaneous remission of the lung nodules in the absence of any systemic treatment. However, in 2019, the lung nodules at started to increase in size but were still mostly subcentimeter in greatest dimension. In 2/2019, when the left lower lobe lesion increased to 1.4 cm. IR biopsied a peripheral left lung lesion which was PAX-8 negative thought to be a small focus of atypical adenomatous hyperplasia. We therefore brought her back after only 3 months to monitor those lesions carefully. She was referred to Dr. King who thinks the lesions represent indolent RCC metastases. She underwent resection and RCC was confirmed on pathology at [facility].      Review of Systems   Constitutional: Negative for activity change, appetite change, chills, fatigue, fever and unexpected weight change.   HENT: Negative for congestion, hearing loss, mouth sores, sore throat, tinnitus and voice change.    Eyes: Negative for pain and visual disturbance.   Respiratory: Negative for cough, shortness of breath and wheezing.    Cardiovascular: Negative for chest pain, palpitations and leg swelling.   Gastrointestinal: Negative for abdominal pain, constipation, diarrhea, nausea and vomiting.   Endocrine: Negative for cold intolerance and heat intolerance.   Genitourinary: Negative for difficulty urinating, dyspareunia, dysuria, frequency, menstrual problem, urgency, vaginal bleeding, vaginal discharge and vaginal pain.   Musculoskeletal: Positive for arthralgias. Negative for back pain and myalgias.   Skin: Negative for color change, rash and wound.   Allergic/Immunologic: Negative for environmental allergies and  food allergies.   Neurological: Negative for weakness, numbness and headaches.   Hematological: Negative for adenopathy. Does not bruise/bleed easily.   Psychiatric/Behavioral: Negative for agitation, confusion, hallucinations and sleep disturbance. The patient is nervous/anxious.    All other systems reviewed and are negative.          Allergies:  Review of patient's allergies indicates:   Allergen Reactions    Talwin compound Anxiety     hallucinations/anxiety    Tramadol Itching    Buspirone Anxiety    Codeine Itching    Morphine Itching     jittery    Morpholine analogues Anxiety and Itching    Naproxen Itching     Takes Ibuprofen is ok on rare occasion     Nexium [esomeprazole magnesium] Other (See Comments)     constipation    Wal-phed [pseudoephedrine hcl] Itching       Medications:  Current Outpatient Medications   Medication Sig Dispense Refill    ALPRAZolam (XANAX) 0.5 MG tablet Take daily as needed for anxiety. 30 tablet 5    azelastine (ASTELIN) 137 mcg (0.1 %) nasal spray 1 spray (137 mcg total) by Nasal route 2 (two) times daily as needed for Rhinitis. 30 mL 2    brimonidine (MIRVASO) 0.33 % GlwP Apply topically.      buPROPion (WELLBUTRIN XL) 150 MG TB24 tablet Take 1 tablet (150 mg total) by mouth once daily. 90 tablet 1    butalbital-acetaminophen-caffeine -40 mg (FIORICET, ESGIC) -40 mg per tablet TAKE 1 TABLET EVERY 4 HOURS AS NEEDED 30 tablet 0    clotrimazole (LOTRIMIN) 1 % Soln APPLY TOPICALLY 2 TIMES DAILY FOR 7 DAYS 30 mL 2    diazePAM (VALIUM) 5 MG tablet Take 5 mg by mouth.      diclofenac sodium (VOLTAREN) 1 % Gel Apply 2 g topically daily as needed. 100 g 11    diflorasone-emollient (APEXICON E) 0.05 % Crea Apply 1 application topically once daily. for 7 days 30 g 5    docusate sodium (COLACE) 100 MG capsule Take 100 mg by mouth.      estradioL (ESTRACE) 1 MG tablet Take 1 tablet (1 mg total) by mouth once daily. 90 tablet 0    famotidine (PEPCID) 20 MG tablet Take 20 mg by  mouth.      fluocinonide (LIDEX) 0.05 % external solution Apply topically 2 (two) times daily. Do not use morning of surgery      hydrocortisone 2.5 % cream Apply topically 2 (two) times daily. apply to affected area for 7 days 20 g 5    ketoconazole (NIZORAL) 2 % shampoo Do not use morning of surgery      levocetirizine (XYZAL) 5 MG tablet Take 1 tablet (5 mg total) by mouth every evening. 90 tablet 3    levothyroxine (SYNTHROID) 112 MCG tablet TAKE 1 TABLET (112 MCG) BY MOUTH BEFORE BREAKFAST AS SCHEDULED 90 tablet 2    metronidazole 1% (METROGEL) 1 % Gel Apply topically once daily. 60 g 5    mometasone (ELOCON) 0.1 % solution Apply topically.      naltrexone (DEPADE) 50 mg tablet 1/4 pill twice daily 15 tablet 0    neomycin-polymyxin-dexamethasone (DEXACINE) 3.5 mg/g-10,000 unit/g-0.1 % Oint Place small amount to affected area three times daily 3.5 g 0    omeprazole (PRILOSEC) 10 MG capsule Take 10 mg by mouth.      ondansetron (ZOFRAN) 4 MG tablet Take 2 tablets (8 mg total) by mouth every 12 (twelve) hours as needed for Nausea. 20 tablet 2    oxyCODONE-acetaminophen (PERCOCET) 5-325 mg per tablet Take 1 tablet by mouth every 6 (six) hours as needed for Pain. 30 each 0    pravastatin (PRAVACHOL) 20 MG tablet TAKE 1 TABLET EVERY DAY 90 tablet 4    senna (SENOKOT) 8.6 mg tablet Take 1 tablet by mouth.      zolpidem (AMBIEN) 5 MG Tab Take 1 tablet (5 mg total) by mouth nightly as needed. 90 tablet 0     No current facility-administered medications for this visit.     Facility-Administered Medications Ordered in Other Visits   Medication Dose Route Frequency Provider Last Rate Last Admin    triamcinolone acetonide injection 40 mg  40 mg Intra-articular  Osmar Avery III, MD   40 mg at 05/17/22 1345       PMH:  Past Medical History:   Diagnosis Date    Anxiety     Family history of malignant melanoma 08/25/2014    Fibromyalgia affecting lower leg     GERD (gastroesophageal reflux disease)     Hx of psychiatric  care     Hypercholesteremia     Hypertension     Hypothyroidism     Inflamed seborrheic keratosis 08/25/2014    Microscopic hematuria 02/02/2017    Multiple benign melanocytic nevi 08/25/2014    Renal cell carcinoma of right kidney metastatic to other site     Therapy        PSH:  Past Surgical History:   Procedure Laterality Date    AUGMENTATION OF BREAST      bladder lift      breast implants      BREAST SURGERY Bilateral 1996    saline implants    COLONOSCOPY  2007    HYSTERECTOMY      ectopic pregnancy x 2, with LSO    KNEE ARTHROPLASTY Left 06/14/2021    Procedure: ARTHROPLASTY, KNEE:LEFT:DEPUY-SIGMA;  Surgeon: Osmar Avery III, MD;  Location: [facility] OR;  Service: Orthopedics;  Laterality: Left;    LAPAROSCOPIC NEPHRECTOMY, HAND ASSISTED  07/25/2013    LUNG REMOVAL, PARTIAL Left 2020    At [facility]    NEPHRECTOMY      OOPHORECTOMY Bilateral     RADIOFREQUENCY ABLATION Left 08/30/2022    Procedure: RADIOFREQUENCY ABLATION, LEFT KNEE COOLED;  Surgeon: Vijaya Landin MD;  Location: [facility] PAIN MGT;  Service: Pain Management;  Laterality: Left;    RADIOFREQUENCY ABLATION Right 10/25/2022    Procedure: RADIOFREQUENCY ABLATION RIGHT KNEE GENICULAR COOLED;  Surgeon: Vijaya Landin MD;  Location: [facility] PAIN MGT;  Service: Pain Management;  Laterality: Right;    RADIOFREQUENCY ABLATION Left 03/07/2023    Procedure: RADIOFREQUENCY ABLATION LEFT GENICULAR COOLED RFA;  Surgeon: Vijaya Landin MD;  Location: [facility] PAIN MGT;  Service: Pain Management;  Laterality: Left;    RADIOFREQUENCY ABLATION Right 08/01/2023    Procedure: RADIOFREQUENCY ABLATION, RIGHT GENICULAR COOLED;  Surgeon: Vijaya Landin MD;  Location: [facility] PAIN MGT;  Service: Pain Management;  Laterality: Right;    RADIOFREQUENCY ABLATION Left 12/26/2023    Procedure: RADIOFREQUENCY ABLATION LEFT GENICULAR 1 OF 2;  Surgeon: Vijaya Landin MD;  Location: [facility] PAIN MGT;  Service: Pain Management;  Laterality: Left;  [phone]     RADIOFREQUENCY ABLATION Right 2/6/2024    Procedure: RADIOFREQUENCY ABLATION RIGHT GENICULAR 2 OF 2;  Surgeon: Vijaya Landin MD;  Location: [facility] PAIN MGT;  Service: Pain Management;  Laterality: Right;  [phone]  *after 2/1/24    RADIOFREQUENCY ABLATION Left 7/2/2024    Procedure: RADIOFREQUENCY ABLATION LEFT GENICULAR;  Surgeon: Vijaya Landin MD;  Location: [facility] PAIN MGT;  Service: Pain Management;  Laterality: Left;  [phone]  *SCHEDULE AFTER 6/26    RADIOFREQUENCY ABLATION Right 8/13/2024    Procedure: RADIOFREQUENCY ABLATION RIGHT GENICULAR;  Surgeon: Vijaya Landin MD;  Location: [facility] PAIN MGT;  Service: Pain Management;  Laterality: Right;  [phone]  4 WK F/U MARGARET  *SCHEDULE AFTER 8/6    right ganglion cyst      throat polyp      TRANSOBTURATOR SLING  2011    with RSO for persistent complex cyst & MARGOT; done by yris    UPPER GASTROINTESTINAL ENDOSCOPY  2007       FamHx:  Family History   Problem Relation Name Age of Onset    Diabetes Mother      Hypertension Mother      Heart disease Mother      Cancer Father          lung    Migraines Father      Glaucoma Paternal Grandmother      Glaucoma Sister      Thyroid disease Neg Hx      Asthma Neg Hx         SocHx:  Social History     Socioeconomic History    Marital status:      Spouse name: KAIDEN    Number of children: 5   Occupational History    Occupation: retired     Comment: Dance Instructor   Tobacco Use    Smoking status: Never    Smokeless tobacco: Never   Substance and Sexual Activity    Alcohol use: Yes     Alcohol/week: 0.0 standard drinks of alcohol     Comment: social    Drug use: No    Sexual activity: Yes     Partners: Male     Birth control/protection: Surgical   Social History Narrative    Patient is a retired dance instructor and live with . Has stairs at home 12- has an elevator     Social Drivers of Health     Financial Resource Strain: Low Risk  (5/26/2024)    Overall Financial Resource Strain (CARDIA)      Difficulty of Paying Living Expenses: Not hard at all   Food Insecurity: No Food Insecurity (5/26/2024)    Hunger Vital Sign     Worried About Running Out of Food in the Last Year: Never true     Ran Out of Food in the Last Year: Never true   Transportation Needs: Patient Declined (5/15/2024)    PRAPARE - Transportation     Lack of Transportation (Medical): Patient declined     Lack of Transportation (Non-Medical): Patient declined   Physical Activity: Inactive (5/26/2024)    Exercise Vital Sign     Days of Exercise per Week: 0 days     Minutes of Exercise per Session: 0 min   Stress: Stress Concern Present (5/26/2024)    [employer] [employer] - Occupational Stress Questionnaire     Feeling of Stress : To some extent   Housing Stability: Unknown (5/26/2024)    Housing Stability Vital Sign     Unable to Pay for Housing in the Last Year: No       Objective:        Anesthesia/Surgery risks, benefits and alternative options discussed and understood by patient/family.  LABS:  WBC   Date Value Ref Range Status   08/17/2021 5.58 3.90 - 12.70 K/uL Final     Hemoglobin   Date Value Ref Range Status   08/17/2021 13.8 12.0 - 16.0 g/dL Final     Hematocrit   Date Value Ref Range Status   08/17/2021 42.4 37.0 - 48.5 % Final     Platelets   Date Value Ref Range Status   08/17/2021 215 150 - 450 K/uL Final       Chemistry        Component Value Date/Time     10/05/2022 1015    K 4.2 10/05/2022 1015     10/05/2022 1015    CO2 25 10/05/2022 1015    BUN 12 10/09/2023 1309    BUN 10 10/05/2022 1015    CREATININE 1.00 (H) 10/09/2023 1309    CREATININE 0.8 10/05/2022 1015    GLU 86 10/05/2022 1015        Component Value Date/Time    CALCIUM 8.9 10/09/2023 1309    CALCIUM 8.4 (L) 10/05/2022 1015    ALKPHOS 78 10/05/2022 1015    AST 15 10/05/2022 1015    ALT 14 10/05/2022 1015    BILITOT 0.7 10/05/2022 1015    ESTGFRAFRICA >60 08/17/2021 1135    EGFRNONAA >60 08/17/2021 1135              Assessment:       1.  Carcinoma of kidney, unspecified laterality    2. Clear cell carcinoma    3. Carcinoma of right kidney         Plan:       1. Metastatic ccRCC, very indolent cancer, current off therapy:    Currently on no systemic treatment. S/p wedge resection [facility] and XRT.      Recent scans at [facility] stable.  Plan to continue to monitor.  No systemic therapy unless clear PD on imaging.      RTC for virtual visit to see me in 4 months     The patient agrees with the plan, and all questions have been answered to their satisfaction.      More than 30 mins total time were spent during this encounter.      Kervin Jaquez M.D., M.S., F.A.C.P.  Hematology/Oncology Attending  [facility]            Med Onc Chart Routing      Follow up with physician . RTC for virtual visit to see me in 4 months   Follow up with MARGARET    Infusion scheduling note    Injection scheduling note    Labs    Imaging    Pharmacy appointment    Other referrals                The patient location is: home   The chief complaint leading to consultation is: cancer   Visit type: Virtual visit with synchronous audio and video  Total time spent with patient: 30 mins  Each patient to whom he or she provides medical services by telemedicine is:  (1) informed of the relationship between the physician and patient and the respective role of any other health care provider with respect to management of the patient; and (2) notified that he or she may decline to receive medical services by telemedicine and may withdraw from such care at any time.

## 2024-10-09 ENCOUNTER — OFFICE VISIT (OUTPATIENT)
Dept: HEMATOLOGY/ONCOLOGY | Facility: CLINIC | Age: 74
End: 2024-10-09
Payer: MEDICARE

## 2024-10-09 DIAGNOSIS — C64.1 CARCINOMA OF RIGHT KIDNEY: ICD-10-CM

## 2024-10-09 DIAGNOSIS — C64.9 CARCINOMA OF KIDNEY, UNSPECIFIED LATERALITY: Primary | ICD-10-CM

## 2024-10-09 DIAGNOSIS — C80.1 CLEAR CELL CARCINOMA: ICD-10-CM

## 2024-10-17 ENCOUNTER — PATIENT MESSAGE (OUTPATIENT)
Dept: OTOLARYNGOLOGY | Facility: CLINIC | Age: 74
End: 2024-10-17
Payer: MEDICARE

## 2024-10-21 ENCOUNTER — TELEPHONE (OUTPATIENT)
Dept: ENDOCRINOLOGY | Facility: CLINIC | Age: 74
End: 2024-10-21
Payer: MEDICARE

## 2024-10-21 DIAGNOSIS — E03.9 HYPOTHYROIDISM, UNSPECIFIED TYPE: Primary | ICD-10-CM

## 2024-10-23 ENCOUNTER — LAB VISIT (OUTPATIENT)
Dept: LAB | Facility: HOSPITAL | Age: 74
End: 2024-10-23
Attending: STUDENT IN AN ORGANIZED HEALTH CARE EDUCATION/TRAINING PROGRAM
Payer: MEDICARE

## 2024-10-23 DIAGNOSIS — E03.9 HYPOTHYROIDISM, UNSPECIFIED TYPE: ICD-10-CM

## 2024-10-23 LAB — TSH SERPL DL<=0.005 MIU/L-ACNC: 2.81 UIU/ML (ref 0.4–4)

## 2024-10-23 PROCEDURE — 84443 ASSAY THYROID STIM HORMONE: CPT | Performed by: STUDENT IN AN ORGANIZED HEALTH CARE EDUCATION/TRAINING PROGRAM

## 2024-10-23 PROCEDURE — 36415 COLL VENOUS BLD VENIPUNCTURE: CPT | Mod: PO | Performed by: STUDENT IN AN ORGANIZED HEALTH CARE EDUCATION/TRAINING PROGRAM

## 2024-10-24 ENCOUNTER — OFFICE VISIT (OUTPATIENT)
Dept: PRIMARY CARE CLINIC | Facility: CLINIC | Age: 74
End: 2024-10-24
Payer: MEDICARE

## 2024-10-24 ENCOUNTER — PATIENT MESSAGE (OUTPATIENT)
Dept: OTOLARYNGOLOGY | Facility: CLINIC | Age: 74
End: 2024-10-24
Payer: MEDICARE

## 2024-10-24 ENCOUNTER — PATIENT MESSAGE (OUTPATIENT)
Dept: INTERNAL MEDICINE | Facility: CLINIC | Age: 74
End: 2024-10-24
Payer: MEDICARE

## 2024-10-24 VITALS
TEMPERATURE: 98 F | DIASTOLIC BLOOD PRESSURE: 78 MMHG | OXYGEN SATURATION: 99 % | HEIGHT: 65 IN | HEART RATE: 78 BPM | WEIGHT: 194.56 LBS | SYSTOLIC BLOOD PRESSURE: 138 MMHG | BODY MASS INDEX: 32.42 KG/M2

## 2024-10-24 DIAGNOSIS — H92.01 RIGHT EAR PAIN: Primary | ICD-10-CM

## 2024-10-24 PROCEDURE — 99999 PR PBB SHADOW E&M-EST. PATIENT-LVL V: CPT | Mod: PBBFAC,,, | Performed by: NURSE PRACTITIONER

## 2024-10-24 PROCEDURE — 99215 OFFICE O/P EST HI 40 MIN: CPT | Mod: PBBFAC,PN | Performed by: NURSE PRACTITIONER

## 2024-10-24 PROCEDURE — 99214 OFFICE O/P EST MOD 30 MIN: CPT | Mod: S$PBB,,, | Performed by: NURSE PRACTITIONER

## 2024-10-24 NOTE — PATIENT INSTRUCTIONS
Driss Mccartney,     If you are due for any health screening(s) below please notify me so we can arrange them to be ordered and scheduled. Most healthy patients at your age complete them, but you are free to accept or refuse.     If you can't do it, I'll definitely understand. If you can, I'd certainly appreciate it!    All of your core healthy metrics are met.

## 2024-10-25 NOTE — PROGRESS NOTES
Assessment/Plan:    Problem List Items Addressed This Visit    None  Visit Diagnoses       Right ear pain    -  Primary    Relevant Orders    Ambulatory referral/consult to ENT            No follow-ups on file.    Mercedes Grissom, FRANCOISE  _____________________________________________________________________________________________________________________________________________________    History of Present Illness    CHIEF COMPLAINT:  Ms. Avery presents today for right ear pain.    HISTORY OF PRESENT ILLNESS:  She reports a recent visit to a clinic for an ear issue where a nurse practitioner performed an earwax removal procedure using alligator forceps. The procedure caused pain and bleeding. She denies any dripping from the ear but notes continued bleeding deep within the ear canal since Monday. Today, she checked the ear using a Q-tip and observed a small amount of blood. She expresses concern about the procedure and is seeking further evaluation, potentially by an ear specialist.    PAST MEDICAL HISTORY:  She recalls a similar incident many years ago when she inadvertently scratched her ear, resulting in blood in the back of her ear. Weeks later, when the ear swelled up, she sought medical attention. The treating physician required three weekly sessions to remove the dried blood.    HEARING AID USE:  She wears a hearing aid in the affected ear.          No other new complaints today.  Remaining chronic conditions have been reviewed and remain stable. Further detail as stated above.      reviewed.     No recent changes to medical/surgical history.    Current Outpatient Medications on File Prior to Visit   Medication Sig Dispense Refill    ALPRAZolam (XANAX) 0.5 MG tablet Take daily as needed for anxiety. 30 tablet 5    azelastine (ASTELIN) 137 mcg (0.1 %) nasal spray 1 spray (137 mcg total) by Nasal route 2 (two) times daily as needed for Rhinitis. 30 mL 2    brimonidine (MIRVASO) 0.33 % GlwP Apply topically.       buPROPion (WELLBUTRIN XL) 150 MG TB24 tablet Take 1 tablet (150 mg total) by mouth once daily. 90 tablet 1    butalbital-acetaminophen-caffeine -40 mg (FIORICET, ESGIC) -40 mg per tablet TAKE 1 TABLET EVERY 4 HOURS AS NEEDED 30 tablet 0    clotrimazole (LOTRIMIN) 1 % Soln APPLY TOPICALLY 2 TIMES DAILY FOR 7 DAYS 30 mL 2    diazePAM (VALIUM) 5 MG tablet Take 5 mg by mouth.      diclofenac sodium (VOLTAREN) 1 % Gel Apply 2 g topically daily as needed. 100 g 11    docusate sodium (COLACE) 100 MG capsule Take 100 mg by mouth.      estradioL (ESTRACE) 1 MG tablet Take 1 tablet (1 mg total) by mouth once daily. 90 tablet 0    famotidine (PEPCID) 20 MG tablet Take 20 mg by mouth.      fluocinonide (LIDEX) 0.05 % external solution Apply topically 2 (two) times daily. Do not use morning of surgery      ketoconazole (NIZORAL) 2 % shampoo Do not use morning of surgery      levocetirizine (XYZAL) 5 MG tablet Take 1 tablet (5 mg total) by mouth every evening. 90 tablet 3    levothyroxine (SYNTHROID) 112 MCG tablet TAKE 1 TABLET (112 MCG) BY MOUTH BEFORE BREAKFAST AS SCHEDULED 90 tablet 2    mometasone (ELOCON) 0.1 % solution Apply topically.      naltrexone (DEPADE) 50 mg tablet 1/4 pill twice daily 15 tablet 0    neomycin-polymyxin-dexamethasone (DEXACINE) 3.5 mg/g-10,000 unit/g-0.1 % Oint Place small amount to affected area three times daily 3.5 g 0    omeprazole (PRILOSEC) 10 MG capsule Take 10 mg by mouth.      ondansetron (ZOFRAN) 4 MG tablet Take 2 tablets (8 mg total) by mouth every 12 (twelve) hours as needed for Nausea. 20 tablet 2    oxyCODONE-acetaminophen (PERCOCET) 5-325 mg per tablet Take 1 tablet by mouth every 6 (six) hours as needed for Pain. 30 each 0    pravastatin (PRAVACHOL) 20 MG tablet TAKE 1 TABLET EVERY DAY 90 tablet 4    senna (SENOKOT) 8.6 mg tablet Take 1 tablet by mouth.      diflorasone-emollient (APEXICON E) 0.05 % Crea Apply 1 application topically once daily. for 7 days 30 g 5     "hydrocortisone 2.5 % cream Apply topically 2 (two) times daily. apply to affected area for 7 days 20 g 5    metronidazole 1% (METROGEL) 1 % Gel Apply topically once daily. 60 g 5    zolpidem (AMBIEN) 5 MG Tab Take 1 tablet (5 mg total) by mouth nightly as needed. 90 tablet 0     Current Facility-Administered Medications on File Prior to Visit   Medication Dose Route Frequency Provider Last Rate Last Admin    triamcinolone acetonide injection 40 mg  40 mg Intra-articular  Osmar Avery III, MD   40 mg at 05/17/22 1345       Review of Systems   Constitutional: Negative.    HENT:  Positive for ear pain (right).    Eyes: Negative.    Respiratory: Negative.     Cardiovascular: Negative.    Gastrointestinal: Negative.    Endocrine: Negative.    Genitourinary: Negative.    Musculoskeletal: Negative.    Skin: Negative.    Allergic/Immunologic: Negative.    Neurological: Negative.    Hematological: Negative.    Psychiatric/Behavioral: Negative.         Vitals:    10/24/24 1420   BP: 138/78   Pulse: 78   Temp: 97.7 °F (36.5 °C)   SpO2: 99%   Weight: 88.3 kg (194 lb 8.9 oz)   Height: 5' 5" (1.651 m)       Wt Readings from Last 3 Encounters:   10/24/24 88.3 kg (194 lb 8.9 oz)   09/16/24 86.2 kg (190 lb)   08/13/24 86.2 kg (190 lb)       Physical Exam  Vitals and nursing note reviewed.   Constitutional:       Appearance: She is well-developed.   HENT:      Head: Normocephalic and atraumatic.      Ears:      Comments: Right Ear canal with a scab and small amount of bleeding  Eyes:      Conjunctiva/sclera: Conjunctivae normal.      Pupils: Pupils are equal, round, and reactive to light.   Cardiovascular:      Rate and Rhythm: Normal rate and regular rhythm.      Heart sounds: Normal heart sounds.   Pulmonary:      Effort: Pulmonary effort is normal.      Breath sounds: Normal breath sounds.   Abdominal:      General: Bowel sounds are normal.      Palpations: Abdomen is soft.   Musculoskeletal:         General: Normal range of " motion.      Cervical back: Normal range of motion and neck supple.   Skin:     General: Skin is warm and dry.   Neurological:      Mental Status: She is alert and oriented to person, place, and time.      Deep Tendon Reflexes: Reflexes are normal and symmetric.   Psychiatric:         Behavior: Behavior normal.         Thought Content: Thought content normal.         Judgment: Judgment normal.         Health Maintenance   Topic Date Due    Shingles Vaccine (1 of 2) Never done    TETANUS VACCINE  02/12/2023    Lipid Panel  10/18/2024    Mammogram  12/07/2024    DEXA Scan  11/06/2027    Colorectal Cancer Screening  01/26/2032    Hepatitis C Screening  Completed       This note was generated with the assistance of ambient listening technology. Verbal consent was obtained by the patient and accompanying visitor(s) for the recording of patient appointment to facilitate this note. I attest to having reviewed and edited the generated note for accuracy, though some syntax or spelling errors may persist. Please contact the author of this note for any clarification.

## 2024-11-04 ENCOUNTER — PATIENT MESSAGE (OUTPATIENT)
Dept: OBSTETRICS AND GYNECOLOGY | Facility: CLINIC | Age: 74
End: 2024-11-04
Payer: MEDICARE

## 2024-11-04 DIAGNOSIS — N95.1 MENOPAUSE SYNDROME: ICD-10-CM

## 2024-11-04 RX ORDER — ESTRADIOL 1 MG/1
1 TABLET ORAL DAILY
Qty: 90 TABLET | Refills: 3 | Status: SHIPPED | OUTPATIENT
Start: 2024-11-04

## 2024-11-19 ENCOUNTER — OFFICE VISIT (OUTPATIENT)
Dept: CARDIOLOGY | Facility: CLINIC | Age: 74
End: 2024-11-19
Payer: MEDICARE

## 2024-11-19 ENCOUNTER — OFFICE VISIT (OUTPATIENT)
Dept: INTERNAL MEDICINE | Facility: CLINIC | Age: 74
End: 2024-11-19
Payer: MEDICARE

## 2024-11-19 VITALS
TEMPERATURE: 98 F | HEART RATE: 84 BPM | WEIGHT: 194 LBS | OXYGEN SATURATION: 97 % | HEIGHT: 65 IN | BODY MASS INDEX: 32.32 KG/M2 | DIASTOLIC BLOOD PRESSURE: 64 MMHG | SYSTOLIC BLOOD PRESSURE: 114 MMHG

## 2024-11-19 VITALS
WEIGHT: 192.88 LBS | HEIGHT: 65 IN | BODY MASS INDEX: 32.14 KG/M2 | HEART RATE: 63 BPM | DIASTOLIC BLOOD PRESSURE: 85 MMHG | OXYGEN SATURATION: 96 % | SYSTOLIC BLOOD PRESSURE: 143 MMHG

## 2024-11-19 DIAGNOSIS — F41.1 GENERALIZED ANXIETY DISORDER: Chronic | ICD-10-CM

## 2024-11-19 DIAGNOSIS — C80.1 CLEAR CELL CARCINOMA: ICD-10-CM

## 2024-11-19 DIAGNOSIS — C64.9 CARCINOMA OF KIDNEY, UNSPECIFIED LATERALITY: ICD-10-CM

## 2024-11-19 DIAGNOSIS — K21.9 GASTROESOPHAGEAL REFLUX DISEASE WITHOUT ESOPHAGITIS: ICD-10-CM

## 2024-11-19 DIAGNOSIS — R94.31 NONSPECIFIC ABNORMAL ELECTROCARDIOGRAM (ECG) (EKG): ICD-10-CM

## 2024-11-19 DIAGNOSIS — N18.31 STAGE 3A CHRONIC KIDNEY DISEASE: ICD-10-CM

## 2024-11-19 DIAGNOSIS — E04.2 MULTIPLE THYROID NODULES: Primary | ICD-10-CM

## 2024-11-19 DIAGNOSIS — E03.9 ACQUIRED HYPOTHYROIDISM: ICD-10-CM

## 2024-11-19 DIAGNOSIS — I10 PRIMARY HYPERTENSION: ICD-10-CM

## 2024-11-19 DIAGNOSIS — R06.09 DYSPNEA ON EXERTION: ICD-10-CM

## 2024-11-19 DIAGNOSIS — E66.811 CLASS 1 OBESITY DUE TO EXCESS CALORIES WITH SERIOUS COMORBIDITY AND BODY MASS INDEX (BMI) OF 32.0 TO 32.9 IN ADULT: ICD-10-CM

## 2024-11-19 DIAGNOSIS — Z12.31 ENCOUNTER FOR SCREENING MAMMOGRAM FOR MALIGNANT NEOPLASM OF BREAST: ICD-10-CM

## 2024-11-19 DIAGNOSIS — R06.09 OTHER FORM OF DYSPNEA: Primary | ICD-10-CM

## 2024-11-19 DIAGNOSIS — R91.8 MULTIPLE LUNG NODULES ON CT: Chronic | ICD-10-CM

## 2024-11-19 DIAGNOSIS — I70.0 AORTIC ATHEROSCLEROSIS: ICD-10-CM

## 2024-11-19 DIAGNOSIS — E78.00 HYPERCHOLESTEREMIA: ICD-10-CM

## 2024-11-19 DIAGNOSIS — F51.04 PSYCHOPHYSIOLOGICAL INSOMNIA: ICD-10-CM

## 2024-11-19 DIAGNOSIS — E55.9 VITAMIN D DEFICIENCY: ICD-10-CM

## 2024-11-19 DIAGNOSIS — R00.2 PALPITATIONS: ICD-10-CM

## 2024-11-19 DIAGNOSIS — E66.09 CLASS 1 OBESITY DUE TO EXCESS CALORIES WITH SERIOUS COMORBIDITY AND BODY MASS INDEX (BMI) OF 32.0 TO 32.9 IN ADULT: ICD-10-CM

## 2024-11-19 PROCEDURE — 99214 OFFICE O/P EST MOD 30 MIN: CPT | Mod: PBBFAC | Performed by: INTERNAL MEDICINE

## 2024-11-19 PROCEDURE — 99999 PR PBB SHADOW E&M-EST. PATIENT-LVL IV: CPT | Mod: PBBFAC,,, | Performed by: INTERNAL MEDICINE

## 2024-11-19 PROCEDURE — G2211 COMPLEX E/M VISIT ADD ON: HCPCS | Mod: S$PBB,,, | Performed by: PHYSICIAN ASSISTANT

## 2024-11-19 PROCEDURE — 99215 OFFICE O/P EST HI 40 MIN: CPT | Mod: PBBFAC,27 | Performed by: PHYSICIAN ASSISTANT

## 2024-11-19 PROCEDURE — 99214 OFFICE O/P EST MOD 30 MIN: CPT | Mod: S$PBB,,, | Performed by: INTERNAL MEDICINE

## 2024-11-19 PROCEDURE — 99999 PR PBB SHADOW E&M-EST. PATIENT-LVL V: CPT | Mod: PBBFAC,,, | Performed by: PHYSICIAN ASSISTANT

## 2024-11-19 PROCEDURE — 99214 OFFICE O/P EST MOD 30 MIN: CPT | Mod: S$PBB,,, | Performed by: PHYSICIAN ASSISTANT

## 2024-11-19 PROCEDURE — G2211 COMPLEX E/M VISIT ADD ON: HCPCS | Mod: S$PBB,,, | Performed by: INTERNAL MEDICINE

## 2024-11-19 RX ORDER — ALPRAZOLAM 0.5 MG/1
TABLET ORAL
Qty: 30 TABLET | Refills: 5 | Status: SHIPPED | OUTPATIENT
Start: 2024-11-19

## 2024-11-19 RX ORDER — ERGOCALCIFEROL 1.25 MG/1
50000 CAPSULE ORAL
Qty: 12 CAPSULE | Refills: 1 | Status: SHIPPED | OUTPATIENT
Start: 2024-11-19 | End: 2025-05-06

## 2024-11-19 RX ORDER — BUPROPION HYDROCHLORIDE 300 MG/1
300 TABLET ORAL DAILY
Qty: 90 TABLET | Refills: 3 | Status: SHIPPED | OUTPATIENT
Start: 2024-11-19

## 2024-11-19 RX ORDER — ZOLPIDEM TARTRATE 5 MG/1
5 TABLET ORAL NIGHTLY PRN
Qty: 90 TABLET | Refills: 0 | Status: SHIPPED | OUTPATIENT
Start: 2024-11-19 | End: 2025-05-20

## 2024-11-19 NOTE — PROGRESS NOTES
Subjective:   Patient ID:  Sherrill Avery is a 74 y.o. female who presents for cardiac consult of No chief complaint on file.      Referral by: No referring provider defined for this encounter.     Reason for consult:       HPI  The patient came in today for cardiac consult of No chief complaint on file.      Sherrill Avery is a 74 y.o. female pt with HTN, HLD, obesity, aortic atherosc, renal cell CA, Vit D, GERD, hypothyroidism, OA joints presents for CV eval of SOB.     6/26/2024  BP and Hr stable. BMI 31  She has more MONTESINOS at times - thought she may have PNA she still has SOB. 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     11/19/24  BP mildly elevated.HR 60s. BMI 31    Nuc stress neg for ischemia 9/2024.   Vital monitor neg 9/2024  She had been taking Omeprazole 40mg which caused her to get bloated.     Results for orders placed during the hospital encounter of 09/16/24    Nuclear Stress - Cardiology Interpreted    Interpretation Summary    Normal myocardial perfusion scan. There is no evidence of myocardial ischemia or infarction.    The gated perfusion images showed an ejection fraction of 79% at rest. The gated perfusion images showed an ejection fraction of 83% post stress. Normal ejection fraction is greater than 59%.    The ECG portion of the study is negative for ischemia.    During stress, rare PACs are noted.          Summary  Show Result Comparison     Left Ventricle: The left ventricle is normal in size. Ventricular mass is normal. Normal wall thickness. There is normal systolic function with a visually estimated ejection fraction of 55 - 60%. There is normal diastolic function.    Right Ventricle: Normal right ventricular cavity size. Wall thickness is normal. Systolic function is normal.    IVC/SVC: Normal  venous pressure at 3 mmHg.    Baseline ECG: The Baseline ECG reveals sinus rhythm. The axis is normal. The ST segments are normal.    Stress ECG: There are no ST segment deviation identified during the protocol. During stress, rare PVCs are noted. There is normal blood pressure response with stress.    ECG Conclusion: The ECG portion of the study is negative for ischemia.    Post-stress Impression: The study is negative with no echocardiographic evidence of stress induced ischemia.      Results for orders placed during the hospital encounter of 09/07/21    Echo    Interpretation Summary  · The left ventricle is normal in size with concentric remodeling and normal systolic function. The estimated ejection fraction is 60%.  · Normal left ventricular diastolic function.  · Normal right ventricular size with normal right ventricular systolic function.  · Mild tricuspid regurgitation.  · The estimated PA systolic pressure is 25 mmHg.  · Trivial circumferential pericardial effusion.  · Normal central venous pressure (3 mmHg).      No results found for this or any previous visit.      No results found for this or any previous visit.      Cardiac Monitor - 3-15 Day Adult (Cupid Only)    Result Date: 10/2/2024  Attached at the bottom of this summary is the study interpretation as   provided by the ambulatory cardiac telemetry service provider ([employer]).   The actual submitted strips are posted under the media tab in the   patient's chart. I reviewed them in detail and I agree with the findings   as listed with salient findings, personal comments and edits as listed   below:    Benign study.  No symptoms entered.    Service provider quantitative analysis and interpretation:    The patient was monitored for a total of 5d 17h, underlying rhythm is   Sinus.  The minimum heart rate was 63 bpm; the maximum 132 bpm; the average 87   bpm.  0 % of Atrial fibrillation/Atrial flutter with longest episode of 0 ms.  The total  burden of AV Block present was 0 % [Complete Heart Block: 0 %;   Advanced (High Grade):  0 %; 2nd Degree, Mobitz II: 0 %; 2nd Degree, Mobitz I: 0 %].  There were 0 pauses, the longest pause was 0 ms at --.  Total count of Ventricular Tachycardia (VT): 8 episode(s). Longest VT: 3   beats on Day 4 / 10:37:07  am. Fastest VT: 141 bpm on Day 5 / 06:19:06 pm.  13 supraventricular episodes were found. Longest SVT Episode 9 beats,   Fastest  bpm  There were a total of 5467 PVCs with 2 morphologies and 230 couplets.   Overall PVC Bowman at 0.78  %  There were a total of 0 Other Beats. There were 0 total number of paced   beats.  There were a total of 446 PSVCs with 1 morphologies and 6 couplets.   Overall PSVC Bowman at  0.06 %  There is a total of 0 patient events              Past Medical History:   Diagnosis Date    Anxiety     Family history of malignant melanoma 08/25/2014    Fibromyalgia affecting lower leg     GERD (gastroesophageal reflux disease)     Hx of psychiatric care     Hypercholesteremia     Hypertension     Hypothyroidism     Inflamed seborrheic keratosis 08/25/2014    Microscopic hematuria 02/02/2017    Multiple benign melanocytic nevi 08/25/2014    Renal cell carcinoma of right kidney metastatic to other site     Therapy        Past Surgical History:   Procedure Laterality Date    AUGMENTATION OF BREAST      bladder lift      breast implants      BREAST SURGERY Bilateral 1996    saline implants    COLONOSCOPY  2007    HYSTERECTOMY      ectopic pregnancy x 2, with LSO    KNEE ARTHROPLASTY Left 06/14/2021    Procedure: ARTHROPLASTY, KNEE:LEFT:DEPUY-SIGMA;  Surgeon: Osmar Avery III, MD;  Location: [facility];  Service: Orthopedics;  Laterality: Left;    LAPAROSCOPIC NEPHRECTOMY, HAND ASSISTED  07/25/2013    LUNG REMOVAL, PARTIAL Left 2020    At [facility]    NEPHRECTOMY      OOPHORECTOMY Bilateral     RADIOFREQUENCY ABLATION Left 08/30/2022    Procedure: RADIOFREQUENCY ABLATION, LEFT KNEE COOLED;   Surgeon: Vijaya Landin MD;  Location: [facility];  Service: Pain Management;  Laterality: Left;    RADIOFREQUENCY ABLATION Right 10/25/2022    Procedure: RADIOFREQUENCY ABLATION RIGHT KNEE GENICULAR COOLED;  Surgeon: Vijaya Landin MD;  Location: [facility];  Service: Pain Management;  Laterality: Right;    RADIOFREQUENCY ABLATION Left 03/07/2023    Procedure: RADIOFREQUENCY ABLATION LEFT GENICULAR COOLED RFA;  Surgeon: Vijaya Landin MD;  Location: [facility];  Service: Pain Management;  Laterality: Left;    RADIOFREQUENCY ABLATION Right 08/01/2023    Procedure: RADIOFREQUENCY ABLATION, RIGHT GENICULAR COOLED;  Surgeon: Vijaya Landin MD;  Location: [facility];  Service: Pain Management;  Laterality: Right;    RADIOFREQUENCY ABLATION Left 12/26/2023    Procedure: RADIOFREQUENCY ABLATION LEFT GENICULAR 1 OF 2;  Surgeon: Vijaya Landin MD;  Location: [facility];  Service: Pain Management;  Laterality: Left;  [phone]    RADIOFREQUENCY ABLATION Right 2/6/2024    Procedure: RADIOFREQUENCY ABLATION RIGHT GENICULAR 2 OF 2;  Surgeon: Vijaya Landin MD;  Location: [facility];  Service: Pain Management;  Laterality: Right;  [phone]  *after 2/1/24    RADIOFREQUENCY ABLATION Left 7/2/2024    Procedure: RADIOFREQUENCY ABLATION LEFT GENICULAR;  Surgeon: Vijaya Landin MD;  Location: [facility];  Service: Pain Management;  Laterality: Left;  [phone]  *SCHEDULE AFTER 6/26    RADIOFREQUENCY ABLATION Right 8/13/2024    Procedure: RADIOFREQUENCY ABLATION RIGHT GENICULAR;  Surgeon: Vijaya Landin MD;  Location: [facility];  Service: Pain Management;  Laterality: Right;  [phone]  4 WK F/U MARGARET  *SCHEDULE AFTER 8/6    right ganglion cyst      throat polyp      TRANSOBTURATOR SLING  2011    with RSO for persistent complex cyst & MARGOT; done by [facility]    UPPER GASTROINTESTINAL ENDOSCOPY  2007       Social History     Tobacco Use    Smoking status: Never    Smokeless  tobacco: Never   Substance Use Topics    Alcohol use: Yes     Alcohol/week: 0.0 standard drinks of alcohol     Comment: social    Drug use: No       Family History   Problem Relation Name Age of Onset    Diabetes Mother      Hypertension Mother      Heart disease Mother      Cancer Father          lung    Migraines Father      Glaucoma Paternal Grandmother      Glaucoma Sister      Thyroid disease Neg Hx      Asthma Neg Hx         Patient's Medications   New Prescriptions    No medications on file   Previous Medications    ALPRAZOLAM (XANAX) 0.5 MG TABLET    Take daily as needed for anxiety.    AZELASTINE (ASTELIN) 137 MCG (0.1 %) NASAL SPRAY    1 spray (137 mcg total) by Nasal route 2 (two) times daily as needed for Rhinitis.    BRIMONIDINE (MIRVASO) 0.33 % GLWP    Apply topically.    BUPROPION (WELLBUTRIN XL) 150 MG TB24 TABLET    Take 1 tablet (150 mg total) by mouth once daily.    BUTALBITAL-ACETAMINOPHEN-CAFFEINE -40 MG (FIORICET, ESGIC) -40 MG PER TABLET    TAKE 1 TABLET EVERY 4 HOURS AS NEEDED    CLOTRIMAZOLE (LOTRIMIN) 1 % SOLN    APPLY TOPICALLY 2 TIMES DAILY FOR 7 DAYS    DIAZEPAM (VALIUM) 5 MG TABLET    Take 5 mg by mouth.    DICLOFENAC SODIUM (VOLTAREN) 1 % GEL    Apply 2 g topically daily as needed.    DIFLORASONE-EMOLLIENT (APEXICON E) 0.05 % CREA    Apply 1 application topically once daily. for 7 days    DOCUSATE SODIUM (COLACE) 100 MG CAPSULE    Take 100 mg by mouth.    ESTRADIOL (ESTRACE) 1 MG TABLET    Take 1 tablet (1 mg total) by mouth once daily.    FAMOTIDINE (PEPCID) 20 MG TABLET    Take 20 mg by mouth.    FLUOCINONIDE (LIDEX) 0.05 % EXTERNAL SOLUTION    Apply topically 2 (two) times daily. Do not use morning of surgery    HYDROCORTISONE 2.5 % CREAM    Apply topically 2 (two) times daily. apply to affected area for 7 days    KETOCONAZOLE (NIZORAL) 2 % SHAMPOO    Do not use morning of surgery    LEVOCETIRIZINE (XYZAL) 5 MG TABLET    Take 1 tablet (5 mg total) by mouth every evening.     LEVOTHYROXINE (SYNTHROID) 112 MCG TABLET    TAKE 1 TABLET (112 MCG) BY MOUTH BEFORE BREAKFAST AS SCHEDULED    METRONIDAZOLE 1% (METROGEL) 1 % GEL    Apply topically once daily.    MOMETASONE (ELOCON) 0.1 % SOLUTION    Apply topically.    NALTREXONE (DEPADE) 50 MG TABLET    1/4 pill twice daily    NEOMYCIN-POLYMYXIN-DEXAMETHASONE (DEXACINE) 3.5 MG/G-10,000 UNIT/G-0.1 % OINT    Place small amount to affected area three times daily    OMEPRAZOLE (PRILOSEC) 10 MG CAPSULE    Take 10 mg by mouth.    ONDANSETRON (ZOFRAN) 4 MG TABLET    Take 2 tablets (8 mg total) by mouth every 12 (twelve) hours as needed for Nausea.    OXYCODONE-ACETAMINOPHEN (PERCOCET) 5-325 MG PER TABLET    Take 1 tablet by mouth every 6 (six) hours as needed for Pain.    PRAVASTATIN (PRAVACHOL) 20 MG TABLET    TAKE 1 TABLET EVERY DAY    SENNA (SENOKOT) 8.6 MG TABLET    Take 1 tablet by mouth.    ZOLPIDEM (AMBIEN) 5 MG TAB    Take 1 tablet (5 mg total) by mouth nightly as needed.   Modified Medications    No medications on file   Discontinued Medications    No medications on file       Review of Systems   Constitutional:  Positive for malaise/fatigue.   HENT: Negative.     Eyes: Negative.    Respiratory:  Positive for shortness of breath.    Cardiovascular:  Positive for chest pain and palpitations.   Gastrointestinal: Negative.    Genitourinary: Negative.    Musculoskeletal:  Positive for joint pain.   Skin: Negative.    Neurological: Negative.    Endo/Heme/Allergies: Negative.    Psychiatric/Behavioral: Negative.     All 12 systems otherwise negative.      Wt Readings from Last 3 Encounters:   11/19/24 87.5 kg (192 lb 14.4 oz)   10/24/24 88.3 kg (194 lb 8.9 oz)   09/16/24 86.2 kg (190 lb)     Temp Readings from Last 3 Encounters:   10/24/24 97.7 °F (36.5 °C)   08/13/24 97.4 °F (36.3 °C) (Oral)   07/02/24 98.1 °F (36.7 °C) (Oral)     BP Readings from Last 3 Encounters:   11/19/24 (!) 143/85   10/24/24 138/78   09/16/24 (!) 147/90     Pulse Readings  "from Last 3 Encounters:   11/19/24 63   10/24/24 78   09/16/24 (!) 59       BP (!) 143/85 (BP Location: Left arm, Patient Position: Sitting)   Pulse 63   Ht 5' 5" (1.651 m)   Wt 87.5 kg (192 lb 14.4 oz)   SpO2 96%   BMI 32.10 kg/m²     Objective:   Physical Exam  Vitals and nursing note reviewed.   Constitutional:       General: She is not in acute distress.     Appearance: She is well-developed. She is obese. She is not diaphoretic.   HENT:      Head: Normocephalic and atraumatic.      Nose: Nose normal.   Eyes:      General: No scleral icterus.     Conjunctiva/sclera: Conjunctivae normal.   Neck:      Thyroid: No thyromegaly.      Vascular: No JVD.   Cardiovascular:      Rate and Rhythm: Normal rate and regular rhythm.      Heart sounds: S1 normal and S2 normal. Murmur heard.      No friction rub. No gallop. No S3 or S4 sounds.   Pulmonary:      Effort: Pulmonary effort is normal. No respiratory distress.      Breath sounds: Normal breath sounds. No stridor. No wheezing or rales.   Chest:      Chest wall: No tenderness.   Abdominal:      General: Bowel sounds are normal. There is no distension.      Palpations: Abdomen is soft. There is no mass.      Tenderness: There is no abdominal tenderness. There is no rebound.   Genitourinary:     Comments: Deferred  Musculoskeletal:         General: No tenderness or deformity. Normal range of motion.      Cervical back: Normal range of motion and neck supple.   Lymphadenopathy:      Cervical: No cervical adenopathy.   Skin:     General: Skin is warm and dry.      Coloration: Skin is not pale.      Findings: No erythema or rash.   Neurological:      Mental Status: She is alert and oriented to person, place, and time.      Motor: No abnormal muscle tone.      Coordination: Coordination normal.   Psychiatric:         Behavior: Behavior normal.         Thought Content: Thought content normal.         Judgment: Judgment normal.         Lab Results   Component Value Date    "  10/05/2022    K 4.2 10/05/2022     10/05/2022    CO2 25 10/05/2022    BUN 12 10/09/2023    BUN 10 10/05/2022    CREATININE 1.00 (H) 10/09/2023    CREATININE 0.8 10/05/2022    GLU 86 10/05/2022    HGBA1C 5.2 10/09/2023    HGBA1C 5.3 07/23/2014    AST 15 10/05/2022    ALT 14 10/05/2022    ALBUMIN 3.6 10/05/2022    PROT 6.1 10/05/2022    BILITOT 0.7 10/05/2022    WBC 5.58 08/17/2021    HGB 13.8 08/17/2021    HCT 42.4 08/17/2021    MCV 92 08/17/2021     08/17/2021    INR 0.9 06/08/2021    TSH 2.813 10/23/2024    CHOL 194 10/18/2023    HDL 66 10/18/2023    LDLCALC 108.6 10/18/2023    TRIG 97 10/18/2023    BNP 42 06/05/2021         BNP (pg/mL)   Date Value   06/05/2021 42   02/24/2020 16   04/25/2018 <10   04/25/2017 22     INR (no units)   Date Value   06/08/2021 0.9   04/25/2017 0.9   07/14/2013 0.9          Assessment:      1. Other form of dyspnea    2. Carcinoma of kidney, unspecified laterality    3. Dyspnea on exertion    4. Palpitations    5. Gastroesophageal reflux disease without esophagitis    6. Clear cell carcinoma with lung metastasis    7. Nonspecific abnormal electrocardiogram (ECG) (EKG)          Plan:     MONTESINOS, CP - few times x year with abnormal ECG  - stress ECHO overall neg 6/2024  - coronary atherosc in CT noted  - Nuc stress neg for ischemia 9/2024.   -Vital monitor neg 9/2024  -improved breathing     2. GERD  - cont PPI    3. Palpitations  --Vital monitor neg 9/2024    4. Cell Cell CA - with mets, S/p wedge resection [facility] and XRT.   - f/u heme/onc and pulm    Visit today included increased complexity associated with the care of the episodic problem dyspnea addressed and managing the longitudinal care of the patient due to the serious and/or complex managed problem(s) .      Thank you for allowing me to participate in this patient's care. Please do not hesitate to contact me with any questions or concerns. Consult note has been forwarded to the referral physician.

## 2024-11-19 NOTE — PROGRESS NOTES
"  Subjective:      Patient ID: Sherrill Avery is a 74 y.o. female.    Chief Complaint: Annual Exam    HPI  History of Present Illness    CHIEF COMPLAINT:  Ms. Avery presents today for annual exam/follow-up and medication management.    DEPRESSION AND ANXIETY:  She reports feeling tired all the time and believes she is "in a rut." She requests a Wellbutrin dose increase. She also requests a refill for Xanax, which she takes infrequently but wants available, especially for an upcoming visit to [facility] in January. She expresses anxiety about an upcoming MRI procedure, citing discomfort with the loud noise, long duration, and feeling of claustrophobia. In the past, sedation was used for some MRI scans, which she found more tolerable.    SLEEP ISSUES:  She requests a refill for Ambien, which she takes as needed.    THYROID:  She reports seeing a new endocrinologist virtually. Her thyroid labs and ultrasound results were good, with no mention of new growth.    FOOT PAIN:  She experiences pain and a burning sensation in her foot, particularly upon initial steps. The pain eases after about 5-6 steps but persists at a lower intensity. Her podiatrist diagnosed her with arthritis in her heel.    TMJ ISSUES:  She reports TMJ issues that began last year after starting Invisalign treatment and transitioning to retainers. She experiences pain with jaw movement, particularly when eating or opening her mouth wide. Dental imaging has not revealed a specific cause. She is undergoing a redo of some Invisalign trays and suspects the problem may be related to a new retainer that altered her bite alignment.    REFLUX AND ALLERGY SYMPTOMS:  She reports conflicting diagnoses regarding her symptoms, with specialists attributing them to either reflux or allergies. She continues to take omeprazole 10 mg for reflux, noting that higher doses cause bloating, constipation, and breathing difficulties. She uses azelastine nasal spray twice daily " for allergies, reporting it helps but does not completely resolve her symptoms.    MEDICATIONS AND SUPPLEMENTS:  She inquires about obtaining a vitamin D prescription, recalling a previous episode of low vitamin D levels treated with a high-dose weekly regimen. She continues omeprazole 10 mg for reflux and azelastine nasal spray twice daily for allergies.    LAB WORK:  She reports not having labs done through primary care this year, as specialists have been managing her lab work. She acknowledges that her cholesterol needs to be checked. She recalls being prescribed vitamin D in the past due to low levels, which subsequently improved.    Medications were reviewed        Stress echo and nuclear stress test negative this year.   Mammogram due next month.   Up to date with her specialist.   Lipid due for recheck.   Recommend rsv/shingles vaccine at pharmacy.    Patient Active Problem List   Diagnosis    GERD (gastroesophageal reflux disease)    Hypothyroidism    Primary osteoarthritis involving multiple joints    Fibromyalgia    Actinic degeneration    Dermatofibroma    Bilateral sensorineural hearing loss    Hypertension    Hypercholesteremia    Kidney carcinoma    Neuropathy of left sural nerve-mild    Vitamin D deficiency    Anxiety    Chronic insomnia    Multiple lung nodules on CT    Clear cell carcinoma with lung metastasis    Subacromial bursitis    It band syndrome, left    Chronic pain of both knees    Chondromalacia, patella, right    Chondromalacia, patella, left    Lumbar radiculopathy    Allergic rhinitis    CKD (chronic kidney disease)    Hormone replacement therapy (HRT)    Osteoarthritis of left knee    Chronic knee pain after total replacement of left knee joint    Functional gait abnormality    Neck pain    Primary osteoarthritis of both knees    Primary osteoarthritis of right knee    Leg weakness    Class 1 obesity due to excess calories with serious comorbidity and body mass index (BMI) of 32.0 to  32.9 in adult    Generalized anxiety disorder    Aortic atherosclerosis    Urge incontinence of urine    Multiple thyroid nodules         Current Outpatient Medications:     azelastine (ASTELIN) 137 mcg (0.1 %) nasal spray, 1 spray (137 mcg total) by Nasal route 2 (two) times daily as needed for Rhinitis., Disp: 30 mL, Rfl: 2    brimonidine (MIRVASO) 0.33 % GlwP, Apply topically., Disp: , Rfl:     butalbital-acetaminophen-caffeine -40 mg (FIORICET, ESGIC) -40 mg per tablet, TAKE 1 TABLET EVERY 4 HOURS AS NEEDED, Disp: 30 tablet, Rfl: 0    clotrimazole (LOTRIMIN) 1 % Soln, APPLY TOPICALLY 2 TIMES DAILY FOR 7 DAYS, Disp: 30 mL, Rfl: 2    diazePAM (VALIUM) 5 MG tablet, Take 5 mg by mouth., Disp: , Rfl:     diclofenac sodium (VOLTAREN) 1 % Gel, Apply 2 g topically daily as needed., Disp: 100 g, Rfl: 11    diflorasone-emollient (APEXICON E) 0.05 % Crea, Apply 1 application topically once daily. for 7 days, Disp: 30 g, Rfl: 5    docusate sodium (COLACE) 100 MG capsule, Take 100 mg by mouth., Disp: , Rfl:     estradioL (ESTRACE) 1 MG tablet, Take 1 tablet (1 mg total) by mouth once daily., Disp: 90 tablet, Rfl: 3    famotidine (PEPCID) 20 MG tablet, Take 20 mg by mouth., Disp: , Rfl:     fluocinonide (LIDEX) 0.05 % external solution, Apply topically 2 (two) times daily. Do not use morning of surgery, Disp: , Rfl:     hydrocortisone 2.5 % cream, Apply topically 2 (two) times daily. apply to affected area for 7 days, Disp: 20 g, Rfl: 5    ketoconazole (NIZORAL) 2 % shampoo, Do not use morning of surgery, Disp: , Rfl:     levocetirizine (XYZAL) 5 MG tablet, Take 1 tablet (5 mg total) by mouth every evening., Disp: 90 tablet, Rfl: 3    levothyroxine (SYNTHROID) 112 MCG tablet, TAKE 1 TABLET (112 MCG) BY MOUTH BEFORE BREAKFAST AS SCHEDULED, Disp: 90 tablet, Rfl: 2    metronidazole 1% (METROGEL) 1 % Gel, Apply topically once daily., Disp: 60 g, Rfl: 5    mometasone (ELOCON) 0.1 % solution, Apply topically., Disp: ,  Rfl:     naltrexone (DEPADE) 50 mg tablet, 1/4 pill twice daily, Disp: 15 tablet, Rfl: 0    neomycin-polymyxin-dexamethasone (DEXACINE) 3.5 mg/g-10,000 unit/g-0.1 % Oint, Place small amount to affected area three times daily, Disp: 3.5 g, Rfl: 0    omeprazole (PRILOSEC) 10 MG capsule, Take 10 mg by mouth., Disp: , Rfl:     ondansetron (ZOFRAN) 4 MG tablet, Take 2 tablets (8 mg total) by mouth every 12 (twelve) hours as needed for Nausea., Disp: 20 tablet, Rfl: 2    oxyCODONE-acetaminophen (PERCOCET) 5-325 mg per tablet, Take 1 tablet by mouth every 6 (six) hours as needed for Pain., Disp: 30 each, Rfl: 0    pravastatin (PRAVACHOL) 20 MG tablet, TAKE 1 TABLET EVERY DAY, Disp: 90 tablet, Rfl: 4    senna (SENOKOT) 8.6 mg tablet, Take 1 tablet by mouth., Disp: , Rfl:     ALPRAZolam (XANAX) 0.5 MG tablet, Take daily as needed for anxiety., Disp: 30 tablet, Rfl: 5    buPROPion (WELLBUTRIN XL) 300 MG 24 hr tablet, Take 1 tablet (300 mg total) by mouth once daily., Disp: 90 tablet, Rfl: 3    ergocalciferol (ERGOCALCIFEROL) 50,000 unit Cap, Take 1 capsule (50,000 Units total) by mouth every 7 days., Disp: 12 capsule, Rfl: 1    zolpidem (AMBIEN) 5 MG Tab, Take 1 tablet (5 mg total) by mouth nightly as needed (insomnia)., Disp: 90 tablet, Rfl: 0  No current facility-administered medications for this visit.    Facility-Administered Medications Ordered in Other Visits:     triamcinolone acetonide injection 40 mg, 40 mg, Intra-articular, , Osmar Avery III, MD, 40 mg at 05/17/22 0665    Review of Systems   Constitutional:  Positive for fatigue. Negative for activity change, appetite change, chills, diaphoresis, fever and unexpected weight change.   HENT: Negative.  Negative for congestion, hearing loss, postnasal drip, rhinorrhea, sore throat, trouble swallowing and voice change.    Eyes: Negative.  Negative for discharge and visual disturbance.   Respiratory: Negative.  Negative for cough, choking, chest tightness,  "shortness of breath and wheezing.    Cardiovascular:  Negative for chest pain, palpitations and leg swelling.   Gastrointestinal:  Negative for abdominal distention, abdominal pain, blood in stool, constipation, diarrhea, nausea and vomiting.   Endocrine: Negative for cold intolerance, heat intolerance, polydipsia and polyuria.   Genitourinary: Negative.  Negative for difficulty urinating, dysuria, frequency, hematuria and menstrual problem.   Musculoskeletal:  Positive for arthralgias and myalgias. Negative for back pain, gait problem, joint swelling and neck pain.   Skin:  Negative for color change, pallor, rash and wound.   Neurological:  Negative for dizziness, tremors, weakness, light-headedness, numbness and headaches.   Hematological:  Negative for adenopathy.   Psychiatric/Behavioral:  Positive for dysphoric mood. Negative for behavioral problems, confusion, self-injury, sleep disturbance and suicidal ideas. The patient is not nervous/anxious.      Objective:   /64 (BP Location: Left arm, Patient Position: Sitting)   Pulse 84   Temp 98.1 °F (36.7 °C) (Tympanic)   Ht 5' 5" (1.651 m)   Wt 88 kg (194 lb 0.1 oz)   SpO2 97%   BMI 32.28 kg/m²     Physical Exam  Vitals reviewed.   Constitutional:       General: She is not in acute distress.     Appearance: Normal appearance. She is well-developed. She is not ill-appearing, toxic-appearing or diaphoretic.   HENT:      Head: Normocephalic and atraumatic.      Right Ear: External ear normal.      Left Ear: External ear normal.      Nose: Nose normal.   Eyes:      Conjunctiva/sclera: Conjunctivae normal.      Pupils: Pupils are equal, round, and reactive to light.   Cardiovascular:      Rate and Rhythm: Normal rate and regular rhythm.      Heart sounds: Normal heart sounds. No murmur heard.     No friction rub. No gallop.   Pulmonary:      Effort: Pulmonary effort is normal. No respiratory distress.      Breath sounds: Normal breath sounds. No wheezing or " rales.   Chest:      Chest wall: No tenderness.   Abdominal:      General: There is no distension.      Palpations: Abdomen is soft.      Tenderness: There is no abdominal tenderness.   Musculoskeletal:         General: Normal range of motion.      Cervical back: Normal range of motion and neck supple.   Lymphadenopathy:      Cervical: No cervical adenopathy.   Skin:     General: Skin is warm and dry.      Capillary Refill: Capillary refill takes less than 2 seconds.      Findings: No rash.   Neurological:      Mental Status: She is alert and oriented to person, place, and time.      Motor: No weakness.      Coordination: Coordination normal.      Gait: Gait normal.   Psychiatric:         Mood and Affect: Mood normal.         Behavior: Behavior normal.         Thought Content: Thought content normal.         Judgment: Judgment normal.       Lab Results   Component Value Date    TSH 2.813 10/23/2024     Lab Results   Component Value Date    WBC 5.58 08/17/2021    HGB 13.8 08/17/2021    HCT 42.4 08/17/2021    MCV 92 08/17/2021     08/17/2021       CMP  Sodium   Date Value Ref Range Status   10/05/2022 138 136 - 145 mmol/L Final     Potassium   Date Value Ref Range Status   10/05/2022 4.2 3.5 - 5.1 mmol/L Final     Chloride   Date Value Ref Range Status   10/05/2022 106 95 - 110 mmol/L Final     CO2   Date Value Ref Range Status   10/05/2022 25 23 - 29 mmol/L Final     Glucose   Date Value Ref Range Status   10/05/2022 86 70 - 110 mg/dL Final     BUN   Date Value Ref Range Status   10/05/2022 10 8 - 23 mg/dL Final     Blood Urea Nitrogen   Date Value Ref Range Status   10/09/2023 12 6 - 23 mg/dL Final     Comment:     Testing Performed at [facility], [address] [place] [address], [address], [place] [address]     Creatinine   Date Value Ref Range Status   10/09/2023 1.00 (H) 0.51 - 0.95 mg/dL Final     Comment:     Testing Performed at [facility], [address] [place] [address], [address], [place]  [address]   10/05/2022 0.8 0.5 - 1.4 mg/dL Final     Calcium   Date Value Ref Range Status   10/09/2023 8.9 8.4 - 10.2 mg/dL Final     Comment:     Testing Performed at [facility], [address] [place] [address], [address], [place] [address]   10/05/2022 8.4 (L) 8.7 - 10.5 mg/dL Final     Total Protein   Date Value Ref Range Status   10/05/2022 6.1 6.0 - 8.4 g/dL Final     Albumin   Date Value Ref Range Status   10/05/2022 3.6 3.5 - 5.2 g/dL Final     Total Bilirubin   Date Value Ref Range Status   10/05/2022 0.7 0.1 - 1.0 mg/dL Final     Comment:     For infants and newborns, interpretation of results should be based  on gestational age, weight and in agreement with clinical  observations.    Premature Infant recommended reference ranges:  Up to 24 hours.............<8.0 mg/dL  Up to 48 hours............<12.0 mg/dL  3-5 days..................<15.0 mg/dL  6-29 days.................<15.0 mg/dL       Alkaline Phosphatase   Date Value Ref Range Status   10/05/2022 78 55 - 135 U/L Final     AST   Date Value Ref Range Status   10/05/2022 15 10 - 40 U/L Final     ALT   Date Value Ref Range Status   10/05/2022 14 10 - 44 U/L Final     Anion Gap   Date Value Ref Range Status   10/05/2022 7 (L) 8 - 16 mmol/L Final     eGFR   Date Value Ref Range Status   10/05/2022 >60.0 >60 mL/min/1.73 m^2 Final     Lab Results   Component Value Date    CHOL 194 10/18/2023    CHOL 180 10/05/2022    CHOL 222 (H) 09/30/2021     Lab Results   Component Value Date    HDL 66 10/18/2023    HDL 62 10/05/2022    HDL 66 09/30/2021     Lab Results   Component Value Date    LDLCALC 108.6 10/18/2023    LDLCALC 100.2 10/05/2022    LDLCALC 136.4 09/30/2021     Lab Results   Component Value Date    TRIG 97 10/18/2023    TRIG 89 10/05/2022    TRIG 98 09/30/2021       Lab Results   Component Value Date    CHOLHDL 34.0 10/18/2023    CHOLHDL 34.4 10/05/2022    CHOLHDL 29.7 09/30/2021      Assessment:     1. Multiple thyroid nodules    2. Primary hypertension  "   3. Hypercholesteremia    4. Acquired hypothyroidism    5. Gastroesophageal reflux disease without esophagitis    6. Generalized anxiety disorder    7. Multiple lung nodules on CT    8. Carcinoma of kidney, unspecified laterality    9. Clear cell carcinoma with lung metastasis    10. Stage 3a chronic kidney disease    11. Aortic atherosclerosis    12. Class 1 obesity due to excess calories with serious comorbidity and body mass index (BMI) of 32.0 to 32.9 in adult    13. Vitamin D deficiency    14. Psychophysiological insomnia      Plan:     Increased Wellbutrin dose from 150 mg to 300 mg daily to address depression symptoms and feelings of being "in a rut"  Recommend vitamin D supplementation due to suspected low levels  Advised RSV vaccine due to immunocompromised status and lung concerns  Considered jaw pain as potential side effect of Wellbutrin, particularly with dose increase  Assessed current reflux management as adequate with low-dose omeprazole (10 mg) and as-needed Zazel    Multiple thyroid nodules  -stable on recent imaging    Primary hypertension  -stable and controlled on current meds    Hypercholesteremia  -     Lipid Panel; Future; Expected date: 11/19/2024  -schedule fasting lipid lab    Acquired hypothyroidism  -cont management with new endocrinologist.     Gastroesophageal reflux disease without esophagitis  - Continued omeprazole 10 mg daily for reflux.  - Continued H2 as needed for reflux symptoms.    Generalized anxiety disorder  -     buPROPion (WELLBUTRIN XL) 300 MG 24 hr tablet; Take 1 tablet (300 mg total) by mouth once daily.  Dispense: 90 tablet; Refill: 3  -     ALPRAZolam (XANAX) 0.5 MG tablet; Take daily as needed for anxiety.  Dispense: 30 tablet; Refill: 5  - Explained potential side effects of increased Wellbutrin dose, including tremors and palpitations.  - Increased Wellbutrin from 150 mg to 300 mg daily.  - Follow up virtually in 6 weeks to assess Wellbutrin efficacy and side " effects.  - Contact office if experiencing side effects from Wellbutrin increase, particularly worsening jaw pain.  GENERALIZED ANXIETY DISORDER:  - Refilled Xanax as needed for anxiety.  - checked and appropriate.     Multiple lung nodules on CT  Carcinoma of kidney, unspecified laterality  Clear cell carcinoma with lung metastasis  -up to date with pulm/oncology team    Stage 3a chronic kidney disease    Aortic atherosclerosis  -stable on appropriate meds    Class 1 obesity due to excess calories with serious comorbidity and body mass index (BMI) of 32.0 to 32.9 in adult  -weight stable.     Vitamin D deficiency  -     Vitamin D; Future; Expected date: 11/19/2024  -     ergocalciferol (ERGOCALCIFEROL) 50,000 unit Cap; Take 1 capsule (50,000 Units total) by mouth every 7 days.  Dispense: 12 capsule; Refill: 1  - Started vitamin D 50,000 IU weekly for 3 months.  - Ordered vitamin D level test.    Psychophysiological insomnia  -     zolpidem (AMBIEN) 5 MG Tab; Take 1 tablet (5 mg total) by mouth nightly as needed (insomnia).  Dispense: 90 tablet; Refill: 0  -refilled.  checked and appropriate for refill.       - checked and appropriate for refills.     -virtual in 6 weeks to see how doing on increased dose of wellbutrin.   -6 months with Dr. Macias.     PLANTAR FASCIAL FIBROMATOSIS:  - Discussed plantar fasciitis as possible cause of heel pain, showing anatomical diagram and explaining inflammation mechanism.  - Ms. Avery to wear plantar fasciitis splint at night to alleviate heel pain.  - Recommend rolling frozen water bottle under foot for additional relief of heel pain.      ALLERGIC RHINITIS:  - Continued nasal spray twice daily for allergies.    SLEEP DISORDER:  - Refilled Ambien as needed for sleep.    GENERAL HEALTH MONITORING:  - Ordered fasting cholesterol lab test.    FOLLOW-UP:  - Follow up in-person with Dr. Macias in 6 months.       Follow up in about 6 weeks (around 12/31/2024), or if symptoms  worsen or fail to improve.

## 2024-11-21 ENCOUNTER — LAB VISIT (OUTPATIENT)
Dept: LAB | Facility: HOSPITAL | Age: 74
End: 2024-11-21
Attending: PHYSICIAN ASSISTANT
Payer: MEDICARE

## 2024-11-21 DIAGNOSIS — E78.00 HYPERCHOLESTEREMIA: ICD-10-CM

## 2024-11-21 DIAGNOSIS — E55.9 VITAMIN D DEFICIENCY: ICD-10-CM

## 2024-11-21 LAB
CHOLEST SERPL-MCNC: 189 MG/DL (ref 120–199)
CHOLEST/HDLC SERPL: 2.9 {RATIO} (ref 2–5)
HDLC SERPL-MCNC: 66 MG/DL (ref 40–75)
HDLC SERPL: 34.9 % (ref 20–50)
LDLC SERPL CALC-MCNC: 104 MG/DL (ref 63–159)
NONHDLC SERPL-MCNC: 123 MG/DL
TRIGL SERPL-MCNC: 95 MG/DL (ref 30–150)

## 2024-11-21 PROCEDURE — 80061 LIPID PANEL: CPT | Performed by: PHYSICIAN ASSISTANT

## 2024-11-21 PROCEDURE — 36415 COLL VENOUS BLD VENIPUNCTURE: CPT | Mod: PO | Performed by: PHYSICIAN ASSISTANT

## 2024-11-21 PROCEDURE — 82306 VITAMIN D 25 HYDROXY: CPT | Performed by: PHYSICIAN ASSISTANT

## 2024-11-22 LAB — 25(OH)D3+25(OH)D2 SERPL-MCNC: 19 NG/ML (ref 30–96)

## 2024-12-04 DIAGNOSIS — N18.9 CKD (CHRONIC KIDNEY DISEASE): ICD-10-CM

## 2024-12-09 ENCOUNTER — OFFICE VISIT (OUTPATIENT)
Dept: PAIN MEDICINE | Facility: CLINIC | Age: 74
End: 2024-12-09
Payer: MEDICARE

## 2024-12-09 ENCOUNTER — PATIENT MESSAGE (OUTPATIENT)
Dept: PAIN MEDICINE | Facility: OTHER | Age: 74
End: 2024-12-09
Payer: MEDICARE

## 2024-12-09 DIAGNOSIS — M25.561 CHRONIC PAIN OF RIGHT KNEE: Primary | ICD-10-CM

## 2024-12-09 DIAGNOSIS — G89.29 CHRONIC PAIN OF RIGHT KNEE: Primary | ICD-10-CM

## 2024-12-09 DIAGNOSIS — G89.4 CHRONIC PAIN SYNDROME: ICD-10-CM

## 2024-12-09 PROCEDURE — 99213 OFFICE O/P EST LOW 20 MIN: CPT | Mod: 95,,, | Performed by: NURSE PRACTITIONER

## 2024-12-09 NOTE — PROGRESS NOTES
Chronic Pain-Tele-Medicine-Established Note (Follow up visit)        The patient location is: Home  The chief complaint leading to consultation is: pain  Visit type: Virtual visit with synchronous audio and video  Total time spent with patient: 15 min  Each patient to whom he or she provides medical services by telemedicine is:  (1) informed of the relationship between the physician and patient and the respective role of any other health care provider with respect to management of the patient; and (2) notified that he or she may decline to receive medical services by telemedicine and may withdraw from such care at any time.      PCP: Alexandre Macias MD    REFERRING PHYSICIAN: No ref. provider found    CHIEF COMPLAINT: Bilateral knee pain    Original HISTORY OF PRESENT ILLNESS: Sherrill Avery w/ pmh of HTN, and renal cell carcinoma (kidney removed in 2013) s/p radiation in February 2022 due to METs which appeared in 2017 who presents to the clinic for the evaluation of the above pain. She had a knee replacement in June 2021, and has continued to have pain since the procedure. She states when she sits for an extended period of time and stands back up she experiences the pain around the bottom to lateral edge of her knee cap. She has a swollen area just below the L knee as well. She currently denies CP, SOB, vision changes, or speech changes.    Original Pain Description:  The pain is located in the L knee and does not radiate. The pain is described as sharp and stinging . Exacerbating factors: Sitting, Standing, Laying, Night Time, Getting out of bed/chair and going up stairs. Mitigating factors massage, medications, physical therapy, rest and sitting. Symptoms interfere with daily activity and sleeping. The patient feels like symptoms have been unchanged. Patient denies night fever/night sweats, urinary incontinence, bowel incontinence and significant motor weakness.    Original PAIN SCORES:  Best: Pain is 0  Worst:  Pain is 6  Usually: Pain is 4  Current: Pain is 2    INTERVAL HISTORY:       Interval History 9/21/22:  Mrs. Avery returns to clinic after left genicular RFA on 8/30/22. She reports significant pain relief and she is able to stand from a seated position and walk more comfortably. She is happy with the results and would like to have the same procedure on her right knee. She has had right knee pain for years and she has received multiple steroid injections which only provide about 2 weeks of pain relief. Pain is exacerbated by stairs, walking, and rising from a chair.  She denies any new weakness and numbness/tingling.     Interval History 12/13/2022:  The patient has a virtual follow up for right knee pain. She is now s/p right genicular RFA on 10/25/22 with 80% relief. She is still having benefit from left knee RFA. She is also reporting lower back pain . The pain is sharp in nature. It does not radiate. She has pain when she stands for a long time. She takes Gabapentin regularly with some benefit. She has completed PT in the past with some benefit. She did have a lumbar MRI in 2015.    Interval History 1/24/2023:  The patient returns for follow up of chronic bilateral knee and lower back pain. She has been having more left knee pain recently. She had genicular RFA 8/30/22 with significant benefit. However, she feels like the pain has started to return. She would like to repeat when possible. She still has some back pain with intermittent radiation across the back. She is taking Gabapentin 900 mg daily with some benefit and no side effects. Since previous encounter, she had a recent oncology 3 month follow up at at [facility]. She has a spot to the back and two to the lung which are being watched. She has a follow up in 3 months again. Her pain today is 4/10.    Interval History 4/4/2023:  The patient has a virtual follow up for bilateral knee pain. She is now s/p left genicular RFA with 80% relief. Her left knee  pain has significantly improved. She is feeling more pain on the right knee and lower back recently. She previously had excellent relief for right knee pain from RFA for 5 months. She would like to repeat the procedure. She is following up with [facility] next week to discuss back pain and possible radiation treatment. She stopped Gabapentin due to memory issues but her pain worsened. She restarted this week with one daily.    Interval History 6/13/2023:  The patient returns for virtual follow up for worsened knee pain, R>L. There was difficulty with her connection and the latter part of the visit was converted to audio. She has been having more sharp and aching right knee pain over the past month. It bothers her with standing and going down stairs. She has had benefit from both left and right genicular RFAs in the past. She would like to repeat the right knee RFA. She has tried stretching, PT exercises, ice and heat without benefit. She is also reporting right hand pain. No history or trauma. She would like an orthopedic referral. Her pain today is 6/10.    Interval History 12/8/2023:  The patient has a virtual appointment to discuss worsened bilateral knee pain, L>R. She has had significant benefit with genicular RFAs in the past and wishes to repeat. The pain bothers her more with standing and walking. She has some benefit with rest. Pain today is 7/10.    Interval History 6/11/2024:  The patient returns for a virtual visit today to discuss knee pain.     Interval History 9/10/2024:  The patient is has a virtual follow up for bilateral knee pain. She underwent left genicular RFA on 7/2/24 with 80% relief of left knee pain. She is also s/p right genicular RFA on 8/13/24. She is not sure how much benefit she has had for the right knee at this time. She is not interested in surgical referral at this time. Her pain is 6/10.    Interval History 12/9/2024:  The patient has a virtual follow up for knee pain. She says  that her left knee pain has been tolerable. She has been having more right knee pain recently. She previously had genicular RFA on 8/13/24 with 80% relief until recently. The pain has been returning recently. It is bothering her and affecting her activity to complete ADLs. It worsens with standing and walking. Her pain today is 8/10.    6 weeks of Conservative therapy (PT/Chiro/Home Exercises with Dates)  PT July 2021-September 2021  PT November 2021-December 2021  PT April 2022-May 2022   12/26/23 Left genicular RFA- 80% relief  2/6/24 Right genicular RFA- 80% relief  7/2/24 Left genicular RFA- 80% relief  8/13/24 Right genicular RFA- 80% relief    Treatments / Medications: (Ice/Heat/NSAIDS/APAP/etc):  Ice  Heat  Massage  PT  Voltaren Gel  Gabapentin     Report: N/A      Past Medical History:   Diagnosis Date    Anxiety     Family history of malignant melanoma 08/25/2014    Fibromyalgia affecting lower leg     GERD (gastroesophageal reflux disease)     Hx of psychiatric care     Hypercholesteremia     Hypertension     Hypothyroidism     Inflamed seborrheic keratosis 08/25/2014    Microscopic hematuria 02/02/2017    Multiple benign melanocytic nevi 08/25/2014    Renal cell carcinoma of right kidney metastatic to other site     Therapy      Past Surgical History:   Procedure Laterality Date    AUGMENTATION OF BREAST      bladder lift      breast implants      BREAST SURGERY Bilateral 1996    saline implants    COLONOSCOPY  2007    HYSTERECTOMY      ectopic pregnancy x 2, with LSO    KNEE ARTHROPLASTY Left 06/14/2021    Procedure: ARTHROPLASTY, KNEE:LEFT:DEPUY-SIGMA;  Surgeon: Osmar Avery III, MD;  Location: [facility];  Service: Orthopedics;  Laterality: Left;    LAPAROSCOPIC NEPHRECTOMY, HAND ASSISTED  07/25/2013    LUNG REMOVAL, PARTIAL Left 2020    At [facility]    NEPHRECTOMY      OOPHORECTOMY Bilateral     RADIOFREQUENCY ABLATION Left 08/30/2022    Procedure: RADIOFREQUENCY ABLATION, LEFT KNEE COOLED;   Surgeon: Vijaya Landin MD;  Location: [facility];  Service: Pain Management;  Laterality: Left;    RADIOFREQUENCY ABLATION Right 10/25/2022    Procedure: RADIOFREQUENCY ABLATION RIGHT KNEE GENICULAR COOLED;  Surgeon: Vijaya Landin MD;  Location: [facility];  Service: Pain Management;  Laterality: Right;    RADIOFREQUENCY ABLATION Left 03/07/2023    Procedure: RADIOFREQUENCY ABLATION LEFT GENICULAR COOLED RFA;  Surgeon: Vijaya Landin MD;  Location: [facility];  Service: Pain Management;  Laterality: Left;    RADIOFREQUENCY ABLATION Right 08/01/2023    Procedure: RADIOFREQUENCY ABLATION, RIGHT GENICULAR COOLED;  Surgeon: Vijaya Landin MD;  Location: [facility];  Service: Pain Management;  Laterality: Right;    RADIOFREQUENCY ABLATION Left 12/26/2023    Procedure: RADIOFREQUENCY ABLATION LEFT GENICULAR 1 OF 2;  Surgeon: Vijaya Landin MD;  Location: [facility];  Service: Pain Management;  Laterality: Left;  [phone]    RADIOFREQUENCY ABLATION Right 2/6/2024    Procedure: RADIOFREQUENCY ABLATION RIGHT GENICULAR 2 OF 2;  Surgeon: Vijaya Landin MD;  Location: [facility];  Service: Pain Management;  Laterality: Right;  [phone]  *after 2/1/24    RADIOFREQUENCY ABLATION Left 7/2/2024    Procedure: RADIOFREQUENCY ABLATION LEFT GENICULAR;  Surgeon: Vijaya Landin MD;  Location: [facility];  Service: Pain Management;  Laterality: Left;  [phone]  *SCHEDULE AFTER 6/26    RADIOFREQUENCY ABLATION Right 8/13/2024    Procedure: RADIOFREQUENCY ABLATION RIGHT GENICULAR;  Surgeon: Vijaya Landin MD;  Location: [facility];  Service: Pain Management;  Laterality: Right;  [phone]  4 WK F/U MARGARET  *SCHEDULE AFTER 8/6    right ganglion cyst      throat polyp      TRANSOBTURATOR SLING  2011    with RSO for persistent complex cyst & MARGOT; done by [facility]    UPPER GASTROINTESTINAL ENDOSCOPY  2007     Social History     Socioeconomic History    Marital status:       Spouse name: KAIDEN    Number of children: 5   Occupational History    Occupation: retired     Comment: Dance Instructor   Tobacco Use    Smoking status: Never    Smokeless tobacco: Never   Substance and Sexual Activity    Alcohol use: Yes     Alcohol/week: 0.0 standard drinks of alcohol     Comment: social    Drug use: No    Sexual activity: Yes     Partners: Male     Birth control/protection: Surgical   Social History Narrative    Patient is a retired dance instructor and live with . Has stairs at home 12- has an elevator     Social Drivers of Health     Financial Resource Strain: Low Risk  (5/26/2024)    Overall Financial Resource Strain (CARDIA)     Difficulty of Paying Living Expenses: Not hard at all   Food Insecurity: No Food Insecurity (5/26/2024)    Hunger Vital Sign     Worried About Running Out of Food in the Last Year: Never true     Ran Out of Food in the Last Year: Never true   Transportation Needs: Patient Declined (5/15/2024)    PRAPARE - Transportation     Lack of Transportation (Medical): Patient declined     Lack of Transportation (Non-Medical): Patient declined   Physical Activity: Inactive (5/26/2024)    Exercise Vital Sign     Days of Exercise per Week: 0 days     Minutes of Exercise per Session: 0 min   Stress: Stress Concern Present (5/26/2024)    [place] [employer] - Occupational Stress Questionnaire     Feeling of Stress : To some extent   Housing Stability: Unknown (5/26/2024)    Housing Stability Vital Sign     Unable to Pay for Housing in the Last Year: No     Family History   Problem Relation Name Age of Onset    Diabetes Mother      Hypertension Mother      Heart disease Mother      Cancer Father          lung    Migraines Father      Glaucoma Paternal Grandmother      Glaucoma Sister      Thyroid disease Neg Hx      Asthma Neg Hx         Review of patient's allergies indicates:   Allergen Reactions    Talwin compound Anxiety     hallucinations/anxiety     Tramadol Itching    Buspirone Anxiety    Codeine Itching    Morphine Itching     jittery    Morpholine analogues Anxiety and Itching    Naproxen Itching     Takes Ibuprofen is ok on rare occasion     Nexium [esomeprazole magnesium] Other (See Comments)     constipation    Wal-phed [pseudoephedrine hcl] Itching       Current Outpatient Medications   Medication Sig    ALPRAZolam (XANAX) 0.5 MG tablet Take daily as needed for anxiety.    azelastine (ASTELIN) 137 mcg (0.1 %) nasal spray 1 spray (137 mcg total) by Nasal route 2 (two) times daily as needed for Rhinitis.    brimonidine (MIRVASO) 0.33 % GlwP Apply topically.    buPROPion (WELLBUTRIN XL) 300 MG 24 hr tablet Take 1 tablet (300 mg total) by mouth once daily.    butalbital-acetaminophen-caffeine -40 mg (FIORICET, ESGIC) -40 mg per tablet TAKE 1 TABLET EVERY 4 HOURS AS NEEDED    clotrimazole (LOTRIMIN) 1 % Soln APPLY TOPICALLY 2 TIMES DAILY FOR 7 DAYS    diazePAM (VALIUM) 5 MG tablet Take 5 mg by mouth.    diclofenac sodium (VOLTAREN) 1 % Gel Apply 2 g topically daily as needed.    diflorasone-emollient (APEXICON E) 0.05 % Crea Apply 1 application topically once daily. for 7 days    docusate sodium (COLACE) 100 MG capsule Take 100 mg by mouth.    ergocalciferol (ERGOCALCIFEROL) 50,000 unit Cap Take 1 capsule (50,000 Units total) by mouth every 7 days.    estradioL (ESTRACE) 1 MG tablet Take 1 tablet (1 mg total) by mouth once daily.    famotidine (PEPCID) 20 MG tablet Take 20 mg by mouth.    fluocinonide (LIDEX) 0.05 % external solution Apply topically 2 (two) times daily. Do not use morning of surgery    hydrocortisone 2.5 % cream Apply topically 2 (two) times daily. apply to affected area for 7 days    ketoconazole (NIZORAL) 2 % shampoo Do not use morning of surgery    levocetirizine (XYZAL) 5 MG tablet Take 1 tablet (5 mg total) by mouth every evening.    levothyroxine (SYNTHROID) 112 MCG tablet TAKE 1 TABLET (112 MCG) BY MOUTH BEFORE BREAKFAST AS  SCHEDULED    metronidazole 1% (METROGEL) 1 % Gel Apply topically once daily.    naltrexone (DEPADE) 50 mg tablet 1/4 pill twice daily    neomycin-polymyxin-dexamethasone (DEXACINE) 3.5 mg/g-10,000 unit/g-0.1 % Oint Place small amount to affected area three times daily    omeprazole (PRILOSEC) 10 MG capsule Take 10 mg by mouth.    ondansetron (ZOFRAN) 4 MG tablet Take 2 tablets (8 mg total) by mouth every 12 (twelve) hours as needed for Nausea.    oxyCODONE-acetaminophen (PERCOCET) 5-325 mg per tablet Take 1 tablet by mouth every 6 (six) hours as needed for Pain.    pravastatin (PRAVACHOL) 20 MG tablet TAKE 1 TABLET EVERY DAY    senna (SENOKOT) 8.6 mg tablet Take 1 tablet by mouth.    zolpidem (AMBIEN) 5 MG Tab Take 1 tablet (5 mg total) by mouth nightly as needed (insomnia).     No current facility-administered medications for this visit.     Facility-Administered Medications Ordered in Other Visits   Medication    triamcinolone acetonide injection 40 mg       ROS:  GENERAL: No fever. No chills. No fatigue. Denies weight loss. Denies weight gain.  HEENT: Denies headaches. Denies vision change. Denies eye pain. Denies double vision. Denies ear pain.   CV: Denies chest pain.   PULM: Denies of shortness of breath.  GI: Denies constipation. No diarrhea. No abdominal pain. Denies nausea. Denies vomiting. No blood in stool.  HEME: Denies bleeding problems.  : Denies urgency. No painful urination. No blood in urine.  MS: Denies joint stiffness. Denies joint swelling.  Denies back pain.  SKIN: Denies rash.   NEURO: Denies seizures. No weakness.  PSYCH:  Denies difficulty sleeping. No anxiety. Denies depression. No suicidal thoughts.     PHYSICAL EXAMINATION:    General appearance: Well appearing, in no acute distress, alert and oriented x3.  Psych:  Mood and affect appropriate.  Skin: Skin color normal, no rashes or lesions, in both upper and lower body.  Extremities: Moves all visualized extremities freely.    PREVIOUS  PHYSICAL EXAM:   GENERAL: Well appearing, in no acute distress, alert and oriented x3.  PSYCH:  Mood and affect appropriate.  SKIN: Normal turgor and coloration.  HEENT:  Normocephalic, atraumatic. Cranial nerves grossly intact.  NECK: No pain to palpation over the cervical paraspinous muscles. No pain to palpation over facets. No pain with neck flexion, extension, or lateral flexion.   PULM: No evidence of respiratory difficulty, symmetric chest rise.  GI:  Non-distended  BACK: Normal range of motion. No pain to palpation over the spinous processes. No pain to palpation over facet joints. There is no pain with palpation over the sacroiliac joints bilaterally. Straight leg raising in the supine position is negative to radicular pain.   EXTREMITIES: +ttp left medial joint line and pain with patella ROM. Swollen 3 x 3 inch area just below L patella.   MUSCULOSKELETAL:  Bilateral lower extremity strength is normal and symmetric, with some pain limitation with movement of L knee joint. No atrophy is noted.  NEURO: No change in sensation.  GAIT: Antalgic and slow.    IMAGING:      X- Ray knee ortho BL 5/17/22    Narrative & Impression  EXAMINATION:  XR KNEE ORTHO BILAT WITH FLEXION     CLINICAL HISTORY:  Presence of left artificial knee joint     TECHNIQUE:  AP standing of both knees, PA flexion standing views of both knees, and Merchant views of both knees were performed.  Lateral views of both knees were also performed.     COMPARISON:  No 07/27/2021 ne     FINDINGS:  Left knee arthroplasty identified.  The position and alignment is satisfactory and unchanged as compared to the previous study.     DJD involving the right knee with narrowing of the patellofemoral and the medial tibiofemoral joint space.  No fracture or bone destruction identified     Impression:  Patient with symptoms, imaging, and PE consistent with:    1. Chronic pain of right knee  Procedure Order to Pain Management      2. Chronic pain syndrome             ASSESSMENT: 74 y.o. year old female  with bilateral knee pain.     PLAN:  Schedule for repeat right genicular cooled RFAs. Previous provided 80% relief and pain has been returning. Will plan for after 2/13/25. Left knee is tolerable at this time.  Continue current medications.  She may pursue TKA in the future.  The patient will continue a home exercise routine to help with pain and strengthening.    RTC 4 weeks after RFA.      The above plan and management options were discussed at length with patient. Patient is in agreement with the above and verbalized understanding.     SAMAN Solomon  12/09/2024

## 2024-12-19 ENCOUNTER — PATIENT MESSAGE (OUTPATIENT)
Dept: INTERNAL MEDICINE | Facility: CLINIC | Age: 74
End: 2024-12-19
Payer: MEDICARE

## 2024-12-19 DIAGNOSIS — R51.9 HEADACHE, UNSPECIFIED HEADACHE TYPE: ICD-10-CM

## 2024-12-20 NOTE — TELEPHONE ENCOUNTER
Refill Routing Note   Medication(s) are not appropriate for processing by [facility] for the following reason(s):        Outside of protocol    [facility] action(s):  Route               Appointments  past 12m or future 3m with PCP    Date Provider   Last Visit   5/6/2024 Alexandre Macias MD   Next Visit   5/19/2025 Alexandre Macias MD   ED visits in past 90 days: 0        Note composed:6:22 PM 12/19/2024

## 2024-12-24 RX ORDER — BUTALBITAL, ACETAMINOPHEN AND CAFFEINE 50; 325; 40 MG/1; MG/1; MG/1
TABLET ORAL
Qty: 30 TABLET | Refills: 0 | Status: SHIPPED | OUTPATIENT
Start: 2024-12-24

## 2025-01-06 ENCOUNTER — OFFICE VISIT (OUTPATIENT)
Dept: INTERNAL MEDICINE | Facility: CLINIC | Age: 75
End: 2025-01-06
Payer: MEDICARE

## 2025-01-06 ENCOUNTER — PATIENT MESSAGE (OUTPATIENT)
Dept: INTERNAL MEDICINE | Facility: CLINIC | Age: 75
End: 2025-01-06

## 2025-01-06 DIAGNOSIS — M72.2 PLANTAR FASCIITIS: Primary | ICD-10-CM

## 2025-01-06 DIAGNOSIS — C64.9 CARCINOMA OF KIDNEY, UNSPECIFIED LATERALITY: ICD-10-CM

## 2025-01-06 DIAGNOSIS — C80.1 CLEAR CELL CARCINOMA: ICD-10-CM

## 2025-01-06 DIAGNOSIS — E03.9 ACQUIRED HYPOTHYROIDISM: ICD-10-CM

## 2025-01-06 DIAGNOSIS — F51.04 CHRONIC INSOMNIA: ICD-10-CM

## 2025-01-06 DIAGNOSIS — R41.3 POOR SHORT TERM MEMORY: ICD-10-CM

## 2025-01-06 DIAGNOSIS — N18.31 STAGE 3A CHRONIC KIDNEY DISEASE: ICD-10-CM

## 2025-01-06 DIAGNOSIS — E55.9 VITAMIN D DEFICIENCY: ICD-10-CM

## 2025-01-06 DIAGNOSIS — F41.1 GENERALIZED ANXIETY DISORDER: Chronic | ICD-10-CM

## 2025-01-06 NOTE — PROGRESS NOTES
The patient location is: [place]  The chief complaint leading to consultation is: 1 month follow up    Visit type: audiovisual    Face to Face time with patient: 11 min  13 minutes of total time spent on the encounter, which includes face to face time and non-face to face time preparing to see the patient (eg, review of tests), Obtaining and/or reviewing separately obtained history, Documenting clinical information in the electronic or other health record, Independently interpreting results (not separately reported) and communicating results to the patient/family/caregiver, or Care coordination (not separately reported).         Each patient to whom he or she provides medical services by telemedicine is:  (1) informed of the relationship between the physician and patient and the respective role of any other health care provider with respect to management of the patient; and (2) notified that he or she may decline to receive medical services by telemedicine and may withdraw from such care at any time.    Notes:    Subjective:      Patient ID: Sherrill Avery is a 74 y.o. female.    Chief Complaint: No chief complaint on file.    HPI  History of Present Illness    CHIEF COMPLAINT:  Ms. Avery presents today with cold symptoms and eye pain.    UPPER RESPIRATORY SYMPTOMS:  She reports congestion and runny nose. She denies fever.    OCULAR CONCERNS:  She experienced a sharp pain behind the eye lasting 10-15 seconds, preceded by eye itching and rubbing. The eye appeared swollen with liquid underneath. There is a family history of retinal detachment.    DEPRESSION:  She previously increased Wellbutrin dose during a depressive episode but subsequently discontinued it due to confusion regarding heel pain etiology. Since discontinuation, she reports being very emotional with frequent crying.    COGNITIVE FUNCTION:  She reports one-year history of difficulty focusing and remembering. She takes Ambien approximately once monthly  for significant fatigue and poor sleep, and Xanax a few times monthly during stressful periods, particularly during [facility] visits.    MUSCULOSKELETAL:  She reports heel pain that has improved in one heel but persists in the other, which led to medication discontinuation.    LABS:  Creatinine was elevated in September.    Medications were reviewed        Patient Active Problem List   Diagnosis    GERD (gastroesophageal reflux disease)    Hypothyroidism    Primary osteoarthritis involving multiple joints    Fibromyalgia    Actinic degeneration    Dermatofibroma    Bilateral sensorineural hearing loss    Hypertension    Hypercholesteremia    Kidney carcinoma    Neuropathy of left sural nerve-mild    Vitamin D deficiency    Anxiety    Chronic insomnia    Multiple lung nodules on CT    Clear cell carcinoma with lung metastasis    Subacromial bursitis    It band syndrome, left    Chronic pain of both knees    Chondromalacia, patella, right    Chondromalacia, patella, left    Lumbar radiculopathy    Allergic rhinitis    CKD (chronic kidney disease)    Hormone replacement therapy (HRT)    Osteoarthritis of left knee    Chronic knee pain after total replacement of left knee joint    Functional gait abnormality    Neck pain    Primary osteoarthritis of both knees    Primary osteoarthritis of right knee    Leg weakness    Class 1 obesity due to excess calories with serious comorbidity and body mass index (BMI) of 32.0 to 32.9 in adult    Generalized anxiety disorder    Aortic atherosclerosis    Urge incontinence of urine    Multiple thyroid nodules    Poor short term memory         Current Outpatient Medications:     ALPRAZolam (XANAX) 0.5 MG tablet, Take daily as needed for anxiety., Disp: 30 tablet, Rfl: 5    azelastine (ASTELIN) 137 mcg (0.1 %) nasal spray, 1 spray (137 mcg total) by Nasal route 2 (two) times daily as needed for Rhinitis., Disp: 30 mL, Rfl: 2    brimonidine (MIRVASO) 0.33 % GlwP, Apply topically.,  Disp: , Rfl:     buPROPion (WELLBUTRIN XL) 300 MG 24 hr tablet, Take 1 tablet (300 mg total) by mouth once daily., Disp: 90 tablet, Rfl: 3    butalbital-acetaminophen-caffeine -40 mg (FIORICET, ESGIC) -40 mg per tablet, TAKE 1 TABLET EVERY 4 HOURS AS NEEDED, Disp: 30 tablet, Rfl: 0    clotrimazole (LOTRIMIN) 1 % Soln, APPLY TOPICALLY 2 TIMES DAILY FOR 7 DAYS, Disp: 30 mL, Rfl: 2    diazePAM (VALIUM) 5 MG tablet, Take 5 mg by mouth., Disp: , Rfl:     diclofenac sodium (VOLTAREN) 1 % Gel, Apply 2 g topically daily as needed., Disp: 100 g, Rfl: 11    diflorasone-emollient (APEXICON E) 0.05 % Crea, Apply 1 application topically once daily. for 7 days, Disp: 30 g, Rfl: 5    docusate sodium (COLACE) 100 MG capsule, Take 100 mg by mouth., Disp: , Rfl:     ergocalciferol (ERGOCALCIFEROL) 50,000 unit Cap, Take 1 capsule (50,000 Units total) by mouth every 7 days., Disp: 12 capsule, Rfl: 1    estradioL (ESTRACE) 1 MG tablet, Take 1 tablet (1 mg total) by mouth once daily., Disp: 90 tablet, Rfl: 3    famotidine (PEPCID) 20 MG tablet, Take 20 mg by mouth., Disp: , Rfl:     fluocinonide (LIDEX) 0.05 % external solution, Apply topically 2 (two) times daily. Do not use morning of surgery, Disp: , Rfl:     hydrocortisone 2.5 % cream, Apply topically 2 (two) times daily. apply to affected area for 7 days, Disp: 20 g, Rfl: 5    ketoconazole (NIZORAL) 2 % shampoo, Do not use morning of surgery, Disp: , Rfl:     levocetirizine (XYZAL) 5 MG tablet, Take 1 tablet (5 mg total) by mouth every evening., Disp: 90 tablet, Rfl: 3    levothyroxine (SYNTHROID) 112 MCG tablet, TAKE 1 TABLET (112 MCG) BY MOUTH BEFORE BREAKFAST AS SCHEDULED, Disp: 90 tablet, Rfl: 2    metronidazole 1% (METROGEL) 1 % Gel, Apply topically once daily., Disp: 60 g, Rfl: 5    naltrexone (DEPADE) 50 mg tablet, 1/4 pill twice daily, Disp: 15 tablet, Rfl: 0    neomycin-polymyxin-dexamethasone (DEXACINE) 3.5 mg/g-10,000 unit/g-0.1 % Oint, Place small amount to  affected area three times daily, Disp: 3.5 g, Rfl: 0    omeprazole (PRILOSEC) 10 MG capsule, Take 10 mg by mouth., Disp: , Rfl:     ondansetron (ZOFRAN) 4 MG tablet, Take 2 tablets (8 mg total) by mouth every 12 (twelve) hours as needed for Nausea., Disp: 20 tablet, Rfl: 2    oxyCODONE-acetaminophen (PERCOCET) 5-325 mg per tablet, Take 1 tablet by mouth every 6 (six) hours as needed for Pain., Disp: 30 each, Rfl: 0    pravastatin (PRAVACHOL) 20 MG tablet, TAKE 1 TABLET EVERY DAY, Disp: 90 tablet, Rfl: 4    senna (SENOKOT) 8.6 mg tablet, Take 1 tablet by mouth., Disp: , Rfl:     zolpidem (AMBIEN) 5 MG Tab, Take 1 tablet (5 mg total) by mouth nightly as needed (insomnia)., Disp: 90 tablet, Rfl: 0  No current facility-administered medications for this visit.    Facility-Administered Medications Ordered in Other Visits:     triamcinolone acetonide injection 40 mg, 40 mg, Intra-articular, , Osmar Avery III, MD, 40 mg at 05/17/22 1345    Review of Systems   Constitutional:  Negative for activity change, appetite change, chills, diaphoresis, fatigue, fever and unexpected weight change.   HENT:  Negative for hearing loss, rhinorrhea and trouble swallowing.    Eyes:  Positive for pain and discharge. Negative for photophobia, redness, itching and visual disturbance.   Respiratory:  Negative for chest tightness, shortness of breath and wheezing.    Cardiovascular:  Negative for chest pain and palpitations.   Gastrointestinal:  Negative for blood in stool, constipation, diarrhea and vomiting.   Endocrine: Negative for polydipsia and polyuria.   Genitourinary:  Negative for difficulty urinating, dysuria, hematuria and menstrual problem.   Musculoskeletal:  Positive for arthralgias. Negative for back pain, gait problem, joint swelling and neck pain.   Neurological:  Negative for weakness and headaches.   Psychiatric/Behavioral:  Positive for decreased concentration. Negative for agitation, behavioral problems, confusion,  dysphoric mood, hallucinations, self-injury, sleep disturbance and suicidal ideas. The patient is nervous/anxious. The patient is not hyperactive.      Objective:   There were no vitals taken for this visit.    Physical Exam  Constitutional:       General: She is not in acute distress.     Appearance: Normal appearance. She is not ill-appearing, toxic-appearing or diaphoretic.   HENT:      Head: Normocephalic and atraumatic.   Pulmonary:      Effort: Pulmonary effort is normal. No respiratory distress.      Breath sounds: Normal breath sounds.   Skin:     Coloration: Skin is not pale.      Findings: No bruising, erythema or rash.   Neurological:      Mental Status: She is alert and oriented to person, place, and time.   Psychiatric:         Mood and Affect: Mood normal.         Behavior: Behavior normal.         Thought Content: Thought content normal.         Judgment: Judgment normal.       No visits with results within 1 Month(s) from this visit.   Latest known visit with results is:   Lab Visit on 11/21/2024   Component Date Value Ref Range Status    Cholesterol 11/21/2024 189  120 - 199 mg/dL Final    Comment: The National Cholesterol Education Program (NCEP) has set the  following guidelines (reference ranges) for Cholesterol:  Optimal.....................<200 mg/dL  Borderline High.............200-239 mg/dL  High........................> or = 240 mg/dL      Triglycerides 11/21/2024 95  30 - 150 mg/dL Final    Comment: The National Cholesterol Education Program (NCEP) has set the  following guidelines (reference values) for triglycerides:  Normal......................<150 mg/dL  Borderline High.............150-199 mg/dL  High........................200-499 mg/dL      HDL 11/21/2024 66  40 - 75 mg/dL Final    Comment: The National Cholesterol Education Program (NCEP) has set the  following guidelines (reference values) for HDL Cholesterol:  Low...............<40 mg/dL  Optimal...........>60 mg/dL      LDL  Cholesterol 11/21/2024 104.0  63.0 - 159.0 mg/dL Final    Comment: The National Cholesterol Education Program (NCEP) has set the  following guidelines (reference values) for LDL Cholesterol:  Optimal.......................<130 mg/dL  Borderline High...............130-159 mg/dL  High..........................160-189 mg/dL  Very High.....................>190 mg/dL      HDL/Cholesterol Ratio 11/21/2024 34.9  20.0 - 50.0 % Final    Total Cholesterol/HDL Ratio 11/21/2024 2.9  2.0 - 5.0 Final    Non-HDL Cholesterol 11/21/2024 123  mg/dL Final    Comment: Risk category and Non-HDL cholesterol goals:  Coronary heart disease (CHD)or equivalent (10-year risk of CHD >20%):  Non-HDL cholesterol goal     <130 mg/dL  Two or more CHD risk factors and 10-year risk of CHD <= 20%:  Non-HDL cholesterol goal     <160 mg/dL  0 to 1 CHD risk factor:  Non-HDL cholesterol goal     <190 mg/dL      Vit D, 25-Hydroxy 11/21/2024 19 (L)  30 - 96 ng/mL Final    Comment: Vitamin D deficiency.........<10 ng/mL                              Vitamin D insufficiency......10-29 ng/mL       Vitamin D sufficiency........> or equal to 30 ng/mL  Vitamin D toxicity............>100 ng/mL         Lab Results   Component Value Date    TSH 2.813 10/23/2024       Assessment:     1. Plantar fasciitis    2. Generalized anxiety disorder    3. Chronic insomnia    4. Poor short term memory    5. Acquired hypothyroidism    6. Stage 3a chronic kidney disease    7. Vitamin D deficiency    8. Carcinoma of kidney, unspecified laterality    9. Clear cell carcinoma with lung metastasis      Plan:   Plantar fasciitis    Generalized anxiety disorder    Chronic insomnia    Poor short term memory    Acquired hypothyroidism    Stage 3a chronic kidney disease    Vitamin D deficiency    Carcinoma of kidney, unspecified laterality    Clear cell carcinoma with lung metastasis      Assessment & Plan    Assessed cold symptoms and eye issue, determining conjunctival irritation rather  than retinal detachment  Evaluated plantar fasciitis as cause of heel pain, not a side effect of increased Wellbutrin dosage  Assessed mood and emotional state in context of recent Wellbutrin dose adjustment  Considered potential causes of memory and focus issues, including stress, lack of sleep, and medication side effects  Determined no urgent need for cognitive assessment referral based on current symptoms    MALIGNANT NEOPLASM OF RIGHT LUNG:  - Scheduled the patient to see [facility] doctor this month for follow-up, discussion of test results, and treatment options.  - Noted that between [facility] visits, the patient is under the care of Dr. Woods, an oncologist at [facility] in [place], for cancer-related care.    3A CHRONIC KIDNEY DISEASE:  - Instructed the patient to increase water intake to improve kidney function.  - Noted slightly decreased kidney functions based on previous lab results, including elevated creatinine in September.  - Planned to add kidney function tests to the routine cancer-related lab work before the patient's appointment with Dr. Gonzáles in May.    DEPRESSION:  - Restarted Wellbutrin 150 mg daily for 1 week, then instructed to increase to 300 mg daily.  - Advised the patient to contact the office to notify of response to increased dosage.  - Noted that the patient reports being very emotional and crying easily.  - Assessed the patient's mood, observing that the patient was in a rut during the last visit.  - Discussed Wellbutrin effectiveness and dosage adjustment.    ANXIETY:  - Continued Xanax as needed, up to a few times per month.  - Noted that the patient reports using Xanax a few times per month, especially during [facility] visits.  - Acknowledged the patient's stress and anxiety related to medical appointments.    FASCIITIS:  - Diagnosed plantar fasciitis based on the patient's symptoms of heel pain in both feet, with one foot improving and the other still painful.  -  Clarified that plantar fasciitis, not medication side effect, is likely the cause.  - Instructed the patient to use night splints, roll a frozen water bottle under foot for relief, and perform foot stretches with a band.  - Reviewed previous treatment recommendations.    IRRITATION:  - Noted that the patient reports recent eye pain and swelling, with yellowish discoloration and water-like appearance under the conjunctiva.  - Explained the difference between conjunctival irritation and retinal detachment symptoms.  - Instructed the patient to contact the office if eye irritation recurs and send a picture for further evaluation.  - Recommend annual eye check-ups due to the patient's family history of retinal issues.    ISSUES:  - Noted that the patient mentions upcoming ablation on knees and potential knee replacement.  - Observed that the patient plans to consult [facility] doctor for knee replacement decision.    FUNCTION:  - Advised the patient to increase water intake.    SYMPTOMS:  - Noted that the patient reports difficulty focusing, remembering, and following conversations for about a year.  - Explained that stress, lack of sleep, and anxiety can contribute to memory and focus issues.  - Assessed the patient's symptoms and ruled out severe cognitive decline.  - Discussed potential causes and offered referral for cognitive assessment if needed.  - Suggested that Wellbutrin may help with cognitive symptoms.  - Instructed the patient to contact   the office if any worsening of memory or focus issues occurs.    MEDICATIONS/SUPPLEMENTS:  - Discussed that combining Xanax and Ambien could potentially exacerbate cognitive symptoms.  - Continued Ambien as needed, up to once per month.    UP:  - Confirmed scheduled mammogram.  - Instructed the patient to follow up with Dr. Gonzáles as scheduled in May.     Follow up if symptoms worsen or fail to improve.

## 2025-01-13 DIAGNOSIS — Z00.00 ENCOUNTER FOR MEDICARE ANNUAL WELLNESS EXAM: ICD-10-CM

## 2025-01-15 DIAGNOSIS — N18.31 STAGE 3A CHRONIC KIDNEY DISEASE: ICD-10-CM

## 2025-02-05 ENCOUNTER — TELEPHONE (OUTPATIENT)
Dept: HEMATOLOGY/ONCOLOGY | Facility: CLINIC | Age: 75
End: 2025-02-05
Payer: MEDICARE

## 2025-02-05 NOTE — TELEPHONE ENCOUNTER
Spoke to pt to confirm her 2/6 virtual appt. Pt expressed that she wasn't able to go to [facility] due to the snow so she couldn't get the tests done. Pt asked me if Dr. Jaquez still wanted to do the virtual without the tests. I told her I would reach out to Dr. Jaquez to see if he wanted to keep the appt or move it until after she gets back from [facility] on 2/13.   Per Dr. Jaquez pt ok to wait until after she goes to [facility] to meet with Dr. Jaquez. LYNDA ADKINSM to inform pt that Dr. Jaquez wanted to reschedule appt and that I would reschedule it for her to the week after next.

## 2025-02-18 ENCOUNTER — TELEPHONE (OUTPATIENT)
Dept: HEMATOLOGY/ONCOLOGY | Facility: CLINIC | Age: 75
End: 2025-02-18
Payer: MEDICARE

## 2025-02-18 ENCOUNTER — PATIENT MESSAGE (OUTPATIENT)
Dept: PAIN MEDICINE | Facility: CLINIC | Age: 75
End: 2025-02-18
Payer: MEDICARE

## 2025-02-18 ENCOUNTER — HOSPITAL ENCOUNTER (OUTPATIENT)
Facility: OTHER | Age: 75
Discharge: HOME | End: 2025-02-18
Attending: ANESTHESIOLOGY | Admitting: ANESTHESIOLOGY
Payer: MEDICARE

## 2025-02-18 VITALS
SYSTOLIC BLOOD PRESSURE: 143 MMHG | OXYGEN SATURATION: 98 % | DIASTOLIC BLOOD PRESSURE: 79 MMHG | HEART RATE: 70 BPM | WEIGHT: 191 LBS | TEMPERATURE: 97 F | RESPIRATION RATE: 16 BRPM | HEIGHT: 65 IN | BODY MASS INDEX: 31.82 KG/M2

## 2025-02-18 DIAGNOSIS — M25.561 CHRONIC PAIN OF BOTH KNEES: Primary | ICD-10-CM

## 2025-02-18 DIAGNOSIS — G89.29 CHRONIC PAIN: ICD-10-CM

## 2025-02-18 DIAGNOSIS — G89.29 CHRONIC PAIN OF BOTH KNEES: Primary | ICD-10-CM

## 2025-02-18 DIAGNOSIS — M17.11 PRIMARY OSTEOARTHRITIS OF RIGHT KNEE: ICD-10-CM

## 2025-02-18 DIAGNOSIS — M25.562 CHRONIC PAIN OF BOTH KNEES: Primary | ICD-10-CM

## 2025-02-18 PROCEDURE — 99152 MOD SED SAME PHYS/QHP 5/>YRS: CPT | Mod: ,,, | Performed by: ANESTHESIOLOGY

## 2025-02-18 PROCEDURE — 63600175 PHARM REV CODE 636 W HCPCS: Performed by: ANESTHESIOLOGY

## 2025-02-18 PROCEDURE — 99153 MOD SED SAME PHYS/QHP EA: CPT | Performed by: ANESTHESIOLOGY

## 2025-02-18 PROCEDURE — 64624 DSTRJ NULYT AGT GNCLR NRV: CPT | Mod: RT | Performed by: ANESTHESIOLOGY

## 2025-02-18 PROCEDURE — 99152 MOD SED SAME PHYS/QHP 5/>YRS: CPT | Performed by: ANESTHESIOLOGY

## 2025-02-18 PROCEDURE — 64624 DSTRJ NULYT AGT GNCLR NRV: CPT | Mod: RT,,, | Performed by: ANESTHESIOLOGY

## 2025-02-18 PROCEDURE — A4649 SURGICAL SUPPLIES: HCPCS | Performed by: ANESTHESIOLOGY

## 2025-02-18 RX ORDER — ONDANSETRON HYDROCHLORIDE 2 MG/ML
4 INJECTION, SOLUTION INTRAVENOUS ONCE
Status: COMPLETED | OUTPATIENT
Start: 2025-02-18 | End: 2025-02-18

## 2025-02-18 RX ORDER — TRIAMCINOLONE ACETONIDE 40 MG/ML
INJECTION, SUSPENSION INTRA-ARTICULAR; INTRAMUSCULAR
Status: DISCONTINUED | OUTPATIENT
Start: 2025-02-18 | End: 2025-02-18 | Stop reason: HOSPADM

## 2025-02-18 RX ORDER — LIDOCAINE HYDROCHLORIDE 20 MG/ML
INJECTION, SOLUTION INFILTRATION; PERINEURAL
Status: DISCONTINUED | OUTPATIENT
Start: 2025-02-18 | End: 2025-02-18 | Stop reason: HOSPADM

## 2025-02-18 RX ORDER — FENTANYL CITRATE 50 UG/ML
INJECTION, SOLUTION INTRAMUSCULAR; INTRAVENOUS
Status: DISCONTINUED | OUTPATIENT
Start: 2025-02-18 | End: 2025-02-18 | Stop reason: HOSPADM

## 2025-02-18 RX ORDER — SODIUM CHLORIDE 9 MG/ML
INJECTION, SOLUTION INTRAVENOUS CONTINUOUS
Status: DISCONTINUED | OUTPATIENT
Start: 2025-02-18 | End: 2025-02-18 | Stop reason: HOSPADM

## 2025-02-18 RX ORDER — BUPIVACAINE HYDROCHLORIDE 2.5 MG/ML
INJECTION, SOLUTION EPIDURAL; INFILTRATION; INTRACAUDAL
Status: DISCONTINUED | OUTPATIENT
Start: 2025-02-18 | End: 2025-02-18 | Stop reason: HOSPADM

## 2025-02-18 RX ORDER — MIDAZOLAM HYDROCHLORIDE 1 MG/ML
INJECTION INTRAMUSCULAR; INTRAVENOUS
Status: DISCONTINUED | OUTPATIENT
Start: 2025-02-18 | End: 2025-02-18 | Stop reason: HOSPADM

## 2025-02-18 RX ADMIN — ONDANSETRON 4 MG: 2 INJECTION INTRAMUSCULAR; INTRAVENOUS at 10:02

## 2025-02-18 NOTE — PROGRESS NOTES
Subjective:       Patient ID: Sherrill Avery    Chief Complaint:  Met ccRCC    HPI     Sherrill Avery is a 74 y.o. 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 She is s/p left VATS LLL wedge resection on 1/29/20.     Here to follow-up Currently NOT on therapy. Disease has been indolent.      Saw Dr. Leslie's NP at [facility] recently, scans there were stable.      Sees pain mgmt at [facility], psych onc at [facility].       Oncologic History:    Clear cell carcinoma of right kidney.    6/30/2013 Initial Diagnosis   Presented with gross hematuria. CT CAP: 7.2-cm mass occupying the upper two thirds of the right kidney. 2.8-cm mass in the posterior aspect of the urinary bladder c/w TCC.    7/25/2013 Surgery   Right radical nephrectomy Pathology: 7.5-cm clear cell RCC Yanet nuclear grade 2, with microscopic extension into perinephric adipose tissue and with tumor in the renal margin.  pT3a pN0 pMX    10/16/2013 - No Evidence of Disease ([facility])   CT AP: No CT evidence of recurrence or metastatic disease    8/4/2015 - No Evidence of Disease ([facility])   CT AP: No CT evidence of recurrence or metastatic disease    12/1/2016 - [facility] with concern of recurrence   CT AP: Mew less than 4 mm pulmonary nodule in the anterior basal segment of the RLL. Stable 4 mm pulmonary nodule posterior basal segment RLL.     3/1/2017 Relapse/Recurrence   CT Chest: Multiple new subcm pulmonary nodules in the RLL and one in the right middle measuring up to 6 mm suspicious for metastatic disease.     2/8/2019 - [facility] with concern of recurrence   1. Small volume pulmonary metastases are slightly larger compared to the prior study.     2. No recurrent or metastatic disease in the abdomen or pelvis.    2/14/2019 Biopsy   CT-guided biopsy of a 1 cm left lower lobe lesion   Pathology: Small focus of atypical adenomatous hyperplasia with no tumor present. PAX-8 negative.    2/7/2022 - 3/3/2022  TX-Radiation Therapy   1 RUL x06 VMAT 02/07/2022 02/25/2022 400 cGy 15/15 6000/6000 cGy   1 RLL x06 STEREOTACTIC 02/28/2022 03/03/2022 1250 cGy 4/4 5000cGy    She was initally noted to have spontaneous remission of the lung nodules in the absence of any systemic treatment. However, in 2019, the lung nodules at started to increase in size but were still mostly subcentimeter in greatest dimension. In 2/2019, when the left lower lobe lesion increased to 1.4 cm. IR biopsied a peripheral left lung lesion which was PAX-8 negative thought to be a small focus of atypical adenomatous hyperplasia. We therefore brought her back after only 3 months to monitor those lesions carefully. She was referred to Dr. King who thinks the lesions represent indolent RCC metastases. She underwent resection and RCC was confirmed on pathology at [facility].      Review of Systems   Constitutional: Negative for activity change, appetite change, chills, fatigue, fever and unexpected weight change.   HENT: Negative for congestion, hearing loss, mouth sores, sore throat, tinnitus and voice change.    Eyes: Negative for pain and visual disturbance.   Respiratory: Negative for cough, shortness of breath and wheezing.    Cardiovascular: Negative for chest pain, palpitations and leg swelling.   Gastrointestinal: Negative for abdominal pain, constipation, diarrhea, nausea and vomiting.   Endocrine: Negative for cold intolerance and heat intolerance.   Genitourinary: Negative for difficulty urinating, dyspareunia, dysuria, frequency, menstrual problem, urgency, vaginal bleeding, vaginal discharge and vaginal pain.   Musculoskeletal: Positive for arthralgias. Negative for back pain and myalgias.   Skin: Negative for color change, rash and wound.   Allergic/Immunologic: Negative for environmental allergies and food allergies.   Neurological: Negative for weakness, numbness and headaches.   Hematological: Negative for adenopathy. Does not bruise/bleed  easily.   Psychiatric/Behavioral: Negative for agitation, confusion, hallucinations and sleep disturbance. The patient is nervous/anxious.    All other systems reviewed and are negative.          Allergies:  Review of patient's allergies indicates:   Allergen Reactions    Talwin compound Anxiety     hallucinations/anxiety    Tramadol Itching    Buspirone Anxiety    Codeine Itching    Morphine Itching     jittery    Morpholine analogues Anxiety and Itching    Naproxen Itching     Takes Ibuprofen is ok on rare occasion     Nexium [esomeprazole magnesium] Other (See Comments)     constipation    Wal-phed [pseudoephedrine hcl] Itching       Medications:  Current Outpatient Medications   Medication Sig Dispense Refill    ALPRAZolam (XANAX) 0.5 MG tablet Take daily as needed for anxiety. 30 tablet 5    azelastine (ASTELIN) 137 mcg (0.1 %) nasal spray 1 spray (137 mcg total) by Nasal route 2 (two) times daily as needed for Rhinitis. 30 mL 2    brimonidine (MIRVASO) 0.33 % GlwP Apply topically.      buPROPion (WELLBUTRIN XL) 300 MG 24 hr tablet Take 1 tablet (300 mg total) by mouth once daily. 90 tablet 3    butalbital-acetaminophen-caffeine -40 mg (FIORICET, ESGIC) -40 mg per tablet TAKE 1 TABLET EVERY 4 HOURS AS NEEDED 30 tablet 0    clotrimazole (LOTRIMIN) 1 % Soln APPLY TOPICALLY 2 TIMES DAILY FOR 7 DAYS 30 mL 2    diazePAM (VALIUM) 5 MG tablet Take 5 mg by mouth.      diclofenac sodium (VOLTAREN) 1 % Gel Apply 2 g topically daily as needed. 100 g 11    diflorasone-emollient (APEXICON E) 0.05 % Crea Apply 1 application topically once daily. for 7 days 30 g 5    docusate sodium (COLACE) 100 MG capsule Take 100 mg by mouth.      ergocalciferol (ERGOCALCIFEROL) 50,000 unit Cap Take 1 capsule (50,000 Units total) by mouth every 7 days. 12 capsule 1    estradioL (ESTRACE) 1 MG tablet Take 1 tablet (1 mg total) by mouth once daily. 90 tablet 3    famotidine (PEPCID) 20 MG tablet Take 20 mg by mouth.       fluocinonide (LIDEX) 0.05 % external solution Apply topically 2 (two) times daily. Do not use morning of surgery      hydrocortisone 2.5 % cream Apply topically 2 (two) times daily. apply to affected area for 7 days 20 g 5    ketoconazole (NIZORAL) 2 % shampoo Do not use morning of surgery      levocetirizine (XYZAL) 5 MG tablet Take 1 tablet (5 mg total) by mouth every evening. 90 tablet 3    levothyroxine (SYNTHROID) 112 MCG tablet TAKE 1 TABLET (112 MCG) BY MOUTH BEFORE BREAKFAST AS SCHEDULED 90 tablet 2    metronidazole 1% (METROGEL) 1 % Gel Apply topically once daily. 60 g 5    naltrexone (DEPADE) 50 mg tablet 1/4 pill twice daily 15 tablet 0    neomycin-polymyxin-dexamethasone (DEXACINE) 3.5 mg/g-10,000 unit/g-0.1 % Oint Place small amount to affected area three times daily 3.5 g 0    omeprazole (PRILOSEC) 10 MG capsule Take 10 mg by mouth.      ondansetron (ZOFRAN) 4 MG tablet Take 2 tablets (8 mg total) by mouth every 12 (twelve) hours as needed for Nausea. 20 tablet 2    oxyCODONE-acetaminophen (PERCOCET) 5-325 mg per tablet Take 1 tablet by mouth every 6 (six) hours as needed for Pain. 30 each 0    pravastatin (PRAVACHOL) 20 MG tablet TAKE 1 TABLET EVERY DAY 90 tablet 4    senna (SENOKOT) 8.6 mg tablet Take 1 tablet by mouth.      zolpidem (AMBIEN) 5 MG Tab Take 1 tablet (5 mg total) by mouth nightly as needed (insomnia). 90 tablet 0     No current facility-administered medications for this visit.     Facility-Administered Medications Ordered in Other Visits   Medication Dose Route Frequency Provider Last Rate Last Admin    triamcinolone acetonide injection 40 mg  40 mg Intra-articular  Osmar Avery III, MD   40 mg at 05/17/22 1345       PMH:  Past Medical History:   Diagnosis Date    Anxiety     Family history of malignant melanoma 08/25/2014    Fibromyalgia affecting lower leg     GERD (gastroesophageal reflux disease)     Hx of psychiatric care     Hypercholesteremia     Hypertension      Hypothyroidism     Inflamed seborrheic keratosis 08/25/2014    Microscopic hematuria 02/02/2017    Multiple benign melanocytic nevi 08/25/2014    Renal cell carcinoma of right kidney metastatic to other site     Therapy        PSH:  Past Surgical History:   Procedure Laterality Date    AUGMENTATION OF BREAST      bladder lift      breast implants      BREAST SURGERY Bilateral 1996    saline implants    COLONOSCOPY  2007    HYSTERECTOMY      ectopic pregnancy x 2, with LSO    KNEE ARTHROPLASTY Left 06/14/2021    Procedure: ARTHROPLASTY, KNEE:LEFT:DEPUY-SIGMA;  Surgeon: Osmar Avery III, MD;  Location: [facility] OR;  Service: Orthopedics;  Laterality: Left;    LAPAROSCOPIC NEPHRECTOMY, HAND ASSISTED  07/25/2013    LUNG REMOVAL, PARTIAL Left 2020    At [facility]    NEPHRECTOMY      OOPHORECTOMY Bilateral     RADIOFREQUENCY ABLATION Left 08/30/2022    Procedure: RADIOFREQUENCY ABLATION, LEFT KNEE COOLED;  Surgeon: Vijaya Landin MD;  Location: [facility] PAIN MGT;  Service: Pain Management;  Laterality: Left;    RADIOFREQUENCY ABLATION Right 10/25/2022    Procedure: RADIOFREQUENCY ABLATION RIGHT KNEE GENICULAR COOLED;  Surgeon: Vijaya Landin MD;  Location: [facility] PAIN MGT;  Service: Pain Management;  Laterality: Right;    RADIOFREQUENCY ABLATION Left 03/07/2023    Procedure: RADIOFREQUENCY ABLATION LEFT GENICULAR COOLED RFA;  Surgeon: Vijaya Landin MD;  Location: [facility] PAIN MGT;  Service: Pain Management;  Laterality: Left;    RADIOFREQUENCY ABLATION Right 08/01/2023    Procedure: RADIOFREQUENCY ABLATION, RIGHT GENICULAR COOLED;  Surgeon: Vijaya Landin MD;  Location: [facility] PAIN MGT;  Service: Pain Management;  Laterality: Right;    RADIOFREQUENCY ABLATION Left 12/26/2023    Procedure: RADIOFREQUENCY ABLATION LEFT GENICULAR 1 OF 2;  Surgeon: Vijaya Landin MD;  Location: [facility] PAIN MGT;  Service: Pain Management;  Laterality: Left;  [phone]    RADIOFREQUENCY ABLATION Right 2/6/2024    Procedure:  RADIOFREQUENCY ABLATION RIGHT GENICULAR 2 OF 2;  Surgeon: Vijaya Landin MD;  Location: [facility] PAIN MGT;  Service: Pain Management;  Laterality: Right;  [phone]  *after 2/1/24    RADIOFREQUENCY ABLATION Left 7/2/2024    Procedure: RADIOFREQUENCY ABLATION LEFT GENICULAR;  Surgeon: Vijaya Landin MD;  Location: [facility] PAIN MGT;  Service: Pain Management;  Laterality: Left;  [phone]  *SCHEDULE AFTER 6/26    RADIOFREQUENCY ABLATION Right 8/13/2024    Procedure: RADIOFREQUENCY ABLATION RIGHT GENICULAR;  Surgeon: Vijaya Landin MD;  Location: [facility] PAIN MGT;  Service: Pain Management;  Laterality: Right;  [phone]  4 WK F/U MARGARET  *SCHEDULE AFTER 8/6    right ganglion cyst      throat polyp      TRANSOBTURATOR SLING  2011    with RSO for persistent complex cyst & MARGOT; done by yris    UPPER GASTROINTESTINAL ENDOSCOPY  2007       FamHx:  Family History   Problem Relation Name Age of Onset    Diabetes Mother      Hypertension Mother      Heart disease Mother      Cancer Father          lung    Migraines Father      Glaucoma Paternal Grandmother      Glaucoma Sister      Thyroid disease Neg Hx      Asthma Neg Hx         SocHx:  Social History     Socioeconomic History    Marital status:      Spouse name: KAIDEN    Number of children: 5   Occupational History    Occupation: retired     Comment: Dance Instructor   Tobacco Use    Smoking status: Never    Smokeless tobacco: Never   Substance and Sexual Activity    Alcohol use: Yes     Alcohol/week: 0.0 standard drinks of alcohol     Comment: social    Drug use: No    Sexual activity: Yes     Partners: Male     Birth control/protection: Surgical   Social History Narrative    Patient is a retired dance instructor and live with . Has stairs at home 12- has an elevator     Social Drivers of Health     Financial Resource Strain: Low Risk  (5/26/2024)    Overall Financial Resource Strain (CARDIA)     Difficulty of Paying Living Expenses: Not hard at  all   Food Insecurity: No Food Insecurity (5/26/2024)    Hunger Vital Sign     Worried About Running Out of Food in the Last Year: Never true     Ran Out of Food in the Last Year: Never true   Transportation Needs: Patient Declined (5/15/2024)    PRAPARE - Transportation     Lack of Transportation (Medical): Patient declined     Lack of Transportation (Non-Medical): Patient declined   Physical Activity: Inactive (5/26/2024)    Exercise Vital Sign     Days of Exercise per Week: 0 days     Minutes of Exercise per Session: 0 min   Stress: Stress Concern Present (5/26/2024)    [employer] - Occupational Stress Questionnaire     Feeling of Stress : To some extent   Housing Stability: Unknown (5/26/2024)    Housing Stability Vital Sign     Unable to Pay for Housing in the Last Year: No       Objective:        Anesthesia/Surgery risks, benefits and alternative options discussed and understood by patient/family.  LABS:  WBC   Date Value Ref Range Status   08/17/2021 5.58 3.90 - 12.70 K/uL Final     Hemoglobin   Date Value Ref Range Status   08/17/2021 13.8 12.0 - 16.0 g/dL Final     Hematocrit   Date Value Ref Range Status   08/17/2021 42.4 37.0 - 48.5 % Final     Platelets   Date Value Ref Range Status   08/17/2021 215 150 - 450 K/uL Final       Chemistry        Component Value Date/Time     10/05/2022 1015    K 4.2 10/05/2022 1015     10/05/2022 1015    CO2 25 10/05/2022 1015    BUN 12 10/09/2023 1309    BUN 10 10/05/2022 1015    CREATININE 1.00 (H) 10/09/2023 1309    CREATININE 0.8 10/05/2022 1015    GLU 86 10/05/2022 1015        Component Value Date/Time    CALCIUM 8.9 10/09/2023 1309    CALCIUM 8.4 (L) 10/05/2022 1015    ALKPHOS 78 10/05/2022 1015    AST 15 10/05/2022 1015    ALT 14 10/05/2022 1015    BILITOT 0.7 10/05/2022 1015    ESTGFRAFRICA >60 08/17/2021 1135    EGFRNONAA >60 08/17/2021 1135              Assessment:       1. Carcinoma of kidney, unspecified laterality    2.  Clear cell carcinoma    3. Carcinoma of right kidney    4. Hypothyroidism, unspecified type           Plan:       1. Metastatic ccRCC, very indolent cancer, current off therapy:    Currently on no systemic treatment. S/p wedge resection [facility] and XRT.      Recent scans at [facility] stable.  Plan to continue to monitor.  No systemic therapy unless clear PD on imaging.      RTC for virtual visit to see me in 6 months     The patient agrees with the plan, and all questions have been answered to their satisfaction.      More than 30 mins total time were spent during this encounter.      Kervin Jaquez M.D., M.S., F.A.C.P.  Hematology/Oncology Attending  [facility]            Med Onc Chart Routing      Follow up with physician 6 months.   Follow up with MARGARET    Infusion scheduling note    Injection scheduling note    Labs    Imaging    Pharmacy appointment    Other referrals                  The patient location is: home   The chief complaint leading to consultation is: cancer   Visit type: Virtual visit with synchronous audio and video  Total time spent with patient: 30 mins    Each patient to whom he or she provides medical services by telemedicine is:  (1) informed of the relationship between the physician and patient and the respective role of any other health care provider with respect to management of the patient; and (2) notified that he or she may decline to receive medical services by telemedicine and may withdraw from such care at any time.

## 2025-02-18 NOTE — OP NOTE
Therapeutic Genicular Cooled Nerve Radiofrequency Ablation under Fluoroscopy     The procedure, risks, benefits, and options were discussed with the patient. There are no contraindications to the procedure. The patent expressed understanding and agreed to the procedure. Informed written consent was obtained prior to the start of the procedure and can be found in the patient's chart.        PATIENT NAME: Sherrill Avery   MRN: [id number]     DATE OF PROCEDURE: 02/18/2025     PROCEDURE: Therapeutic Right Genicular Cooled Nerve Radiofrequency Ablation under Fluoroscopy    PRE-OP DIAGNOSIS: Chronic pain of right knee [M25.561, G89.29]  Right knee Osteoarthritis    POST-OP DIAGNOSIS: Chronic pain of right knee [M25.561, G89.29]    PHYSICIAN: Vijaya Landin MD    ASSISTANTS: Edel Salmon MD  [facility]      MEDICATIONS INJECTED:  Preservative-free Kenalog 40mg with 9cc of Bupivicaine 0.25%    LOCAL ANESTHETIC INJECTED:   Xylocaine 2%    SEDATION: Versed 1.5mg and Fentanyl 75mcg                                                                                                                                                                                     Conscious sedation ordered by M.D. Patient re-evaluation prior to administration of conscious sedation. No changes noted in patient's status from initial evaluation. The patient's vital signs were monitored by RN and patient remained hemodynamically stable throughout the procedure.    Event Time In   Sedation Start 1107   Sedation End 1132       ESTIMATED BLOOD LOSS:  None    COMPLICATIONS:  None     INTERVAL HISTORY: Patient has clinical findings of chronic knee pain. Patients has completed 2 previous diagnostic genicular nerve blocks with at least 80% relief for the expected duration of the local anesthetic utilized.     TECHNIQUE: Time-out was performed to identify the patient and procedure to be performed. With the patient laying in a supine position, the  surgical area was prepped and draped in the usual sterile fashion using ChloraPrep and fenestrated drape. Three target sites including the superior lateral genicular nerve where the lateral femoral shaft meets the epicondyle, the superior medial genicular nerve where the medial femoral shaft meets the epicondyle, and the inferior medial genicular nerve where the medial tibial shaft meets the epicondyle, were determined under fluoroscopic guidance. Skin anesthesia was achieved by injecting Lidocaine 2% over the injection sites. A 17 gauge, 50mm, 10mm active tip needle was then advanced under fluoroscopy in the AP and lateral views into the positions of the geniculate nerves at these levels. This was followed by motor testing at each of the nerves to ensure that there was no dorsiflexion of the foot. After negative aspiration for blood was confirmed, 1 mL of the lidocaine 2% listed above was injected slowly at each site. This was followed by cooled thermal lesioning at 80 degrees celsius for 120 seconds at each site. That was followed by slowly injecting 1 mL of the medication mixture listed above at each site. Needles were retracted 1/2 a centimeter, followed by additional cooled thermal lesioning at 80 degrees celsius for 120 seconds at each site.  The needles were removed and bleeding was nil. A sterile dressing was applied. No specimens collected. The patient tolerated the procedure well and did not have any procedure related motor deficit at the conclusion of the procedure.      The patient was monitored after the procedure in the recovery area. They were given post-procedure and discharge instructions to follow at home. The patient was discharged in a stable condition.    Edel Salmon MD     I reviewed and edited the fellow's note. I conducted my own interview and physical examination. I agree with the findings. I was present and supervising all critical portions of the procedure.

## 2025-02-18 NOTE — DISCHARGE INSTRUCTIONS

## 2025-02-18 NOTE — H&P
HPI  Patient presenting for Procedure(s) (LRB):  RADIOFREQUENCY ABLATION RIGHT GENICULAR (Right)     Patient on Anti-coagulation No    No health changes since previous encounter    Past Medical History:   Diagnosis Date    Anxiety     Family history of malignant melanoma 08/25/2014    Fibromyalgia affecting lower leg     GERD (gastroesophageal reflux disease)     Hx of psychiatric care     Hypercholesteremia     Hypertension     Hypothyroidism     Inflamed seborrheic keratosis 08/25/2014    Microscopic hematuria 02/02/2017    Multiple benign melanocytic nevi 08/25/2014    Renal cell carcinoma of right kidney metastatic to other site     Therapy      Past Surgical History:   Procedure Laterality Date    AUGMENTATION OF BREAST      bladder lift      breast implants      BREAST SURGERY Bilateral 1996    saline implants    COLONOSCOPY  2007    HYSTERECTOMY      ectopic pregnancy x 2, with LSO    KNEE ARTHROPLASTY Left 06/14/2021    Procedure: ARTHROPLASTY, KNEE:LEFT:DEPUY-SIGMA;  Surgeon: Osmar Avery III, MD;  Location: [facility] OR;  Service: Orthopedics;  Laterality: Left;    LAPAROSCOPIC NEPHRECTOMY, HAND ASSISTED  07/25/2013    LUNG REMOVAL, PARTIAL Left 2020    At [facility]    NEPHRECTOMY      OOPHORECTOMY Bilateral     RADIOFREQUENCY ABLATION Left 08/30/2022    Procedure: RADIOFREQUENCY ABLATION, LEFT KNEE COOLED;  Surgeon: Vijaya Landin MD;  Location: [facility] PAIN [facility]GT;  Service: Pain Management;  Laterality: Left;    RADIOFREQUENCY ABLATION Right 10/25/2022    Procedure: RADIOFREQUENCY ABLATION RIGHT KNEE GENICULAR COOLED;  Surgeon: Vijaya Landin MD;  Location: [facility];  Service: Pain Management;  Laterality: Right;    RADIOFREQUENCY ABLATION Left 03/07/2023    Procedure: RADIOFREQUENCY ABLATION LEFT GENICULAR COOLED RFA;  Surgeon: Vijaya Landin MD;  Location: [facility];  Service: Pain Management;  Laterality: Left;    RADIOFREQUENCY ABLATION Right 08/01/2023    Procedure: RADIOFREQUENCY  ABLATION, RIGHT GENICULAR COOLED;  Surgeon: Vijaya Landin MD;  Location: [facility]IN [facility]GT;  Service: Pain Management;  Laterality: Right;    RADIOFREQUENCY ABLATION Left 12/26/2023    Procedure: RADIOFREQUENCY ABLATION LEFT GENICULAR 1 OF 2;  Surgeon: Vijaya Landin MD;  Location: [facility];  Service: Pain Management;  Laterality: Left;  [phone]    RADIOFREQUENCY ABLATION Right 2/6/2024    Procedure: RADIOFREQUENCY ABLATION RIGHT GENICULAR 2 OF 2;  Surgeon: Vijaya Landin MD;  Location: [facility];  Service: Pain Management;  Laterality: Right;  [phone]  *after 2/1/24    RADIOFREQUENCY ABLATION Left 7/2/2024    Procedure: RADIOFREQUENCY ABLATION LEFT GENICULAR;  Surgeon: Vijaya Landin MD;  Location: [facility];  Service: Pain Management;  Laterality: Left;  [phone]  *SCHEDULE AFTER 6/26    RADIOFREQUENCY ABLATION Right 8/13/2024    Procedure: RADIOFREQUENCY ABLATION RIGHT GENICULAR;  Surgeon: Vijaya Landin MD;  Location: [facility];  Service: Pain Management;  Laterality: Right;  [phone]  4 WK F/U MARGARET  *SCHEDULE AFTER 8/6    right ganglion cyst      throat polyp      TRANSOBTURATOR SLING  2011    with RSO for persistent complex cyst & MARGOT; done by arndt    UPPER GASTROINTESTINAL ENDOSCOPY  2007     Review of patient's allergies indicates:   Allergen Reactions    Talwin compound Anxiety     hallucinations/anxiety    Tramadol Itching    Buspirone Anxiety    Codeine Itching    Morphine Itching     jittery    Morpholine analogues Anxiety and Itching    Naproxen Itching     Takes Ibuprofen is ok on rare occasion     Nexium [esomeprazole magnesium] Other (See Comments)     constipation    Wal-phed [pseudoephedrine hcl] Itching      No current facility-administered medications for this encounter.     Facility-Administered Medications Ordered in Other Encounters   Medication    triamcinolone acetonide injection 40 mg       PMHx, PSHx, Allergies, Medications reviewed in  epic    ROS negative except pain complaints in HPI    OBJECTIVE:    There were no vitals taken for this visit.    PHYSICAL EXAMINATION:    GENERAL: Well appearing, in no acute distress, alert and oriented x3.  PSYCH:  Mood and affect appropriate.  SKIN: Skin color, texture, turgor normal, no rashes or lesions which will impact the procedure.  CV: RRR with palpation of the radial artery.  PULM: No evidence of respiratory difficulty, symmetric chest rise. Clear to auscultation.  NEURO: Cranial nerves grossly intact.    Plan:    Proceed with procedure as planned Procedure(s) (LRB):  RADIOFREQUENCY ABLATION RIGHT GENICULAR (Right)    Edel Salmon  02/18/2025

## 2025-02-18 NOTE — DISCHARGE SUMMARY
Discharge Note  Short Stay      SUMMARY     Admit Date: 2/18/2025    Attending Physician: Vijaya Landin MD    Discharge Physician: Vijaya Landin MD      Discharge Date: 2/18/2025 11:32 AM    Procedure(s) (LRB):  RADIOFREQUENCY ABLATION RIGHT GENICULAR (Right)    Final Diagnosis:  1. Chronic pain of both knees    2. Primary osteoarthritis of right knee    3. Chronic pain           Disposition: Home or self care    Patient Instructions:   Current Discharge Medication List        CONTINUE these medications which have NOT CHANGED    Details   ALPRAZolam (XANAX) 0.5 MG tablet Take daily as needed for anxiety.  Qty: 30 tablet, Refills: 5    Associated Diagnoses: Generalized anxiety disorder      azelastine (ASTELIN) 137 mcg (0.1 %) nasal spray 1 spray (137 mcg total) by Nasal route 2 (two) times daily as needed for Rhinitis.  Qty: 30 mL, Refills: 2      brimonidine (MIRVASO) 0.33 % GlwP Apply topically.      buPROPion (WELLBUTRIN XL) 300 MG 24 hr tablet Take 1 tablet (300 mg total) by mouth once daily.  Qty: 90 tablet, Refills: 3    Associated Diagnoses: Generalized anxiety disorder      butalbital-acetaminophen-caffeine -40 mg (FIORICET, ESGIC) -40 mg per tablet TAKE 1 TABLET EVERY 4 HOURS AS NEEDED  Qty: 30 tablet, Refills: 0    Associated Diagnoses: Headache, unspecified headache type      clotrimazole (LOTRIMIN) 1 % Soln APPLY TOPICALLY 2 TIMES DAILY FOR 7 DAYS  Qty: 30 mL, Refills: 2      diazePAM (VALIUM) 5 MG tablet Take 5 mg by mouth.      diclofenac sodium (VOLTAREN) 1 % Gel Apply 2 g topically daily as needed.  Qty: 100 g, Refills: 11      diflorasone-emollient (APEXICON E) 0.05 % Crea Apply 1 application topically once daily. for 7 days  Qty: 30 g, Refills: 5      docusate sodium (COLACE) 100 MG capsule Take 100 mg by mouth.      ergocalciferol (ERGOCALCIFEROL) 50,000 unit Cap Take 1 capsule (50,000 Units total) by mouth every 7 days.  Qty: 12 capsule, Refills: 1    Associated Diagnoses:  Vitamin D deficiency      estradioL (ESTRACE) 1 MG tablet Take 1 tablet (1 mg total) by mouth once daily.  Qty: 90 tablet, Refills: 3    Associated Diagnoses: Menopause syndrome      famotidine (PEPCID) 20 MG tablet Take 20 mg by mouth.      fluocinonide (LIDEX) 0.05 % external solution Apply topically 2 (two) times daily. Do not use morning of surgery      hydrocortisone 2.5 % cream Apply topically 2 (two) times daily. apply to affected area for 7 days  Qty: 20 g, Refills: 5      ketoconazole (NIZORAL) 2 % shampoo Do not use morning of surgery      levocetirizine (XYZAL) 5 MG tablet Take 1 tablet (5 mg total) by mouth every evening.  Qty: 90 tablet, Refills: 3    Associated Diagnoses: Allergic rhinitis, unspecified seasonality, unspecified trigger      levothyroxine (SYNTHROID) 112 MCG tablet TAKE 1 TABLET (112 MCG) BY MOUTH BEFORE BREAKFAST AS SCHEDULED  Qty: 90 tablet, Refills: 2      metronidazole 1% (METROGEL) 1 % Gel Apply topically once daily.  Qty: 60 g, Refills: 5      naltrexone (DEPADE) 50 mg tablet 1/4 pill twice daily  Qty: 15 tablet, Refills: 0    Associated Diagnoses: Class 1 obesity due to excess calories with serious comorbidity and body mass index (BMI) of 31.0 to 31.9 in adult      neomycin-polymyxin-dexamethasone (DEXACINE) 3.5 mg/g-10,000 unit/g-0.1 % Oint Place small amount to affected area three times daily  Qty: 3.5 g, Refills: 0      omeprazole (PRILOSEC) 10 MG capsule Take 10 mg by mouth.      ondansetron (ZOFRAN) 4 MG tablet Take 2 tablets (8 mg total) by mouth every 12 (twelve) hours as needed for Nausea.  Qty: 20 tablet, Refills: 2      oxyCODONE-acetaminophen (PERCOCET) 5-325 mg per tablet Take 1 tablet by mouth every 6 (six) hours as needed for Pain.  Qty: 30 each, Refills: 0    Comments: Quantity prescribed more than 7 day supply? Yes, quantity medically necessary, metastatic cancer patient.  Associated Diagnoses: Clear cell carcinoma; Carcinoma of right kidney; Carcinoma of kidney,  unspecified laterality; History of right nephrectomy      pravastatin (PRAVACHOL) 20 MG tablet TAKE 1 TABLET EVERY DAY  Qty: 90 tablet, Refills: 4      senna (SENOKOT) 8.6 mg tablet Take 1 tablet by mouth.      zolpidem (AMBIEN) 5 MG Tab Take 1 tablet (5 mg total) by mouth nightly as needed (insomnia).  Qty: 90 tablet, Refills: 0    Associated Diagnoses: Psychophysiological insomnia                 Discharge Diagnosis: Same as above  Condition on Discharge: Stable with no complications to procedure   Diet on Discharge: Same as before.  Activity: as per instruction sheet.  Discharge to: Home with a responsible adult.  Follow up: 2-4 weeks       Please call my office or pager at [phone] if experienced any weakness or loss of sensation, fever > 101.5, pain uncontrolled with oral medications, persistent nausea/vomiting/or diarrhea, redness or drainage from the incisions, or any other worrisome concerns. If physician on call was not reached or could not communicate with our office for any reason please go to the nearest emergency department     Edel Salmon MD

## 2025-02-19 ENCOUNTER — OFFICE VISIT (OUTPATIENT)
Dept: HEMATOLOGY/ONCOLOGY | Facility: CLINIC | Age: 75
End: 2025-02-19
Payer: MEDICARE

## 2025-02-19 DIAGNOSIS — C64.1 CARCINOMA OF RIGHT KIDNEY: ICD-10-CM

## 2025-02-19 DIAGNOSIS — C64.9 CARCINOMA OF KIDNEY, UNSPECIFIED LATERALITY: Primary | ICD-10-CM

## 2025-02-19 DIAGNOSIS — C80.1 CLEAR CELL CARCINOMA: ICD-10-CM

## 2025-02-19 DIAGNOSIS — E03.9 HYPOTHYROIDISM, UNSPECIFIED TYPE: ICD-10-CM

## 2025-02-21 ENCOUNTER — OFFICE VISIT (OUTPATIENT)
Dept: SPINE | Facility: CLINIC | Age: 75
End: 2025-02-21
Payer: MEDICARE

## 2025-02-21 DIAGNOSIS — M25.562 CHRONIC PAIN OF LEFT KNEE: Primary | ICD-10-CM

## 2025-02-21 DIAGNOSIS — G89.29 CHRONIC PAIN OF LEFT KNEE: Primary | ICD-10-CM

## 2025-02-21 NOTE — PROGRESS NOTES
Chronic Pain-Tele-Medicine-Established Note (Follow up visit)        The patient location is: Home  The chief complaint leading to consultation is: pain  Visit type: Virtual visit with synchronous audio and video  Total time spent with patient: 10 min  Each patient to whom he or she provides medical services by telemedicine is:  (1) informed of the relationship between the physician and patient and the respective role of any other health care provider with respect to management of the patient; and (2) notified that he or she may decline to receive medical services by telemedicine and may withdraw from such care at any time.      PCP: Alexandre Macias MD    REFERRING PHYSICIAN: No ref. provider found    CHIEF COMPLAINT: Bilateral knee pain    Original HISTORY OF PRESENT ILLNESS: Sherrill Avery w/ pmh of HTN, and renal cell carcinoma (kidney removed in 2013) s/p radiation in February 2022 due to METs which appeared in 2017 who presents to the clinic for the evaluation of the above pain. She had a knee replacement in June 2021, and has continued to have pain since the procedure. She states when she sits for an extended period of time and stands back up she experiences the pain around the bottom to lateral edge of her knee cap. She has a swollen area just below the L knee as well. She currently denies CP, SOB, vision changes, or speech changes.    Original Pain Description:  The pain is located in the L knee and does not radiate. The pain is described as sharp and stinging . Exacerbating factors: Sitting, Standing, Laying, Night Time, Getting out of bed/chair and going up stairs. Mitigating factors massage, medications, physical therapy, rest and sitting. Symptoms interfere with daily activity and sleeping. The patient feels like symptoms have been unchanged. Patient denies night fever/night sweats, urinary incontinence, bowel incontinence and significant motor weakness.    Original PAIN SCORES:  Best: Pain is 0  Worst:  Pain is 6  Usually: Pain is 4  Current: Pain is 2    INTERVAL HISTORY:       Interval History 9/21/22:  Mrs. Avery returns to clinic after left genicular RFA on 8/30/22. She reports significant pain relief and she is able to stand from a seated position and walk more comfortably. She is happy with the results and would like to have the same procedure on her right knee. She has had right knee pain for years and she has received multiple steroid injections which only provide about 2 weeks of pain relief. Pain is exacerbated by stairs, walking, and rising from a chair.  She denies any new weakness and numbness/tingling.     Interval History 12/13/2022:  The patient has a virtual follow up for right knee pain. She is now s/p right genicular RFA on 10/25/22 with 80% relief. She is still having benefit from left knee RFA. She is also reporting lower back pain . The pain is sharp in nature. It does not radiate. She has pain when she stands for a long time. She takes Gabapentin regularly with some benefit. She has completed PT in the past with some benefit. She did have a lumbar MRI in 2015.    Interval History 1/24/2023:  The patient returns for follow up of chronic bilateral knee and lower back pain. She has been having more left knee pain recently. She had genicular RFA 8/30/22 with significant benefit. However, she feels like the pain has started to return. She would like to repeat when possible. She still has some back pain with intermittent radiation across the back. She is taking Gabapentin 900 mg daily with some benefit and no side effects. Since previous encounter, she had a recent oncology 3 month follow up at at [facility]. She has a spot to the back and two to the lung which are being watched. She has a follow up in 3 months again. Her pain today is 4/10.    Interval History 4/4/2023:  The patient has a virtual follow up for bilateral knee pain. She is now s/p left genicular RFA with 80% relief. Her left knee  pain has significantly improved. She is feeling more pain on the right knee and lower back recently. She previously had excellent relief for right knee pain from RFA for 5 months. She would like to repeat the procedure. She is following up with [facility] next week to discuss back pain and possible radiation treatment. She stopped Gabapentin due to memory issues but her pain worsened. She restarted this week with one daily.    Interval History 6/13/2023:  The patient returns for virtual follow up for worsened knee pain, R>L. There was difficulty with her connection and the latter part of the visit was converted to audio. She has been having more sharp and aching right knee pain over the past month. It bothers her with standing and going down stairs. She has had benefit from both left and right genicular RFAs in the past. She would like to repeat the right knee RFA. She has tried stretching, PT exercises, ice and heat without benefit. She is also reporting right hand pain. No history or trauma. She would like an orthopedic referral. Her pain today is 6/10.    Interval History 12/8/2023:  The patient has a virtual appointment to discuss worsened bilateral knee pain, L>R. She has had significant benefit with genicular RFAs in the past and wishes to repeat. The pain bothers her more with standing and walking. She has some benefit with rest. Pain today is 7/10.    Interval History 6/11/2024:  The patient returns for a virtual visit today to discuss knee pain.     Interval History 9/10/2024:  The patient is has a virtual follow up for bilateral knee pain. She underwent left genicular RFA on 7/2/24 with 80% relief of left knee pain. She is also s/p right genicular RFA on 8/13/24. She is not sure how much benefit she has had for the right knee at this time. She is not interested in surgical referral at this time. Her pain is 6/10.    Interval History 12/9/2024:  The patient has a virtual follow up for knee pain. She says  that her left knee pain has been tolerable. She has been having more right knee pain recently. She previously had genicular RFA on 8/13/24 with 80% relief until recently. The pain has been returning recently. It is bothering her and affecting her activity to complete ADLs. It worsens with standing and walking. Her pain today is 8/10.    Interval History 2/21/2025:  The patient has a virtual appointment for worsened left knee pain. The pain is sharp and aching in nature. She has had excellent relief with left genicular RFAs in the past. Previous provided 80% relief for 6 months. She has tried ice, heat and stretches recently without benefit. She would like to reschedule the procedure. No additional complaints. Her pain today is 8/10.    6 weeks of Conservative therapy (PT/Chiro/Home Exercises with Dates)  PT July 2021-September 2021  PT November 2021-December 2021  PT April 2022-May 2022   12/26/23 Left genicular RFA- 80% relief  2/6/24 Right genicular RFA- 80% relief  7/2/24 Left genicular RFA- 80% relief  8/13/24 Right genicular RFA- 80% relief  2/18/25 Right genicular RFA- 80% relief for 6 months    Treatments / Medications: (Ice/Heat/NSAIDS/APAP/etc):  Ice  Heat  Massage  PT  Voltaren Gel  Gabapentin     Report: N/A      Past Medical History:   Diagnosis Date    Anxiety     Family history of malignant melanoma 08/25/2014    Fibromyalgia affecting lower leg     GERD (gastroesophageal reflux disease)     Hx of psychiatric care     Hypercholesteremia     Hypertension     Hypothyroidism     Inflamed seborrheic keratosis 08/25/2014    Microscopic hematuria 02/02/2017    Multiple benign melanocytic nevi 08/25/2014    Renal cell carcinoma of right kidney metastatic to other site     Therapy      Past Surgical History:   Procedure Laterality Date    AUGMENTATION OF BREAST      bladder lift      breast implants      BREAST SURGERY Bilateral 1996    saline implants    COLONOSCOPY  2007    HYSTERECTOMY      ectopic  pregnancy x 2, with LSO    KNEE ARTHROPLASTY Left 06/14/2021    Procedure: ARTHROPLASTY, KNEE:LEFT:DEPUY-SIGMA;  Surgeon: Osmar Avery III, MD;  Location: [facility];  Service: Orthopedics;  Laterality: Left;    LAPAROSCOPIC NEPHRECTOMY, HAND ASSISTED  07/25/2013    LUNG REMOVAL, PARTIAL Left 2020    At [facility]    NEPHRECTOMY      OOPHORECTOMY Bilateral     RADIOFREQUENCY ABLATION Left 08/30/2022    Procedure: RADIOFREQUENCY ABLATION, LEFT KNEE COOLED;  Surgeon: Vijaya Landin MD;  Location: [facility];  Service: Pain Management;  Laterality: Left;    RADIOFREQUENCY ABLATION Right 10/25/2022    Procedure: RADIOFREQUENCY ABLATION RIGHT KNEE GENICULAR COOLED;  Surgeon: Vijaya Landin MD;  Location: [facility];  Service: Pain Management;  Laterality: Right;    RADIOFREQUENCY ABLATION Left 03/07/2023    Procedure: RADIOFREQUENCY ABLATION LEFT GENICULAR COOLED RFA;  Surgeon: Vijaya Landin MD;  Location: [facility];  Service: Pain Management;  Laterality: Left;    RADIOFREQUENCY ABLATION Right 08/01/2023    Procedure: RADIOFREQUENCY ABLATION, RIGHT GENICULAR COOLED;  Surgeon: Vijaya Landin MD;  Location: [facility];  Service: Pain Management;  Laterality: Right;    RADIOFREQUENCY ABLATION Left 12/26/2023    Procedure: RADIOFREQUENCY ABLATION LEFT GENICULAR 1 OF 2;  Surgeon: Vijaya Landin MD;  Location: [facility];  Service: Pain Management;  Laterality: Left;  [phone]    RADIOFREQUENCY ABLATION Right 2/6/2024    Procedure: RADIOFREQUENCY ABLATION RIGHT GENICULAR 2 OF 2;  Surgeon: Vijaya Landin MD;  Location: [facility];  Service: Pain Management;  Laterality: Right;  [phone]  *after 2/1/24    RADIOFREQUENCY ABLATION Left 7/2/2024    Procedure: RADIOFREQUENCY ABLATION LEFT GENICULAR;  Surgeon: Vijaya Landin MD;  Location: [facility];  Service: Pain Management;  Laterality: Left;  [phone]  *SCHEDULE AFTER 6/26    RADIOFREQUENCY ABLATION Right  8/13/2024    Procedure: RADIOFREQUENCY ABLATION RIGHT GENICULAR;  Surgeon: Vijaya Landin MD;  Location: [facility];  Service: Pain Management;  Laterality: Right;  [phone]  4 WK F/U MARGARET  *SCHEDULE AFTER 8/6    RADIOFREQUENCY ABLATION Right 2/18/2025    Procedure: RADIOFREQUENCY ABLATION RIGHT GENICULAR;  Surgeon: Vijaya Landin MD;  Location: [facility];  Service: Pain Management;  Laterality: Right;  4 WK F/U MADELEINE  *SCHEDULE AFTER 2/13    right ganglion cyst      throat polyp      TRANSOBTURATOR SLING  2011    with RSO for persistent complex cyst & MARGOT; done by [facility]    UPPER GASTROINTESTINAL ENDOSCOPY  2007     Social History     Socioeconomic History    Marital status:      Spouse name: KAIDEN    Number of children: 5   Occupational History    Occupation: retired     Comment: Dance Instructor   Tobacco Use    Smoking status: Never    Smokeless tobacco: Never   Substance and Sexual Activity    Alcohol use: Yes     Alcohol/week: 0.0 standard drinks of alcohol     Comment: social    Drug use: No    Sexual activity: Yes     Partners: Male     Birth control/protection: Surgical   Social History Narrative    Patient is a retired dance instructor and live with . Has stairs at home 12- has an elevator     Social Drivers of Health     Financial Resource Strain: Low Risk  (2/19/2025)    Overall Financial Resource Strain (CARDIA)     Difficulty of Paying Living Expenses: Not hard at all   Food Insecurity: Patient Declined (2/19/2025)    Hunger Vital Sign     Worried About Running Out of Food in the Last Year: Patient declined     Ran Out of Food in the Last Year: Patient declined   Transportation Needs: Patient Declined (2/19/2025)    PRAPARE - Transportation     Lack of Transportation (Medical): Patient declined     Lack of Transportation (Non-Medical): Patient declined   Physical Activity: Inactive (2/19/2025)    Exercise Vital Sign     Days of Exercise per Week: 0 days     Minutes of  Exercise per Session: 0 min   Stress: No Stress Concern Present (2/19/2025)    [employer] - Occupational Stress Questionnaire     Feeling of Stress : Only a little   Housing Stability: Patient Declined (2/19/2025)    Housing Stability Vital Sign     Unable to Pay for Housing in the Last Year: Patient declined     Homeless in the Last Year: Patient declined     Family History   Problem Relation Name Age of Onset    Diabetes Mother      Hypertension Mother      Heart disease Mother      Cancer Father          lung    Migraines Father      Glaucoma Paternal Grandmother      Glaucoma Sister      Thyroid disease Neg Hx      Asthma Neg Hx         Review of patient's allergies indicates:   Allergen Reactions    Talwin compound Anxiety     hallucinations/anxiety    Tramadol Itching    Buspirone Anxiety    Codeine Itching    Morphine Itching     jittery    Morpholine analogues Anxiety and Itching    Naproxen Itching     Takes Ibuprofen is ok on rare occasion     Nexium [esomeprazole magnesium] Other (See Comments)     constipation    Wal-phed [pseudoephedrine hcl] Itching       Current Outpatient Medications   Medication Sig    ALPRAZolam (XANAX) 0.5 MG tablet Take daily as needed for anxiety.    azelastine (ASTELIN) 137 mcg (0.1 %) nasal spray 1 spray (137 mcg total) by Nasal route 2 (two) times daily as needed for Rhinitis.    brimonidine (MIRVASO) 0.33 % GlwP Apply topically.    buPROPion (WELLBUTRIN XL) 300 MG 24 hr tablet Take 1 tablet (300 mg total) by mouth once daily.    butalbital-acetaminophen-caffeine -40 mg (FIORICET, ESGIC) -40 mg per tablet TAKE 1 TABLET EVERY 4 HOURS AS NEEDED    clotrimazole (LOTRIMIN) 1 % Soln APPLY TOPICALLY 2 TIMES DAILY FOR 7 DAYS    diazePAM (VALIUM) 5 MG tablet Take 5 mg by mouth.    diclofenac sodium (VOLTAREN) 1 % Gel Apply 2 g topically daily as needed.    diflorasone-emollient (APEXICON E) 0.05 % Crea Apply 1 application topically once  daily. for 7 days    docusate sodium (COLACE) 100 MG capsule Take 100 mg by mouth.    ergocalciferol (ERGOCALCIFEROL) 50,000 unit Cap Take 1 capsule (50,000 Units total) by mouth every 7 days.    estradioL (ESTRACE) 1 MG tablet Take 1 tablet (1 mg total) by mouth once daily.    famotidine (PEPCID) 20 MG tablet Take 20 mg by mouth.    fluocinonide (LIDEX) 0.05 % external solution Apply topically 2 (two) times daily. Do not use morning of surgery    hydrocortisone 2.5 % cream Apply topically 2 (two) times daily. apply to affected area for 7 days    ketoconazole (NIZORAL) 2 % shampoo Do not use morning of surgery    levocetirizine (XYZAL) 5 MG tablet Take 1 tablet (5 mg total) by mouth every evening.    levothyroxine (SYNTHROID) 112 MCG tablet TAKE 1 TABLET (112 MCG) BY MOUTH BEFORE BREAKFAST AS SCHEDULED    metronidazole 1% (METROGEL) 1 % Gel Apply topically once daily.    naltrexone (DEPADE) 50 mg tablet 1/4 pill twice daily    neomycin-polymyxin-dexamethasone (DEXACINE) 3.5 mg/g-10,000 unit/g-0.1 % Oint Place small amount to affected area three times daily    omeprazole (PRILOSEC) 10 MG capsule Take 10 mg by mouth.    ondansetron (ZOFRAN) 4 MG tablet Take 2 tablets (8 mg total) by mouth every 12 (twelve) hours as needed for Nausea.    oxyCODONE-acetaminophen (PERCOCET) 5-325 mg per tablet Take 1 tablet by mouth every 6 (six) hours as needed for Pain.    pravastatin (PRAVACHOL) 20 MG tablet TAKE 1 TABLET EVERY DAY    senna (SENOKOT) 8.6 mg tablet Take 1 tablet by mouth.    zolpidem (AMBIEN) 5 MG Tab Take 1 tablet (5 mg total) by mouth nightly as needed (insomnia).     No current facility-administered medications for this visit.     Facility-Administered Medications Ordered in Other Visits   Medication    triamcinolone acetonide injection 40 mg       ROS:  GENERAL: No fever. No chills. No fatigue. Denies weight loss. Denies weight gain.  HEENT: Denies headaches. Denies vision change. Denies eye pain. Denies double  vision. Denies ear pain.   CV: Denies chest pain.   PULM: Denies of shortness of breath.  GI: Denies constipation. No diarrhea. No abdominal pain. Denies nausea. Denies vomiting. No blood in stool.  HEME: Denies bleeding problems.  : Denies urgency. No painful urination. No blood in urine.  MS: Denies joint stiffness. Denies joint swelling.  Denies back pain.  SKIN: Denies rash.   NEURO: Denies seizures. No weakness.  PSYCH:  Denies difficulty sleeping. No anxiety. Denies depression. No suicidal thoughts.     PHYSICAL EXAMINATION:    General appearance: Well appearing, in no acute distress, alert and oriented x3.  Psych:  Mood and affect appropriate.  Skin: Skin color normal, no rashes or lesions, in both upper and lower body.  Extremities: Moves all visualized extremities freely.    PREVIOUS PHYSICAL EXAM:   GENERAL: Well appearing, in no acute distress, alert and oriented x3.  PSYCH:  Mood and affect appropriate.  SKIN: Normal turgor and coloration.  HEENT:  Normocephalic, atraumatic. Cranial nerves grossly intact.  NECK: No pain to palpation over the cervical paraspinous muscles. No pain to palpation over facets. No pain with neck flexion, extension, or lateral flexion.   PULM: No evidence of respiratory difficulty, symmetric chest rise.  GI:  Non-distended  BACK: Normal range of motion. No pain to palpation over the spinous processes. No pain to palpation over facet joints. There is no pain with palpation over the sacroiliac joints bilaterally. Straight leg raising in the supine position is negative to radicular pain.   EXTREMITIES: +ttp left medial joint line and pain with patella ROM. Swollen 3 x 3 inch area just below L patella.   MUSCULOSKELETAL:  Bilateral lower extremity strength is normal and symmetric, with some pain limitation with movement of L knee joint. No atrophy is noted.  NEURO: No change in sensation.  GAIT: Antalgic and slow.    IMAGING:      X- Ray knee ortho BL 5/17/22    Narrative &  Impression  EXAMINATION:  XR KNEE ORTHO BILAT WITH FLEXION     CLINICAL HISTORY:  Presence of left artificial knee joint     TECHNIQUE:  AP standing of both knees, PA flexion standing views of both knees, and Merchant views of both knees were performed.  Lateral views of both knees were also performed.     COMPARISON:  No 07/27/2021 ne     FINDINGS:  Left knee arthroplasty identified.  The position and alignment is satisfactory and unchanged as compared to the previous study.     DJD involving the right knee with narrowing of the patellofemoral and the medial tibiofemoral joint space.  No fracture or bone destruction identified     Impression:  Patient with symptoms, imaging, and PE consistent with:    1. Chronic pain of left knee  Procedure Order to Pain Management            ASSESSMENT: 74 y.o. year old female  with bilateral knee pain.     PLAN:  Schedule for repeat left genicular cooled RFAs. Previous provided 80% relief for 6 months.  Continue current medications.  She may pursue TKA in the future. Will continue follow up with ortho.  The patient will continue a home exercise routine to help with pain and strengthening.    RTC 4 weeks after RFA.      The above plan and management options were discussed at length with patient. Patient is in agreement with the above and verbalized understanding.     Dona Arteaga, SAMAN  02/21/2025

## 2025-02-21 NOTE — H&P (VIEW-ONLY)
Chronic Pain-Tele-Medicine-Established Note (Follow up visit)        The patient location is: Home  The chief complaint leading to consultation is: pain  Visit type: Virtual visit with synchronous audio and video  Total time spent with patient: 10 min  Each patient to whom he or she provides medical services by telemedicine is:  (1) informed of the relationship between the physician and patient and the respective role of any other health care provider with respect to management of the patient; and (2) notified that he or she may decline to receive medical services by telemedicine and may withdraw from such care at any time.      PCP: Alexandre Macias MD    REFERRING PHYSICIAN: No ref. provider found    CHIEF COMPLAINT: Bilateral knee pain    Original HISTORY OF PRESENT ILLNESS: Sherrill Avery w/ pmh of HTN, and renal cell carcinoma (kidney removed in 2013) s/p radiation in February 2022 due to METs which appeared in 2017 who presents to the clinic for the evaluation of the above pain. She had a knee replacement in June 2021, and has continued to have pain since the procedure. She states when she sits for an extended period of time and stands back up she experiences the pain around the bottom to lateral edge of her knee cap. She has a swollen area just below the L knee as well. She currently denies CP, SOB, vision changes, or speech changes.    Original Pain Description:  The pain is located in the L knee and does not radiate. The pain is described as sharp and stinging . Exacerbating factors: Sitting, Standing, Laying, Night Time, Getting out of bed/chair and going up stairs. Mitigating factors massage, medications, physical therapy, rest and sitting. Symptoms interfere with daily activity and sleeping. The patient feels like symptoms have been unchanged. Patient denies night fever/night sweats, urinary incontinence, bowel incontinence and significant motor weakness.    Original PAIN SCORES:  Best: Pain is 0  Worst:  Pain is 6  Usually: Pain is 4  Current: Pain is 2    INTERVAL HISTORY:       Interval History 9/21/22:  Mrs. Avery returns to clinic after left genicular RFA on 8/30/22. She reports significant pain relief and she is able to stand from a seated position and walk more comfortably. She is happy with the results and would like to have the same procedure on her right knee. She has had right knee pain for years and she has received multiple steroid injections which only provide about 2 weeks of pain relief. Pain is exacerbated by stairs, walking, and rising from a chair.  She denies any new weakness and numbness/tingling.     Interval History 12/13/2022:  The patient has a virtual follow up for right knee pain. She is now s/p right genicular RFA on 10/25/22 with 80% relief. She is still having benefit from left knee RFA. She is also reporting lower back pain . The pain is sharp in nature. It does not radiate. She has pain when she stands for a long time. She takes Gabapentin regularly with some benefit. She has completed PT in the past with some benefit. She did have a lumbar MRI in 2015.    Interval History 1/24/2023:  The patient returns for follow up of chronic bilateral knee and lower back pain. She has been having more left knee pain recently. She had genicular RFA 8/30/22 with significant benefit. However, she feels like the pain has started to return. She would like to repeat when possible. She still has some back pain with intermittent radiation across the back. She is taking Gabapentin 900 mg daily with some benefit and no side effects. Since previous encounter, she had a recent oncology 3 month follow up at at [facility]. She has a spot to the back and two to the lung which are being watched. She has a follow up in 3 months again. Her pain today is 4/10.    Interval History 4/4/2023:  The patient has a virtual follow up for bilateral knee pain. She is now s/p left genicular RFA with 80% relief. Her left knee  pain has significantly improved. She is feeling more pain on the right knee and lower back recently. She previously had excellent relief for right knee pain from RFA for 5 months. She would like to repeat the procedure. She is following up with [facility] next week to discuss back pain and possible radiation treatment. She stopped Gabapentin due to memory issues but her pain worsened. She restarted this week with one daily.    Interval History 6/13/2023:  The patient returns for virtual follow up for worsened knee pain, R>L. There was difficulty with her connection and the latter part of the visit was converted to audio. She has been having more sharp and aching right knee pain over the past month. It bothers her with standing and going down stairs. She has had benefit from both left and right genicular RFAs in the past. She would like to repeat the right knee RFA. She has tried stretching, PT exercises, ice and heat without benefit. She is also reporting right hand pain. No history or trauma. She would like an orthopedic referral. Her pain today is 6/10.    Interval History 12/8/2023:  The patient has a virtual appointment to discuss worsened bilateral knee pain, L>R. She has had significant benefit with genicular RFAs in the past and wishes to repeat. The pain bothers her more with standing and walking. She has some benefit with rest. Pain today is 7/10.    Interval History 6/11/2024:  The patient returns for a virtual visit today to discuss knee pain.     Interval History 9/10/2024:  The patient is has a virtual follow up for bilateral knee pain. She underwent left genicular RFA on 7/2/24 with 80% relief of left knee pain. She is also s/p right genicular RFA on 8/13/24. She is not sure how much benefit she has had for the right knee at this time. She is not interested in surgical referral at this time. Her pain is 6/10.    Interval History 12/9/2024:  The patient has a virtual follow up for knee pain. She says  that her left knee pain has been tolerable. She has been having more right knee pain recently. She previously had genicular RFA on 8/13/24 with 80% relief until recently. The pain has been returning recently. It is bothering her and affecting her activity to complete ADLs. It worsens with standing and walking. Her pain today is 8/10.    Interval History 2/21/2025:  The patient has a virtual appointment for worsened left knee pain. The pain is sharp and aching in nature. She has had excellent relief with left genicular RFAs in the past. Previous provided 80% relief for 6 months. She has tried ice, heat and stretches recently without benefit. She would like to reschedule the procedure. No additional complaints. Her pain today is 8/10.    6 weeks of Conservative therapy (PT/Chiro/Home Exercises with Dates)  PT July 2021-September 2021  PT November 2021-December 2021  PT April 2022-May 2022   12/26/23 Left genicular RFA- 80% relief  2/6/24 Right genicular RFA- 80% relief  7/2/24 Left genicular RFA- 80% relief  8/13/24 Right genicular RFA- 80% relief  2/18/25 Right genicular RFA- 80% relief for 6 months    Treatments / Medications: (Ice/Heat/NSAIDS/APAP/etc):  Ice  Heat  Massage  PT  Voltaren Gel  Gabapentin     Report: N/A      Past Medical History:   Diagnosis Date    Anxiety     Family history of malignant melanoma 08/25/2014    Fibromyalgia affecting lower leg     GERD (gastroesophageal reflux disease)     Hx of psychiatric care     Hypercholesteremia     Hypertension     Hypothyroidism     Inflamed seborrheic keratosis 08/25/2014    Microscopic hematuria 02/02/2017    Multiple benign melanocytic nevi 08/25/2014    Renal cell carcinoma of right kidney metastatic to other site     Therapy      Past Surgical History:   Procedure Laterality Date    AUGMENTATION OF BREAST      bladder lift      breast implants      BREAST SURGERY Bilateral 1996    saline implants    COLONOSCOPY  2007    HYSTERECTOMY      ectopic  pregnancy x 2, with LSO    KNEE ARTHROPLASTY Left 06/14/2021    Procedure: ARTHROPLASTY, KNEE:LEFT:DEPUY-SIGMA;  Surgeon: Osmar Avery III, MD;  Location: [facility];  Service: Orthopedics;  Laterality: Left;    LAPAROSCOPIC NEPHRECTOMY, HAND ASSISTED  07/25/2013    LUNG REMOVAL, PARTIAL Left 2020    At [facility]    NEPHRECTOMY      OOPHORECTOMY Bilateral     RADIOFREQUENCY ABLATION Left 08/30/2022    Procedure: RADIOFREQUENCY ABLATION, LEFT KNEE COOLED;  Surgeon: Vijaya Landin MD;  Location: [facility];  Service: Pain Management;  Laterality: Left;    RADIOFREQUENCY ABLATION Right 10/25/2022    Procedure: RADIOFREQUENCY ABLATION RIGHT KNEE GENICULAR COOLED;  Surgeon: Vijaya Landin MD;  Location: [facility];  Service: Pain Management;  Laterality: Right;    RADIOFREQUENCY ABLATION Left 03/07/2023    Procedure: RADIOFREQUENCY ABLATION LEFT GENICULAR COOLED RFA;  Surgeon: Vijaya Landin MD;  Location: [facility];  Service: Pain Management;  Laterality: Left;    RADIOFREQUENCY ABLATION Right 08/01/2023    Procedure: RADIOFREQUENCY ABLATION, RIGHT GENICULAR COOLED;  Surgeon: Vijaya Landin MD;  Location: [facility];  Service: Pain Management;  Laterality: Right;    RADIOFREQUENCY ABLATION Left 12/26/2023    Procedure: RADIOFREQUENCY ABLATION LEFT GENICULAR 1 OF 2;  Surgeon: Vijaya Landin MD;  Location: [facility];  Service: Pain Management;  Laterality: Left;  [phone]    RADIOFREQUENCY ABLATION Right 2/6/2024    Procedure: RADIOFREQUENCY ABLATION RIGHT GENICULAR 2 OF 2;  Surgeon: Vijaya Landin MD;  Location: [facility];  Service: Pain Management;  Laterality: Right;  [phone]  *after 2/1/24    RADIOFREQUENCY ABLATION Left 7/2/2024    Procedure: RADIOFREQUENCY ABLATION LEFT GENICULAR;  Surgeon: Vijaya Landin MD;  Location: [facility];  Service: Pain Management;  Laterality: Left;  [phone]  *SCHEDULE AFTER 6/26    RADIOFREQUENCY ABLATION Right  8/13/2024    Procedure: RADIOFREQUENCY ABLATION RIGHT GENICULAR;  Surgeon: Vijaya Landin MD;  Location: [facility];  Service: Pain Management;  Laterality: Right;  [phone]  4 WK F/U MARGARET  *SCHEDULE AFTER 8/6    RADIOFREQUENCY ABLATION Right 2/18/2025    Procedure: RADIOFREQUENCY ABLATION RIGHT GENICULAR;  Surgeon: Vijaya Landin MD;  Location: [facility];  Service: Pain Management;  Laterality: Right;  4 WK F/U MADELEINE  *SCHEDULE AFTER 2/13    right ganglion cyst      throat polyp      TRANSOBTURATOR SLING  2011    with RSO for persistent complex cyst & MARGOT; done by [facility]    UPPER GASTROINTESTINAL ENDOSCOPY  2007     Social History     Socioeconomic History    Marital status:      Spouse name: KAIDEN    Number of children: 5   Occupational History    Occupation: retired     Comment: Dance Instructor   Tobacco Use    Smoking status: Never    Smokeless tobacco: Never   Substance and Sexual Activity    Alcohol use: Yes     Alcohol/week: 0.0 standard drinks of alcohol     Comment: social    Drug use: No    Sexual activity: Yes     Partners: Male     Birth control/protection: Surgical   Social History Narrative    Patient is a retired dance instructor and live with . Has stairs at home 12- has an elevator     Social Drivers of Health     Financial Resource Strain: Low Risk  (2/19/2025)    Overall Financial Resource Strain (CARDIA)     Difficulty of Paying Living Expenses: Not hard at all   Food Insecurity: Patient Declined (2/19/2025)    Hunger Vital Sign     Worried About Running Out of Food in the Last Year: Patient declined     Ran Out of Food in the Last Year: Patient declined   Transportation Needs: Patient Declined (2/19/2025)    PRAPARE - Transportation     Lack of Transportation (Medical): Patient declined     Lack of Transportation (Non-Medical): Patient declined   Physical Activity: Inactive (2/19/2025)    Exercise Vital Sign     Days of Exercise per Week: 0 days     Minutes of  Exercise per Session: 0 min   Stress: No Stress Concern Present (2/19/2025)    [place] [employer] - Occupational Stress Questionnaire     Feeling of Stress : Only a little   Housing Stability: Patient Declined (2/19/2025)    Housing Stability Vital Sign     Unable to Pay for Housing in the Last Year: Patient declined     Homeless in the Last Year: Patient declined     Family History   Problem Relation Name Age of Onset    Diabetes Mother      Hypertension Mother      Heart disease Mother      Cancer Father          lung    Migraines Father      Glaucoma Paternal Grandmother      Glaucoma Sister      Thyroid disease Neg Hx      Asthma Neg Hx         Review of patient's allergies indicates:   Allergen Reactions    Talwin compound Anxiety     hallucinations/anxiety    Tramadol Itching    Buspirone Anxiety    Codeine Itching    Morphine Itching     jittery    Morpholine analogues Anxiety and Itching    Naproxen Itching     Takes Ibuprofen is ok on rare occasion     Nexium [esomeprazole magnesium] Other (See Comments)     constipation    Wal-phed [pseudoephedrine hcl] Itching       Current Outpatient Medications   Medication Sig    ALPRAZolam (XANAX) 0.5 MG tablet Take daily as needed for anxiety.    azelastine (ASTELIN) 137 mcg (0.1 %) nasal spray 1 spray (137 mcg total) by Nasal route 2 (two) times daily as needed for Rhinitis.    brimonidine (MIRVASO) 0.33 % GlwP Apply topically.    buPROPion (WELLBUTRIN XL) 300 MG 24 hr tablet Take 1 tablet (300 mg total) by mouth once daily.    butalbital-acetaminophen-caffeine -40 mg (FIORICET, ESGIC) -40 mg per tablet TAKE 1 TABLET EVERY 4 HOURS AS NEEDED    clotrimazole (LOTRIMIN) 1 % Soln APPLY TOPICALLY 2 TIMES DAILY FOR 7 DAYS    diazePAM (VALIUM) 5 MG tablet Take 5 mg by mouth.    diclofenac sodium (VOLTAREN) 1 % Gel Apply 2 g topically daily as needed.    diflorasone-emollient (APEXICON E) 0.05 % Crea Apply 1 application topically once  daily. for 7 days    docusate sodium (COLACE) 100 MG capsule Take 100 mg by mouth.    ergocalciferol (ERGOCALCIFEROL) 50,000 unit Cap Take 1 capsule (50,000 Units total) by mouth every 7 days.    estradioL (ESTRACE) 1 MG tablet Take 1 tablet (1 mg total) by mouth once daily.    famotidine (PEPCID) 20 MG tablet Take 20 mg by mouth.    fluocinonide (LIDEX) 0.05 % external solution Apply topically 2 (two) times daily. Do not use morning of surgery    hydrocortisone 2.5 % cream Apply topically 2 (two) times daily. apply to affected area for 7 days    ketoconazole (NIZORAL) 2 % shampoo Do not use morning of surgery    levocetirizine (XYZAL) 5 MG tablet Take 1 tablet (5 mg total) by mouth every evening.    levothyroxine (SYNTHROID) 112 MCG tablet TAKE 1 TABLET (112 MCG) BY MOUTH BEFORE BREAKFAST AS SCHEDULED    metronidazole 1% (METROGEL) 1 % Gel Apply topically once daily.    naltrexone (DEPADE) 50 mg tablet 1/4 pill twice daily    neomycin-polymyxin-dexamethasone (DEXACINE) 3.5 mg/g-10,000 unit/g-0.1 % Oint Place small amount to affected area three times daily    omeprazole (PRILOSEC) 10 MG capsule Take 10 mg by mouth.    ondansetron (ZOFRAN) 4 MG tablet Take 2 tablets (8 mg total) by mouth every 12 (twelve) hours as needed for Nausea.    oxyCODONE-acetaminophen (PERCOCET) 5-325 mg per tablet Take 1 tablet by mouth every 6 (six) hours as needed for Pain.    pravastatin (PRAVACHOL) 20 MG tablet TAKE 1 TABLET EVERY DAY    senna (SENOKOT) 8.6 mg tablet Take 1 tablet by mouth.    zolpidem (AMBIEN) 5 MG Tab Take 1 tablet (5 mg total) by mouth nightly as needed (insomnia).     No current facility-administered medications for this visit.     Facility-Administered Medications Ordered in Other Visits   Medication    triamcinolone acetonide injection 40 mg       ROS:  GENERAL: No fever. No chills. No fatigue. Denies weight loss. Denies weight gain.  HEENT: Denies headaches. Denies vision change. Denies eye pain. Denies double  vision. Denies ear pain.   CV: Denies chest pain.   PULM: Denies of shortness of breath.  GI: Denies constipation. No diarrhea. No abdominal pain. Denies nausea. Denies vomiting. No blood in stool.  HEME: Denies bleeding problems.  : Denies urgency. No painful urination. No blood in urine.  MS: Denies joint stiffness. Denies joint swelling.  Denies back pain.  SKIN: Denies rash.   NEURO: Denies seizures. No weakness.  PSYCH:  Denies difficulty sleeping. No anxiety. Denies depression. No suicidal thoughts.     PHYSICAL EXAMINATION:    General appearance: Well appearing, in no acute distress, alert and oriented x3.  Psych:  Mood and affect appropriate.  Skin: Skin color normal, no rashes or lesions, in both upper and lower body.  Extremities: Moves all visualized extremities freely.    PREVIOUS PHYSICAL EXAM:   GENERAL: Well appearing, in no acute distress, alert and oriented x3.  PSYCH:  Mood and affect appropriate.  SKIN: Normal turgor and coloration.  HEENT:  Normocephalic, atraumatic. Cranial nerves grossly intact.  NECK: No pain to palpation over the cervical paraspinous muscles. No pain to palpation over facets. No pain with neck flexion, extension, or lateral flexion.   PULM: No evidence of respiratory difficulty, symmetric chest rise.  GI:  Non-distended  BACK: Normal range of motion. No pain to palpation over the spinous processes. No pain to palpation over facet joints. There is no pain with palpation over the sacroiliac joints bilaterally. Straight leg raising in the supine position is negative to radicular pain.   EXTREMITIES: +ttp left medial joint line and pain with patella ROM. Swollen 3 x 3 inch area just below L patella.   MUSCULOSKELETAL:  Bilateral lower extremity strength is normal and symmetric, with some pain limitation with movement of L knee joint. No atrophy is noted.  NEURO: No change in sensation.  GAIT: Antalgic and slow.    IMAGING:      X- Ray knee ortho BL 5/17/22    Narrative &  Impression  EXAMINATION:  XR KNEE ORTHO BILAT WITH FLEXION     CLINICAL HISTORY:  Presence of left artificial knee joint     TECHNIQUE:  AP standing of both knees, PA flexion standing views of both knees, and Merchant views of both knees were performed.  Lateral views of both knees were also performed.     COMPARISON:  No 07/27/2021 ne     FINDINGS:  Left knee arthroplasty identified.  The position and alignment is satisfactory and unchanged as compared to the previous study.     DJD involving the right knee with narrowing of the patellofemoral and the medial tibiofemoral joint space.  No fracture or bone destruction identified     Impression:  Patient with symptoms, imaging, and PE consistent with:    1. Chronic pain of left knee  Procedure Order to Pain Management            ASSESSMENT: 74 y.o. year old female  with bilateral knee pain.     PLAN:  Schedule for repeat left genicular cooled RFAs. Previous provided 80% relief for 6 months.  Continue current medications.  She may pursue TKA in the future. Will continue follow up with ortho.  The patient will continue a home exercise routine to help with pain and strengthening.    RTC 4 weeks after RFA.      The above plan and management options were discussed at length with patient. Patient is in agreement with the above and verbalized understanding.     Dona Arteaga, SAMAN  02/21/2025

## 2025-02-24 ENCOUNTER — PATIENT MESSAGE (OUTPATIENT)
Dept: PAIN MEDICINE | Facility: OTHER | Age: 75
End: 2025-02-24
Payer: MEDICARE

## 2025-03-04 DIAGNOSIS — F41.1 GENERALIZED ANXIETY DISORDER: Chronic | ICD-10-CM

## 2025-03-04 RX ORDER — BUPROPION HYDROCHLORIDE 300 MG/1
300 TABLET ORAL DAILY
Qty: 90 TABLET | Refills: 3 | Status: SHIPPED | OUTPATIENT
Start: 2025-03-04

## 2025-03-07 ENCOUNTER — HOSPITAL ENCOUNTER (OUTPATIENT)
Facility: OTHER | Age: 75
Discharge: HOME | End: 2025-03-07
Attending: ANESTHESIOLOGY | Admitting: ANESTHESIOLOGY
Payer: MEDICARE

## 2025-03-07 VITALS
HEART RATE: 70 BPM | TEMPERATURE: 98 F | SYSTOLIC BLOOD PRESSURE: 147 MMHG | DIASTOLIC BLOOD PRESSURE: 74 MMHG | RESPIRATION RATE: 18 BRPM | OXYGEN SATURATION: 97 %

## 2025-03-07 DIAGNOSIS — Z96.652 CHRONIC KNEE PAIN AFTER TOTAL REPLACEMENT OF LEFT KNEE JOINT: Primary | ICD-10-CM

## 2025-03-07 DIAGNOSIS — M25.562 CHRONIC KNEE PAIN AFTER TOTAL REPLACEMENT OF LEFT KNEE JOINT: Primary | ICD-10-CM

## 2025-03-07 DIAGNOSIS — G89.29 CHRONIC KNEE PAIN AFTER TOTAL REPLACEMENT OF LEFT KNEE JOINT: Primary | ICD-10-CM

## 2025-03-07 DIAGNOSIS — G89.29 CHRONIC PAIN: ICD-10-CM

## 2025-03-07 PROCEDURE — 64624 DSTRJ NULYT AGT GNCLR NRV: CPT | Mod: LT | Performed by: ANESTHESIOLOGY

## 2025-03-07 PROCEDURE — 64624 DSTRJ NULYT AGT GNCLR NRV: CPT | Mod: LT,,, | Performed by: ANESTHESIOLOGY

## 2025-03-07 PROCEDURE — A4649 SURGICAL SUPPLIES: HCPCS | Performed by: ANESTHESIOLOGY

## 2025-03-07 PROCEDURE — 99152 MOD SED SAME PHYS/QHP 5/>YRS: CPT | Performed by: ANESTHESIOLOGY

## 2025-03-07 PROCEDURE — 63600175 PHARM REV CODE 636 W HCPCS: Performed by: ANESTHESIOLOGY

## 2025-03-07 PROCEDURE — 99152 MOD SED SAME PHYS/QHP 5/>YRS: CPT | Mod: ,,, | Performed by: ANESTHESIOLOGY

## 2025-03-07 PROCEDURE — 99153 MOD SED SAME PHYS/QHP EA: CPT | Performed by: ANESTHESIOLOGY

## 2025-03-07 RX ORDER — ONDANSETRON HYDROCHLORIDE 2 MG/ML
INJECTION, SOLUTION INTRAVENOUS
Status: DISCONTINUED | OUTPATIENT
Start: 2025-03-07 | End: 2025-03-07 | Stop reason: HOSPADM

## 2025-03-07 RX ORDER — SODIUM CHLORIDE 9 MG/ML
INJECTION, SOLUTION INTRAVENOUS CONTINUOUS
Status: DISCONTINUED | OUTPATIENT
Start: 2025-03-07 | End: 2025-03-07 | Stop reason: HOSPADM

## 2025-03-07 RX ORDER — FENTANYL CITRATE 50 UG/ML
INJECTION, SOLUTION INTRAMUSCULAR; INTRAVENOUS
Status: DISCONTINUED | OUTPATIENT
Start: 2025-03-07 | End: 2025-03-07 | Stop reason: HOSPADM

## 2025-03-07 RX ORDER — MIDAZOLAM HYDROCHLORIDE 1 MG/ML
INJECTION INTRAMUSCULAR; INTRAVENOUS
Status: DISCONTINUED | OUTPATIENT
Start: 2025-03-07 | End: 2025-03-07 | Stop reason: HOSPADM

## 2025-03-07 RX ORDER — BUPIVACAINE HYDROCHLORIDE 2.5 MG/ML
INJECTION, SOLUTION EPIDURAL; INFILTRATION; INTRACAUDAL; PERINEURAL
Status: DISCONTINUED | OUTPATIENT
Start: 2025-03-07 | End: 2025-03-07 | Stop reason: HOSPADM

## 2025-03-07 RX ORDER — LIDOCAINE HYDROCHLORIDE 20 MG/ML
INJECTION, SOLUTION INFILTRATION; PERINEURAL
Status: DISCONTINUED | OUTPATIENT
Start: 2025-03-07 | End: 2025-03-07 | Stop reason: HOSPADM

## 2025-03-07 RX ORDER — TRIAMCINOLONE ACETONIDE 40 MG/ML
INJECTION, SUSPENSION INTRA-ARTICULAR; INTRAMUSCULAR
Status: DISCONTINUED | OUTPATIENT
Start: 2025-03-07 | End: 2025-03-07 | Stop reason: HOSPADM

## 2025-03-07 NOTE — DISCHARGE INSTRUCTIONS

## 2025-03-07 NOTE — OP NOTE
Therapeutic Genicular Cooled Nerve Radiofrequency Ablation under Fluoroscopy     The procedure, risks, benefits, and options were discussed with the patient. There are no contraindications to the procedure. The patent expressed understanding and agreed to the procedure. Informed written consent was obtained prior to the start of the procedure and can be found in the patient's chart.        PATIENT NAME: Sherrill Avery   MRN: [id number]     DATE OF PROCEDURE: 03/07/2025     PROCEDURE: Therapeutic Left Genicular Cooled Nerve Radiofrequency Ablation under Fluoroscopy    PRE-OP DIAGNOSIS: Chronic pain of left knee [M25.562, G89.29]    POST-OP DIAGNOSIS: Chronic pain of left knee [M25.562, G89.29]    PHYSICIAN: Vijaya Landin MD    ASSISTANTS: Jozef Lindsay MD  [employer] Pain Medicine Fellow      MEDICATIONS INJECTED:  Preservative-free Kenalog 40mg with 9cc of Bupivicaine 0.25%    LOCAL ANESTHETIC INJECTED:   Xylocaine 2%    SEDATION: Versed 2mg and Fentanyl 100mcg                                                                                                                                                                                     Conscious sedation ordered by M.D. Patient re-evaluation prior to administration of conscious sedation. No changes noted in patient's status from initial evaluation. The patient's vital signs were monitored by RN and patient remained hemodynamically stable throughout the procedure.    Event Time In   Sedation Start 1215   Sedation End 1242       ESTIMATED BLOOD LOSS:  None    COMPLICATIONS:  None     INTERVAL HISTORY: Patient has clinical findings of chronic knee pain. Patients has completed 2 previous diagnostic genicular nerve blocks with at least 80% relief for the expected duration of the local anesthetic utilized.     TECHNIQUE: Time-out was performed to identify the patient and procedure to be performed. With the patient laying in a supine position, the surgical area was prepped and draped  in the usual sterile fashion using ChloraPrep and fenestrated drape. Three target sites including the superior lateral genicular nerve where the lateral femoral shaft meets the epicondyle, the superior medial genicular nerve where the medial femoral shaft meets the epicondyle, and the inferior medial genicular nerve where the medial tibial shaft meets the epicondyle, were determined under fluoroscopic guidance. Skin anesthesia was achieved by injecting Lidocaine 2% over the injection sites. A 17 gauge, 50mm, 10mm active tip needle was then advanced under fluoroscopy in the AP and lateral views into the positions of the geniculate nerves at these levels. This was followed by motor testing at each of the nerves to ensure that there was no dorsiflexion of the foot. After negative aspiration for blood was confirmed, 1 mL of the lidocaine 2% listed above was injected slowly at each site. This was followed by cooled thermal lesioning at 80 degrees celsius for 150 seconds at each site. That was followed by slowly injecting 1 mL of the medication mixture listed above at each site. The needles were removed and bleeding was nil. A sterile dressing was applied. No specimens collected. The patient tolerated the procedure well and did not have any procedure related motor deficit at the conclusion of the procedure.      The patient was monitored after the procedure in the recovery area. They were given post-procedure and discharge instructions to follow at home. The patient was discharged in a stable condition.    Torrey Hollins MD     I reviewed and edited the fellow's note. I conducted my own interview and physical examination. I agree with the findings. I was present and supervising all critical portions of the procedure.

## 2025-03-07 NOTE — DISCHARGE SUMMARY
Discharge Note  Short Stay      SUMMARY     Admit Date: 3/7/2025    Attending Physician: Vijaya Landin MD    Discharge Physician: Vijaya Landin MD      Discharge Date: 3/7/2025 12:14 PM    Procedure(s) (LRB):  RADIOFREQUENCY ABLATION LEFT GENICULAR (Left)    Final Diagnosis: Chronic pain of left knee [M25.562, G89.29]    Disposition: Home or self care    Patient Instructions:   Current Discharge Medication List        CONTINUE these medications which have NOT CHANGED    Details   ALPRAZolam (XANAX) 0.5 MG tablet Take daily as needed for anxiety.  Qty: 30 tablet, Refills: 5    Associated Diagnoses: Generalized anxiety disorder      azelastine (ASTELIN) 137 mcg (0.1 %) nasal spray 1 spray (137 mcg total) by Nasal route 2 (two) times daily as needed for Rhinitis.  Qty: 30 mL, Refills: 2      brimonidine (MIRVASO) 0.33 % GlwP Apply topically.      buPROPion (WELLBUTRIN XL) 300 MG 24 hr tablet Take 1 tablet (300 mg total) by mouth once daily.  Qty: 90 tablet, Refills: 3    Associated Diagnoses: Generalized anxiety disorder      butalbital-acetaminophen-caffeine -40 mg (FIORICET, ESGIC) -40 mg per tablet TAKE 1 TABLET EVERY 4 HOURS AS NEEDED  Qty: 30 tablet, Refills: 0    Associated Diagnoses: Headache, unspecified headache type      clotrimazole (LOTRIMIN) 1 % Soln APPLY TOPICALLY 2 TIMES DAILY FOR 7 DAYS  Qty: 30 mL, Refills: 2      diazePAM (VALIUM) 5 MG tablet Take 5 mg by mouth.      diclofenac sodium (VOLTAREN) 1 % Gel Apply 2 g topically daily as needed.  Qty: 100 g, Refills: 11      diflorasone-emollient (APEXICON E) 0.05 % Crea Apply 1 application topically once daily. for 7 days  Qty: 30 g, Refills: 5      docusate sodium (COLACE) 100 MG capsule Take 100 mg by mouth.      ergocalciferol (ERGOCALCIFEROL) 50,000 unit Cap Take 1 capsule (50,000 Units total) by mouth every 7 days.  Qty: 12 capsule, Refills: 1    Associated Diagnoses: Vitamin D deficiency      estradioL (ESTRACE) 1 MG tablet Take 1  tablet (1 mg total) by mouth once daily.  Qty: 90 tablet, Refills: 3    Associated Diagnoses: Menopause syndrome      famotidine (PEPCID) 20 MG tablet Take 20 mg by mouth.      fluocinonide (LIDEX) 0.05 % external solution Apply topically 2 (two) times daily. Do not use morning of surgery      hydrocortisone 2.5 % cream Apply topically 2 (two) times daily. apply to affected area for 7 days  Qty: 20 g, Refills: 5      ketoconazole (NIZORAL) 2 % shampoo Do not use morning of surgery      levocetirizine (XYZAL) 5 MG tablet Take 1 tablet (5 mg total) by mouth every evening.  Qty: 90 tablet, Refills: 3    Associated Diagnoses: Allergic rhinitis, unspecified seasonality, unspecified trigger      levothyroxine (SYNTHROID) 112 MCG tablet TAKE 1 TABLET (112 MCG) BY MOUTH BEFORE BREAKFAST AS SCHEDULED  Qty: 90 tablet, Refills: 2      metronidazole 1% (METROGEL) 1 % Gel Apply topically once daily.  Qty: 60 g, Refills: 5      naltrexone (DEPADE) 50 mg tablet 1/4 pill twice daily  Qty: 15 tablet, Refills: 0    Associated Diagnoses: Class 1 obesity due to excess calories with serious comorbidity and body mass index (BMI) of 31.0 to 31.9 in adult      neomycin-polymyxin-dexamethasone (DEXACINE) 3.5 mg/g-10,000 unit/g-0.1 % Oint Place small amount to affected area three times daily  Qty: 3.5 g, Refills: 0      omeprazole (PRILOSEC) 10 MG capsule Take 10 mg by mouth.      ondansetron (ZOFRAN) 4 MG tablet Take 2 tablets (8 mg total) by mouth every 12 (twelve) hours as needed for Nausea.  Qty: 20 tablet, Refills: 2      oxyCODONE-acetaminophen (PERCOCET) 5-325 mg per tablet Take 1 tablet by mouth every 6 (six) hours as needed for Pain.  Qty: 30 each, Refills: 0    Comments: Quantity prescribed more than 7 day supply? Yes, quantity medically necessary, metastatic cancer patient.  Associated Diagnoses: Clear cell carcinoma; Carcinoma of right kidney; Carcinoma of kidney, unspecified laterality; History of right nephrectomy       pravastatin (PRAVACHOL) 20 MG tablet TAKE 1 TABLET EVERY DAY  Qty: 90 tablet, Refills: 4      senna (SENOKOT) 8.6 mg tablet Take 1 tablet by mouth.      zolpidem (AMBIEN) 5 MG Tab Take 1 tablet (5 mg total) by mouth nightly as needed (insomnia).  Qty: 90 tablet, Refills: 0    Associated Diagnoses: Psychophysiological insomnia                 Discharge Diagnosis: Chronic pain of left knee [M25.562, G89.29]  Condition on Discharge: Stable with no complications to procedure   Diet on Discharge: Same as before.  Activity: as per instruction sheet.  Discharge to: Home with a responsible adult.  Follow up: 2-4 weeks       Please call my office or pager at [phone] if experienced any weakness or loss of sensation, fever > 101.5, pain uncontrolled with oral medications, persistent nausea/vomiting/or diarrhea, redness or drainage from the incisions, or any other worrisome concerns. If physician on call was not reached or could not communicate with our office for any reason please go to the nearest emergency department     Torrey Hollins M.D.  PGY-5  Interventional Pain Management Fellow  [facility]  Pager: [phone]

## 2025-03-13 ENCOUNTER — OFFICE VISIT (OUTPATIENT)
Dept: OPHTHALMOLOGY | Facility: CLINIC | Age: 75
End: 2025-03-13
Payer: MEDICARE

## 2025-03-13 DIAGNOSIS — H35.3111 EARLY DRY STAGE NONEXUDATIVE AGE-RELATED MACULAR DEGENERATION OF RIGHT EYE: Primary | ICD-10-CM

## 2025-03-13 DIAGNOSIS — H04.123 DRY EYE SYNDROME, BILATERAL: ICD-10-CM

## 2025-03-13 DIAGNOSIS — H52.223 REGULAR ASTIGMATISM OF BOTH EYES WITH PRESBYOPIA: ICD-10-CM

## 2025-03-13 DIAGNOSIS — H52.4 REGULAR ASTIGMATISM OF BOTH EYES WITH PRESBYOPIA: ICD-10-CM

## 2025-03-13 PROCEDURE — 99999 PR PBB SHADOW E&M-EST. PATIENT-LVL III: CPT | Mod: PBBFAC,,, | Performed by: OPTOMETRIST

## 2025-03-13 PROCEDURE — 92014 COMPRE OPH EXAM EST PT 1/>: CPT | Mod: S$PBB,,, | Performed by: OPTOMETRIST

## 2025-03-13 PROCEDURE — 99213 OFFICE O/P EST LOW 20 MIN: CPT | Mod: PBBFAC,PO | Performed by: OPTOMETRIST

## 2025-03-13 NOTE — TELEPHONE ENCOUNTER
Care Due:                  Date            Visit Type   Department     Provider  --------------------------------------------------------------------------------                                EP -                              PRIMARY      [facility] INTER[facility]  Last Visit: 05-      CARE ([facility])   VANCE Macias                              EP -                              PRIMARY      [facility] INTER[facility]  Next Visit: 05-      CARE ([facility])   VANCE Macias                                                            Last  Test          Frequency    Reason                     Performed    Due Date  --------------------------------------------------------------------------------    CBC.........  12 months..  diclofenac...............  Not Found    Overdue    CMP.........  12 months..  diclofenac, pravastatin..  Not Found    Overdue    [employer] Embedded Care Due Messages. Reference number: [id number].   3/13/2025 10:17:32 AM CDT

## 2025-03-13 NOTE — TELEPHONE ENCOUNTER
Refill Routing Note   Medication(s) are not appropriate for processing by [facility] for the following reason(s):        Required labs outdated    [facility] action(s):  Defer     Requires labs : Yes             Appointments  past 12m or future 3m with PCP    Date Provider   Last Visit   5/6/2024 Alexandre Macias MD   Next Visit   5/19/2025 Alexandre Macias MD   ED visits in past 90 days: 0        Note composed:12:49 PM 03/13/2025

## 2025-03-13 NOTE — PROGRESS NOTES
HPI     Annual Exam            Comments: Vision changes since last eye exam?: yes distance va looking at   the tv     Any eye pain today: no    Other ocular symptoms: no    Interested in contact lens fitting today? no            Last edited by Nimisha Wong on 3/13/2025  2:38 PM.            Assessment /Plan     For exam results, see Encounter Report.    1. Early dry stage nonexudative age-related macular degeneration of right eye  Refer to Dr. BERMUDEZ for retinal eval         2. Dry eye syndrome, bilateral  There were signs of mild dry eye on today's examination. Discussed warm compresses with lid hygiene twice daily. Recommend preservative-free artificial tears 3-4 times daily. Patient to return to clinic if no improvement in symptoms with current treatment.       RTC next available for retina eval, sooner if changes to vision or worsening symptoms.  Discussed above and answered questions.

## 2025-03-14 RX ORDER — PRAVASTATIN SODIUM 20 MG/1
20 TABLET ORAL
Qty: 90 TABLET | Refills: 3 | Status: SHIPPED | OUTPATIENT
Start: 2025-03-14

## 2025-03-17 ENCOUNTER — PATIENT MESSAGE (OUTPATIENT)
Dept: PAIN MEDICINE | Facility: CLINIC | Age: 75
End: 2025-03-17
Payer: MEDICARE

## 2025-03-31 ENCOUNTER — LAB VISIT (OUTPATIENT)
Dept: LAB | Facility: HOSPITAL | Age: 75
End: 2025-03-31
Attending: FAMILY MEDICINE
Payer: MEDICARE

## 2025-03-31 DIAGNOSIS — N18.9 CKD (CHRONIC KIDNEY DISEASE): ICD-10-CM

## 2025-03-31 DIAGNOSIS — N18.31 STAGE 3A CHRONIC KIDNEY DISEASE: ICD-10-CM

## 2025-03-31 PROCEDURE — 82570 ASSAY OF URINE CREATININE: CPT

## 2025-03-31 PROCEDURE — 84075 ASSAY ALKALINE PHOSPHATASE: CPT

## 2025-03-31 PROCEDURE — 36415 COLL VENOUS BLD VENIPUNCTURE: CPT | Mod: PO

## 2025-04-01 ENCOUNTER — OFFICE VISIT (OUTPATIENT)
Dept: PAIN MEDICINE | Facility: CLINIC | Age: 75
End: 2025-04-01
Payer: MEDICARE

## 2025-04-01 DIAGNOSIS — M25.562 CHRONIC KNEE PAIN AFTER TOTAL REPLACEMENT OF LEFT KNEE JOINT: Primary | ICD-10-CM

## 2025-04-01 DIAGNOSIS — M25.561 CHRONIC PAIN OF RIGHT KNEE: ICD-10-CM

## 2025-04-01 DIAGNOSIS — G89.29 CHRONIC PAIN OF RIGHT KNEE: ICD-10-CM

## 2025-04-01 DIAGNOSIS — G89.29 CHRONIC KNEE PAIN AFTER TOTAL REPLACEMENT OF LEFT KNEE JOINT: Primary | ICD-10-CM

## 2025-04-01 DIAGNOSIS — Z96.652 CHRONIC KNEE PAIN AFTER TOTAL REPLACEMENT OF LEFT KNEE JOINT: Primary | ICD-10-CM

## 2025-04-01 DIAGNOSIS — G89.4 CHRONIC PAIN SYNDROME: ICD-10-CM

## 2025-04-01 PROCEDURE — 98003 SYNCH AUDIO-VIDEO NEW HI 60: CPT | Mod: 95,,, | Performed by: NURSE PRACTITIONER

## 2025-04-01 NOTE — PROGRESS NOTES
Chronic Pain-Tele-Medicine-Established Note (Follow up visit)        The patient location is: Home  The chief complaint leading to consultation is: pain  Visit type: Virtual visit with synchronous audio and video  Total time spent with patient: 10 min  Each patient to whom he or she provides medical services by telemedicine is:  (1) informed of the relationship between the physician and patient and the respective role of any other health care provider with respect to management of the patient; and (2) notified that he or she may decline to receive medical services by telemedicine and may withdraw from such care at any time.      PCP: Alexandre Macias MD    REFERRING PHYSICIAN: No ref. provider found    CHIEF COMPLAINT: Bilateral knee pain    Original HISTORY OF PRESENT ILLNESS: Sherrill Avery w/ pmh of HTN, and renal cell carcinoma (kidney removed in 2013) s/p radiation in February 2022 due to METs which appeared in 2017 who presents to the clinic for the evaluation of the above pain. She had a knee replacement in June 2021, and has continued to have pain since the procedure. She states when she sits for an extended period of time and stands back up she experiences the pain around the bottom to lateral edge of her knee cap. She has a swollen area just below the L knee as well. She currently denies CP, SOB, vision changes, or speech changes.    Original Pain Description:  The pain is located in the L knee and does not radiate. The pain is described as sharp and stinging . Exacerbating factors: Sitting, Standing, Laying, Night Time, Getting out of bed/chair and going up stairs. Mitigating factors massage, medications, physical therapy, rest and sitting. Symptoms interfere with daily activity and sleeping. The patient feels like symptoms have been unchanged. Patient denies night fever/night sweats, urinary incontinence, bowel incontinence and significant motor weakness.    Original PAIN SCORES:  Best: Pain is 0  Worst:  Pain is 6  Usually: Pain is 4  Current: Pain is 2    INTERVAL HISTORY:       Interval History 9/21/22:  Mrs. Avery returns to clinic after left genicular RFA on 8/30/22. She reports significant pain relief and she is able to stand from a seated position and walk more comfortably. She is happy with the results and would like to have the same procedure on her right knee. She has had right knee pain for years and she has received multiple steroid injections which only provide about 2 weeks of pain relief. Pain is exacerbated by stairs, walking, and rising from a chair.  She denies any new weakness and numbness/tingling.     Interval History 12/13/2022:  The patient has a virtual follow up for right knee pain. She is now s/p right genicular RFA on 10/25/22 with 80% relief. She is still having benefit from left knee RFA. She is also reporting lower back pain . The pain is sharp in nature. It does not radiate. She has pain when she stands for a long time. She takes Gabapentin regularly with some benefit. She has completed PT in the past with some benefit. She did have a lumbar MRI in 2015.    Interval History 1/24/2023:  The patient returns for follow up of chronic bilateral knee and lower back pain. She has been having more left knee pain recently. She had genicular RFA 8/30/22 with significant benefit. However, she feels like the pain has started to return. She would like to repeat when possible. She still has some back pain with intermittent radiation across the back. She is taking Gabapentin 900 mg daily with some benefit and no side effects. Since previous encounter, she had a recent oncology 3 month follow up at at [facility]. She has a spot to the back and two to the lung which are being watched. She has a follow up in 3 months again. Her pain today is 4/10.    Interval History 4/4/2023:  The patient has a virtual follow up for bilateral knee pain. She is now s/p left genicular RFA with 80% relief. Her left knee  pain has significantly improved. She is feeling more pain on the right knee and lower back recently. She previously had excellent relief for right knee pain from RFA for 5 months. She would like to repeat the procedure. She is following up with [facility] next week to discuss back pain and possible radiation treatment. She stopped Gabapentin due to memory issues but her pain worsened. She restarted this week with one daily.    Interval History 6/13/2023:  The patient returns for virtual follow up for worsened knee pain, R>L. There was difficulty with her connection and the latter part of the visit was converted to audio. She has been having more sharp and aching right knee pain over the past month. It bothers her with standing and going down stairs. She has had benefit from both left and right genicular RFAs in the past. She would like to repeat the right knee RFA. She has tried stretching, PT exercises, ice and heat without benefit. She is also reporting right hand pain. No history or trauma. She would like an orthopedic referral. Her pain today is 6/10.    Interval History 12/8/2023:  The patient has a virtual appointment to discuss worsened bilateral knee pain, L>R. She has had significant benefit with genicular RFAs in the past and wishes to repeat. The pain bothers her more with standing and walking. She has some benefit with rest. Pain today is 7/10.    Interval History 6/11/2024:  The patient returns for a virtual visit today to discuss knee pain.     Interval History 9/10/2024:  The patient is has a virtual follow up for bilateral knee pain. She underwent left genicular RFA on 7/2/24 with 80% relief of left knee pain. She is also s/p right genicular RFA on 8/13/24. She is not sure how much benefit she has had for the right knee at this time. She is not interested in surgical referral at this time. Her pain is 6/10.    Interval History 12/9/2024:  The patient has a virtual follow up for knee pain. She says  that her left knee pain has been tolerable. She has been having more right knee pain recently. She previously had genicular RFA on 8/13/24 with 80% relief until recently. The pain has been returning recently. It is bothering her and affecting her activity to complete ADLs. It worsens with standing and walking. Her pain today is 8/10.    Interval History 2/21/2025:  The patient has a virtual appointment for worsened left knee pain. The pain is sharp and aching in nature. She has had excellent relief with left genicular RFAs in the past. Previous provided 80% relief for 6 months. She has tried ice, heat and stretches recently without benefit. She would like to reschedule the procedure. No additional complaints. Her pain today is 8/10.    Interval History 4/1/2025:  The patient has a virtual follow up for bilateral knee pain. She is s/p left genicular RFA on 3/7/25 with 80% relief. She says that her left knee pain has been minimal since that time. She did have a flare up of right knee pain last month which lasted for about 4 days. She says that it improved on its own. Her pain is tolerable at this time. She is considering right TKA in the future. No additional complaints at this time.     6 weeks of Conservative therapy (PT/Chiro/Home Exercises with Dates)  PT July 2021-September 2021  PT November 2021-December 2021  PT April 2022-May 2022   12/26/23 Left genicular RFA- 80% relief  2/6/24 Right genicular RFA- 80% relief  7/2/24 Left genicular RFA- 80% relief  8/13/24 Right genicular RFA- 80% relief  2/18/25 Right genicular RFA- 80% relief for 6 months  3/7/25 Left genicular RFA- 80% relief    Treatments / Medications: (Ice/Heat/NSAIDS/APAP/etc):  Ice  Heat  Massage  PT  Voltaren Gel  Gabapentin     Report: N/A      Past Medical History:   Diagnosis Date    Anxiety     Family history of malignant melanoma 08/25/2014    Fibromyalgia affecting lower leg     GERD (gastroesophageal reflux disease)     Hx of psychiatric  care     Hypercholesteremia     Hypertension     Hypothyroidism     Inflamed seborrheic keratosis 08/25/2014    Microscopic hematuria 02/02/2017    Multiple benign melanocytic nevi 08/25/2014    Renal cell carcinoma of right kidney metastatic to other site     Therapy      Past Surgical History:   Procedure Laterality Date    AUGMENTATION OF BREAST      bladder lift      breast implants      BREAST SURGERY Bilateral 1996    saline implants    COLONOSCOPY  2007    HYSTERECTOMY      ectopic pregnancy x 2, with LSO    KNEE ARTHROPLASTY Left 06/14/2021    Procedure: ARTHROPLASTY, KNEE:LEFT:DEPUY-SIGMA;  Surgeon: Osmar Avery III, MD;  Location: [facility] OR;  Service: Orthopedics;  Laterality: Left;    LAPAROSCOPIC NEPHRECTOMY, HAND ASSISTED  07/25/2013    LUNG REMOVAL, PARTIAL Left 2020    At [facility]    NEPHRECTOMY      OOPHORECTOMY Bilateral     RADIOFREQUENCY ABLATION Left 08/30/2022    Procedure: RADIOFREQUENCY ABLATION, LEFT KNEE COOLED;  Surgeon: Vijaya Landin MD;  Location: [facility] PAIN MGT;  Service: Pain Management;  Laterality: Left;    RADIOFREQUENCY ABLATION Right 10/25/2022    Procedure: RADIOFREQUENCY ABLATION RIGHT KNEE GENICULAR COOLED;  Surgeon: Vijaya Landin MD;  Location: [facility] PAIN [facility]GT;  Service: Pain Management;  Laterality: Right;    RADIOFREQUENCY ABLATION Left 03/07/2023    Procedure: RADIOFREQUENCY ABLATION LEFT GENICULAR COOLED RFA;  Surgeon: Vijaya Landin MD;  Location: [facility] PAIN [facility]GT;  Service: Pain Management;  Laterality: Left;    RADIOFREQUENCY ABLATION Right 08/01/2023    Procedure: RADIOFREQUENCY ABLATION, RIGHT GENICULAR COOLED;  Surgeon: Vijaya Landin MD;  Location: [facility] PAIN [facility]GT;  Service: Pain Management;  Laterality: Right;    RADIOFREQUENCY ABLATION Left 12/26/2023    Procedure: RADIOFREQUENCY ABLATION LEFT GENICULAR 1 OF 2;  Surgeon: Vijaya Landin MD;  Location: [facility] PAIN [facility]GT;  Service: Pain Management;  Laterality: Left;  [phone]    RADIOFREQUENCY  ABLATION Right 2/6/2024    Procedure: RADIOFREQUENCY ABLATION RIGHT GENICULAR 2 OF 2;  Surgeon: Vijaya Landin MD;  Location: [facility]IN [facility]GT;  Service: Pain Management;  Laterality: Right;  [phone]  *after 2/1/24    RADIOFREQUENCY ABLATION Left 7/2/2024    Procedure: RADIOFREQUENCY ABLATION LEFT GENICULAR;  Surgeon: Vijaya Landin MD;  Location: [facility];  Service: Pain Management;  Laterality: Left;  [phone]  *SCHEDULE AFTER 6/26    RADIOFREQUENCY ABLATION Right 8/13/2024    Procedure: RADIOFREQUENCY ABLATION RIGHT GENICULAR;  Surgeon: Vijaya Landin MD;  Location: [facility];  Service: Pain Management;  Laterality: Right;  [phone]  4 WK F/U MARGARET  *SCHEDULE AFTER 8/6    RADIOFREQUENCY ABLATION Right 2/18/2025    Procedure: RADIOFREQUENCY ABLATION RIGHT GENICULAR;  Surgeon: Vijaya Landin MD;  Location: [facility];  Service: Pain Management;  Laterality: Right;  4 WK F/U MADELEINE  *SCHEDULE AFTER 2/13    RADIOFREQUENCY ABLATION Left 3/7/2025    Procedure: RADIOFREQUENCY ABLATION LEFT GENICULAR;  Surgeon: Vijaya Landin MD;  Location: [facility];  Service: Pain Management;  Laterality: Left;  4 WK F/U MADELEINE    right ganglion cyst      throat polyp      TRANSOBTURATOR SLING  2011    with RSO for persistent complex cyst & MARGOT; done by [facility]    UPPER GASTROINTESTINAL ENDOSCOPY  2007     Social History     Socioeconomic History    Marital status:      Spouse name: KAIDEN    Number of children: 5   Occupational History    Occupation: retired     Comment: Dance Instructor   Tobacco Use    Smoking status: Never    Smokeless tobacco: Never   Substance and Sexual Activity    Alcohol use: Yes     Alcohol/week: 0.0 standard drinks of alcohol     Comment: social    Drug use: No    Sexual activity: Yes     Partners: Male     Birth control/protection: Surgical   Social History Narrative    Patient is a retired dance instructor and live with . Has stairs at home 12- has an  elevator     Social Drivers of Health     Financial Resource Strain: Low Risk  (2/19/2025)    Overall Financial Resource Strain (CARDIA)     Difficulty of Paying Living Expenses: Not hard at all   Food Insecurity: Patient Declined (2/19/2025)    Hunger Vital Sign     Worried About Running Out of Food in the Last Year: Patient declined     Ran Out of Food in the Last Year: Patient declined   Transportation Needs: Patient Declined (2/19/2025)    PRAPARE - Transportation     Lack of Transportation (Medical): Patient declined     Lack of Transportation (Non-Medical): Patient declined   Physical Activity: Inactive (2/19/2025)    Exercise Vital Sign     Days of Exercise per Week: 0 days     Minutes of Exercise per Session: 0 min   Stress: No Stress Concern Present (2/19/2025)    [employer] - Occupational Stress Questionnaire     Feeling of Stress : Only a little   Housing Stability: Patient Declined (2/19/2025)    Housing Stability Vital Sign     Unable to Pay for Housing in the Last Year: Patient declined     Homeless in the Last Year: Patient declined     Family History   Problem Relation Name Age of Onset    Diabetes Mother      Hypertension Mother      Heart disease Mother      Cancer Father          lung    Migraines Father      Glaucoma Paternal Grandmother      Glaucoma Sister      Thyroid disease Neg Hx      Asthma Neg Hx         Review of patient's allergies indicates:   Allergen Reactions    Talwin compound Anxiety     hallucinations/anxiety    Tramadol Itching    Buspirone Anxiety    Codeine Itching    Morphine Itching     jittery    Morpholine analogues Anxiety and Itching    Naproxen Itching     Takes Ibuprofen is ok on rare occasion     Nexium [esomeprazole magnesium] Other (See Comments)     constipation    Wal-phed [pseudoephedrine hcl] Itching       Current Outpatient Medications   Medication Sig    ALPRAZolam (XANAX) 0.5 MG tablet Take daily as needed for anxiety.     azelastine (ASTELIN) 137 mcg (0.1 %) nasal spray 1 spray (137 mcg total) by Nasal route 2 (two) times daily as needed for Rhinitis.    brimonidine (MIRVASO) 0.33 % GlwP Apply topically.    buPROPion (WELLBUTRIN XL) 300 MG 24 hr tablet Take 1 tablet (300 mg total) by mouth once daily.    butalbital-acetaminophen-caffeine -40 mg (FIORICET, ESGIC) -40 mg per tablet TAKE 1 TABLET EVERY 4 HOURS AS NEEDED    clotrimazole (LOTRIMIN) 1 % Soln APPLY TOPICALLY 2 TIMES DAILY FOR 7 DAYS    diazePAM (VALIUM) 5 MG tablet Take 5 mg by mouth.    diclofenac sodium (VOLTAREN) 1 % Gel Apply 2 g topically daily as needed.    diflorasone-emollient (APEXICON E) 0.05 % Crea Apply 1 application topically once daily. for 7 days    docusate sodium (COLACE) 100 MG capsule Take 100 mg by mouth.    ergocalciferol (ERGOCALCIFEROL) 50,000 unit Cap Take 1 capsule (50,000 Units total) by mouth every 7 days.    estradioL (ESTRACE) 1 MG tablet Take 1 tablet (1 mg total) by mouth once daily.    famotidine (PEPCID) 20 MG tablet Take 20 mg by mouth.    fluocinonide (LIDEX) 0.05 % external solution Apply topically 2 (two) times daily. Do not use morning of surgery    hydrocortisone 2.5 % cream Apply topically 2 (two) times daily. apply to affected area for 7 days    ketoconazole (NIZORAL) 2 % shampoo Do not use morning of surgery    levocetirizine (XYZAL) 5 MG tablet Take 1 tablet (5 mg total) by mouth every evening.    levothyroxine (SYNTHROID) 112 MCG tablet TAKE 1 TABLET (112 MCG) BY MOUTH BEFORE BREAKFAST AS SCHEDULED    metronidazole 1% (METROGEL) 1 % Gel Apply topically once daily.    naltrexone (DEPADE) 50 mg tablet 1/4 pill twice daily    neomycin-polymyxin-dexamethasone (DEXACINE) 3.5 mg/g-10,000 unit/g-0.1 % Oint Place small amount to affected area three times daily    omeprazole (PRILOSEC) 10 MG capsule Take 10 mg by mouth.    ondansetron (ZOFRAN) 4 MG tablet Take 2 tablets (8 mg total) by mouth every 12 (twelve) hours as needed  for Nausea.    oxyCODONE-acetaminophen (PERCOCET) 5-325 mg per tablet Take 1 tablet by mouth every 6 (six) hours as needed for Pain.    pravastatin (PRAVACHOL) 20 MG tablet TAKE 1 TABLET EVERY DAY    senna (SENOKOT) 8.6 mg tablet Take 1 tablet by mouth.    zolpidem (AMBIEN) 5 MG Tab Take 1 tablet (5 mg total) by mouth nightly as needed (insomnia).     No current facility-administered medications for this visit.     Facility-Administered Medications Ordered in Other Visits   Medication    triamcinolone acetonide injection 40 mg       ROS:  GENERAL: No fever. No chills. No fatigue. Denies weight loss. Denies weight gain.  HEENT: Denies headaches. Denies vision change. Denies eye pain. Denies double vision. Denies ear pain.   CV: Denies chest pain.   PULM: Denies of shortness of breath.  GI: Denies constipation. No diarrhea. No abdominal pain. Denies nausea. Denies vomiting. No blood in stool.  HEME: Denies bleeding problems.  : Denies urgency. No painful urination. No blood in urine.  MS: Denies joint stiffness. Denies joint swelling.  Denies back pain.  SKIN: Denies rash.   NEURO: Denies seizures. No weakness.  PSYCH:  Denies difficulty sleeping. No anxiety. Denies depression. No suicidal thoughts.     PHYSICAL EXAMINATION:    General appearance: Well appearing, in no acute distress, alert and oriented x3.  Psych:  Mood and affect appropriate.  Skin: Skin color normal, no rashes or lesions, in both upper and lower body.  Extremities: Moves all visualized extremities freely.    PREVIOUS PHYSICAL EXAM:   GENERAL: Well appearing, in no acute distress, alert and oriented x3.  PSYCH:  Mood and affect appropriate.  SKIN: Normal turgor and coloration.  HEENT:  Normocephalic, atraumatic. Cranial nerves grossly intact.  NECK: No pain to palpation over the cervical paraspinous muscles. No pain to palpation over facets. No pain with neck flexion, extension, or lateral flexion.   PULM: No evidence of respiratory difficulty,  symmetric chest rise.  GI:  Non-distended  BACK: Normal range of motion. No pain to palpation over the spinous processes. No pain to palpation over facet joints. There is no pain with palpation over the sacroiliac joints bilaterally. Straight leg raising in the supine position is negative to radicular pain.   EXTREMITIES: +ttp left medial joint line and pain with patella ROM. Swollen 3 x 3 inch area just below L patella.   MUSCULOSKELETAL:  Bilateral lower extremity strength is normal and symmetric, with some pain limitation with movement of L knee joint. No atrophy is noted.  NEURO: No change in sensation.  GAIT: Antalgic and slow.    IMAGING:      X- Ray knee ortho BL 5/17/22    Narrative & Impression  EXAMINATION:  XR KNEE ORTHO BILAT WITH FLEXION     CLINICAL HISTORY:  Presence of left artificial knee joint     TECHNIQUE:  AP standing of both knees, PA flexion standing views of both knees, and Merchant views of both knees were performed.  Lateral views of both knees were also performed.     COMPARISON:  No 07/27/2021 ne     FINDINGS:  Left knee arthroplasty identified.  The position and alignment is satisfactory and unchanged as compared to the previous study.     DJD involving the right knee with narrowing of the patellofemoral and the medial tibiofemoral joint space.  No fracture or bone destruction identified     Impression:  Patient with symptoms, imaging, and PE consistent with:    1. Chronic knee pain after total replacement of left knee joint        2. Chronic pain of right knee        3. Chronic pain syndrome                ASSESSMENT: 74 y.o. year old female  with bilateral knee pain.     PLAN:  She is s/p repeat left genicular cooled RFA with 80% relief  Continue current medications.  She may pursue right TKA in the future.   The patient will continue a home exercise routine to help with pain and strengthening.    RTC PRN.      The above plan and management options were discussed at length with  patient. Patient is in agreement with the above and verbalized understanding.     Dona Arteaga, SAMAN  04/01/2025

## 2025-04-02 ENCOUNTER — RESULTS FOLLOW-UP (OUTPATIENT)
Dept: INTERNAL MEDICINE | Facility: CLINIC | Age: 75
End: 2025-04-02

## 2025-04-02 LAB
ALBUMIN SERPL BCP-MCNC: 3.5 G/DL (ref 3.5–5.2)
ALBUMIN/CREAT UR: ABNORMAL
ALP SERPL-CCNC: 81 UNIT/L (ref 40–150)
ALT SERPL W/O P-5'-P-CCNC: 14 UNIT/L (ref 10–44)
ANION GAP (OHS): 6 MMOL/L (ref 8–16)
AST SERPL-CCNC: 19 UNIT/L (ref 11–45)
BILIRUB SERPL-MCNC: 0.4 MG/DL (ref 0.1–1)
BUN SERPL-MCNC: 12 MG/DL (ref 8–23)
CALCIUM SERPL-MCNC: 8.8 MG/DL (ref 8.7–10.5)
CHLORIDE SERPL-SCNC: 108 MMOL/L (ref 95–110)
CO2 SERPL-SCNC: 25 MMOL/L (ref 23–29)
CREAT SERPL-MCNC: 1 MG/DL (ref 0.5–1.4)
CREAT UR-MCNC: >450 MG/DL (ref 15–325)
GFR SERPLBLD CREATININE-BSD FMLA CKD-EPI: 59 ML/MIN/1.73/M2
GLUCOSE SERPL-MCNC: 78 MG/DL (ref 70–110)
MICROALBUMIN UR-MCNC: 111 UG/ML (ref ?–5000)
POTASSIUM SERPL-SCNC: 4.4 MMOL/L (ref 3.5–5.1)
PROT SERPL-MCNC: 6.6 GM/DL (ref 6–8.4)
SODIUM SERPL-SCNC: 139 MMOL/L (ref 136–145)

## 2025-04-09 RX ORDER — LEVOTHYROXINE SODIUM 112 UG/1
TABLET ORAL
Qty: 90 TABLET | Refills: 0 | Status: SHIPPED | OUTPATIENT
Start: 2025-04-09

## 2025-04-09 NOTE — TELEPHONE ENCOUNTER
No care due was identified.  [facility] Embedded Care Due Messages. Reference number: [id number].   4/09/2025 10:39:00 AM CDT

## 2025-04-09 NOTE — TELEPHONE ENCOUNTER
Refill Decision Note   Sherrill Bennie  is requesting a refill authorization.  Brief Assessment and Rationale for Refill:  Approve     Medication Therapy Plan:        Comments:     Note composed:12:19 PM 04/09/2025

## 2025-04-11 ENCOUNTER — OFFICE VISIT (OUTPATIENT)
Dept: OPHTHALMOLOGY | Facility: CLINIC | Age: 75
End: 2025-04-11
Payer: MEDICARE

## 2025-04-11 DIAGNOSIS — H35.721 RETINAL PIGMENT EPITHELIAL DETACHMENT OF RIGHT EYE: ICD-10-CM

## 2025-04-11 DIAGNOSIS — H35.361 DRUSEN (DEGENERATIVE) OF MACULA, RIGHT EYE: ICD-10-CM

## 2025-04-11 DIAGNOSIS — H53.60 NYCTALOPIA: ICD-10-CM

## 2025-04-11 DIAGNOSIS — H35.89 RPE MOTTLING OF MACULA: Primary | ICD-10-CM

## 2025-04-11 DIAGNOSIS — Z96.1 PSEUDOPHAKIA OF BOTH EYES: ICD-10-CM

## 2025-04-11 DIAGNOSIS — H43.812 PVD (POSTERIOR VITREOUS DETACHMENT), LEFT: ICD-10-CM

## 2025-04-11 PROCEDURE — 99203 OFFICE O/P NEW LOW 30 MIN: CPT | Mod: S$PBB,,, | Performed by: OPHTHALMOLOGY

## 2025-04-11 PROCEDURE — 99212 OFFICE O/P EST SF 10 MIN: CPT | Mod: PBBFAC | Performed by: OPHTHALMOLOGY

## 2025-04-11 PROCEDURE — 99999 PR PBB SHADOW E&M-EST. PATIENT-LVL II: CPT | Mod: PBBFAC,,, | Performed by: OPHTHALMOLOGY

## 2025-04-11 PROCEDURE — 92134 CPTRZ OPH DX IMG PST SGM RTA: CPT | Mod: PBBFAC | Performed by: OPHTHALMOLOGY

## 2025-04-11 NOTE — PROGRESS NOTES
===============================  Date today is 4/11/2025  Sherrill Avery is a 74 y.o. female  Last visit [facility]: :Visit date not found   Last visit eye dept. Visit date not found    Uncorrected distance visual acuity was 20/60 in the right eye and 20/40 in the left eye.  Tonometry       Tonometry ([facility], 10:57 AM)         Right Left    Pressure 13 10                  Not recorded       Not recorded       Not recorded       Chief Complaint   Patient presents with    Annual Exam     AMD     HPI     Annual Exam            Comments: AMD          Comments    Pt reports for annual. No pain or irritation. Va stable.          Last edited by Nadege Grace on 4/11/2025 10:57 AM.      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  Check OU independently daily.    Gave my office and personal cell phone number.  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 Check OU daily.   Gave my office and cell phone number.      =============================

## 2025-04-17 ENCOUNTER — PATIENT MESSAGE (OUTPATIENT)
Dept: INTERNAL MEDICINE | Facility: CLINIC | Age: 75
End: 2025-04-17
Payer: MEDICARE

## 2025-04-19 NOTE — PATIENT INSTRUCTIONS
I will put another prescription on file for the naltrexone    Concurrent administration or the administration of naltrexone within 7-10 days of opioids may induce acute abstinence syndrome or exacerbate a pre-existing subclinical abstinence syndrome.(1,4) Patients taking naltrexone may not experience beneficial effects of opioid-containing medications.(4) Samidorphan is contraindicated in patients who are using opioids or undergoing acute opioid withdrawal.(3)     Stop 7 days prior to any procedure that requires a pain pill.   Do not resume naltrexone until 7 days after last pain pill.   
Male

## 2025-04-21 ENCOUNTER — PATIENT MESSAGE (OUTPATIENT)
Dept: PSYCHIATRY | Facility: CLINIC | Age: 75
End: 2025-04-21
Payer: MEDICARE

## 2025-04-25 RX ORDER — BUPROPION HYDROCHLORIDE 150 MG/1
300 TABLET ORAL DAILY
Qty: 180 TABLET | Refills: 1 | Status: SHIPPED | OUTPATIENT
Start: 2025-04-25 | End: 2026-04-25

## 2025-04-28 ENCOUNTER — PATIENT MESSAGE (OUTPATIENT)
Dept: INTERNAL MEDICINE | Facility: CLINIC | Age: 75
End: 2025-04-28
Payer: MEDICARE

## 2025-04-28 DIAGNOSIS — E66.811 CLASS 1 OBESITY DUE TO EXCESS CALORIES WITH SERIOUS COMORBIDITY AND BODY MASS INDEX (BMI) OF 31.0 TO 31.9 IN ADULT: ICD-10-CM

## 2025-04-28 DIAGNOSIS — E66.09 CLASS 1 OBESITY DUE TO EXCESS CALORIES WITH SERIOUS COMORBIDITY AND BODY MASS INDEX (BMI) OF 31.0 TO 31.9 IN ADULT: ICD-10-CM

## 2025-04-29 ENCOUNTER — TELEPHONE (OUTPATIENT)
Dept: INTERNAL MEDICINE | Facility: CLINIC | Age: 75
End: 2025-04-29
Payer: MEDICARE

## 2025-04-29 NOTE — TELEPHONE ENCOUNTER
Spoke with the patient informed her that the requested handicap tag form will be placed in the mail for the patient. We unfortunately can not email to patients.

## 2025-04-29 NOTE — TELEPHONE ENCOUNTER
----- Message from Kerry sent at 4/29/2025  2:09 PM CDT -----  Regarding: Medical Advice  Contact: Sherrill  Type:  Patient Returning CallWho Called: Sherrill Who Left Message for Patient: David Dominguez MADoes the patient know what this is regarding?:Would the patient rather a call back or a response via [facility]? [facility]: [phone] (home)  Additional Information:

## 2025-04-29 NOTE — TELEPHONE ENCOUNTER
----- Message from Sangeetha sent at 4/29/2025 12:17 PM CDT -----  Regarding: Sherrill  Who called:SherrillSachijohan is the request in detail: Patient states that she only rec'd half of her forms filled out for her noemip tag back from the nurse to give to the DMV please give the patient a call at the below number or email back completed forms to [email or web address]? NOWould the patient rather a call back or a response via [facility]? callbackBe call back number:.[phone] Information:

## 2025-04-29 NOTE — TELEPHONE ENCOUNTER
Tried returning the patient call with no answer. A message was left for  a return call to Dr Macias's office.

## 2025-04-30 RX ORDER — NALTREXONE HYDROCHLORIDE 50 MG/1
50 TABLET, FILM COATED ORAL DAILY
Qty: 90 TABLET | Refills: 0 | Status: SHIPPED | OUTPATIENT
Start: 2025-04-30 | End: 2025-07-29

## 2025-05-08 DIAGNOSIS — I10 PRIMARY HYPERTENSION: Primary | ICD-10-CM

## 2025-05-08 DIAGNOSIS — E78.00 HYPERCHOLESTEREMIA: ICD-10-CM

## 2025-05-19 ENCOUNTER — OFFICE VISIT (OUTPATIENT)
Dept: INTERNAL MEDICINE | Facility: CLINIC | Age: 75
End: 2025-05-19
Payer: MEDICARE

## 2025-05-19 VITALS
HEART RATE: 78 BPM | BODY MASS INDEX: 32.36 KG/M2 | HEIGHT: 65 IN | SYSTOLIC BLOOD PRESSURE: 148 MMHG | OXYGEN SATURATION: 97 % | WEIGHT: 194.25 LBS | TEMPERATURE: 97 F | DIASTOLIC BLOOD PRESSURE: 70 MMHG

## 2025-05-19 DIAGNOSIS — I70.0 AORTIC ATHEROSCLEROSIS: ICD-10-CM

## 2025-05-19 DIAGNOSIS — I10 PRIMARY HYPERTENSION: ICD-10-CM

## 2025-05-19 DIAGNOSIS — Z12.31 ENCOUNTER FOR SCREENING MAMMOGRAM FOR MALIGNANT NEOPLASM OF BREAST: ICD-10-CM

## 2025-05-19 DIAGNOSIS — C80.1 CLEAR CELL CARCINOMA: ICD-10-CM

## 2025-05-19 DIAGNOSIS — F51.04 CHRONIC INSOMNIA: ICD-10-CM

## 2025-05-19 DIAGNOSIS — E03.9 ACQUIRED HYPOTHYROIDISM: ICD-10-CM

## 2025-05-19 DIAGNOSIS — F51.04 PSYCHOPHYSIOLOGICAL INSOMNIA: ICD-10-CM

## 2025-05-19 DIAGNOSIS — F41.9 ANXIETY: ICD-10-CM

## 2025-05-19 DIAGNOSIS — F41.1 GENERALIZED ANXIETY DISORDER: Chronic | ICD-10-CM

## 2025-05-19 DIAGNOSIS — E78.5 HYPERLIPIDEMIA, UNSPECIFIED HYPERLIPIDEMIA TYPE: Primary | ICD-10-CM

## 2025-05-19 PROCEDURE — G2211 COMPLEX E/M VISIT ADD ON: HCPCS | Mod: S$PBB,,, | Performed by: FAMILY MEDICINE

## 2025-05-19 PROCEDURE — 99999 PR PBB SHADOW E&M-EST. PATIENT-LVL V: CPT | Mod: PBBFAC,,, | Performed by: FAMILY MEDICINE

## 2025-05-19 PROCEDURE — 99215 OFFICE O/P EST HI 40 MIN: CPT | Mod: PBBFAC | Performed by: FAMILY MEDICINE

## 2025-05-19 PROCEDURE — 99214 OFFICE O/P EST MOD 30 MIN: CPT | Mod: S$PBB,,, | Performed by: FAMILY MEDICINE

## 2025-05-19 RX ORDER — AMLODIPINE BESYLATE 2.5 MG/1
2.5 TABLET ORAL DAILY
Qty: 30 TABLET | Refills: 5 | Status: SHIPPED | OUTPATIENT
Start: 2025-05-19 | End: 2026-05-19

## 2025-05-19 RX ORDER — ALPRAZOLAM 0.5 MG/1
TABLET ORAL
Qty: 90 TABLET | Refills: 1 | Status: SHIPPED | OUTPATIENT
Start: 2025-05-19

## 2025-05-19 RX ORDER — ZOLPIDEM TARTRATE 5 MG/1
5 TABLET ORAL NIGHTLY PRN
Qty: 90 TABLET | Refills: 1 | Status: SHIPPED | OUTPATIENT
Start: 2025-05-19 | End: 2025-11-17

## 2025-05-19 NOTE — PATIENT INSTRUCTIONS
Please obtain the RSV vaccine via your pharmacy    Shingrix new shingles vaccine  via a pharmacy

## 2025-05-19 NOTE — PROGRESS NOTES
Subjective:      Patient ID: Sherrill Avery is a female.    Chief Complaint: Follow-up    HPI f/u chronic issues prob list below    And osteopenia no tx needed    Hypothy,thy nodules utd tsh 10/24; dr block spring 24; y/s 9/24 endocrine [facility] ;Stable bilateral small thyroid nodules.     Occas headache no change pattern req rf butal      Intermitt anxiety req rf xnax    Htn elv bp today.  again    Hx RCC utd oncol    SHER hxpsych            Objective:   Husb present  Physical Examgen nad  Heent tms clear  Nares cricket  O/p clear  Cvrrr  Chest ctabilat  Abd +bssoftndnt   Assessment:         ICD-10-CM ICD-9-CM   1. Hypothyroidism, unspecified type  E03.9 244.9         3. Primary hypertension  I10 401.9   4. Carcinoma of kidney, unspecified laterality  C64.9 189.0    Sher  Uri  Gerd  Anxiety  Hx renal ca met to lung  Thy nodules  Plan:    F/u oncol,  , psych when due    1F/u 6 months  Has f/u gi at [facility] and has PET scan sched in June there/oncol    Hyperlipidemia, unspecified hyperlipidemia type  -     Comprehensive Metabolic Panel; Future; Expected date: 11/15/2025  -     Lipid Panel; Future; Expected date: 11/15/2025  -     TSH; Future; Expected date: 11/15/2025    Anxiety    Acquired hypothyroidism    Aortic atherosclerosis    Clear cell carcinoma with lung metastasis    Chronic insomnia    Primary hypertension    Encounter for screening mammogram for malignant neoplasm of breast  -     Mammo Digital Screening Bilat w/ Oh (XPD); Future; Expected date: 05/19/2025     Add: amlod 2.5 Side effects and dosing discussed  F/u mark 2 weeks on htn

## 2025-05-20 ENCOUNTER — PATIENT MESSAGE (OUTPATIENT)
Dept: CARDIOLOGY | Facility: CLINIC | Age: 75
End: 2025-05-20
Payer: MEDICARE

## 2025-05-21 ENCOUNTER — OFFICE VISIT (OUTPATIENT)
Dept: CARDIOLOGY | Facility: CLINIC | Age: 75
End: 2025-05-21
Payer: MEDICARE

## 2025-05-21 ENCOUNTER — HOSPITAL ENCOUNTER (OUTPATIENT)
Dept: CARDIOLOGY | Facility: HOSPITAL | Age: 75
Discharge: HOME | End: 2025-05-21
Attending: INTERNAL MEDICINE
Payer: MEDICARE

## 2025-05-21 VITALS
HEIGHT: 65 IN | WEIGHT: 196.44 LBS | DIASTOLIC BLOOD PRESSURE: 86 MMHG | BODY MASS INDEX: 32.73 KG/M2 | HEART RATE: 72 BPM | OXYGEN SATURATION: 97 % | SYSTOLIC BLOOD PRESSURE: 138 MMHG

## 2025-05-21 DIAGNOSIS — G47.30 SLEEP APNEA, UNSPECIFIED TYPE: ICD-10-CM

## 2025-05-21 DIAGNOSIS — R94.31 NONSPECIFIC ABNORMAL ELECTROCARDIOGRAM (ECG) (EKG): ICD-10-CM

## 2025-05-21 DIAGNOSIS — K21.9 GASTROESOPHAGEAL REFLUX DISEASE WITHOUT ESOPHAGITIS: ICD-10-CM

## 2025-05-21 DIAGNOSIS — I10 PRIMARY HYPERTENSION: Primary | ICD-10-CM

## 2025-05-21 DIAGNOSIS — R00.2 PALPITATIONS: ICD-10-CM

## 2025-05-21 DIAGNOSIS — R06.09 DYSPNEA ON EXERTION: ICD-10-CM

## 2025-05-21 DIAGNOSIS — C80.1 CLEAR CELL CARCINOMA: ICD-10-CM

## 2025-05-21 DIAGNOSIS — E78.00 HYPERCHOLESTEREMIA: ICD-10-CM

## 2025-05-21 DIAGNOSIS — C64.9 CARCINOMA OF KIDNEY, UNSPECIFIED LATERALITY: ICD-10-CM

## 2025-05-21 DIAGNOSIS — I10 PRIMARY HYPERTENSION: ICD-10-CM

## 2025-05-21 DIAGNOSIS — R06.09 OTHER FORM OF DYSPNEA: ICD-10-CM

## 2025-05-21 LAB
OHS QRS DURATION: 86 MS
OHS QTC CALCULATION: 425 MS

## 2025-05-21 PROCEDURE — 99215 OFFICE O/P EST HI 40 MIN: CPT | Mod: PBBFAC,25 | Performed by: INTERNAL MEDICINE

## 2025-05-21 PROCEDURE — 99999 PR PBB SHADOW E&M-EST. PATIENT-LVL V: CPT | Mod: PBBFAC,,, | Performed by: INTERNAL MEDICINE

## 2025-05-21 PROCEDURE — 99214 OFFICE O/P EST MOD 30 MIN: CPT | Mod: S$PBB,,, | Performed by: INTERNAL MEDICINE

## 2025-05-21 PROCEDURE — 93010 ELECTROCARDIOGRAM REPORT: CPT | Mod: ,,, | Performed by: INTERNAL MEDICINE

## 2025-05-21 PROCEDURE — 93005 ELECTROCARDIOGRAM TRACING: CPT

## 2025-05-21 PROCEDURE — G2211 COMPLEX E/M VISIT ADD ON: HCPCS | Mod: S$PBB,,, | Performed by: INTERNAL MEDICINE

## 2025-05-21 NOTE — PROGRESS NOTES
Subjective:   Patient ID:  Sherrill Avery is a 74 y.o. female who presents for cardiac consult of No chief complaint on file.      Referral by: No referring provider defined for this encounter.     Reason for consult:       HPI  The patient came in today for cardiac consult of No chief complaint on file.      Sherrill Avery is a 74 y.o. female pt with HTN, HLD, obesity, aortic atherosc, renal cell CA, Vit D, GERD, hypothyroidism, OA joints presents for CV eval of SOB.       11/19/24  BP mildly elevated.HR 60s. BMI 31  Nuc stress neg for ischemia 9/2024.   Vital monitor neg 9/2024  She had been taking Omeprazole 40mg which caused her to get bloated.     5/21/25  BP and HR stable. BMI 32 - 196 lbs  Has mild MONTESINOS at times.   ECG - NSR, poor RWP     Results for orders placed during the hospital encounter of 09/16/24    Nuclear Stress - Cardiology Interpreted    Interpretation Summary    Normal myocardial perfusion scan. There is no evidence of myocardial ischemia or infarction.    The gated perfusion images showed an ejection fraction of 79% at rest. The gated perfusion images showed an ejection fraction of 83% post stress. Normal ejection fraction is greater than 59%.    The ECG portion of the study is negative for ischemia.    During stress, rare PACs are noted.          Summary  Show Result Comparison     Left Ventricle: The left ventricle is normal in size. Ventricular mass is normal. Normal wall thickness. There is normal systolic function with a visually estimated ejection fraction of 55 - 60%. There is normal diastolic function.    Right Ventricle: Normal right ventricular cavity size. Wall thickness is normal. Systolic function is normal.    IVC/SVC: Normal venous pressure at 3 mmHg.    Baseline ECG: The Baseline ECG reveals sinus rhythm. The axis is normal. The ST segments are normal.    Stress ECG: There are no ST segment deviation identified during the protocol. During stress, rare PVCs are noted. There is  normal blood pressure response with stress.    ECG Conclusion: The ECG portion of the study is negative for ischemia.    Post-stress Impression: The study is negative with no echocardiographic evidence of stress induced ischemia.      Results for orders placed during the hospital encounter of 09/07/21    Echo    Interpretation Summary  · The left ventricle is normal in size with concentric remodeling and normal systolic function. The estimated ejection fraction is 60%.  · Normal left ventricular diastolic function.  · Normal right ventricular size with normal right ventricular systolic function.  · Mild tricuspid regurgitation.  · The estimated PA systolic pressure is 25 mmHg.  · Trivial circumferential pericardial effusion.  · Normal central venous pressure (3 mmHg).      No results found for this or any previous visit.      No results found for this or any previous visit.      Cardiac Monitor - 3-15 Day Adult (Cupid Only)  Result Date: 10/2/2024  Attached at the bottom of this summary is the study interpretation as   provided by the ambulatory cardiac telemetry service provider ([facility]).   The actual submitted strips are posted under the media tab in the   patient's chart. I reviewed them in detail and I agree with the findings   as listed with salient findings, personal comments and edits as listed   below:    Benign study.  No symptoms entered.    Service provider quantitative analysis and interpretation:    The patient was monitored for a total of 5d 17h, underlying rhythm is   Sinus.  The minimum heart rate was 63 bpm; the maximum 132 bpm; the average 87   bpm.  0 % of Atrial fibrillation/Atrial flutter with longest episode of 0 ms.  The total burden of AV Block present was 0 % [Complete Heart Block: 0 %;   Advanced (High Grade):  0 %; 2nd Degree, Mobitz II: 0 %; 2nd Degree, Mobitz I: 0 %].  There were 0 pauses, the longest pause was 0 ms at --.  Total count of Ventricular Tachycardia (VT): 8  episode(s). Longest VT: 3   beats on Day 4 / 10:37:07  am. Fastest VT: 141 bpm on Day 5 / 06:19:06 pm.  13 supraventricular episodes were found. Longest SVT Episode 9 beats,   Fastest  bpm  There were a total of 5467 PVCs with 2 morphologies and 230 couplets.   Overall PVC [place] at 0.78  %  There were a total of 0 Other Beats. There were 0 total number of paced   beats.  There were a total of 446 PSVCs with 1 morphologies and 6 couplets.   Overall PSVC [place] at  0.06 %  There is a total of 0 patient events               Past Medical History:   Diagnosis Date    Anxiety     Family history of malignant melanoma 08/25/2014    Fibromyalgia affecting lower leg     GERD (gastroesophageal reflux disease)     Hx of psychiatric care     Hypercholesteremia     Hypertension     Hypothyroidism     Inflamed seborrheic keratosis 08/25/2014    Microscopic hematuria 02/02/2017    Multiple benign melanocytic nevi 08/25/2014    Renal cell carcinoma of right kidney metastatic to other site     Therapy        Past Surgical History:   Procedure Laterality Date    AUGMENTATION OF BREAST      bladder lift      breast implants      BREAST SURGERY Bilateral 1996    saline implants    COLONOSCOPY  2007    HYSTERECTOMY      ectopic pregnancy x 2, with LSO    KNEE ARTHROPLASTY Left 06/14/2021    Procedure: ARTHROPLASTY, KNEE:LEFT:DEPUY-SIGMA;  Surgeon: Osmar Avery III, MD;  Location: [facility] OR;  Service: Orthopedics;  Laterality: Left;    LAPAROSCOPIC NEPHRECTOMY, HAND ASSISTED  07/25/2013    LUNG REMOVAL, PARTIAL Left 2020    At [facility]    NEPHRECTOMY      OOPHORECTOMY Bilateral     RADIOFREQUENCY ABLATION Left 08/30/2022    Procedure: RADIOFREQUENCY ABLATION, LEFT KNEE COOLED;  Surgeon: Vijaya Landin MD;  Location: [facility];  Service: Pain Management;  Laterality: Left;    RADIOFREQUENCY ABLATION Right 10/25/2022    Procedure: RADIOFREQUENCY ABLATION RIGHT KNEE GENICULAR COOLED;  Surgeon: Vijaya Landin MD;   Location: [facility];  Service: Pain Management;  Laterality: Right;    RADIOFREQUENCY ABLATION Left 03/07/2023    Procedure: RADIOFREQUENCY ABLATION LEFT GENICULAR COOLED RFA;  Surgeon: Vijaya Landin MD;  Location: [facility];  Service: Pain Management;  Laterality: Left;    RADIOFREQUENCY ABLATION Right 08/01/2023    Procedure: RADIOFREQUENCY ABLATION, RIGHT GENICULAR COOLED;  Surgeon: Vijaya Landin MD;  Location: [facility];  Service: Pain Management;  Laterality: Right;    RADIOFREQUENCY ABLATION Left 12/26/2023    Procedure: RADIOFREQUENCY ABLATION LEFT GENICULAR 1 OF 2;  Surgeon: Vijaya Landin MD;  Location: [facility];  Service: Pain Management;  Laterality: Left;  [phone]    RADIOFREQUENCY ABLATION Right 2/6/2024    Procedure: RADIOFREQUENCY ABLATION RIGHT GENICULAR 2 OF 2;  Surgeon: Vijaya Landin MD;  Location: [facility];  Service: Pain Management;  Laterality: Right;  [phone]  *after 2/1/24    RADIOFREQUENCY ABLATION Left 7/2/2024    Procedure: RADIOFREQUENCY ABLATION LEFT GENICULAR;  Surgeon: Vijaya Landin MD;  Location: [facility];  Service: Pain Management;  Laterality: Left;  [phone]  *SCHEDULE AFTER 6/26    RADIOFREQUENCY ABLATION Right 8/13/2024    Procedure: RADIOFREQUENCY ABLATION RIGHT GENICULAR;  Surgeon: Vijaya Landin MD;  Location: [facility];  Service: Pain Management;  Laterality: Right;  [phone]  4 WK F/U MARGARET  *SCHEDULE AFTER 8/6    RADIOFREQUENCY ABLATION Right 2/18/2025    Procedure: RADIOFREQUENCY ABLATION RIGHT GENICULAR;  Surgeon: Vijaya Landin MD;  Location: [facility];  Service: Pain Management;  Laterality: Right;  4 WK F/U MADELEINE  *SCHEDULE AFTER 2/13    RADIOFREQUENCY ABLATION Left 3/7/2025    Procedure: RADIOFREQUENCY ABLATION LEFT GENICULAR;  Surgeon: Vijaya Landin MD;  Location: [facility];  Service: Pain Management;  Laterality: Left;  4 WK F/U MADELEINE    right ganglion cyst      throat  polyp      TRANSOBTURATOR SLING  2011    with RSO for persistent complex cyst & MARGOT; done by yris    UPPER GASTROINTESTINAL ENDOSCOPY  2007       Social History     Tobacco Use    Smoking status: Never    Smokeless tobacco: Never   Substance Use Topics    Alcohol use: Yes     Alcohol/week: 0.0 standard drinks of alcohol     Comment: social    Drug use: No       Family History   Problem Relation Name Age of Onset    Diabetes Mother      Hypertension Mother      Heart disease Mother      Cancer Father          lung    Migraines Father      Glaucoma Paternal Grandmother      Glaucoma Sister      Thyroid disease Neg Hx      Asthma Neg Hx         Patient's Medications   New Prescriptions    No medications on file   Previous Medications    ALPRAZOLAM (XANAX) 0.5 MG TABLET    Take daily as needed for anxiety.    AMLODIPINE (NORVASC) 2.5 MG TABLET    Take 1 tablet (2.5 mg total) by mouth once daily.    AZELASTINE (ASTELIN) 137 MCG (0.1 %) NASAL SPRAY    1 spray (137 mcg total) by Nasal route 2 (two) times daily as needed for Rhinitis.    BRIMONIDINE (MIRVASO) 0.33 % GLWP    Apply topically.    BUPROPION (WELLBUTRIN XL) 150 MG TB24 TABLET    Take 2 tablets (300 mg total) by mouth once daily.    BUTALBITAL-ACETAMINOPHEN-CAFFEINE -40 MG (FIORICET, ESGIC) -40 MG PER TABLET    TAKE 1 TABLET EVERY 4 HOURS AS NEEDED    CLOTRIMAZOLE (LOTRIMIN) 1 % SOLN    APPLY TOPICALLY 2 TIMES DAILY FOR 7 DAYS    DICLOFENAC SODIUM (VOLTAREN) 1 % GEL    Apply 2 g topically daily as needed.    DIFLORASONE-EMOLLIENT (APEXICON E) 0.05 % CREA    Apply 1 application topically once daily. for 7 days    DOCUSATE SODIUM (COLACE) 100 MG CAPSULE    Take 100 mg by mouth.    ESTRADIOL (ESTRACE) 1 MG TABLET    Take 1 tablet (1 mg total) by mouth once daily.    FAMOTIDINE (PEPCID) 20 MG TABLET    Take 20 mg by mouth.    FLUOCINONIDE (LIDEX) 0.05 % EXTERNAL SOLUTION    Apply topically 2 (two) times daily. Do not use morning of surgery     HYDROCORTISONE 2.5 % CREAM    Apply topically 2 (two) times daily. apply to affected area for 7 days    KETOCONAZOLE (NIZORAL) 2 % SHAMPOO    Do not use morning of surgery    LEVOCETIRIZINE (XYZAL) 5 MG TABLET    Take 1 tablet (5 mg total) by mouth every evening.    LEVOTHYROXINE (SYNTHROID) 112 MCG TABLET    TAKE 1 TABLET (112 MCG) BY MOUTH BEFORE BREAKFAST AS SCHEDULED    METRONIDAZOLE 1% (METROGEL) 1 % GEL    Apply topically once daily.    NALTREXONE (DEPADE) 50 MG TABLET    Take 1 tablet (50 mg total) by mouth once daily.    NEOMYCIN-POLYMYXIN-DEXAMETHASONE (DEXACINE) 3.5 MG/G-10,000 UNIT/G-0.1 % OINT    Place small amount to affected area three times daily    OMEPRAZOLE (PRILOSEC) 10 MG CAPSULE    Take 10 mg by mouth.    ONDANSETRON (ZOFRAN) 4 MG TABLET    Take 2 tablets (8 mg total) by mouth every 12 (twelve) hours as needed for Nausea.    OXYCODONE-ACETAMINOPHEN (PERCOCET) 5-325 MG PER TABLET    Take 1 tablet by mouth every 6 (six) hours as needed for Pain.    PRAVASTATIN (PRAVACHOL) 20 MG TABLET    TAKE 1 TABLET EVERY DAY    SENNA (SENOKOT) 8.6 MG TABLET    Take 1 tablet by mouth.    ZOLPIDEM (AMBIEN) 5 MG TAB    Take 1 tablet (5 mg total) by mouth nightly as needed (insomnia).   Modified Medications    No medications on file   Discontinued Medications    No medications on file       Review of Systems   Constitutional:  Positive for malaise/fatigue.   HENT: Negative.     Eyes: Negative.    Respiratory:  Positive for shortness of breath.    Cardiovascular:  Positive for chest pain and palpitations.   Gastrointestinal: Negative.    Genitourinary: Negative.    Musculoskeletal:  Positive for joint pain.   Skin: Negative.    Neurological: Negative.    Endo/Heme/Allergies: Negative.    Psychiatric/Behavioral: Negative.     All 12 systems otherwise negative.      Wt Readings from Last 3 Encounters:   05/21/25 89.1 kg (196 lb 6.9 oz)   05/19/25 88.1 kg (194 lb 3.6 oz)   02/18/25 86.6 kg (191 lb)     Temp Readings  "from Last 3 Encounters:   05/19/25 96.8 °F (36 °C) (Tympanic)   03/07/25 97.7 °F (36.5 °C) (Oral)   02/18/25 97.4 °F (36.3 °C) (Oral)     BP Readings from Last 3 Encounters:   05/21/25 138/86   05/19/25 (!) 148/70   03/07/25 (!) 147/74     Pulse Readings from Last 3 Encounters:   05/21/25 72   05/19/25 78   03/07/25 70       /86 (BP Location: Right arm, Patient Position: Sitting)   Pulse 72   Ht 5' 5" (1.651 m)   Wt 89.1 kg (196 lb 6.9 oz)   SpO2 97%   BMI 32.69 kg/m²     Objective:   Physical Exam  Vitals and nursing note reviewed.   Constitutional:       General: She is not in acute distress.     Appearance: She is well-developed. She is obese. She is not diaphoretic.   HENT:      Head: Normocephalic and atraumatic.      Nose: Nose normal.   Eyes:      General: No scleral icterus.     Conjunctiva/sclera: Conjunctivae normal.   Neck:      Thyroid: No thyromegaly.      Vascular: No JVD.   Cardiovascular:      Rate and Rhythm: Normal rate and regular rhythm.      Heart sounds: S1 normal and S2 normal. Murmur heard.      No friction rub. No gallop. No S3 or S4 sounds.   Pulmonary:      Effort: Pulmonary effort is normal. No respiratory distress.      Breath sounds: Normal breath sounds. No stridor. No wheezing or rales.   Chest:      Chest wall: No tenderness.   Abdominal:      General: Bowel sounds are normal. There is no distension.      Palpations: Abdomen is soft. There is no mass.      Tenderness: There is no abdominal tenderness. There is no rebound.   Genitourinary:     Comments: Deferred  Musculoskeletal:         General: No tenderness or deformity. Normal range of motion.      Cervical back: Normal range of motion and neck supple.   Lymphadenopathy:      Cervical: No cervical adenopathy.   Skin:     General: Skin is warm and dry.      Coloration: Skin is not pale.      Findings: No erythema or rash.   Neurological:      Mental Status: She is alert and oriented to person, place, and time.      " Motor: No abnormal muscle tone.      Coordination: Coordination normal.   Psychiatric:         Behavior: Behavior normal.         Thought Content: Thought content normal.         Judgment: Judgment normal.         Lab Results   Component Value Date     03/31/2025     10/05/2022    K 4.4 03/31/2025    K 4.2 10/05/2022     03/31/2025     10/05/2022    CO2 25 03/31/2025    CO2 25 10/05/2022    BUN 12 03/31/2025    CREATININE 1.0 03/31/2025    GLU 78 03/31/2025    GLU 86 10/05/2022    HGBA1C 5.2 10/09/2023    HGBA1C 5.3 07/23/2014    AST 19 03/31/2025    AST 15 10/05/2022    ALT 14 03/31/2025    ALT 14 10/05/2022    ALBUMIN 3.5 03/31/2025    ALBUMIN 3.6 10/05/2022    PROT 6.6 03/31/2025    PROT 6.1 10/05/2022    BILITOT 0.4 03/31/2025    BILITOT 0.7 10/05/2022    WBC 5.58 08/17/2021    HGB 13.8 08/17/2021    HCT 42.4 08/17/2021    MCV 92 08/17/2021     08/17/2021    INR 0.9 06/08/2021    TSH 2.813 10/23/2024    CHOL 189 11/21/2024    HDL 66 11/21/2024    LDLCALC 104.0 11/21/2024    TRIG 95 11/21/2024    BNP 42 06/05/2021         BNP (pg/mL)   Date Value   06/05/2021 42   02/24/2020 16   04/25/2018 <10   04/25/2017 22     INR (no units)   Date Value   06/08/2021 0.9   04/25/2017 0.9   07/14/2013 0.9          Assessment:      1. Primary hypertension    2. Hypercholesteremia    3. Carcinoma of kidney, unspecified laterality    4. Dyspnea on exertion    5. Palpitations    6. Gastroesophageal reflux disease without esophagitis    7. Clear cell carcinoma with lung metastasis    8. Nonspecific abnormal electrocardiogram (ECG) (EKG)    9. Other form of dyspnea    10. Sleep apnea, unspecified type            Plan:     MONTESINOS, CP - few times x year with abnormal ECG  - stress ECHO overall neg 6/2024  - coronary atherosc in CT noted  - Nuc stress neg for ischemia 9/2024.   -Vital monitor neg 9/2024  -improved breathing   - r/o MIRANDA     2. GERD  - cont PPI    3. Palpitations  --Vital monitor neg  9/2024    4. Clear Cell CA - with mets, S/p wedge resection [facility] and XRT.   - f/u heme/onc and pulm    5. Obesity  -  BMI 32 - 196 lbs  - will try to get GLP    Visit today included increased complexity associated with the care of the episodic problem dyspnea addressed and managing the longitudinal care of the patient due to the serious and/or complex managed problem(s) .      Thank you for allowing me to participate in this patient's care. Please do not hesitate to contact me with any questions or concerns. Consult note has been forwarded to the referral physician.

## 2025-05-29 DIAGNOSIS — J30.9 ALLERGIC RHINITIS, UNSPECIFIED SEASONALITY, UNSPECIFIED TRIGGER: ICD-10-CM

## 2025-05-29 RX ORDER — LEVOCETIRIZINE DIHYDROCHLORIDE 5 MG/1
5 TABLET, FILM COATED ORAL NIGHTLY
Qty: 90 TABLET | Refills: 0 | Status: SHIPPED | OUTPATIENT
Start: 2025-05-29

## 2025-06-02 ENCOUNTER — PATIENT MESSAGE (OUTPATIENT)
Dept: INTERNAL MEDICINE | Facility: CLINIC | Age: 75
End: 2025-06-02
Payer: MEDICARE

## 2025-06-03 ENCOUNTER — TELEPHONE (OUTPATIENT)
Dept: CARDIOLOGY | Facility: CLINIC | Age: 75
End: 2025-06-03
Payer: MEDICARE

## 2025-06-04 ENCOUNTER — TELEPHONE (OUTPATIENT)
Dept: INTERNAL MEDICINE | Facility: CLINIC | Age: 75
End: 2025-06-04
Payer: MEDICARE

## 2025-06-09 ENCOUNTER — OFFICE VISIT (OUTPATIENT)
Dept: OPHTHALMOLOGY | Facility: CLINIC | Age: 75
End: 2025-06-09
Payer: MEDICARE

## 2025-06-09 DIAGNOSIS — H35.361 DRUSEN (DEGENERATIVE) OF MACULA, RIGHT EYE: ICD-10-CM

## 2025-06-09 DIAGNOSIS — H35.721 RETINAL PIGMENT EPITHELIAL DETACHMENT OF RIGHT EYE: ICD-10-CM

## 2025-06-09 DIAGNOSIS — Z96.1 PSEUDOPHAKIA OF BOTH EYES: ICD-10-CM

## 2025-06-09 DIAGNOSIS — H53.60 NYCTALOPIA: ICD-10-CM

## 2025-06-09 DIAGNOSIS — H35.89 RPE MOTTLING OF MACULA: Primary | ICD-10-CM

## 2025-06-09 PROCEDURE — 92134 CPTRZ OPH DX IMG PST SGM RTA: CPT | Mod: PBBFAC | Performed by: OPHTHALMOLOGY

## 2025-06-09 PROCEDURE — 99213 OFFICE O/P EST LOW 20 MIN: CPT | Mod: PBBFAC | Performed by: OPHTHALMOLOGY

## 2025-06-09 PROCEDURE — 99999 PR PBB SHADOW E&M-EST. PATIENT-LVL III: CPT | Mod: PBBFAC,,, | Performed by: OPHTHALMOLOGY

## 2025-06-09 PROCEDURE — 92083 EXTENDED VISUAL FIELD XM: CPT | Mod: PBBFAC | Performed by: OPHTHALMOLOGY

## 2025-06-09 PROCEDURE — 99214 OFFICE O/P EST MOD 30 MIN: CPT | Mod: S$PBB,,, | Performed by: OPHTHALMOLOGY

## 2025-06-09 PROCEDURE — 92250 FUNDUS PHOTOGRAPHY W/I&R: CPT | Mod: 26,S$PBB,, | Performed by: OPHTHALMOLOGY

## 2025-06-09 NOTE — PROGRESS NOTES
===============================  Date today is 6/9/2025  Sherrill Avery is a 74 y.o. female  Last visit [facility]: :4/11/2025   Last visit eye dept. 4/11/2025    Uncorrected distance visual acuity was 20/40 in the right eye and 20/40 in the left eye.  Tonometry       Tonometry (Applanation, 1:35 PM)         Right Left    Pressure 12 12                  Not recorded       Manifest Refraction       Manifest Refraction         Sphere Cylinder Axis    Right [place]      Left -0.25 +1.25 005                  Not recorded       Chief Complaint   Patient presents with    RPE MOTTLING OF MACULA     2 MONTHS  HVF  OPTOS  MOCT     HPI     RPE MOTTLING OF MACULA            Comments: 2 MONTHS  HVF  [facility]  MOCT          Comments    Diagnosed with kidney cancer, nodules in lungs.  No dm  Uses readers   Dry eyes   pciol ou  RK sx  Many years ago          Last edited by Eusebia Gutierrez on 6/9/2025  1:23 PM.      Problem List Items Addressed This Visit    None  Visit Diagnoses         RPE mottling of macula    -  Primary    Relevant Orders    Posterior Segment OCT Retina-Both eyes (Completed)    Color Fundus Photography - OU - Both Eyes (Completed)      Drusen (degenerative) of macula, right eye        Relevant Orders    Posterior Segment OCT Retina-Both eyes (Completed)    Color Fundus Photography - OU - Both Eyes (Completed)      Pseudophakia of both eyes          Nyctalopia        Relevant Orders    Posterior Segment OCT Retina-Both eyes (Completed)    Bermudez Visual Field - OU - Extended - Both Eyes (Completed)      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  Check OU independently daily.    Gave my office and personal cell phone number.  ________________  6/9/2025 today  Sherrill Avery    :  Why  visually symptomatic  Slight refractive error  VF today normal  OCT OD subfoveal clivus/RPED, pigmentary maculopathy, OS ok    Plate haptic IOL OU  Sharp disc OU  OD RPED with pigmentary maculopathy  Subfoveal clivus  OS macula ok, trace MA's nasally  Optos today:      RTC as scheduled  Instructed to call 24/7 for any worsening of vision or symptoms. Check OU daily.   Gave my office and cell phone number.        =============================

## 2025-06-10 ENCOUNTER — HOSPITAL ENCOUNTER (OUTPATIENT)
Dept: RADIOLOGY | Facility: HOSPITAL | Age: 75
Discharge: HOME | End: 2025-06-10
Attending: PHYSICIAN ASSISTANT
Payer: MEDICARE

## 2025-06-10 ENCOUNTER — TELEPHONE (OUTPATIENT)
Dept: INTERNAL MEDICINE | Facility: CLINIC | Age: 75
End: 2025-06-10
Payer: MEDICARE

## 2025-06-10 ENCOUNTER — OFFICE VISIT (OUTPATIENT)
Dept: INTERNAL MEDICINE | Facility: CLINIC | Age: 75
End: 2025-06-10
Payer: MEDICARE

## 2025-06-10 ENCOUNTER — RESULTS FOLLOW-UP (OUTPATIENT)
Dept: INTERNAL MEDICINE | Facility: CLINIC | Age: 75
End: 2025-06-10

## 2025-06-10 ENCOUNTER — TELEPHONE (OUTPATIENT)
Dept: CARDIOLOGY | Facility: CLINIC | Age: 75
End: 2025-06-10
Payer: MEDICARE

## 2025-06-10 VITALS
SYSTOLIC BLOOD PRESSURE: 138 MMHG | TEMPERATURE: 98 F | HEIGHT: 65 IN | HEART RATE: 79 BPM | WEIGHT: 190.38 LBS | DIASTOLIC BLOOD PRESSURE: 78 MMHG | BODY MASS INDEX: 31.72 KG/M2 | OXYGEN SATURATION: 98 %

## 2025-06-10 DIAGNOSIS — I70.0 AORTIC ATHEROSCLEROSIS: ICD-10-CM

## 2025-06-10 DIAGNOSIS — F51.04 CHRONIC INSOMNIA: ICD-10-CM

## 2025-06-10 DIAGNOSIS — M25.561 CHRONIC PAIN OF BOTH KNEES: ICD-10-CM

## 2025-06-10 DIAGNOSIS — M54.16 LUMBAR RADICULOPATHY: ICD-10-CM

## 2025-06-10 DIAGNOSIS — E04.2 MULTIPLE THYROID NODULES: ICD-10-CM

## 2025-06-10 DIAGNOSIS — K21.9 GASTROESOPHAGEAL REFLUX DISEASE WITHOUT ESOPHAGITIS: ICD-10-CM

## 2025-06-10 DIAGNOSIS — E03.9 ACQUIRED HYPOTHYROIDISM: ICD-10-CM

## 2025-06-10 DIAGNOSIS — G89.29 CHRONIC PAIN OF BOTH KNEES: ICD-10-CM

## 2025-06-10 DIAGNOSIS — C80.1 CLEAR CELL CARCINOMA: ICD-10-CM

## 2025-06-10 DIAGNOSIS — G57.82 NEUROPATHY OF LEFT SURAL NERVE: ICD-10-CM

## 2025-06-10 DIAGNOSIS — N18.31 STAGE 3A CHRONIC KIDNEY DISEASE: ICD-10-CM

## 2025-06-10 DIAGNOSIS — F41.1 GENERALIZED ANXIETY DISORDER: Chronic | ICD-10-CM

## 2025-06-10 DIAGNOSIS — R91.8 MULTIPLE LUNG NODULES ON CT: Chronic | ICD-10-CM

## 2025-06-10 DIAGNOSIS — R41.3 POOR SHORT TERM MEMORY: ICD-10-CM

## 2025-06-10 DIAGNOSIS — I10 PRIMARY HYPERTENSION: ICD-10-CM

## 2025-06-10 DIAGNOSIS — H90.3 BILATERAL SENSORINEURAL HEARING LOSS: ICD-10-CM

## 2025-06-10 DIAGNOSIS — Z01.818 PRE-OPERATIVE GENERAL PHYSICAL EXAMINATION: Primary | ICD-10-CM

## 2025-06-10 DIAGNOSIS — M79.7 FIBROMYALGIA: ICD-10-CM

## 2025-06-10 DIAGNOSIS — E55.9 VITAMIN D DEFICIENCY: ICD-10-CM

## 2025-06-10 DIAGNOSIS — E78.5 HYPERLIPIDEMIA, UNSPECIFIED HYPERLIPIDEMIA TYPE: ICD-10-CM

## 2025-06-10 DIAGNOSIS — C64.9 CARCINOMA OF KIDNEY, UNSPECIFIED LATERALITY: ICD-10-CM

## 2025-06-10 DIAGNOSIS — N39.41 URGE INCONTINENCE OF URINE: ICD-10-CM

## 2025-06-10 DIAGNOSIS — Z01.818 PRE-OPERATIVE GENERAL PHYSICAL EXAMINATION: ICD-10-CM

## 2025-06-10 DIAGNOSIS — M25.562 CHRONIC PAIN OF BOTH KNEES: ICD-10-CM

## 2025-06-10 PROCEDURE — 99215 OFFICE O/P EST HI 40 MIN: CPT | Mod: PBBFAC,25 | Performed by: PHYSICIAN ASSISTANT

## 2025-06-10 PROCEDURE — 71046 X-RAY EXAM CHEST 2 VIEWS: CPT | Mod: 26,,, | Performed by: RADIOLOGY

## 2025-06-10 PROCEDURE — 99999 PR PBB SHADOW E&M-EST. PATIENT-LVL V: CPT | Mod: PBBFAC,,, | Performed by: PHYSICIAN ASSISTANT

## 2025-06-10 PROCEDURE — 71046 X-RAY EXAM CHEST 2 VIEWS: CPT | Mod: TC

## 2025-06-10 NOTE — TELEPHONE ENCOUNTER
Spoke with the patient concerning an appointment with Dr Macias. The patient stated discard the message she has an appointment with a provider at the [facility].

## 2025-06-10 NOTE — TELEPHONE ENCOUNTER
Copied from CRM #[id number]. Topic: General Inquiry - Patient Advice  >> Billy 10, 2025  8:51 AM Zully wrote:  Type: Staff Message     Who called: [facility] - dr. richardson   Call back number: [phone]  Reason for the call: need copy of ekg  Additional information: fax [phone]        EKG faxed to [phone] at this time. Patient updated.

## 2025-06-10 NOTE — PROGRESS NOTES
Subjective:       Patient ID: Sherrill Avery is a 74 y.o. female.    Chief Complaint: Pre-op Exam      HPI  Patient presents to clinic today for preop exam. Patient is having left plantar fascitis surgery by Dr. Shola Escobar on 6/18/2025. Patient denies personal or family history of problems with anesthesia.     Past Medical History:   Diagnosis Date    Anxiety     Family history of malignant melanoma 08/25/2014    Fibromyalgia affecting lower leg     GERD (gastroesophageal reflux disease)     Hx of psychiatric care     Hypercholesteremia     Hypertension     Hypothyroidism     Inflamed seborrheic keratosis 08/25/2014    Microscopic hematuria 02/02/2017    Multiple benign melanocytic nevi 08/25/2014    Renal cell carcinoma of right kidney metastatic to other site     Therapy        Past Surgical History:   Procedure Laterality Date    AUGMENTATION OF BREAST      bladder lift      breast implants      BREAST SURGERY Bilateral 1996    saline implants    COLONOSCOPY  2007    HYSTERECTOMY      ectopic pregnancy x 2, with LSO    KNEE ARTHROPLASTY Left 06/14/2021    Procedure: ARTHROPLASTY, KNEE:LEFT:DEPUY-SIGMA;  Surgeon: Osmar Avery III, MD;  Location: [facility];  Service: Orthopedics;  Laterality: Left;    LAPAROSCOPIC NEPHRECTOMY, HAND ASSISTED  07/25/2013    LUNG REMOVAL, PARTIAL Left 2020    At [facility]    NEPHRECTOMY      OOPHORECTOMY Bilateral     RADIOFREQUENCY ABLATION Left 08/30/2022    Procedure: RADIOFREQUENCY ABLATION, LEFT KNEE COOLED;  Surgeon: Vijaya Landin MD;  Location: [facility];  Service: Pain Management;  Laterality: Left;    RADIOFREQUENCY ABLATION Right 10/25/2022    Procedure: RADIOFREQUENCY ABLATION RIGHT KNEE GENICULAR COOLED;  Surgeon: Vijaya Landin MD;  Location: [facility];  Service: Pain Management;  Laterality: Right;    RADIOFREQUENCY ABLATION Left 03/07/2023    Procedure: RADIOFREQUENCY ABLATION LEFT GENICULAR COOLED RFA;  Surgeon: Vijaya Landin MD;   Location: [facility];  Service: Pain Management;  Laterality: Left;    RADIOFREQUENCY ABLATION Right 08/01/2023    Procedure: RADIOFREQUENCY ABLATION, RIGHT GENICULAR COOLED;  Surgeon: Vijaya Landin MD;  Location: [facility];  Service: Pain Management;  Laterality: Right;    RADIOFREQUENCY ABLATION Left 12/26/2023    Procedure: RADIOFREQUENCY ABLATION LEFT GENICULAR 1 OF 2;  Surgeon: Vijaya Landin MD;  Location: [facility];  Service: Pain Management;  Laterality: Left;  [phone]    RADIOFREQUENCY ABLATION Right 2/6/2024    Procedure: RADIOFREQUENCY ABLATION RIGHT GENICULAR 2 OF 2;  Surgeon: Vijaya Landin MD;  Location: [facility];  Service: Pain Management;  Laterality: Right;  [phone]  *after 2/1/24    RADIOFREQUENCY ABLATION Left 7/2/2024    Procedure: RADIOFREQUENCY ABLATION LEFT GENICULAR;  Surgeon: Vijaya Landin MD;  Location: [facility];  Service: Pain Management;  Laterality: Left;  [phone]  *SCHEDULE AFTER 6/26    RADIOFREQUENCY ABLATION Right 8/13/2024    Procedure: RADIOFREQUENCY ABLATION RIGHT GENICULAR;  Surgeon: Vijaya Landin MD;  Location: [facility];  Service: Pain Management;  Laterality: Right;  [phone]  4 WK F/U MARGARET  *SCHEDULE AFTER 8/6    RADIOFREQUENCY ABLATION Right 2/18/2025    Procedure: RADIOFREQUENCY ABLATION RIGHT GENICULAR;  Surgeon: Vijaya Landin MD;  Location: [facility];  Service: Pain Management;  Laterality: Right;  4 WK F/U MADELEINE  *SCHEDULE AFTER 2/13    RADIOFREQUENCY ABLATION Left 3/7/2025    Procedure: RADIOFREQUENCY ABLATION LEFT GENICULAR;  Surgeon: Vijaya Landin MD;  Location: [facility];  Service: Pain Management;  Laterality: Left;  4 WK F/U MADELEINE    right ganglion cyst      throat polyp      TRANSOBTURATOR SLING  2011    with RSO for persistent complex cyst & MARGOT; done by arndt    UPPER GASTROINTESTINAL ENDOSCOPY  2007       Current Medications[1]    Review of patient's allergies indicates:  "  Allergen Reactions    Talwin compound Anxiety     hallucinations/anxiety    Tramadol Itching    Buspirone Anxiety    Codeine Itching    Morphine Itching     jittery    Morpholine analogues Anxiety and Itching    Naproxen Itching     Takes Ibuprofen is ok on rare occasion     Nexium [esomeprazole magnesium] Other (See Comments)     constipation    Wal-phed [pseudoephedrine hcl] Itching       Review of Systems   Constitutional:  Negative for chills, fatigue, fever and unexpected weight change.   HENT:  Negative for congestion, dental problem, ear pain, hearing loss, rhinorrhea and trouble swallowing.    Eyes:  Negative for pain and visual disturbance.   Respiratory:  Negative for cough and shortness of breath.    Cardiovascular:  Negative for chest pain, palpitations and leg swelling.   Gastrointestinal:  Negative for abdominal distention, abdominal pain, blood in stool, constipation, diarrhea, nausea and vomiting.   Genitourinary:  Negative for difficulty urinating and pelvic pain.   Musculoskeletal:  Negative for arthralgias and myalgias.   Skin:  Negative for rash.   Neurological:  Negative for dizziness, weakness, numbness and headaches.   Hematological:  Negative for adenopathy. Does not bruise/bleed easily.   Psychiatric/Behavioral:  Negative for dysphoric mood and sleep disturbance. The patient is not nervous/anxious.        Objective:     Vitals:    06/10/25 1059   BP: 138/78   Pulse: 79   Temp: 97.6 °F (36.4 °C)   SpO2: 98%   Weight: 86.4 kg (190 lb 5.9 oz)   Height: 5' 5" (1.651 m)        Physical Exam  Vitals reviewed.   Constitutional:       General: She is not in acute distress.     Appearance: Normal appearance. She is well-developed. She is obese. She is not ill-appearing or toxic-appearing.   HENT:      Head: Normocephalic and atraumatic.      Right Ear: Tympanic membrane, ear canal and external ear normal.      Left Ear: Tympanic membrane, ear canal and external ear normal.      Nose: Nose normal. "      Mouth/Throat:      Mouth: Mucous membranes are dry.      Pharynx: Oropharynx is clear.   Eyes:      General: Lids are normal. No scleral icterus.     Extraocular Movements: Extraocular movements intact.      Conjunctiva/sclera: Conjunctivae normal.      Pupils: Pupils are equal, round, and reactive to light.   Cardiovascular:      Rate and Rhythm: Normal rate and regular rhythm.      Pulses: Normal pulses.      Heart sounds: Normal heart sounds. No murmur heard.     No friction rub. No gallop.   Pulmonary:      Effort: Pulmonary effort is normal. No respiratory distress.      Breath sounds: Normal breath sounds. No stridor. No decreased breath sounds, wheezing, rhonchi or rales.   Abdominal:      General: Abdomen is flat. Bowel sounds are normal. There is no distension.      Palpations: Abdomen is soft. There is no mass.      Tenderness: There is no abdominal tenderness. There is no guarding or rebound.      Hernia: No hernia is present.   Musculoskeletal:         General: Normal range of motion.      Cervical back: Normal range of motion and neck supple. No tenderness.      Right lower leg: No edema.      Left lower leg: No edema.   Lymphadenopathy:      Cervical: No cervical adenopathy.   Skin:     General: Skin is warm and dry.   Neurological:      General: No focal deficit present.      Mental Status: She is alert and oriented to person, place, and time. Mental status is at baseline.      Cranial Nerves: No cranial nerve deficit.      Gait: Gait normal.   Psychiatric:         Mood and Affect: Mood and affect normal.         Behavior: Behavior normal.         Thought Content: Thought content normal.         Judgment: Judgment normal.         Assessment:       1. Pre-operative general physical examination    2. Generalized anxiety disorder    3. Carcinoma of kidney, unspecified laterality    4. Clear cell carcinoma with lung metastasis    5. Poor short term memory    6. Neuropathy of left sural nerve-mild     7. Lumbar radiculopathy    8. Bilateral sensorineural hearing loss    9. Multiple lung nodules on CT    10. Aortic atherosclerosis    11. Hyperlipidemia, unspecified hyperlipidemia type    12. Primary hypertension    13. Stage 3a chronic kidney disease    14. Urge incontinence of urine    15. Acquired hypothyroidism    16. Multiple thyroid nodules    17. Vitamin D deficiency    18. Gastroesophageal reflux disease without esophagitis    19. Chronic pain of both knees    20. Fibromyalgia    21. Chronic insomnia        Plan:   1. Pre-operative general physical examination  -     CBC Auto Differential; Future; Expected date: 06/10/2025  -     Comprehensive Metabolic Panel; Future; Expected date: 06/10/2025  -     Protime-INR; Future; Expected date: 06/10/2025  -     APTT; Future; Expected date: 06/10/2025  -     X-Ray Chest PA And Lateral; Future; Expected date: 06/10/2025  -     Cancel: EKG 12-lead; Future; Expected date: 06/10/2025    2. Generalized anxiety disorder    3. Carcinoma of kidney, unspecified laterality  Overview:  Known history of renal cancer with mets to the lungs in the past requiring surgery and radiation. Followed by Oncology.       4. Clear cell carcinoma with lung metastasis    5. Poor short term memory    6. Neuropathy of left sural nerve-mild    7. Lumbar radiculopathy    8. Bilateral sensorineural hearing loss    9. Multiple lung nodules on CT    10. Aortic atherosclerosis    11. Hyperlipidemia, unspecified hyperlipidemia type    12. Primary hypertension    13. Stage 3a chronic kidney disease    14. Urge incontinence of urine    15. Acquired hypothyroidism    16. Multiple thyroid nodules    17. Vitamin D deficiency    18. Gastroesophageal reflux disease without esophagitis    19. Chronic pain of both knees    20. Fibromyalgia    21. Chronic insomnia    22. Dyspnea on exertion- Cardiology visit with Dr. Grant was 5/21/2025:   - stress ECHO overall neg 6/2024  - coronary atherosc in CT noted  -  Nuc stress neg for ischemia 9/2024.   -Vital monitor neg 9/2024  -improved breathing   - r/o MIRANDA     EKG 5/21/2025:  Normal sinus rhythm   Normal ECG   When compared with ECG of 16-Sep-2024 12:07,   No significant change was found      Chest xray 6/10/2025:   FINDINGS: The size and contour of the heart are normal. The lungs are clear. There is no pneumothorax or pleural effusion.  Impression:  Normal study.    Pre-op labs:   Calcium mildly low, 8.4  eGFR: 59 (stable)  All other values WNL.       Patient is moderate risk for surgery under general anesthesia from a Primary Care standpoint due to patient's complex medical history.         Airam Tamez PA-C       This note was generated with the assistance of ambient listening technology. I attest to having reviewed and edited the generated note for accuracy, though some syntax or spelling errors may persist. Please contact the author of this note for any clarification.          [1]   Current Outpatient Medications:     ALPRAZolam (XANAX) 0.5 MG tablet, Take daily as needed for anxiety., Disp: 90 tablet, Rfl: 1    amLODIPine (NORVASC) 2.5 MG tablet, Take 1 tablet (2.5 mg total) by mouth once daily., Disp: 30 tablet, Rfl: 5    azelastine (ASTELIN) 137 mcg (0.1 %) nasal spray, 1 spray (137 mcg total) by Nasal route 2 (two) times daily as needed for Rhinitis., Disp: 30 mL, Rfl: 2    brimonidine (MIRVASO) 0.33 % GlwP, Apply topically., Disp: , Rfl:     buPROPion (WELLBUTRIN XL) 150 MG TB24 tablet, Take 2 tablets (300 mg total) by mouth once daily., Disp: 180 tablet, Rfl: 1    butalbital-acetaminophen-caffeine -40 mg (FIORICET, ESGIC) -40 mg per tablet, TAKE 1 TABLET EVERY 4 HOURS AS NEEDED, Disp: 30 tablet, Rfl: 0    clotrimazole (LOTRIMIN) 1 % Soln, APPLY TOPICALLY 2 TIMES DAILY FOR 7 DAYS, Disp: 30 mL, Rfl: 2    diclofenac sodium (VOLTAREN) 1 % Gel, Apply 2 g topically daily as needed., Disp: 100 g, Rfl: 11    diflorasone-emollient (APEXICON E) 0.05 %  Crea, Apply 1 application topically once daily. for 7 days, Disp: 30 g, Rfl: 5    docusate sodium (COLACE) 100 MG capsule, Take 100 mg by mouth., Disp: , Rfl:     estradioL (ESTRACE) 1 MG tablet, Take 1 tablet (1 mg total) by mouth once daily., Disp: 90 tablet, Rfl: 3    famotidine (PEPCID) 20 MG tablet, Take 20 mg by mouth., Disp: , Rfl:     fluocinonide (LIDEX) 0.05 % external solution, Apply topically 2 (two) times daily. Do not use morning of surgery, Disp: , Rfl:     hydrocortisone 2.5 % cream, Apply topically 2 (two) times daily. apply to affected area for 7 days, Disp: 20 g, Rfl: 5    ketoconazole (NIZORAL) 2 % shampoo, Do not use morning of surgery, Disp: , Rfl:     levocetirizine (XYZAL) 5 MG tablet, TAKE 1 TABLET BY MOUTH ONCE DAILY IN THE EVENING, Disp: 90 tablet, Rfl: 0    levothyroxine (SYNTHROID) 112 MCG tablet, TAKE 1 TABLET (112 MCG) BY MOUTH BEFORE BREAKFAST AS SCHEDULED, Disp: 90 tablet, Rfl: 0    metronidazole 1% (METROGEL) 1 % Gel, Apply topically once daily., Disp: 60 g, Rfl: 5    naltrexone (DEPADE) 50 mg tablet, Take 1 tablet (50 mg total) by mouth once daily., Disp: 90 tablet, Rfl: 0    neomycin-polymyxin-dexamethasone (DEXACINE) 3.5 mg/g-10,000 unit/g-0.1 % Oint, Place small amount to affected area three times daily, Disp: 3.5 g, Rfl: 0    omeprazole (PRILOSEC) 10 MG capsule, Take 10 mg by mouth., Disp: , Rfl:     ondansetron (ZOFRAN) 4 MG tablet, Take 2 tablets (8 mg total) by mouth every 12 (twelve) hours as needed for Nausea., Disp: 20 tablet, Rfl: 2    oxyCODONE-acetaminophen (PERCOCET) 5-325 mg per tablet, Take 1 tablet by mouth every 6 (six) hours as needed for Pain., Disp: 30 each, Rfl: 0    pravastatin (PRAVACHOL) 20 MG tablet, TAKE 1 TABLET EVERY DAY, Disp: 90 tablet, Rfl: 3    senna (SENOKOT) 8.6 mg tablet, Take 1 tablet by mouth., Disp: , Rfl:     zolpidem (AMBIEN) 5 MG Tab, Take 1 tablet (5 mg total) by mouth nightly as needed (insomnia)., Disp: 90 tablet, Rfl: 1  No current  facility-administered medications for this visit.    Facility-Administered Medications Ordered in Other Visits:     triamcinolone acetonide injection 40 mg, 40 mg, Intra-articular, , Osmar Avery III, MD, 40 mg at 05/17/22 [id number]

## 2025-06-10 NOTE — TELEPHONE ENCOUNTER
Copied from CRM #[id number]. Topic: Appointments - Appointment Access  >> Billy 10, 2025  8:46 AM Emma wrote:  .Type:  Patient  Call    Who Called:[facility] ( [facility])  Does the patient know what this is regarding?:nurse was calling for patient to see if she can get an appointment on this week to get clearance for surgery  Would the patient rather a call back or a response via [facility]? Call back  Best Call Back Number:.[phone]   Additional Information:

## 2025-06-12 ENCOUNTER — NURSE TRIAGE (OUTPATIENT)
Dept: ADMINISTRATIVE | Facility: CLINIC | Age: 75
End: 2025-06-12
Payer: MEDICARE

## 2025-06-12 ENCOUNTER — OCHSNER VIRTUAL EMERGENCY DEPARTMENT (OUTPATIENT)
Facility: CLINIC | Age: 75
End: 2025-06-12
Payer: MEDICARE

## 2025-06-12 ENCOUNTER — PATIENT OUTREACH (OUTPATIENT)
Facility: OTHER | Age: 75
End: 2025-06-12
Payer: MEDICARE

## 2025-06-12 DIAGNOSIS — M79.672 LEFT FOOT PAIN: Primary | ICD-10-CM

## 2025-06-12 NOTE — PLAN OF CARE-OVED
[facility] Emergency Department Plan of Care Note  Referral Source: Nurse On-Call                               Chief Complaint   Patient presents with    Foot Pain       Recommendation: Primary Care                            Encounter Diagnosis   Name Primary?    Left foot pain Yes

## 2025-06-12 NOTE — TELEPHONE ENCOUNTER
Pt has a foot surgery coming up next Wednesday. She did her pre-op. Saw her cardiac provider about 3 weeks ago. But, she's been experiencing SOB with exertion for some time, and is now wondering if it's safe for her to go under for surgery. PCP aware that she gets SOB with activity. For example, earlier she was watering her plants for about 30 min and suddenly got very exhausted and had to sit down and catch her breath. Saw her cardiologist on 5/21 for dyspnea on exertion and testing overall came back normal. Chest xray on 6/10 normal. Saw her pcp for pre-op appt on 6/10 and cleared for surgery. Not SOB with all activity. More like going up stairs or bending up and down to do laundry. Just walking around her house or doing dishes, she's fine. Feels like it may be slightly worse than when she saw her PCP on 6/10, but she also realizes it's very hot outside and could be related to that.    Dispo- callback by pcp today. Referred to [facility]. Dr. Beaver reviewed and advised if pt is not comfortable and procedure is elective, she could always postpone. Also suggested talking with her surgeon about an epidural instead of general as an option. Advised pt messages will be routed to her pcp and cardiologist requesting they reach out to further discuss so she can make a final decision.  Reason for Disposition   Nursing judgment    Protocols used: Information Only Call - No Triage-A-[place]

## 2025-06-12 NOTE — PROGRESS NOTES
"Patient spoke to [facility] RN on call nurse to report the following, "Pt has a foot surgery coming up next Wednesday. She did her pre-op. Saw her cardiac provider about 3 weeks ago. But, she's been experiencing SOB with exertion for some time, and is now wondering if it's safe for her to go under for surgery. PCP aware that she gets SOB with activity. For example, earlier she was watering her plants for about 30 min and suddenly got very exhausted and had to sit down and catch her breath. Saw her cardiologist on 5/21 for dyspnea on exertion and testing overall came back normal. Chest xray on 6/10 normal. Saw her pcp for pre-op appt on 6/10 and cleared for surgery. Not SOB with all activity. More like going up stairs or bending up and down to do laundry. Just walking around her house or doing dishes, she's fine. Feels like it may be slightly worse than when she saw her PCP on 6/10, but she also realizes it's very hot outside and could be related to that."    [facility] RN on call nurse consulted with Kavita MD on call, Dr. Elder Beaver, and disposition is primary care. Pt can further discuss with PCP. Follow up scheduled on 06/14/2025 to outreach to assess for any additional needs/concerns.     "

## 2025-06-12 NOTE — TELEPHONE ENCOUNTER
I tried calling the patient concerning the following message from  RAEANN Merino with no answer. A message was left for a return call to Dr Macias's office.The patient did not have her pre-op clearance appointment with Dr Macias. The follow message was sent to Airam SCHREIBER for advisement due to pre-op clearance appointment on  6/10/25. Dr Macias is not in clinic this week.

## 2025-06-13 ENCOUNTER — PATIENT MESSAGE (OUTPATIENT)
Dept: CARDIOLOGY | Facility: CLINIC | Age: 75
End: 2025-06-13
Payer: MEDICARE

## 2025-06-14 ENCOUNTER — HOSPITAL ENCOUNTER (EMERGENCY)
Facility: HOSPITAL | Age: 75
Discharge: HOME | End: 2025-06-14
Attending: FAMILY MEDICINE
Payer: MEDICARE

## 2025-06-14 VITALS
HEART RATE: 78 BPM | DIASTOLIC BLOOD PRESSURE: 91 MMHG | RESPIRATION RATE: 20 BRPM | BODY MASS INDEX: 32.39 KG/M2 | HEIGHT: 65 IN | TEMPERATURE: 98 F | SYSTOLIC BLOOD PRESSURE: 147 MMHG | OXYGEN SATURATION: 98 % | WEIGHT: 194.38 LBS

## 2025-06-14 DIAGNOSIS — R07.9 CHEST PAIN: Primary | ICD-10-CM

## 2025-06-14 DIAGNOSIS — R06.00 DYSPNEA: ICD-10-CM

## 2025-06-14 LAB
ABSOLUTE EOSINOPHIL (OHS): 0.16 K/UL
ABSOLUTE MONOCYTE (OHS): 0.41 K/UL (ref 0.3–1)
ABSOLUTE NEUTROPHIL COUNT (OHS): 3.97 K/UL (ref 1.8–7.7)
ALBUMIN SERPL BCP-MCNC: 3.9 G/DL (ref 3.5–5.2)
ALP SERPL-CCNC: 90 UNIT/L (ref 40–150)
ALT SERPL W/O P-5'-P-CCNC: 12 UNIT/L (ref 10–44)
ANION GAP (OHS): 9 MMOL/L (ref 8–16)
AST SERPL-CCNC: 16 UNIT/L (ref 11–45)
BASOPHILS # BLD AUTO: 0.03 K/UL
BASOPHILS NFR BLD AUTO: 0.5 %
BILIRUB SERPL-MCNC: 0.5 MG/DL (ref 0.1–1)
BILIRUB UR QL STRIP.AUTO: NEGATIVE
BNP SERPL-MCNC: 14 PG/ML (ref 0–99)
BUN SERPL-MCNC: 16 MG/DL (ref 8–23)
CALCIUM SERPL-MCNC: 8.7 MG/DL (ref 8.7–10.5)
CHLORIDE SERPL-SCNC: 109 MMOL/L (ref 95–110)
CLARITY UR: CLEAR
CO2 SERPL-SCNC: 22 MMOL/L (ref 23–29)
COLOR UR AUTO: YELLOW
CREAT SERPL-MCNC: 1 MG/DL (ref 0.5–1.4)
D DIMER PPP IA.FEU-MCNC: 0.94 MG/L FEU
ERYTHROCYTE [DISTWIDTH] IN BLOOD BY AUTOMATED COUNT: 13.2 % (ref 11.5–14.5)
GFR SERPLBLD CREATININE-BSD FMLA CKD-EPI: 59 ML/MIN/1.73/M2
GLUCOSE SERPL-MCNC: 84 MG/DL (ref 70–110)
GLUCOSE UR QL STRIP: NEGATIVE
HCT VFR BLD AUTO: 43.7 % (ref 37–48.5)
HGB BLD-MCNC: 14.5 GM/DL (ref 12–16)
HGB UR QL STRIP: NEGATIVE
HOLD SPECIMEN: NORMAL
IMM GRANULOCYTES # BLD AUTO: 0.02 K/UL (ref 0–0.04)
IMM GRANULOCYTES NFR BLD AUTO: 0.3 % (ref 0–0.5)
KETONES UR QL STRIP: NEGATIVE
LEUKOCYTE ESTERASE UR QL STRIP: NEGATIVE
LYMPHOCYTES # BLD AUTO: 1.37 K/UL (ref 1–4.8)
MAGNESIUM SERPL-MCNC: 2.1 MG/DL (ref 1.6–2.6)
MCH RBC QN AUTO: 30.7 PG (ref 27–31)
MCHC RBC AUTO-ENTMCNC: 33.2 G/DL (ref 32–36)
MCV RBC AUTO: 92 FL (ref 82–98)
NITRITE UR QL STRIP: NEGATIVE
NUCLEATED RBC (/100WBC) (OHS): 0 /100 WBC
OHS QRS DURATION: 82 MS
OHS QTC CALCULATION: 427 MS
PH UR STRIP: 7 [PH]
PLATELET # BLD AUTO: 201 K/UL (ref 150–450)
PMV BLD AUTO: 9.8 FL (ref 9.2–12.9)
POTASSIUM SERPL-SCNC: 4.6 MMOL/L (ref 3.5–5.1)
PROT SERPL-MCNC: 7.2 GM/DL (ref 6–8.4)
PROT UR QL STRIP: NEGATIVE
RBC # BLD AUTO: 4.73 M/UL (ref 4–5.4)
RELATIVE EOSINOPHIL (OHS): 2.7 %
RELATIVE LYMPHOCYTE (OHS): 23 % (ref 18–48)
RELATIVE MONOCYTE (OHS): 6.9 % (ref 4–15)
RELATIVE NEUTROPHIL (OHS): 66.6 % (ref 38–73)
SODIUM SERPL-SCNC: 140 MMOL/L (ref 136–145)
SP GR UR STRIP: 1.02
TROPONIN I SERPL DL<=0.01 NG/ML-MCNC: <0.006 NG/ML
UROBILINOGEN UR STRIP-ACNC: NEGATIVE EU/DL
WBC # BLD AUTO: 5.96 K/UL (ref 3.9–12.7)

## 2025-06-14 PROCEDURE — 83880 ASSAY OF NATRIURETIC PEPTIDE: CPT | Performed by: FAMILY MEDICINE

## 2025-06-14 PROCEDURE — 25500020 PHARM REV CODE 255: Performed by: FAMILY MEDICINE

## 2025-06-14 PROCEDURE — 83735 ASSAY OF MAGNESIUM: CPT | Performed by: FAMILY MEDICINE

## 2025-06-14 PROCEDURE — 99285 EMERGENCY DEPT VISIT HI MDM: CPT | Mod: 25

## 2025-06-14 PROCEDURE — 85025 COMPLETE CBC W/AUTO DIFF WBC: CPT | Performed by: FAMILY MEDICINE

## 2025-06-14 PROCEDURE — 84484 ASSAY OF TROPONIN QUANT: CPT | Performed by: FAMILY MEDICINE

## 2025-06-14 PROCEDURE — 93005 ELECTROCARDIOGRAM TRACING: CPT

## 2025-06-14 PROCEDURE — 85379 FIBRIN DEGRADATION QUANT: CPT | Performed by: FAMILY MEDICINE

## 2025-06-14 PROCEDURE — 82947 ASSAY GLUCOSE BLOOD QUANT: CPT | Performed by: FAMILY MEDICINE

## 2025-06-14 PROCEDURE — 93010 ELECTROCARDIOGRAM REPORT: CPT | Mod: ,,, | Performed by: INTERNAL MEDICINE

## 2025-06-14 PROCEDURE — 81003 URINALYSIS AUTO W/O SCOPE: CPT | Performed by: FAMILY MEDICINE

## 2025-06-14 RX ADMIN — IOHEXOL 100 ML: 350 INJECTION, SOLUTION INTRAVENOUS at 01:06

## 2025-06-14 NOTE — ED PROVIDER NOTES
SCRIBE #1 NOTE: I, Cici Marin, am scribing for, and in the presence of, Jad Perera MD. I have scribed the entire note.       History     Chief Complaint   Patient presents with    Shortness of Breath     SOB that started 1 month ago but worsen in the past 2 days. This morning the pt was cleaning up when she started to have acute mid-sternal chest pain with bilateral jaw pain. Hx HTN and started taking med 2 weeks ago     Review of patient's allergies indicates:   Allergen Reactions    Talwin compound Anxiety     hallucinations/anxiety    Tramadol Itching    Buspirone Anxiety    Codeine Itching    Morphine Itching     jittery    Morpholine analogues Anxiety and Itching    Naproxen Itching     Takes Ibuprofen is ok on rare occasion     Nexium [esomeprazole magnesium] Other (See Comments)     constipation    Wal-phed [pseudoephedrine hcl] Itching         History of Present Illness     HPI    6/14/2025, 12:28 PM  History obtained from the patient and medical records      History of Present Illness: Sherrill Avery is a 74 y.o. female patient with a PMHx of anxiety, family history of malignant melanoma, fibromyalgia affecting lower leg, GERD, psychiatric care, hypercholesterolemia, HTN, hypothyroidism, inflamed seborrheic keratosis, microscopic hematuria, multiple benign melanocytic nevi, renal cell carcinoma of right kidney metastatic to other side, and therapy who presents to the Emergency Department for evaluation of SOB which pt has been experiencing intermittently for the last month. She notes that over the past few days this has been accompanied with exertional CP, bilateral jaw pain, and fatigue. Pt has renal cancer metastatic to the lungs. Pt denies any history of cardiac disease, history of DVT, or any recent travel. She denies any leg swelling, vomiting, or fever. No prior Tx specified.  No further complaints or concerns at this time.       Arrival mode: Personal Transportation    PCP: Gilberto  Alexandre MARTINEZ MD        Past Medical History:  Past Medical History:   Diagnosis Date    Anxiety     Family history of malignant melanoma 08/25/2014    Fibromyalgia affecting lower leg     GERD (gastroesophageal reflux disease)     Hx of psychiatric care     Hypercholesteremia     Hypertension     Hypothyroidism     Inflamed seborrheic keratosis 08/25/2014    Microscopic hematuria 02/02/2017    Multiple benign melanocytic nevi 08/25/2014    Renal cell carcinoma of right kidney metastatic to other site     Therapy        Past Surgical History:  Past Surgical History:   Procedure Laterality Date    AUGMENTATION OF BREAST      bladder lift      breast implants      BREAST SURGERY Bilateral 1996    saline implants    COLONOSCOPY  2007    HYSTERECTOMY      ectopic pregnancy x 2, with LSO    KNEE ARTHROPLASTY Left 06/14/2021    Procedure: ARTHROPLASTY, KNEE:LEFT:DEPUY-SIGMA;  Surgeon: Osmar Avery III, MD;  Location: [facility];  Service: Orthopedics;  Laterality: Left;    LAPAROSCOPIC NEPHRECTOMY, HAND ASSISTED  07/25/2013    LUNG REMOVAL, PARTIAL Left 2020    At [facility]    NEPHRECTOMY      OOPHORECTOMY Bilateral     RADIOFREQUENCY ABLATION Left 08/30/2022    Procedure: RADIOFREQUENCY ABLATION, LEFT KNEE COOLED;  Surgeon: Vijaya Landin MD;  Location: [facility];  Service: Pain Management;  Laterality: Left;    RADIOFREQUENCY ABLATION Right 10/25/2022    Procedure: RADIOFREQUENCY ABLATION RIGHT KNEE GENICULAR COOLED;  Surgeon: Vijaya Landin MD;  Location: [facility];  Service: Pain Management;  Laterality: Right;    RADIOFREQUENCY ABLATION Left 03/07/2023    Procedure: RADIOFREQUENCY ABLATION LEFT GENICULAR COOLED RFA;  Surgeon: Vijaya Landin MD;  Location: [facility];  Service: Pain Management;  Laterality: Left;    RADIOFREQUENCY ABLATION Right 08/01/2023    Procedure: RADIOFREQUENCY ABLATION, RIGHT GENICULAR COOLED;  Surgeon: Vijaya Landin MD;  Location: [facility];  Service: Pain  Management;  Laterality: Right;    RADIOFREQUENCY ABLATION Left 12/26/2023    Procedure: RADIOFREQUENCY ABLATION LEFT GENICULAR 1 OF 2;  Surgeon: Vijaya Landin MD;  Location: [facility];  Service: Pain Management;  Laterality: Left;  [phone]    RADIOFREQUENCY ABLATION Right 2/6/2024    Procedure: RADIOFREQUENCY ABLATION RIGHT GENICULAR 2 OF 2;  Surgeon: Vijaya Landin MD;  Location: [facility];  Service: Pain Management;  Laterality: Right;  [phone]  *after 2/1/24    RADIOFREQUENCY ABLATION Left 7/2/2024    Procedure: RADIOFREQUENCY ABLATION LEFT GENICULAR;  Surgeon: Vijaya Landin MD;  Location: [facility];  Service: Pain Management;  Laterality: Left;  [phone]  *SCHEDULE AFTER 6/26    RADIOFREQUENCY ABLATION Right 8/13/2024    Procedure: RADIOFREQUENCY ABLATION RIGHT GENICULAR;  Surgeon: Vijaya Landin MD;  Location: [facility];  Service: Pain Management;  Laterality: Right;  [phone]  4 WK F/U MARGARET  *SCHEDULE AFTER 8/6    RADIOFREQUENCY ABLATION Right 2/18/2025    Procedure: RADIOFREQUENCY ABLATION RIGHT GENICULAR;  Surgeon: Vijaya Landin MD;  Location: [facility];  Service: Pain Management;  Laterality: Right;  4 WK F/U MADELEINE  *SCHEDULE AFTER 2/13    RADIOFREQUENCY ABLATION Left 3/7/2025    Procedure: RADIOFREQUENCY ABLATION LEFT GENICULAR;  Surgeon: Vijaya Landin MD;  Location: [facility];  Service: Pain Management;  Laterality: Left;  4 WK F/U MADELEINE    right ganglion cyst      throat polyp      TRANSOBTURATOR SLING  2011    with RSO for persistent complex cyst & MARGOT; done by [facility]    UPPER GASTROINTESTINAL ENDOSCOPY  2007         Family History:  Family History   Problem Relation Name Age of Onset    Diabetes Mother      Hypertension Mother      Heart disease Mother      Cancer Father          lung    Migraines Father      Glaucoma Paternal Grandmother      Glaucoma Sister      Thyroid disease Neg Hx      Asthma Neg Hx         Social  History:  Social History     Tobacco Use    Smoking status: Never    Smokeless tobacco: Never   Substance and Sexual Activity    Alcohol use: Yes     Alcohol/week: 0.0 standard drinks of alcohol     Comment: social    Drug use: No    Sexual activity: Yes     Partners: Male     Birth control/protection: Surgical        Review of Systems     Review of Systems   Constitutional:  Negative for fever.   HENT:  Negative for sore throat.         (+) bilateral jaw pain   Respiratory:  Positive for chest tightness and shortness of breath.    Cardiovascular:  Negative for chest pain and leg swelling.   Gastrointestinal:  Negative for nausea and vomiting.   Genitourinary:  Negative for dysuria.   Musculoskeletal:  Negative for back pain.   Skin:  Negative for rash.   Neurological:  Negative for weakness.   Hematological:  Does not bruise/bleed easily.   All other systems reviewed and are negative.     Physical Exam     Initial Vitals [06/14/25 1146]   BP Pulse Resp Temp SpO2   (!) 147/91 77 20 98 °F (36.7 °C) 98 %      MAP       --          Physical Exam  Nursing Notes and Vital Signs Reviewed.  Constitutional: Patient is in no acute distress. Well-developed and well-nourished.  Head: Atraumatic. Normocephalic.  Eyes: PERRL. EOM intact. Conjunctivae are not pale. No scleral icterus.  ENT: Mucous membranes are moist. Oropharynx is clear and symmetric.    Neck: Supple. Full ROM. No lymphadenopathy.  Cardiovascular: Regular rate. Regular rhythm. No murmurs, rubs, or gallops. Distal pulses are 2+ and symmetric.  Pulmonary/Chest: No respiratory distress. Clear to auscultation bilaterally. No wheezing or rales.  Abdominal: Soft and non-distended. There is no tenderness.  No rebound, guarding, or rigidity. Good bowel sounds.  Genitourinary: No CVA tenderness.  Musculoskeletal: Moves all extremities. No obvious deformities. No edema. No calf tenderness.  Skin: Warm and dry.  Neurological:  Alert, awake, and appropriate.  Normal speech.  " No acute focal neurological deficits are appreciated.  Psychiatric: Normal affect. Good eye contact. Appropriate in content.     ED Course   Procedures  ED Vital Signs:  Vitals:    06/14/25 1146 06/14/25 1200   BP: (!) 147/91    Pulse: 77 78   Resp: 20    Temp: 98 °F (36.7 °C)    TempSrc: Oral    SpO2: 98%    Weight: 88.2 kg (194 lb 6.4 oz)    Height: 5' 5" (1.651 m)        Abnormal Lab Results:  Labs Reviewed   COMPREHENSIVE METABOLIC PANEL - Abnormal       Result Value    Sodium 140      Potassium 4.6      Chloride 109      CO2 22 (*)     Glucose 84      BUN 16      Creatinine 1.0      Calcium 8.7      Protein Total 7.2      Albumin 3.9      Bilirubin Total 0.5      ALP 90      AST 16      ALT 12      Anion Gap 9      eGFR 59 (*)    D DIMER, QUANTITATIVE - Abnormal    D-Dimer 0.94 (*)    B-TYPE NATRIURETIC PEPTIDE - Normal    BNP 14     MAGNESIUM - Normal    Magnesium  2.1     TROPONIN I - Normal    Troponin-I <0.006     URINALYSIS, REFLEX TO URINE CULTURE - Normal    Color, UA Yellow      Appearance, UA Clear      pH, UA 7.0      Spec Grav UA 1.020      Protein, UA Negative      Glucose, UA Negative      Ketones, UA Negative      Bilirubin, UA Negative      Blood, UA Negative      Nitrites, UA Negative      Urobilinogen, UA Negative      Leukocyte Esterase, UA Negative     CBC WITH DIFFERENTIAL - Normal    WBC 5.96      RBC 4.73      HGB 14.5      HCT 43.7      MCV 92      MCH 30.7      MCHC 33.2      RDW 13.2      Platelet Count 201      MPV 9.8      Nucleated RBC 0      Neut % 66.6      Lymph % 23.0      Mono % 6.9      Eos % 2.7      Basophil % 0.5      Imm Grans % 0.3      Neut # 3.97      Lymph # 1.37      Mono # 0.41      Eos # 0.16      Baso # 0.03      Imm Grans # 0.02     CBC W/ AUTO DIFFERENTIAL    Narrative:     The following orders were created for panel order CBC auto differential.  Procedure                               Abnormality         Status                     ---------                       "         -----------         ------                     CBC with Differential[[id number]]       Normal              Final result                 Please view results for these tests on the individual orders.   GREY TOP URINE HOLD    Extra Tube Hold for add-ons.          All Lab Results:  Results for orders placed or performed during the hospital encounter of 06/14/25   EKG 12-lead (Chest Pain) Age >30    Collection Time: 06/14/25 11:45 AM   Result Value Ref Range    QRS Duration 82 ms    [facility] QTC Calculation 427 ms   Brain natriuretic peptide    Collection Time: 06/14/25 12:46 PM   Result Value Ref Range    BNP 14 0 - 99 pg/mL   Comprehensive metabolic panel    Collection Time: 06/14/25 12:46 PM   Result Value Ref Range    Sodium 140 136 - 145 mmol/L    Potassium 4.6 3.5 - 5.1 mmol/L    Chloride 109 95 - 110 mmol/L    CO2 22 (L) 23 - 29 mmol/L    Glucose 84 70 - 110 mg/dL    BUN 16 8 - 23 mg/dL    Creatinine 1.0 0.5 - 1.4 mg/dL    Calcium 8.7 8.7 - 10.5 mg/dL    Protein Total 7.2 6.0 - 8.4 gm/dL    Albumin 3.9 3.5 - 5.2 g/dL    Bilirubin Total 0.5 0.1 - 1.0 mg/dL    ALP 90 40 - 150 unit/L    AST 16 11 - 45 unit/L    ALT 12 10 - 44 unit/L    Anion Gap 9 8 - 16 mmol/L    eGFR 59 (L) >60 mL/min/1.73/m2   Magnesium    Collection Time: 06/14/25 12:46 PM   Result Value Ref Range    Magnesium  2.1 1.6 - 2.6 mg/dL   Troponin I    Collection Time: 06/14/25 12:46 PM   Result Value Ref Range    Troponin-I <0.006 <=0.026 ng/mL   Urinalysis, Reflex to Urine Culture Urine, Clean Catch    Collection Time: 06/14/25 12:46 PM    Specimen: Urine, Clean Catch   Result Value Ref Range    Color, UA Yellow Straw, Janiya, Yellow, Light-Orange    Appearance, UA Clear Clear    pH, UA 7.0 5.0 - 8.0    Spec Grav UA 1.020 1.005 - 1.030    Protein, UA Negative Negative    Glucose, UA Negative Negative    Ketones, UA Negative Negative    Bilirubin, UA Negative Negative    Blood, UA Negative Negative    Nitrites, UA Negative Negative    Urobilinogen,  UA Negative <2.0 EU/dL    Leukocyte Esterase, UA Negative Negative   D dimer, quantitative    Collection Time: 06/14/25 12:46 PM   Result Value Ref Range    D-Dimer 0.94 (H) <0.50 mg/L FEU   CBC with Differential    Collection Time: 06/14/25 12:46 PM   Result Value Ref Range    WBC 5.96 3.90 - 12.70 K/uL    RBC 4.73 4.00 - 5.40 M/uL    HGB 14.5 12.0 - 16.0 gm/dL    HCT 43.7 37.0 - 48.5 %    MCV 92 82 - 98 fL    MCH 30.7 27.0 - 31.0 pg    MCHC 33.2 32.0 - 36.0 g/dL    RDW 13.2 11.5 - 14.5 %    Platelet Count 201 150 - 450 K/uL    MPV 9.8 9.2 - 12.9 fL    Nucleated RBC 0 <=0 /100 WBC    Neut % 66.6 38 - 73 %    Lymph % 23.0 18 - 48 %    Mono % 6.9 4 - 15 %    Eos % 2.7 <=8 %    Basophil % 0.5 <=1.9 %    Imm Grans % 0.3 0.0 - 0.5 %    Neut # 3.97 1.8 - 7.7 K/uL    Lymph # 1.37 1 - 4.8 K/uL    Mono # 0.41 0.3 - 1 K/uL    Eos # 0.16 <=0.5 K/uL    Baso # 0.03 <=0.2 K/uL    Imm Grans # 0.02 0.00 - 0.04 K/uL   GREY TOP URINE HOLD    Collection Time: 06/14/25 12:46 PM   Result Value Ref Range    Extra Tube Hold for add-ons.      *Note: Due to a large number of results and/or encounters for the requested time period, some results have not been displayed. A complete set of results can be found in Results Review.       Imaging Results:  Imaging Results              CTA Chest Non-Coronary (PE Studies) (Final result)  Result time 06/14/25 14:11:28      Final result by Rashawn Hernandez III, MD (06/14/25 14:11:28)                   Impression:      1.  No evidence of pulmonary embolism or other acute cardiopulmonary disease.    Finalized on: 6/14/2025 2:11 PM By:  Rashawn Hernandez MD  [facility]# [id number]      2025-06-14 14:13:31.220     [facility]               Narrative:      EXAM: CTA of the chest with contrast, with 3-D reconstructions    HISTORY:  Pulmonary embolism (PE) suspected, positive D-dimer;    shortness of breath    TECHNIQUE: Routine CT angiogram of the chest was performed. 3-D reconstructions/reformats/MIPs obtained. All CT scans  at [this location] are performed using dose modulation techniques as appropriate to a performed exam including the following: automated exposure control; adjustment of the mA and/or kV according to patient size (this includes techniques or standardized protocols for targeted exams where dose is matched to indication / reason for exam; i.e. extremities or head); use of iterative reconstruction technique.    Comparison: No prior chest CT is available for comparison.    FINDINGS:  There is satisfactory opacification of the pulmonary arteries. No intraluminal filling defects are identified to suggest pulmonary embolism. Mild scarring or discoid atelectasis in the right middle lobe.  Mild dependent atelectasis.  Small calcified pleural plaques in the left lung base.  The lungs are otherwise grossly clear. No acute appearing infiltrate, pleural effusion, pneumothorax, suspicious mass or other acute pulmonary disease identified. No pathologically enlarged mediastinal or hilar lymph nodes are present. Heart size is within normal limits. There is no significant pericardial effusion. Negative for aortic aneurysm or dissection. No significant coronary artery calcifications are noted. No acute osseous abnormality or suspicious bone marrow lesion identified.  Limited images through the upper abdomen demonstrate no acute abnormality.  Cholelithiasis noted.                                         X-Ray Chest 1 View (Final result)  Result time 06/14/25 13:00:51      Final result by Alexandre Ahn MD (06/14/25 13:00:51)                   Impression:      1.  Overall, no significant interval change has occurred.      Finalized on: 6/14/2025 1:00 PM By:  Alexandre Ahn MD  [facility]# [id number]      2025-06-14 13:02:57.257     [facility]               Narrative:    EXAM:  XR CHEST 1 VIEW    CLINICAL HISTORY: Shortness of breath.  Dyspnea    FINDINGS:  Comparisons are made to 06/10/2025. EKG leads overlie the chest.  Clothing artifact is present.   The lungs remain clear.  Hilar and mediastinal contours and osseous structures are unchanged.                                         The EKG was ordered, reviewed, and independently interpreted by the ED provider.  Interpretation time: 11:45  Rate: 76 BPM  Rhythm: normal sinus rhythm  Interpretation: Low voltage QRS. No STEMI.           The Emergency Provider reviewed the vital signs and test results, which are outlined above.     ED Discussion     3:32 PM: Reassessed pt at this time. Discussed with patient and/or family/caretaker all pertinent ED information and results. Discussed pt dx and plan of tx. Gave the patient all f/u and return to the ED instructions. All questions and concerns were addressed at this time. Patient and/or family/caretaker expresses understanding of information and instructions, and is comfortable with plan to discharge. Pt is stable for discharge.     I discussed with patient and/or family/caretaker that evaluation in the ED does not suggest any emergent or life threatening medical conditions requiring immediate intervention beyond what was provided in the ED, and I believe patient is safe for discharge. Regardless, an unremarkable evaluation in the ED does not preclude the development or presence of a serious or life threatening condition. As such, I instructed that the patient is to return immediately for any worsening or change in current symptoms.         Medical Decision Making  74-year-old white female with a history of dyspnea.  Patient has dyspnea on exertion.  She has had substernal chest discomfort.  The symptoms mostly occurred while she is working in her yd.  No hemoptysis.  No independent risk factors for thromboembolic disease other than a past history of clear cell carcinoma (remote).  She has 2 nodules in her lungs are being followed.    D-dimer was slightly elevated.  Chest x-ray was unremarkable.  CTA revealed no evidence of PE.  I did see the nodule lesion in her left  lingular segment.  I do not have a film to compare but reading her results from [facility], it appears to be unchanged.  She has an appointment to see doctors at [facility] soon.    Patient have surgery later on this week on her heel.  I see no reason to postpone that surgery at this time.    I suspect her dyspnea is related to deconditioning.  Recommend continue current medications.  Follow up with PCP.    She has been treated for GERD/dyspepsia.  Chest discomfort certainly could be related to atypical chest pain due to reflux.  I will leave it up to her PCP to address this.    Amount and/or Complexity of Data Reviewed  Labs: ordered. Decision-making details documented in ED Course.  Radiology: ordered. Decision-making details documented in ED Course.  ECG/medicine tests: ordered and independent interpretation performed. Decision-making details documented in ED Course.    Risk  Prescription drug management.                ED Medication(s):  Medications   iohexoL (OMNIPAQUE 350) injection 100 mL (100 mLs Intravenous Given 6/14/25 1353)       Discharge Medication List as of 6/14/2025  2:30 PM           Follow-up Information       Alexandre Macias MD.    Specialty: Family Medicine  Contact information:  [id number]  [place] [address]  [phone]                                 Scribe Attestation:   Scribe #1: I performed the above scribed service and the documentation accurately describes the services I performed. I attest to the accuracy of the note.     Attending:   Physician Attestation Statement for Scribe #1: I, Jad Perera MD, personally performed the services described in this documentation, as scribed by Cici Marin, in my presence, and it is both accurate and complete.           Clinical Impression       ICD-10-CM ICD-9-CM   1. Chest pain  R07.9 786.50   2. Dyspnea  R06.00 786.09       Disposition:   Disposition: Discharged  Condition: Stable         Jad Perera,  MD  06/15/25 1006

## 2025-06-14 NOTE — DISCHARGE INSTRUCTIONS
CTA was unremarkable.  There is a stable appearing nodular lesion in the lingula segment left upper lobe.  I do not have compared to CTs available.  But judging on the report from the CT at [facility] it does not appear to be changed.    At this time there is no compelling reason to postpone your heel surgery.  Make sure you follow up with your oncologist at [facility] at scheduled appointment.

## 2025-06-16 ENCOUNTER — OFFICE VISIT (OUTPATIENT)
Dept: OBSTETRICS AND GYNECOLOGY | Facility: CLINIC | Age: 75
End: 2025-06-16
Payer: MEDICARE

## 2025-06-16 VITALS
WEIGHT: 197.06 LBS | DIASTOLIC BLOOD PRESSURE: 88 MMHG | SYSTOLIC BLOOD PRESSURE: 132 MMHG | HEIGHT: 65 IN | BODY MASS INDEX: 32.83 KG/M2

## 2025-06-16 DIAGNOSIS — Z78.0 POSTMENOPAUSAL: ICD-10-CM

## 2025-06-16 DIAGNOSIS — Z79.890 HORMONE REPLACEMENT THERAPY (HRT): ICD-10-CM

## 2025-06-16 DIAGNOSIS — Z01.419 ENCOUNTER FOR GYNECOLOGICAL EXAMINATION WITHOUT ABNORMAL FINDING: Primary | ICD-10-CM

## 2025-06-16 PROCEDURE — 99999 PR PBB SHADOW E&M-EST. PATIENT-LVL V: CPT | Mod: PBBFAC,,, | Performed by: NURSE PRACTITIONER

## 2025-06-16 PROCEDURE — 99215 OFFICE O/P EST HI 40 MIN: CPT | Mod: PBBFAC | Performed by: NURSE PRACTITIONER

## 2025-06-16 PROCEDURE — G0101 CA SCREEN;PELVIC/BREAST EXAM: HCPCS | Mod: S$PBB,,, | Performed by: NURSE PRACTITIONER

## 2025-06-16 PROCEDURE — G0101 CA SCREEN;PELVIC/BREAST EXAM: HCPCS | Mod: PBBFAC | Performed by: NURSE PRACTITIONER

## 2025-06-16 RX ORDER — ESTRADIOL 0.5 MG/1
0.5 TABLET ORAL DAILY
Qty: 90 TABLET | Refills: 3 | Status: SHIPPED | OUTPATIENT
Start: 2025-06-16 | End: 2026-06-16

## 2025-06-16 NOTE — PROGRESS NOTES
CC: Well woman exam    Sherrill Avery is a 74 y.o. female  presents for a well woman exam.      Did not do well with decreasing estrogen last time but was breaking tablet in half not doing a 0.5 mg dose  Will try again this year   Mmg for in July    Health Maintenance Topics with due status: Not Due       Topic Last Completion Date    Colorectal Cancer Screening 2022    DEXA Scan 2023    Lipid Panel 2024    Annual UACr 2025      Past Medical History:   Diagnosis Date    Anxiety     Family history of malignant melanoma 2014    Fibromyalgia affecting lower leg     GERD (gastroesophageal reflux disease)     Hx of psychiatric care     Hypercholesteremia     Hypertension     Hypothyroidism     Inflamed seborrheic keratosis 2014    Microscopic hematuria 2017    Multiple benign melanocytic nevi 2014    Renal cell carcinoma of right kidney metastatic to other site     Therapy      Past Surgical History:   Procedure Laterality Date    AUGMENTATION OF BREAST      bladder lift      breast implants      BREAST SURGERY Bilateral 1996    saline implants    COLONOSCOPY  2007    HYSTERECTOMY      ectopic pregnancy x 2, with LSO    KNEE ARTHROPLASTY Left 2021    Procedure: ARTHROPLASTY, KNEE:LEFT:DEPUY-SIGMA;  Surgeon: Osmar Avery III, MD;  Location: [facility];  Service: Orthopedics;  Laterality: Left;    LAPAROSCOPIC NEPHRECTOMY, HAND ASSISTED  2013    LUNG REMOVAL, PARTIAL Left 2020    At [facility]    NEPHRECTOMY      OOPHORECTOMY Bilateral     RADIOFREQUENCY ABLATION Left 2022    Procedure: RADIOFREQUENCY ABLATION, LEFT KNEE COOLED;  Surgeon: Vijaya Landin MD;  Location: [facility];  Service: Pain Management;  Laterality: Left;    RADIOFREQUENCY ABLATION Right 10/25/2022    Procedure: RADIOFREQUENCY ABLATION RIGHT KNEE GENICULAR COOLED;  Surgeon: Vijaya Landin MD;  [place] [facility] [place];  Service: Pain Management;  Laterality: Right;     RADIOFREQUENCY ABLATION Left 03/07/2023    Procedure: RADIOFREQUENCY ABLATION LEFT GENICULAR COOLED RFA;  Surgeon: Vijaya Landin MD;  Location: [facility];  Service: Pain Management;  Laterality: Left;    RADIOFREQUENCY ABLATION Right 08/01/2023    Procedure: RADIOFREQUENCY ABLATION, RIGHT GENICULAR COOLED;  Surgeon: Vijaya Landin MD;  Location: [facility];  Service: Pain Management;  Laterality: Right;    RADIOFREQUENCY ABLATION Left 12/26/2023    Procedure: RADIOFREQUENCY ABLATION LEFT GENICULAR 1 OF 2;  Surgeon: Vijaya Landin MD;  Location: [facility];  Service: Pain Management;  Laterality: Left;  [phone]    RADIOFREQUENCY ABLATION Right 2/6/2024    Procedure: RADIOFREQUENCY ABLATION RIGHT GENICULAR 2 OF 2;  Surgeon: Vijaya Landin MD;  Location: [facility];  Service: Pain Management;  Laterality: Right;  [phone]  *after 2/1/24    RADIOFREQUENCY ABLATION Left 7/2/2024    Procedure: RADIOFREQUENCY ABLATION LEFT GENICULAR;  Surgeon: Vijaya Landin MD;  Location: [facility];  Service: Pain Management;  Laterality: Left;  [phone]  *SCHEDULE AFTER 6/26    RADIOFREQUENCY ABLATION Right 8/13/2024    Procedure: RADIOFREQUENCY ABLATION RIGHT GENICULAR;  Surgeon: Vijaya Landin MD;  Location: [facility];  Service: Pain Management;  Laterality: Right;  [phone]  4 WK F/U MARGARET  *SCHEDULE AFTER 8/6    RADIOFREQUENCY ABLATION Right 2/18/2025    Procedure: RADIOFREQUENCY ABLATION RIGHT GENICULAR;  Surgeon: Vijaya Landin MD;  Location: [facility];  Service: Pain Management;  Laterality: Right;  4 WK F/U MADELEINE  *SCHEDULE AFTER 2/13    RADIOFREQUENCY ABLATION Left 3/7/2025    Procedure: RADIOFREQUENCY ABLATION LEFT GENICULAR;  Surgeon: Vijaya Landin MD;  Location: [facility];  Service: Pain Management;  Laterality: Left;  4 WK F/U MADELEINE    right ganglion cyst      throat polyp      TRANSOBTURATOR SLING  2011    with RSO for persistent complex cyst &  "MARGOT; done by yris    UPPER GASTROINTESTINAL ENDOSCOPY       Family History   Problem Relation Name Age of Onset    Diabetes Mother      Hypertension Mother      Heart disease Mother      Cancer Father          lung    Migraines Father      Glaucoma Paternal Grandmother      Glaucoma Sister      Thyroid disease Neg Hx      Asthma Neg Hx       Social History[1]  OB History          7    Para   2    Term   2            AB   5    Living   2         SAB   3    IAB        Ectopic   2    Multiple        Live Births   2                 /88   Ht 5' 5" (1.651 m)   Wt 89.4 kg (197 lb 1.5 oz)   BMI 32.80 kg/m²     ROS:  Per hpi    PE:   APPEARANCE: Well nourished, well developed, in no acute distress.  AFFECT: WNL, alert and oriented x 3.  SKIN: No acne or hirsutism.  NECK: Neck symmetric without masses or thyromegaly.  NODES: No inguinal, cervical, axillary or femoral lymph node enlargement.  CHEST: Good respiratory effort.   ABDOMEN: Soft. No tenderness or masses. No hepatosplenomegaly. No hernias.  BREASTS: Symmetrical, no skin changes or visible lesions. No palpable masses, nipple discharge bilaterally.  PELVIC: Normal external female genitalia without lesions. Normal hair distribution. Adequate perineal body, normal urethral meatus. Vagina atrophic without lesions or discharge. Grade 1 cystocele or rectocele. Bimanual exam shows uterus and cervix to be surgically absent. Adnexa absent  Physical Exam     1. Encounter for gynecological examination without abnormal finding        2. Postmenopausal        3. Hormone replacement therapy (HRT)  estradioL (ESTRACE) 0.5 MG tablet       and PLAN:    Sherrill was seen today for well woman.    Diagnoses and all orders for this visit:    Encounter for gynecological examination without abnormal finding    Postmenopausal    Hormone replacement therapy (HRT)  -     estradioL (ESTRACE) 0.5 MG tablet; Take 1 tablet (0.5 mg total) by mouth once daily.     "     Patient was counseled today on A.C.S. Pap guidelines and recommendations for yearly pelvic exams, mammograms and monthly self breast exams; to see her PCP for other health maintenance.                            [1]   Social History  Tobacco Use    Smoking status: Never    Smokeless tobacco: Never   Substance Use Topics    Alcohol use: Yes     Alcohol/week: 0.0 standard drinks of alcohol     Comment: social    Drug use: No

## 2025-06-22 NOTE — TELEPHONE ENCOUNTER
No care due was identified.  Health [facility] Embedded Care Due Messages. Reference number: [id number].   6/22/2025 1:23:35 PM CDT

## 2025-06-23 RX ORDER — LEVOTHYROXINE SODIUM 112 UG/1
TABLET ORAL
Qty: 90 TABLET | Refills: 1 | Status: SHIPPED | OUTPATIENT
Start: 2025-06-23

## 2025-06-23 NOTE — TELEPHONE ENCOUNTER
Refill Decision Note   Sherrill Avery  is requesting a refill authorization.  Brief Assessment and Rationale for Refill:  Approve     Medication Therapy Plan: ED VISIT REVIEWED      Extended chart review required: Yes   Comments:     Note composed:11:10 AM 06/23/2025

## 2025-06-30 DIAGNOSIS — M17.11 PRIMARY OSTEOARTHRITIS OF RIGHT KNEE: Primary | ICD-10-CM

## 2025-07-03 ENCOUNTER — HOSPITAL ENCOUNTER (OUTPATIENT)
Dept: RADIOLOGY | Facility: HOSPITAL | Age: 75
Discharge: HOME | End: 2025-07-03
Attending: ORTHOPAEDIC SURGERY
Payer: MEDICARE

## 2025-07-03 ENCOUNTER — OFFICE VISIT (OUTPATIENT)
Dept: ORTHOPEDICS | Facility: CLINIC | Age: 75
End: 2025-07-03
Payer: MEDICARE

## 2025-07-03 DIAGNOSIS — Z01.818 PRE-OP TESTING: ICD-10-CM

## 2025-07-03 DIAGNOSIS — M17.11 PRIMARY OSTEOARTHRITIS OF RIGHT KNEE: ICD-10-CM

## 2025-07-03 DIAGNOSIS — M17.11 PRIMARY OSTEOARTHRITIS OF RIGHT KNEE: Primary | ICD-10-CM

## 2025-07-03 PROCEDURE — 99204 OFFICE O/P NEW MOD 45 MIN: CPT | Mod: S$PBB,,, | Performed by: ORTHOPAEDIC SURGERY

## 2025-07-03 PROCEDURE — 73560 X-RAY EXAM OF KNEE 1 OR 2: CPT | Mod: 26,LT,, | Performed by: RADIOLOGY

## 2025-07-03 PROCEDURE — 99999 PR PBB SHADOW E&M-EST. PATIENT-LVL III: CPT | Mod: PBBFAC,,, | Performed by: ORTHOPAEDIC SURGERY

## 2025-07-03 PROCEDURE — 73562 X-RAY EXAM OF KNEE 3: CPT | Mod: 26,RT,, | Performed by: RADIOLOGY

## 2025-07-03 PROCEDURE — 73560 X-RAY EXAM OF KNEE 1 OR 2: CPT | Mod: TC,PO,LT

## 2025-07-03 PROCEDURE — 99213 OFFICE O/P EST LOW 20 MIN: CPT | Mod: PBBFAC,25,PO | Performed by: ORTHOPAEDIC SURGERY

## 2025-07-03 RX ORDER — SODIUM CHLORIDE 0.9 % (FLUSH) 0.9 %
10 SYRINGE (ML) INJECTION EVERY 6 HOURS PRN
Status: SHIPPED | OUTPATIENT
Start: 2025-07-28

## 2025-07-03 RX ORDER — TOLMETIN SODIUM 600 MG/1
600 TABLET, FILM COATED ORAL 3 TIMES DAILY
Qty: 90 EACH | Refills: 3 | Status: SHIPPED | OUTPATIENT
Start: 2025-07-03

## 2025-07-03 RX ORDER — CEFAZOLIN SODIUM 2 G/50ML
2 SOLUTION INTRAVENOUS
OUTPATIENT
Start: 2025-07-03

## 2025-07-14 ENCOUNTER — TELEPHONE (OUTPATIENT)
Dept: AUDIOLOGY | Facility: CLINIC | Age: 75
End: 2025-07-14
Payer: MEDICARE

## 2025-07-15 ENCOUNTER — CLINICAL SUPPORT (OUTPATIENT)
Dept: AUDIOLOGY | Facility: CLINIC | Age: 75
End: 2025-07-15
Payer: MEDICARE

## 2025-07-15 ENCOUNTER — OFFICE VISIT (OUTPATIENT)
Dept: INTERNAL MEDICINE | Facility: CLINIC | Age: 75
End: 2025-07-15
Payer: MEDICARE

## 2025-07-15 VITALS
HEART RATE: 75 BPM | OXYGEN SATURATION: 97 % | TEMPERATURE: 97 F | BODY MASS INDEX: 32.32 KG/M2 | HEIGHT: 65 IN | DIASTOLIC BLOOD PRESSURE: 88 MMHG | SYSTOLIC BLOOD PRESSURE: 126 MMHG | WEIGHT: 194 LBS

## 2025-07-15 DIAGNOSIS — R06.09 DOE (DYSPNEA ON EXERTION): Primary | ICD-10-CM

## 2025-07-15 DIAGNOSIS — F41.1 GENERALIZED ANXIETY DISORDER: Chronic | ICD-10-CM

## 2025-07-15 DIAGNOSIS — G89.29 CHRONIC PAIN OF BOTH KNEES: ICD-10-CM

## 2025-07-15 DIAGNOSIS — M25.562 CHRONIC PAIN OF BOTH KNEES: ICD-10-CM

## 2025-07-15 DIAGNOSIS — H90.3 HEARING LOSS, SENSORINEURAL, ASYMMETRICAL: Primary | ICD-10-CM

## 2025-07-15 DIAGNOSIS — M25.561 CHRONIC PAIN OF BOTH KNEES: ICD-10-CM

## 2025-07-15 PROCEDURE — 99215 OFFICE O/P EST HI 40 MIN: CPT | Mod: PBBFAC | Performed by: FAMILY MEDICINE

## 2025-07-15 PROCEDURE — 99999 PR PBB SHADOW E&M-EST. PATIENT-LVL V: CPT | Mod: PBBFAC,,, | Performed by: FAMILY MEDICINE

## 2025-07-15 PROCEDURE — 99214 OFFICE O/P EST MOD 30 MIN: CPT | Mod: S$PBB,,, | Performed by: FAMILY MEDICINE

## 2025-07-15 PROCEDURE — G2211 COMPLEX E/M VISIT ADD ON: HCPCS | Mod: ,,, | Performed by: FAMILY MEDICINE

## 2025-07-15 RX ORDER — NEOMYCIN SULFATE, POLYMYXIN B SULFATE, HYDROCORTISONE 3.5; 10000; 1 MG/ML; [USP'U]/ML; MG/ML
SOLUTION/ DROPS AURICULAR (OTIC)
COMMUNITY
Start: 2024-10-21

## 2025-07-15 RX ORDER — CHLORZOXAZONE 500 MG/1
500 TABLET ORAL 3 TIMES DAILY PRN
COMMUNITY
Start: 2024-10-25

## 2025-07-15 RX ORDER — DIAZEPAM 5 MG/1
5 TABLET ORAL
COMMUNITY
Start: 2024-10-28

## 2025-07-15 NOTE — H&P
Chief Complaint   Patient presents with    Right Knee - Pain         HPI:   This is a 74 y.o. who presents to clinic today complaining of right knee pain for 5 years after no known trauma. Pain is progressively worsening. No numbness or tingling. No associated signs or symptoms. She has failed NSAIDs, PT, and injections. She is unable to perform ADLs.     Past Medical History:   Diagnosis Date    Anxiety     Family history of malignant melanoma 08/25/2014    Fibromyalgia affecting lower leg     GERD (gastroesophageal reflux disease)     Hx of psychiatric care     Hypercholesteremia     Hypertension     Hypothyroidism     Inflamed seborrheic keratosis 08/25/2014    Microscopic hematuria 02/02/2017    Multiple benign melanocytic nevi 08/25/2014    Renal cell carcinoma of right kidney metastatic to other site     Therapy      Past Surgical History:   Procedure Laterality Date    AUGMENTATION OF BREAST      bladder lift      breast implants      BREAST SURGERY Bilateral 1996    saline implants    COLONOSCOPY  2007    HYSTERECTOMY      ectopic pregnancy x 2, with LSO    KNEE ARTHROPLASTY Left 06/14/2021    Procedure: ARTHROPLASTY, KNEE:LEFT:DEPUY-SIGMA;  Surgeon: Osmar Avery III, MD;  Location: [facility];  Service: Orthopedics;  Laterality: Left;    LAPAROSCOPIC NEPHRECTOMY, HAND ASSISTED  07/25/2013    LUNG REMOVAL, PARTIAL Left 2020    At [facility]    NEPHRECTOMY      OOPHORECTOMY Bilateral     PLANTAR FASCIOTOMY      june 25 dr jagjit melchor    RADIOFREQUENCY ABLATION Left 08/30/2022    Procedure: RADIOFREQUENCY ABLATION, LEFT KNEE COOLED;  Surgeon: Vijaya Landin MD;  Location: [facility];  Service: Pain Management;  Laterality: Left;    RADIOFREQUENCY ABLATION Right 10/25/2022    Procedure: RADIOFREQUENCY ABLATION RIGHT KNEE GENICULAR COOLED;  Surgeon: Vijaya Landin MD;  Location: [facility];  Service: Pain Management;  Laterality: Right;    RADIOFREQUENCY ABLATION Left 03/07/2023    Procedure:  RADIOFREQUENCY ABLATION LEFT GENICULAR COOLED RFA;  Surgeon: Vijaya Landin MD;  Location: [facility];  Service: Pain Management;  Laterality: Left;    RADIOFREQUENCY ABLATION Right 08/01/2023    Procedure: RADIOFREQUENCY ABLATION, RIGHT GENICULAR COOLED;  Surgeon: Vijaya Landin MD;  Location: [facility];  Service: Pain Management;  Laterality: Right;    RADIOFREQUENCY ABLATION Left 12/26/2023    Procedure: RADIOFREQUENCY ABLATION LEFT GENICULAR 1 OF 2;  Surgeon: Vijaya Landin MD;  Location: [facility];  Service: Pain Management;  Laterality: Left;  [phone]    RADIOFREQUENCY ABLATION Right 02/06/2024    Procedure: RADIOFREQUENCY ABLATION RIGHT GENICULAR 2 OF 2;  Surgeon: Vijaya Landin MD;  Location: [facility];  Service: Pain Management;  Laterality: Right;  [phone]  *after 2/1/24    RADIOFREQUENCY ABLATION Left 07/02/2024    Procedure: RADIOFREQUENCY ABLATION LEFT GENICULAR;  Surgeon: Vijaya Landin MD;  Location: [facility];  Service: Pain Management;  Laterality: Left;  [phone]  *SCHEDULE AFTER 6/26    RADIOFREQUENCY ABLATION Right 08/13/2024    Procedure: RADIOFREQUENCY ABLATION RIGHT GENICULAR;  Surgeon: Vijaya Landin MD;  Location: [facility];  Service: Pain Management;  Laterality: Right;  [phone]  4 WK F/U MARGARET  *SCHEDULE AFTER 8/6    RADIOFREQUENCY ABLATION Right 02/18/2025    Procedure: RADIOFREQUENCY ABLATION RIGHT GENICULAR;  Surgeon: Vijaya Landin MD;  Location: [facility];  Service: Pain Management;  Laterality: Right;  4 WK F/U MADELEINE  *SCHEDULE AFTER 2/13    RADIOFREQUENCY ABLATION Left 03/07/2025    Procedure: RADIOFREQUENCY ABLATION LEFT GENICULAR;  Surgeon: Vijaya Landin MD;  Location: [facility];  Service: Pain Management;  Laterality: Left;  4 WK F/U MADELEINE    right ganglion cyst      throat polyp      TRANSOBTURATOR SLING  2011    with RSO for persistent complex cyst & MARGOT; done by arndt    UPPER GASTROINTESTINAL  ENDOSCOPY  2007     Medications Ordered Prior to Encounter[1]  Review of patient's allergies indicates:   Allergen Reactions    Talwin compound Anxiety     hallucinations/anxiety    Tramadol Itching    Norco [hydrocodone-acetaminophen] Itching    Buspirone Anxiety    Codeine Itching    Morphine Itching     jittery    Morpholine analogues Anxiety and Itching    Naproxen Itching     Takes Ibuprofen is ok on rare occasion     Nexium [esomeprazole magnesium] Other (See Comments)     constipation    Wal-phed [pseudoephedrine hcl] Itching     Family History   Problem Relation Name Age of Onset    Diabetes Mother      Hypertension Mother      Heart disease Mother      Cancer Father          lung    Migraines Father      Glaucoma Paternal Grandmother      Glaucoma Sister      Thyroid disease Neg Hx      Asthma Neg Hx       Social History[2]    Review of Systems:  Constitutional:  Denies fever or chills   Eyes:  Denies change in visual acuity   HENT:  Denies nasal congestion or sore throat   Respiratory:  Denies cough or shortness of breath   Cardiovascular:  Denies chest pain or edema   GI:  Denies abdominal pain, nausea, vomiting, bloody stools or diarrhea   :  Denies dysuria   Integument:  Denies rash   Neurologic:  Denies headache, focal weakness or sensory changes   Endocrine:  Denies polyuria or polydipsia   Lymphatic:  Denies swollen glands   Psychiatric:  Denies depression or anxiety     Physical Exam:   Constitutional:  Well developed, well nourished, no acute distress, non-toxic appearance   Integument:  Well hydrated, no rash   Lymphatic:  No lymphadenopathy noted   Neurologic:  Alert & oriented x 3, CN 2-12 normal, normal motor function, normal sensory function, no focal deficits noted   Psychiatric:  Speech and behavior appropriate   Eyes: EOMI  Gi: abdomen soft    Bilateral Knee Exam    right Knee Exam     Tenderness   The patient is experiencing tenderness in the medial joint line.    Range of Motion    Extension: 5  Flexion: 100    Muscle Strength     The patient has normal knee strength.    Tests   Remedios:  Medial - positive   Lachman:  Anterior - negative      Varus: negative  Valgus: negative  Patellar Apprehension: negative    Other   Erythema: absent  Sensation: normal  Pulse: present  Swelling: mild      left Knee Exam   left knee exam performed same as contralateral side and is normal.            X-rays were performed, personally reviewed by me and findings discussed with the patient.  3 views of the right knee show tricompartmental degenerative change most pronounced in the medial compartment with Kellgren 3 changes    Primary osteoarthritis of right knee  -     CT Knee Without Contrast Right; Future; Expected date: 07/03/2025  -     Vital signs; Standing  -     Diet NPO; Standing  -     Case Request Operating Room: ROBOTIC ARTHROPLASTY, KNEE, TOTAL  -     Place in Outpatient; Standing  -     Place sequential compression device; Standing  -     Place GABO hose; Standing    Pre-op testing  -     CBC auto differential; Future; Expected date: 07/03/2025  -     Comprehensive metabolic panel; Future; Expected date: 07/03/2025    Other orders  -     sodium chloride 0.9% flush 10 mL  -     IP VTE HIGH RISK PATIENT; Standing  -     tranexamic acid (CYKLOKAPRON) 1,000 mg in 0.9% NaCl 100 mL IVPB (MB+)  -     cefazolin (ANCEF) 2 gram in dextrose 5% 50 mL IVPB (premix)  -     tolmetin 600 mg Tab; Take 600 mg by mouth 3 (three) times daily.  Dispense: 90 each; Refill: 3            I had a long discussion with the patient regarding the benefits and risks of right TKA. They voiced understanding and wish to proceed with surgery. Consents signed in clinic.           [1]   Current Outpatient Medications on File Prior to Visit   Medication Sig Dispense Refill    ALPRAZolam (XANAX) 0.5 MG tablet Take daily as needed for anxiety. 90 tablet 1    amLODIPine (NORVASC) 2.5 MG tablet Take 1 tablet (2.5 mg total) by mouth once  daily. 30 tablet 5    brimonidine (MIRVASO) 0.33 % GlwP Apply topically.      buPROPion (WELLBUTRIN XL) 150 MG TB24 tablet Take 2 tablets (300 mg total) by mouth once daily. 180 tablet 1    butalbital-acetaminophen-caffeine -40 mg (FIORICET, ESGIC) -40 mg per tablet TAKE 1 TABLET EVERY 4 HOURS AS NEEDED 30 tablet 0    clotrimazole (LOTRIMIN) 1 % Soln APPLY TOPICALLY 2 TIMES DAILY FOR 7 DAYS 30 mL 2    diclofenac sodium (VOLTAREN) 1 % Gel Apply 2 g topically daily as needed. 100 g 11    docusate sodium (COLACE) 100 MG capsule Take 100 mg by mouth.      estradioL (ESTRACE) 0.5 MG tablet Take 1 tablet (0.5 mg total) by mouth once daily. 90 tablet 3    famotidine (PEPCID) 20 MG tablet Take 20 mg by mouth.      fluocinonide (LIDEX) 0.05 % external solution Apply topically 2 (two) times daily. Do not use morning of surgery      ketoconazole (NIZORAL) 2 % shampoo Do not use morning of surgery      levocetirizine (XYZAL) 5 MG tablet TAKE 1 TABLET BY MOUTH ONCE DAILY IN THE EVENING 90 tablet 0    levothyroxine (SYNTHROID) 112 MCG tablet TAKE 1 TABLET (112 MCG) BY MOUTH BEFORE BREAKFAST AS SCHEDULED 90 tablet 1    naltrexone (DEPADE) 50 mg tablet Take 1 tablet (50 mg total) by mouth once daily. (Patient not taking: Reported on 7/15/2025) 90 tablet 0    neomycin-polymyxin-dexamethasone (DEXACINE) 3.5 mg/g-10,000 unit/g-0.1 % Oint Place small amount to affected area three times daily 3.5 g 0    omeprazole (PRILOSEC) 10 MG capsule Take 10 mg by mouth.      ondansetron (ZOFRAN) 4 MG tablet Take 2 tablets (8 mg total) by mouth every 12 (twelve) hours as needed for Nausea. 20 tablet 2    oxyCODONE-acetaminophen (PERCOCET) 5-325 mg per tablet Take 1 tablet by mouth every 6 (six) hours as needed for Pain. 30 each 0    pravastatin (PRAVACHOL) 20 MG tablet TAKE 1 TABLET EVERY DAY 90 tablet 3    senna (SENOKOT) 8.6 mg tablet Take 1 tablet by mouth.      zolpidem (AMBIEN) 5 MG Tab Take 1 tablet (5 mg total) by mouth nightly  as needed (insomnia). 90 tablet 1    azelastine (ASTELIN) 137 mcg (0.1 %) nasal spray 1 spray (137 mcg total) by Nasal route 2 (two) times daily as needed for Rhinitis. 30 mL 2    diflorasone-emollient (APEXICON E) 0.05 % Crea Apply 1 application topically once daily. for 7 days 30 g 5    hydrocortisone 2.5 % cream Apply topically 2 (two) times daily. apply to affected area for 7 days 20 g 5    metronidazole 1% (METROGEL) 1 % Gel Apply topically once daily. 60 g 5     Current Facility-Administered Medications on File Prior to Visit   Medication Dose Route Frequency Provider Last Rate Last Admin    triamcinolone acetonide injection 40 mg  40 mg Intra-articular  Osmar Avery III, MD   40 mg at 05/17/22 1345   [2]   Social History  Socioeconomic History    Marital status:      Spouse name: KAIDEN    Number of children: 5   Occupational History    Occupation: retired     Comment: Dance Instructor   Tobacco Use    Smoking status: Never    Smokeless tobacco: Never   Substance and Sexual Activity    Alcohol use: Yes     Alcohol/week: 0.0 standard drinks of alcohol     Comment: social    Drug use: No    Sexual activity: Yes     Partners: Male     Birth control/protection: Surgical   Social History Narrative    Patient is a retired dance instructor and live with . Has stairs at home 12- has an elevator     Social Drivers of Health     Financial Resource Strain: Low Risk  (6/16/2025)    Overall Financial Resource Strain (CARDIA)     Difficulty of Paying Living Expenses: Not hard at all   Food Insecurity: Patient Declined (6/16/2025)    Hunger Vital Sign     Worried About Running Out of Food in the Last Year: Patient declined     Ran Out of Food in the Last Year: Patient declined   Transportation Needs: Patient Declined (6/16/2025)    PRAPARE - Transportation     Lack of Transportation (Medical): Patient declined     Lack of Transportation (Non-Medical): Patient declined   Physical Activity: Inactive  (6/16/2025)    Exercise Vital Sign     Days of Exercise per Week: 0 days     Minutes of Exercise per Session: 0 min   Stress: No Stress Concern Present (6/16/2025)    [employer] - Occupational Stress Questionnaire     Feeling of Stress : Only a little   Housing Stability: Patient Declined (6/16/2025)    Housing Stability Vital Sign     Unable to Pay for Housing in the Last Year: Patient declined     Homeless in the Last Year: Patient declined

## 2025-07-15 NOTE — TELEPHONE ENCOUNTER
No care due was identified.  Health [facility] Embedded Care Due Messages. Reference number: [id number].   7/15/2025 4:51:02 PM CDT

## 2025-07-15 NOTE — PROGRESS NOTES
Sherrill Avery was seen 07/15/2025 for a hearing aid follow-up appointment.  Her dog has eaten her right hearing aid and it will need to be replaced. It is under warranty. We filled out the claim form and I will submit to "[employer]" for replacement for right Audeo L 90 R with 0 (M) R speaker. Serial number is [id number]    Patient will be called as soon as it arrives and understands $300 plus tax replacement fee.

## 2025-07-15 NOTE — TELEPHONE ENCOUNTER
Refill Routing Note   Medication(s) are not appropriate for processing by [facility] for the following reason(s):        Outside of protocol    [facility] action(s):  Route               Appointments  past 12m or future 3m with PCP    Date Provider   Last Visit   7/15/2025 Alexandre Macias MD   Next Visit   11/24/2025 Alexandre Macias MD   ED visits in past 90 days: 1        Note composed:6:04 PM 07/15/2025

## 2025-07-15 NOTE — PROGRESS NOTES
Chief Complaint: Shortness of Breath and Pre-op Exam    History of Present Illness    CHIEF COMPLAINT:  Ms. Avery presents today for pre-operative evaluation for upcoming knee surgery./spnal anesth. July 28     UPCOMING SURGERY:  She is scheduled for knee surgery on July 28th in [place] with planned spinal anesthesia. She expresses less concern about the surgical approach due to the planned anesthesia type. She underwent foot surgery on June 14th with Dr. Escobar for plantar fasciitis after three months of failed conservative management. Did ok w surg. Saw cardiol prior    RESPIRATORY SYMPTOMS:  She reports progressive shortness of breath for two years, e bending over, walking from parking lot, and climbing stairs (19 steps). Correlates in time with dec activity she had. No chest pain;She describes the shortness of breath as noticeable but not life-threatening, requiring deep breaths to calm down. The symptoms worsen with pain and have impacted her daily functioning and mobility.    CARDIAC HISTORY:  She had a stress test 10 months ago which was deemed acceptable when reviewed in May. She had a previous ER visit fwhere CTA showed no blood clots. Recent EKG was performed as part of pre-operative testing for foot surgery.     ANXIETY AND MENTAL HEALTH:  She experiences panic attacks that may contribute to her cardiac symptoms. She expresses significant anxiety about her upcoming surgery with intrusive thoughts about potential complications. Her anxiety manifests as ruminating thoughts about potential cardiac issues, stemming from her prior emergency room visit.      Objective:   Physical Exam    Vitals: Reviewed. Nursing note reviewed.  Constitutional: Alert.  HENT: Normocephalic. Atraumatic. External ears normal. Nose normal. PERRL. Conjunctivae normal.  Neck: ROM normal. Supple.  Cardiovascular: Normal rate and regular rhythm. Normal heart sounds.  Pulmonary: Normal breath sounds. Effort normal.  Abdominal:  Bowel sounds are normal. Soft.  Musculoskeletal: ROM normal.  Skin: Warm. Dry.  Neurological: Oriented x3.  Psychiatric: Behavior normal. Thought content normal. Judgment normal.       Assessment:       1. MONTESINOS (dyspnea on exertion)    2. Chronic pain of both knees    3. Generalized anxiety disorder        Plan:   Assessment & Plan    Reviewed recent cardiac workup, including stress test from 10 months ago and CTA, which ruled out blood clots to the lungs.  Shortness of breath likely due to deconditioning from recent foot surgery, limited mobility and recent inactivity.  Upcoming knee surgery will use spinal anesthesia instead of general anesthesia, which is less concerning for respiratory issues.  Evaluated need for additional cardiac testing before surgery, determined current workup is sufficient.  Ruled out need for pulmonary function test given thorough recent lung evaluations.  Discussed that hiatal hernia could potentially cause chest pain similar to what patient experienced.    PLAN SUMMARY:  Consider lung function test to check for asthma  Advise patient to do conditioning exercises and increase activity levels as tolerated  Evaluate hiatal hernia in the future  Start physical therapy after surgery  Follow up with cardiologist if desired to discuss any remaining heart health concerns    PLANTAR FASCIITIS AND FOOT PAIN:  Evaluated the patient's condition post-foot surgery for plantar fasciitis, noting continued heel pain.  Ms. Avery reports difficulty walking, especially when getting up from a recliner or bed.  Advised the patient to start physical therapy after surgery.  Noted the patient had nerve conduction study at [facility] post-surgery.    SHORTNESS OF BREATH:  Ms. Avery reports shortness of breath when bending over or walking short distances.  Evaluated that this may be related to deconditioning and pain in the feet and knee.  Considered lung function test to check for asthma.  Advised the patient  to do conditioning exercises and increase activity levels as tolerated to improve breathlessness.    CHEST PAIN:  Ms. Avery reports occasional chest pain, sometimes severe, though occurring rarely.  Evaluated that chest pain may be related to hiatal hernia or reflux.    ANXIETY DISORDER:  Ms. Avery reports panic attacks and anxiety, particularly about upcoming surgery.  Evaluated that anxiety may be contributing to shortness of breath.  Provided information on spinal anesthesia, comparing it to an epidural used during labor.    GASTROESOPHAGEAL REFLUX DISEASE AND HIATAL HERNIA:  Ms. Avery reports severe reflux.  Evaluated that both reflux and hiatal hernia may be contributing to chest pain.  Considered future evaluation of hiatal hernia.    CARDIOVASCULAR HEALTH HISTORY:  Ms. Avery had a stress test and echocardiogram 10 months ago, both were normal.    Advised follow up with cardiologist if desired to discuss any remaining concerns about heart health.        Form filled/signed , scanned, faxed and orig to patient low risk

## 2025-07-17 RX ORDER — ONDANSETRON 4 MG/1
8 TABLET, FILM COATED ORAL EVERY 12 HOURS PRN
Qty: 20 TABLET | Refills: 2 | Status: SHIPPED | OUTPATIENT
Start: 2025-07-17

## 2025-07-18 ENCOUNTER — HOSPITAL ENCOUNTER (OUTPATIENT)
Dept: CARDIOLOGY | Facility: HOSPITAL | Age: 75
Discharge: HOME | End: 2025-07-18
Attending: INTERNAL MEDICINE
Payer: MEDICARE

## 2025-07-18 ENCOUNTER — OFFICE VISIT (OUTPATIENT)
Dept: CARDIOLOGY | Facility: CLINIC | Age: 75
End: 2025-07-18
Payer: MEDICARE

## 2025-07-18 VITALS
WEIGHT: 193.81 LBS | BODY MASS INDEX: 32.75 KG/M2 | SYSTOLIC BLOOD PRESSURE: 135 MMHG | OXYGEN SATURATION: 97 % | HEART RATE: 76 BPM | DIASTOLIC BLOOD PRESSURE: 72 MMHG

## 2025-07-18 DIAGNOSIS — I25.10 CORONARY ARTERY CALCIFICATION: ICD-10-CM

## 2025-07-18 DIAGNOSIS — G47.30 SLEEP APNEA, UNSPECIFIED TYPE: ICD-10-CM

## 2025-07-18 DIAGNOSIS — C64.9 CARCINOMA OF KIDNEY, UNSPECIFIED LATERALITY: ICD-10-CM

## 2025-07-18 DIAGNOSIS — I10 PRIMARY HYPERTENSION: Primary | ICD-10-CM

## 2025-07-18 DIAGNOSIS — K21.9 GASTROESOPHAGEAL REFLUX DISEASE WITHOUT ESOPHAGITIS: ICD-10-CM

## 2025-07-18 DIAGNOSIS — C80.1 CLEAR CELL CARCINOMA: ICD-10-CM

## 2025-07-18 DIAGNOSIS — R06.09 DYSPNEA ON EXERTION: ICD-10-CM

## 2025-07-18 DIAGNOSIS — I25.10 ASCVD (ARTERIOSCLEROTIC CARDIOVASCULAR DISEASE): ICD-10-CM

## 2025-07-18 DIAGNOSIS — R00.2 PALPITATIONS: ICD-10-CM

## 2025-07-18 DIAGNOSIS — E78.00 HYPERCHOLESTEREMIA: ICD-10-CM

## 2025-07-18 DIAGNOSIS — E03.9 ACQUIRED HYPOTHYROIDISM: ICD-10-CM

## 2025-07-18 DIAGNOSIS — R07.9 CHEST PAIN, UNSPECIFIED TYPE: ICD-10-CM

## 2025-07-18 DIAGNOSIS — R94.31 NONSPECIFIC ABNORMAL ELECTROCARDIOGRAM (ECG) (EKG): ICD-10-CM

## 2025-07-18 DIAGNOSIS — R06.09 OTHER FORM OF DYSPNEA: ICD-10-CM

## 2025-07-18 PROCEDURE — 99215 OFFICE O/P EST HI 40 MIN: CPT | Mod: PBBFAC | Performed by: INTERNAL MEDICINE

## 2025-07-18 PROCEDURE — 99999 PR PBB SHADOW E&M-EST. PATIENT-LVL V: CPT | Mod: PBBFAC,,, | Performed by: INTERNAL MEDICINE

## 2025-07-18 RX ORDER — PRAVASTATIN SODIUM 10 MG/1
10 TABLET ORAL NIGHTLY
Qty: 90 TABLET | Refills: 1 | Status: SHIPPED | OUTPATIENT
Start: 2025-07-18

## 2025-07-18 RX ORDER — SEMAGLUTIDE 0.25 MG/.5ML
0.25 INJECTION, SOLUTION SUBCUTANEOUS
Qty: 6 ML | Refills: 1 | Status: SHIPPED | OUTPATIENT
Start: 2025-07-18

## 2025-07-18 NOTE — PROGRESS NOTES
Subjective:   Patient ID:  Sherrill Avery is a 74 y.o. female who presents for cardiac consult of Chest Pain, Shortness of Breath, and Fatigue      Referral by: No referring provider defined for this encounter.     Reason for consult:       HPI  The patient came in today for cardiac consult of Chest Pain, Shortness of Breath, and Fatigue      Sherrill Avery is a 74 y.o. female pt with HTN, HLD, obesity, aortic atherosc, clear cell CA, Vit D, GERD, hypothyroidism, OA joints presents for CVD eval       11/19/24  BP mildly elevated.HR 60s. BMI 31  Nuc stress neg for ischemia 9/2024.   Vital monitor neg 9/2024  She had been taking Omeprazole 40mg which caused her to get bloated.     5/21/25  BP and HR stable. BMI 32 - 196 lbs  Has mild MONTESINOS at times.   ECG - NSR, poor RWP       7/18/25 - ER follow up     ER eval June 2025  Medical Decision Making  74-year-old white female with a history of dyspnea.  Patient has dyspnea on exertion.  She has had substernal chest discomfort.  The symptoms mostly occurred while she is working in her yd.  No hemoptysis.  No independent risk factors for thromboembolic disease other than a past history of clear cell carcinoma (remote).  She has 2 nodules in her lungs are being followed.  D-dimer was slightly elevated.  Chest x-ray was unremarkable.  CTA revealed no evidence of PE.  I did see the nodule lesion in her left lingular segment.  I do not have a film to compare but reading her results from [facility], it appears to be unchanged.  She has an appointment to see doctors at [facility] soon.  Patient have surgery later on this week on her heel.  I see no reason to postpone that surgery at this time.  I suspect her dyspnea is related to deconditioning.  Recommend continue current medications.  Follow up with PCP.  She has been treated for GERD/dyspepsia.  Chest discomfort certainly could be related to atypical chest pain due to reflux.  I will leave it up to her PCP to address  this.      Pt had recent Er eval for CP/SOB - neg CV workup overall. CT scan - Heart size is within normal limits. There is no significant pericardial effusion. Negative for aortic aneurysm or dissection. No significant coronary artery calcifications are noted     She has upcoming knee surgery with Dr. Henderson   Has more SOB and CP lately. Is more nervous now  Has burning chest pain in center.       Results for orders placed during the hospital encounter of 09/16/24    Nuclear Stress - Cardiology Interpreted    Interpretation Summary    Normal myocardial perfusion scan. There is no evidence of myocardial ischemia or infarction.    The gated perfusion images showed an ejection fraction of 79% at rest. The gated perfusion images showed an ejection fraction of 83% post stress. Normal ejection fraction is greater than 59%.    The ECG portion of the study is negative for ischemia.    During stress, rare PACs are noted.          Summary  Show Result Comparison     Left Ventricle: The left ventricle is normal in size. Ventricular mass is normal. Normal wall thickness. There is normal systolic function with a visually estimated ejection fraction of 55 - 60%. There is normal diastolic function.    Right Ventricle: Normal right ventricular cavity size. Wall thickness is normal. Systolic function is normal.    IVC/SVC: Normal venous pressure at 3 mmHg.    Baseline ECG: The Baseline ECG reveals sinus rhythm. The axis is normal. The ST segments are normal.    Stress ECG: There are no ST segment deviation identified during the protocol. During stress, rare PVCs are noted. There is normal blood pressure response with stress.    ECG Conclusion: The ECG portion of the study is negative for ischemia.    Post-stress Impression: The study is negative with no echocardiographic evidence of stress induced ischemia.      Results for orders placed during the hospital encounter of 09/07/21    Echo    Interpretation Summary  · The left  ventricle is normal in size with concentric remodeling and normal systolic function. The estimated ejection fraction is 60%.  · Normal left ventricular diastolic function.  · Normal right ventricular size with normal right ventricular systolic function.  · Mild tricuspid regurgitation.  · The estimated PA systolic pressure is 25 mmHg.  · Trivial circumferential pericardial effusion.  · Normal central venous pressure (3 mmHg).      No results found for this or any previous visit.      No results found for this or any previous visit.      Cardiac Monitor - 3-15 Day Adult (Cupid Only)  Result Date: 10/2/2024  Attached at the bottom of this summary is the study interpretation as   provided by the ambulatory cardiac telemetry service provider ([facility]).   The actual submitted strips are posted under the media tab in the   patient's chart. I reviewed them in detail and I agree with the findings   as listed with salient findings, personal comments and edits as listed   below:    Benign study.  No symptoms entered.    Service provider quantitative analysis and interpretation:    The patient was monitored for a total of 5d 17h, underlying rhythm is   Sinus.  The minimum heart rate was 63 bpm; the maximum 132 bpm; the average 87   bpm.  0 % of Atrial fibrillation/Atrial flutter with longest episode of 0 ms.  The total burden of AV Block present was 0 % [Complete Heart Block: 0 %;   Advanced (High Grade):  0 %; 2nd Degree, Mobitz II: 0 %; 2nd Degree, Mobitz I: 0 %].  There were 0 pauses, the longest pause was 0 ms at --.  Total count of Ventricular Tachycardia (VT): 8 episode(s). Longest VT: 3   beats on Day 4 / 10:37:07  am. Fastest VT: 141 bpm on Day 5 / 06:19:06 pm.  13 supraventricular episodes were found. Longest SVT Episode 9 beats,   Fastest  bpm  There were a total of 5467 PVCs with 2 morphologies and 230 couplets.   Overall PVC [place] at 0.78  %  There were a total of 0 Other Beats. There were 0 total  number of paced   beats.  There were a total of 446 PSVCs with 1 morphologies and 6 couplets.   Overall PSVC [place] at  0.06 %  There is a total of 0 patient events               Past Medical History:   Diagnosis Date    Anxiety     Encounter for blood transfusion     Family history of malignant melanoma 08/25/2014    Fibromyalgia affecting lower leg     GERD (gastroesophageal reflux disease)     Hx of psychiatric care     Hypercholesteremia     Hypertension     Hypothyroidism     Inflamed seborrheic keratosis 08/25/2014    Microscopic hematuria 02/02/2017    Multiple benign melanocytic nevi 08/25/2014    Renal cell carcinoma of right kidney metastatic to other site     lung cancer    Therapy        Past Surgical History:   Procedure Laterality Date    AUGMENTATION OF BREAST      bladder lift      breast implants      BREAST SURGERY Bilateral 1996    saline implants    COLONOSCOPY  2007    HYSTERECTOMY      ectopic pregnancy x 2, with LSO    KNEE ARTHROPLASTY Left 06/14/2021    Procedure: ARTHROPLASTY, KNEE:LEFT:DEPUY-SIGMA;  Surgeon: Osmar Avery III, MD;  Location: [facility];  Service: Orthopedics;  Laterality: Left;    LAPAROSCOPIC NEPHRECTOMY, HAND ASSISTED  07/25/2013    LUNG REMOVAL, PARTIAL Left 2020    At [facility]    NEPHRECTOMY      OOPHORECTOMY Bilateral     PLANTAR FASCIOTOMY      june 25 dr jagjit melchor    RADIOFREQUENCY ABLATION Left 08/30/2022    Procedure: RADIOFREQUENCY ABLATION, LEFT KNEE COOLED;  Surgeon: Vijaya Landin MD;  [facility];  Service: Pain Management;  Laterality: Left;    RADIOFREQUENCY ABLATION Right 10/25/2022    Procedure: RADIOFREQUENCY ABLATION RIGHT KNEE GENICULAR COOLED;  Surgeon: Vijaya Landin MD;  [facility];  Service: Pain Management;  Laterality: Right;    RADIOFREQUENCY ABLATION Left 03/07/2023    Procedure: RADIOFREQUENCY ABLATION LEFT GENICULAR COOLED RFA;  Surgeon: Vijaya Landin MD;  [facility];  Service: Pain  Management;  Laterality: Left;    RADIOFREQUENCY ABLATION Right 08/01/2023    Procedure: RADIOFREQUENCY ABLATION, RIGHT GENICULAR COOLED;  Surgeon: Vijaya Landin MD;  Location: [facility];  Service: Pain Management;  Laterality: Right;    RADIOFREQUENCY ABLATION Left 12/26/2023    Procedure: RADIOFREQUENCY ABLATION LEFT GENICULAR 1 OF 2;  Surgeon: Vijaya Landin MD;  Location: [facility];  Service: Pain Management;  Laterality: Left;  [phone]    RADIOFREQUENCY ABLATION Right 02/06/2024    Procedure: RADIOFREQUENCY ABLATION RIGHT GENICULAR 2 OF 2;  Surgeon: Vijaya Landin MD;  Location: [facility];  Service: Pain Management;  Laterality: Right;  [phone]  *after 2/1/24    RADIOFREQUENCY ABLATION Left 07/02/2024    Procedure: RADIOFREQUENCY ABLATION LEFT GENICULAR;  Surgeon: Vijaya Landin MD;  Location: [facility];  Service: Pain Management;  Laterality: Left;  [phone]  *SCHEDULE AFTER 6/26    RADIOFREQUENCY ABLATION Right 08/13/2024    Procedure: RADIOFREQUENCY ABLATION RIGHT GENICULAR;  Surgeon: Vijaya Landin MD;  Location: [facility];  Service: Pain Management;  Laterality: Right;  [phone]  4 WK F/U MARGARET  *SCHEDULE AFTER 8/6    RADIOFREQUENCY ABLATION Right 02/18/2025    Procedure: RADIOFREQUENCY ABLATION RIGHT GENICULAR;  Surgeon: Vijaya Landin MD;  Location: [facility];  Service: Pain Management;  Laterality: Right;  4 WK F/U MADELEINE  *SCHEDULE AFTER 2/13    RADIOFREQUENCY ABLATION Left 03/07/2025    Procedure: RADIOFREQUENCY ABLATION LEFT GENICULAR;  Surgeon: Vijaya Landin MD;  Location: [facility];  Service: Pain Management;  Laterality: Left;  4 WK F/U MADELEINE    right ganglion cyst      throat polyp      TRANSOBTURATOR SLING  2011    with RSO for persistent complex cyst & MARGOT[facility]    UPPER GASTROINTESTINAL ENDOSCOPY  2007       Social History     Tobacco Use    Smoking status: Never    Smokeless tobacco: Never   Substance Use  Topics    Alcohol use: Yes     Alcohol/week: 0.0 standard drinks of alcohol     Comment: social    Drug use: No       Family History   Problem Relation Name Age of Onset    Diabetes Mother      Hypertension Mother      Heart disease Mother      Cancer Father          lung    Migraines Father      Glaucoma Paternal Grandmother      Glaucoma Sister      Thyroid disease Neg Hx      Asthma Neg Hx         Patient's Medications   New Prescriptions    No medications on file   Previous Medications    ALPRAZOLAM (XANAX) 0.5 MG TABLET    Take daily as needed for anxiety.    AMLODIPINE (NORVASC) 2.5 MG TABLET    Take 1 tablet (2.5 mg total) by mouth once daily.    AZELASTINE (ASTELIN) 137 MCG (0.1 %) NASAL SPRAY    1 spray (137 mcg total) by Nasal route 2 (two) times daily as needed for Rhinitis.    BRIMONIDINE (MIRVASO) 0.33 % GLWP    Apply topically.    BUPROPION (WELLBUTRIN XL) 150 MG TB24 TABLET    Take 2 tablets (300 mg total) by mouth once daily.    BUTALBITAL-ACETAMINOPHEN-CAFFEINE -40 MG (FIORICET, ESGIC) -40 MG PER TABLET    TAKE 1 TABLET EVERY 4 HOURS AS NEEDED    CHLORHEXIDINE (PERIDEX) 0.12 % SOLUTION    Use as directed 10 mLs in the mouth or throat 2 (two) times daily. SWISH 10MLS FOR 30 SECONDS AND SPIT for 5 days    CHLORZOXAZONE (PARAFON FORTE) 500 MG TAB    Take 500 mg by mouth 3 (three) times daily as needed.    CLOTRIMAZOLE (LOTRIMIN) 1 % SOLN    APPLY TOPICALLY 2 TIMES DAILY FOR 7 DAYS    DIAZEPAM (VALIUM) 5 MG TABLET    Take 5 mg by mouth.    DICLOFENAC SODIUM (VOLTAREN) 1 % GEL    Apply 2 g topically daily as needed.    DIFLORASONE-EMOLLIENT (APEXICON E) 0.05 % CREA    Apply 1 application topically once daily. for 7 days    DOCUSATE SODIUM (COLACE) 100 MG CAPSULE    Take 100 mg by mouth.    ESTRADIOL (ESTRACE) 0.5 MG TABLET    Take 1 tablet (0.5 mg total) by mouth once daily.    FAMOTIDINE (PEPCID) 20 MG TABLET    Take 20 mg by mouth.    FLUOCINONIDE (LIDEX) 0.05 % EXTERNAL SOLUTION    Apply  topically 2 (two) times daily. Do not use morning of surgery    HYDROCORTISONE 2.5 % CREAM    Apply topically 2 (two) times daily. apply to affected area for 7 days    KETOCONAZOLE (NIZORAL) 2 % SHAMPOO    Do not use morning of surgery    LEVOCETIRIZINE (XYZAL) 5 MG TABLET    TAKE 1 TABLET BY MOUTH ONCE DAILY IN THE EVENING    LEVOTHYROXINE (SYNTHROID) 112 MCG TABLET    TAKE 1 TABLET (112 MCG) BY MOUTH BEFORE BREAKFAST AS SCHEDULED    METRONIDAZOLE 1% (METROGEL) 1 % GEL    Apply topically once daily.    MUPIROCIN (BACTROBAN) 2 % OINTMENT    by Nasal route 2 (two) times daily. APPLY 1 GM TO EACH NOSTRIL for 5 days    NALTREXONE (DEPADE) 50 MG TABLET    Take 1 tablet (50 mg total) by mouth once daily.    NEOMYCIN-POLYMYXIN-DEXAMETHASONE (DEXACINE) 3.5 MG/G-10,000 UNIT/G-0.1 % OINT    Place small amount to affected area three times daily    NEOMYCIN-POLYMYXIN-HYDROCORTISONE (CORTISPORIN) OTIC SOLUTION    PLACE 3 DROPS INTO THE RIGHT EAR IN THE MORNING  AND 3 DROPS AT NOON AND 3 DROPS BEFORE BEDTIME   FOR 10 DAYS    OMEPRAZOLE (PRILOSEC) 10 MG CAPSULE    Take 10 mg by mouth every morning.    ONDANSETRON (ZOFRAN) 4 MG TABLET    Take 2 tablets (8 mg total) by mouth every 12 (twelve) hours as needed for Nausea.    OXYCODONE-ACETAMINOPHEN (PERCOCET) 5-325 MG PER TABLET    Take 1 tablet by mouth every 6 (six) hours as needed for Pain.    PRAVASTATIN (PRAVACHOL) 20 MG TABLET    TAKE 1 TABLET EVERY DAY    SENNA (SENOKOT) 8.6 MG TABLET    Take 1 tablet by mouth.    TOLMETIN 600 MG TAB    Take 600 mg by mouth 3 (three) times daily.    ZOLPIDEM (AMBIEN) 5 MG TAB    Take 1 tablet (5 mg total) by mouth nightly as needed (insomnia).   Modified Medications    No medications on file   Discontinued Medications    No medications on file       Review of Systems   Constitutional:  Positive for malaise/fatigue.   HENT: Negative.     Eyes: Negative.    Respiratory:  Positive for shortness of breath.    Cardiovascular:  Positive for chest  pain and palpitations.   Gastrointestinal: Negative.    Genitourinary: Negative.    Musculoskeletal:  Positive for joint pain.   Skin: Negative.    Neurological: Negative.    Endo/Heme/Allergies: Negative.    Psychiatric/Behavioral: Negative.     All 12 systems otherwise negative.      Wt Readings from Last 3 Encounters:   07/18/25 87.9 kg (193 lb 12.6 oz)   07/17/25 88.7 kg (195 lb 8.8 oz)   07/15/25 88 kg (194 lb 0.1 oz)     Temp Readings from Last 3 Encounters:   07/15/25 96.5 °F (35.8 °C) (Tympanic)   06/14/25 98 °F (36.7 °C) (Oral)     BP Readings from Last 3 Encounters:   07/18/25 135/72   07/17/25 (!) 144/68   07/15/25 126/88     Pulse Readings from Last 3 Encounters:   07/18/25 76   07/17/25 84   07/15/25 75       /72 (BP Location: Left arm, Patient Position: Sitting)   Pulse 76   Wt 87.9 kg (193 lb 12.6 oz)   SpO2 97%   BMI 32.75 kg/m²     Objective:   Physical Exam  Vitals and nursing note reviewed.   Constitutional:       General: She is not in acute distress.     Appearance: She is well-developed. She is obese. She is not diaphoretic.   HENT:      Head: Normocephalic and atraumatic.      Nose: Nose normal.   Eyes:      General: No scleral icterus.     Conjunctiva/sclera: Conjunctivae normal.   Neck:      Thyroid: No thyromegaly.      Vascular: No JVD.   Cardiovascular:      Rate and Rhythm: Normal rate and regular rhythm.      Heart sounds: S1 normal and S2 normal. Murmur heard.      No friction rub. No gallop. No S3 or S4 sounds.   Pulmonary:      Effort: Pulmonary effort is normal. No respiratory distress.      Breath sounds: Normal breath sounds. No stridor. No wheezing or rales.   Chest:      Chest wall: No tenderness.   Abdominal:      General: Bowel sounds are normal. There is no distension.      Palpations: Abdomen is soft. There is no mass.      Tenderness: There is no abdominal tenderness. There is no rebound.   Genitourinary:     Comments: Deferred  Musculoskeletal:         General:  No tenderness or deformity. Normal range of motion.      Cervical back: Normal range of motion and neck supple.   Lymphadenopathy:      Cervical: No cervical adenopathy.   Skin:     General: Skin is warm and dry.      Coloration: Skin is not pale.      Findings: No erythema or rash.   Neurological:      Mental Status: She is alert and oriented to person, place, and time.      Motor: No abnormal muscle tone.      Coordination: Coordination normal.   Psychiatric:         Behavior: Behavior normal.         Thought Content: Thought content normal.         Judgment: Judgment normal.         Lab Results   Component Value Date     07/17/2025    K 4.2 07/17/2025     07/17/2025    CO2 27 07/17/2025    BUN 15 07/17/2025    BUN 16 06/14/2025    CREATININE 1.06 07/17/2025    CREATININE 1.0 06/14/2025    GLU 86 07/17/2025    HGBA1C 5.2 10/09/2023    HGBA1C 5.3 07/23/2014    MG 2.1 06/14/2025    AST 20 07/17/2025    ALT 14 07/17/2025    ALBUMIN 3.8 07/17/2025    PROT 6.2 07/17/2025    BILITOT 0.3 07/17/2025    WBC 5.46 07/17/2025    WBC 5.96 06/14/2025    HGB 12.9 07/17/2025    HCT 38.4 07/17/2025    MCV 92 07/17/2025     07/17/2025    INR 1.0 07/17/2025    INR 0.9 06/08/2021    TSH 2.813 10/23/2024    CHOL 189 11/21/2024    HDL 66 11/21/2024    LDLCALC 104.0 11/21/2024    TRIG 95 11/21/2024    BNP 14 06/14/2025         BNP (pg/mL)   Date Value   06/14/2025 14   06/05/2021 42   02/24/2020 16   04/25/2018 <10   04/25/2017 22     INR (no units)   Date Value   07/17/2025 1.0   06/10/2025 0.9   06/08/2021 0.9   04/25/2017 0.9   07/14/2013 0.9          Assessment:      1. Primary hypertension    2. Hypercholesteremia    3. Dyspnea on exertion    4. Palpitations    5. Clear cell carcinoma with lung metastasis    6. Chest pain, unspecified type    7. Other form of dyspnea    8. Sleep apnea, unspecified type    9. Nonspecific abnormal electrocardiogram (ECG) (EKG)    10. Gastroesophageal reflux disease without  esophagitis    11. Carcinoma of kidney, unspecified laterality    12. Acquired hypothyroidism          Plan:     MONTESINOS, CP - intermittent with abnormal ECG, HLD  - prior stress ECHO neg 6/2024  - coronary atherosc in CT noted - not significant 6/2025  - Nuc stress neg for ischemia 9/2024.   - cont statin - dec or stop for now   - Vital monitor neg 9/2024  - r/o MIRANDA  - not done     2. GERD  - cont PPI    3. Palpitations, Hypothyroidism  -Vital monitor neg 9/2024  - cont Synthroid     4. Clear Cell CA - with mets, [facility].   - f/u heme/onc and pulm    5. Obesity  - BMI 32 - 196 lbs --> BMI 32 - 193 lbs   - will try to get on GLP - will start Wegovy     6. Pre-OP CV evaluation prior to knee surgery with Dr. Henderson   Elevated periop risk of CV events for moderate risk procedure.  Ok to proceed to the scheduled surgery without further cardiac study.  Cont statin periop    Visit today included increased complexity associated with the care of the episodic problem dyspnea addressed and managing the longitudinal care of the patient due to the serious and/or complex managed problem(s) .      Thank you for allowing me to participate in this patient's care. Please do not hesitate to contact me with any questions or concerns. Consult note has been forwarded to the referral physician.     Rivera Grant MD, [facility]  Cardiovascular Disease  [facility], [place]  [phone] (P)

## 2025-07-19 LAB
OHS QRS DURATION: 80 MS
OHS QTC CALCULATION: 418 MS

## 2025-07-23 ENCOUNTER — CLINICAL SUPPORT (OUTPATIENT)
Dept: AUDIOLOGY | Facility: CLINIC | Age: 75
End: 2025-07-23
Payer: MEDICARE

## 2025-07-23 ENCOUNTER — PATIENT MESSAGE (OUTPATIENT)
Dept: ORTHOPEDICS | Facility: CLINIC | Age: 75
End: 2025-07-23
Payer: MEDICARE

## 2025-07-23 DIAGNOSIS — H90.3 HEARING LOSS, SENSORINEURAL, ASYMMETRICAL: Primary | ICD-10-CM

## 2025-07-23 NOTE — PROGRESS NOTES
Sherrill Avery was seen 07/23/2025 for a hearing aid follow-up appointment.  Right aid replaced. Aids reprogrammed to user settings. Left aid firmware updated. There was a mis-match in left speaker and she reports going to [facility] and perhaps it was replaced there. I replaced with new 5.0 1(M) left speaker and it solved the issue. Speech enhancer turned on and aids set to 100% of target. She reported improvement in volume and clarity.     Re-paired to phone and into rush and both working as intended.     Patient will pay $331.50 for replacement.     She understands that her warranty on both aids expires 12/28/25. She may renew her warranty and she may need to send the left one off prior due to high battery drain.    Patient will call for any future hearing aid follow-up appointments as needed. She will request referral for audiogram after surgery recovery.

## 2025-07-24 ENCOUNTER — TELEPHONE (OUTPATIENT)
Dept: OPHTHALMOLOGY | Facility: CLINIC | Age: 75
End: 2025-07-24
Payer: MEDICARE

## 2025-07-24 ENCOUNTER — TELEPHONE (OUTPATIENT)
Dept: OTOLARYNGOLOGY | Facility: CLINIC | Age: 75
End: 2025-07-24
Payer: MEDICARE

## 2025-07-24 ENCOUNTER — PATIENT MESSAGE (OUTPATIENT)
Dept: CARDIOLOGY | Facility: CLINIC | Age: 75
End: 2025-07-24
Payer: MEDICARE

## 2025-07-25 ENCOUNTER — OFFICE VISIT (OUTPATIENT)
Dept: OTOLARYNGOLOGY | Facility: CLINIC | Age: 75
End: 2025-07-25
Payer: MEDICARE

## 2025-07-25 ENCOUNTER — PATIENT MESSAGE (OUTPATIENT)
Dept: ORTHOPEDICS | Facility: CLINIC | Age: 75
End: 2025-07-25
Payer: MEDICARE

## 2025-07-25 VITALS — WEIGHT: 193.81 LBS | HEIGHT: 64 IN | BODY MASS INDEX: 33.09 KG/M2

## 2025-07-25 DIAGNOSIS — H81.13 BPPV (BENIGN PAROXYSMAL POSITIONAL VERTIGO), BILATERAL: Primary | ICD-10-CM

## 2025-07-25 DIAGNOSIS — R42 VERTIGO: ICD-10-CM

## 2025-07-25 PROCEDURE — 99999 PR PBB SHADOW E&M-EST. PATIENT-LVL IV: CPT | Mod: PBBFAC,,,

## 2025-07-25 PROCEDURE — 99214 OFFICE O/P EST MOD 30 MIN: CPT | Mod: PBBFAC

## 2025-07-25 NOTE — PROGRESS NOTES
Subjective:   Patient ID: Sherrill Avery is a 74 y.o. female.    Chief Complaint: Otalgia, Ear Problem, and Hearing Loss (Pt is coming in for otalgia. Pt states that she is experiencing hl and ear pain.)    History of Present Illness    Patient presents today with vertigo. She reports onset of vertigo symptoms yesterday morning upon sitting up, describing a spinning sensation. She experiences dizziness when turning to look at the TV, becoming unsteady and leaning when rotating her head. She is unable to lay on the left side in bed due to increased dizziness. She has a history of bppv years ago. She has upcoming right knee replacement scheduled for Monday and expresses anxiety regarding post-surgical mobility and wound management. She is considering postponement due to concerns about her current dizziness issues.       Review of patient's allergies indicates:   Allergen Reactions    Talwin compound Anxiety     hallucinations/anxiety    Tramadol Itching    Norco [hydrocodone-acetaminophen] Itching    Buspirone Anxiety    Codeine Itching    Morphine Itching     jittery    Morpholine analogues Anxiety and Itching    Naproxen Itching     Takes Ibuprofen is ok on rare occasion     Nexium [esomeprazole magnesium] Other (See Comments)     constipation    Wal-phed [pseudoephedrine hcl] Itching           Review of Systems   Constitutional:  Negative for chills, fatigue, fever and unexpected weight change.   HENT:  Negative for congestion, dental problem, ear discharge, ear pain, facial swelling, hearing loss, nosebleeds, postnasal drip, rhinorrhea, sinus pressure, sneezing, sore throat, tinnitus, trouble swallowing and voice change.    Eyes:  Negative for redness, itching and visual disturbance.   Respiratory:  Negative for cough, choking, shortness of breath and wheezing.    Cardiovascular:  Negative for chest pain and palpitations.   Gastrointestinal:  Positive for nausea. Negative for abdominal pain.        No reflux.  "  Musculoskeletal:  Negative for gait problem.   Skin:  Negative for rash.   Neurological:  Positive for dizziness and light-headedness. Negative for headaches.         Objective:   Ht 5' 4" (1.626 m)   Wt 87.9 kg (193 lb 12.6 oz)   BMI 33.26 kg/m²     Physical Exam  Constitutional:       General: She is not in acute distress.     Appearance: Normal appearance. She is not ill-appearing.   HENT:      Head: Normocephalic and atraumatic.      Right Ear: Tympanic membrane, ear canal and external ear normal.      Left Ear: Tympanic membrane, ear canal and external ear normal.      Ears:      Comments: [place] hallpike positive bilaterally   More severe on the left - repositioned today     Nose: Nose normal. No congestion or rhinorrhea.      Mouth/Throat:      Mouth: Mucous membranes are moist.      Pharynx: Oropharynx is clear. Uvula midline. No oropharyngeal exudate or posterior oropharyngeal erythema.   Eyes:      Extraocular Movements: Extraocular movements intact.      Conjunctiva/sclera: Conjunctivae normal.      Pupils: Pupils are equal, round, and reactive to light.   Neck:      Thyroid: No thyroid mass.   Pulmonary:      Effort: Pulmonary effort is normal. No respiratory distress.   Musculoskeletal:         General: Normal range of motion.      Cervical back: Full passive range of motion without pain.   Lymphadenopathy:      Cervical: No cervical adenopathy.   Neurological:      General: No focal deficit present.      Mental Status: She is alert and oriented to person, place, and time.   Psychiatric:         Mood and Affect: Mood normal.         Behavior: Behavior normal. Behavior is cooperative.         Thought Content: Thought content normal.         Judgment: Judgment normal.            Sherrill Avery was seen 07/25/2025 for Epley maneuver for BPPV in the left ear.      A 5-position Epley maneuver was performed for the left.  Patient tolerated the maneuver well and was asymptomatic upon discharge.  No nystagmus " seen on post-procedure examination.  Post-Epley home instructions were reviewed and given to the patient.  Understanding was voiced.     Assessment/Plan:     1. BPPV (benign paroxysmal positional vertigo), bilateral    2. Vertigo          BPPV (benign paroxysmal positional vertigo), bilateral  -     Ambulatory Referral/Consult to Physical Therapy; Future; Expected date: 08/01/2025    Vertigo        Assessment & Plan         We had a long discussion regarding the relevant anatomy and pathology relevant to BPPV.  We discussed that in the ear there are three semicircular canals that detect rotational movement.  BPPV occurs as a result of otoconia, tiny crystals of calcium carbonate that are a normal part of the inner ears anatomy, detaching from their normal anatomic position and collecting in one of the semicircular canals.  When the head moves, the otoconia shift. This stimulates the cupula to send false signals to the brain, producing vertigo and triggering nystagmus (involuntary eye movements).  I performed CRM of the left ear today, and provided the patient with necessary post-procedure instructions. Given her upcoming knee surgery & bilateral BPPV, I recommended she be referred to [facility] for recheck & repeat maneuvers. She has seen Liz in the past & plans to see her post operatively for knee replacement.       This note was generated with the assistance of ambient listening technology. Verbal consent was obtained by the patient and accompanying visitor(s) for the recording of patient appointment to facilitate this note. I attest to having reviewed and edited the generated note for accuracy, though some syntax or spelling errors may persist. Please contact the author of this note for any clarification.    I spent a total of 30 minutes on the day of the visit.  This includes face to face time and non-face to face time preparing to see the patient (eg, review of tests), obtaining and/or reviewing separately  obtained history, documenting clinical information in the electronic or other health record, independently interpreting results and communicating results to the patient/family/caregiver, or care coordinator.

## 2025-07-25 NOTE — Clinical Note
Driss mckeon!  I saw this patient today & she is positive for BPPV bilaterally. I repositioned the left today. She is having surgery on Monday for a right knee replacement & is planning to see you for PT for that as well so I thought it would probably be better for me to just place referral to you to recheck BPPV instead of her seeing me again. She wanted me to reach out to you so you were aware!! Let me know if there is anything I need to do!   Thanks so much,  Willy Pike PA-C

## 2025-07-29 ENCOUNTER — HOSPITAL ENCOUNTER (EMERGENCY)
Facility: HOSPITAL | Age: 75
Discharge: HOME | End: 2025-07-29
Attending: EMERGENCY MEDICINE
Payer: MEDICARE

## 2025-07-29 ENCOUNTER — TELEPHONE (OUTPATIENT)
Dept: ORTHOPEDICS | Facility: CLINIC | Age: 75
End: 2025-07-29
Payer: MEDICARE

## 2025-07-29 VITALS
RESPIRATION RATE: 20 BRPM | WEIGHT: 193 LBS | BODY MASS INDEX: 32.15 KG/M2 | HEART RATE: 79 BPM | OXYGEN SATURATION: 98 % | DIASTOLIC BLOOD PRESSURE: 53 MMHG | HEIGHT: 65 IN | TEMPERATURE: 99 F | SYSTOLIC BLOOD PRESSURE: 109 MMHG

## 2025-07-29 DIAGNOSIS — R07.9 CHEST PAIN: ICD-10-CM

## 2025-07-29 DIAGNOSIS — N28.9 MILD RENAL INSUFFICIENCY: ICD-10-CM

## 2025-07-29 DIAGNOSIS — F41.0 ANXIETY ATTACK: Primary | ICD-10-CM

## 2025-07-29 LAB
ABSOLUTE EOSINOPHIL (OHS): 0.03 K/UL
ABSOLUTE MONOCYTE (OHS): 0.8 K/UL (ref 0.3–1)
ABSOLUTE NEUTROPHIL COUNT (OHS): 7.71 K/UL (ref 1.8–7.7)
ALBUMIN SERPL BCP-MCNC: 3.4 G/DL (ref 3.5–5.2)
ALP SERPL-CCNC: 75 UNIT/L (ref 40–150)
ALT SERPL W/O P-5'-P-CCNC: 12 UNIT/L (ref 10–44)
ANION GAP (OHS): 10 MMOL/L (ref 8–16)
AST SERPL-CCNC: 23 UNIT/L (ref 11–45)
BASOPHILS # BLD AUTO: 0.02 K/UL
BASOPHILS NFR BLD AUTO: 0.2 %
BILIRUB SERPL-MCNC: 0.5 MG/DL (ref 0.1–1)
BUN SERPL-MCNC: 26 MG/DL (ref 8–23)
CALCIUM SERPL-MCNC: 8.5 MG/DL (ref 8.7–10.5)
CHLORIDE SERPL-SCNC: 104 MMOL/L (ref 95–110)
CO2 SERPL-SCNC: 23 MMOL/L (ref 23–29)
CREAT SERPL-MCNC: 1.5 MG/DL (ref 0.5–1.4)
ERYTHROCYTE [DISTWIDTH] IN BLOOD BY AUTOMATED COUNT: 13.5 % (ref 11.5–14.5)
GFR SERPLBLD CREATININE-BSD FMLA CKD-EPI: 36 ML/MIN/1.73/M2
GLUCOSE SERPL-MCNC: 115 MG/DL (ref 70–110)
HCT VFR BLD AUTO: 35.7 % (ref 37–48.5)
HCV AB SERPL QL IA: NORMAL
HGB BLD-MCNC: 11.7 GM/DL (ref 12–16)
HIV 1+2 AB+HIV1 P24 AG SERPL QL IA: NORMAL
IMM GRANULOCYTES # BLD AUTO: 0.06 K/UL (ref 0–0.04)
IMM GRANULOCYTES NFR BLD AUTO: 0.6 % (ref 0–0.5)
LYMPHOCYTES # BLD AUTO: 1.13 K/UL (ref 1–4.8)
MCH RBC QN AUTO: 30.6 PG (ref 27–31)
MCHC RBC AUTO-ENTMCNC: 32.8 G/DL (ref 32–36)
MCV RBC AUTO: 94 FL (ref 82–98)
NT-PROBNP SERPL-MCNC: 157 PG/ML
NUCLEATED RBC (/100WBC) (OHS): 0 /100 WBC
PLATELET # BLD AUTO: 165 K/UL (ref 150–450)
PMV BLD AUTO: 9.6 FL (ref 9.2–12.9)
POTASSIUM SERPL-SCNC: 4.1 MMOL/L (ref 3.5–5.1)
PROT SERPL-MCNC: 6.2 GM/DL (ref 6–8.4)
RBC # BLD AUTO: 3.82 M/UL (ref 4–5.4)
RELATIVE EOSINOPHIL (OHS): 0.3 %
RELATIVE LYMPHOCYTE (OHS): 11.6 % (ref 18–48)
RELATIVE MONOCYTE (OHS): 8.2 % (ref 4–15)
RELATIVE NEUTROPHIL (OHS): 79.1 % (ref 38–73)
SODIUM SERPL-SCNC: 137 MMOL/L (ref 136–145)
TROPONIN I SERPL HS-MCNC: <3 NG/L
WBC # BLD AUTO: 9.75 K/UL (ref 3.9–12.7)

## 2025-07-29 PROCEDURE — 83880 ASSAY OF NATRIURETIC PEPTIDE: CPT | Performed by: NURSE PRACTITIONER

## 2025-07-29 PROCEDURE — 80053 COMPREHEN METABOLIC PANEL: CPT | Performed by: NURSE PRACTITIONER

## 2025-07-29 PROCEDURE — 99285 EMERGENCY DEPT VISIT HI MDM: CPT | Mod: 25

## 2025-07-29 PROCEDURE — 87389 HIV-1 AG W/HIV-1&-2 AB AG IA: CPT | Performed by: EMERGENCY MEDICINE

## 2025-07-29 PROCEDURE — 84484 ASSAY OF TROPONIN QUANT: CPT | Performed by: NURSE PRACTITIONER

## 2025-07-29 PROCEDURE — 85025 COMPLETE CBC W/AUTO DIFF WBC: CPT | Performed by: NURSE PRACTITIONER

## 2025-07-29 PROCEDURE — 93005 ELECTROCARDIOGRAM TRACING: CPT

## 2025-07-29 PROCEDURE — 86803 HEPATITIS C AB TEST: CPT | Performed by: EMERGENCY MEDICINE

## 2025-07-29 PROCEDURE — 93010 ELECTROCARDIOGRAM REPORT: CPT | Mod: ,,, | Performed by: INTERNAL MEDICINE

## 2025-07-29 NOTE — TELEPHONE ENCOUNTER
----- Message from Med Assistant Swanson sent at 7/29/2025  3:35 PM CDT -----  Contact: MEERA shahid  Going back to hospital jaw pain, chest pain   [facility]   Call back  [phone]

## 2025-07-30 ENCOUNTER — PATIENT MESSAGE (OUTPATIENT)
Dept: ORTHOPEDICS | Facility: CLINIC | Age: 75
End: 2025-07-30
Payer: MEDICARE

## 2025-07-30 ENCOUNTER — TELEPHONE (OUTPATIENT)
Dept: ORTHOPEDICS | Facility: CLINIC | Age: 75
End: 2025-07-30
Payer: MEDICARE

## 2025-07-30 ENCOUNTER — PATIENT OUTREACH (OUTPATIENT)
Dept: ADMINISTRATIVE | Facility: HOSPITAL | Age: 75
End: 2025-07-30
Payer: MEDICARE

## 2025-07-30 ENCOUNTER — PATIENT MESSAGE (OUTPATIENT)
Dept: ADMINISTRATIVE | Facility: OTHER | Age: 75
End: 2025-07-30
Payer: MEDICARE

## 2025-07-30 LAB
OHS QRS DURATION: 84 MS
OHS QTC CALCULATION: 413 MS

## 2025-07-30 NOTE — PROGRESS NOTES
C OUTREACH: per chart review pt had Cscope in 2022 with Dr Rodolfo Ashby, pt will age out of recommended Cscope in one month, pt is in hospital at this time, pt has aged out of recommended mammogram.

## 2025-07-30 NOTE — TELEPHONE ENCOUNTER
Spoke with patient. All questions answered. Needs silver bandage mailed to her home. Messaged [employer] for assistance.

## 2025-07-31 ENCOUNTER — OFFICE VISIT (OUTPATIENT)
Dept: ORTHOPEDICS | Facility: CLINIC | Age: 75
End: 2025-07-31
Payer: MEDICARE

## 2025-07-31 VITALS — WEIGHT: 192.88 LBS | BODY MASS INDEX: 32.14 KG/M2 | HEIGHT: 65 IN

## 2025-07-31 DIAGNOSIS — M17.11 PRIMARY OSTEOARTHRITIS OF RIGHT KNEE: Primary | ICD-10-CM

## 2025-07-31 PROCEDURE — 99214 OFFICE O/P EST MOD 30 MIN: CPT | Mod: PBBFAC,PO | Performed by: ORTHOPAEDIC SURGERY

## 2025-07-31 PROCEDURE — 99024 POSTOP FOLLOW-UP VISIT: CPT | Mod: POP,,, | Performed by: ORTHOPAEDIC SURGERY

## 2025-07-31 PROCEDURE — 99999 PR PBB SHADOW E&M-EST. PATIENT-LVL IV: CPT | Mod: PBBFAC,,, | Performed by: ORTHOPAEDIC SURGERY

## 2025-08-01 ENCOUNTER — PATIENT MESSAGE (OUTPATIENT)
Dept: ORTHOPEDICS | Facility: CLINIC | Age: 75
End: 2025-08-01
Payer: MEDICARE

## 2025-08-01 LAB — HOLD SPECIMEN: NORMAL

## 2025-08-02 ENCOUNTER — PATIENT MESSAGE (OUTPATIENT)
Dept: ORTHOPEDICS | Facility: CLINIC | Age: 75
End: 2025-08-02
Payer: MEDICARE

## 2025-08-02 DIAGNOSIS — M17.11 OSTEOARTHRITIS OF RIGHT KNEE, UNSPECIFIED OSTEOARTHRITIS TYPE: ICD-10-CM

## 2025-08-04 RX ORDER — OXYCODONE HYDROCHLORIDE 5 MG/1
5 TABLET ORAL EVERY 4 HOURS PRN
Qty: 22 TABLET | Refills: 0 | Status: SHIPPED | OUTPATIENT
Start: 2025-08-04 | End: 2025-08-07 | Stop reason: SDUPTHER

## 2025-08-04 NOTE — TELEPHONE ENCOUNTER
oYko, did you or do you remember doing anything for this pt, refills of Oxycodone she is looking for./

## 2025-08-05 ENCOUNTER — HOSPITAL ENCOUNTER (OUTPATIENT)
Dept: CARDIOLOGY | Facility: HOSPITAL | Age: 75
Discharge: HOME | End: 2025-08-05
Attending: ORTHOPAEDIC SURGERY
Payer: MEDICARE

## 2025-08-05 ENCOUNTER — PATIENT MESSAGE (OUTPATIENT)
Dept: ORTHOPEDICS | Facility: CLINIC | Age: 75
End: 2025-08-05
Payer: MEDICARE

## 2025-08-05 DIAGNOSIS — M79.661 RIGHT CALF PAIN: Primary | ICD-10-CM

## 2025-08-05 DIAGNOSIS — M79.661 RIGHT CALF PAIN: ICD-10-CM

## 2025-08-05 DIAGNOSIS — M17.11 OSTEOARTHRITIS OF RIGHT KNEE, UNSPECIFIED OSTEOARTHRITIS TYPE: Primary | ICD-10-CM

## 2025-08-05 PROCEDURE — 93971 EXTREMITY STUDY: CPT | Mod: 26,RT,, | Performed by: INTERNAL MEDICINE

## 2025-08-05 PROCEDURE — 93971 EXTREMITY STUDY: CPT | Mod: PO,RT

## 2025-08-06 ENCOUNTER — HOSPITAL ENCOUNTER (OUTPATIENT)
Dept: RADIOLOGY | Facility: HOSPITAL | Age: 75
Discharge: HOME | End: 2025-08-06
Attending: ORTHOPAEDIC SURGERY
Payer: MEDICARE

## 2025-08-06 DIAGNOSIS — M17.11 OSTEOARTHRITIS OF RIGHT KNEE, UNSPECIFIED OSTEOARTHRITIS TYPE: ICD-10-CM

## 2025-08-06 PROCEDURE — 73562 X-RAY EXAM OF KNEE 3: CPT | Mod: 26,RT,, | Performed by: RADIOLOGY

## 2025-08-06 PROCEDURE — 73562 X-RAY EXAM OF KNEE 3: CPT | Mod: TC,RT

## 2025-08-07 ENCOUNTER — PATIENT MESSAGE (OUTPATIENT)
Dept: ORTHOPEDICS | Facility: CLINIC | Age: 75
End: 2025-08-07
Payer: MEDICARE

## 2025-08-07 DIAGNOSIS — M17.11 OSTEOARTHRITIS OF RIGHT KNEE, UNSPECIFIED OSTEOARTHRITIS TYPE: ICD-10-CM

## 2025-08-07 DIAGNOSIS — Z96.651 S/P TOTAL KNEE ARTHROPLASTY, RIGHT: Primary | ICD-10-CM

## 2025-08-07 RX ORDER — TOLMETIN SODIUM 600 MG/1
600 TABLET, FILM COATED ORAL 3 TIMES DAILY
Qty: 90 EACH | Refills: 3 | Status: CANCELLED | OUTPATIENT
Start: 2025-08-07

## 2025-08-07 RX ORDER — OXYCODONE HYDROCHLORIDE 5 MG/1
5 TABLET ORAL EVERY 4 HOURS PRN
Qty: 22 TABLET | Refills: 0 | Status: SHIPPED | OUTPATIENT
Start: 2025-08-07

## 2025-08-07 RX ORDER — TRAMADOL HYDROCHLORIDE 50 MG/1
50 TABLET, FILM COATED ORAL EVERY 6 HOURS PRN
Qty: 28 TABLET | Refills: 0 | Status: SHIPPED | OUTPATIENT
Start: 2025-08-07

## 2025-08-07 RX ORDER — METHOCARBAMOL 500 MG/1
500 TABLET, FILM COATED ORAL 4 TIMES DAILY PRN
Qty: 60 TABLET | Refills: 2 | Status: SHIPPED | OUTPATIENT
Start: 2025-08-07

## 2025-08-07 NOTE — TELEPHONE ENCOUNTER
----- Message from Med Assistant Swanson sent at 8/7/2025  8:24 AM CDT -----  Contact: pt  Refill on pain med  robaxin, tolmetin 600  Pharmacy  [facility]  [place]   Yesterday  changed bandage discharge   Call back  [phone]

## 2025-08-12 ENCOUNTER — OFFICE VISIT (OUTPATIENT)
Dept: ORTHOPEDICS | Facility: CLINIC | Age: 75
End: 2025-08-12
Payer: MEDICARE

## 2025-08-12 ENCOUNTER — PATIENT MESSAGE (OUTPATIENT)
Dept: ORTHOPEDICS | Facility: CLINIC | Age: 75
End: 2025-08-12

## 2025-08-12 DIAGNOSIS — M17.11 PRIMARY OSTEOARTHRITIS OF RIGHT KNEE: Primary | ICD-10-CM

## 2025-08-12 PROCEDURE — 99211 OFF/OP EST MAY X REQ PHY/QHP: CPT | Mod: PBBFAC,PO | Performed by: ORTHOPAEDIC SURGERY

## 2025-08-12 PROCEDURE — 99999 PR PBB SHADOW E&M-EST. PATIENT-LVL I: CPT | Mod: PBBFAC,,, | Performed by: ORTHOPAEDIC SURGERY

## 2025-08-12 PROCEDURE — 99024 POSTOP FOLLOW-UP VISIT: CPT | Mod: POP,,, | Performed by: ORTHOPAEDIC SURGERY

## 2025-08-12 RX ORDER — METHOCARBAMOL 750 MG/1
750 TABLET, FILM COATED ORAL 4 TIMES DAILY PRN
Qty: 44 TABLET | Refills: 3 | Status: SHIPPED | OUTPATIENT
Start: 2025-08-12

## 2025-08-12 RX ORDER — TRAMADOL HYDROCHLORIDE 50 MG/1
50 TABLET, FILM COATED ORAL EVERY 4 HOURS PRN
Qty: 44 TABLET | Refills: 0 | Status: SHIPPED | OUTPATIENT
Start: 2025-08-12

## 2025-08-13 ENCOUNTER — PATIENT MESSAGE (OUTPATIENT)
Dept: ADMINISTRATIVE | Facility: HOSPITAL | Age: 75
End: 2025-08-13
Payer: MEDICARE

## 2025-08-13 ENCOUNTER — CLINICAL SUPPORT (OUTPATIENT)
Dept: REHABILITATION | Facility: HOSPITAL | Age: 75
End: 2025-08-13
Payer: MEDICARE

## 2025-08-13 DIAGNOSIS — H81.13 BPPV (BENIGN PAROXYSMAL POSITIONAL VERTIGO), BILATERAL: ICD-10-CM

## 2025-08-13 DIAGNOSIS — R29.898 WEAKNESS OF RIGHT LOWER EXTREMITY: ICD-10-CM

## 2025-08-13 DIAGNOSIS — Z96.651 S/P TKR (TOTAL KNEE REPLACEMENT), RIGHT: Primary | ICD-10-CM

## 2025-08-13 PROCEDURE — 97530 THERAPEUTIC ACTIVITIES: CPT | Mod: PN

## 2025-08-13 PROCEDURE — 97161 PT EVAL LOW COMPLEX 20 MIN: CPT | Mod: PN

## 2025-08-19 ENCOUNTER — CLINICAL SUPPORT (OUTPATIENT)
Dept: REHABILITATION | Facility: HOSPITAL | Age: 75
End: 2025-08-19
Payer: MEDICARE

## 2025-08-19 ENCOUNTER — PATIENT MESSAGE (OUTPATIENT)
Dept: ORTHOPEDICS | Facility: CLINIC | Age: 75
End: 2025-08-19
Payer: MEDICARE

## 2025-08-19 DIAGNOSIS — Z96.651 S/P TKR (TOTAL KNEE REPLACEMENT), RIGHT: Primary | ICD-10-CM

## 2025-08-19 DIAGNOSIS — R29.898 WEAKNESS OF RIGHT LOWER EXTREMITY: ICD-10-CM

## 2025-08-19 PROCEDURE — 97112 NEUROMUSCULAR REEDUCATION: CPT | Mod: PN

## 2025-08-19 PROCEDURE — 97530 THERAPEUTIC ACTIVITIES: CPT | Mod: PN

## 2025-08-19 PROCEDURE — 97140 MANUAL THERAPY 1/> REGIONS: CPT | Mod: PN

## 2025-08-20 ENCOUNTER — PATIENT MESSAGE (OUTPATIENT)
Dept: ORTHOPEDICS | Facility: CLINIC | Age: 75
End: 2025-08-20
Payer: MEDICARE

## 2025-08-20 DIAGNOSIS — M17.11 PRIMARY OSTEOARTHRITIS OF RIGHT KNEE: ICD-10-CM

## 2025-08-20 RX ORDER — TRAMADOL HYDROCHLORIDE 50 MG/1
50 TABLET, FILM COATED ORAL EVERY 6 HOURS PRN
Qty: 28 TABLET | Refills: 0 | Status: SHIPPED | OUTPATIENT
Start: 2025-08-20

## 2025-08-26 ENCOUNTER — CLINICAL SUPPORT (OUTPATIENT)
Dept: REHABILITATION | Facility: HOSPITAL | Age: 75
End: 2025-08-26
Payer: MEDICARE

## 2025-08-26 DIAGNOSIS — Z96.651 S/P TKR (TOTAL KNEE REPLACEMENT), RIGHT: Primary | ICD-10-CM

## 2025-08-26 DIAGNOSIS — R29.898 WEAKNESS OF RIGHT LOWER EXTREMITY: ICD-10-CM

## 2025-08-26 DIAGNOSIS — Z98.890 POSTOPERATIVE STATE: Primary | ICD-10-CM

## 2025-08-26 DIAGNOSIS — M17.11 PRIMARY OSTEOARTHRITIS OF RIGHT KNEE: ICD-10-CM

## 2025-08-26 PROCEDURE — 97110 THERAPEUTIC EXERCISES: CPT | Mod: PN

## 2025-08-26 PROCEDURE — 97530 THERAPEUTIC ACTIVITIES: CPT | Mod: PN

## 2025-08-26 PROCEDURE — 97140 MANUAL THERAPY 1/> REGIONS: CPT | Mod: PN

## 2025-08-26 PROCEDURE — 97112 NEUROMUSCULAR REEDUCATION: CPT | Mod: PN

## 2025-08-26 RX ORDER — TRAMADOL HYDROCHLORIDE 50 MG/1
50 TABLET, FILM COATED ORAL EVERY 6 HOURS PRN
Qty: 28 TABLET | Refills: 0 | Status: SHIPPED | OUTPATIENT
Start: 2025-08-26

## 2025-08-28 DIAGNOSIS — J30.9 ALLERGIC RHINITIS, UNSPECIFIED SEASONALITY, UNSPECIFIED TRIGGER: ICD-10-CM

## 2025-08-28 RX ORDER — LEVOCETIRIZINE DIHYDROCHLORIDE 5 MG/1
5 TABLET, FILM COATED ORAL NIGHTLY
Qty: 90 TABLET | Refills: 3 | Status: SHIPPED | OUTPATIENT
Start: 2025-08-28

## 2025-09-03 ENCOUNTER — CLINICAL SUPPORT (OUTPATIENT)
Dept: REHABILITATION | Facility: HOSPITAL | Age: 75
End: 2025-09-03
Payer: MEDICARE

## 2025-09-03 DIAGNOSIS — Z96.651 S/P TKR (TOTAL KNEE REPLACEMENT), RIGHT: Primary | ICD-10-CM

## 2025-09-03 DIAGNOSIS — R29.898 WEAKNESS OF RIGHT LOWER EXTREMITY: ICD-10-CM

## 2025-09-03 PROCEDURE — 97530 THERAPEUTIC ACTIVITIES: CPT | Mod: PN

## 2025-09-03 PROCEDURE — 97110 THERAPEUTIC EXERCISES: CPT | Mod: PN

## 2025-09-03 PROCEDURE — 97140 MANUAL THERAPY 1/> REGIONS: CPT | Mod: PN

## 2025-09-03 PROCEDURE — 97112 NEUROMUSCULAR REEDUCATION: CPT | Mod: PN

## (undated) DEVICE — UNDERGLOVES BIOGEL PI SIZE 8.5

## (undated) DEVICE — SYR 50CC LL

## (undated) DEVICE — GAUZE SPONGE 4X4 12PLY

## (undated) DEVICE — DRESSING LEUKOPLAST FLEX 1X3IN

## (undated) DEVICE — SYS REVOLUTION CEMENT MIXING

## (undated) DEVICE — MASK FLYTE HOOD PEEL AWAY

## (undated) DEVICE — KIT IRR SUCTION HND PIECE

## (undated) DEVICE — PAD KNEE POLAR XL

## (undated) DEVICE — SEE MEDLINE ITEM 157144

## (undated) DEVICE — SOL IRR NACL .9% 3000ML

## (undated) DEVICE — DRAPE INCISE IOBAN 2 23X33IN

## (undated) DEVICE — BLADE SAGITTAL 18 X 1.27 X 90M

## (undated) DEVICE — BRUSH SCRUB HIBICLENS 4%

## (undated) DEVICE — DRAPE SURG W/TWL 17 5/8X23

## (undated) DEVICE — CATH SUCTION 10FR

## (undated) DEVICE — CONTAINER SPECIMEN STRL 4OZ

## (undated) DEVICE — SUT 1 36IN COATED VICRYL UN

## (undated) DEVICE — ELECTRODE REM PLYHSV RETURN 9

## (undated) DEVICE — TAPE SILK 3IN

## (undated) DEVICE — SUT QUILL PDO VIOL CP 45CM 2

## (undated) DEVICE — SPONGE GAUZE 16PLY 4X4

## (undated) DEVICE — SUT MCRYL PLUS 4-0 PS2 27IN

## (undated) DEVICE — TOWEL OR XRAY WHITE 17X26IN

## (undated) DEVICE — SEE MEDLINE ITEM 157131

## (undated) DEVICE — ALCOHOL 70% ISOP W/GREEN 16OZ

## (undated) DEVICE — SOL BETADINE 5%

## (undated) DEVICE — MARKER SKIN STND TIP BLUE BARR

## (undated) DEVICE — SUT 2/0 36IN COATED VICRYL

## (undated) DEVICE — PUMP COLD THERAPY

## (undated) DEVICE — ADHESIVE DERMABOND ADVANCED

## (undated) DEVICE — SYS KNEE EPAK PIN ATTUNE
Type: IMPLANTABLE DEVICE | Site: KNEE | Status: NON-FUNCTIONAL
Removed: 2021-06-14

## (undated) DEVICE — BLADE DUAL CUT SAG 35X64X.89MM

## (undated) DEVICE — SEE MEDLINE ITEM 146298

## (undated) DEVICE — NDL 18GA X1 1/2 REG BEVEL

## (undated) DEVICE — GLOVE BIOGEL SKINSENSE PI 8.0

## (undated) DEVICE — DRESSING TELFA N ADH 3X8

## (undated) DEVICE — DRESSING TRANS 4X4 TEGADERM

## (undated) DEVICE — DRESSING AQUACEL AG RBBN 2X45

## (undated) DEVICE — KIT TOTAL KNEE TKOFG